# Patient Record
Sex: FEMALE | Race: WHITE | NOT HISPANIC OR LATINO | Employment: FULL TIME | URBAN - METROPOLITAN AREA
[De-identification: names, ages, dates, MRNs, and addresses within clinical notes are randomized per-mention and may not be internally consistent; named-entity substitution may affect disease eponyms.]

---
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
## 2018-01-19 ASSESSMENT — ASTHMA QUESTIONNAIRES: ACT_TOTALSCORE: 13

## 2018-02-07 ENCOUNTER — TELEPHONE (OUTPATIENT)
Dept: FAMILY MEDICINE | Facility: CLINIC | Age: 18
End: 2018-02-07

## 2018-02-07 NOTE — TELEPHONE ENCOUNTER
Called patient and made her an appt for 2/8. Asked her if she wanted to talk with triage and she declined. States that she is not having any suicidal thoughts or anything. Just feeling anxious. I did talk with ELLY Patel and she said if she gets worse before appt she should go to the ER. Patient agrees with plan.  ADRIANE/MA

## 2018-02-07 NOTE — TELEPHONE ENCOUNTER
Reason for Call:  Same Day Appointment, Requested Provider:  Oscar Medley M.D.    PCP: Oscar Medley    Reason for visit: Patient is scheduled for an appt with PCP for severe anxiety. She would really like to get worked in sooner if able. Please call her back to let her know if there is any chance she can be worked in sooner.     Duration of symptoms:     Have you been treated for this in the past? Yes    Additional comments:     Can we leave a detailed message on this number? YES    Phone number patient can be reached at: Home number on file [phone] (home)    Best Time: any    Call taken on 2/7/2018 at 3:21 PM by Jacklyn Felix

## 2018-02-08 ENCOUNTER — OFFICE VISIT (OUTPATIENT)
Dept: FAMILY MEDICINE | Facility: CLINIC | Age: 18
End: 2018-02-08
Payer: COMMERCIAL

## 2018-02-08 VITALS
TEMPERATURE: 97.9 F | WEIGHT: 283 LBS | BODY MASS INDEX: 41.92 KG/M2 | HEIGHT: 69 IN | OXYGEN SATURATION: 98 % | DIASTOLIC BLOOD PRESSURE: 72 MMHG | SYSTOLIC BLOOD PRESSURE: 122 MMHG | RESPIRATION RATE: 20 BRPM | HEART RATE: 100 BPM

## 2018-02-08 DIAGNOSIS — F32.1 MAJOR DEPRESSIVE DISORDER, SINGLE EPISODE, MODERATE (H): ICD-10-CM

## 2018-02-08 DIAGNOSIS — F41.1 GAD (GENERALIZED ANXIETY DISORDER): Primary | ICD-10-CM

## 2018-02-08 PROCEDURE — 99214 OFFICE O/P EST MOD 30 MIN: CPT | Performed by: FAMILY MEDICINE

## 2018-02-08 RX ORDER — VENLAFAXINE HYDROCHLORIDE 37.5 MG/1
37.5 CAPSULE, EXTENDED RELEASE ORAL DAILY
Qty: 60 CAPSULE | Refills: 3 | Status: ON HOLD | OUTPATIENT
Start: 2018-02-08 | End: 2018-02-22

## 2018-02-08 ASSESSMENT — PAIN SCALES - GENERAL: PAINLEVEL: NO PAIN (0)

## 2018-02-08 ASSESSMENT — ANXIETY QUESTIONNAIRES
IF YOU CHECKED OFF ANY PROBLEMS ON THIS QUESTIONNAIRE, HOW DIFFICULT HAVE THESE PROBLEMS MADE IT FOR YOU TO DO YOUR WORK, TAKE CARE OF THINGS AT HOME, OR GET ALONG WITH OTHER PEOPLE: SOMEWHAT DIFFICULT
6. BECOMING EASILY ANNOYED OR IRRITABLE: SEVERAL DAYS
2. NOT BEING ABLE TO STOP OR CONTROL WORRYING: MORE THAN HALF THE DAYS
3. WORRYING TOO MUCH ABOUT DIFFERENT THINGS: MORE THAN HALF THE DAYS
GAD7 TOTAL SCORE: 13
5. BEING SO RESTLESS THAT IT IS HARD TO SIT STILL: SEVERAL DAYS
1. FEELING NERVOUS, ANXIOUS, OR ON EDGE: MORE THAN HALF THE DAYS
7. FEELING AFRAID AS IF SOMETHING AWFUL MIGHT HAPPEN: NEARLY EVERY DAY

## 2018-02-08 ASSESSMENT — PATIENT HEALTH QUESTIONNAIRE - PHQ9: 5. POOR APPETITE OR OVEREATING: MORE THAN HALF THE DAYS

## 2018-02-08 NOTE — MR AVS SNAPSHOT
After Visit Summary   2/8/2018    Sasha Hand    MRN: [id number]           Patient Information     Date Of Birth          2000        Visit Information        Provider Department      2/8/2018 2:00 PM Oscar Medley MD [facility]        Today's Diagnoses     DELIA (generalized anxiety disorder)    -  1    Major depressive disorder, single episode, moderate (H)           Follow-ups after your visit        Your next 10 appointments already scheduled     Mar 27, 2018 10:10 AM CDT   Office Visit with Oscar Medley MD   [facility]    [address] [place] [id number] [phone]           Bring a current list of meds and any records pertaining to this visit. For Physicals, please bring immunization records and any forms needing to be filled out. Please arrive 10 minutes early to complete paperwork.              Who to contact     If you have questions or need follow up information about today's clinic visit or your schedule please contact [facility] directly at [phone].  Normal or non-critical lab and imaging results will be communicated to you by MyChart, letter or phone within 4 business days after the clinic has received the results. If you do not hear from us within 7 days, please contact the clinic through [email or web address] or phone. If you have a critical or abnormal lab result, we will notify you by phone as soon as possible.  Submit refill requests through [employer] or call your pharmacy and they will forward the refill request to us. Please allow 3 business days for your refill to be completed.          Additional Information About Your Visit        MyChart Information     [employer] gives you secure access to your electronic health record. If you see a primary care provider, you can also send messages to your care team and make appointments. If you have questions, please call your primary care clinic.   "If you do not have a primary care provider, please call [phone] and they will assist you.        Care EveryWhere ID     This is your Care EveryWhere ID. This could be used by other organizations to access your [facility] medical records  [id number]        Your Vitals Were     Pulse Temperature Respirations Height Pulse Oximetry BMI (Body Mass Index)    100 97.9  F (36.6  C) (Tympanic) 20 5' 9\" (1.753 m) 98% 41.79 kg/m2       Blood Pressure from Last 3 Encounters:   02/08/18 122/72   12/09/17 149/87   12/07/17 130/74    Weight from Last 3 Encounters:   02/08/18 283 lb (128.4 kg) (>99 %)*   11/14/17 276 lb (125.2 kg) (>99 %)*   10/22/17 265 lb (120.2 kg) (>99 %)*     * Growth percentiles are based on [facility] 2-20 Years data.              Today, you had the following     No orders found for display         Today's Medication Changes          These changes are accurate as of 2/8/18  5:24 PM.  If you have any questions, ask your nurse or doctor.               Start taking these medicines.        Dose/Directions    venlafaxine 37.5 MG 24 hr capsule   Commonly known as:  EFFEXOR-XR   Used for:  DELIA (generalized anxiety disorder), Major depressive disorder, single episode, moderate (H)   Started by:  Oscar Medley MD        Dose:  37.5 mg   Take 1 capsule (37.5 mg) by mouth daily (may increase to 75 mg after 2 weeks if needed)   Quantity:  60 capsule   Refills:  3            Where to get your medicines      These medications were sent to [facility] [address] [place] [address] [id number] [facility] [address]     Phone:  [phone]     venlafaxine 37.5 MG 24 hr capsule                Primary Care Provider Office Phone # Fax #    Oscar Medley [phone] [address] [place] [address]        Equal Access to Services     [facility]: Janeth Glasgow, sekou najera, qayblester staton" ah. So [facility] [phone].    ATENCIÓN: Si dean piper, tiene a de la rosa disposición servicios gratuitos de asistencia lingüística. Emely al [phone].    We comply with applicable federal civil rights laws and [place] laws. We do not discriminate on the basis of race, color, national origin, age, disability, sex, sexual orientation, or gender identity.            Thank you!     Thank you for choosing [facility]  for your care. Our goal is always to provide you with excellent care. Hearing back from our patients is one way we can continue to improve our services. Please take a few minutes to complete the written survey that you may receive in the mail after your visit with us. Thank you!             Your Updated Medication List - Protect others around you: Learn how to safely use, store and throw away your medicines at www.disposemymeds.org.          This list is accurate as of 2/8/18  5:24 PM.  Always use your most recent med list.                   Brand Name Dispense Instructions for use Diagnosis    etonogestrel 68 MG Impl    IMPLANON/NEXPLANON     1 each (68 mg) by Subdermal route continuous    Encounter for initial prescription of implantable subdermal contraceptive       montelukast 10 MG tablet    SINGULAIR    90 tablet    Take 1 tablet (10 mg) by mouth At Bedtime    Mild persistent asthma without complication       valACYclovir 500 MG tablet    VALTREX    90 tablet    Take 1 tablet (500 mg) by mouth daily    Genital herpes simplex, unspecified site       venlafaxine 37.5 MG 24 hr capsule    EFFEXOR-XR    60 capsule    Take 1 capsule (37.5 mg) by mouth daily (may increase to 75 mg after 2 weeks if needed)    DELIA (generalized anxiety disorder), Major depressive disorder, single episode, moderate (H)

## 2018-02-08 NOTE — PROGRESS NOTES
SUBJECTIVE:   Sasha Hand is a 17 year old female who presents to clinic today for the following health issues:      Anxiety Follow-Up    Status since last visit: Worsened     Other associated symptoms:None    Complicating factors:   Significant life event: No   Current substance abuse: None  Depression symptoms: No  No flowsheet data found.    DELIA-7    Amount of exercise or physical activity: None    Problems taking medications regularly: No    Medication side effects: none    Diet: regular (no restrictions)    DELIA-7 SCORE 2/8/2018   Total Score 13     PHQ-9 SCORE 1/20/2014 2/8/2018   Total Score 13 -   Total Score - 19         PROBLEMS TO ADD ON...  Patient has been under a lot of stress she and her boyfriend are living with her parents and they were just told that they needed to start paying rent which they did, but now have been told that they need to be out by March 19.  They also have a friend of hers and her 3 children living with her parents so she is helping care for those kids and keeping them under wraps so that there is not too much chaos.  Along with the stress, she has the stress of her father's health.  He has been depending on her a lot to help with things and he is quite demanding.  She denies any suicidal or homicidal ideation but her PHQ 9 is elevated as is her DELIA 7.  Her anxiety seems worse.  She feels with all the stress she has been eating a lot more and this is the heaviest she has ever been.  She has gained almost 35 pounds since May 2017.  Her BMI is 41.79.  She also notes that her cigarette smoking has increased to a pack and a half per day.  This bothers her because her father has significant lung disease i.e. severe asthma with COPD.  See above.    Problem list and histories reviewed & adjusted, as indicated.  Additional history: as documented    Patient Active Problem List   Diagnosis     Obesity     Tear of lateral cartilage or meniscus of knee, current     Menorrhagia      "Dysmenorrhea     Wheezing     Tobacco use disorder     Genital herpes simplex, unspecified site     Mild persistent asthma without complication     Nexplanon placed 11/14/17     Past Surgical History:   Procedure Laterality Date     ARTHROSCOPY KNEE WITH MENISCAL REPAIR Right 5/10/2017    Procedure: ARTHROSCOPY KNEE WITH MENISCAL REPAIR;  arthroscopy right knee with lateral meniscus repair;  Surgeon: Nathaniel Hicks MD;  Location: [facility] OR     NO HISTORY OF SURGERY         Social History   Substance Use Topics     Smoking status: Current Every Day Smoker     Packs/day: 0.50     Types: Cigarettes     Smokeless tobacco: Never Used     Alcohol use No     Family History   Problem Relation Age of Onset     Asthma Mother      Hypertension Mother      Asthma Father      DIABETES Father      CANCER Paternal Grandmother          Allergies   Allergen Reactions     No Known Drug Allergies      Seasonal Allergies      Recent Labs   Lab Test  08/21/17   1714   TSH  2.30      BP Readings from Last 3 Encounters:   02/08/18 122/72   12/09/17 149/87   12/07/17 130/74    Wt Readings from Last 3 Encounters:   02/08/18 283 lb (128.4 kg) (>99 %)*   11/14/17 276 lb (125.2 kg) (>99 %)*   10/22/17 265 lb (120.2 kg) (>99 %)*     * Growth percentiles are based on CDC 2-20 Years data.         Labs reviewed in EPIC    Reviewed and updated as needed this visit by clinical staff  Tobacco  Allergies  Meds  Problems       Reviewed and updated as needed this visit by Provider         ROS:  Constitutional, HEENT, cardiovascular, pulmonary, gi and gu systems are negative, except as otherwise noted.    OBJECTIVE:     /72  Pulse 100  Temp 97.9  F (36.6  C) (Tympanic)  Resp 20  Ht 5' 9\" (1.753 m)  Wt 283 lb (128.4 kg)  SpO2 98%  BMI 41.79 kg/m2  Body mass index is 41.79 kg/(m^2).  GENERAL: healthy, alert and no distress  NECK: no adenopathy, no asymmetry, masses, or scars and thyroid normal to palpation  RESP: lungs clear to " "auscultation - no rales, rhonchi or wheezes  CV: regular rate and rhythm, normal S1 S2, no S3 or S4, no murmur, click or rub, no peripheral edema and peripheral pulses strong    Diagnostic Test Results:  None     ASSESSMENT/PLAN:   (F41.1) DELIA (generalized anxiety disorder)  (primary encounter diagnosis)  (F32.1) Major depressive disorder, single episode, moderate (H)  Comment: A lot of her stress and anxiety I believe is situational due to the living situation and the stress that she feels from her dad and his dependency on her for help.  Plan: venlafaxine (EFFEXOR-XR) 37.5 MG 24 hr capsule        She is agreeable to start a medication as noted above.  She will take it in the morning and we discussed potential and reportable side effects of the medication.  We will start low at 37.5 mg and after 2 weeks if she is tolerating it well can go up to 75 mg if needed.  She has an appointment with me in March for follow-up and to repeat PHQ 9 and DELIA 7 scores.         Tobacco Cessation:   reports that she has been smoking Cigarettes.  She has been smoking about 0.50 packs per day. She has never used smokeless tobacco.  Tobacco Cessation Action Plan: Information offered: Patient not interested at this time    BMI:   Estimated body mass index is 41.79 kg/(m^2) as calculated from the following:    Height as of this encounter: 5' 9\" (1.753 m).    Weight as of this encounter: 283 lb (128.4 kg).   Weight management plan: She did not want to discuss this at this visit.    Electronically signed by:  Oscar Medley M.D.  2/8/2018    "

## 2018-02-08 NOTE — NURSING NOTE
"Chief Complaint   Patient presents with     Anxiety     having some anxiety attacks ongoing just after Deepali       Initial Temp 97.9  F (36.6  C) (Tympanic)  Ht 5' 9\" (1.753 m)  Wt 283 lb (128.4 kg)  SpO2 98%  BMI 41.79 kg/m2 Estimated body mass index is 41.79 kg/(m^2) as calculated from the following:    Height as of this encounter: 5' 9\" (1.753 m).    Weight as of this encounter: 283 lb (128.4 kg).  Medication Reconciliation: complete    "

## 2018-02-09 PROBLEM — F32.1 MAJOR DEPRESSIVE DISORDER, SINGLE EPISODE, MODERATE (H): Status: ACTIVE | Noted: 2018-02-09

## 2018-02-09 PROBLEM — F41.1 GAD (GENERALIZED ANXIETY DISORDER): Status: ACTIVE | Noted: 2018-02-09

## 2018-02-09 ASSESSMENT — ANXIETY QUESTIONNAIRES: GAD7 TOTAL SCORE: 13

## 2018-02-09 ASSESSMENT — PATIENT HEALTH QUESTIONNAIRE - PHQ9: SUM OF ALL RESPONSES TO PHQ QUESTIONS 1-9: 19

## 2018-02-19 ENCOUNTER — HOSPITAL ENCOUNTER (INPATIENT)
Facility: CLINIC | Age: 18
LOS: 2 days | Discharge: HOME | End: 2018-02-22
Attending: PHYSICIAN ASSISTANT | Admitting: FAMILY MEDICINE
Payer: COMMERCIAL

## 2018-02-19 ENCOUNTER — APPOINTMENT (OUTPATIENT)
Dept: ULTRASOUND IMAGING | Facility: CLINIC | Age: 18
End: 2018-02-19
Attending: PHYSICIAN ASSISTANT
Payer: COMMERCIAL

## 2018-02-19 DIAGNOSIS — F32.1 MAJOR DEPRESSIVE DISORDER, SINGLE EPISODE, MODERATE (H): ICD-10-CM

## 2018-02-19 DIAGNOSIS — F41.1 GAD (GENERALIZED ANXIETY DISORDER): ICD-10-CM

## 2018-02-19 DIAGNOSIS — N12 PYELONEPHRITIS: ICD-10-CM

## 2018-02-19 DIAGNOSIS — R11.0 NAUSEA: Primary | ICD-10-CM

## 2018-02-19 PROBLEM — N10 ACUTE PYELONEPHRITIS: Status: ACTIVE | Noted: 2018-02-19

## 2018-02-19 LAB
ALBUMIN SERPL-MCNC: 3.3 G/DL (ref 3.4–5)
ALBUMIN UR-MCNC: NEGATIVE MG/DL
ALP SERPL-CCNC: 86 U/L (ref 40–150)
ALT SERPL W P-5'-P-CCNC: 25 U/L (ref 0–50)
ANION GAP SERPL CALCULATED.3IONS-SCNC: 7 MMOL/L (ref 3–14)
APPEARANCE UR: ABNORMAL
AST SERPL W P-5'-P-CCNC: 8 U/L (ref 0–35)
BACTERIA #/AREA URNS HPF: ABNORMAL /HPF
BASOPHILS # BLD AUTO: 0 10E9/L (ref 0–0.2)
BASOPHILS NFR BLD AUTO: 0.1 %
BILIRUB SERPL-MCNC: 0.6 MG/DL (ref 0.2–1.3)
BILIRUB UR QL STRIP: NEGATIVE
BUN SERPL-MCNC: 5 MG/DL (ref 7–30)
CALCIUM SERPL-MCNC: 8.7 MG/DL (ref 8.5–10.1)
CHLORIDE SERPL-SCNC: 109 MMOL/L (ref 96–110)
CO2 SERPL-SCNC: 25 MMOL/L (ref 20–32)
COLOR UR AUTO: YELLOW
CREAT SERPL-MCNC: 0.61 MG/DL (ref 0.5–1)
CRP SERPL-MCNC: 162 MG/L (ref 0–8)
DIFFERENTIAL METHOD BLD: ABNORMAL
EOSINOPHIL # BLD AUTO: 0.2 10E9/L (ref 0–0.7)
EOSINOPHIL NFR BLD AUTO: 1.1 %
ERYTHROCYTE [DISTWIDTH] IN BLOOD BY AUTOMATED COUNT: 12.8 % (ref 10–15)
FLUAV+FLUBV AG SPEC QL: NEGATIVE
FLUAV+FLUBV AG SPEC QL: NEGATIVE
GFR SERPL CREATININE-BSD FRML MDRD: >90 ML/MIN/1.7M2
GLUCOSE SERPL-MCNC: 91 MG/DL (ref 70–99)
GLUCOSE UR STRIP-MCNC: NEGATIVE MG/DL
HCT VFR BLD AUTO: 41.7 % (ref 35–47)
HGB BLD-MCNC: 13.6 G/DL (ref 11.7–15.7)
HGB UR QL STRIP: ABNORMAL
IMM GRANULOCYTES # BLD: 0 10E9/L (ref 0–0.4)
IMM GRANULOCYTES NFR BLD: 0.2 %
KETONES UR STRIP-MCNC: NEGATIVE MG/DL
LEUKOCYTE ESTERASE UR QL STRIP: ABNORMAL
LIPASE SERPL-CCNC: 67 U/L (ref 73–393)
LYMPHOCYTES # BLD AUTO: 1.9 10E9/L (ref 0.8–5.3)
LYMPHOCYTES NFR BLD AUTO: 12.4 %
MCH RBC QN AUTO: 28.5 PG (ref 26.5–33)
MCHC RBC AUTO-ENTMCNC: 32.6 G/DL (ref 31.5–36.5)
MCV RBC AUTO: 87 FL (ref 78–100)
MONOCYTES # BLD AUTO: 1.3 10E9/L (ref 0–1.3)
MONOCYTES NFR BLD AUTO: 8.7 %
MUCOUS THREADS #/AREA URNS LPF: PRESENT /LPF
NEUTROPHILS # BLD AUTO: 11.7 10E9/L (ref 1.6–8.3)
NEUTROPHILS NFR BLD AUTO: 77.5 %
NITRATE UR QL: POSITIVE
PH UR STRIP: 8 PH (ref 5–7)
PLATELET # BLD AUTO: 262 10E9/L (ref 150–450)
POTASSIUM SERPL-SCNC: 3.3 MMOL/L (ref 3.4–5.3)
PROT SERPL-MCNC: 7.3 G/DL (ref 6.8–8.8)
RBC # BLD AUTO: 4.77 10E12/L (ref 3.8–5.2)
RBC #/AREA URNS AUTO: 1 /HPF (ref 0–2)
SODIUM SERPL-SCNC: 141 MMOL/L (ref 133–144)
SOURCE: ABNORMAL
SP GR UR STRIP: 1.01 (ref 1–1.03)
SPECIMEN SOURCE: NORMAL
SQUAMOUS #/AREA URNS AUTO: <1 /HPF (ref 0–1)
UROBILINOGEN UR STRIP-MCNC: 4 MG/DL (ref 0–2)
WBC # BLD AUTO: 15.1 10E9/L (ref 4–11)
WBC #/AREA URNS AUTO: 28 /HPF (ref 0–2)

## 2018-02-19 PROCEDURE — 86140 C-REACTIVE PROTEIN: CPT | Performed by: PHYSICIAN ASSISTANT

## 2018-02-19 PROCEDURE — 87186 SC STD MICRODIL/AGAR DIL: CPT | Performed by: PHYSICIAN ASSISTANT

## 2018-02-19 PROCEDURE — 81001 URINALYSIS AUTO W/SCOPE: CPT | Performed by: PHYSICIAN ASSISTANT

## 2018-02-19 PROCEDURE — 96375 TX/PRO/DX INJ NEW DRUG ADDON: CPT

## 2018-02-19 PROCEDURE — 96376 TX/PRO/DX INJ SAME DRUG ADON: CPT

## 2018-02-19 PROCEDURE — 80053 COMPREHEN METABOLIC PANEL: CPT | Performed by: PHYSICIAN ASSISTANT

## 2018-02-19 PROCEDURE — 76705 ECHO EXAM OF ABDOMEN: CPT

## 2018-02-19 PROCEDURE — 83690 ASSAY OF LIPASE: CPT | Performed by: PHYSICIAN ASSISTANT

## 2018-02-19 PROCEDURE — 96374 THER/PROPH/DIAG INJ IV PUSH: CPT

## 2018-02-19 PROCEDURE — 99207 ZZC CDG-MDM COMPONENT: MEETS MODERATE - UP CODED: CPT | Performed by: FAMILY MEDICINE

## 2018-02-19 PROCEDURE — 87086 URINE CULTURE/COLONY COUNT: CPT | Performed by: PHYSICIAN ASSISTANT

## 2018-02-19 PROCEDURE — 87088 URINE BACTERIA CULTURE: CPT | Performed by: PHYSICIAN ASSISTANT

## 2018-02-19 PROCEDURE — 99285 EMERGENCY DEPT VISIT HI MDM: CPT | Mod: 25

## 2018-02-19 PROCEDURE — 25000128 H RX IP 250 OP 636: Performed by: PHYSICIAN ASSISTANT

## 2018-02-19 PROCEDURE — 85025 COMPLETE CBC W/AUTO DIFF WBC: CPT | Performed by: PHYSICIAN ASSISTANT

## 2018-02-19 PROCEDURE — 99285 EMERGENCY DEPT VISIT HI MDM: CPT | Mod: 25 | Performed by: FAMILY MEDICINE

## 2018-02-19 PROCEDURE — 99220 ZZC INITIAL OBSERVATION CARE,LEVL III: CPT | Performed by: FAMILY MEDICINE

## 2018-02-19 PROCEDURE — 87804 INFLUENZA ASSAY W/OPTIC: CPT | Performed by: PHYSICIAN ASSISTANT

## 2018-02-19 RX ORDER — KETOROLAC TROMETHAMINE 30 MG/ML
30 INJECTION, SOLUTION INTRAMUSCULAR; INTRAVENOUS ONCE
Status: COMPLETED | OUTPATIENT
Start: 2018-02-19 | End: 2018-02-19

## 2018-02-19 RX ORDER — POTASSIUM CHLORIDE 7.45 MG/ML
10 INJECTION INTRAVENOUS
Status: DISCONTINUED | OUTPATIENT
Start: 2018-02-19 | End: 2018-02-22 | Stop reason: HOSPADM

## 2018-02-19 RX ORDER — POTASSIUM CL/LIDO/0.9 % NACL 10MEQ/0.1L
10 INTRAVENOUS SOLUTION, PIGGYBACK (ML) INTRAVENOUS
Status: DISCONTINUED | OUTPATIENT
Start: 2018-02-19 | End: 2018-02-22 | Stop reason: HOSPADM

## 2018-02-19 RX ORDER — VALACYCLOVIR HYDROCHLORIDE 500 MG/1
500 TABLET, FILM COATED ORAL AT BEDTIME
Status: DISCONTINUED | OUTPATIENT
Start: 2018-02-19 | End: 2018-02-22 | Stop reason: HOSPADM

## 2018-02-19 RX ORDER — POTASSIUM CHLORIDE 1500 MG/1
20-40 TABLET, EXTENDED RELEASE ORAL
Status: DISCONTINUED | OUTPATIENT
Start: 2018-02-19 | End: 2018-02-22 | Stop reason: HOSPADM

## 2018-02-19 RX ORDER — NALOXONE HYDROCHLORIDE 0.4 MG/ML
.1-.4 INJECTION, SOLUTION INTRAMUSCULAR; INTRAVENOUS; SUBCUTANEOUS
Status: DISCONTINUED | OUTPATIENT
Start: 2018-02-19 | End: 2018-02-22 | Stop reason: HOSPADM

## 2018-02-19 RX ORDER — ONDANSETRON 2 MG/ML
4 INJECTION INTRAMUSCULAR; INTRAVENOUS ONCE
Status: COMPLETED | OUTPATIENT
Start: 2018-02-19 | End: 2018-02-19

## 2018-02-19 RX ORDER — VENLAFAXINE HYDROCHLORIDE 75 MG/1
75 CAPSULE, EXTENDED RELEASE ORAL DAILY
Status: DISCONTINUED | OUTPATIENT
Start: 2018-02-20 | End: 2018-02-22 | Stop reason: HOSPADM

## 2018-02-19 RX ORDER — POTASSIUM CHLORIDE 1.5 G/1.58G
20-40 POWDER, FOR SOLUTION ORAL
Status: DISCONTINUED | OUTPATIENT
Start: 2018-02-19 | End: 2018-02-22 | Stop reason: HOSPADM

## 2018-02-19 RX ORDER — ONDANSETRON 4 MG/1
4 TABLET, ORALLY DISINTEGRATING ORAL EVERY 6 HOURS PRN
Status: DISCONTINUED | OUTPATIENT
Start: 2018-02-19 | End: 2018-02-22 | Stop reason: HOSPADM

## 2018-02-19 RX ORDER — SODIUM CHLORIDE 9 MG/ML
1000 INJECTION, SOLUTION INTRAVENOUS CONTINUOUS
Status: DISCONTINUED | OUTPATIENT
Start: 2018-02-19 | End: 2018-02-21

## 2018-02-19 RX ORDER — KETOROLAC TROMETHAMINE 30 MG/ML
30 INJECTION, SOLUTION INTRAMUSCULAR; INTRAVENOUS EVERY 6 HOURS
Status: DISCONTINUED | OUTPATIENT
Start: 2018-02-20 | End: 2018-02-21

## 2018-02-19 RX ORDER — HYDROMORPHONE HYDROCHLORIDE 1 MG/ML
0.5 INJECTION, SOLUTION INTRAMUSCULAR; INTRAVENOUS; SUBCUTANEOUS ONCE
Status: COMPLETED | OUTPATIENT
Start: 2018-02-19 | End: 2018-02-19

## 2018-02-19 RX ORDER — MONTELUKAST SODIUM 10 MG/1
10 TABLET ORAL AT BEDTIME
Status: DISCONTINUED | OUTPATIENT
Start: 2018-02-19 | End: 2018-02-22 | Stop reason: HOSPADM

## 2018-02-19 RX ORDER — PROCHLORPERAZINE 25 MG
25 SUPPOSITORY, RECTAL RECTAL EVERY 12 HOURS PRN
Status: DISCONTINUED | OUTPATIENT
Start: 2018-02-19 | End: 2018-02-22 | Stop reason: HOSPADM

## 2018-02-19 RX ORDER — POTASSIUM CHLORIDE 29.8 MG/ML
20 INJECTION INTRAVENOUS
Status: DISCONTINUED | OUTPATIENT
Start: 2018-02-19 | End: 2018-02-20 | Stop reason: CLARIF

## 2018-02-19 RX ORDER — LIDOCAINE 40 MG/G
CREAM TOPICAL
Status: DISCONTINUED | OUTPATIENT
Start: 2018-02-19 | End: 2018-02-22 | Stop reason: HOSPADM

## 2018-02-19 RX ORDER — HYDROMORPHONE HYDROCHLORIDE 1 MG/ML
0.3 INJECTION, SOLUTION INTRAMUSCULAR; INTRAVENOUS; SUBCUTANEOUS
Status: DISCONTINUED | OUTPATIENT
Start: 2018-02-19 | End: 2018-02-22 | Stop reason: HOSPADM

## 2018-02-19 RX ORDER — PROCHLORPERAZINE MALEATE 5 MG
10 TABLET ORAL EVERY 6 HOURS PRN
Status: DISCONTINUED | OUTPATIENT
Start: 2018-02-19 | End: 2018-02-22 | Stop reason: HOSPADM

## 2018-02-19 RX ORDER — ONDANSETRON 2 MG/ML
4 INJECTION INTRAMUSCULAR; INTRAVENOUS EVERY 6 HOURS PRN
Status: DISCONTINUED | OUTPATIENT
Start: 2018-02-19 | End: 2018-02-22 | Stop reason: HOSPADM

## 2018-02-19 RX ORDER — ACETAMINOPHEN 325 MG/1
650 TABLET ORAL EVERY 4 HOURS PRN
Status: DISCONTINUED | OUTPATIENT
Start: 2018-02-19 | End: 2018-02-21

## 2018-02-19 RX ORDER — HYDROMORPHONE HYDROCHLORIDE 1 MG/ML
0.2 INJECTION, SOLUTION INTRAMUSCULAR; INTRAVENOUS; SUBCUTANEOUS ONCE
Status: COMPLETED | OUTPATIENT
Start: 2018-02-19 | End: 2018-02-19

## 2018-02-19 RX ADMIN — HYDROMORPHONE HYDROCHLORIDE 0.5 MG: 1 INJECTION, SOLUTION INTRAMUSCULAR; INTRAVENOUS; SUBCUTANEOUS at 21:13

## 2018-02-19 RX ADMIN — KETOROLAC TROMETHAMINE 30 MG: 30 INJECTION, SOLUTION INTRAMUSCULAR at 20:43

## 2018-02-19 RX ADMIN — SODIUM CHLORIDE 1000 ML: 9 INJECTION, SOLUTION INTRAVENOUS at 23:05

## 2018-02-19 RX ADMIN — CEFTRIAXONE 1 G: 1 INJECTION, SOLUTION INTRAVENOUS at 23:49

## 2018-02-19 RX ADMIN — ONDANSETRON 4 MG: 2 INJECTION INTRAMUSCULAR; INTRAVENOUS at 23:05

## 2018-02-19 RX ADMIN — HYDROMORPHONE HYDROCHLORIDE 0.2 MG: 1 INJECTION, SOLUTION INTRAMUSCULAR; INTRAVENOUS; SUBCUTANEOUS at 23:04

## 2018-02-19 ASSESSMENT — ENCOUNTER SYMPTOMS
NAUSEA: 1
DYSURIA: 0
VOMITING: 0
ABDOMINAL PAIN: 1
SHORTNESS OF BREATH: 0
PHOTOPHOBIA: 1
HEADACHES: 1
RHINORRHEA: 0
COUGH: 0
SORE THROAT: 0
FEVER: 1
DIARRHEA: 0

## 2018-02-19 NOTE — IP AVS SNAPSHOT
[address] [facility]    [address] [place] [address]    Phone:  [phone]                                       After Visit Summary   2/19/2018    Sasha Hand    MRN: [id number]           After Visit Summary Signature Page     I have received my discharge instructions, and my questions have been answered. I have discussed any challenges I see with this plan with the nurse or doctor.    ..........................................................................................................................................  Patient/Patient Representative Signature      ..........................................................................................................................................  Patient Representative Print Name and Relationship to Patient    ..................................................               ................................................  Date                                            Time    ..........................................................................................................................................  Reviewed by Signature/Title    ...................................................              ..............................................  Date                                                            Time

## 2018-02-19 NOTE — IP AVS SNAPSHOT
MRN:[id number]                      After Visit Summary   2/19/2018    Sasha Hand    MRN: [id number]           Thank you!     Thank you for choosing [facility] for your care. Our goal is always to provide you with excellent care. Hearing back from our patients is one way we can continue to improve our services. Please take a few minutes to complete the written survey that you may receive in the mail after you visit with us. Thank you!        Patient Information     Date Of Birth          2000        Designated Caregiver       Most Recent Value    Caregiver    Will someone help with your care after discharge? no      About your hospital stay     You were admitted on:  February 19, 2018 You last received care in the:  [address]    You were discharged on:  February 22, 2018        Reason for your hospital stay       Acute pyelonephritis (kidney infection) which the culture has showed to be caused by the bacteria E coli, one of the most common bacterias that cause this type of infection.  You have been in antibiotics during this hospitalization and will continue on the Cipro antibiotic twice a day for another 7 days as you go home - please take all of the antibiotics until they are gone, even if you are feeling better before then.  Enjoy going home!                  Who to Call     For medical emergencies, please call 911.  For non-urgent questions about your medical care, please call your primary care provider or clinic, [phone]          Attending Provider     Provider Specialty    Leti Fox PA-C Physician Assistant    Chidi Chi MD [facility]    Blossom Beltran MD Family Practice       Primary Care Provider Office Phone # Fax #    Oscar Medley [phone]      After Care Instructions     Activity       Your activity upon discharge: activity as tolerated            Diet       Follow this diet upon  "discharge: Regular                  Follow-up Appointments     Follow-up and recommended labs and tests        Follow up with primary care provider, Oscar Medley MD, within 7 days for hospital follow- up.                  Your next 10 appointments already scheduled     Mar 01, 2018 11:30 AM CST   [facility]    [address] [place] [id number] [phone]           Bring a current list of meds and any records pertaining to this visit. For Physicals, please bring immunization records and any forms needing to be filled out. Please arrive 10 minutes early to complete paperwork.            Mar 27, 2018 10:10 AM CDT   Office Visit with Oscar Medley MD   [facility] ([address] [id number] [phone]           Bring a current list of meds and any records pertaining to this visit. For Physicals, please bring immunization records and any forms needing to be filled out. Please arrive 10 minutes early to complete paperwork.              Pending Results     No orders found from 2/17/2018 to 2/20/2018.            Statement of Approval     Ordered          02/22/18 0949  I have reviewed and agree with all the recommendations and orders detailed in this document.  EFFECTIVE NOW     Approved and electronically signed by:  Blossom Beltran MD             Admission Information     Date & Time Provider Department Dept. Phone    2/19/2018 Blossom Beltran MD [address] [facility] [phone]      Your Vitals Were     Blood Pressure Pulse Temperature Respirations Height Weight    118/60 69 96.5  F (35.8  C) (Oral) 16 1.702 m (5' 7\") 126.5 kg (278 lb 14.1 oz)    Pulse Oximetry BMI (Body Mass Index)                97% 43.68 kg/m2          MyChart Information     [employer] gives you secure access to your electronic " health record. If you see a primary care provider, you can also send messages to your care team and make appointments. If you have questions, please call your primary care clinic.  If you do not have a primary care provider, please call [phone] and they will assist you.        Care EveryWhere ID     This is your Care EveryWhere ID. This could be used by other organizations to access your [facility] medical records  [id number]        Equal Access to Services     MILAD HANSON : Hadbetty Glasgow, wajosephda dania, qaybta kaalmada vlad, lester gilmoreabdifatahkailyn castillo . So [facility] [phone].    ATENCIÓN: Si maria ala feliz, tiene a de la rosa disposición servicios gratuitos de asistencia lingüística. Llame al [phone].    We comply with applicable federal civil rights laws and [place] laws. We do not discriminate on the basis of race, color, national origin, age, disability, sex, sexual orientation, or gender identity.               Review of your medicines      START taking        Dose / Directions    acetaminophen 325 MG tablet   Commonly known as:  TYLENOL        Dose:  325 mg   Take 1 tablet (325 mg) by mouth every 4 hours as needed for mild pain or fever   Quantity:  100 tablet   Refills:  0       ciprofloxacin 500 MG tablet   Commonly known as:  CIPRO   Indication:  Pyelonephritis   Used for:  Pyelonephritis        Dose:  500 mg   Take 1 tablet (500 mg) by mouth every 12 hours for 7 days   Quantity:  14 tablet   Refills:  0       ibuprofen 600 MG tablet   Commonly known as:  ADVIL/MOTRIN   Used for:  Pyelonephritis        Dose:  600 mg   Take 1 tablet (600 mg) by mouth every 6 hours as needed for moderate pain   Quantity:  40 tablet   Refills:  0       ondansetron 4 MG ODT tab   Commonly known as:  ZOFRAN-ODT   Used for:  Nausea        Dose:  4 mg   Take 1 tablet (4 mg) by mouth every 6 hours as needed for nausea or vomiting   Quantity:  40 tablet   Refills:  0        oxyCODONE-acetaminophen 5-325 MG per tablet   Commonly known as:  PERCOCET   Used for:  Pyelonephritis        Dose:  1 tablet   Take 1 tablet by mouth every 4 hours as needed for moderate to severe pain   Quantity:  18 tablet   Refills:  0       senna-docusate 8.6-50 MG per tablet   Commonly known as:  SENOKOT-S;PERICOLACE   Used for:  Pyelonephritis        Dose:  1 tablet   Take 1 tablet by mouth 2 times daily as needed for constipation   Quantity:  40 tablet   Refills:  0         CONTINUE these medicines which may have CHANGED, or have new prescriptions. If we are uncertain of the size of tablets/capsules you have at home, strength may be listed as something that might have changed.        Dose / Directions    venlafaxine 37.5 MG 24 hr capsule   Commonly known as:  EFFEXOR-XR   This may have changed:    - how much to take  - additional instructions   Used for:  DELIA (generalized anxiety disorder), Major depressive disorder, single episode, moderate (H)        Dose:  75 mg   Take 2 capsules (75 mg) by mouth daily 75 mg daily with food.   Refills:  0         CONTINUE these medicines which have NOT CHANGED        Dose / Directions    etonogestrel 68 MG Impl   Commonly known as:  IMPLANON/NEXPLANON   Used for:  Encounter for initial prescription of implantable subdermal contraceptive        Dose:  1 each   1 each (68 mg) by Subdermal route continuous   Refills:  0       montelukast 10 MG tablet   Commonly known as:  SINGULAIR   Used for:  Mild persistent asthma without complication        Dose:  10 mg   Take 1 tablet (10 mg) by mouth At Bedtime   Quantity:  90 tablet   Refills:  3       valACYclovir 500 MG tablet   Commonly known as:  VALTREX   Used for:  Genital herpes simplex, unspecified site        Dose:  500 mg   Take 1 tablet (500 mg) by mouth daily   Quantity:  90 tablet   Refills:  3            Where to get your medicines      These medications were sent to [facility] [place] [address]  [address]  [address], [place] [address]     Phone:  [phone]     ciprofloxacin 500 MG tablet    ibuprofen 600 MG tablet    ondansetron 4 MG ODT tab    senna-docusate 8.6-50 MG per tablet         Some of these will need a paper prescription and others can be bought over the counter. Ask your nurse if you have questions.     Bring a paper prescription for each of these medications     oxyCODONE-acetaminophen 5-325 MG per tablet                Protect others around you: Learn how to safely use, store and throw away your medicines at www.disposemymeds.org.        ANTIBIOTIC INSTRUCTION     You've Been Prescribed an Antibiotic - Now What?  Your healthcare team thinks that you or your loved one might have an infection. Some infections can be treated with antibiotics, which are powerful, life-saving drugs. Like all medications, antibiotics have side effects and should only be used when necessary. There are some important things you should know about your antibiotic treatment.      Your healthcare team may run tests before you start taking an antibiotic.    Your team may take samples (e.g., from your blood, urine or other areas) to run tests to look for bacteria. These test can be important to determine if you need an antibiotic at all and, if you do, which antibiotic will work best.      Within a few days, your healthcare team might change or even stop your antibiotic.    Your team may start you on an antibiotic while they are working to find out what is making you sick.    Your team might change your antibiotic because test results show that a different antibiotic would be better to treat your infection.    In some cases, once your team has more information, they learn that you do not need an antibiotic at all. They may find out that you don't have an infection, or that the antibiotic you're taking won't work against your infection. For example, an infection caused by a virus can't be treated with  antibiotics. Staying on an antibiotic when you don't need it is more likely to be harmful than helpful.      You may experience side effects from your antibiotic.    Like all medications, antibiotics have side effects. Some of these can be serious.    Let you healthcare team know if you have any known allergies when you are admitted to the hospital.    One significant side effect of nearly all antibiotics is the risk of severe and sometimes deadly diarrhea caused by Clostridium difficile (C. Difficile). This occurs when a person takes antibiotics because some good germs are destroyed. Antibiotic use allows C. diificile to take over, putting patients at high risk for this serious infection.    As a patient or caregiver, it is important to understand your or your loved one's antibiotic treatment. It is especially important for caregivers to speak up when patients can't speak for themselves. Here are some important questions to ask your healthcare team.    What infection is this antibiotic treating and how do you know I have that infection?    What side effects might occur from this antibiotic?    How long will I need to take this antibiotic?    Is it safe to take this antibiotic with other medications or supplements (e.g., vitamins) that I am taking?     Are there any special directions I need to know about taking this antibiotic? For example, should I take it with food?    How will I be monitored to know whether my infection is responding to the antibiotic?    What tests may help to make sure the right antibiotic is prescribed for me?      Information provided by:  www.cdc.gov/getsmart  U.S. Department of Health and Human Services  Centers for disease Control and Prevention  National Center for Emerging and Zoonotic Infectious Diseases  Division of Healthcare Quality Promotion        Information about OPIOIDS     PRESCRIPTION OPIOIDS: WHAT YOU NEED TO KNOW    Prescription opioids can be used to help relieve moderate  to severe pain and are often prescribed following a surgery or injury, or for certain health conditions. These medications can be an important part of treatment but also come with serious risks. It is important to work with your health care provider to make sure you are getting the safest, most effective care.    WHAT ARE THE RISKS AND SIDE EFFECTS OF OPIOID USE?  Prescription opioids carry serious risks of addiction and overdose, especially with prolonged use. An opioid overdose, often marked by slowed breathing can cause sudden death. The use of prescription opioids can have a number of side effects as well, even when taken as directed:      Tolerance - meaning you might need to take more of a medication for the same pain relief    Physical dependence - meaning you have symptoms of withdrawal when a medication is stopped    Increased sensitivity to pain    Constipation    Nausea, vomiting, and dry mouth    Sleepiness and dizziness    Confusion    Depression    Low levels of testosterone that can result in lower sex drive, energy, and strength    Itching and sweating    RISKS ARE GREATER WITH:    History of drug misuse, substance use disorder, or overdose    Mental health conditions (such as depression or anxiety)    Sleep apnea    Older age (65 years or older)    Pregnancy    Avoid alcohol while taking prescription opioids.   Also, unless specifically advised by your health care provider, medications to avoid include:    Benzodiazepines (such as Xanax or Valium)    Muscle relaxants (such as Soma or Flexeril)    Hypnotics (such as Ambien or Lunesta)    Other prescription opioids    KNOW YOUR OPTIONS:  Talk to your health care provider about ways to manage your pain that do not involve prescription opioids. Some of these options may actually work better and have fewer risks and side effects:    Pain relievers such as acetaminophen, ibuprofen, and naproxen    Some medications that are also used for depression or  seizures    Physical therapy and exercise    Cognitive behavioral therapy, a psychological, goal-directed approach, in which patients learn how to modify physical, behavioral, and emotional triggers of pain and stress    IF YOU ARE PRESCRIBED OPIOIDS FOR PAIN:    Never take opioids in greater amounts or more often than prescribed    Follow up with your primary health care provider and work together to create a plan on how to manage your pain.    Talk about ways to help manage your pain that do not involve prescription opioids    Talk about all concerns and side effects    Help prevent misuse and abuse    Never sell or share prescription opioids    Never use another person's prescription opioids    Store prescription opioids in a secure place and out of reach of others (this may include visitors, children, friends, and family)    Visit www.cdc.gov/drugoverdose to learn about risks of opioid abuse and overdose    If you believe you may be struggling with addiction, tell your health care provider and ask for guidance or call [facility]'s National Helpline at 0-724-770-HELP    LEARN MORE / www.cdc.gov/drugoverdose/prescribing/guideline.html    Safely dispose of unused prescription opioids: Find your local drug take-back programs and more information about the importance of safe disposal at www.doseofreality.mn.gov             Medication List: This is a list of all your medications and when to take them. Check marks below indicate your daily home schedule. Keep this list as a reference.      Medications           Morning Afternoon Evening Bedtime As Needed    acetaminophen 325 MG tablet   Commonly known as:  TYLENOL   Take 1 tablet (325 mg) by mouth every 4 hours as needed for mild pain or fever   Last time this was given:  325 mg on 2/22/2018  6:27 AM                                   ciprofloxacin 500 MG tablet   Commonly known as:  CIPRO   Take 1 tablet (500 mg) by mouth every 12 hours for 7 days   Last time this was  given:  500 mg on 2/22/2018  8:27 AM                                      etonogestrel 68 MG Impl   Commonly known as:  IMPLANON/NEXPLANON   1 each (68 mg) by Subdermal route continuous                                ibuprofen 600 MG tablet   Commonly known as:  ADVIL/MOTRIN   Take 1 tablet (600 mg) by mouth every 6 hours as needed for moderate pain   Last time this was given:  600 mg on 2/22/2018  8:27 AM                                   montelukast 10 MG tablet   Commonly known as:  SINGULAIR   Take 1 tablet (10 mg) by mouth At Bedtime   Last time this was given:  10 mg on 2/21/2018  9:43 PM                                   ondansetron 4 MG ODT tab   Commonly known as:  ZOFRAN-ODT   Take 1 tablet (4 mg) by mouth every 6 hours as needed for nausea or vomiting                                   oxyCODONE-acetaminophen 5-325 MG per tablet   Commonly known as:  PERCOCET   Take 1 tablet by mouth every 4 hours as needed for moderate to severe pain   Last time this was given:  1 tablet on 2/22/2018  6:27 AM                                   senna-docusate 8.6-50 MG per tablet   Commonly known as:  SENOKOT-S;PERICOLACE   Take 1 tablet by mouth 2 times daily as needed for constipation   Last time this was given:  1 tablet on 2/22/2018  8:27 AM                                   valACYclovir 500 MG tablet   Commonly known as:  VALTREX   Take 1 tablet (500 mg) by mouth daily   Last time this was given:  500 mg on 2/21/2018  9:43 PM                                   venlafaxine 37.5 MG 24 hr capsule   Commonly known as:  EFFEXOR-XR   Take 2 capsules (75 mg) by mouth daily 75 mg daily with food.   Last time this was given:  75 mg on 2/22/2018  7:34 AM                                             More Information        Patient Education    Ibuprofen Chewable tablet    Ibuprofen Oral capsule    Ibuprofen Oral capsule, liquid filled    Ibuprofen Oral drops, suspension    Ibuprofen Oral suspension    Ibuprofen Oral  tablet    Ibuprofen Solution for injection  Ibuprofen Oral tablet  What is this medicine?  IBUPROFEN (eye BYOO proe fen) is a non-steroidal anti-inflammatory drug (NSAID). It is used for dental pain, fever, headaches or migraines, osteoarthritis, rheumatoid arthritis, or painful monthly periods. It can also relieve minor aches and pains caused by a cold, flu, or sore throat.  This medicine may be used for other purposes; ask your health care provider or pharmacist if you have questions.  What should I tell my health care provider before I take this medicine?  They need to know if you have any of these conditions:    asthma    cigarette smoker    drink more than 3 alcohol containing drinks a day    heart disease or circulation problems such as heart failure or leg edema (fluid retention)    high blood pressure    kidney disease    liver disease    stomach bleeding or ulcers    an unusual or allergic reaction to ibuprofen, aspirin, other NSAIDS, other medicines, foods, dyes, or preservatives    pregnant or trying to get pregnant    breast-feeding  How should I use this medicine?  Take this medicine by mouth with a glass of water. Follow the directions on the prescription label. Take this medicine with food if your stomach gets upset. Try to not lie down for at least 10 minutes after you take the medicine. Take your medicine at regular intervals. Do not take your medicine more often than directed.  A special MedGuide will be given to you by the pharmacist with each prescription and refill. Be sure to read this information carefully each time.  Talk to your pediatrician regarding the use of this medicine in children. Special care may be needed.  Overdosage: If you think you have taken too much of this medicine contact a poison control center or emergency room at once.  NOTE: This medicine is only for you. Do not share this medicine with others.  What if I miss a dose?  If you miss a dose, take it as soon as you can. If  it is almost time for your next dose, take only that dose. Do not take double or extra doses.  What may interact with this medicine?  Do not take this medicine with any of the following medications:    cidofovir    ketorolac    methotrexate    pemetrexed  This medicine may also interact with the following medications:    alcohol    aspirin    diuretics    lithium    other drugs for inflammation like prednisone    warfarin  This list may not describe all possible interactions. Give your health care provider a list of all the medicines, herbs, non-prescription drugs, or dietary supplements you use. Also tell them if you smoke, drink alcohol, or use illegal drugs. Some items may interact with your medicine.  What should I watch for while using this medicine?  Tell your doctor or healthcare professional if your symptoms do not start to get better or if they get worse.  This medicine does not prevent heart attack or stroke. In fact, this medicine may increase the chance of a heart attack or stroke. The chance may increase with longer use of this medicine and in people who have heart disease. If you take aspirin to prevent heart attack or stroke, talk with your doctor or health care professional.  Do not take other medicines that contain aspirin, ibuprofen, or naproxen with this medicine. Side effects such as stomach upset, nausea, or ulcers may be more likely to occur. Many medicines available without a prescription should not be taken with this medicine.  This medicine can cause ulcers and bleeding in the stomach and intestines at any time during treatment. Ulcers and bleeding can happen without warning symptoms and can cause death. To reduce your risk, do not smoke cigarettes or drink alcohol while you are taking this medicine.  You may get drowsy or dizzy. Do not drive, use machinery, or do anything that needs mental alertness until you know how this medicine affects you. Do not stand or sit up quickly, especially if  you are an older patient. This reduces the risk of dizzy or fainting spells.  This medicine can cause you to bleed more easily. Try to avoid damage to your teeth and gums when you brush or floss your teeth.  This medicine may be used to treat migraines. If you take migraine medicines for 10 or more days a month, your migraines may get worse. Keep a diary of headache days and medicine use. Contact your healthcare professional if your migraine attacks occur more frequently.  What side effects may I notice from receiving this medicine?  Side effects that you should report to your doctor or health care professional as soon as possible:    allergic reactions like skin rash, itching or hives, swelling of the face, lips, or tongue    severe stomach pain    signs and symptoms of bleeding such as bloody or black, tarry stools; red or dark-brown urine; spitting up blood or brown material that looks like coffee grounds; red spots on the skin; unusual bruising or bleeding from the eye, gums, or nose    signs and symptoms of a blood clot such as changes in vision; chest pain; severe, sudden headache; trouble speaking; sudden numbness or weakness of the face, arm, or leg    unexplained weight gain or swelling    unusually weak or tired    yellowing of eyes or skin  Side effects that usually do not require medical attention (report to your doctor or health care professional if they continue or are bothersome):    bruising    diarrhea    dizziness, drowsiness    headache    nausea, vomiting  This list may not describe all possible side effects. Call your doctor for medical advice about side effects. You may report side effects to FDA at 7-659-IKS-8172.  Where should I keep my medicine?  Keep out of the reach of children.   Store at room temperature between 15 and 30 degrees C (59 and 86 degrees F). Keep container tightly closed. Throw away any unused medicine after the expiration date.  NOTE:This sheet is a summary. It may not  cover all possible information. If you have questions about this medicine, talk to your doctor, pharmacist, or health care provider. Copyright  2016 [employer]                Ciprofloxacin tablets  Brand Name: Cipro  What is this medicine?  CIPROFLOXACIN (sip damaris FLOX a sin) is a quinolone antibiotic. It is used to treat certain kinds of bacterial infections. It will not work for colds, flu, or other viral infections.  How should I use this medicine?  Take this medicine by mouth with a glass of water. Follow the directions on the prescription label. Take your medicine at regular intervals. Do not take your medicine more often than directed. Take all of your medicine as directed even if you think your are better. Do not skip doses or stop your medicine early.  You can take this medicine with food or on an empty stomach. It can be taken with a meal that contains dairy or calcium, but do not take it alone with a dairy product, like milk or yogurt or calcium-fortified juice.  A special MedGuide will be given to you by the pharmacist with each prescription and refill. Be sure to read this information carefully each time.  Talk to your pediatrician regarding the use of this medicine in children. Special care may be needed.  What side effects may I notice from receiving this medicine?  Side effects that you should report to your doctor or health care professional as soon as possible:    allergic reactions like skin rash or hives, swelling of the face, lips, or tongue    anxious    confusion    depressed mood    diarrhea    fast, irregular heartbeat    hallucination, loss of contact with reality    joint, muscle, or tendon pain or swelling    pain, tingling, numbness in the hands or feet    suicidal thoughts or other mood changes    sunburn    unusually weak or tired  Side effects that usually do not require medical attention (report to your doctor or health care professional if they continue or are bothersome):    dry  mouth    headache    nausea    trouble sleeping  What may interact with this medicine?  Do not take this medicine with any of the following medications:    cisapride    droperidol    terfenadine    tizanidine  This medicine may also interact with the following medications:    antacids    birth control pills    caffeine    cyclosporin    didanosine (ddI) buffered tablets or powder    medicines for diabetes    medicines for inflammation like ibuprofen, naproxen    methotrexate    multivitamins    omeprazole    phenytoin    probenecid    sucralfate    theophylline    warfarin  What if I miss a dose?  If you miss a dose, take it as soon as you can. If it is almost time for your next dose, take only that dose. Do not take double or extra doses.  Where should I keep my medicine?  Keep out of the reach of children.  Store at room temperature below 30 degrees C (86 degrees F). Keep container tightly closed. Throw away any unused medicine after the expiration date.  What should I tell my health care provider before I take this medicine?  They need to know if you have any of these conditions:    bone problems    history of low levels of potassium in the blood    joint problems    irregular heartbeat    kidney disease    myasthenia gravis    seizures    tendon problems    tingling of the fingers or toes, or other nerve disorder    an unusual or allergic reaction to ciprofloxacin, other antibiotics or medicines, foods, dyes, or preservatives    pregnant or trying to get pregnant    breast-feeding  What should I watch for while using this medicine?  Tell your doctor or health care professional if your symptoms do not improve.  Do not treat diarrhea with over the counter products. Contact your doctor if you have diarrhea that lasts more than 2 days or if it is severe and watery.  You may get drowsy or dizzy. Do not drive, use machinery, or do anything that needs mental alertness until you know how this medicine affects you. Do  not stand or sit up quickly, especially if you are an older patient. This reduces the risk of dizzy or fainting spells.  This medicine can make you more sensitive to the sun. Keep out of the sun. If you cannot avoid being in the sun, wear protective clothing and use sunscreen. Do not use sun lamps or tanning beds/booths.  Avoid antacids, aluminum, calcium, iron, magnesium, and zinc products for 6 hours before and 2 hours after taking a dose of this medicine.  NOTE:This sheet is a summary. It may not cover all possible information. If you have questions about this medicine, talk to your doctor, pharmacist, or health care provider. Copyright  2017 [employer]                Patient Education    Senna Concentrate Oral solution    Senna Concentrate Oral syrup    Senna Concentrate Oral tablet    Sennosides Oral solution    Sennosides Oral tablet  Sennosides Oral tablet  What is this medicine?  SENNA (SEN uh) is a laxative. This medicine is used to relieve constipation. It may also be used to empty and prepare the bowel for surgery or examination.  This medicine may be used for other purposes; ask your health care provider or pharmacist if you have questions.  What should I tell my health care provider before I take this medicine?  They need to know if you have any of these conditions    appendicitis    stomach pain or blockage    vomiting    an unusual or allergic reaction to senna, other medicines, foods, dyes, or preservatives    pregnant or trying to get pregnant    breast-feeding  How should I use this medicine?  Take this medicine by mouth with a full glass of water. Follow the directions on the package. Always take with plenty of water. Take exactly as directed. Do not take your medicine more often than directed.  Talk to your pediatrician regarding the use of this medicine in children. While this medicine may be prescribed for children as young as 2 years for selected conditions, precautions do apply.  Overdosage: If  you think you have taken too much of this medicine contact a poison control center or emergency room at once.  NOTE: This medicine is only for you. Do not share this medicine with others.  What if I miss a dose?  If you miss a dose, take it as soon as you can. If it is almost time for your next dose, take only that dose. Do not take double or extra doses.  What may interact with this medicine?  Interactions are not expected. However, this medicine may affect the time other medicines stay in the stomach. It is best not to take this medicine within 1 to 2 hours of taking other medicines.  This list may not describe all possible interactions. Give your health care provider a list of all the medicines, herbs, non-prescription drugs, or dietary supplements you use. Also tell them if you smoke, drink alcohol, or use illegal drugs. Some items may interact with your medicine.  What should I watch for while using this medicine?  Do not use this medicine for longer than directed by your doctor or health care professional. This medicine can be habit-forming. Long-term use can make your body depend on the laxative for regular bowel movements, damage the bowel, cause malnutrition, and problems with the amounts of water and salts in your body. If your constipation keeps returning, check with your doctor or health care professional.  What side effects may I notice from receiving this medicine?  Side effects that you should report to your doctor or health care professional as soon as possible:    diarrhea    muscle weakness    nausea, vomiting    unusual weight loss  Side effects that usually do not require medical attention (report to your doctor or health care professional if they continue or are bothersome):    bloating    discolored urine    lower stomach discomfort or cramps  This list may not describe all possible side effects. Call your doctor for medical advice about side effects. You may report side effects to FDA at  1-800-FDA-1088.  Where should I keep my medicine?  Keep out of the reach of children.  Store at room temperature between 15 and 30 degrees C (59 and 86 degrees F). Keep container tightly closed. Throw away any unused medicine after the expiration date.  NOTE:This sheet is a summary. It may not cover all possible information. If you have questions about this medicine, talk to your doctor, pharmacist, or health care provider. Copyright  2016 [employer]                Ondansetron tablets  Brand Name: Zofran  What is this medicine?  ONDANSETRON (on DAN se bev) is used to treat nausea and vomiting caused by chemotherapy. It is also used to prevent or treat nausea and vomiting after surgery.  How should I use this medicine?  Take this medicine by mouth with a glass of water. Follow the directions on your prescription label. Take your doses at regular intervals. Do not take your medicine more often than directed.  Talk to your pediatrician regarding the use of this medicine in children. Special care may be needed.  What side effects may I notice from receiving this medicine?  Side effects that you should report to your doctor or health care professional as soon as possible:    allergic reactions like skin rash, itching or hives, swelling of the face, lips or tongue    breathing problems    confusion    dizziness    fast or irregular heartbeat    feeling faint or lightheaded, falls    fever and chills    loss of balance or coordination    seizures    sweating    swelling of the hands or feet    tightness in the chest    tremors    unusually weak or tired  Side effects that usually do not require medical attention (report to your doctor or health care professional if they continue or are bothersome):    constipation or diarrhea    headache  What may interact with this medicine?  Do not take this medicine with any of the following medications:    apomorphine    certain medicines for fungal infections like fluconazole,  itraconazole, ketoconazole, posaconazole, voriconazole    cisapride    dofetilide    dronedarone    pimozide    thioridazine    ziprasidone  This medicine may also interact with the following medications:    carbamazepine    certain medicines for depression, anxiety, or psychotic disturbances    fentanyl    linezolid    MAOIs like Carbex, Eldepryl, Marplan, Nardil, and Parnate    methylene blue (injected into a vein)    other medicines that prolong the QT interval (cause an abnormal heart rhythm)    phenytoin    rifampicin    tramadol  What if I miss a dose?  If you miss a dose, take it as soon as you can. If it is almost time for your next dose, take only that dose. Do not take double or extra doses.  Where should I keep my medicine?  Keep out of the reach of children.  Store between 2 and 30 degrees C (36 and 86 degrees F). Throw away any unused medicine after the expiration date.  What should I tell my health care provider before I take this medicine?  They need to know if you have any of these conditions:    heart disease    history of irregular heartbeat    liver disease    low levels of magnesium or potassium in the blood    an unusual or allergic reaction to ondansetron, granisetron, other medicines, foods, dyes, or preservatives    pregnant or trying to get pregnant    breast-feeding  What should I watch for while using this medicine?  Check with your doctor or health care professional right away if you have any sign of an allergic reaction.  NOTE:This sheet is a summary. It may not cover all possible information. If you have questions about this medicine, talk to your doctor, pharmacist, or health care provider. Copyright  2017 [employer]                Patient Education    Oxycodone Hydrochloride, Acetaminophen Oral capsule    Oxycodone Hydrochloride, Acetaminophen Oral solution    Oxycodone Hydrochloride, Acetaminophen Oral tablet    Oxycodone Hydrochloride, Acetaminophen Oral tablet, biphasic  release  Oxycodone Hydrochloride, Acetaminophen Oral tablet  What is this medicine?  ACETAMINOPHEN; OXYCODONE (a set a NORMA cole fen; ox i KOE done) is a pain reliever. It is used to treat moderate to severe pain.  This medicine may be used for other purposes; ask your health care provider or pharmacist if you have questions.  What should I tell my health care provider before I take this medicine?  They need to know if you have any of these conditions:    brain tumor    Crohn's disease, inflammatory bowel disease, or ulcerative colitis    drug abuse or addiction    head injury    heart or circulation problems    if you often drink alcohol    kidney disease or problems going to the bathroom    liver disease    lung disease, asthma, or breathing problems    an unusual or allergic reaction to acetaminophen, oxycodone, other opioid analgesics, other medicines, foods, dyes, or preservatives    pregnant or trying to get pregnant    breast-feeding  How should I use this medicine?  Take this medicine by mouth with a full glass of water. Follow the directions on the prescription label. You can take it with or without food. If it upsets your stomach, take it with food. Take your medicine at regular intervals. Do not take it more often than directed.  Talk to your pediatrician regarding the use of this medicine in children. Special care may be needed.  Patients over 65 years old may have a stronger reaction and need a smaller dose.  Overdosage: If you think you have taken too much of this medicine contact a poison control center or emergency room at once.  NOTE: This medicine is only for you. Do not share this medicine with others.  What if I miss a dose?  If you miss a dose, take it as soon as you can. If it is almost time for your next dose, take only that dose. Do not take double or extra doses.  What may interact with this medicine?    alcohol    antihistamines    barbiturates like amobarbital, butalbital, butabarbital,  methohexital, pentobarbital, phenobarbital, thiopental, and secobarbital    benztropine    drugs for bladder problems like solifenacin, trospium, oxybutynin, tolterodine, hyoscyamine, and methscopolamine    drugs for breathing problems like ipratropium and tiotropium    drugs for certain stomach or intestine problems like propantheline, homatropine methylbromide, glycopyrrolate, atropine, belladonna, and dicyclomine    general anesthetics like etomidate, ketamine, nitrous oxide, propofol, desflurane, enflurane, halothane, isoflurane, and sevoflurane    medicines for depression, anxiety, or psychotic disturbances    medicines for sleep    muscle relaxants    naltrexone    narcotic medicines (opiates) for pain    phenothiazines like perphenazine, thioridazine, chlorpromazine, mesoridazine, fluphenazine, prochlorperazine, promazine, and trifluoperazine    scopolamine    tramadol    trihexyphenidyl  This list may not describe all possible interactions. Give your health care provider a list of all the medicines, herbs, non-prescription drugs, or dietary supplements you use. Also tell them if you smoke, drink alcohol, or use illegal drugs. Some items may interact with your medicine.  What should I watch for while using this medicine?  Tell your doctor or health care professional if your pain does not go away, if it gets worse, or if you have new or a different type of pain. You may develop tolerance to the medicine. Tolerance means that you will need a higher dose of the medication for pain relief. Tolerance is normal and is expected if you take this medicine for a long time.  Do not suddenly stop taking your medicine because you may develop a severe reaction. Your body becomes used to the medicine. This does NOT mean you are addicted. Addiction is a behavior related to getting and using a drug for a non-medical reason. If you have pain, you have a medical reason to take pain medicine. Your doctor will tell you how much  medicine to take. If your doctor wants you to stop the medicine, the dose will be slowly lowered over time to avoid any side effects.  You may get drowsy or dizzy. Do not drive, use machinery, or do anything that needs mental alertness until you know how this medicine affects you. Do not stand or sit up quickly, especially if you are an older patient. This reduces the risk of dizzy or fainting spells. Alcohol may interfere with the effect of this medicine. Avoid alcoholic drinks.  There are different types of narcotic medicines (opiates) for pain. If you take more than one type at the same time, you may have more side effects. Give your health care provider a list of all medicines you use. Your doctor will tell you how much medicine to take. Do not take more medicine than directed. Call emergency for help if you have problems breathing.  The medicine will cause constipation. Try to have a bowel movement at least every 2 to 3 days. If you do not have a bowel movement for 3 days, call your doctor or health care professional.  Do not take Tylenol (acetaminophen) or medicines that have acetaminophen with this medicine. Too much acetaminophen can be very dangerous. Many nonprescription medicines contain acetaminophen. Always read the labels carefully to avoid taking more acetaminophen.  What side effects may I notice from receiving this medicine?  Side effects that you should report to your doctor or health care professional as soon as possible:    allergic reactions like skin rash, itching or hives, swelling of the face, lips, or tongue    breathing difficulties, wheezing    confusion    light headedness or fainting spells    severe stomach pain    unusually weak or tired    yellowing of the skin or the whites of the eyes  Side effects that usually do not require medical attention (report to your doctor or health care professional if they continue or are  bothersome):    dizziness    drowsiness    nausea    vomiting  This list may not describe all possible side effects. Call your doctor for medical advice about side effects. You may report side effects to FDA at 0-979-UIF-5240.  Where should I keep my medicine?  Keep out of the reach of children. This medicine can be abused. Keep your medicine in a safe place to protect it from theft. Do not share this medicine with anyone. Selling or giving away this medicine is dangerous and against the law.  Store at room temperature between 20 and 25 degrees C (68 and 77 degrees F). Keep container tightly closed. Protect from light.  This medicine may cause accidental overdose and death if it is taken by other adults, children, or pets. Flush any unused medicine down the toilet to reduce the chance of harm. Do not use the medicine after the expiration date.  NOTE: This sheet is a summary. It may not cover all possible information. If you have questions about this medicine, talk to your doctor, pharmacist, or health care provider.  NOTE:This sheet is a summary. It may not cover all possible information. If you have questions about this medicine, talk to your doctor, pharmacist, or health care provider. Copyright  2016 [employer]

## 2018-02-19 NOTE — LETTER
[address] [facility]  [address] [place] [address]  Phone: [phone]    February 22, 2018        Sasha Hand  [id number] [facility] [address]          To whom it may concern:    RE: Sasha Baez has been hospitalized from 2/19/18 through 2/22/18 and her absence from work should be excused.  She will be unable to work on 2/23/18 through 2/25/18 as she continues to recuperate but will be able to return to work on 2/26/18 for full days but must have a 20 lb weight restriction in place from 2/26/18 through 3/2/18.  She can return to work without restrictions on 3/3/18.    Please contact me for questions or concerns.      Sincerely,        Blossom Beltran MD

## 2018-02-20 PROBLEM — R63.8 DECREASED ORAL INTAKE: Status: ACTIVE | Noted: 2018-02-20

## 2018-02-20 LAB
ANION GAP SERPL CALCULATED.3IONS-SCNC: 8 MMOL/L (ref 3–14)
BUN SERPL-MCNC: 6 MG/DL (ref 7–30)
CALCIUM SERPL-MCNC: 8.1 MG/DL (ref 8.5–10.1)
CHLORIDE SERPL-SCNC: 112 MMOL/L (ref 96–110)
CO2 SERPL-SCNC: 21 MMOL/L (ref 20–32)
CREAT SERPL-MCNC: 0.64 MG/DL (ref 0.5–1)
ERYTHROCYTE [DISTWIDTH] IN BLOOD BY AUTOMATED COUNT: 12.8 % (ref 10–15)
GFR SERPL CREATININE-BSD FRML MDRD: >90 ML/MIN/1.7M2
GLUCOSE SERPL-MCNC: 84 MG/DL (ref 70–99)
HCT VFR BLD AUTO: 36.2 % (ref 35–47)
HGB BLD-MCNC: 11.8 G/DL (ref 11.7–15.7)
MCH RBC QN AUTO: 28.7 PG (ref 26.5–33)
MCHC RBC AUTO-ENTMCNC: 32.6 G/DL (ref 31.5–36.5)
MCV RBC AUTO: 88 FL (ref 78–100)
PLATELET # BLD AUTO: 231 10E9/L (ref 150–450)
POTASSIUM SERPL-SCNC: 3.9 MMOL/L (ref 3.4–5.3)
RBC # BLD AUTO: 4.11 10E12/L (ref 3.8–5.2)
SODIUM SERPL-SCNC: 141 MMOL/L (ref 133–144)
WBC # BLD AUTO: 10.2 10E9/L (ref 4–11)

## 2018-02-20 PROCEDURE — 25000128 H RX IP 250 OP 636: Performed by: PHYSICIAN ASSISTANT

## 2018-02-20 PROCEDURE — G0378 HOSPITAL OBSERVATION PER HR: HCPCS

## 2018-02-20 PROCEDURE — 12000000 ZZH R&B MED SURG/OB

## 2018-02-20 PROCEDURE — 85027 COMPLETE CBC AUTOMATED: CPT | Performed by: FAMILY MEDICINE

## 2018-02-20 PROCEDURE — 96376 TX/PRO/DX INJ SAME DRUG ADON: CPT

## 2018-02-20 PROCEDURE — 25000128 H RX IP 250 OP 636: Performed by: FAMILY MEDICINE

## 2018-02-20 PROCEDURE — 36415 COLL VENOUS BLD VENIPUNCTURE: CPT | Performed by: FAMILY MEDICINE

## 2018-02-20 PROCEDURE — 99232 SBSQ HOSP IP/OBS MODERATE 35: CPT | Performed by: FAMILY MEDICINE

## 2018-02-20 PROCEDURE — 80048 BASIC METABOLIC PNL TOTAL CA: CPT | Performed by: FAMILY MEDICINE

## 2018-02-20 PROCEDURE — 25000132 ZZH RX MED GY IP 250 OP 250 PS 637: Performed by: FAMILY MEDICINE

## 2018-02-20 RX ORDER — AMOXICILLIN 250 MG
1 CAPSULE ORAL 2 TIMES DAILY PRN
Status: DISCONTINUED | OUTPATIENT
Start: 2018-02-20 | End: 2018-02-22 | Stop reason: HOSPADM

## 2018-02-20 RX ORDER — OXYCODONE HYDROCHLORIDE 5 MG/1
5 TABLET ORAL
Status: DISCONTINUED | OUTPATIENT
Start: 2018-02-20 | End: 2018-02-21

## 2018-02-20 RX ADMIN — CEFTRIAXONE 1 G: 1 INJECTION, SOLUTION INTRAVENOUS at 22:56

## 2018-02-20 RX ADMIN — SODIUM CHLORIDE 1000 ML: 9 INJECTION, SOLUTION INTRAVENOUS at 14:41

## 2018-02-20 RX ADMIN — MONTELUKAST SODIUM 10 MG: 10 TABLET, FILM COATED ORAL at 22:56

## 2018-02-20 RX ADMIN — KETOROLAC TROMETHAMINE 30 MG: 30 INJECTION, SOLUTION INTRAMUSCULAR at 02:47

## 2018-02-20 RX ADMIN — ONDANSETRON 4 MG: 2 INJECTION INTRAMUSCULAR; INTRAVENOUS at 10:29

## 2018-02-20 RX ADMIN — MONTELUKAST SODIUM 10 MG: 10 TABLET, FILM COATED ORAL at 00:54

## 2018-02-20 RX ADMIN — VALACYCLOVIR HYDROCHLORIDE 500 MG: 500 TABLET, FILM COATED ORAL at 22:56

## 2018-02-20 RX ADMIN — KETOROLAC TROMETHAMINE 30 MG: 30 INJECTION, SOLUTION INTRAMUSCULAR at 20:31

## 2018-02-20 RX ADMIN — OXYCODONE HYDROCHLORIDE 5 MG: 5 TABLET ORAL at 20:31

## 2018-02-20 RX ADMIN — SODIUM CHLORIDE 1000 ML: 9 INJECTION, SOLUTION INTRAVENOUS at 00:55

## 2018-02-20 RX ADMIN — OXYCODONE HYDROCHLORIDE 2.5 MG: 5 TABLET ORAL at 14:40

## 2018-02-20 RX ADMIN — VALACYCLOVIR HYDROCHLORIDE 500 MG: 500 TABLET, FILM COATED ORAL at 00:54

## 2018-02-20 RX ADMIN — OXYCODONE HYDROCHLORIDE 5 MG: 5 TABLET ORAL at 17:33

## 2018-02-20 RX ADMIN — POTASSIUM CHLORIDE 40 MEQ: 1500 TABLET, EXTENDED RELEASE ORAL at 00:54

## 2018-02-20 RX ADMIN — OXYCODONE HYDROCHLORIDE 2.5 MG: 5 TABLET ORAL at 07:22

## 2018-02-20 RX ADMIN — KETOROLAC TROMETHAMINE 30 MG: 30 INJECTION, SOLUTION INTRAMUSCULAR at 07:31

## 2018-02-20 RX ADMIN — SODIUM CHLORIDE 1000 ML: 9 INJECTION, SOLUTION INTRAVENOUS at 04:13

## 2018-02-20 RX ADMIN — VENLAFAXINE HYDROCHLORIDE 75 MG: 75 CAPSULE, EXTENDED RELEASE ORAL at 07:31

## 2018-02-20 RX ADMIN — SODIUM CHLORIDE 1000 ML: 9 INJECTION, SOLUTION INTRAVENOUS at 22:58

## 2018-02-20 RX ADMIN — ONDANSETRON 4 MG: 2 INJECTION INTRAMUSCULAR; INTRAVENOUS at 19:16

## 2018-02-20 RX ADMIN — KETOROLAC TROMETHAMINE 30 MG: 30 INJECTION, SOLUTION INTRAMUSCULAR at 14:39

## 2018-02-20 RX ADMIN — POTASSIUM CHLORIDE 20 MEQ: 1500 TABLET, EXTENDED RELEASE ORAL at 02:47

## 2018-02-20 ASSESSMENT — ACTIVITIES OF DAILY LIVING (ADL)
CHANGE_IN_FUNCTIONAL_STATUS_SINCE_ONSET_OF_CURRENT_ILLNESS/INJURY: NO
AMBULATION: 0 - INDEPENDENT
FALL_HISTORY_WITHIN_LAST_SIX_MONTHS: NO
BATHING: 0 - INDEPENDENT
SWALLOWING: 0 - SWALLOWS FOODS/LIQUIDS WITHOUT DIFFICULTY
TOILETING: 0 - INDEPENDENT
TRANSFERRING: 0 - INDEPENDENT
RETIRED_EATING: 0-->INDEPENDENT
COGNITION: 0 - NO COGNITION ISSUES REPORTED
RETIRED_COMMUNICATION: 0-->UNDERSTANDS/COMMUNICATES WITHOUT DIFFICULTY
TOILETING: 0-->INDEPENDENT
BATHING: 0-->INDEPENDENT
SWALLOWING: 0-->SWALLOWS FOODS/LIQUIDS WITHOUT DIFFICULTY
AMBULATION: 0-->INDEPENDENT
DRESS: 0-->INDEPENDENT
DRESS: 0 - INDEPENDENT
EATING: 0 - INDEPENDENT
COMMUNICATION: 0 - UNDERSTANDS/COMMUNICATES WITHOUT DIFFICULTY
TRANSFERRING: 0-->INDEPENDENT

## 2018-02-20 NOTE — PLAN OF CARE
"Problem: Patient Care Overview  Goal: Plan of Care/Patient Progress Review  Outcome: Improving  Pt A&O x4. Potassium replacement given, recheck scheduled for 0700. VSS, /57  Pulse 98  Temp 99.8  F (37.7  C) (Oral)  Resp 20  Ht 1.702 m (5' 7\")  Wt 126.5 kg (278 lb 14.1 oz)  SpO2 100%  BMI 43.68 kg/m2. Pain controlled with toradol x1. Resting comfortably. Will continue to monitor per care plan.         "

## 2018-02-20 NOTE — PROGRESS NOTES
"S-(situation): Patient registered to Observation. Patient arrived to room 255 via cart from ED.    B-(background): pyelonephritis    A-(assessment): Pt A&Ox4, ambulated with SBA. VSS. /57  Pulse 98  Temp 99.8  F (37.7  C) (Oral)  Resp 20  Ht 1.702 m (5' 7\")  Wt 126.5 kg (278 lb 14.1 oz)  SpO2 100%  BMI 43.68 kg/m2.       R-(recommendations): Orders and observation goals reviewed with Pt.    Nursing Observation criteria listed below was met:    Skin issues/needs documented:No  Isolation needs addressed, if appropriate: NA  Fall Prevention: Education given and documented: Yes  Education Assessment documented:Yes  Education Documented (Pre-existing chronic infection such as, MRSA/VRE need education on admission): No  New medication patient education completed and documented (Possible Side Effects of Common Medications handout): Yes  Home medications if not able to send immediately home with family stored here: NA  Reminder note placed in discharge instructions: NA  Patient has discharge needs (If yes, please explain): No            "

## 2018-02-20 NOTE — PROGRESS NOTES
S-(situation): Patient changed to inpatient status    B-(background): Patient status change due to observation goals not being met.     A-(assessment): Pain and VS, and UO to be monitored, ABX continue.     R-(recommendations):  Will monitor patient per MD orders.       Inpatient nursing criteria listed below were met:    Adult Profile completedYes  Health care directives status obtained and documented: Yes  VTE prophylaxis orders: yes  (FYI: Asprin is not an approved anticoagulation for DVT prophylaxis)  SCD's Documented: Yes  Vaccine assessment done and vaccine ordered if needed. Yes  MRSA swab completed for patient 55 years and older (exclude ANNABELLE and TKA): NA  My Chart patient sign up addressed and documented: Yes  Care Plan initiated: Yes  Discharge planning review completed (admission navigator) Yes

## 2018-02-20 NOTE — PROGRESS NOTES
[facility] Progress Note          Assessment and Plan:   Assessment:   Principal Problem:    Acute pyelonephritis    Assessment:  WBC is improving today and HR improving but patient has been afebrile however continues to have significant pain and needs IV pain medications and also has not been able to take in much by mouth secondary to ongoing issues with nausea and pain.  At this time, although patient is starting to improve, it is not felt she would be able to control pain, keep down oral antibiotics, or maintain hydration if she were discharged.    Plan:  Patient will be transitioned to inpatient status, continue with IV Rocephin as WBC is improving and starting to see some signs of clinical improvement.  Continue with scheduled IV tordol and prn Dilaudid as needed, offer prn oxycodone as tolerated.   Will have patient try to void and see how much she can go and perform a post-void residual - may need to consider repeating a bolus if patient's output is truly low.      Active Problems:    Decreased oral intake    Assessment:  Ongoing and has not improved despite improvement of symptoms as above    Plan:  Continue with plan as above      Tobacco use disorder    Assessment: smoking 1 ppd prior to admission, declines nicotine replacement here    Plan: Continue to monitor, provide replacement if needed.      Mild persistent asthma without complication    Assessment: Chronic and stable, without concern for acute exacerbation    Plan: Continue with current treatment plan      Major depressive disorder, single episode, moderate (H)    Assessment: On Effexor, with symptoms well controlled    Plan: Continue with current treatment plan.       # Pain Assessment:   Current Pain Score 2/20/2018 2/20/2018 2/20/2018   Patient currently in pain? yes yes -   Pain score (0-10) 4 3 3   Pain location - - -   Pain descriptors - - -   - Sasha is experiencing pain due to acute pyelonephritis. Pain management was discussed and the  "plan was created in a collaborative fashion.  Sasha's response to the current recommendations: engaged  - Opioid regimen: oxycodone PO, Dilaudid IV if oxycodone is not working well enough   - Response to opioid medications: Reduction of symptoms - makes the symptoms tolerable but does not take the pain away  - Bowel regimen: senokot prn  - Pharmacologic adjuvants: NSAIDs, Tylenol  - Non-pharmacologic adjuvants: Heat and Ice    VTE:  SCDs  Code Status:  Full Code        Interval History:   Starting to improve but still having significant pain, feels chilled and sweats but no fever noted, unable to take in much by mouth secondary to pain and nausea.  Vital signs generally better.  Eating poorly and hasn't voided in over 9 hours despite IV fluids.  Tolerating medications without significant side effects.  No new concerns today.           Significant Problems:     Past Medical History:   Diagnosis Date     Moderate major depression (H) 1/20/2014     Nexplanon placed 11/14/17 11/14/2017     Obesity 9/14/2010            Physical Exam:   Blood pressure 119/47, pulse 75, temperature 97.2  F (36.2  C), temperature source Oral, resp. rate 20, height 1.702 m (5' 7\"), weight 126.5 kg (278 lb 14.1 oz), SpO2 97 %, not currently breastfeeding.  Constitutional:   awake, alert, cooperative, appears slightly uncomfortable at rest - states the oxycodone medication hasn't helped all the way from when it was given an hour ago, and appears stated age     Lungs:   No increased work of breathing, good air exchange, clear to auscultation bilaterally, no crackles or wheezing     Cardiovascular:   Normal apical impulse, regular rate and rhythm, normal S1 and S2, no S3 or S4, and no murmur noted     Abdomen:   Bowel sounds present, abdomen soft, mild tenderness on palpation of the right upper quadrant but more when going to the lateral side and still a lot on the right mid back region.     Musculoskeletal:   no lower extremity pitting edema " present     Neurologic:   Awake, alert, oriented to name, place and time.       Skin:   normal skin color, texture, turgor             Data:   All laboratory data reviewed    Attestation:  I have reviewed today's vital signs, notes, medications, labs and imaging.     Electronically Signed:  Blossom Beltran MD    Note: Chart documentation done in part with Dragon Voice Recognition software. Although reviewed after completion, some word and grammatical errors may remain.

## 2018-02-20 NOTE — ED PROVIDER NOTES
History     Chief Complaint   Patient presents with     Generalized Body Aches     The history is provided by the patient.     Sasha Hand is a 17 year old female who presents to the emergency department complaining of body aches.  The patient explains about 2 days ago she developed generalized body aches and fevers.  She had a temp up to 102F today, but she has been taking Tylenol around-the-clock since then.  2 days ago she developed generalized abdominal pain, and she still notes pain throughout abdomen and in back.  She has some nausea but no vomiting.  No diarrhea.  She notes headache with photophobia as well.  She has not had a cough, nasal congestion, sore throat.  She denies dysuria or hematuria.  Her mom has influenza-like symptoms currently.    Problem List:    Patient Active Problem List    Diagnosis Date Noted     Acute pyelonephritis 02/19/2018     Priority: Medium     DELIA (generalized anxiety disorder) 02/09/2018     Priority: Medium     Major depressive disorder, single episode, moderate (H) 02/09/2018     Priority: Medium     Nexplanon placed 11/14/17 11/14/2017     Priority: Medium     Genital herpes simplex, unspecified site 01/18/2017     Priority: Medium     Mild persistent asthma without complication 01/18/2017     Priority: Medium     Wheezing 05/04/2016     Priority: Medium     Tobacco use disorder 05/04/2016     Priority: Medium     Menorrhagia 01/20/2014     Priority: Medium     Dysmenorrhea 01/20/2014     Priority: Medium     Tear of lateral cartilage or meniscus of knee, current 09/09/2013     Priority: Medium     Obesity 09/14/2010     Priority: Medium        Past Medical History:    Past Medical History:   Diagnosis Date     Moderate major depression (H) 1/20/2014     Nexplanon placed 11/14/17 11/14/2017     Obesity 9/14/2010       Past Surgical History:    Past Surgical History:   Procedure Laterality Date     ARTHROSCOPY KNEE WITH MENISCAL REPAIR Right 5/10/2017    Procedure:  ARTHROSCOPY KNEE WITH MENISCAL REPAIR;  arthroscopy right knee with lateral meniscus repair;  Surgeon: Nathaniel Hicks MD;  Location: [facility] OR     NO HISTORY OF SURGERY         Family History:    Family History   Problem Relation Age of Onset     Asthma Mother      Hypertension Mother      Asthma Father      DIABETES Father      CANCER Paternal Grandmother        Social History:  Marital Status:  Single [1]  Social History   Substance Use Topics     Smoking status: Current Every Day Smoker     Packs/day: 0.50     Types: Cigarettes     Smokeless tobacco: Never Used     Alcohol use No        Medications:      valACYclovir (VALTREX) 500 MG tablet   montelukast (SINGULAIR) 10 MG tablet   venlafaxine (EFFEXOR-XR) 37.5 MG 24 hr capsule   etonogestrel (IMPLANON/NEXPLANON) 68 MG IMPL         Review of Systems   Constitutional: Positive for fever.   HENT: Negative for congestion, rhinorrhea and sore throat.    Eyes: Positive for photophobia.   Respiratory: Negative for cough and shortness of breath.    Cardiovascular: Negative for chest pain.   Gastrointestinal: Positive for abdominal pain and nausea. Negative for diarrhea and vomiting.   Genitourinary: Negative for dysuria.   Neurological: Positive for headaches.   All other systems reviewed and are negative.      Physical Exam   BP: 141/90  Pulse: 131  Temp: 98.8  F (37.1  C)  Resp: 20  Weight: 126.1 kg (278 lb)  SpO2: 100 %      Physical Exam   Constitutional: She appears well-developed and well-nourished.  Non-toxic appearance. She appears ill. No distress.   HENT:   Head: Normocephalic and atraumatic.   Mouth/Throat: Posterior oropharyngeal erythema present. No oropharyngeal exudate.   Eyes: Conjunctivae are normal. Pupils are equal, round, and reactive to light.   Neck: Normal range of motion. Neck supple. Muscular tenderness (Bilateral musculature) present. No rigidity. Normal range of motion present. No Brudzinski's sign noted.   Cardiovascular: Regular rhythm and  normal heart sounds.  Tachycardia present.    No murmur heard.  Pulmonary/Chest: Effort normal and breath sounds normal. She has no wheezes. She has no rales.   Abdominal: Soft. Bowel sounds are normal. There is generalized tenderness. There is no rebound and no guarding.   Musculoskeletal: Normal range of motion.   Neurological: She is alert. No cranial nerve deficit.   Skin: Skin is warm and dry. She is not diaphoretic.   Psychiatric: She has a normal mood and affect. Her behavior is normal.   Nursing note and vitals reviewed.      ED Course     ED Course     Procedures    Results for orders placed or performed during the hospital encounter of 02/19/18 (from the past 24 hour(s))   Influenza A/B antigen   Result Value Ref Range    Influenza A/B Agn Specimen Nasopharyngeal     Influenza A Negative NEG^Negative    Influenza B Negative NEG^Negative   CBC with platelets differential   Result Value Ref Range    WBC 15.1 (H) 4.0 - 11.0 10e9/L    RBC Count 4.77 3.8 - 5.2 10e12/L    Hemoglobin 13.6 11.7 - 15.7 g/dL    Hematocrit 41.7 35.0 - 47.0 %    MCV 87 78 - 100 fl    MCH 28.5 26.5 - 33.0 pg    MCHC 32.6 31.5 - 36.5 g/dL    RDW 12.8 10.0 - 15.0 %    Platelet Count 262 150 - 450 10e9/L    Diff Method Automated Method     % Neutrophils 77.5 %    % Lymphocytes 12.4 %    % Monocytes 8.7 %    % Eosinophils 1.1 %    % Basophils 0.1 %    % Immature Granulocytes 0.2 %    Absolute Neutrophil 11.7 (H) 1.6 - 8.3 10e9/L    Absolute Lymphocytes 1.9 0.8 - 5.3 10e9/L    Absolute Monocytes 1.3 0.0 - 1.3 10e9/L    Absolute Eosinophils 0.2 0.0 - 0.7 10e9/L    Absolute Basophils 0.0 0.0 - 0.2 10e9/L    Abs Immature Granulocytes 0.0 0 - 0.4 10e9/L   Comprehensive metabolic panel   Result Value Ref Range    Sodium 141 133 - 144 mmol/L    Potassium 3.3 (L) 3.4 - 5.3 mmol/L    Chloride 109 96 - 110 mmol/L    Carbon Dioxide 25 20 - 32 mmol/L    Anion Gap 7 3 - 14 mmol/L    Glucose 91 70 - 99 mg/dL    Urea Nitrogen 5 (L) 7 - 30 mg/dL     Creatinine 0.61 0.50 - 1.00 mg/dL    GFR Estimate >90 >60 mL/min/1.7m2    GFR Estimate If Black >90 >60 mL/min/1.7m2    Calcium 8.7 8.5 - 10.1 mg/dL    Bilirubin Total 0.6 0.2 - 1.3 mg/dL    Albumin 3.3 (L) 3.4 - 5.0 g/dL    Protein Total 7.3 6.8 - 8.8 g/dL    Alkaline Phosphatase 86 40 - 150 U/L    ALT 25 0 - 50 U/L    AST 8 0 - 35 U/L   Lipase   Result Value Ref Range    Lipase 67 (L) 73 - 393 U/L   CRP inflammation   Result Value Ref Range    CRP Inflammation 162.0 (H) 0.0 - 8.0 mg/L   UA with Microscopic reflex to Culture   Result Value Ref Range    Color Urine Yellow     Appearance Urine Slightly Cloudy     Glucose Urine Negative NEG^Negative mg/dL    Bilirubin Urine Negative NEG^Negative    Ketones Urine Negative NEG^Negative mg/dL    Specific Gravity Urine 1.009 1.003 - 1.035    Blood Urine Small (A) NEG^Negative    pH Urine 8.0 (H) 5.0 - 7.0 pH    Protein Albumin Urine Negative NEG^Negative mg/dL    Urobilinogen mg/dL 4.0 (H) 0.0 - 2.0 mg/dL    Nitrite Urine Positive (A) NEG^Negative    Leukocyte Esterase Urine Moderate (A) NEG^Negative    Source Midstream Urine     WBC Urine 28 (H) 0 - 2 /HPF    RBC Urine 1 0 - 2 /HPF    Bacteria Urine Moderate (A) NEG^Negative /HPF    Squamous Epithelial /HPF Urine <1 0 - 1 /HPF    Mucous Urine Present (A) NEG^Negative /LPF   US Abdomen Limited    Narrative    RIGHT UPPER QUADRANT ULTRASOUND  2/19/2018  10:29 PM    HISTORY: Right upper quadrant pain.    COMPARISON: None.    FINDINGS:   Gallbladder: Normal with no cholelithiasis, wall thickening or focal  tenderness.     Bile ducts: CHD is normal diameter. No intrahepatic biliary  dilatation.    Liver: Normal.     Pancreas: Not seen.    Right kidney: Normal.   Abdominal aorta and IVC partially seen and appear normal.    Appendix not visualized due to body habitus. No free fluid in the  right lower quadrant.      Impression    IMPRESSION:   1. Difficult exam due to body habitus.  2. No abnormalities seen.  3. Appendix not  identified. It is indeterminate.    NAWAF DOUGLAS MD       Medications   0.9% sodium chloride BOLUS (1,000 mLs Intravenous New Bag 2/19/18 2305)     Followed by   sodium chloride 0.9% infusion (not administered)   cefTRIAXone (ROCEPHIN) intermittent infusion 1 g (not administered)   ketorolac (TORADOL) injection 30 mg (30 mg Intravenous Given 2/19/18 2043)   HYDROmorphone (PF) (DILAUDID) injection 0.5 mg (0.5 mg Intravenous Given 2/19/18 2113)   HYDROmorphone (PF) (DILAUDID) injection 0.2 mg (0.2 mg Intravenous Given 2/19/18 2304)   ondansetron (ZOFRAN) injection 4 mg (4 mg Intravenous Given 2/19/18 2305)        Assessments & Plan (with Medical Decision Making)  Sasha Hand is a 17 year old female who presented to the ED complaining of body aches, headache, and fever for the last few days.  She has had generalized abdominal pain as well with nausea, but no vomiting or diarrhea.  No URI symptoms or urinary symptoms.  On arrival to the ED she was tachycardic but afebrile.  Exam notable for generalized tenderness throughout the abdomen.  Initially it was thought that symptoms seemed somewhat consistent with influenza with body aches and fever with headache, but influenza screen was negative today.  Patient was agreeable to further evaluation with additional laboratory studies.  IV access was obtained and labs drawn.  She was given Toradol initially for symptomatic relief.  Her white count was elevated at 15.1, and .  With these results I went to reassess the patient.  She reported abdominal pain had improved with Toradol.  She was no longer tender in her abdomen except for the right upper quadrant and right flank.  She did have a positive Blanco sign which led me to be concerned for possible gallbladder etiology.  Her LFTs and lipase were within normal limits however.  An ultrasound of the right upper quadrant was ordered and showed no acute gallbladder abnormalities.  Her urinalysis today however showed  positive nitrate, moderate leukocyte esterase, 28 WBCs, moderate bacteria, concerning for urinary tract infection.  Given her negative ultrasound, elevated white count and CRP, and right flank and right upper abdominal tenderness, I think this patient has pyelonephritis.  She was rather surprised to hear this given her lack of urinary symptoms.  I did offer her the options to either trial management at home with oral antibiotics or stay on observation for IV antibiotics and continued pain relief.  In addition to the Toradol, she required a couple doses of IV Dilaudid and Zofran here in the ED and still continued to complain of pain and nausea.  Because of this, I recommended that she be admitted to hospital, and she did prefer to stay the night for continued symptomatic relief.  IV ceftriaxone and fluids ordered.  I discussed this case Dr. Chi who accepted patient onto his service for observation stay.  Patient was staffed in the ED with Dr. Ortiz.     I have reviewed the nursing notes.    I have reviewed the findings, diagnosis, plan and need for follow up with the patient.       ED to Inpatient Handoff:    Discussed with Dr. Chi at 2300  Patient accepted for Observation Stay  Pending studies include urine culture  Code Status: Not Addressed           New Prescriptions    No medications on file       Final diagnoses:   Pyelonephritis     This document serves as a record of services personally performed by Leti Fox PA-C. It was created on their behalf by Rupali Kennedy, a trained medical scribe. The creation of this record is based on the provider's personal observations and the statements of the patient. This document has been checked and approved by the attending provider.    Note: Chart documentation done in part with Dragon Voice Recognition software. Although reviewed after completion, some word and grammatical errors may remain.     2/19/2018   [facility] EMERGENCY DEPARTMENT      Leti Fox PA-C  02/19/18 [id number]

## 2018-02-20 NOTE — PLAN OF CARE
Problem: Patient Care Overview  Goal: Plan of Care/Patient Progress Review  Pain controlled with Roxicodone and Toradol-- 0-7 T/O shift, up with SBA, Zofran given X1,poor intake, adequate UO, family has been present, post void bladder scan of 45cc.

## 2018-02-20 NOTE — H&P
[facility]    History and Physical  Hospitalist       Date of Admission:  2/19/2018    Assessment & Plan   Sasha Hand is a 17 year old female who presents with a 2 day history of increasing symptoms including body aches, headache, abdominal pain with slight nausea, decreased appetite and fevers as high as 102 .  She thought she might have influenza, but denies sore throat, cough.  In the emergency room she was uncomfortable with a pulse of 131.  Lab work has an elevated white blood cell count of 15,100 with an elevated CRP of 162.  Influenza testing was negative.  Urinalysis is positive for UTI.  This point she likely has pyelonephritis and is currently mildly dehydrated and has fair amount of pain which is not being well-controlled with which she has been given in the emergency department. She might very well have sepsis with fever, tachycardia, and leukocytosis.  Patient will be registered observation and brought in with IV fluid management, pain management and will start an IV Rocephin overnight with cultures of urine pending.  If improving clinically, able to take oral intake and pain management is improved, can likely go home tomorrow on oral medications.    Principal Problem:    Acute pyelonephritis    Assessment: Presenting with fever, abdominal pain, nausea with an abnormal urinalysis.  Has had UTIs in the past, but never pyelonephritis.  Currently nauseated, unable to keep fluids down.    Plan: IV  antibiotics tonight, will treat with Rocephin.  IV fluids, pain management, antinausea medications.  Reevaluate tomorrow to see if she can be managed as an outpatient.    Sepsis    Assessment: presenting with fever, leukocytosis and tachycardia, likely due to pyelonephritis    Plan: as above  Active Problems:    Mild persistent asthma without complication    Assessment: Stable, no current symptoms    Plan: Follow    Tobacco use disorder    Assessment: Smoking 1 pack cigarettes a  day    Plan: Encouraged to stop, declines nicotine replacement    Major depressive disorder, single episode, moderate (H)    Assessment: Taking Effexor    Plan: Continue  # Pain Assessment:   Current Pain Score 2/19/2018 2/19/2018 2/19/2018   Patient currently in pain? - - -   Pain score (0-10) 4 9 7   Pain location - - -   Pain descriptors - - -   - Sasha is experiencing pain due to pyelonephritis. Pain management was discussed and the plan was created in a collaborative fashion.  Sasha's response to the current recommendations: engaged  - Opioid regimen: IV narcotics, oxycodone  - Response to opioid medications: Reduction of symptoms   - Bowel regimen: not needed  - Pharmacologic adjuvants: NSAIDs and Acetaminophen        DVT Prophylaxis: Low Risk/Ambulatory with no VTE prophylaxis indicated  Code Status: Full Code    Disposition: Expected discharge in 1-2 days once feeling better.    Chidi Chi MD    Primary Care Physician   Oscar Medley MD    Chief Complaint   17-year-old female with fever, body aches and abdominal pain    History is obtained from the patient, electronic health record and emergency department provider    History of Present Illness   Sasha Hand is a 17 year old female who presents with increasing body aches, abdominal pain, nausea.  Has had a fever at home as high as 102.1.  Today the pain is localized more in the right side of the abdomen radiating to the back.  She denies dysuria, frequency or change in urine.  Has not had a history of pyelonephritis.  She thought she might have the flu and that her mother has been sick, patient denies sore throat, cough.  Patient denies rash.  She denies cough, sore throat, shortness of breath.    Past Medical History    I have reviewed this patient's medical history and updated it with pertinent information if needed.   Past Medical History:   Diagnosis Date     Moderate major depression (H) 1/20/2014     Nexplanon placed 11/14/17 11/14/2017      Obesity 2010       Past Surgical History   I have reviewed this patient's surgical history and updated it with pertinent information if needed.  Past Surgical History:   Procedure Laterality Date     ARTHROSCOPY KNEE WITH MENISCAL REPAIR Right 5/10/2017    Procedure: ARTHROSCOPY KNEE WITH MENISCAL REPAIR;  arthroscopy right knee with lateral meniscus repair;  Surgeon: Nathaniel Hicks MD;  Location: [facility] OR     NO HISTORY OF SURGERY         Prior to Admission Medications   Prior to Admission Medications   Prescriptions Last Dose Informant Patient Reported? Taking?   etonogestrel (IMPLANON/NEXPLANON) 68 MG IMPL More than a month at Unknown time  No No   Si each (68 mg) by Subdermal route continuous   montelukast (SINGULAIR) 10 MG tablet 2018 at 2200  No Yes   Sig: Take 1 tablet (10 mg) by mouth At Bedtime   valACYclovir (VALTREX) 500 MG tablet 2018 at 2200  No Yes   Sig: Take 1 tablet (500 mg) by mouth daily   venlafaxine (EFFEXOR-XR) 37.5 MG 24 hr capsule 2018 at 0800  No Yes   Sig: Take 1 capsule (37.5 mg) by mouth daily (may increase to 75 mg after 2 weeks if needed)   Patient taking differently: Take 75 mg by mouth daily 75 mg daily with food.      Facility-Administered Medications: None     Allergies   Allergies   Allergen Reactions     No Known Drug Allergies      Seasonal Allergies        Social History   I have reviewed this patient's social history and updated it with pertinent information if needed. Sasha Hand  reports that she has been smoking Cigarettes.  She has been smoking about 0.50 packs per day. She has never used smokeless tobacco. She reports that she does not drink alcohol or use illicit drugs.    Family History   I have reviewed this patient's family history and updated it with pertinent information if needed.   Family History   Problem Relation Age of Onset     Asthma Mother      Hypertension Mother      Asthma Father      DIABETES Father      CANCER Paternal  Grandmother        Review of Systems   The 10 point Review of Systems is negative other than noted in the HPI or here.     Physical Exam   Temp: 98.8  F (37.1  C) Temp src: Temporal BP: 141/90 Pulse: 131   Resp: 20 SpO2: 100 % O2 Device: None (Room air)    Vital Signs with Ranges  Temp:  [98.8  F (37.1  C)] 98.8  F (37.1  C)  Pulse:  [131] 131  Resp:  [20] 20  BP: (141)/(90) 141/90  SpO2:  [100 %] 100 %  278 lbs 0 oz    Constitutional: Alert female lying in bed in some mild distress.  Crying at times  Eyes: Pupils are equal reactive, EOM intact.  HEENT: Normal mouth and throat.  No signs or redness  Respiratory: Clear, no rales or wheezing or crackles heard  Cardiovascular: Regular rate and rhythm, no murmur heard, no peripheral edema  GI: Tender especially over the right upper quadrant with radiation into the right flank area.  Positive CVA tenderness.  No rebound, abdominal exam but mild guarding over the right upper quadrant.  Lymph/Hematologic: Normal cervical nodes  Genitourinary: Not examined  Skin: No signs of rashes  Musculoskeletal: No signs of deformity in her arms or legs.  Moving them equally.  Good strength  Neurologic: Cranial nerves normal.  No signs of any focal deficits, did not try to walk.  Psychiatric: She is crying.  Alert and oriented.    Data   Data reviewed today:  I personally reviewed the Ultrasound image(s) showing No obvious abdominal abnormalities per radiology.    Recent Labs  Lab 02/19/18  2035   WBC 15.1*   HGB 13.6   MCV 87         POTASSIUM 3.3*   CHLORIDE 109   CO2 25   BUN 5*   CR 0.61   ANIONGAP 7   DELIA 8.7   GLC 91   ALBUMIN 3.3*   PROTTOTAL 7.3   BILITOTAL 0.6   ALKPHOS 86   ALT 25   AST 8   LIPASE 67*       Recent Results (from the past 24 hour(s))   US Abdomen Limited    Narrative    RIGHT UPPER QUADRANT ULTRASOUND  2/19/2018  10:29 PM    HISTORY: Right upper quadrant pain.    COMPARISON: None.    FINDINGS:   Gallbladder: Normal with no cholelithiasis, wall  thickening or focal  tenderness.     Bile ducts: CHD is normal diameter. No intrahepatic biliary  dilatation.    Liver: Normal.     Pancreas: Not seen.    Right kidney: Normal.   Abdominal aorta and IVC partially seen and appear normal.    Appendix not visualized due to body habitus. No free fluid in the  right lower quadrant.      Impression    IMPRESSION:   1. Difficult exam due to body habitus.  2. No abnormalities seen.  3. Appendix not identified. It is indeterminate.    NAWAF DOUGLAS MD

## 2018-02-21 LAB
ANION GAP SERPL CALCULATED.3IONS-SCNC: 8 MMOL/L (ref 3–14)
BACTERIA SPEC CULT: ABNORMAL
BUN SERPL-MCNC: 7 MG/DL (ref 7–30)
CALCIUM SERPL-MCNC: 8.1 MG/DL (ref 8.5–10.1)
CHLORIDE SERPL-SCNC: 108 MMOL/L (ref 96–110)
CO2 SERPL-SCNC: 26 MMOL/L (ref 20–32)
CREAT SERPL-MCNC: 0.58 MG/DL (ref 0.5–1)
GFR SERPL CREATININE-BSD FRML MDRD: >90 ML/MIN/1.7M2
GLUCOSE SERPL-MCNC: 89 MG/DL (ref 70–99)
Lab: ABNORMAL
POTASSIUM SERPL-SCNC: 4 MMOL/L (ref 3.4–5.3)
SODIUM SERPL-SCNC: 142 MMOL/L (ref 133–144)
SPECIMEN SOURCE: ABNORMAL
WBC # BLD AUTO: 6.1 10E9/L (ref 4–11)

## 2018-02-21 PROCEDURE — 25000132 ZZH RX MED GY IP 250 OP 250 PS 637: Performed by: FAMILY MEDICINE

## 2018-02-21 PROCEDURE — 80048 BASIC METABOLIC PNL TOTAL CA: CPT | Performed by: FAMILY MEDICINE

## 2018-02-21 PROCEDURE — 99232 SBSQ HOSP IP/OBS MODERATE 35: CPT | Performed by: FAMILY MEDICINE

## 2018-02-21 PROCEDURE — 85048 AUTOMATED LEUKOCYTE COUNT: CPT | Performed by: FAMILY MEDICINE

## 2018-02-21 PROCEDURE — 36415 COLL VENOUS BLD VENIPUNCTURE: CPT | Performed by: FAMILY MEDICINE

## 2018-02-21 PROCEDURE — 25000128 H RX IP 250 OP 636: Performed by: FAMILY MEDICINE

## 2018-02-21 PROCEDURE — 12000000 ZZH R&B MED SURG/OB

## 2018-02-21 RX ORDER — OXYCODONE AND ACETAMINOPHEN 5; 325 MG/1; MG/1
1 TABLET ORAL EVERY 4 HOURS PRN
Status: DISCONTINUED | OUTPATIENT
Start: 2018-02-21 | End: 2018-02-22 | Stop reason: HOSPADM

## 2018-02-21 RX ORDER — ACETAMINOPHEN 325 MG/1
325 TABLET ORAL EVERY 4 HOURS PRN
Status: DISCONTINUED | OUTPATIENT
Start: 2018-02-21 | End: 2018-02-22 | Stop reason: HOSPADM

## 2018-02-21 RX ORDER — IBUPROFEN 600 MG/1
600 TABLET, FILM COATED ORAL EVERY 6 HOURS PRN
Status: DISCONTINUED | OUTPATIENT
Start: 2018-02-21 | End: 2018-02-22 | Stop reason: HOSPADM

## 2018-02-21 RX ORDER — SODIUM CHLORIDE 9 MG/ML
1000 INJECTION, SOLUTION INTRAVENOUS CONTINUOUS
Status: DISCONTINUED | OUTPATIENT
Start: 2018-02-21 | End: 2018-02-22

## 2018-02-21 RX ADMIN — KETOROLAC TROMETHAMINE 30 MG: 30 INJECTION, SOLUTION INTRAMUSCULAR at 08:23

## 2018-02-21 RX ADMIN — ONDANSETRON 4 MG: 2 INJECTION INTRAMUSCULAR; INTRAVENOUS at 14:00

## 2018-02-21 RX ADMIN — HYDROMORPHONE HYDROCHLORIDE 0.3 MG: 1 INJECTION, SOLUTION INTRAMUSCULAR; INTRAVENOUS; SUBCUTANEOUS at 00:47

## 2018-02-21 RX ADMIN — ACETAMINOPHEN 325 MG: 325 TABLET ORAL at 15:37

## 2018-02-21 RX ADMIN — MONTELUKAST SODIUM 10 MG: 10 TABLET, FILM COATED ORAL at 21:43

## 2018-02-21 RX ADMIN — CEFTRIAXONE 1 G: 1 INJECTION, SOLUTION INTRAVENOUS at 22:51

## 2018-02-21 RX ADMIN — OXYCODONE HYDROCHLORIDE AND ACETAMINOPHEN 1 TABLET: 5; 325 TABLET ORAL at 21:43

## 2018-02-21 RX ADMIN — OXYCODONE HYDROCHLORIDE AND ACETAMINOPHEN 1 TABLET: 5; 325 TABLET ORAL at 17:22

## 2018-02-21 RX ADMIN — VENLAFAXINE HYDROCHLORIDE 75 MG: 75 CAPSULE, EXTENDED RELEASE ORAL at 08:24

## 2018-02-21 RX ADMIN — KETOROLAC TROMETHAMINE 30 MG: 30 INJECTION, SOLUTION INTRAMUSCULAR at 02:01

## 2018-02-21 RX ADMIN — VALACYCLOVIR HYDROCHLORIDE 500 MG: 500 TABLET, FILM COATED ORAL at 21:43

## 2018-02-21 RX ADMIN — ACETAMINOPHEN 650 MG: 325 TABLET ORAL at 05:52

## 2018-02-21 RX ADMIN — OXYCODONE HYDROCHLORIDE AND ACETAMINOPHEN 1 TABLET: 5; 325 TABLET ORAL at 12:38

## 2018-02-21 RX ADMIN — OXYCODONE HYDROCHLORIDE 5 MG: 5 TABLET ORAL at 05:52

## 2018-02-21 NOTE — PROGRESS NOTES
[facility] Progress Note          Assessment and Plan:   Assessment:   Principal Problem:    Acute pyelonephritis    Assessment: Patient is improving and having lessening pain, improved appetite.  WBC is well within normal limits, patient remains afebrile with normal heart rate and sepsis symptoms resolved.  Urine culture is growing out E coli, sensitivities pending     Plan: will continue with IV rocephin until sensitivities are known, then transition to PO antibiotics tonight in hopes of tolerance and discharge home tomorrow.  Will decrease IV fluids from 125 to 50 cc/hr as patient's oral intake is starting to improve and consider saline locking this evening if oral intake continues to improve.      Active Problems:    Decreased oral intake    Assessment: improving with patient taking in more last evening and even more this morning without nausea or discomfort.      Plan: Will decrease IV fluid to 50 cc/hr this morning and consider saline locking later today if patient continues to improve in oral intake.       Tobacco use disorder    Assessment: ongoing, smoking 1 ppd    Plan: encouraged cessation, patient declines supplementation      Mild persistent asthma without complication    Assessment: chronic and stable without symptoms of acute exacerbation    Plan: continue home regimen without change      Major depressive disorder, single episode, moderate (H)    Assessment: on effexor    Plan: continue without change       # Pain Assessment:   Current Pain Score 2/21/2018 2/20/2018 2/20/2018   Patient currently in pain? yes yes yes   Pain score (0-10) - 7 3   Pain location Back - -   Pain descriptors - - -   - Sasha is experiencing pain due to acute pyelonephritis. Pain management was discussed and the plan was created in a collaborative fashion.  Sasha's response to the current recommendations: engaged  - Opioid regimen: will transition to percocet as patient feels the combination of oxycodone and acetaminophen  "given together improves the response - this will decrease the frequency of oxycodone but patient has not been needing more than every 4 hours so should work well  - Response to opioid medications: Reduction of symptoms   - Bowel regimen: Senokot prn  - Pharmacologic adjuvants: NSAIDs        VTE:  SCDs  Code Status:  Full Code        Interval History:   Starting to improve.  Vital signs generally better with patient continuing to be afebrile.  Eating has improved and patient is voiding well.  Tolerating medications without significant side effects - has noticed the oxycodone works better when given with tylenol.  No new concerns today.           Significant Problems:     Past Medical History:   Diagnosis Date     Moderate major depression (H) 1/20/2014     Nexplanon placed 11/14/17 11/14/2017     Obesity 9/14/2010            Physical Exam:   Blood pressure 132/71, pulse 70, temperature 96  F (35.6  C), temperature source Oral, resp. rate 16, height 1.702 m (5' 7\"), weight 126.5 kg (278 lb 14.1 oz), SpO2 98 %, not currently breastfeeding.  Constitutional:   awake, alert, cooperative, no apparent distress, and appears stated age     Lungs:   No increased work of breathing, good air exchange, clear to auscultation bilaterally, no crackles or wheezing     Cardiovascular:   Normal apical impulse, regular rate and rhythm, normal S1 and S2, no S3 or S4, and no murmur noted     Abdomen:   Bowel sounds present, abdomen soft, ongoing mild tenderness on palpation of the right upper abdomen but improved from previous and pain remains more lateral and right flank pain noted     Musculoskeletal:   no lower extremity pitting edema present     Neurologic:   Awake, alert, oriented to name, place and time.      Skin:   normal skin color, texture, turgor             Data:   All laboratory data reviewed    Attestation:  I have reviewed today's vital signs, notes, medications, labs and imaging.     Electronically Signed:  Blossom GARCIA" MD Devin    Note: Chart documentation done in part with Dragon Voice Recognition software. Although reviewed after completion, some word and grammatical errors may remain.

## 2018-02-21 NOTE — PLAN OF CARE
Problem: Pain, Acute (Adult)  Goal: Identify Related Risk Factors and Signs and Symptoms  Related risk factors and signs and symptoms are identified upon initiation of Human Response Clinical Practice Guideline (CPG).   Outcome: Improving  Pt reports pain to R flank. Requested IV Dilaudid and was given. Scheduled toradol given. Pt informed need to transition to oral meds for pain control and pt agreed to this plan with next pain intervention. Voiding well without difficulty. Vss, afebrile. Will monitor pain, labs.

## 2018-02-21 NOTE — PLAN OF CARE
Problem: Patient Care Overview  Goal: Plan of Care/Patient Progress Review  Outcome: Improving  Patient taking 5 mg oxycodone and scheduled Toradol with improvement in pain. Nauseated once after clear liquids, improved with Zofran. IV fluids running at 125 ml/hr, has had good urine output.

## 2018-02-22 ENCOUNTER — TELEPHONE (OUTPATIENT)
Dept: FAMILY MEDICINE | Facility: CLINIC | Age: 18
End: 2018-02-22

## 2018-02-22 VITALS
HEART RATE: 69 BPM | BODY MASS INDEX: 43.77 KG/M2 | DIASTOLIC BLOOD PRESSURE: 60 MMHG | HEIGHT: 67 IN | TEMPERATURE: 96.5 F | RESPIRATION RATE: 16 BRPM | SYSTOLIC BLOOD PRESSURE: 118 MMHG | OXYGEN SATURATION: 97 % | WEIGHT: 278.88 LBS

## 2018-02-22 PROCEDURE — 25000132 ZZH RX MED GY IP 250 OP 250 PS 637: Performed by: FAMILY MEDICINE

## 2018-02-22 PROCEDURE — 99239 HOSP IP/OBS DSCHRG MGMT >30: CPT | Performed by: FAMILY MEDICINE

## 2018-02-22 RX ORDER — ONDANSETRON 4 MG/1
4 TABLET, ORALLY DISINTEGRATING ORAL EVERY 6 HOURS PRN
Qty: 40 TABLET | Refills: 0 | Status: SHIPPED | OUTPATIENT
Start: 2018-02-22 | End: 2018-06-01

## 2018-02-22 RX ORDER — OXYCODONE AND ACETAMINOPHEN 5; 325 MG/1; MG/1
1 TABLET ORAL EVERY 4 HOURS PRN
Qty: 18 TABLET | Refills: 0 | Status: SHIPPED | OUTPATIENT
Start: 2018-02-22 | End: 2018-03-01

## 2018-02-22 RX ORDER — IBUPROFEN 600 MG/1
600 TABLET, FILM COATED ORAL EVERY 6 HOURS PRN
Qty: 40 TABLET | Refills: 0 | Status: SHIPPED | OUTPATIENT
Start: 2018-02-22 | End: 2018-04-03

## 2018-02-22 RX ORDER — AMOXICILLIN 250 MG
1 CAPSULE ORAL 2 TIMES DAILY PRN
Qty: 40 TABLET | Refills: 0 | Status: SHIPPED | OUTPATIENT
Start: 2018-02-22 | End: 2018-03-01

## 2018-02-22 RX ORDER — ACETAMINOPHEN 325 MG/1
325 TABLET ORAL EVERY 4 HOURS PRN
Qty: 100 TABLET | COMMUNITY
Start: 2018-02-22 | End: 2019-01-15

## 2018-02-22 RX ORDER — VENLAFAXINE HYDROCHLORIDE 37.5 MG/1
75 CAPSULE, EXTENDED RELEASE ORAL DAILY
Status: ON HOLD | COMMUNITY
Start: 2018-02-22 | End: 2018-03-12

## 2018-02-22 RX ORDER — CIPROFLOXACIN 500 MG/1
500 TABLET, FILM COATED ORAL EVERY 12 HOURS SCHEDULED
Status: DISCONTINUED | OUTPATIENT
Start: 2018-02-22 | End: 2018-02-22 | Stop reason: HOSPADM

## 2018-02-22 RX ORDER — CIPROFLOXACIN 500 MG/1
500 TABLET, FILM COATED ORAL EVERY 12 HOURS
Qty: 14 TABLET | Refills: 0 | Status: SHIPPED | OUTPATIENT
Start: 2018-02-22 | End: 2018-03-01

## 2018-02-22 RX ADMIN — IBUPROFEN 600 MG: 600 TABLET ORAL at 14:39

## 2018-02-22 RX ADMIN — OXYCODONE HYDROCHLORIDE AND ACETAMINOPHEN 1 TABLET: 5; 325 TABLET ORAL at 01:44

## 2018-02-22 RX ADMIN — ACETAMINOPHEN 325 MG: 325 TABLET ORAL at 00:23

## 2018-02-22 RX ADMIN — ACETAMINOPHEN 325 MG: 325 TABLET ORAL at 06:27

## 2018-02-22 RX ADMIN — VENLAFAXINE HYDROCHLORIDE 75 MG: 75 CAPSULE, EXTENDED RELEASE ORAL at 07:34

## 2018-02-22 RX ADMIN — OXYCODONE HYDROCHLORIDE AND ACETAMINOPHEN 1 TABLET: 5; 325 TABLET ORAL at 06:27

## 2018-02-22 RX ADMIN — CIPROFLOXACIN HYDROCHLORIDE 500 MG: 500 TABLET, FILM COATED ORAL at 08:27

## 2018-02-22 RX ADMIN — OXYCODONE HYDROCHLORIDE AND ACETAMINOPHEN 1 TABLET: 5; 325 TABLET ORAL at 11:17

## 2018-02-22 RX ADMIN — SENNOSIDES AND DOCUSATE SODIUM 1 TABLET: 8.6; 5 TABLET ORAL at 08:27

## 2018-02-22 RX ADMIN — IBUPROFEN 600 MG: 600 TABLET ORAL at 08:27

## 2018-02-22 NOTE — PROGRESS NOTES
S-(situation): Patient discharged to home via walking with her SO.    B-(background): pyelonephritis    A-(assessment): Goals were met for D/C.    R-(recommendations): Discharge instructions reviewed with pt previosly. Listed belongings gathered and returned to patient.          Discharge Nursing Criteria:     Care Plan and Patient education resolved: Yes    New Medications- pt has been educated about purpose and side effects: Yes    Vaccines  Influenza status verified at discharge:  Yes            MISC  Prescriptions if needed, hard copies sent with patient  Yes  Home and hospital aquired medications returned to patient: NA  Medication Bin checked and emptied on discharge Yes  Patient reports post-discharge pain management plan is effective: Yes

## 2018-02-22 NOTE — TELEPHONE ENCOUNTER
Patient called to schedule appointment for a hospital follow-up    Patient was admitted to [facility]   Discharged date: 2/22/18  Reason for hospital admission:  Pyelonephritis  Does patient have future appointment scheduled with provider? Yes   Date of future appointment:  3/01/18      This information will be used to help the care team plan for the patients upcoming visit.  The triage RN may determine that a follow up call is necessary and reach out to the patient via the phone number listed in the chart.     Please route this message on routine priority to the Triage RN pool.

## 2018-02-22 NOTE — PLAN OF CARE
Problem: Patient Care Overview  Goal: Interdisciplinary Rounds/Family Conf  Patient alert and oriented able to make needs known . Patient's pain is controlled with PO Percocet. Patient had one episode of nausea but given Zofran that provided relief.  Will continue to monitor patient .

## 2018-02-22 NOTE — PLAN OF CARE
Problem: Patient Care Overview  Goal: Plan of Care/Patient Progress Review  Outcome: Improving  Pt reports feeling improved and has appetite. Denies any difficulty with urination. Drinking fluids well and voiding well. Vss. IV fluids dc'd and IV saline locked. Pt has been independent in room. Percocet given for R flank pain and pt reports good relief, anticipating d/c to home today. Will monitor labs, pain.

## 2018-02-22 NOTE — DISCHARGE SUMMARY
[facility] Discharge Summary    Sasha Hand MRN# [id number]   Age: 17 year old YOB: 2000     Date of Admission:  2/19/2018  Date of Discharge::  2/22/2018  Admitting Physician:  Blossom Beltran MD  Discharge Physician:  Blossom Beltran MD    Home clinic: [facility]          Admission Diagnoses:   Pyelonephritis [N12]          Discharge Diagnosis:   Principal Problem:    Acute pyelonephritis    Assessment: Urine culture growing out E coli  with known sensitivities.  Patient was initially on IV Rocephin and transition to oral n oral Cipro and tolerating well with ongoing clinical resolution    Plan: Continue with Cipro at time of discharge for 10 day antibiotic course completion.   Patient does have follow-up scheduled for next week with her primary care provider    Active Problems:    Decreased oral intake    Assessment: Secondary to pyelonephritis, with symptoms resolving as patient clinically improved and is now tolerating a regular diet without difficulty    Plan: Discharge with plan as above      Tobacco use disorder    Assessment: Smoking 1 pack per day    Plan: Strongly encourage cessation, patient is not interested in supplementation help at this time      Mild persistent asthma without complication    Assessment: Chronic and stable without signs of acute exacerbation    Plan: Discharge without change to home regimen      Major depressive disorder, single episode, moderate (H)    Assessment: Chronic and stable on Effexor    Plan: Discharge without change to home regimen    # Discharge Pain Plan:   - During her hospitalization, Sasha experienced pain due to acute pyelonephritis.  The pain plan for discharge was discussed with Sasha and the plan was created in a collaborative fashion.    - Opioids prescribed on discharge: percocet 5/325 mg   - Duration of opioids after discharge: Per CDC opioid prescribing guidelines, a 3 day prescription of opioids was  provided.  - Bowel regimen: senokot as needed  - Pharmacologic adjuvants:  NSAIDs and Acetaminophen            Procedures:   No procedures performed during this admission          Medications Prior to Admission:     Prescriptions Prior to Admission   Medication Sig Dispense Refill Last Dose     valACYclovir (VALTREX) 500 MG tablet Take 1 tablet (500 mg) by mouth daily 90 tablet 3 2/18/2018 at 2200     montelukast (SINGULAIR) 10 MG tablet Take 1 tablet (10 mg) by mouth At Bedtime 90 tablet 3 2/18/2018 at 2200     etonogestrel (IMPLANON/NEXPLANON) 68 MG IMPL 1 each (68 mg) by Subdermal route continuous  0 More than a month at Unknown time             Discharge Medications:     Current Discharge Medication List      START taking these medications    Details   oxyCODONE-acetaminophen (PERCOCET) 5-325 MG per tablet Take 1 tablet by mouth every 4 hours as needed for moderate to severe pain  Qty: 18 tablet, Refills: 0    Associated Diagnoses: Pyelonephritis      acetaminophen (TYLENOL) 325 MG tablet Take 1 tablet (325 mg) by mouth every 4 hours as needed for mild pain or fever  Qty: 100 tablet      ibuprofen (ADVIL/MOTRIN) 600 MG tablet Take 1 tablet (600 mg) by mouth every 6 hours as needed for moderate pain  Qty: 40 tablet, Refills: 0    Associated Diagnoses: Pyelonephritis      ciprofloxacin (CIPRO) 500 MG tablet Take 1 tablet (500 mg) by mouth every 12 hours for 7 days  Qty: 14 tablet, Refills: 0    Associated Diagnoses: Pyelonephritis      senna-docusate (SENOKOT-S;PERICOLACE) 8.6-50 MG per tablet Take 1 tablet by mouth 2 times daily as needed for constipation  Qty: 40 tablet, Refills: 0    Associated Diagnoses: Pyelonephritis      ondansetron (ZOFRAN-ODT) 4 MG ODT tab Take 1 tablet (4 mg) by mouth every 6 hours as needed for nausea or vomiting  Qty: 40 tablet, Refills: 0    Associated Diagnoses: Nausea         CONTINUE these medications which have CHANGED    Details   venlafaxine (EFFEXOR-XR) 37.5 MG 24 hr capsule  Take 2 capsules (75 mg) by mouth daily 75 mg daily with food.    Associated Diagnoses: DELIA (generalized anxiety disorder); Major depressive disorder, single episode, moderate (H)         CONTINUE these medications which have NOT CHANGED    Details   valACYclovir (VALTREX) 500 MG tablet Take 1 tablet (500 mg) by mouth daily  Qty: 90 tablet, Refills: 3    Associated Diagnoses: Genital herpes simplex, unspecified site      montelukast (SINGULAIR) 10 MG tablet Take 1 tablet (10 mg) by mouth At Bedtime  Qty: 90 tablet, Refills: 3    Associated Diagnoses: Mild persistent asthma without complication      etonogestrel (IMPLANON/NEXPLANON) 68 MG IMPL 1 each (68 mg) by Subdermal route continuous  Refills: 0    Associated Diagnoses: Encounter for initial prescription of implantable subdermal contraceptive                   Consultations:   No consultations were requested during this admission          Brief History of Illness:   Patient is a 17-year-old female who presented with a 2 day history of body aches, headache, flank pain and right upper to lateral abdominal pain, decreased appetite, and fevers up to 102.  In the emergency room she was found to be tachycardic with leukocytosis and UA was positive for urinary tract infection.  Remainder of evaluation was unremarkable for other infection source and it was suspected patient had pyelonephritis.  Patient was placed under observation status for fluid resuscitation and initiation of antibiotics          Hospital Course:   During the patient's observation stay she was placed on IV Rocephin and did have improvement of her leukocytosis as well as some reduction in her pain, but she continued to have very poor oral intake and was unable to maintain hydration, therefore she was transitioned to inpatient status.  She did progressively improve and her UA grew out E. coli with known sensitivities.  She was transitioned to oral ciprofloxacin with ongoing clinical improvement, with  return of her appetite and ability to maintain hydration, and is being discharged home in stable condition.         Physical Exam:   Vitals were reviewed  Constitutional:   awake, alert, cooperative, no apparent distress, and appears stated age     Lungs:   No increased work of breathing, good air exchange, clear to auscultation bilaterally, no crackles or wheezing     Cardiovascular:   Normal apical impulse, regular rate and rhythm, normal S1 and S2, no S3 or S4, and no murmur noted     Abdomen:   Bowel sounds present, abdomen soft and non-tender, no significant tenderness on CVA testing     Musculoskeletal:   no lower extremity pitting edema present     Neurologic:   Awake, alert, oriented to name, place and time.       Skin:   normal skin color, texture, turgor              Discharge Instructions and Follow-Up:   Discharge diet: Regular   Discharge activity: Activity as tolerated   Discharge follow-up: Follow up with primary care provider within 7 days           Discharge Disposition:   Discharged to home      Attestation:  I have reviewed today's vital signs, notes, medications, labs and imaging.    More than 30 minutes was spent on this discharge.      Blossom Beltran MD    Note: Chart documentation done in part with Dragon Voice Recognition software. Although reviewed after completion, some word and grammatical errors may remain.

## 2018-02-23 ENCOUNTER — MYC MEDICAL ADVICE (OUTPATIENT)
Dept: FAMILY MEDICINE | Facility: CLINIC | Age: 18
End: 2018-02-23

## 2018-02-23 NOTE — TELEPHONE ENCOUNTER
Sasha Hand is a 17 year old female who calls with right sided abdominal and flank pain.  Patient was in the hospital from 02/19/2018 - 02/22/20108 with kidney infection.  Patient reports that pain has returned rating a 5/10 after taking ibuprofen and Percocet today.  Patient reports that she is getting plenty of fluids during the day, she reports that she has urinated today, but denies any burning or irritation.  She is not sure about odor or color.      NURSING ASSESSMENT:  Description:  Right flank pain.   Onset/duration:  02/19/2018.   Precip. factors:  Hospital stay for kidney infection.   Improves/worsens symptoms:  Percocet and Ibu semi-effective.   Pain scale (0-10)   5/10  Last exam/Treatment:  02/08/2018  Allergies:   Allergies   Allergen Reactions     No Known Drug Allergies      Seasonal Allergies      NURSING PLAN: Nursing advice to patient .    RECOMMENDED DISPOSITION:  Patient encouraged to continue to monitor symptoms, increase fluids, monitor fever, continue with medications.   Educated when to seek emergent care.   Will comply with recommendation: Yes  If further questions/concerns or if symptoms do not improve, worsen or new symptoms develop, call your PCP or [facility] as soon as possible.    Guideline used:  Telephone Triage Protocols for Nurses, Fifth Edition, Ana Paula Carlton RN

## 2018-02-23 NOTE — TELEPHONE ENCOUNTER
Follow up encounter documentation:    Reason for follow up: Sasha Hand appeared on our list for recent discharge from an Emergency Room, inpatient admission, or TCU/nursing home facility.    Visit date: 2/21/2018  Location: [facility]   Reason for visit: Pyelonephritis   Discharge instructions include: Follow up in clinic next week   Follow up phone call indicated? Yes. Details: Patient returns call. Continues to have some pain. She is taking her prescribed medications. Follow up appt scheduled. No further questions or concerns at this time.   Kelsey Blankenship, RN, BSN

## 2018-02-23 NOTE — TELEPHONE ENCOUNTER
ED / Discharge Outreach Protocol    Patient Contact    Attempt # 1    Was call answered?  No.  Left message on voicemail with information to call clinic back.     Prema Carlton RN

## 2018-03-01 ENCOUNTER — OFFICE VISIT (OUTPATIENT)
Dept: FAMILY MEDICINE | Facility: CLINIC | Age: 18
End: 2018-03-01
Payer: COMMERCIAL

## 2018-03-01 VITALS
HEIGHT: 67 IN | WEIGHT: 281 LBS | SYSTOLIC BLOOD PRESSURE: 114 MMHG | RESPIRATION RATE: 20 BRPM | OXYGEN SATURATION: 98 % | BODY MASS INDEX: 44.1 KG/M2 | DIASTOLIC BLOOD PRESSURE: 60 MMHG | TEMPERATURE: 98.2 F | HEART RATE: 80 BPM

## 2018-03-01 DIAGNOSIS — F41.1 GAD (GENERALIZED ANXIETY DISORDER): ICD-10-CM

## 2018-03-01 DIAGNOSIS — N10 ACUTE PYELONEPHRITIS: ICD-10-CM

## 2018-03-01 DIAGNOSIS — G47.09 OTHER INSOMNIA: ICD-10-CM

## 2018-03-01 DIAGNOSIS — J45.30 MILD PERSISTENT ASTHMA WITHOUT COMPLICATION: ICD-10-CM

## 2018-03-01 DIAGNOSIS — F32.1 MAJOR DEPRESSIVE DISORDER, SINGLE EPISODE, MODERATE (H): ICD-10-CM

## 2018-03-01 PROCEDURE — 99214 OFFICE O/P EST MOD 30 MIN: CPT | Performed by: FAMILY MEDICINE

## 2018-03-01 RX ORDER — HYDROXYZINE PAMOATE 25 MG/1
25-50 CAPSULE ORAL
Qty: 60 CAPSULE | Refills: 3 | Status: SHIPPED | OUTPATIENT
Start: 2018-03-01 | End: 2018-06-07

## 2018-03-01 ASSESSMENT — PATIENT HEALTH QUESTIONNAIRE - PHQ9: 5. POOR APPETITE OR OVEREATING: SEVERAL DAYS

## 2018-03-01 ASSESSMENT — ANXIETY QUESTIONNAIRES
7. FEELING AFRAID AS IF SOMETHING AWFUL MIGHT HAPPEN: NOT AT ALL
2. NOT BEING ABLE TO STOP OR CONTROL WORRYING: SEVERAL DAYS
5. BEING SO RESTLESS THAT IT IS HARD TO SIT STILL: NOT AT ALL
IF YOU CHECKED OFF ANY PROBLEMS ON THIS QUESTIONNAIRE, HOW DIFFICULT HAVE THESE PROBLEMS MADE IT FOR YOU TO DO YOUR WORK, TAKE CARE OF THINGS AT HOME, OR GET ALONG WITH OTHER PEOPLE: NOT DIFFICULT AT ALL
1. FEELING NERVOUS, ANXIOUS, OR ON EDGE: SEVERAL DAYS
GAD7 TOTAL SCORE: 4
3. WORRYING TOO MUCH ABOUT DIFFERENT THINGS: SEVERAL DAYS
6. BECOMING EASILY ANNOYED OR IRRITABLE: NOT AT ALL

## 2018-03-01 ASSESSMENT — PAIN SCALES - GENERAL: PAINLEVEL: NO PAIN (0)

## 2018-03-01 NOTE — MR AVS SNAPSHOT
After Visit Summary   3/1/2018    Sasha Hand    MRN: [id number]           Patient Information     Date Of Birth          2000        Visit Information        Provider Department      3/1/2018 11:30 AM Oscar Medley MD [facility]        Today's Diagnoses     Acute pyelonephritis (resolving)        DELIA (generalized anxiety disorder)        Major depressive disorder, single episode, moderate (H)        Other insomnia        Mild persistent asthma without complication           Follow-ups after your visit        Your next 10 appointments already scheduled     Mar 27, 2018 10:10 AM CDT   Office Visit with Oscar Medley MD   [facility] ([facility]    [address] [place] [id number] [phone]           Bring a current list of meds and any records pertaining to this visit. For Physicals, please bring immunization records and any forms needing to be filled out. Please arrive 10 minutes early to complete paperwork.              Future tests that were ordered for you today     Open Future Orders        Priority Expected Expires Ordered    Urine Culture Aerobic Bacterial Routine 3/15/2018 3/15/2018 3/1/2018            Who to contact     If you have questions or need follow up information about today's clinic visit or your schedule please contact [facility] directly at [phone].  Normal or non-critical lab and imaging results will be communicated to you by MyChart, letter or phone within 4 business days after the clinic has received the results. If you do not hear from us within 7 days, please contact the clinic through MyChart or phone. If you have a critical or abnormal lab result, we will notify you by phone as soon as possible.  Submit refill requests through [email or web address] or call your pharmacy and they will forward the refill request to us. Please allow 3 business days for your refill to be completed.           "Additional Information About Your Visit        MyChart Information     The [employer] [facility] gives you secure access to your electronic health record. If you see a primary care provider, you can also send messages to your care team and make appointments. If you have questions, please call your primary care clinic.  If you do not have a primary care provider, please call [phone] and they will assist you.        Care EveryWhere ID     This is your Care EveryWhere ID. This could be used by other organizations to access your [facility] medical records  [id number]        Your Vitals Were     Pulse Temperature Respirations Height Pulse Oximetry Breastfeeding?    80 98.2  F (36.8  C) (Temporal) 20 5' 7\" (1.702 m) 98% No    BMI (Body Mass Index)                   44.01 kg/m2            Blood Pressure from Last 3 Encounters:   03/01/18 114/60   02/22/18 118/60   02/08/18 122/72    Weight from Last 3 Encounters:   03/01/18 281 lb (127.5 kg) (>99 %)*   02/20/18 278 lb 14.1 oz (126.5 kg) (>99 %)*   02/08/18 283 lb (128.4 kg) (>99 %)*     * Growth percentiles are based on [facility] 2-20 Years data.              We Performed the Following     Asthma Action Plan (AAP)     DEPRESSION ACTION PLAN (DAP)          Today's Medication Changes          These changes are accurate as of 3/1/18  1:03 PM.  If you have any questions, ask your nurse or doctor.               Start taking these medicines.        Dose/Directions    hydrOXYzine 25 MG capsule   Commonly known as:  VISTARIL   Used for:  Other insomnia   Started by:  Oscar Medley MD        Dose:  25-50 mg   Take 1-2 capsules (25-50 mg) by mouth nightly as needed (insomnia)   Quantity:  60 capsule   Refills:  3         Stop taking these medicines if you haven't already. Please contact your care team if you have questions.     ciprofloxacin 500 MG tablet   Commonly known as:  CIPRO   Stopped by:  Oscar Medley MD           oxyCODONE-acetaminophen 5-325 MG per tablet   Commonly known as:  " PERCOCET   Stopped by:  Oscar Medley MD           senna-docusate 8.6-50 MG per tablet   Commonly known as:  SENOKOT-S;PERICOLACE   Stopped by:  Oscar Medley MD                Where to get your medicines      Some of these will need a paper prescription and others can be bought over the counter.  Ask your nurse if you have questions.     Bring a paper prescription for each of these medications     hydrOXYzine 25 MG capsule                Primary Care Provider Office Phone # Fax #    Oscar Medley [phone]       [address] [place] [address]        Equal Access to Services     [facility]: Hadii mark Glasgow, wajosephda dania, qaybta kaalmada vlad, lester castillo . So [facility] [phone].    ATENCIÓN: Si habla español, tiene a de la rosa disposición servicios gratuitos de asistencia lingüística. [facility] [phone].    We comply with applicable federal civil rights laws and [place] laws. We do not discriminate on the basis of race, color, national origin, age, disability, sex, sexual orientation, or gender identity.            Thank you!     Thank you for choosing [facility]  for your care. Our goal is always to provide you with excellent care. Hearing back from our patients is one way we can continue to improve our services. Please take a few minutes to complete the written survey that you may receive in the mail after your visit with us. Thank you!             Your Updated Medication List - Protect others around you: Learn how to safely use, store and throw away your medicines at www.disposemymeds.org.          This list is accurate as of 3/1/18  1:03 PM.  Always use your most recent med list.                   Brand Name Dispense Instructions for use Diagnosis    acetaminophen 325 MG tablet    TYLENOL    100 tablet    Take 1 tablet (325 mg) by mouth every 4 hours as needed for mild pain or fever         etonogestrel 68 MG Impl    IMPLANON/NEXPLANON     1 each (68 mg) by Subdermal route continuous    Encounter for initial prescription of implantable subdermal contraceptive       hydrOXYzine 25 MG capsule    VISTARIL    60 capsule    Take 1-2 capsules (25-50 mg) by mouth nightly as needed (insomnia)    Other insomnia       ibuprofen 600 MG tablet    ADVIL/MOTRIN    40 tablet    Take 1 tablet (600 mg) by mouth every 6 hours as needed for moderate pain    Pyelonephritis       montelukast 10 MG tablet    SINGULAIR    90 tablet    Take 1 tablet (10 mg) by mouth At Bedtime    Mild persistent asthma without complication       ondansetron 4 MG ODT tab    ZOFRAN-ODT    40 tablet    Take 1 tablet (4 mg) by mouth every 6 hours as needed for nausea or vomiting    Nausea       valACYclovir 500 MG tablet    VALTREX    90 tablet    Take 1 tablet (500 mg) by mouth daily    Genital herpes simplex, unspecified site       venlafaxine 37.5 MG 24 hr capsule    EFFEXOR-XR     Take 2 capsules (75 mg) by mouth daily 75 mg daily with food.    DELIA (generalized anxiety disorder), Major depressive disorder, single episode, moderate (H)

## 2018-03-01 NOTE — NURSING NOTE
"Chief Complaint   Patient presents with     Hospital F/U     2/19/2018       Initial /60  Pulse 80  Temp 98.2  F (36.8  C) (Temporal)  Resp 20  Ht 5' 7\" (1.702 m)  Wt 281 lb (127.5 kg)  SpO2 98%  Breastfeeding? No  BMI 44.01 kg/m2 Estimated body mass index is 44.01 kg/(m^2) as calculated from the following:    Height as of this encounter: 5' 7\" (1.702 m).    Weight as of this encounter: 281 lb (127.5 kg).  Medication Reconciliation: complete    "

## 2018-03-01 NOTE — PROGRESS NOTES
SUBJECTIVE:   Sasha Hand is a 17 year old female who presents to clinic today for the following health issues:          Hospital Follow-up Visit:    Hospital/Nursing Home/IP Rehab Facility: [facility]  Date of Admission: 2/19/2018  Date of Discharge: 2/22/2018  Reason(s) for Admission: pyelonephritis,DELIA,nausea             Problems taking medications regularly:  None       Medication changes since discharge: None       Problems adhering to non-medication therapy:  None    Summary of hospitalization:  [facility] discharge summary reviewed  Diagnostic Tests/Treatments reviewed.  Follow up needed: none  Other Healthcare Providers Involved in Patient s Care:         None  Update since discharge: improved.     Post Discharge Medication Reconciliation: discharge medications reconciled, continue medications without change.  Plan of care communicated with patient and family     Coding guidelines for this visit:  Type of Medical   Decision Making Face-to-Face Visit       within 7 Days of discharge Face-to-Face Visit        within 14 days of discharge   Moderate Complexity [id number]   High Complexity [id number]              PROBLEMS TO ADD ON...  She did her DELIA 7 again today to follow-up for her anxiety and that is markedly improved.  Her total score was 5 down from 13.  She feels remarkably better and less stressed.  DELIA-7 SCORE 2/8/2018 3/1/2018   Total Score 13 4     She also feels significantly better since her hospitalization.  She finished her last dose of antibiotics today and has no urinary tract symptoms whatsoever.    Problem list and histories reviewed & adjusted, as indicated.  Additional history: as documented    Patient Active Problem List   Diagnosis     Obesity     Tear of lateral cartilage or meniscus of knee, current     Menorrhagia     Dysmenorrhea     Wheezing     Tobacco use disorder     Genital herpes simplex, unspecified site     Mild persistent asthma without  complication     Nexplanon placed 11/14/17     DELIA (generalized anxiety disorder)     Major depressive disorder, single episode, moderate (H)     Acute pyelonephritis     Pyelonephritis     Decreased oral intake     Past Surgical History:   Procedure Laterality Date     ARTHROSCOPY KNEE WITH MENISCAL REPAIR Right 5/10/2017    Procedure: ARTHROSCOPY KNEE WITH MENISCAL REPAIR;  arthroscopy right knee with lateral meniscus repair;  Surgeon: Nathaniel Hicks MD;  Location: [facility]     NO HISTORY OF SURGERY         Social History   Substance Use Topics     Smoking status: Current Every Day Smoker     Packs/day: 0.50     Types: Cigarettes     Smokeless tobacco: Never Used     Alcohol use No     Family History   Problem Relation Age of Onset     Asthma Mother      Hypertension Mother      Asthma Father      DIABETES Father      CANCER Paternal Grandmother          Current Outpatient Prescriptions   Medication Sig Dispense Refill     hydrOXYzine (VISTARIL) 25 MG capsule Take 1-2 capsules (25-50 mg) by mouth nightly as needed (insomnia) 60 capsule 3     acetaminophen (TYLENOL) 325 MG tablet Take 1 tablet (325 mg) by mouth every 4 hours as needed for mild pain or fever 100 tablet      ibuprofen (ADVIL/MOTRIN) 600 MG tablet Take 1 tablet (600 mg) by mouth every 6 hours as needed for moderate pain 40 tablet 0     venlafaxine (EFFEXOR-XR) 37.5 MG 24 hr capsule Take 2 capsules (75 mg) by mouth daily 75 mg daily with food.       ondansetron (ZOFRAN-ODT) 4 MG ODT tab Take 1 tablet (4 mg) by mouth every 6 hours as needed for nausea or vomiting 40 tablet 0     etonogestrel (IMPLANON/NEXPLANON) 68 MG IMPL 1 each (68 mg) by Subdermal route continuous  0     valACYclovir (VALTREX) 500 MG tablet Take 1 tablet (500 mg) by mouth daily 90 tablet 3     montelukast (SINGULAIR) 10 MG tablet Take 1 tablet (10 mg) by mouth At Bedtime 90 tablet 3     Allergies   Allergen Reactions     No Known Drug Allergies      Seasonal Allergies      BP  "Readings from Last 3 Encounters:   03/01/18 114/60   02/22/18 118/60   02/08/18 122/72    Wt Readings from Last 3 Encounters:   03/01/18 281 lb (127.5 kg) (>99 %)*   02/20/18 278 lb 14.1 oz (126.5 kg) (>99 %)*   02/08/18 283 lb (128.4 kg) (>99 %)*     * Growth percentiles are based on CDC 2-20 Years data.            Labs were reviewed in EPIC     Reviewed and updated as needed this visit by clinical staff       Reviewed and updated as needed this visit by Provider         ROS:  Constitutional, HEENT, cardiovascular, pulmonary, gi and gu systems are negative, except as otherwise noted.    OBJECTIVE:     /60  Pulse 80  Temp 98.2  F (36.8  C) (Temporal)  Resp 20  Ht 5' 7\" (1.702 m)  Wt 281 lb (127.5 kg)  SpO2 98%  Breastfeeding? No  BMI 44.01 kg/m2  Body mass index is 44.01 kg/(m^2).  GENERAL: healthy, alert, no distress and she seems much more relaxed today.  RESP: lungs clear to auscultation - no rales, rhonchi or wheezes  CV: regular rate and rhythm, normal S1 S2, no S3 or S4, no murmur, click or rub, no peripheral edema and peripheral pulses strong  ABDOMEN: soft, nontender, no hepatosplenomegaly, no masses and bowel sounds normal  Back: She has no CVA tenderness.    Diagnostic Test Results:  none     ASSESSMENT/PLAN:   (N10) Acute pyelonephritis (resolving)  Comment: Showed a positive culture that grew out E. coli and is feeling significantly better after treatment with ciprofloxacin.  Plan: Urine Culture Aerobic Bacterial        She just finished her antibiotics today so we will repeat a urine culture next week to make sure that there is nothing still lingering.    (F41.1) DELIA (generalized anxiety disorder)  (F32.1) Major depressive disorder, single episode, moderate (H)  Comment: Her anxiety is significantly better today.  As is her depression.  The increase her dose to 75 mg the venlafaxine when she was in for her pyelonephritis.  Plan: DEPRESSION ACTION PLAN (DAP)        We will keep her on her " "current dose for a minimum of 6 months and have her follow-up to see if she is continuing to do well.  If so we can at that time decrease her down to 37.5 mg for 2-4 weeks and then try to get her off the medication or continue her on a lower dose to see how she does.    (G47.09) Other insomnia  Comment: 1 side effects of the medication that she is having is some middle insomnia.  Plan: hydrOXYzine (VISTARIL) 25 MG capsule        We will have her try hydroxyzine 25-50 mg right when she gets home from work which is usually about 11:30 at night.  This will hopefully get her resting better so that she does not continue to wake up in the middle of her night.    (J45.30) Mild persistent asthma without complication  Comment: Her asthma has been stable  Plan: Continue current medications and recommend smoking cessation, she is not ready to do so.    Tobacco Cessation:   reports that she has been smoking Cigarettes.  She has been smoking about 0.50 packs per day. She has never used smokeless tobacco.  Tobacco Cessation Action Plan: Information offered: Patient not interested at this time    BMI:   Estimated body mass index is 44.01 kg/(m^2) as calculated from the following:    Height as of this encounter: 5' 7\" (1.702 m).    Weight as of this encounter: 281 lb (127.5 kg).   Weight management plan: Discussed healthy diet and exercise guidelines and patient will follow up in 12 months in clinic to re-evaluate.    Electronically signed by:  Oscar Medley M.D.  3/1/2018    "

## 2018-03-01 NOTE — LETTER
My Asthma Action Plan  Name: Sasha Hand   YOB: 2000  Date: 3/1/2018   My doctor: Oscar Medley MD, MD   My clinic: [facility]        My Control Medicine: { :016277}  My Rescue Medicine: { :930641}  {AAP include Oral Steroid:456179} My Asthma Severity: { :391886}  Avoid your asthma triggers: { :370717}        {Is patient a child or adult?:661486}       GREEN ZONE   Good Control    I feel good    No cough or wheeze    Can work, sleep and play without asthma symptoms       Take your asthma control medicine every day.     1. If exercise triggers your asthma, take your rescue medication    15 minutes before exercise or sports, and    During exercise if you have asthma symptoms  2. Spacer to use with inhaler: If you have a spacer, make sure to use it with your inhaler             YELLOW ZONE Getting Worse  I have ANY of these:    I do not feel good    Cough or wheeze    Chest feels tight    Wake up at night   1. Keep taking your Green Zone medications  2. Start taking your rescue medicine:    every 20 minutes for up to 1 hour. Then every 4 hours for 24-48 hours.  3. If you stay in the Yellow Zone for more than 12-24 hours, contact your doctor.  4. If you do not return to the Green Zone in 12-24 hours or you get worse, start taking your oral steroid medicine if prescribed by your provider.           RED ZONE Medical Alert - Get Help  I have ANY of these:    I feel awful    Medicine is not helping    Breathing getting harder    Trouble walking or talking    Nose opens wide to breathe       1. Take your rescue medicine NOW  2. If your provider has prescribed an oral steroid medicine, start taking it NOW  3. Call your doctor NOW  4. If you are still in the Red Zone after 20 minutes and you have not reached your doctor:    Take your rescue medicine again and    Call 911 or go to the emergency room right away    See your regular doctor within 2 weeks of an Emergency Room or Urgent Care  visit for follow-up treatment.        Electronically signed by: Claudia Coyne, March 1, 2018    Annual Reminders:  Meet with Asthma Educator,  Flu Shot in the Fall, consider Pneumonia Vaccination for patients with asthma (aged 19 and older).    Pharmacy:    [facility]  [facility] [address] [place], [place] [id number] [facility]                    Asthma Triggers  How To Control Things That Make Your Asthma Worse    Triggers are things that make your asthma worse.  Look at the list below to help you find your triggers and what you can do about them.  You can help prevent asthma flare-ups by staying away from your triggers.      Trigger                                                          What you can do   Cigarette Smoke  Tobacco smoke can make asthma worse. Do not allow smoking in your home, car or around you.  Be sure no one smokes at a child s day care or school.  If you smoke, ask your health care provider for ways to help you quit.  Ask family members to quit too.  Ask your health care provider for a referral to Quit Plan to help you quit smoking, or call 5-866-343-PLAN.     Colds, Flu, Bronchitis  These are common triggers of asthma. Wash your hands often.  Don t touch your eyes, nose or mouth.  Get a flu shot every year.     Dust Mites  These are tiny bugs that live in cloth or carpet. They are too small to see. Wash sheets and blankets in hot water every week.   Encase pillows and mattress in dust mite proof covers.  Avoid having carpet if you can. If you have carpet, vacuum weekly.   Use a dust mask and HEPA vacuum.   Pollen and Outdoor Mold  Some people are allergic to trees, grass, or weed pollen, or molds. Try to keep your windows closed.  Limit time out doors when pollen count is high.   Ask you health care provider about taking medicine during allergy season.     Animal Dander  Some people are allergic to skin flakes, urine or  saliva from pets with fur or feathers. Keep pets with fur or feathers out of your home.    If you can t keep the pet outdoors, then keep the pet out of your bedroom.  Keep the bedroom door closed.  Keep pets off cloth furniture and away from stuffed toys.     Mice, Rats, and Cockroaches  Some people are allergic to the waste from these pests.   Cover food and garbage.  Clean up spills and food crumbs.  Store grease in the refrigerator.   Keep food out of the bedroom.   Indoor Mold  This can be a trigger if your home has high moisture. Fix leaking faucets, pipes, or other sources of water.   Clean moldy surfaces.  Dehumidify basement if it is damp and smelly.   Smoke, Strong Odors, and Sprays  These can reduce air quality. Stay away from strong odors and sprays, such as perfume, powder, hair spray, paints, smoke incense, paint, cleaning products, candles and new carpet.   Exercise or Sports  Some people with asthma have this trigger. Be active!  Ask your doctor about taking medicine before sports or exercise to prevent symptoms.    Warm up for 5-10 minutes before and after sports or exercise.     Other Triggers of Asthma  Cold air:  Cover your nose and mouth with a scarf.  Sometimes laughing or crying can be a trigger.  Some medicines and food can trigger asthma.

## 2018-03-02 ASSESSMENT — PATIENT HEALTH QUESTIONNAIRE - PHQ9: SUM OF ALL RESPONSES TO PHQ QUESTIONS 1-9: 6

## 2018-03-02 ASSESSMENT — ANXIETY QUESTIONNAIRES: GAD7 TOTAL SCORE: 4

## 2018-03-02 ASSESSMENT — ASTHMA QUESTIONNAIRES: ACT_TOTALSCORE: 25

## 2018-03-03 NOTE — PROGRESS NOTES
"Sasha Ferrer  Gender: female  : 2000  [address]  [facility] [id number] [phone] (home)     Medical Record: [id number]  Pharmacy:    [facility] - [facility]  [facility]  - [place] - [address] [facility]  Primary Care Provider: Oscar Medley    Parent's names are: Aishwarya Ferrernica (mother) and FELICITA FERRER (father).      [facility]  March 3, 2018     Discharge Phone Call:  Key Words/Key Times      Introduction - AIDET (Acknowledge, Introduce, Duration, Explanation)      Empathy-   We are calling to see how you are since your recent stay in the hospital?     Call back COMMENTS:        Clinical Questions -  (f/u appts, medication side effects/purpose, ability to care for self at home) \"For your safety, it is important to us that you understand the purpose and side effects of your medications, can you tell me what your new medications are?\"     Call back COMMENTS:         Staff Recognition -  We like to recognize staff and physicians who have done an excellent job.  Do you remember any people from your care team that you would like recognize?     Call back COMMENTS:        Very Good Care -  We want to provide very good care to all patients.  How was your care?     Call back COMMENTS:        Opportunities for Improvement -  Our goal is to be the best.  Do you have any suggestions for things that we could improve upon?     Call back COMMENTS:        Thank You        1st attempt FRANKLIN Haywood RN  2nd attempt left message with father.  FRANKLIN Haywood RN      "

## 2018-03-10 ENCOUNTER — TRANSFERRED RECORDS (OUTPATIENT)
Dept: HEALTH INFORMATION MANAGEMENT | Facility: CLINIC | Age: 18
End: 2018-03-10

## 2018-03-11 ENCOUNTER — HOSPITAL ENCOUNTER (INPATIENT)
Facility: CLINIC | Age: 18
LOS: 1 days | Discharge: HOME | End: 2018-03-12
Attending: PSYCHIATRY & NEUROLOGY | Admitting: PSYCHIATRY & NEUROLOGY
Payer: COMMERCIAL

## 2018-03-11 DIAGNOSIS — F41.1 GAD (GENERALIZED ANXIETY DISORDER): ICD-10-CM

## 2018-03-11 DIAGNOSIS — N39.0 URINARY TRACT INFECTION WITHOUT HEMATURIA, SITE UNSPECIFIED: Primary | ICD-10-CM

## 2018-03-11 DIAGNOSIS — F32.1 MAJOR DEPRESSIVE DISORDER, SINGLE EPISODE, MODERATE (H): ICD-10-CM

## 2018-03-11 PROBLEM — F32.A DEPRESSION: Status: ACTIVE | Noted: 2018-03-11

## 2018-03-11 PROCEDURE — 99223 1ST HOSP IP/OBS HIGH 75: CPT | Mod: AI | Performed by: PSYCHIATRY & NEUROLOGY

## 2018-03-11 PROCEDURE — 12400007 ZZH R&B MH INTERMEDIATE UMMC

## 2018-03-11 PROCEDURE — 25000132 ZZH RX MED GY IP 250 OP 250 PS 637: Performed by: PSYCHIATRY & NEUROLOGY

## 2018-03-11 RX ORDER — VALACYCLOVIR HYDROCHLORIDE 500 MG/1
500 TABLET, FILM COATED ORAL DAILY
Status: DISCONTINUED | OUTPATIENT
Start: 2018-03-11 | End: 2018-03-12 | Stop reason: HOSPADM

## 2018-03-11 RX ORDER — ALUMINA, MAGNESIA, AND SIMETHICONE 2400; 2400; 240 MG/30ML; MG/30ML; MG/30ML
30 SUSPENSION ORAL EVERY 4 HOURS PRN
Status: DISCONTINUED | OUTPATIENT
Start: 2018-03-11 | End: 2018-03-12 | Stop reason: HOSPADM

## 2018-03-11 RX ORDER — HYDROXYZINE HYDROCHLORIDE 25 MG/1
25 TABLET, FILM COATED ORAL EVERY 4 HOURS PRN
Status: DISCONTINUED | OUTPATIENT
Start: 2018-03-11 | End: 2018-03-12 | Stop reason: HOSPADM

## 2018-03-11 RX ORDER — OLANZAPINE 10 MG/1
10 TABLET ORAL
Status: DISCONTINUED | OUTPATIENT
Start: 2018-03-11 | End: 2018-03-12 | Stop reason: HOSPADM

## 2018-03-11 RX ORDER — VENLAFAXINE HYDROCHLORIDE 75 MG/1
75 TABLET, EXTENDED RELEASE ORAL DAILY
Status: DISCONTINUED | OUTPATIENT
Start: 2018-03-11 | End: 2018-03-11

## 2018-03-11 RX ORDER — MONTELUKAST SODIUM 10 MG/1
10 TABLET ORAL AT BEDTIME
Status: DISCONTINUED | OUTPATIENT
Start: 2018-03-11 | End: 2018-03-12 | Stop reason: HOSPADM

## 2018-03-11 RX ORDER — ACETAMINOPHEN 325 MG/1
650 TABLET ORAL EVERY 4 HOURS PRN
Status: DISCONTINUED | OUTPATIENT
Start: 2018-03-11 | End: 2018-03-12 | Stop reason: HOSPADM

## 2018-03-11 RX ORDER — OLANZAPINE 10 MG/2ML
10 INJECTION, POWDER, FOR SOLUTION INTRAMUSCULAR
Status: DISCONTINUED | OUTPATIENT
Start: 2018-03-11 | End: 2018-03-12 | Stop reason: HOSPADM

## 2018-03-11 RX ORDER — TRAZODONE HYDROCHLORIDE 50 MG/1
50 TABLET, FILM COATED ORAL
Status: DISCONTINUED | OUTPATIENT
Start: 2018-03-11 | End: 2018-03-12 | Stop reason: HOSPADM

## 2018-03-11 RX ORDER — VENLAFAXINE HYDROCHLORIDE 150 MG/1
150 TABLET, EXTENDED RELEASE ORAL DAILY
Status: DISCONTINUED | OUTPATIENT
Start: 2018-03-12 | End: 2018-03-12 | Stop reason: HOSPADM

## 2018-03-11 RX ADMIN — VENLAFAXINE HYDROCHLORIDE 75 MG: 75 TABLET, EXTENDED RELEASE ORAL at 10:27

## 2018-03-11 RX ADMIN — VALACYCLOVIR HYDROCHLORIDE 500 MG: 500 TABLET, FILM COATED ORAL at 10:27

## 2018-03-11 ASSESSMENT — ACTIVITIES OF DAILY LIVING (ADL)
GROOMING: INDEPENDENT;SHOWER
AMBULATION: 0-->INDEPENDENT
DRESS: STREET CLOTHES;INDEPENDENT
AMBULATION: 0 - INDEPENDENT
COMMUNICATION: 0 - UNDERSTANDS/COMMUNICATES WITHOUT DIFFICULTY
TOILETING: 0-->INDEPENDENT
BATHING: 0-->INDEPENDENT
LAUNDRY: WITH SUPERVISION
COGNITION: 0 - NO COGNITION ISSUES REPORTED
GROOMING: INDEPENDENT
RETIRED_COMMUNICATION: 0-->UNDERSTANDS/COMMUNICATES WITHOUT DIFFICULTY
DRESS: 0 - INDEPENDENT
SWALLOWING: 0 - SWALLOWS FOODS/LIQUIDS WITHOUT DIFFICULTY
TRANSFERRING: 0 - INDEPENDENT
DRESS: INDEPENDENT
ORAL_HYGIENE: INDEPENDENT
EATING: 0 - INDEPENDENT
ORAL_HYGIENE: INDEPENDENT
TOILETING: 0 - INDEPENDENT
DRESS: 0-->INDEPENDENT
RETIRED_EATING: 0-->INDEPENDENT
TRANSFERRING: 0-->INDEPENDENT
BATHING: 0 - INDEPENDENT
SWALLOWING: 0-->SWALLOWS FOODS/LIQUIDS WITHOUT DIFFICULTY
FALL_HISTORY_WITHIN_LAST_SIX_MONTHS: NO
LAUNDRY: UNABLE TO COMPLETE

## 2018-03-11 NOTE — IP AVS SNAPSHOT
MRN:[id number]                      After Visit Summary   3/11/2018    Sasha Hand    MRN: [id number]           Thank you!     Thank you for choosing [facility] for your care. Our goal is always to provide you with excellent care.        Patient Information     Date Of Birth          2000        About your hospital stay     You were admitted on:  March 11, 2018 You last received care in the:  [facility] 30[facility]    You were discharged on:  March 12, 2018       Who to Call     For medical emergencies, please call 911.  For non-urgent questions about your medical care, please call your primary care provider or clinic, [phone]          Attending Provider     Provider Jeremiah Berry MD Psychiatry       Primary Care Provider Office Phone # Fax #    Oscar Medley [phone]      Your next 10 appointments already scheduled     Mar 19, 2018  2:40 PM CDT   [facility] with Monica Friedman Mai, MD   [facility]    [address] [place] [address] [phone]            Mar 27, 2018 10:10 AM CDT   Office Visit with Oscar Medley MD   [facility]    [address] [place] [address] [phone]           Bring a current list of meds and any records pertaining to this visit. For Physicals, please bring immunization records and any forms needing to be filled out. Please arrive 10 minutes early to complete paperwork.              Further instructions from your care team        Behavioral Discharge Planning and Instructions      Summary:  You were admitted on 3/11/2018  due to suicidal ideation with intent.  You were treated by Dr. Jeremiah Barker MD and discharged on 03/12/2018 from [facility] to Home      Principal Diagnosis:   1.  Unspecified depressive disorder.   2.  Unspecified anxiety disorder.   3.  Rule out major depressive disorder, single episode, moderate  severity.    Health Care Follow-up Appointments:   1. Individual Therapy Appointment:  Date/Time: Friday, March 16, 2018 @ 1:00pm, with 12:30pm arrival time for paperwork.  Provider: Amarjit Ayala MS, [facility]  The location for your upcoming appointment:  [facility]  [address] [place] [address]  Phone: [phone]  The Health Unit Coordinator has faxed these discharge instructions to Fax:[phone].    2. Hospital Follow-up Appointment:  Date/Time: Monday, March 19, 2018 @ 2:40pm  Provider: Dr. Cross  * A note was left with Dr. Medley attempting to get you seen sooner, however his team did not get back to hospital  prior to your discharge.   The location for your upcoming appointment  [address] [place] [address]  Phone: [phone]  Attend all scheduled appointments with your outpatient providers. Call at least 24 hours in advance if you need to reschedule an appointment to ensure continued access to your outpatient providers.   Major Treatments, Procedures and Findings:  You were provided with: a psychiatric assessment, assessed for medical stability, medication evaluation and/or management, group therapy, individual therapy, milieu management and medical interventions    Symptoms to Report: feeling more aggressive, increased confusion, losing more sleep, mood getting worse or thoughts of suicide    Early warning signs can include: increased depression or anxiety sleep disturbances increased thoughts or behaviors of suicide or self-harm  increased unusual thinking, such as paranoia or hearing voices    Safety and Wellness:  Take all medicines as directed.  Make no changes unless your doctor suggests them.      Follow treatment recommendations.  Refrain from alcohol and non-prescribed drugs.  If there is a concern for safety, call 911.    Resources:   [facility]  Suicide/Mental Health  "Crisis Hotline..........................1-772.459.5088 or (978) 614-9979     Contact:   [facility]  ATTN:   [address] [place] [address]  Phone: [phone] or [phone]  Fax: [phone]  The treatment team has appreciated the opportunity to work with you.     Sasha,  please take care and make your recovery a daily recovery.   If you have any questions or concerns our unit number is [phone].  You may be receiving a follow-up phone call within the next three days from a representative from behavioral health.    You have identified the best phone number to reach you as [phone].          Pending Results     Date and Time Order Name Status Description    3/12/2018 1135 Urine Culture Aerobic Bacterial Preliminary             Admission Information     Date & Time Provider Department Dept. Phone    3/11/2018 Jeremiah Barker MD [facility] 30NR [phone]      Your Vitals Were     Blood Pressure Pulse Temperature Height Weight Pulse Oximetry    138/62 79 98.2  F (36.8  C) 1.702 m (5' 7\") 119.7 kg (264 lb) 98%    BMI (Body Mass Index)                   41.35 kg/m2           MyChart Information     [facility] gives you secure access to your electronic health record. If you see a primary care provider, you can also send messages to your care team and make appointments. If you have questions, please call your primary care clinic.  If you do not have a primary care provider, please call [phone] and they will assist you.        Care EveryWhere ID     This is your Care EveryWhere ID. This could be used by other organizations to access your [facility] medical records  [id number]        Equal Access to Services     MILAD HANSON : Janeth Glasgow, sekou najera, lester betancur. So [facility] [phone].    ATENCIÓN: Si habla español, tiene a de la rosa disposición servicios gratuitos de " asistencia lingüística. Emely al [phone].    We comply with applicable federal civil rights laws and [place] laws. We do not discriminate on the basis of race, color, national origin, age, disability, sex, sexual orientation, or gender identity.               Review of your medicines      START taking        Dose / Directions    nitroFURantoin (macrocrystal-monohydrate) 100 MG capsule   Commonly known as:  MACROBID   Used for:  Urinary tract infection without hematuria, site unspecified        Dose:  100 mg   Take 1 capsule (100 mg) by mouth 2 times daily   Quantity:  14 capsule   Refills:  0       venlafaxine 150 MG Tb24 24 hr tablet   Commonly known as:  EFFEXOR-ER   Used for:  DELIA (generalized anxiety disorder), Major depressive disorder, single episode, moderate (H)   Replaces:  venlafaxine 37.5 MG 24 hr capsule        Dose:  150 mg   Take 1 tablet (150 mg) by mouth daily   Quantity:  30 each   Refills:  0         CONTINUE these medicines which have NOT CHANGED        Dose / Directions    acetaminophen 325 MG tablet   Commonly known as:  TYLENOL        Dose:  325 mg   Take 1 tablet (325 mg) by mouth every 4 hours as needed for mild pain or fever   Quantity:  100 tablet   Refills:  0       etonogestrel 68 MG Impl   Commonly known as:  IMPLANON/NEXPLANON   Used for:  Encounter for initial prescription of implantable subdermal contraceptive        Dose:  1 each   1 each (68 mg) by Subdermal route continuous   Refills:  0       hydrOXYzine 25 MG capsule   Commonly known as:  VISTARIL   Used for:  Other insomnia        Dose:  25-50 mg   Take 1-2 capsules (25-50 mg) by mouth nightly as needed (insomnia)   Quantity:  60 capsule   Refills:  3       ibuprofen 600 MG tablet   Commonly known as:  ADVIL/MOTRIN   Used for:  Pyelonephritis        Dose:  600 mg   Take 1 tablet (600 mg) by mouth every 6 hours as needed for moderate pain   Quantity:  40 tablet   Refills:  0       montelukast 10 MG tablet   Commonly known  as:  SINGULAIR   Used for:  Mild persistent asthma without complication        Dose:  10 mg   Take 1 tablet (10 mg) by mouth At Bedtime   Quantity:  90 tablet   Refills:  3       ondansetron 4 MG ODT tab   Commonly known as:  ZOFRAN-ODT   Used for:  Nausea        Dose:  4 mg   Take 1 tablet (4 mg) by mouth every 6 hours as needed for nausea or vomiting   Quantity:  40 tablet   Refills:  0       valACYclovir 500 MG tablet   Commonly known as:  VALTREX   Used for:  Genital herpes simplex, unspecified site        Dose:  500 mg   Take 1 tablet (500 mg) by mouth daily   Quantity:  90 tablet   Refills:  3         STOP taking     venlafaxine 37.5 MG 24 hr capsule   Commonly known as:  EFFEXOR-XR   Replaced by:  venlafaxine 150 MG Tb24 24 hr tablet                Where to get your medicines      These medications were sent to [facility] [place], [place] - [address]  [address] [facility] [address]     Phone:  [phone]     nitroFURantoin (macrocrystal-monohydrate) 100 MG capsule    venlafaxine 150 MG Tb24 24 hr tablet                Protect others around you: Learn how to safely use, store and throw away your medicines at www.disposemymeds.org.             Medication List: This is a list of all your medications and when to take them. Check marks below indicate your daily home schedule. Keep this list as a reference.      Medications           Morning Afternoon Evening Bedtime As Needed    acetaminophen 325 MG tablet   Commonly known as:  TYLENOL   Take 1 tablet (325 mg) by mouth every 4 hours as needed for mild pain or fever                                etonogestrel 68 MG Impl   Commonly known as:  IMPLANON/NEXPLANON   1 each (68 mg) by Subdermal route continuous                                hydrOXYzine 25 MG capsule   Commonly known as:  VISTARIL   Take 1-2 capsules (25-50 mg) by mouth nightly as needed (insomnia)                                ibuprofen 600 MG tablet   Commonly  known as:  ADVIL/MOTRIN   Take 1 tablet (600 mg) by mouth every 6 hours as needed for moderate pain                                montelukast 10 MG tablet   Commonly known as:  SINGULAIR   Take 1 tablet (10 mg) by mouth At Bedtime                                nitroFURantoin (macrocrystal-monohydrate) 100 MG capsule   Commonly known as:  MACROBID   Take 1 capsule (100 mg) by mouth 2 times daily                                ondansetron 4 MG ODT tab   Commonly known as:  ZOFRAN-ODT   Take 1 tablet (4 mg) by mouth every 6 hours as needed for nausea or vomiting                                valACYclovir 500 MG tablet   Commonly known as:  VALTREX   Take 1 tablet (500 mg) by mouth daily   Last time this was given:  500 mg on 3/12/2018 12:48 PM                                venlafaxine 150 MG Tb24 24 hr tablet   Commonly known as:  EFFEXOR-ER   Take 1 tablet (150 mg) by mouth daily   Last time this was given:  150 mg on 3/12/2018 12:48 PM

## 2018-03-11 NOTE — PROGRESS NOTES
"Initial Psychosocial Assessment    I have reviewed the chart, met with the patient, and developed Care Plan. Information for assessment was obtained from: pt and chart review    Presenting Problem:  17 y.o bib EMS after family called police when pt locked herself in car. Pt made SI statements about \"shooting herself\" and \"crashing her car\". Pt was put on a 72 hour hold at 18 0350 to  18 035.  Pt has no previous MH admissions and no current MH providers. Pt signed AMANDA for her mother on unit and denied current SI.     History of Mental Health and Chemical Dependency:  1s t MH admission. Remote Hx of depression and anxiety .Denied CD issues.   Family Description ([place], Family Psychiatric History):  Pt grew up in [place] raised by both parents and is the youngest of 6 children. Pt has an uncle who suffers from depression and has SA in past.   Significant Life Events (Illness, Abuse, Trauma, Death):  Pt broke up with  boyfriend of 1.5 months because he cheated on her and started using drugs again. Pt lost her job and her father lost his job.  Living Situation:  Pt lives with family  Educational Background:  [employer]  Occupational History:  - recently lost her job  Financial Status:  None- parents  Legal Issues:   None  Ethnic/Cultural Issues:      Spiritual Orientation:  Pt denied   Service History:  None  Current Treatment Providers are:    PCP:D[facility] in [place].     Psychiatrist: None    Therapist: None- open to referral.   CADI Worker: None    Social Service Assessment/Plan:    Pt presented depressed (tearful) and irritable during assessment and was focused on discharging asap. Pt said she has a supportive family and \"did something stupid\" when making SI statements. Pt denied current SI and said \"I know I am worth more than having a boyfriend that cheats on me\". Pt is open to therapy referral.     Hospital staff will provide a safe " environment and a therapeutic milieu. Pt will have psychiatric assessment and medication management by the psychiatrist. CTC will do individual inpatient treatment planning and after care planning. Staff will continue to assess pt as needed. Patient will participate in unit groups and activities. Pt will receive individual and group support on the unit.     Patient admitted for safety/stabilization of mood disorder sx's.  Medication will be reviewed, adjusted per MD's as indicated.   Will contact outpatient providers for care coordination.  Will discuss options for increased community supports.  Will continue to assess, coordinate care, and ensure appropriate f/u care is in place.

## 2018-03-11 NOTE — IP AVS SNAPSHOT
30    [address]    [place] [address]    Phone:  [phone]                                       After Visit Summary   3/11/2018    Sasha Hand    MRN: [id number]           After Visit Summary Signature Page     I have received my discharge instructions, and my questions have been answered. I have discussed any challenges I see with this plan with the nurse or doctor.    ..........................................................................................................................................  Patient/Patient Representative Signature      ..........................................................................................................................................  Patient Representative Print Name and Relationship to Patient    ..................................................               ................................................  Date                                            Time    ..........................................................................................................................................  Reviewed by Signature/Title    ...................................................              ..............................................  Date                                                            Time

## 2018-03-11 NOTE — PLAN OF CARE
"Problem: Depressive Symptoms  Goal: Depressive Symptoms  Signs and symptoms of listed problems will be absent or manageable.     Pt's admission interview completed and profile is complete.  Allergy and PTA medication have been verified with pt.  Pt denies SI and contracts for safety.  Pt was calm and co-operative during the interview, displays a flat blunted affect,  was very tearful.  Pt denies AH/VH, denies anxiety and only endorsed depression.  Pt is on Effexor 75 mg daily for the depression which she reports taking consistently.  Pt reports feeling worthless and hopeless.  Main stressor is a break up with a boyfriend.  Pt was tearful and reports being sad because the boyfriend lied to her.  Pt thought her boyfriend was 22 yrs old and just found out he might be 47 years old.  This is pt's first psych admission.  Pt given a tour of the unit but still reported \"I am scared of this place\".  Will continue to monitor pt, build rapport and provide for safety as needed.        "

## 2018-03-11 NOTE — PLAN OF CARE
Problem: Depressive Symptoms  Goal: Depressive Symptoms  Signs and symptoms of listed problems will be absent or manageable.  Outcome: No Change  Admission:  16 y/o female admitted from [facility] ED on 72 hour hold with SI and plan to crash car or shoot self per ED report. Pt arrived on unit at 0645. She was ambulatory, alert and oriented. Pt was cooperative with safety search and VS. She declined offer for snack. Pt is quiet with flat affect, tearful and bed resting. Pt was given unit pkt and informed that day RN will be completing admission.  She voices no questions or requests.

## 2018-03-11 NOTE — PROGRESS NOTES
03/11/18 0807   Patient Belongings   Did you bring any home meds/supplements to the hospital?  No   Patient Belongings body jewelry;clothing   Disposition of Belongings Locker   Belongings Search Yes   Clothing Search Yes     Belongings in locker: Small cup of rings and earrings, one sweatshirt, one pair of sweatpants, one pair of boots, one pair of shoe insoles, and one tank top.     Other items: Two sweatshirts, three pairs of socks, two pairs of underwear, two shirts, makeup (eyeliner and mascara), two pairs of pants.    A               Admission:  I am responsible for any personal items that are not sent to the safe or pharmacy.  [facility] is not responsible for loss, theft or damage of any property in my possession.    Signature:  _________________________________ Date: _______  Time: _____                                              Staff Signature:  ____________________________ Date: ________  Time: _____      2nd Staff person, if patient is unable/unwilling to sign:    Signature: ________________________________ Date: ________  Time: _____     Discharge:  [facility] has returned all of my personal belongings:    Signature: _________________________________ Date: ________  Time: _____                                          Staff Signature:  ____________________________ Date: ________  Time: _____

## 2018-03-12 ENCOUNTER — TELEPHONE (OUTPATIENT)
Dept: FAMILY MEDICINE | Facility: CLINIC | Age: 18
End: 2018-03-12

## 2018-03-12 VITALS
SYSTOLIC BLOOD PRESSURE: 138 MMHG | BODY MASS INDEX: 41.44 KG/M2 | HEIGHT: 67 IN | OXYGEN SATURATION: 98 % | HEART RATE: 79 BPM | WEIGHT: 264 LBS | TEMPERATURE: 98.2 F | DIASTOLIC BLOOD PRESSURE: 62 MMHG

## 2018-03-12 LAB
ALBUMIN SERPL-MCNC: 3.5 G/DL (ref 3.4–5)
ALBUMIN UR-MCNC: 30 MG/DL
ALP SERPL-CCNC: 77 U/L (ref 40–150)
ALT SERPL W P-5'-P-CCNC: 36 U/L (ref 0–50)
AMORPH CRY #/AREA URNS HPF: ABNORMAL /HPF
ANION GAP SERPL CALCULATED.3IONS-SCNC: 9 MMOL/L (ref 3–14)
APPEARANCE UR: ABNORMAL
AST SERPL W P-5'-P-CCNC: 21 U/L (ref 0–35)
BASOPHILS # BLD AUTO: 0 10E9/L (ref 0–0.2)
BASOPHILS NFR BLD AUTO: 0.2 %
BILIRUB SERPL-MCNC: 0.6 MG/DL (ref 0.2–1.3)
BILIRUB UR QL STRIP: NEGATIVE
BUN SERPL-MCNC: 11 MG/DL (ref 7–30)
CALCIUM SERPL-MCNC: 8.5 MG/DL (ref 8.5–10.1)
CHLORIDE SERPL-SCNC: 110 MMOL/L (ref 96–110)
CHOLEST SERPL-MCNC: 92 MG/DL
CO2 SERPL-SCNC: 25 MMOL/L (ref 20–32)
COLOR UR AUTO: ABNORMAL
CREAT SERPL-MCNC: 0.62 MG/DL (ref 0.5–1)
DIFFERENTIAL METHOD BLD: NORMAL
EOSINOPHIL # BLD AUTO: 0.2 10E9/L (ref 0–0.7)
EOSINOPHIL NFR BLD AUTO: 4.3 %
ERYTHROCYTE [DISTWIDTH] IN BLOOD BY AUTOMATED COUNT: 12.6 % (ref 10–15)
GFR SERPL CREATININE-BSD FRML MDRD: >90 ML/MIN/1.7M2
GLUCOSE SERPL-MCNC: 90 MG/DL (ref 70–99)
GLUCOSE UR STRIP-MCNC: NEGATIVE MG/DL
HCT VFR BLD AUTO: 41.2 % (ref 35–47)
HDLC SERPL-MCNC: 37 MG/DL
HGB BLD-MCNC: 14 G/DL (ref 11.7–15.7)
HGB UR QL STRIP: NEGATIVE
IMM GRANULOCYTES # BLD: 0 10E9/L (ref 0–0.4)
IMM GRANULOCYTES NFR BLD: 0 %
KETONES UR STRIP-MCNC: NEGATIVE MG/DL
LDLC SERPL CALC-MCNC: 43 MG/DL
LEUKOCYTE ESTERASE UR QL STRIP: ABNORMAL
LYMPHOCYTES # BLD AUTO: 2.1 10E9/L (ref 0.8–5.3)
LYMPHOCYTES NFR BLD AUTO: 48.9 %
MCH RBC QN AUTO: 29.4 PG (ref 26.5–33)
MCHC RBC AUTO-ENTMCNC: 34 G/DL (ref 31.5–36.5)
MCV RBC AUTO: 87 FL (ref 78–100)
MONOCYTES # BLD AUTO: 0.4 10E9/L (ref 0–1.3)
MONOCYTES NFR BLD AUTO: 10 %
MUCOUS THREADS #/AREA URNS LPF: PRESENT /LPF
NEUTROPHILS # BLD AUTO: 1.6 10E9/L (ref 1.6–8.3)
NEUTROPHILS NFR BLD AUTO: 36.6 %
NITRATE UR QL: NEGATIVE
NONHDLC SERPL-MCNC: 55 MG/DL
NRBC # BLD AUTO: 0 10*3/UL
NRBC BLD AUTO-RTO: 0 /100
PH UR STRIP: 5.5 PH (ref 5–7)
PLATELET # BLD AUTO: 232 10E9/L (ref 150–450)
POTASSIUM SERPL-SCNC: 3.9 MMOL/L (ref 3.4–5.3)
PROT SERPL-MCNC: 7.3 G/DL (ref 6.8–8.8)
RBC # BLD AUTO: 4.76 10E12/L (ref 3.8–5.2)
RBC #/AREA URNS AUTO: 0 /HPF (ref 0–2)
SODIUM SERPL-SCNC: 144 MMOL/L (ref 133–144)
SOURCE: ABNORMAL
SP GR UR STRIP: 1.03 (ref 1–1.03)
TRIGL SERPL-MCNC: 58 MG/DL
TSH SERPL DL<=0.005 MIU/L-ACNC: 0.96 MU/L (ref 0.4–4)
UROBILINOGEN UR STRIP-MCNC: NORMAL MG/DL (ref 0–2)
WBC # BLD AUTO: 4.4 10E9/L (ref 4–11)
WBC #/AREA URNS AUTO: 2 /HPF (ref 0–5)

## 2018-03-12 PROCEDURE — 99239 HOSP IP/OBS DSCHRG MGMT >30: CPT | Performed by: PSYCHIATRY & NEUROLOGY

## 2018-03-12 PROCEDURE — 80053 COMPREHEN METABOLIC PANEL: CPT | Performed by: PSYCHIATRY & NEUROLOGY

## 2018-03-12 PROCEDURE — 25000132 ZZH RX MED GY IP 250 OP 250 PS 637: Performed by: PSYCHIATRY & NEUROLOGY

## 2018-03-12 PROCEDURE — 81001 URINALYSIS AUTO W/SCOPE: CPT | Performed by: PSYCHIATRY & NEUROLOGY

## 2018-03-12 PROCEDURE — 36415 COLL VENOUS BLD VENIPUNCTURE: CPT | Performed by: PSYCHIATRY & NEUROLOGY

## 2018-03-12 PROCEDURE — 85025 COMPLETE CBC W/AUTO DIFF WBC: CPT | Performed by: PSYCHIATRY & NEUROLOGY

## 2018-03-12 PROCEDURE — 80061 LIPID PANEL: CPT | Performed by: PSYCHIATRY & NEUROLOGY

## 2018-03-12 PROCEDURE — 90853 GROUP PSYCHOTHERAPY: CPT

## 2018-03-12 PROCEDURE — 87086 URINE CULTURE/COLONY COUNT: CPT | Performed by: PSYCHIATRY & NEUROLOGY

## 2018-03-12 PROCEDURE — 84443 ASSAY THYROID STIM HORMONE: CPT | Performed by: PSYCHIATRY & NEUROLOGY

## 2018-03-12 RX ORDER — VENLAFAXINE HYDROCHLORIDE 150 MG/1
150 TABLET, EXTENDED RELEASE ORAL DAILY
Qty: 30 EACH | Refills: 0 | Status: SHIPPED | OUTPATIENT
Start: 2018-03-12 | End: 2018-04-03

## 2018-03-12 RX ORDER — NITROFURANTOIN 25; 75 MG/1; MG/1
100 CAPSULE ORAL 2 TIMES DAILY
Qty: 14 CAPSULE | Refills: 0 | Status: SHIPPED | OUTPATIENT
Start: 2018-03-12 | End: 2018-04-03

## 2018-03-12 RX ADMIN — VENLAFAXINE HYDROCHLORIDE 150 MG: 150 TABLET, EXTENDED RELEASE ORAL at 12:48

## 2018-03-12 RX ADMIN — VALACYCLOVIR HYDROCHLORIDE 500 MG: 500 TABLET, FILM COATED ORAL at 12:48

## 2018-03-12 ASSESSMENT — ACTIVITIES OF DAILY LIVING (ADL)
LAUNDRY: WITH SUPERVISION
LAUNDRY: WITH SUPERVISION
GROOMING: INDEPENDENT
GROOMING: INDEPENDENT
ORAL_HYGIENE: INDEPENDENT
ORAL_HYGIENE: INDEPENDENT
DRESS: INDEPENDENT
DRESS: INDEPENDENT

## 2018-03-12 NOTE — DISCHARGE INSTRUCTIONS
Behavioral Discharge Planning and Instructions      Summary:  You were admitted on 3/11/2018  due to suicidal ideation with intent.  You were treated by Dr. Jeremiah Barker MD and discharged on 03/12/2018 from [facility] to Home      Principal Diagnosis:   1.  Unspecified depressive disorder.   2.  Unspecified anxiety disorder.   3.  Rule out major depressive disorder, single episode, moderate severity.    Health Care Follow-up Appointments:   1. Individual Therapy Appointment:  Date/Time: Friday, March 16, 2018 @ 1:00pm, with 12:30pm arrival time for paperwork.  Provider: Amarjit Ayala MS, [facility]  The location for your upcoming appointment:  [facility]  [address] [place] [address]  Phone: [phone]  The Health Unit Coordinator has faxed these discharge instructions to Fax:[phone].    2. Hospital Follow-up Appointment:  Date/Time: Monday, March 19, 2018 @ 2:40pm  Provider: Dr. Cross  * A note was left with Dr. Medley attempting to get you seen sooner, however his team did not get back to hospital  prior to your discharge.   The location for your upcoming appointment  [address] [place] [address]  Phone: [phone]  Attend all scheduled appointments with your outpatient providers. Call at least 24 hours in advance if you need to reschedule an appointment to ensure continued access to your outpatient providers.   Major Treatments, Procedures and Findings:  You were provided with: a psychiatric assessment, assessed for medical stability, medication evaluation and/or management, group therapy, individual therapy, milieu management and medical interventions    Symptoms to Report: feeling more aggressive, increased confusion, losing more sleep, mood getting worse or thoughts of suicide    Early warning signs can include: increased depression or anxiety sleep disturbances increased thoughts or behaviors  of suicide or self-harm  increased unusual thinking, such as paranoia or hearing voices    Safety and Wellness:  Take all medicines as directed.  Make no changes unless your doctor suggests them.      Follow treatment recommendations.  Refrain from alcohol and non-prescribed drugs.  If there is a concern for safety, call 001.    Resources:   [facility]  Suicide/Mental Health Crisis Hotline..........................1-382.651.8127 or (033) 653-2729     Contact:   [facility]  ATTN:   [address] [place] [address]  Phone: [phone] or [phone]  Fax: [phone]  The treatment team has appreciated the opportunity to work with you.     Sasha,  please take care and make your recovery a daily recovery.   If you have any questions or concerns our unit number is [phone].  You may be receiving a follow-up phone call within the next three days from a representative from behavioral health.    You have identified the best phone number to reach you as [phone].

## 2018-03-12 NOTE — PROGRESS NOTES
Patient discharging 3/12/2018 accompanied by parents and destination is the family home.     Discharge paperwork reviewed and medications reviewed with Sasha Hand who verbalizes understanding.     Patient has verbalized understanding of discharge instructions and medication administration.     Patient states that she is ready for discharge. Patient denies suicidal ideation and self injurious thoughts. Patient denies auditory and visual hallucinations. Patient denies any depression. Patient reports having a little bit of anxiety but it's mostly about leaving. Affect is bright and pleasant on approach. Awaiting the arrival of her parents.     Copy of AVS reviewed and signed for.     Medications from pharmacy received and signed for.    Locker items returned and signed for    Survey provided

## 2018-03-12 NOTE — PROGRESS NOTES
Patient expressed wanting to speak with her doctor tomorrow about discharge plans. Patient was calm this evening, quietly social with others, sleeping/napping later on in the evening, however attended and participated in groups. Patient denies all mental health issues other than feeling anxious from being in the hospital. Patient was very sad and emotional upon check-in and cried. Patient denies SI/SIB and hallucinations. ADL's are independent with no nutrition concerns. Patient took a shower this evening, wore headphones, and was given some essential oils to help relax. Patient reported the main source of her anxiety is not having her mother here. Both patients parents were visiting upon shift change.       03/11/18 1959   Behavioral Health   Hallucinations denies / not responding to hallucinations   Thinking poor concentration   Orientation time: oriented;date: oriented;place: oriented;person: oriented   Memory baseline memory   Insight poor   Judgement impaired   Eye Contact at examiner   Affect blunted, flat;sad   Mood mood is calm;anxious   Physical Appearance/Attire appears stated age;neat;attire appropriate to age and situation   Hygiene well groomed;other (see comment)  (Showered this evening)   Suicidality other (see comments)  (Patient denies)   Self Injury other (see comment)  (Patient denies)   Elopement (Patient does not appear to be a risk)   Activity isolative;other (see comment)  (Visible in milieu, mealtimes/snacks, groups)   Speech clear;coherent   Medication Sensitivity no observed side effects;no stated side effects   Psychomotor / Gait steady;balanced

## 2018-03-12 NOTE — PLAN OF CARE
"Problem: Depressive Symptoms  Goal: Social and Therapeutic (Depression)  Signs and symptoms of listed problems will be absent or manageable.   Initial Note:     Patient attended 2/3 OT groups designed to promote self-compassion and explore coping skills with positive social experiences. Pt initiated contribution to discussion on self-compassion, identifying concepts and applying concepts during group structure. Pt became tearful sharing that \"I am worth more to other people than [her ex-boyfriend]. That's why I'm here.\"  Pt engaged in exploration of coping skills, following a Abad Chi video. Pt demonstrated attention and adequate motor function to follow the video. Pt stated \"I'm so relaxed I could fall asleep.\" Pt will be given self-assessment form.       "

## 2018-03-12 NOTE — PLAN OF CARE
Brief Medicine Note  March 12, 2018    Was asked to review UA by primary team. UA c/w infection. Pts most recent urine culture with pansensitive E.Coli.     Would recommend macrobid 100mg BID x 7d.     Sharri Rodrigues PA-C

## 2018-03-12 NOTE — PROGRESS NOTES
03/12/18 1525   Behavioral Health   Hallucinations denies / not responding to hallucinations   Thinking intact   Orientation person: oriented;place: oriented;date: oriented;time: oriented   Memory baseline memory   Insight other (see comment)  (fair)   Judgement intact   Eye Contact at examiner   Affect full range affect   Mood mood is calm   Physical Appearance/Attire neat   Hygiene other (see comment)  (fair)   Suicidality other (see comments)  (denies)   1. Wish to be Dead No   2. Non-Specific Active Suicidal Thoughts  No   Self Injury other (see comment)  (denies)   Elopement (nothing)   Activity other (see comment)  (preparing for d/c, in grps, coloring, social )   Speech clear;coherent   Medication Sensitivity no stated side effects;no observed side effects   Psychomotor / Gait balanced;steady   Activities of Daily Living   Hygiene/Grooming independent   Oral Hygiene independent   Dress independent   Laundry with supervision   Room Organization independent   Behavioral Health Interventions   Depression maintain safety precautions;maintain safe secure environment;provide emotional support;establish therapeutic relationship;assist with developing and utilizing healthy coping strategies;build upon strengths   Social and Therapeutic Interventions (Depression) encourage socialization with peers;encourage effective boundaries with peers;encourage participation in therapeutic groups and milieu activities

## 2018-03-12 NOTE — DISCHARGE SUMMARY
[facility]  Department of Psychiatry    DATE OF ADMISSION:  3/11/2018    DATE OF DISCHARGE:  March 12, 2018    DISCHARGE DIAGNOSES:   1.  Major depressive disorder, single episode, moderate severity.     HOSPITAL COURSE: (Refer to H&P, progress notes, and consult notes for details)    The patient was admitted to our Behavioral Health Unit under a 72 hour hold for depressed mood and suicidal thoughts.  She was initially evaluated by Dr. Tipton who increased her Effexor to better address depressive symptoms.  The patient was denying suicidal ideation during that examination however her hospitalizations continue to monitor stability of her reported improvements.  The patient's care was transferred to me.  She continued to deny suicidal ideation.  She had not engaged in any self-injurious behaviors.  She complied with taking her medications, ate her meals, slept adequately, and displayed adequate energy and concentration.  She was not hostile or aggressive.  She was willing to continue taking her medications and follow up with outpatient providers.  Since the patient did not meet criteria to pursue involuntary commitment and she did not desire continuing hospitalization voluntarily, the patient was discharged home per her request.  Her care was transitioned to outpatient providers.    Other interventions received during his hospitalization included:   Psychosocial treatments were addressed with groups, social work consult, and supportive milieu provided by staff.    CONDITION AT DISCHARGE:  Improved.  The patients acute suicide risk is low due to the following factors:  improved mood/anxiety symptoms.  Denies suicidal ideations. Denies psychotic symptoms.  Not actively intoxicated and plans to abstain from illicit substances and alcohol.  Denies access to guns.  Denies feeling hopeless or helpless. At the time of discharge Sasha Hand was determined to not be an immediate  danger to herself or others. The patient's acute risk will be higher if noncompliant with treatment plan, medications, follow-up or using illicit substances or alcohol.  These findings along with the risks of noncompliance with medications and treatment plan, which could potentially cause decompensation and increase the risk for suicide, were discussed with the patient.  The patients chronic suicide risk is moderate given the following factors:white race; diagnosis of MDD, history of SIB; Denied a family history of suicide.  Preventative factors include: social supports, stable housing     MENTAL STATUS EXAMINATION AT TIME OF DISCHARGE:  The patient is 17 year old White female who appears their stated age and is appropriately dressed with good hygiene.  Calm and cooperative with the interview questions.  No psychomotor abnormalities are noted. Eye contact is appropriate. Speech has normal rate, tone, latency and volume and is not pushed or pressured. Mood is euthymic and affect is full and appropriate.  The patient does not seem overtly depressed, anxious, manic or irritable.  Thought process is linear, logical and future oriented.  Thought process is not tangential, circumstantial or disorganized.  Thought content is not significant for apperant paranoia, delusions, ideas of reference or grandiosity.  The patient denies suicidal and homicidal ideations as well as auditory and visual hallucinations.  Insight and judgment are fair.  Cognition appears intact to interviewing including orientation, recent and remote memory, fund of knowledge, use of language, attention span and concentration.  Muscle strength, tone and gait appear normal on visual inspection.      DISPOSITION:  The patient is discharged home     FOLLOWUP APPOINTMENTS:  ( per social workers notes and after visit summary)  1.  Individual psychotherapy of March 16 through [facility]  2.  Medication management on March 19 through  the [facility]    DISCHARGE MEDICATIONS:   Current Discharge Medication List      START taking these medications    Details   venlafaxine (EFFEXOR-ER) 150 MG TB24 24 hr tablet Take 1 tablet (150 mg) by mouth daily  Qty: 30 each, Refills: 0    Associated Diagnoses: DELIA (generalized anxiety disorder); Major depressive disorder, single episode, moderate (H)         CONTINUE these medications which have NOT CHANGED    Details   hydrOXYzine (VISTARIL) 25 MG capsule Take 1-2 capsules (25-50 mg) by mouth nightly as needed (insomnia)  Qty: 60 capsule, Refills: 3    Associated Diagnoses: Other insomnia      valACYclovir (VALTREX) 500 MG tablet Take 1 tablet (500 mg) by mouth daily  Qty: 90 tablet, Refills: 3    Associated Diagnoses: Genital herpes simplex, unspecified site      montelukast (SINGULAIR) 10 MG tablet Take 1 tablet (10 mg) by mouth At Bedtime  Qty: 90 tablet, Refills: 3    Associated Diagnoses: Mild persistent asthma without complication      acetaminophen (TYLENOL) 325 MG tablet Take 1 tablet (325 mg) by mouth every 4 hours as needed for mild pain or fever  Qty: 100 tablet      ibuprofen (ADVIL/MOTRIN) 600 MG tablet Take 1 tablet (600 mg) by mouth every 6 hours as needed for moderate pain  Qty: 40 tablet, Refills: 0    Associated Diagnoses: Pyelonephritis      ondansetron (ZOFRAN-ODT) 4 MG ODT tab Take 1 tablet (4 mg) by mouth every 6 hours as needed for nausea or vomiting  Qty: 40 tablet, Refills: 0    Associated Diagnoses: Nausea      etonogestrel (IMPLANON/NEXPLANON) 68 MG IMPL 1 each (68 mg) by Subdermal route continuous  Refills: 0    Associated Diagnoses: Encounter for initial prescription of implantable subdermal contraceptive         STOP taking these medications       venlafaxine (EFFEXOR-XR) 37.5 MG 24 hr capsule Comments:   Reason for Stopping:                LABORATORY RESULTS: (past 14 days)  Recent Results (from the past 336 hour(s))   CBC with platelets differential    Collection  Time: 03/12/18  7:40 AM   Result Value Ref Range    WBC 4.4 4.0 - 11.0 10e9/L    RBC Count 4.76 3.8 - 5.2 10e12/L    Hemoglobin 14.0 11.7 - 15.7 g/dL    Hematocrit 41.2 35.0 - 47.0 %    MCV 87 78 - 100 fl    MCH 29.4 26.5 - 33.0 pg    MCHC 34.0 31.5 - 36.5 g/dL    RDW 12.6 10.0 - 15.0 %    Platelet Count 232 150 - 450 10e9/L    Diff Method Automated Method     % Neutrophils 36.6 %    % Lymphocytes 48.9 %    % Monocytes 10.0 %    % Eosinophils 4.3 %    % Basophils 0.2 %    % Immature Granulocytes 0.0 %    Nucleated RBCs 0 0 /100    Absolute Neutrophil 1.6 1.6 - 8.3 10e9/L    Absolute Lymphocytes 2.1 0.8 - 5.3 10e9/L    Absolute Monocytes 0.4 0.0 - 1.3 10e9/L    Absolute Eosinophils 0.2 0.0 - 0.7 10e9/L    Absolute Basophils 0.0 0.0 - 0.2 10e9/L    Abs Immature Granulocytes 0.0 0 - 0.4 10e9/L    Absolute Nucleated RBC 0.0    Comprehensive metabolic panel    Collection Time: 03/12/18  7:40 AM   Result Value Ref Range    Sodium 144 133 - 144 mmol/L    Potassium 3.9 3.4 - 5.3 mmol/L    Chloride 110 96 - 110 mmol/L    Carbon Dioxide 25 20 - 32 mmol/L    Anion Gap 9 3 - 14 mmol/L    Glucose 90 70 - 99 mg/dL    Urea Nitrogen 11 7 - 30 mg/dL    Creatinine 0.62 0.50 - 1.00 mg/dL    GFR Estimate >90 >60 mL/min/1.7m2    GFR Estimate If Black >90 >60 mL/min/1.7m2    Calcium 8.5 8.5 - 10.1 mg/dL    Bilirubin Total 0.6 0.2 - 1.3 mg/dL    Albumin 3.5 3.4 - 5.0 g/dL    Protein Total 7.3 6.8 - 8.8 g/dL    Alkaline Phosphatase 77 40 - 150 U/L    ALT 36 0 - 50 U/L    AST 21 0 - 35 U/L   Lipid panel    Collection Time: 03/12/18  7:40 AM   Result Value Ref Range    Cholesterol 92 <170 mg/dL    Triglycerides 58 <90 mg/dL    HDL Cholesterol 37 (L) >45 mg/dL    LDL Cholesterol Calculated 43 <110 mg/dL    Non HDL Cholesterol 55 <120 mg/dL   TSH with free T4 reflex and/or T3 as indicated    Collection Time: 03/12/18  7:40 AM   Result Value Ref Range    TSH 0.96 0.40 - 4.00 mU/L       >30 minutes was spent on this discharge to allow for  reviewing the patient's response to treatment, reviewing plan of care, education on medications and diagnosis, and conducting a risk assessment.

## 2018-03-12 NOTE — PROGRESS NOTES
"This morning patient was c/o nausea and vomiting, unable to eat breakfast, right flank pain, a generalized feeling of not feeling well.  Heart rate is elevated, see flow sheets. \"This is exactly how I felt when I had the kidney infection 1 and 1/2 weeks ago. Dr. Barker notified and placed order for UA reflex to culture. UA was sent this morning.   "

## 2018-03-12 NOTE — TELEPHONE ENCOUNTER
Reason for Call:  Other     Detailed comments: Pt being discharged from Psych unit and will an appt within a week with Dr Medley.   Also, nurse can all for updates    Phone Number Patient can be reached at: [phone]    Best Time: any    Can we leave a detailed message on this number? YES    Call taken on 3/12/2018 at 1:52 PM by Jennifer Odonnell

## 2018-03-12 NOTE — PROGRESS NOTES
"CTC contacted pt's mom, \"Gavino\" per pt's request to inform her of pt's discharge and after care plans.  CTC did communicate with Gavino.  She stated that they can pick pt up around 4:30pm.     CTC scheduled a follow-up appt due to her present symptoms (vomitting, nausea, and right flank pain), generally not feeling.  Left a message for Dr. Medley's team to see if he could squeeze here in later this week, however did not get a return call.  So scheduled an appointment with Dr. Cross on Monday.  Informed pt of this appt and explained circumstances.  Pt informed me that Dr. Medley books pretty far out but that she does have an appt with him in about 2 weeks.    "

## 2018-03-13 LAB
BACTERIA SPEC CULT: NORMAL
Lab: NORMAL
SPECIMEN SOURCE: NORMAL

## 2018-03-13 NOTE — TELEPHONE ENCOUNTER
Called the  back and she made an appt with another provider but the patient wanted to see her PCP per . They wanted us to contact the patient and make her an appt.    Tried to call patient and the line was busy. Will call at later time.  ADRIANE/MA

## 2018-03-13 NOTE — TELEPHONE ENCOUNTER
We could see her next Tuesday or Wednesday.     Electronically signed by:  Oscar Medley M.D.  3/12/2018

## 2018-03-26 ENCOUNTER — TELEPHONE (OUTPATIENT)
Dept: FAMILY MEDICINE | Facility: CLINIC | Age: 18
End: 2018-03-26

## 2018-03-26 ENCOUNTER — ALLIED HEALTH/NURSE VISIT (OUTPATIENT)
Dept: FAMILY MEDICINE | Facility: CLINIC | Age: 18
End: 2018-03-26
Payer: COMMERCIAL

## 2018-03-26 DIAGNOSIS — N92.6 MISSED PERIOD: ICD-10-CM

## 2018-03-26 DIAGNOSIS — N92.6 MISSED PERIOD: Primary | ICD-10-CM

## 2018-03-26 DIAGNOSIS — N91.2 AMENORRHEA: Primary | ICD-10-CM

## 2018-03-26 LAB — B-HCG SERPL-ACNC: <1 IU/L (ref 0–5)

## 2018-03-26 PROCEDURE — 99207 ZZC NO CHARGE NURSE ONLY: CPT

## 2018-03-26 PROCEDURE — 36415 COLL VENOUS BLD VENIPUNCTURE: CPT | Performed by: FAMILY MEDICINE

## 2018-03-26 PROCEDURE — 84702 CHORIONIC GONADOTROPIN TEST: CPT | Performed by: FAMILY MEDICINE

## 2018-03-26 NOTE — TELEPHONE ENCOUNTER
Per opal Wayne for pt to have blood drawn for hcg level. Soraya Kellogg CMA ([facility])

## 2018-03-26 NOTE — TELEPHONE ENCOUNTER
He pregnancy test was negative. Please call to relay this information to her.     Electronically signed by:  Oscar Medley M.D.  3/26/2018

## 2018-03-26 NOTE — NURSING NOTE
RN  got a hold of her to inform her the pregnancy test was NEGATIVE.  She said she was glad. She had been on an antibiotic while on the OCP and was worried. Had 7 positive pregnancy tests at home so wanted the blood test to verify.... ...................ELLY Sparks      RN tried calling the patient 3 times from the waiting rooms and no one with her name. Checked over in lab and she was NOT in that waiting room either. Tried calling her cell phone [phone] - na. Unable to leave a message as she was NOT available to talk and her mail box was full so cannot accept messages. ELLY Sparks

## 2018-03-26 NOTE — MR AVS SNAPSHOT
After Visit Summary   3/26/2018    Sasha Hand    MRN: [id number]           Patient Information     Date Of Birth          2000        Visit Information        Provider Department      3/26/2018 3:30 PM PH NURSE [facility]        Today's Diagnoses     Amenorrhea    -  1    Missed period           Follow-ups after your visit        Your next 10 appointments already scheduled     Mar 27, 2018 10:10 AM CDT   Office Visit with Oscar Medley MD   [facility] ([facility]    [address] [place] [id number] [phone]           Bring a current list of meds and any records pertaining to this visit. For Physicals, please bring immunization records and any forms needing to be filled out. Please arrive 10 minutes early to complete paperwork.              Who to contact     If you have questions or need follow up information about today's clinic visit or your schedule please contact [facility] directly at [phone].  Normal or non-critical lab and imaging results will be communicated to you by [employer], letter or phone within 4 business days after the clinic has received the results. If you do not hear from us within 7 days, please contact the clinic through [employer] or phone. If you have a critical or abnormal lab result, we will notify you by phone as soon as possible.  Submit refill requests through [employer] or call your pharmacy and they will forward the refill request to us. Please allow 3 business days for your refill to be completed.          Additional Information About Your Visit        MyChart Information     [employer] gives you secure access to your electronic health record. If you see a primary care provider, you can also send messages to your care team and make appointments. If you have questions, please call your primary care clinic.  If you do not have a primary care provider, please call [phone] and they  will assist you.        Care EveryWhere ID     This is your Care EveryWhere ID. This could be used by other organizations to access your [facility] medical records  [id number]         Blood Pressure from Last 3 Encounters:   03/12/18 138/62   03/01/18 114/60   02/22/18 118/60    Weight from Last 3 Encounters:   03/11/18 264 lb (119.7 kg) (>99 %)*   03/01/18 281 lb (127.5 kg) (>99 %)*   02/20/18 278 lb 14.1 oz (126.5 kg) (>99 %)*     * Growth percentiles are based on [facility] 2-20 Years data.              We Performed the Following     HCG quantitative pregnancy        Primary Care Provider Office Phone # Fax #    Oscar Medley [phone]       [address] [place] [address]        Equal Access to Services     [facility]: Hadii aad ku hadasho Sojosé miguel, waaxda luqadaha, qaybta kaalmada adeegyada, lester castillo . So [facility] [phone].    ATENCIÓN: Si habla español, tiene a de la rosa disposición servicios gratuitos de asistencia lingüística. Llame al [phone].    We comply with applicable federal civil rights laws and [place] laws. We do not discriminate on the basis of race, color, national origin, age, disability, sex, sexual orientation, or gender identity.            Thank you!     Thank you for choosing [facility]  for your care. Our goal is always to provide you with excellent care. Hearing back from our patients is one way we can continue to improve our services. Please take a few minutes to complete the written survey that you may receive in the mail after your visit with us. Thank you!             Your Updated Medication List - Protect others around you: Learn how to safely use, store and throw away your medicines at www.disposemymeds.org.          This list is accurate as of 3/26/18  4:59 PM.  Always use your most recent med list.                   Brand Name Dispense Instructions for use Diagnosis    acetaminophen 325 MG tablet     TYLENOL    100 tablet    Take 1 tablet (325 mg) by mouth every 4 hours as needed for mild pain or fever        etonogestrel 68 MG Impl    IMPLANON/NEXPLANON     1 each (68 mg) by Subdermal route continuous    Encounter for initial prescription of implantable subdermal contraceptive       hydrOXYzine 25 MG capsule    VISTARIL    60 capsule    Take 1-2 capsules (25-50 mg) by mouth nightly as needed (insomnia)    Other insomnia       ibuprofen 600 MG tablet    ADVIL/MOTRIN    40 tablet    Take 1 tablet (600 mg) by mouth every 6 hours as needed for moderate pain    Pyelonephritis       montelukast 10 MG tablet    SINGULAIR    90 tablet    Take 1 tablet (10 mg) by mouth At Bedtime    Mild persistent asthma without complication       nitroFURantoin (macrocrystal-monohydrate) 100 MG capsule    MACROBID    14 capsule    Take 1 capsule (100 mg) by mouth 2 times daily    Urinary tract infection without hematuria, site unspecified       ondansetron 4 MG ODT tab    ZOFRAN-ODT    40 tablet    Take 1 tablet (4 mg) by mouth every 6 hours as needed for nausea or vomiting    Nausea       valACYclovir 500 MG tablet    VALTREX    90 tablet    Take 1 tablet (500 mg) by mouth daily    Genital herpes simplex, unspecified site       venlafaxine 150 MG Tb24 24 hr tablet    EFFEXOR-ER    30 each    Take 1 tablet (150 mg) by mouth daily    DELIA (generalized anxiety disorder), Major depressive disorder, single episode, moderate (H)

## 2018-03-26 NOTE — TELEPHONE ENCOUNTER
Patient was seen in lab today for a blood draw HCG.   She returns call to clinic.   States that she waited for over an hour and was never called.   Informed per previous RN note that they did attempt to find her, but unsure what happened.   She would like a phone call when blood test results are back.   Ok to leave a message if she doesn't answer.     Kelsey Blankenship, RN, BSN

## 2018-03-27 NOTE — TELEPHONE ENCOUNTER
Patient called back and info was given.  Thank you,  Geraldine Felix   for [facility]

## 2018-04-03 ENCOUNTER — APPOINTMENT (OUTPATIENT)
Dept: CT IMAGING | Facility: CLINIC | Age: 18
End: 2018-04-03
Attending: PHYSICIAN ASSISTANT
Payer: COMMERCIAL

## 2018-04-03 ENCOUNTER — TELEPHONE (OUTPATIENT)
Dept: FAMILY MEDICINE | Facility: CLINIC | Age: 18
End: 2018-04-03

## 2018-04-03 ENCOUNTER — HOSPITAL ENCOUNTER (EMERGENCY)
Facility: CLINIC | Age: 18
Discharge: HOME | End: 2018-04-03
Attending: PHYSICIAN ASSISTANT | Admitting: PHYSICIAN ASSISTANT
Payer: COMMERCIAL

## 2018-04-03 VITALS
TEMPERATURE: 97 F | HEIGHT: 67 IN | HEART RATE: 81 BPM | BODY MASS INDEX: 42.85 KG/M2 | WEIGHT: 273 LBS | SYSTOLIC BLOOD PRESSURE: 129 MMHG | OXYGEN SATURATION: 97 % | RESPIRATION RATE: 20 BRPM | DIASTOLIC BLOOD PRESSURE: 81 MMHG

## 2018-04-03 DIAGNOSIS — S06.0X0A CONCUSSION WITHOUT LOSS OF CONSCIOUSNESS, INITIAL ENCOUNTER: ICD-10-CM

## 2018-04-03 DIAGNOSIS — G44.209 TENSION HEADACHE: ICD-10-CM

## 2018-04-03 PROCEDURE — 99284 EMERGENCY DEPT VISIT MOD MDM: CPT | Mod: 25 | Performed by: PHYSICIAN ASSISTANT

## 2018-04-03 PROCEDURE — 70450 CT HEAD/BRAIN W/O DYE: CPT

## 2018-04-03 PROCEDURE — 99284 EMERGENCY DEPT VISIT MOD MDM: CPT | Mod: Z6 | Performed by: PHYSICIAN ASSISTANT

## 2018-04-03 NOTE — ED AVS SNAPSHOT
[facility] Emergency Department    [address] [place] [address]    Phone:  [phone]    Fax:  [phone]                                       Sasha Hand   MRN: [id number]    Department:  [facility] Emergency Department   Date of Visit:  4/3/2018           Patient Information     Date Of Birth          2000        Your diagnoses for this visit were:     Concussion without loss of consciousness, initial encounter     Tension headache        You were seen by Ankit Mendez PA-C.      Follow-up Information     Follow up with [facility] Emergency Department.    Specialty:  EMERGENCY MEDICINE    Why:  As needed, If symptoms worsen    Contact information:    [address] [place] [address] [phone]    Additional information:    From [address]: Exit at [address] on south side of [place]. Turn right on [address]. Turn left at stoplight on [address]. [facility] will be in view two blocks ahead        Discharge Instructions       It was a pleasure working with you today!  I hope your condition improves rapidly!     Given your concussion, please do not work, exercise, read, watch TV, play video games, or access any social media until cleared to do so by Dr. Roman at your recheck appointment. Adhering to these recommendations will help prevent having ongoing postconcussive symptoms in the future.    Make sure that you are drinking at least 8-10 glasses of water daily to maintain adequate hydration.  Please rest. Sleep is very important during this healing time period.    Please keep your appointment with Dr. Roman. It is crucial that you have appropriate follow-up for this concussion!!              Concussion Discharge Instructions  You were seen today for signs of a concussion. The symptoms will vary, depending on the nature of your injury and your health. You may have: headache, confusion, nausea (feel sick to your  "stomach), vomiting (throwing up) and problems with memory, concentrating or sleep. You may feel dizzy, irritable, and tired.   Children and teens may need help from their parents, teachers and coaches to watch for symptoms as they recover.  Follow-up  It is important for you to see a doctor for follow-up care to see how you are recovering. Please see your primary doctor within the next 5 to 7 days. You may have also been referred to the Concussion  service. They will contact you and arrange a follow-up visit if needed. If you need help sooner, you may call them at [phone].  Warning signs  Call your doctor or come back to Emergency if you suddenly have any of these symptoms:    Headaches that get worse    Feeling more and more drowsy    You keep repeating yourself    Strange behavior    Seizures    Repeat vomiting (throwing up)    Trouble walking    Growing confusion    Feeling more irritable    Neck pain that gets worse    Slurred speech    Weakness or numbness    Loss of consciousness    Fluid or blood coming from ears or nose  Self-care    Get lots of rest and get enough sleep at night. Take daytime naps or rest if you feel tired.    Limit physical activity and \"thinking\" activities. These can make symptoms worse.    Physical activity includes gym, sports, weight training, running, exercise and heavy lifting.    Thinking activities include homework, class work, job-related work and screen time (phone, computer, tablet, TV and video games).    Stick to a healthy diet and drink lots of fluids.    As symptoms improve, you may slowly return to your daily activities. If symptoms get worse   or return, reduce your activity.    Know that it is normal to feel sad and frustrated when you do not feel right and are less active.  Going back to work    Your care team will tell you when you are ready to return to work.    Limit the amount of work you do soon after your injury. This may speed healing. Take breaks " "if your symptoms get worse. You should also reduce your physical activity as well as activities that require a lot of thinking until you see your doctor.    You may need shorter work days and a lighter workload.    Avoid heavy lifting, working with machinery, driving and working at heights until your symptoms are gone or you are cleared by a doctor.  Returning to sports    Never return to play if you have any symptoms. A full recovery will reduce the chances of getting hurt again. Remember, it is better to miss one or two games than a whole season.    You should rest from all physical activity until you see your doctor. Generally, if all symptoms have completely cleared, your doctor can help guide you to slowly return to sports. If symptoms return or worsen, stop the activity and see your doctor.    Important: If you are in an organized sport and under age 18, you will need written consent from a healthcare provider before you return to sports. Typically, this will be your primary care or sports medicine doctor. Please make an appointment.  Going back to school    If you are still having symptoms, you may need extra help at school.    Tell your teachers and school nurse about your injury and symptoms. Ask them to watch for problems with learning, memory and concentrating. Symptoms may get worse when you do schoolwork, and you may become more irritable.    You may need shorter school days, a reduced workload, and to postpone testing.    Do not drive or take gym class (physical activity) until cleared by a doctor.  For informational purposes only. Not to replace the advice of your health care provider.   2009 [employer]. Used with permission. This form is adapted from the \"Heads Up: Brain Injury in Your Practice\" tool kit developed by the Centers for Disease Control and Prevention (CDC). All rights reserved. [employer]. [employer] 245212tu - Rev 03/17.      24 Hour " Appointment Hotline       To make an appointment at any [facility]3], call [phone] ([phone]). If you don't have a family doctor or clinic, we will help you find one. [place] clinics are conveniently located to serve the needs of you and your family.             Review of your medicines      Our records show that you are taking the medicines listed below. If these are incorrect, please call your family doctor or clinic.        Dose / Directions Last dose taken    acetaminophen 325 MG tablet   Commonly known as:  TYLENOL   Dose:  325 mg   Quantity:  100 tablet        Take 1 tablet (325 mg) by mouth every 4 hours as needed for mild pain or fever   Refills:  0        etonogestrel 68 MG Impl   Commonly known as:  IMPLANON/NEXPLANON   Dose:  1 each        1 each (68 mg) by Subdermal route continuous   Refills:  0        hydrOXYzine 25 MG capsule   Commonly known as:  VISTARIL   Dose:  25-50 mg   Quantity:  60 capsule        Take 1-2 capsules (25-50 mg) by mouth nightly as needed (insomnia)   Refills:  3        montelukast 10 MG tablet   Commonly known as:  SINGULAIR   Dose:  10 mg   Quantity:  90 tablet        Take 1 tablet (10 mg) by mouth At Bedtime   Refills:  3        ondansetron 4 MG ODT tab   Commonly known as:  ZOFRAN-ODT   Dose:  4 mg   Quantity:  40 tablet        Take 1 tablet (4 mg) by mouth every 6 hours as needed for nausea or vomiting   Refills:  0        valACYclovir 500 MG tablet   Commonly known as:  VALTREX   Dose:  500 mg   Quantity:  90 tablet        Take 1 tablet (500 mg) by mouth daily   Refills:  3                Procedures and tests performed during your visit     CT Head w/o Contrast      Orders Needing Specimen Collection     None      Pending Results     No orders found from 4/1/2018 to 4/4/2018.            Pending Culture Results     No orders found from 4/1/2018 to 4/4/2018.            Pending Results Instructions     If you had any lab results that were not finalized at the  time of your Discharge, you can call the ED Lab Result RN at [phone]. You will be contacted by this team for any positive Lab results or changes in treatment. The nurses are available 7 days a week from 10A to 6:30P.  You can leave a message 24 hours per day and they will return your call.        Thank you for choosing [facility]       Thank you for choosing [facility] for your care. Our goal is always to provide you with excellent care. Hearing back from our patients is one way we can continue to improve our services. Please take a few minutes to complete the written survey that you may receive in the mail after you visit with us. Thank you!        [employer] Information     [employer] gives you secure access to your electronic health record. If you see a primary care provider, you can also send messages to your care team and make appointments. If you have questions, please call your primary care clinic.  If you do not have a primary care provider, please call [phone] and they will assist you.        Care EveryWhere ID     This is your Care EveryWhere ID. This could be used by other organizations to access your [facility] medical records  [id number]        Equal Access to Services     MILAD HANSON : Janeth Glasgow, sekou najera, lester betancur. So [facility] [phone].    ATENCIÓN: Si habla español, tiene a de la rosa disposición servicios gratuitos de asistencia lingüística. Emely al [phone].    We comply with applicable federal civil rights laws and [place] laws. We do not discriminate on the basis of race, color, national origin, age, disability, sex, sexual orientation, or gender identity.            After Visit Summary       This is your record. Keep this with you and show to your community pharmacist(s) and doctor(s) at your next visit.

## 2018-04-03 NOTE — ED AVS SNAPSHOT
[facility] Emergency Department    [address] [place] [address]    Phone:  [phone]    Fax:  [phone]                                       Sasha Hand   MRN: [id number]    Department:  [facility] Emergency Department   Date of Visit:  4/3/2018           After Visit Summary Signature Page     I have received my discharge instructions, and my questions have been answered. I have discussed any challenges I see with this plan with the nurse or doctor.    ..........................................................................................................................................  Patient/Patient Representative Signature      ..........................................................................................................................................  Patient Representative Print Name and Relationship to Patient    ..................................................               ................................................  Date                                            Time    ..........................................................................................................................................  Reviewed by Signature/Title    ...................................................              ..............................................  Date                                                            Time

## 2018-04-03 NOTE — LETTER
April 3, 2018      To Whom It May Concern:      Sasha Hand was seen in our Emergency Department today, 04/03/18.  Due to her concussion she is not able to work until released by her primary care physician to do so.  She will need to be off of work for a minimum of 7 days likely.      Sincerely,            Ankit Mendez PA-C

## 2018-04-04 ENCOUNTER — PATIENT OUTREACH (OUTPATIENT)
Dept: CARE COORDINATION | Facility: CLINIC | Age: 18
End: 2018-04-04

## 2018-04-04 NOTE — ED PROVIDER NOTES
History     Chief Complaint   Patient presents with     Head Injury     HPI  Sasha Hand is a 17 year old female who presents for evaluation of a persistent and escalating headache ever since a head injury last evening. She was walking and slipped on the sidewalk. She struck the left posterior aspect of her head when she fell. She does not recall loss of consciousness, but cannot guarantee that she did not have an LOC. No witnesses. She got up and walked into the house after the incident. Head started hurting and she had nausea, but did not experience any emesis. She fell asleep okay but woke up with a headache. She went to work today and symptoms worsen. She developed more nausea but still did not have any emesis. Developed some blurry vision, epistaxis, and worsening left posterior headache. Denies any balance issues. No memory changes. Denies taking any anticoagulant medication on a regular basis. She did take Tylenol this morning for the headache, but she reports is not helping. 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 Denies ever having a concussion or head injury in the past.  Denies fever, chills, neck pain, extremity weakness, or extremity numbness/tingling.    Problem List:    Patient Active Problem List    Diagnosis Date Noted     Depression 03/11/2018     Priority: Medium     Decreased oral intake 02/20/2018     Priority: Medium     Acute pyelonephritis 02/19/2018     Priority: Medium     Pyelonephritis 02/19/2018     Priority: Medium     DELIA (generalized anxiety disorder) 02/09/2018     Priority: Medium     Major depressive disorder, single episode, moderate (H) 02/09/2018     Priority: Medium     Nexplanon placed 11/14/17 11/14/2017     Priority: Medium     Genital herpes simplex, unspecified site 01/18/2017     Priority: Medium     Mild persistent asthma without complication 01/18/2017     Priority: Medium     Wheezing 05/04/2016     Priority: Medium      "Tobacco use disorder 05/04/2016     Priority: Medium     Menorrhagia 01/20/2014     Priority: Medium     Dysmenorrhea 01/20/2014     Priority: Medium     Tear of lateral cartilage or meniscus of knee, current 09/09/2013     Priority: Medium     Obesity 09/14/2010     Priority: Medium        Past Medical History:    Past Medical History:   Diagnosis Date     Moderate major depression (H) 1/20/2014     Nexplanon placed 11/14/17 11/14/2017     Obesity 9/14/2010       Past Surgical History:    Past Surgical History:   Procedure Laterality Date     ARTHROSCOPY KNEE WITH MENISCAL REPAIR Right 5/10/2017    Procedure: ARTHROSCOPY KNEE WITH MENISCAL REPAIR;  arthroscopy right knee with lateral meniscus repair;  Surgeon: Nathaniel Hicks MD;  Location: [facility] OR     NO HISTORY OF SURGERY         Family History:    Family History   Problem Relation Age of Onset     Asthma Mother      Hypertension Mother      Asthma Father      DIABETES Father      CANCER Paternal Grandmother        Social History:  Marital Status:  Single [1]  Social History   Substance Use Topics     Smoking status: Current Every Day Smoker     Packs/day: 1.00     Types: Cigarettes     Smokeless tobacco: Never Used     Alcohol use No        Medications:      hydrOXYzine (VISTARIL) 25 MG capsule   acetaminophen (TYLENOL) 325 MG tablet   ondansetron (ZOFRAN-ODT) 4 MG ODT tab   etonogestrel (IMPLANON/NEXPLANON) 68 MG IMPL   valACYclovir (VALTREX) 500 MG tablet   montelukast (SINGULAIR) 10 MG tablet         Review of Systems   All other systems reviewed and are negative.      Physical Exam   BP: (!) 146/96  Pulse: 81  Heart Rate: 108  Temp: 97  F (36.1  C)  Resp: 20  Height: 170.2 cm (5' 7\")  Weight: 123.8 kg (273 lb)  SpO2: 100 %      Physical Exam   Nursing note and vitals reviewed.  Generally healthy appearing female in NAD who is active and non-toxic appearing.   Skin:  No rashes or lesions are noted on inspection of the torso, face, and upper extremities. "   Head: Normocephalic, tender to palpation over the left superior occiput. No skull defect. No significant swelling.  Eyes: PERRLA, conjunctiva and sclera clear  Ears: Bilateral TM's and canals are clear.  TM's translucent without erythema or effusion.  Nose: Nares normal and patent bilaterally.  Mucous membranes are non-erythematous and non-edematous.  No sinus tenderness.  Throat: Mucous membranes are clear.  No tonsilar hypertrophy, exudate, or erythema.  Neck: Supple.  FROM without pain.  No adenopathy.  No thyromegaly.  No nuchal rigidity. Negative Kernig's and Brudzinski's sign. No midline spinous process tenderness in the cervical spine.  Heart:  RRR with normal S1 and S2.  No S3 or S4.  No murmur, rub, gallop, or click.  PMI is nondisplaced.   Lungs:  CTA bilaterally without wheezes, rales, or rhonchi.  Good breath sounds heard throughout all lung fields.  Tympanitic to percussion with no areas of dullness.   Abdomen: Positive bowel sounds in all four quadrants.  No tenderness to palpation throughout.  No rebound and no guarding.  No hepatosplenomegaly.  No masses.   Extremities: extremities, peripheral pulses and reflexes normal, no edema, redness or tenderness in the calves or thighs.  No tenderness to palpation throughout the upper and lower extremities bilaterally.  Neuro:  Cranial nerves II-XII grossly intact.  Romberg is steady.  Gait WNL's.  Cerebellar testing is WNL's.  Sensation is intact to light touch throughout.  Bicep, brachioradialis, patellar, and achilles DTR's 2/4 without clonus.  No neurological abnormality identified.       ED Course     ED Course     Procedures               Critical Care time:  none               Results for orders placed or performed during the hospital encounter of 04/03/18 (from the past 24 hour(s))   CT Head w/o Contrast    Narrative    CT SCAN OF THE HEAD WITHOUT CONTRAST   4/3/2018 10:19 PM     HISTORY: Worsening headache. Left posterior head injury yesterday  with  possible loss of consciousness.     TECHNIQUE: Axial images of the head and coronal reformations without  IV contrast material. Radiation dose for this scan was reduced using  automated exposure control, adjustment of the mA and/or kV according  to patient size, or iterative reconstruction technique.    COMPARISON: None.    FINDINGS: There is no evidence of intracranial hemorrhage, mass, acute  infarct or anomaly. The ventricles are normal in size, shape and  configuration. The brain parenchyma and subarachnoid spaces are  normal.     The visualized portions of the sinuses and mastoids appear normal. The  bony calvarium and bones of the skull base appear intact.       Impression    IMPRESSION: No evidence of acute intracranial hemorrhage, mass, or  herniation.      MITCH BENZ MD       Medications - No data to display    Assessments & Plan (with Medical Decision Making)     Concussion without loss of consciousness, initial encounter  Tension headache     17 year old female presents for evaluation of escalating headache, blurry vision, nausea, and epistaxis since suffering a head injury last evening. She fell on the sidewalk. She is uncertain if she had LOC or not. On exam blood pressure 129/81, pulse 81, temperature 97.0, oxygen saturation 97% on room air. Tenderness to palpation in the left posterior occiput. Remainder of the exam normal including a nonfocal neurological exam. Given her escalating symptoms, I did feel obligated to perform a CT of the head. Thankfully this did not show any intracranial bleeding or skull fracture.  Patient was reassured. Symptoms and exam findings consistent with postconcussive syndrome. We discussed that she should not work, exercise, watch TV, playing video games, or read while she rests. Importance of sleep and hydration discussed. Note provided to not work until next week when she can potentially be cleared by her PCP to do so. We were unable to get an open appointment  with her PCP or with sports medicine at the end of this week, so we sent a work in message to her PCP. Indications to return to the ED prior to then discussed with her and her father in detail. Her father was present during for home.  She can use Tylenol 1000 mg every 6 hours as needed for headache and discomfort. The patient was in agreement with this plan and suitable for discharge.      I have reviewed the nursing notes.    I have reviewed the findings, diagnosis, plan and need for follow up with the patient.       Discharge Medication List as of 4/3/2018 11:00 PM          Final diagnoses:   Concussion without loss of consciousness, initial encounter   Tension headache       Disclaimer: This note consists of symbols derived from keyboarding, dictation and/or voice recognition software. As a result, there may be errors in the script that have gone undetected. Please consider this when interpreting information found in this chart.    4/3/2018   Ankit Mendez PA-C   [facility] EMERGENCY DEPARTMENT     Ankit Mendez PA-C  04/04/18 0016

## 2018-04-04 NOTE — PROGRESS NOTES
Clinic Care Coordination Contact    Situation: Patient chart reviewed by care coordinator.    Background: Pt triggers on Recently Discharged [facility] list.     Assessment: Pt had one ED visit in past 90 days. In ED for concussion after a fall on the sidewalk. Pt discharged to home.     Plan/Recommendations: Pt has a follow up appointment with PCP set up for tomorrow. Pt is aware. [facility] will do no outreaches at this time.     GARY [facility] Care Coordinator  [facility] BoltEwa [facility]  4/4/2018 2:34 PM  [phone]

## 2018-04-04 NOTE — ED NOTES
Fell yesterday on the ice hit back of head . Pain in back of head with nausea and today developed some visual changes

## 2018-04-04 NOTE — DISCHARGE INSTRUCTIONS
It was a pleasure working with you today!  I hope your condition improves rapidly!     Given your concussion, please do not work, exercise, read, watch TV, play video games, or access any social media until cleared to do so by Dr. Roman at your recheck appointment. Adhering to these recommendations will help prevent having ongoing postconcussive symptoms in the future.    Make sure that you are drinking at least 8-10 glasses of water daily to maintain adequate hydration.  Please rest. Sleep is very important during this healing time period.    Please keep your appointment with Dr. Roman. It is crucial that you have appropriate follow-up for this concussion!!              Concussion Discharge Instructions  You were seen today for signs of a concussion. The symptoms will vary, depending on the nature of your injury and your health. You may have: headache, confusion, nausea (feel sick to your stomach), vomiting (throwing up) and problems with memory, concentrating or sleep. You may feel dizzy, irritable, and tired.   Children and teens may need help from their parents, teachers and coaches to watch for symptoms as they recover.  Follow-up  It is important for you to see a doctor for follow-up care to see how you are recovering. Please see your primary doctor within the next 5 to 7 days. You may have also been referred to the Concussion  service. They will contact you and arrange a follow-up visit if needed. If you need help sooner, you may call them at [phone].  Warning signs  Call your doctor or come back to Emergency if you suddenly have any of these symptoms:    Headaches that get worse    Feeling more and more drowsy    You keep repeating yourself    Strange behavior    Seizures    Repeat vomiting (throwing up)    Trouble walking    Growing confusion    Feeling more irritable    Neck pain that gets worse    Slurred speech    Weakness or numbness    Loss of consciousness    Fluid or blood coming from  "ears or nose  Self-care    Get lots of rest and get enough sleep at night. Take daytime naps or rest if you feel tired.    Limit physical activity and \"thinking\" activities. These can make symptoms worse.    Physical activity includes gym, sports, weight training, running, exercise and heavy lifting.    Thinking activities include homework, class work, job-related work and screen time (phone, computer, tablet, TV and video games).    Stick to a healthy diet and drink lots of fluids.    As symptoms improve, you may slowly return to your daily activities. If symptoms get worse   or return, reduce your activity.    Know that it is normal to feel sad and frustrated when you do not feel right and are less active.  Going back to work    Your care team will tell you when you are ready to return to work.    Limit the amount of work you do soon after your injury. This may speed healing. Take breaks if your symptoms get worse. You should also reduce your physical activity as well as activities that require a lot of thinking until you see your doctor.    You may need shorter work days and a lighter workload.    Avoid heavy lifting, working with machinery, driving and working at heights until your symptoms are gone or you are cleared by a doctor.  Returning to sports    Never return to play if you have any symptoms. A full recovery will reduce the chances of getting hurt again. Remember, it is better to miss one or two games than a whole season.    You should rest from all physical activity until you see your doctor. Generally, if all symptoms have completely cleared, your doctor can help guide you to slowly return to sports. If symptoms return or worsen, stop the activity and see your doctor.    Important: If you are in an organized sport and under age 18, you will need written consent from a healthcare provider before you return to sports. Typically, this will be your primary care or sports medicine doctor. Please make an " "appointment.  Going back to school    If you are still having symptoms, you may need extra help at school.    Tell your teachers and school nurse about your injury and symptoms. Ask them to watch for problems with learning, memory and concentrating. Symptoms may get worse when you do schoolwork, and you may become more irritable.    You may need shorter school days, a reduced workload, and to postpone testing.    Do not drive or take gym class (physical activity) until cleared by a doctor.  For informational purposes only. Not to replace the advice of your health care provider.   2009 [employer]. Used with permission. This form is adapted from the \"Heads Up: Brain Injury in Your Practice\" tool kit developed by the Centers for Disease Control and Prevention (CDC). All rights reserved. [employer] 751978ih - Rev 03/17.    "

## 2018-04-05 ENCOUNTER — OFFICE VISIT (OUTPATIENT)
Dept: FAMILY MEDICINE | Facility: CLINIC | Age: 18
End: 2018-04-05
Payer: COMMERCIAL

## 2018-04-05 VITALS
BODY MASS INDEX: 43.47 KG/M2 | WEIGHT: 277 LBS | DIASTOLIC BLOOD PRESSURE: 66 MMHG | RESPIRATION RATE: 18 BRPM | HEART RATE: 120 BPM | HEIGHT: 67 IN | TEMPERATURE: 98.3 F | SYSTOLIC BLOOD PRESSURE: 118 MMHG | OXYGEN SATURATION: 98 %

## 2018-04-05 DIAGNOSIS — S09.90XD CLOSED HEAD INJURY, SUBSEQUENT ENCOUNTER: Primary | ICD-10-CM

## 2018-04-05 PROCEDURE — 99213 OFFICE O/P EST LOW 20 MIN: CPT | Performed by: FAMILY MEDICINE

## 2018-04-05 ASSESSMENT — PAIN SCALES - GENERAL: PAINLEVEL: MODERATE PAIN (5)

## 2018-04-05 NOTE — NURSING NOTE
"Chief Complaint   Patient presents with     ER F/U     fell on ice and hit head on 4-3 concussion        Initial /66  Pulse 120  Temp 98.3  F (36.8  C) (Temporal)  Resp 18  Ht 5' 7\" (1.702 m)  Wt 277 lb (125.6 kg)  SpO2 98%  Breastfeeding? No  BMI 43.38 kg/m2 Estimated body mass index is 43.38 kg/(m^2) as calculated from the following:    Height as of this encounter: 5' 7\" (1.702 m).    Weight as of this encounter: 277 lb (125.6 kg).  Medication Reconciliation: complete    "

## 2018-04-05 NOTE — PROGRESS NOTES
4/5/2018  SUBJECTIVE:  Sasha is a 17 year old female who presents for evaluation of a possible concussion.         Head injury occurred 4/2/2018 .  Mechanism of injury: fell on ice and hit head.   Immediate symptoms at time of injury: headache, light sensitivity, noise sensitivity, dizziness, confusion, blurred vision  Treatment to date:  Patient has been seen in ED on 4/3/2018. ED note reviewed. Patient have  improved since ED visit.       Prior concussion history:  Yes   Prior concussion date:  Back in HS 2016    Current Symptoms:   Headache or  pressure  in head 1 - Mild   Upset stomach or throwing up  0 - No symptoms   Problems with balance  0 - No symptoms   Feeling dizzy  0 - No symptoms   Sensitivity to light 1 - Mild   Sensitivity to noise  0 - No symptoms   Mood changes  0 - No symptoms   Feeling sluggish, hazy or foggy  0 - No symptoms   Trouble concentrating, lack of focus 3 - Moderate   Motion sickness  0 - No symptoms   Vision changes  3 - Moderate   Memory problems  1 - Mild   Feeling confused  0 - No symptoms   Neck pain 0 - No symptoms   Trouble sleeping 0 - No symptoms   Symptom Score at this visit:      Sleep: No Issues    Past pertinent history: Migraines: no     Depression: Yes:      Anxiety: Yes:      Learning disability: no     ADHD: no    Past Medical History:   Diagnosis Date     Moderate major depression (H) 1/20/2014     Nexplanon placed 11/14/17 11/14/2017     Obesity 9/14/2010       Patient Active Problem List   Diagnosis     Obesity     Tear of lateral cartilage or meniscus of knee, current     Menorrhagia     Dysmenorrhea     Wheezing     Tobacco use disorder     Genital herpes simplex, unspecified site     Mild persistent asthma without complication     Nexplanon placed 11/14/17     DELIA (generalized anxiety disorder)     Major depressive disorder, single episode, moderate (H)     Acute pyelonephritis     Pyelonephritis     Decreased oral intake     Depression        Social history-  "works at [employer] in [place].     REVIEW OF SYSTEMS:  CONSTITUTIONAL:NEGATIVE for fever, chills, change in weight  HEENT: negative  EYE- negative  MUSCULOSKELETAL:negative    OBJECTIVE:   /66  Pulse 120  Temp 98.3  F (36.8  C) (Temporal)  Resp 18  Ht 5' 7\" (1.702 m)  Wt 277 lb (125.6 kg)  SpO2 98%  Breastfeeding? No  BMI 43.38 kg/m2     EXAM:  General Appearance: healthy, alert and no distress              Head- no lacerations visualized. Skull is non-tender to palpation    Face- appears symmetric. All facial bones are non-tender to palpation  Eyes- normal on inspection with out any swelling. Eyelids and conjunctiva appear normal. PERRLA. Extraocular muscles move normally. No nystagmus is noted.   ENT- External auditory canal and tympanic membranes are normal. Nasal mucosa and oropharynx appears to be normal.   NECK -supple, non-tender to palpation, full range of motion without pain  Psych- mentation appears normal. and affect normal/bright  Strength: Normal strength in bilateral upper and lower extremities  Sensations- normal in all dermatomes  Cranial nerve testing was normal  Cerebellar tests- rapid alternating hand movements and finger to nose coordination tests were normal  Romberg test - normal  Pronator drift - negative    GENERAL APPEARANCE: healthy, alert and no distress    HEENT:    Tympanic Membranes:Pearly  External Ear Canal:Normal  Oropharynx:Atraumatic  Reflexes: Normal  NECK:  supple, non-tender, FULL ROM    Neurologic:  Cranial Nerves 2-12:  intact  DAVE:Yes  EOMI:Yes    Balance Testing: Romberg:normal    Painful Eye movements: No    Strength:  Shoulder shrug (C5):5/5  Deltoid (C5): 5/5  Bicep (C6):5/5  Wrist Extension (C6): 5/5  Tricep (C7):5/5  Wrist Flexion (C7): 5/5  Finger Flexion (C8/T1):5/5      Cognitive Assessment  Can remember months of year in reverse order:  Yes  Can count backwards from 100 by 3's: Did not do  No obvious cognitive impairment    Plan    Recommend rest " from cognitive and physical stimulus.    1.  Observe and monitor  2.  Return to work by Monday if symptoms have completely cleared.    3.  She will keep me posted if she has any recurrence of symptoms or worsening of symptoms.    Electronically signed by:  Oscar Medley M.D.  4/5/2018

## 2018-04-05 NOTE — MR AVS SNAPSHOT
"              After Visit Summary   4/5/2018    Sasha Hand    MRN: [id number]           Patient Information     Date Of Birth          2000        Visit Information        Provider Department      4/5/2018 2:00 PM Oscar Medley MD [facility]        Today's Diagnoses     Closed head injury, subsequent encounter    -  1       Follow-ups after your visit        Who to contact     If you have questions or need follow up information about today's clinic visit or your schedule please contact [facility] directly at [phone].  Normal or non-critical lab and imaging results will be communicated to you by Cytodynhart, letter or phone within 4 business days after the clinic has received the results. If you do not hear from us within 7 days, please contact the clinic through Brandlet or phone. If you have a critical or abnormal lab result, we will notify you by phone as soon as possible.  Submit refill requests through bMenu or call your pharmacy and they will forward the refill request to us. Please allow 3 business days for your refill to be completed.          Additional Information About Your Visit        MyChart Information     bMenu gives you secure access to your electronic health record. If you see a primary care provider, you can also send messages to your care team and make appointments. If you have questions, please call your primary care clinic.  If you do not have a primary care provider, please call [phone] and they will assist you.        Care EveryWhere ID     This is your Care EveryWhere ID. This could be used by other organizations to access your [place] medical records  [id number]        Your Vitals Were     Pulse Temperature Respirations Height Pulse Oximetry Breastfeeding?    120 98.3  F (36.8  C) (Temporal) 18 5' 7\" (1.702 m) 98% No    BMI (Body Mass Index)                   43.38 kg/m2            Blood Pressure from Last 3 Encounters:   04/05/18 " 118/66   04/03/18 129/81   03/12/18 138/62    Weight from Last 3 Encounters:   04/05/18 277 lb (125.6 kg) (>99 %)*   04/03/18 273 lb (123.8 kg) (>99 %)*   03/11/18 264 lb (119.7 kg) (>99 %)*     * Growth percentiles are based on [facility] 2-20 Years data.              Today, you had the following     No orders found for display       Primary Care Provider Office Phone # Fax #    Oscar Medley [phone] [address] [place] [address]        Equal Access to Services     [facility]: Hadii mark contreras Sojosé miguel, waaxda lumalka, qaybta kaalmada adejeniffer, lester castillo . So [facility] [phone].    ATENCIÓN: Si habla español, tiene a de la rosa disposición servicios gratuitos de asistencia lingüística. Llame al [phone].    We comply with applicable federal civil rights laws and [place] laws. We do not discriminate on the basis of race, color, national origin, age, disability, sex, sexual orientation, or gender identity.            Thank you!     Thank you for choosing [facility]  for your care. Our goal is always to provide you with excellent care. Hearing back from our patients is one way we can continue to improve our services. Please take a few minutes to complete the written survey that you may receive in the mail after your visit with us. Thank you!             Your Updated Medication List - Protect others around you: Learn how to safely use, store and throw away your medicines at www.disposemymeds.org.          This list is accurate as of 4/5/18  3:12 PM.  Always use your most recent med list.                   Brand Name Dispense Instructions for use Diagnosis    acetaminophen 325 MG tablet    TYLENOL    100 tablet    Take 1 tablet (325 mg) by mouth every 4 hours as needed for mild pain or fever        etonogestrel 68 MG Impl    IMPLANON/NEXPLANON     1 each (68 mg) by Subdermal route continuous    Encounter for initial prescription of  implantable subdermal contraceptive       hydrOXYzine 25 MG capsule    VISTARIL    60 capsule    Take 1-2 capsules (25-50 mg) by mouth nightly as needed (insomnia)    Other insomnia       montelukast 10 MG tablet    SINGULAIR    90 tablet    Take 1 tablet (10 mg) by mouth At Bedtime    Mild persistent asthma without complication       ondansetron 4 MG ODT tab    ZOFRAN-ODT    40 tablet    Take 1 tablet (4 mg) by mouth every 6 hours as needed for nausea or vomiting    Nausea       valACYclovir 500 MG tablet    VALTREX    90 tablet    Take 1 tablet (500 mg) by mouth daily    Genital herpes simplex, unspecified site

## 2018-05-07 ENCOUNTER — TELEPHONE (OUTPATIENT)
Dept: FAMILY MEDICINE | Facility: CLINIC | Age: 18
End: 2018-05-07

## 2018-05-07 DIAGNOSIS — R30.0 DYSURIA: Primary | ICD-10-CM

## 2018-05-07 NOTE — TELEPHONE ENCOUNTER
Reason for call:  Patient reporting a symptom    Symptom or request: Patient states since having the Nexplanon inserted in November she's had 5 UTI's and states she researched & that is a side effect, patient is wondering what else she could do?  Please advise.  Patient states she's self treated the UTI's with leftover medications from other things.  Patient thinks she has a UTI currently as well    Duration (how long have symptoms been present):     Have you been treated for this before? No    Additional comments:     Phone Number patient can be reached at:  Home number on file [phone] (home)    Best Time:      Can we leave a detailed message on this number:  YES    Call taken on 5/7/2018 at 2:53 PM by Ashley Lee

## 2018-05-08 DIAGNOSIS — R30.0 DYSURIA: ICD-10-CM

## 2018-05-08 LAB
ALBUMIN UR-MCNC: NEGATIVE MG/DL
AMORPH CRY #/AREA URNS HPF: ABNORMAL /HPF
APPEARANCE UR: ABNORMAL
BACTERIA #/AREA URNS HPF: ABNORMAL /HPF
BILIRUB UR QL STRIP: NEGATIVE
COLOR UR AUTO: YELLOW
GLUCOSE UR STRIP-MCNC: NEGATIVE MG/DL
HGB UR QL STRIP: NEGATIVE
KETONES UR STRIP-MCNC: NEGATIVE MG/DL
LEUKOCYTE ESTERASE UR QL STRIP: ABNORMAL
MUCOUS THREADS #/AREA URNS LPF: PRESENT /LPF
NITRATE UR QL: NEGATIVE
PH UR STRIP: 7 PH (ref 5–7)
RBC #/AREA URNS AUTO: 1 /HPF (ref 0–2)
SOURCE: ABNORMAL
SP GR UR STRIP: 1.02 (ref 1–1.03)
SQUAMOUS #/AREA URNS AUTO: 4 /HPF (ref 0–1)
UROBILINOGEN UR STRIP-MCNC: 0 MG/DL (ref 0–2)
WBC #/AREA URNS AUTO: 15 /HPF (ref 0–5)

## 2018-05-08 PROCEDURE — 81001 URINALYSIS AUTO W/SCOPE: CPT | Performed by: FAMILY MEDICINE

## 2018-05-08 PROCEDURE — 87086 URINE CULTURE/COLONY COUNT: CPT | Performed by: FAMILY MEDICINE

## 2018-05-08 NOTE — TELEPHONE ENCOUNTER
Patient was given the information below.  Patient states that she will schedule a lab only appointment to have her urine analyzed.  Papa Guzman CMA

## 2018-05-08 NOTE — TELEPHONE ENCOUNTER
We need to document whether or not she has a true UTI.  If she is having symptoms she needs to drop off a urine at the lab and we can have it analyzed.  This should not be something that we remove the Nexplanon for.  I have never heard that it is a cause of UTIs.  We need to make sure that she is completely emptying her bladder and not resisting the urge to go.  Holding urine is more of a cause of UTIs than anything else.  Also she needs to empty her bladder within 30-40 minutes of having intercourse.  That is probably the #1 reason why young woman her age and up with UTIs.  She should be drinking 48-64 ounces of water per day also.    Electronically signed by:  Oscar Medley M.D.  5/7/2018

## 2018-05-08 NOTE — TELEPHONE ENCOUNTER
Patient is calling back looking for results of the lab work. It is OK to leave a detailed message on her voicemail.  Thank you,  Geraldine Felix   for [facility]

## 2018-05-08 NOTE — TELEPHONE ENCOUNTER
Tried to reach patient, left message for patient to call the clinic back.  Papa Guzman, [facility]

## 2018-05-08 NOTE — TELEPHONE ENCOUNTER
She only had a few white blood cells on the urine sample so I am waiting for the culture to come back.  If the culture grows out anything that would suggest a UTI then we will treat her.  I should know more tomorrow afternoon or Thursday morning.    Electronically signed by:  Oscar Medley M.D.  5/8/2018

## 2018-05-09 ENCOUNTER — NURSE TRIAGE (OUTPATIENT)
Dept: NURSING | Facility: CLINIC | Age: 18
End: 2018-05-09

## 2018-05-09 ENCOUNTER — HOSPITAL ENCOUNTER (EMERGENCY)
Facility: CLINIC | Age: 18
Discharge: HOME | End: 2018-05-09
Attending: PHYSICIAN ASSISTANT | Admitting: PHYSICIAN ASSISTANT
Payer: COMMERCIAL

## 2018-05-09 VITALS
OXYGEN SATURATION: 100 % | DIASTOLIC BLOOD PRESSURE: 82 MMHG | SYSTOLIC BLOOD PRESSURE: 134 MMHG | TEMPERATURE: 98.2 F | RESPIRATION RATE: 20 BRPM

## 2018-05-09 DIAGNOSIS — A60.04 HERPES SIMPLEX VULVOVAGINITIS: ICD-10-CM

## 2018-05-09 DIAGNOSIS — A60.00 GENITAL HERPES SIMPLEX, UNSPECIFIED SITE: ICD-10-CM

## 2018-05-09 LAB
BACTERIA SPEC CULT: NO GROWTH
Lab: NORMAL
SPECIMEN SOURCE: NORMAL

## 2018-05-09 PROCEDURE — 99284 EMERGENCY DEPT VISIT MOD MDM: CPT | Mod: Z6 | Performed by: PHYSICIAN ASSISTANT

## 2018-05-09 PROCEDURE — 25000125 ZZHC RX 250: Performed by: PHYSICIAN ASSISTANT

## 2018-05-09 PROCEDURE — 99283 EMERGENCY DEPT VISIT LOW MDM: CPT | Performed by: PHYSICIAN ASSISTANT

## 2018-05-09 RX ORDER — VALACYCLOVIR HYDROCHLORIDE 500 MG/1
1000 TABLET, FILM COATED ORAL DAILY
Qty: 10 TABLET | Refills: 0 | Status: SHIPPED | OUTPATIENT
Start: 2018-05-09 | End: 2018-05-14

## 2018-05-09 RX ORDER — LIDOCAINE 50 MG/G
OINTMENT TOPICAL ONCE
Status: COMPLETED | OUTPATIENT
Start: 2018-05-09 | End: 2018-05-09

## 2018-05-09 RX ADMIN — LIDOCAINE: 50 OINTMENT TOPICAL at 22:17

## 2018-05-09 NOTE — ED AVS SNAPSHOT
[facility]    [address]     [place] [address]    Phone:  [phone]    Fax:  [phone]                                       Sasha Hand   MRN: [id number]    Department:  [facility]   Date of Visit:  5/9/2018           Patient Information     Date Of Birth          2000        Your diagnoses for this visit were:     Herpes simplex vulvovaginitis     Genital herpes simplex, unspecified site        You were seen by Leti Fox PA-C.      Follow-up Information     Follow up with Oscar Medley MD.    Specialty:  Family Practice    Why:  For ER follow up    Contact information:    [address] [facility]   [place] [address] [phone]          Follow up with [facility].    Specialty:  EMERGENCY MEDICINE    Why:  If symptoms worsen    Contact information:    [address]  [place] [address] [phone]    Additional information:    From [address]: Exit at [address] on [address] side of [place]. Turn right on [address]. Turn left at stoplight on [address]. [facility] will be in view two blocks ahead        Discharge Instructions         Genital Herpes    Genital herpes is a common sexually transmitted infection (STI). It is caused by the herpes simplex virus (HSV). One out of 5 teens and adults carry the herpes virus. During an outbreak, it causes small blisters that break open, leaving small, painful sores in the genital area. Eventually, scabs form and the sores heal. In women, these show up most often on the skin just outside the vaginal opening. They can occur on the buttocks, anus, or cervix. In men, the sores are usually on the tip, sides, or base of the penis. They also occur on the scrotum, buttocks, or thighs.  The first outbreak begins within 2 to 3 weeks after exposure to an infected sexual partner. It may last 1 to 3 weeks. It may cause  headache, muscle ache, and fevers. The first outbreak is usually the worst. Because the virus remains in the body even after the sores heal, most people will have more outbreaks. The frequency of outbreaks is different for each person. Some people will never have another outbreak. Others will have several episodes a year. Later outbreaks are usually shorter, milder, and less painful. For many, the number of outbreaks tends to decrease over time. Various factors may trigger an outbreak. These include:    Emotional stress    Menstruation    Presence of another illness (cold, flu, or fever from any cause)    Overexertion and fatigue    Weak immune system  Home care    It is very important that you do not have sexual relations until all the herpes sores have healed completely.    Wash the affected area gently with mild soap and water. Wash your hands after touching the affected area.    You may use over-the-counter pain medicine unless another pain medicine was prescribed. If you have chronic liver or kidney disease or have ever had a stomach ulcer or gastrointestinal bleeding, talk with your healthcare provider before using these medicines. Also talk to your provider if you are taking medicine to prevent blood clots. Aspirin should never be given to anyone younger than 18 years of age who is ill with a viral infection or fever. It may cause severe liver or brain damage.    Your healthcare provider may prescribe antiviral medicine during the first outbreak. This will help the sores heal faster. Antiviral medicine may also be prescribed so that you have it ready to take at the first sign of another outbreak. This will help the symptoms go away sooner. For people with frequent outbreaks, daily therapy may be prescribed. This will help reduce the frequency of attacks. Daily therapy may also reduce risk of spread of herpes to your sexual partner. Discuss the risks and benefits of daily therapy with your healthcare  provider.    If you are a woman who is pregnant now or may become pregnant in the future, let your healthcare provider know that you have had herpes. This may affect the way your baby is delivered.  Preventing spread to others  The virus is spread by sexual contact with someone who has the herpes virus. The risk of spread is highest when the sores are present. However, there is a chance of spreading the virus even when sores are not visible. Inform future sexual partners that you have herpes and that they may become infected. To reduce the risk of passing the virus to a partner who has never had herpes, avoid sexual relations at the first sign of an outbreak and until the sores are fully healed. Latex barriers, such as condoms, reduce the risk of spread between outbreaks if the infected site is covered, but they do not guarantee protection.  Follow-up care  Follow up with your healthcare provider, or as advised.  People who have just learned that they have herpes may feel upset. Getting the facts about herpes can help you feel more in control. Follow up with your healthcare provider or the public health department for complete STI screening, including HIV testing.  When to seek medical advice  Call your healthcare provider right away if any of these occur:    Inability to urinate due to pain    Swelling or increasing redness in the genital area    Unusual drowsiness, weakness, or confusion    Headache or stiff neck    Discharge from the vagina or penis    Increasing back or abdominal pain    Rash or joint pain        24 Hour Appointment Hotline       To make an appointment at any [facility] clinic, call [phone] ([phone]). If you don't have a family doctor or clinic, we will help you find one. [facility] clinics are conveniently located to serve the needs of you and your family.             Review of your medicines      CONTINUE these medicines which may have CHANGED, or have new prescriptions. If we are  uncertain of the size of tablets/capsules you have at home, strength may be listed as something that might have changed.        Dose / Directions Last dose taken    valACYclovir 500 MG tablet   Commonly known as:  VALTREX   Dose:  1000 mg   What changed:  how much to take   Quantity:  10 tablet        Take 2 tablets (1,000 mg) by mouth daily for 5 days   Refills:  0          Our records show that you are taking the medicines listed below. If these are incorrect, please call your family doctor or clinic.        Dose / Directions Last dose taken    acetaminophen 325 MG tablet   Commonly known as:  TYLENOL   Dose:  325 mg   Quantity:  100 tablet        Take 1 tablet (325 mg) by mouth every 4 hours as needed for mild pain or fever   Refills:  0        etonogestrel 68 MG Impl   Commonly known as:  IMPLANON/NEXPLANON   Dose:  1 each        1 each (68 mg) by Subdermal route continuous   Refills:  0        hydrOXYzine 25 MG capsule   Commonly known as:  VISTARIL   Dose:  25-50 mg   Quantity:  60 capsule        Take 1-2 capsules (25-50 mg) by mouth nightly as needed (insomnia)   Refills:  3        montelukast 10 MG tablet   Commonly known as:  SINGULAIR   Dose:  10 mg   Quantity:  90 tablet        Take 1 tablet (10 mg) by mouth At Bedtime   Refills:  3        ondansetron 4 MG ODT tab   Commonly known as:  ZOFRAN-ODT   Dose:  4 mg   Quantity:  40 tablet        Take 1 tablet (4 mg) by mouth every 6 hours as needed for nausea or vomiting   Refills:  0                Prescriptions were sent or printed at these locations (1 Prescription)                   [facility] [address] [place] [id number]    Telephone:  [phone]   Fax:  [phone]   Hours:                  E-Prescribed (1 of 1)         valACYclovir (VALTREX) 500 MG tablet                Orders Needing Specimen Collection     None      Pending Results     No orders found from 5/7/2018 to 5/10/2018.             Pending Culture Results     No orders found from 5/7/2018 to 5/10/2018.            Pending Results Instructions     If you had any lab results that were not finalized at the time of your Discharge, you can call the ED Lab Result RN at [phone]. You will be contacted by this team for any positive Lab results or changes in treatment. The nurses are available 7 days a week from 10A to 6:30P.  You can leave a message 24 hours per day and they will return your call.        Thank you for choosing [facility]       Thank you for choosing [facility] for your care. Our goal is always to provide you with excellent care. Hearing back from our patients is one way we can continue to improve our services. Please take a few minutes to complete the written survey that you may receive in the mail after you visit with us. Thank you!        [employer] Information     [employer] App gives you secure access to your electronic health record. If you see a primary care provider, you can also send messages to your care team and make appointments. If you have questions, please call your primary care clinic.  If you do not have a primary care provider, please call [phone] and they will assist you.        Care EveryWhere ID     This is your Care EveryWhere ID. This could be used by other organizations to access your [facility] medical records  [id number]        Equal Access to Services     MILAD HANSON : Janeth Glasgow, waaxda lubradyadaha, qaybta kaalmada vlad, lester metz. So [facility] [phone].    ATENCIÓN: Si habla español, tiene a de la rosa disposición servicios gratuitos de asistencia lingüística. Llame al [phone].    We comply with applicable federal civil rights laws and [place] laws. We do not discriminate on the basis of race, color, national origin, age, disability, sex, sexual orientation, or gender identity.            After Visit Summary       This is your record. Keep this with you and  show to your community pharmacist(s) and doctor(s) at your next visit.

## 2018-05-09 NOTE — ED AVS SNAPSHOT
[facility]    [address] [place] [address]    Phone:  [phone]    Fax:  [phone]                                       Sasha Hand   MRN: [id number]    Department:  [facility]   Date of Visit:  5/9/2018           After Visit Summary Signature Page     I have received my discharge instructions, and my questions have been answered. I have discussed any challenges I see with this plan with the nurse or doctor.    ..........................................................................................................................................  Patient/Patient Representative Signature      ..........................................................................................................................................  Patient Representative Print Name and Relationship to Patient    ..................................................               ................................................  Date                                            Time    ..........................................................................................................................................  Reviewed by Signature/Title    ...................................................              ..............................................  Date                                                            Time

## 2018-05-10 ASSESSMENT — ENCOUNTER SYMPTOMS
NAUSEA: 1
FEVER: 0
DYSURIA: 1
ABDOMINAL PAIN: 0

## 2018-05-10 NOTE — ED TRIAGE NOTES
Patient states he started a Herpes outbreak approx 2 days ago.  States she has severe pain, open sores, and drainage - vaginal area.  Tylenol, Valtrex have not helped.

## 2018-05-10 NOTE — ED PROVIDER NOTES
History     Chief Complaint   Patient presents with     Herpe Outbreak     HPI  Sasha Hand is a 18 year old female who presents to the emergency department for concerns of a herpes outbreak.  The patient reports about 2 days ago she developed sores in her genital region consistent with herpes.  She was diagnosed with this over a year ago and she states this is probably her third outbreak.  She had been taking Valtrex every day but stopped it for a month per her PCP recommendations.  However when this outbreak started again she started taking it once daily as prescribed.  She has tried Tylenol, ibuprofen, and antibiotic ointment to the sores but nothing seems to help the pain.  She is very uncomfortable.  She notes mild nausea related to pain but no vomiting.  No significant pelvic pain, vaginal bleeding or discharge.  No fevers.  She thought the pain may be related to a UTI but she sent a urine to the clinic yesterday and it was negative for infection.    Problem List:    Patient Active Problem List    Diagnosis Date Noted     Closed head injury, subsequent encounter 04/05/2018     Priority: Medium     Depression 03/11/2018     Priority: Medium     Decreased oral intake 02/20/2018     Priority: Medium     Acute pyelonephritis 02/19/2018     Priority: Medium     Pyelonephritis 02/19/2018     Priority: Medium     DELIA (generalized anxiety disorder) 02/09/2018     Priority: Medium     Major depressive disorder, single episode, moderate (H) 02/09/2018     Priority: Medium     Nexplanon placed 11/14/17 11/14/2017     Priority: Medium     Genital herpes simplex, unspecified site 01/18/2017     Priority: Medium     Mild persistent asthma without complication 01/18/2017     Priority: Medium     Wheezing 05/04/2016     Priority: Medium     Tobacco use disorder 05/04/2016     Priority: Medium     Menorrhagia 01/20/2014     Priority: Medium     Dysmenorrhea 01/20/2014     Priority: Medium     Tear of lateral cartilage or  meniscus of knee, current 09/09/2013     Priority: Medium     Obesity 09/14/2010     Priority: Medium        Past Medical History:    Past Medical History:   Diagnosis Date     Moderate major depression (H) 1/20/2014     Nexplanon placed 11/14/17 11/14/2017     Obesity 9/14/2010       Past Surgical History:    Past Surgical History:   Procedure Laterality Date     ARTHROSCOPY KNEE WITH MENISCAL REPAIR Right 5/10/2017    Procedure: ARTHROSCOPY KNEE WITH MENISCAL REPAIR;  arthroscopy right knee with lateral meniscus repair;  Surgeon: Nathaniel Hicks MD;  Location: [facility] OR     NO HISTORY OF SURGERY         Family History:    Family History   Problem Relation Age of Onset     Asthma Mother      Hypertension Mother      Asthma Father      DIABETES Father      CANCER Paternal Grandmother        Social History:  Marital Status:  Single [1]  Social History   Substance Use Topics     Smoking status: Current Every Day Smoker     Packs/day: 1.00     Types: Cigarettes     Smokeless tobacco: Never Used     Alcohol use No        Medications:      acetaminophen (TYLENOL) 325 MG tablet   etonogestrel (IMPLANON/NEXPLANON) 68 MG IMPL   montelukast (SINGULAIR) 10 MG tablet   ondansetron (ZOFRAN-ODT) 4 MG ODT tab   valACYclovir (VALTREX) 500 MG tablet   hydrOXYzine (VISTARIL) 25 MG capsule         Review of Systems   Constitutional: Negative for fever.   Gastrointestinal: Positive for nausea. Negative for abdominal pain.   Genitourinary: Positive for dysuria (from herpes) and vaginal pain. Negative for vaginal bleeding and vaginal discharge.   Skin: Positive for rash.   All other systems reviewed and are negative.      Physical Exam   BP: 136/77  Heart Rate: 94  Temp: 98.5  F (36.9  C)  Resp: 20  SpO2: 100 %      Physical Exam   Constitutional: She is oriented to person, place, and time. She appears well-developed and well-nourished. No distress.   HENT:   Head: Normocephalic and atraumatic.   Eyes: Conjunctivae are normal.   Neck:  Normal range of motion.   Cardiovascular: Normal rate, regular rhythm and normal heart sounds.    Pulmonary/Chest: Effort normal and breath sounds normal. No respiratory distress.   Abdominal: Soft. She exhibits no distension. There is no tenderness.   Genitourinary:   Genitourinary Comments: Inner vulvar folds with small vesicular lesions, very tender to touch.  No vaginal discharge or bleeding noted, otherwise vaginal tissues appeared normal.  Area recently shaved.   Neurological: She is alert and oriented to person, place, and time.   Skin: Skin is warm and dry. She is not diaphoretic.   Psychiatric: She has a normal mood and affect.   Nursing note and vitals reviewed.      ED Course     ED Course     Procedures    No results found for this or any previous visit (from the past 24 hour(s)).    Medications   lidocaine (XYLOCAINE) 5 % ointment ( Topical Given 5/9/18 2217)       Assessments & Plan (with Medical Decision Making)  Sasha Hand is a 18 year old female presents to the ED complaining of herpes.  She was diagnosed with this over a year ago and she says this is her third outbreak.  She just stopped taking daily prophylaxis when this popped up again.  She started taking the daily 500 mg tablets a couple days ago but this has not helped.  No fever, no discharge, otherwise at her baseline.  Exam today consistent with herpetic outbreak.  Patient was given lidocaine ointment here in the ED which completely resolved her symptoms for a short while.  I explained to her that she can use this cream every few hours or so but sparingly.  Encouraged her to continue use of Tylenol or ibuprofen as well and to wear loose fitting clothing, and practice proper hygiene of the area.  She is not on the proper dose of Valtrex to treat acute herpes outbreak.  I did send her a prescription of the 1000 mg daily dose for 5 days.  I encouraged her to follow-up with her PCP in the clinic for further management.  She may benefit from  being on prophylaxis long-term since shortly after discontinuing she had another outbreak.  She can return to the ED for any concerns.  Patient in agreement with plan and discharged home.     I have reviewed the nursing notes.    I have reviewed the findings, diagnosis, plan and need for follow up with the patient.    Discharge Medication List as of 5/9/2018 11:28 PM          Final diagnoses:   Herpes simplex vulvovaginitis     Note: Chart documentation done in part with Dragon Voice Recognition software. Although reviewed after completion, some word and grammatical errors may remain.     5/9/2018   [facility] EMERGENCY DEPARTMENT     Leti Fox PA-C  05/10/18 0014
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
## 2018-05-10 NOTE — DISCHARGE INSTRUCTIONS
Genital Herpes    Genital herpes is a common sexually transmitted infection (STI). It is caused by the herpes simplex virus (HSV). One out of 5 teens and adults carry the herpes virus. During an outbreak, it causes small blisters that break open, leaving small, painful sores in the genital area. Eventually, scabs form and the sores heal. In women, these show up most often on the skin just outside the vaginal opening. They can occur on the buttocks, anus, or cervix. In men, the sores are usually on the tip, sides, or base of the penis. They also occur on the scrotum, buttocks, or thighs.  The first outbreak begins within 2 to 3 weeks after exposure to an infected sexual partner. It may last 1 to 3 weeks. It may cause headache, muscle ache, and fevers. The first outbreak is usually the worst. Because the virus remains in the body even after the sores heal, most people will have more outbreaks. The frequency of outbreaks is different for each person. Some people will never have another outbreak. Others will have several episodes a year. Later outbreaks are usually shorter, milder, and less painful. For many, the number of outbreaks tends to decrease over time. Various factors may trigger an outbreak. These include:    Emotional stress    Menstruation    Presence of another illness (cold, flu, or fever from any cause)    Overexertion and fatigue    Weak immune system  Home care    It is very important that you do not have sexual relations until all the herpes sores have healed completely.    Wash the affected area gently with mild soap and water. Wash your hands after touching the affected area.    You may use over-the-counter pain medicine unless another pain medicine was prescribed. If you have chronic liver or kidney disease or have ever had a stomach ulcer or gastrointestinal bleeding, talk with your healthcare provider before using these medicines. Also talk to your provider if you are taking medicine to prevent  blood clots. Aspirin should never be given to anyone younger than 18 years of age who is ill with a viral infection or fever. It may cause severe liver or brain damage.    Your healthcare provider may prescribe antiviral medicine during the first outbreak. This will help the sores heal faster. Antiviral medicine may also be prescribed so that you have it ready to take at the first sign of another outbreak. This will help the symptoms go away sooner. For people with frequent outbreaks, daily therapy may be prescribed. This will help reduce the frequency of attacks. Daily therapy may also reduce risk of spread of herpes to your sexual partner. Discuss the risks and benefits of daily therapy with your healthcare provider.    If you are a woman who is pregnant now or may become pregnant in the future, let your healthcare provider know that you have had herpes. This may affect the way your baby is delivered.  Preventing spread to others  The virus is spread by sexual contact with someone who has the herpes virus. The risk of spread is highest when the sores are present. However, there is a chance of spreading the virus even when sores are not visible. Inform future sexual partners that you have herpes and that they may become infected. To reduce the risk of passing the virus to a partner who has never had herpes, avoid sexual relations at the first sign of an outbreak and until the sores are fully healed. Latex barriers, such as condoms, reduce the risk of spread between outbreaks if the infected site is covered, but they do not guarantee protection.  Follow-up care  Follow up with your healthcare provider, or as advised.  People who have just learned that they have herpes may feel upset. Getting the facts about herpes can help you feel more in control. Follow up with your healthcare provider or the public health department for complete STI screening, including HIV testing.  When to seek medical advice  Call your  healthcare provider right away if any of these occur:    Inability to urinate due to pain    Swelling or increasing redness in the genital area    Unusual drowsiness, weakness, or confusion    Headache or stiff neck    Discharge from the vagina or penis    Increasing back or abdominal pain    Rash or joint pain

## 2018-05-11 ENCOUNTER — TELEPHONE (OUTPATIENT)
Dept: FAMILY MEDICINE | Facility: CLINIC | Age: 18
End: 2018-05-11

## 2018-05-11 NOTE — TELEPHONE ENCOUNTER
: 2000  PHONE #'s: [phone] (home)     PRESENTING PROBLEM: I was seen in the ER last night to see if I was having agential  Herpes Outbreak?  I was Dx over a year ago, but haven;t had flare up.   My boyfriend just got tested for Chlamydia and was positive. Now I am concerned about having Chlamydia , too, but I have to work tonight until 8 PM. Can I get something or do I need to get tested?   NURSING ASSESSMENT  Description:   as above  Onset/duration:  Just found out today her boyfriend has CHlamydia.  Precip. factors:  She is wondering if she, Too, has it?  Assoc. Sx:   Has current outbreak of genital herpes.  Improves/worsens Sx:  same  Pain scale (1-10)   NA  Sx specific meds:  VALTREX  LMP/preg/breast feeding:  NA  Last exam/Tx:  Last night in ER. NO STD testing done per pt report.     RECOMMENDED DISPOSITION:  Home care advice - she needs to be tested for Chlamydia. RN will set up alicia with Dr. Medley for Monday, 18.  Will comply with recommendation: YES   If further questions/concerns or if Sx do not improve, worsen or new Sx develop, call your PCP or [facility] as soon as possible.    NOTES:  Disposition was determined by the first positive assessment question, therefore all previous assessment questions were negative.  Informed to check provider manual or call insurance company to assure coverage.    Guideline used: Sexually Transmitted Disease  Telephone Triage Protocols for Nurses, Fifth Edition, Ana Paula Purdy RN

## 2018-05-14 ENCOUNTER — OFFICE VISIT (OUTPATIENT)
Dept: FAMILY MEDICINE | Facility: CLINIC | Age: 18
End: 2018-05-14
Payer: COMMERCIAL

## 2018-05-14 VITALS
DIASTOLIC BLOOD PRESSURE: 82 MMHG | HEART RATE: 89 BPM | RESPIRATION RATE: 20 BRPM | BODY MASS INDEX: 43.7 KG/M2 | OXYGEN SATURATION: 99 % | WEIGHT: 279 LBS | TEMPERATURE: 97.9 F | SYSTOLIC BLOOD PRESSURE: 128 MMHG

## 2018-05-14 DIAGNOSIS — A60.00 GENITAL HERPES SIMPLEX, UNSPECIFIED SITE: ICD-10-CM

## 2018-05-14 DIAGNOSIS — Z11.3 SCREEN FOR STD (SEXUALLY TRANSMITTED DISEASE): ICD-10-CM

## 2018-05-14 DIAGNOSIS — Z20.2 EXPOSURE TO CHLAMYDIA: ICD-10-CM

## 2018-05-14 PROCEDURE — 99214 OFFICE O/P EST MOD 30 MIN: CPT | Performed by: FAMILY MEDICINE

## 2018-05-14 PROCEDURE — 36415 COLL VENOUS BLD VENIPUNCTURE: CPT | Performed by: FAMILY MEDICINE

## 2018-05-14 PROCEDURE — 87591 N.GONORRHOEAE DNA AMP PROB: CPT | Performed by: FAMILY MEDICINE

## 2018-05-14 PROCEDURE — 86696 HERPES SIMPLEX TYPE 2 TEST: CPT | Performed by: FAMILY MEDICINE

## 2018-05-14 PROCEDURE — 86780 TREPONEMA PALLIDUM: CPT | Performed by: FAMILY MEDICINE

## 2018-05-14 PROCEDURE — 87389 HIV-1 AG W/HIV-1&-2 AB AG IA: CPT | Performed by: FAMILY MEDICINE

## 2018-05-14 PROCEDURE — 87491 CHLMYD TRACH DNA AMP PROBE: CPT | Performed by: FAMILY MEDICINE

## 2018-05-14 PROCEDURE — 86695 HERPES SIMPLEX TYPE 1 TEST: CPT | Performed by: FAMILY MEDICINE

## 2018-05-14 RX ORDER — VALACYCLOVIR HYDROCHLORIDE 500 MG/1
500 TABLET, FILM COATED ORAL DAILY
Qty: 90 TABLET | Refills: 3 | Status: SHIPPED | OUTPATIENT
Start: 2018-05-14 | End: 2019-01-15

## 2018-05-14 RX ORDER — AZITHROMYCIN 500 MG/1
1000 TABLET, FILM COATED ORAL ONCE
Qty: 2 TABLET | Refills: 0 | Status: SHIPPED | OUTPATIENT
Start: 2018-05-14 | End: 2018-05-14

## 2018-05-14 ASSESSMENT — PAIN SCALES - GENERAL: PAINLEVEL: NO PAIN (0)

## 2018-05-14 NOTE — LETTER
[address]   [address]  Tel. [phone]/ Fax [phone]    August 29, 2018        Sasha I Peace  [address] [address] [place] [id number]          Dear Ms. Sasha Hand:    Your previous orders for lab work have been extended.  Please call to schedule a lab appointment and return to the clinic to have the labs drawn at your earliest convenience.    If you are required to be fasting for these tests,  remember to have nothing by mouth (except water) after midnight on the night before your test.    If you have any questions or concerns, please contact our office.    Sincerely,       Oscar Medley M.D.

## 2018-05-14 NOTE — MR AVS SNAPSHOT
After Visit Summary   5/14/2018    Sasha Hand    MRN: [id number]           Patient Information     Date Of Birth          2000        Visit Information        Provider Department      5/14/2018 4:00 PM Oscar Medley MD [facility]        Today's Diagnoses     Exposure to chlamydia    -  1    Genital herpes simplex, unspecified site           Follow-ups after your visit        Who to contact     If you have questions or need follow up information about today's clinic visit or your schedule please contact [facility] directly at [phone].  Normal or non-critical lab and imaging results will be communicated to you by [employer], letter or phone within 4 business days after the clinic has received the results. If you do not hear from us within 7 days, please contact the clinic through [employer] or phone. If you have a critical or abnormal lab result, we will notify you by phone as soon as possible.  Submit refill requests through Triage or call your pharmacy and they will forward the refill request to us. Please allow 3 business days for your refill to be completed.          Additional Information About Your Visit        MyChart Information     Triage gives you secure access to your electronic health record. If you see a primary care provider, you can also send messages to your care team and make appointments. If you have questions, please call your primary care clinic.  If you do not have a primary care provider, please call [phone] and they will assist you.        Care EveryWhere ID     This is your Care EveryWhere ID. This could be used by other organizations to access your [facility] medical records  [id number]        Your Vitals Were     Pulse Temperature Respirations Pulse Oximetry BMI (Body Mass Index)       89 97.9  F (36.6  C) (Temporal) 20 99% 43.7 kg/m2        Blood Pressure from Last 3 Encounters:   05/14/18 128/82   05/09/18 134/82   04/05/18  118/66    Weight from Last 3 Encounters:   05/14/18 279 lb (126.6 kg)   04/05/18 277 lb (125.6 kg) (>99 %)*   04/03/18 273 lb (123.8 kg) (>99 %)*     * Growth percentiles are based on [facility] 2-20 Years data.              We Performed the Following     CHLAMYDIA TRACHOMATIS PCR     NEISSERIA GONORRHOEA PCR          Today's Medication Changes          These changes are accurate as of 5/14/18  4:14 PM.  If you have any questions, ask your nurse or doctor.               These medicines have changed or have updated prescriptions.        Dose/Directions    valACYclovir 500 MG tablet   Commonly known as:  VALTREX   This may have changed:  how much to take   Used for:  Genital herpes simplex, unspecified site   Changed by:  Oscar Medley MD        Dose:  500 mg   Take 1 tablet (500 mg) by mouth daily   Quantity:  90 tablet   Refills:  3            Where to get your medicines      These medications were sent to [address] [address]     Phone:  [phone]     valACYclovir 500 MG tablet                Primary Care Provider Office Phone # Fax #    Oscar Medley [phone]       [address] [place] [address]        Equal Access to Services     MILAD HANSON AH: Janeth hassan hadasho Soomaali, waaxda luqadaha, qaybta kaalmada adeegyada, waxay bostonin hayjaydenn philipp metz. So [facility] [phone].    ATENCIÓN: Si habla español, tiene a de la rosa disposición servicios gratuitos de asistencia lingüística. Llame al [phone].    We comply with applicable federal civil rights laws and [place] laws. We do not discriminate on the basis of race, color, national origin, age, disability, sex, sexual orientation, or gender identity.            Thank you!     Thank you for choosing [facility]  for your care. Our goal is always to provide you with excellent care. Hearing back from our patients is one way we can  continue to improve our services. Please take a few minutes to complete the written survey that you may receive in the mail after your visit with us. Thank you!             Your Updated Medication List - Protect others around you: Learn how to safely use, store and throw away your medicines at www.disposemymeds.org.          This list is accurate as of 5/14/18  4:14 PM.  Always use your most recent med list.                   Brand Name Dispense Instructions for use Diagnosis    acetaminophen 325 MG tablet    TYLENOL    100 tablet    Take 1 tablet (325 mg) by mouth every 4 hours as needed for mild pain or fever        etonogestrel 68 MG Impl    IMPLANON/NEXPLANON     1 each (68 mg) by Subdermal route continuous    Encounter for initial prescription of implantable subdermal contraceptive       hydrOXYzine 25 MG capsule    VISTARIL    60 capsule    Take 1-2 capsules (25-50 mg) by mouth nightly as needed (insomnia)    Other insomnia       montelukast 10 MG tablet    SINGULAIR    90 tablet    Take 1 tablet (10 mg) by mouth At Bedtime    Mild persistent asthma without complication       ondansetron 4 MG ODT tab    ZOFRAN-ODT    40 tablet    Take 1 tablet (4 mg) by mouth every 6 hours as needed for nausea or vomiting    Nausea       valACYclovir 500 MG tablet    VALTREX    90 tablet    Take 1 tablet (500 mg) by mouth daily    Genital herpes simplex, unspecified site

## 2018-05-14 NOTE — PROGRESS NOTES
SUBJECTIVE:   Sasha Hand is a 18 year old female who presents to clinic today for the following health issues:      Possible Chlamydia partner was tested and was positive. States noticed open sores (could be from herpes states), pelvic pain, discharge.  She has been seeing this man for about 3 weeks and he contacted her stating that he had tested positive for chlamydia.  She had a recent herpes outbreak and was seen in the emergency department and notified him of that also.  They obviously are not wearing condoms which we discussed.  The patient is agreeable to test for other STDs today including GC, chlamydia, syphilis, and HIV.  When she was seen in the emergency department they did not do a Tzanck smear or culture for HSV.  She only has one lesion that still open and her pain is improved dramatically on the increased dose of valacyclovir.  She has had genital herpes since 2014 has not had an outbreak since 2015.  She was on suppressive else Cyclovir for 1 year but stopped taking the antiviral about 4-6 months ago.  This was her first outbreak since.    PROBLEMS TO ADD ON...  As noted above    Problem list and histories reviewed & adjusted, as indicated.  Additional history: as documented    Patient Active Problem List   Diagnosis     Obesity     Tear of lateral cartilage or meniscus of knee, current     Menorrhagia     Dysmenorrhea     Wheezing     Tobacco use disorder     Genital herpes simplex, unspecified site     Mild persistent asthma without complication     Nexplanon placed 11/14/17     DELIA (generalized anxiety disorder)     Major depressive disorder, single episode, moderate (H)     Acute pyelonephritis     Pyelonephritis     Decreased oral intake     Depression     Closed head injury, subsequent encounter     Past Surgical History:   Procedure Laterality Date     ARTHROSCOPY KNEE WITH MENISCAL REPAIR Right 5/10/2017    Procedure: ARTHROSCOPY KNEE WITH MENISCAL REPAIR;  arthroscopy right knee with  lateral meniscus repair;  Surgeon: Nathaniel Hicks MD;  Location: [facility] OR     NO HISTORY OF SURGERY         Social History   Substance Use Topics     Smoking status: Current Every Day Smoker     Packs/day: 1.00     Types: Cigarettes     Smokeless tobacco: Never Used     Alcohol use No     Family History   Problem Relation Age of Onset     Asthma Mother      Hypertension Mother      Asthma Father      DIABETES Father      CANCER Paternal Grandmother          Allergies   Allergen Reactions     No Known Drug Allergies      Seasonal Allergies      BP Readings from Last 3 Encounters:   05/14/18 128/82   05/09/18 134/82   04/05/18 118/66    Wt Readings from Last 3 Encounters:   05/14/18 279 lb (126.6 kg)   04/05/18 277 lb (125.6 kg) (>99 %)*   04/03/18 273 lb (123.8 kg) (>99 %)*     * Growth percentiles are based on [facility] 2-20 Years data.                    Reviewed and updated as needed this visit by clinical staff  Tobacco  Allergies  Meds  Med Hx  Surg Hx  Fam Hx  Soc Hx      Reviewed and updated as needed this visit by Provider         ROS:  Constitutional, HEENT, cardiovascular, pulmonary, gi and gu systems are negative, except as otherwise noted.    OBJECTIVE:     /82  Pulse 89  Temp 97.9  F (36.6  C) (Temporal)  Resp 20  Wt 279 lb (126.6 kg)  SpO2 99%  BMI 43.7 kg/m2  Body mass index is 43.7 kg/(m^2).  GENERAL: healthy, alert and no distress  We did not do any genital exams today.  She does have an recently in the hospital so I did not think it would give us more information since she is on antiviral right now.  She does agree to do blood draw for this screening noted above and to check for HSV titers.  We will do a urine GC chlamydia test today.    Diagnostic Test Results:  No results found for this or any previous visit (from the past 24 hour(s)).    ASSESSMENT/PLAN:   (A60.00) Genital herpes simplex, unspecified site  Comment: 46 months off suppressive therapy now with another outbreak of  "genital herpes.  Plan: valACYclovir (VALTREX) 500 MG tablet, Herpes         Simplex Virus 1 and 2 IgG        She is on treatment now for genital herpes and will go on suppressive therapy once she is completed that course.  She will go back to the 500 mg p.o. daily    (Z20.2) Exposure to chlamydia  Comment: We will screen with a urine PCR today  Plan: NEISSERIA GONORRHOEA PCR, CHLAMYDIA TRACHOMATIS        PCR        I am going to empirically treat her with Zithromax one-time dose.  We should rescreen her in 3 months.    (Z11.3) Screen for STD (sexually transmitted disease)  Comment: Patient is having unprotected sex and was exposed to chlamydia.  Plan: Treponema Abs w Reflex to RPR and Titer, Herpes        Simplex Virus 1 and 2 IgG, HIV Antigen Antibody        Combo        She is agreeable to testing for other infections today.       BP Screening:   Last 3 BP Readings:    BP Readings from Last 3 Encounters:   05/14/18 128/82   05/09/18 134/82   04/05/18 118/66       The following was recommended to the patient:  Re-screen BP within a year and recommended lifestyle modifications  Tobacco Cessation:   reports that she has been smoking Cigarettes.  She has been smoking about 1.00 pack per day. She has never used smokeless tobacco.  Tobacco Cessation Action Plan: Information offered: Patient not interested at this time    BMI:   Estimated body mass index is 43.7 kg/(m^2) as calculated from the following:    Height as of 4/5/18: 5' 7\" (1.702 m).    Weight as of this encounter: 279 lb (126.6 kg).   Weight management plan: Not addressed today.     Electronically signed by:  Oscar Medley M.D.  5/14/2018    "

## 2018-05-15 LAB
C TRACH DNA SPEC QL NAA+PROBE: NEGATIVE
HSV1 IGG SERPL QL IA: <0.2 AI (ref 0–0.8)
HSV2 IGG SERPL QL IA: <0.2 AI (ref 0–0.8)
N GONORRHOEA DNA SPEC QL NAA+PROBE: NEGATIVE
SPECIMEN SOURCE: NORMAL
SPECIMEN SOURCE: NORMAL
T PALLIDUM AB SER QL: NONREACTIVE

## 2018-05-16 LAB — HIV 1+2 AB+HIV1 P24 AG SERPL QL IA: NONREACTIVE

## 2018-05-31 ENCOUNTER — APPOINTMENT (OUTPATIENT)
Dept: CT IMAGING | Facility: CLINIC | Age: 18
End: 2018-05-31
Attending: NURSE PRACTITIONER
Payer: COMMERCIAL

## 2018-05-31 ENCOUNTER — TELEPHONE (OUTPATIENT)
Dept: FAMILY MEDICINE | Facility: CLINIC | Age: 18
End: 2018-05-31

## 2018-05-31 ENCOUNTER — HOSPITAL ENCOUNTER (EMERGENCY)
Facility: CLINIC | Age: 18
Discharge: HOME | End: 2018-06-01
Attending: NURSE PRACTITIONER | Admitting: NURSE PRACTITIONER
Payer: COMMERCIAL

## 2018-05-31 DIAGNOSIS — R11.0 NAUSEA: ICD-10-CM

## 2018-05-31 DIAGNOSIS — R10.9 ABDOMINAL PAIN, UNSPECIFIED ABDOMINAL LOCATION: ICD-10-CM

## 2018-05-31 LAB
ALBUMIN SERPL-MCNC: 3.8 G/DL (ref 3.4–5)
ALBUMIN UR-MCNC: NEGATIVE MG/DL
ALP SERPL-CCNC: 83 U/L (ref 40–150)
ALT SERPL W P-5'-P-CCNC: 49 U/L (ref 0–50)
ANION GAP SERPL CALCULATED.3IONS-SCNC: 9 MMOL/L (ref 3–14)
APPEARANCE UR: CLEAR
AST SERPL W P-5'-P-CCNC: 20 U/L (ref 0–45)
BACTERIA #/AREA URNS HPF: ABNORMAL /HPF
BASOPHILS # BLD AUTO: 0 10E9/L (ref 0–0.2)
BASOPHILS NFR BLD AUTO: 0.2 %
BILIRUB SERPL-MCNC: 0.3 MG/DL (ref 0.2–1.3)
BILIRUB UR QL STRIP: NEGATIVE
BUN SERPL-MCNC: 8 MG/DL (ref 7–30)
CALCIUM SERPL-MCNC: 8.8 MG/DL (ref 8.5–10.1)
CHLORIDE SERPL-SCNC: 111 MMOL/L (ref 94–109)
CO2 SERPL-SCNC: 23 MMOL/L (ref 20–32)
COLOR UR AUTO: YELLOW
CREAT SERPL-MCNC: 0.63 MG/DL (ref 0.52–1.04)
CRP SERPL-MCNC: <2.9 MG/L (ref 0–8)
DIFFERENTIAL METHOD BLD: NORMAL
EOSINOPHIL # BLD AUTO: 0.3 10E9/L (ref 0–0.7)
EOSINOPHIL NFR BLD AUTO: 3.2 %
ERYTHROCYTE [DISTWIDTH] IN BLOOD BY AUTOMATED COUNT: 13.1 % (ref 10–15)
GFR SERPL CREATININE-BSD FRML MDRD: >90 ML/MIN/1.7M2
GLUCOSE SERPL-MCNC: 85 MG/DL (ref 70–99)
GLUCOSE UR STRIP-MCNC: NEGATIVE MG/DL
HCG UR QL: NEGATIVE
HCT VFR BLD AUTO: 41.7 % (ref 35–47)
HGB BLD-MCNC: 14.4 G/DL (ref 11.7–15.7)
HGB UR QL STRIP: NEGATIVE
KETONES UR STRIP-MCNC: NEGATIVE MG/DL
LEUKOCYTE ESTERASE UR QL STRIP: NEGATIVE
LIPASE SERPL-CCNC: 86 U/L (ref 73–393)
LYMPHOCYTES # BLD AUTO: 2.6 10E9/L (ref 0.8–5.3)
LYMPHOCYTES NFR BLD AUTO: 31.1 %
MCH RBC QN AUTO: 30 PG (ref 26.5–33)
MCHC RBC AUTO-ENTMCNC: 34.5 G/DL (ref 31.5–36.5)
MCV RBC AUTO: 87 FL (ref 78–100)
MONOCYTES # BLD AUTO: 0.8 10E9/L (ref 0–1.3)
MONOCYTES NFR BLD AUTO: 9.1 %
MUCOUS THREADS #/AREA URNS LPF: PRESENT /LPF
NEUTROPHILS # BLD AUTO: 4.7 10E9/L (ref 1.6–8.3)
NEUTROPHILS NFR BLD AUTO: 56.4 %
NITRATE UR QL: NEGATIVE
PH UR STRIP: 6 PH (ref 5–7)
PLATELET # BLD AUTO: 268 10E9/L (ref 150–450)
POTASSIUM SERPL-SCNC: 3.7 MMOL/L (ref 3.4–5.3)
PROT SERPL-MCNC: 7.2 G/DL (ref 6.8–8.8)
RBC # BLD AUTO: 4.8 10E12/L (ref 3.8–5.2)
RBC #/AREA URNS AUTO: <1 /HPF (ref 0–2)
SODIUM SERPL-SCNC: 143 MMOL/L (ref 133–144)
SOURCE: ABNORMAL
SP GR UR STRIP: 1.01 (ref 1–1.03)
SQUAMOUS #/AREA URNS AUTO: <1 /HPF (ref 0–1)
UROBILINOGEN UR STRIP-MCNC: 0 MG/DL (ref 0–2)
WBC # BLD AUTO: 8.4 10E9/L (ref 4–11)
WBC #/AREA URNS AUTO: 1 /HPF (ref 0–5)

## 2018-05-31 PROCEDURE — 96361 HYDRATE IV INFUSION ADD-ON: CPT | Performed by: NURSE PRACTITIONER

## 2018-05-31 PROCEDURE — 86140 C-REACTIVE PROTEIN: CPT | Performed by: NURSE PRACTITIONER

## 2018-05-31 PROCEDURE — 85025 COMPLETE CBC W/AUTO DIFF WBC: CPT | Performed by: NURSE PRACTITIONER

## 2018-05-31 PROCEDURE — 74177 CT ABD & PELVIS W/CONTRAST: CPT

## 2018-05-31 PROCEDURE — 80053 COMPREHEN METABOLIC PANEL: CPT | Performed by: NURSE PRACTITIONER

## 2018-05-31 PROCEDURE — 96374 THER/PROPH/DIAG INJ IV PUSH: CPT | Performed by: NURSE PRACTITIONER

## 2018-05-31 PROCEDURE — 25000125 ZZHC RX 250: Performed by: NURSE PRACTITIONER

## 2018-05-31 PROCEDURE — 81001 URINALYSIS AUTO W/SCOPE: CPT | Performed by: NURSE PRACTITIONER

## 2018-05-31 PROCEDURE — 25000128 H RX IP 250 OP 636: Performed by: NURSE PRACTITIONER

## 2018-05-31 PROCEDURE — 99285 EMERGENCY DEPT VISIT HI MDM: CPT | Mod: 25 | Performed by: NURSE PRACTITIONER

## 2018-05-31 PROCEDURE — 81025 URINE PREGNANCY TEST: CPT | Performed by: NURSE PRACTITIONER

## 2018-05-31 PROCEDURE — 99285 EMERGENCY DEPT VISIT HI MDM: CPT | Mod: Z6 | Performed by: NURSE PRACTITIONER

## 2018-05-31 PROCEDURE — 96375 TX/PRO/DX INJ NEW DRUG ADDON: CPT | Performed by: NURSE PRACTITIONER

## 2018-05-31 PROCEDURE — 83690 ASSAY OF LIPASE: CPT | Performed by: NURSE PRACTITIONER

## 2018-05-31 RX ORDER — KETOROLAC TROMETHAMINE 30 MG/ML
30 INJECTION, SOLUTION INTRAMUSCULAR; INTRAVENOUS ONCE
Status: COMPLETED | OUTPATIENT
Start: 2018-05-31 | End: 2018-05-31

## 2018-05-31 RX ORDER — LORAZEPAM 2 MG/ML
1 INJECTION INTRAMUSCULAR ONCE
Status: COMPLETED | OUTPATIENT
Start: 2018-05-31 | End: 2018-05-31

## 2018-05-31 RX ORDER — IOPAMIDOL 755 MG/ML
100 INJECTION, SOLUTION INTRAVASCULAR ONCE
Status: COMPLETED | OUTPATIENT
Start: 2018-05-31 | End: 2018-05-31

## 2018-05-31 RX ORDER — ONDANSETRON 2 MG/ML
4 INJECTION INTRAMUSCULAR; INTRAVENOUS EVERY 30 MIN PRN
Status: DISCONTINUED | OUTPATIENT
Start: 2018-05-31 | End: 2018-06-01 | Stop reason: HOSPADM

## 2018-05-31 RX ADMIN — LORAZEPAM 1 MG: 2 INJECTION INTRAMUSCULAR; INTRAVENOUS at 23:10

## 2018-05-31 RX ADMIN — SODIUM CHLORIDE 1000 ML: 9 INJECTION, SOLUTION INTRAVENOUS at 22:45

## 2018-05-31 RX ADMIN — ONDANSETRON 4 MG: 2 INJECTION INTRAMUSCULAR; INTRAVENOUS at 22:45

## 2018-05-31 RX ADMIN — IOPAMIDOL 99 ML: 755 INJECTION, SOLUTION INTRAVENOUS at 23:42

## 2018-05-31 RX ADMIN — KETOROLAC TROMETHAMINE 30 MG: 30 INJECTION, SOLUTION INTRAMUSCULAR at 22:45

## 2018-05-31 RX ADMIN — SODIUM CHLORIDE 60 ML: 9 INJECTION, SOLUTION INTRAVENOUS at 23:41

## 2018-05-31 ASSESSMENT — ENCOUNTER SYMPTOMS
BACK PAIN: 1
NAUSEA: 1
ABDOMINAL PAIN: 1
ACTIVITY CHANGE: 1

## 2018-05-31 NOTE — ED AVS SNAPSHOT
[facility] Emergency Department    [address]     [place] [address]    Phone:  [phone]    Fax:  [phone]                                       Sasha Hand   MRN: [id number]    Department:  [facility] Emergency Department   Date of Visit:  5/31/2018           Patient Information     Date Of Birth          2000        Your diagnoses for this visit were:     Abdominal pain, unspecified abdominal location lower left and right quadrants    Nausea        You were seen by Brittny Walsh, SAVI VEGA.      Follow-up Information     Follow up with Oscar Medley MD In 1 week.    Specialty:  Family Practice    Why:  As needed    Contact information:    [address]   [place] [address] [phone]          Follow up with [facility] Emergency Department.    Specialty:  EMERGENCY MEDICINE    Why:  If symptoms worsen    Contact information:    [address]  [place] [address] [phone]    Additional information:    From [address]: Exit at [address] on south side of [place]. Turn right on [address]. Turn left at stoplight on [address]. [facility] will be in view two blocks ahead        Discharge Instructions         *Abdominal Pain, Unknown Cause (Female)    The exact cause of your abdominal (stomach) pain is not certain. This does not mean that this is something to worry about, or the right tests were not done. Everyone likes to know the exact cause of the problem, but sometimes with abdominal pain, there is no clear-cut cause, and this could be a good thing. The good news is that your symptoms can be treated, and you will feel better.   Your condition does not seem serious now; however, sometimes the signs of a serious problem may take more time to appear. For this reason, it is important for you to watch for any new symptoms, problems, or worsening of your condition.  Over the next few days, the abdominal pain  may come and go, or be continuous. Other common symptoms can include nausea and vomiting. Sometimes it can be difficult to tell if you feel nauseous, you may just feel bad and not associate that feeling with nausea. Constipation, diarrhea, and a fever may go along with the pain.  The pain may continue even if treated correctly over the following days. Depending on how things go, sometimes the cause can become clear and may require further or different treatment. Additional evaluations, medications, or tests may be needed.  Home care  Your health care provider may prescribe medications for pain, symptoms, or an infection.  Follow the health care provider's instructions for taking these medications.  General care    Rest until your next exam. No strenuous activities.    Try to find positions that ease discomfort. A small pillow placed on the abdomen may help relieve pain.    Something warm on your abdomen (such as a heating pad) may help, but be careful not to burn yourself.  Diet    Do not force yourself to eat, especially if having cramps, vomiting, or diarrhea.    Water is important so you do not get dehydrated. Soup may also be good. Sports drinks may also help, especially if they are not too acidic. Make sure you don't drink sugary drinks as this can make things worse. Take liquids in small amounts. Do not guzzle them.    Caffeine sometimes makes the pain and cramping worse.    Avoid dairy products if you have vomiting or diarrhea.    Don't eat large amounts at a time. Wait a few minutes between bites.    Eat a diet low in fiber (called a low-residue diet). Foods allowed include refined breads, white rice, fruit and vegetable juices without pulp, tender meats. These foods will pass more easily through the intestine.    Avoid fried or fatty foods, dairy, alcohol and spicy foods until your symptoms go away.  Follow-up care  Follow up with your health care provider as instructed, or if your pain does not begin to  improve in the next 24 hours.  When to seek medical care  Seek prompt medical care if any of the following occur:    Pain gets worse or moves to the right lower abdomen    New or worsening vomiting or diarrhea    Swelling of the abdomen    Unable to pass stool for more than three days    New fever over 101  F (38.3 C), or rising fever    Blood in vomit or bowel movements (dark red or black color)    Jaundice (yellow color of eyes and skin)    Weakness, dizziness    Chest, arm, back, neck or jaw pain    Unexpected vaginal bleeding or missed period  Call 911  Call emergency services if any of the following occur:    Trouble breathing    Confusion    Fainting or loss of consciousness    Rapid heart rate    Seizure    [id number] [employer], [address] [place] [address], [place] [address]. All rights reserved. This information is not intended as a substitute for professional medical care. Always follow your healthcare professional's instructions.          24 Hour Appointment Hotline       To make an appointment at any [facility], call [phone] ([phone]). If you don't have a family doctor or clinic, we will help you find one. [facility] clinics are conveniently located to serve the needs of you and your family.             Review of your medicines      CONTINUE these medicines which may have CHANGED, or have new prescriptions. If we are uncertain of the size of tablets/capsules you have at home, strength may be listed as something that might have changed.        Dose / Directions Last dose taken    ondansetron 4 MG ODT tab   Commonly known as:  ZOFRAN ODT   Dose:  4 mg   What changed:    - when to take this  - reasons to take this   Quantity:  10 tablet        Take 1 tablet (4 mg) by mouth every 8 hours as needed for nausea   Refills:  0          Our records show that you are taking the medicines listed below. If these are incorrect, please call your family doctor or clinic.        Dose / Directions Last  dose taken    acetaminophen 325 MG tablet   Commonly known as:  TYLENOL   Dose:  325 mg   Quantity:  100 tablet        Take 1 tablet (325 mg) by mouth every 4 hours as needed for mild pain or fever   Refills:  0        etonogestrel 68 MG Impl   Commonly known as:  IMPLANON/NEXPLANON   Dose:  1 each        1 each (68 mg) by Subdermal route continuous   Refills:  0        hydrOXYzine 25 MG capsule   Commonly known as:  VISTARIL   Dose:  25-50 mg   Quantity:  60 capsule        Take 1-2 capsules (25-50 mg) by mouth nightly as needed (insomnia)   Refills:  3        montelukast 10 MG tablet   Commonly known as:  SINGULAIR   Dose:  10 mg   Quantity:  90 tablet        Take 1 tablet (10 mg) by mouth At Bedtime   Refills:  3        valACYclovir 500 MG tablet   Commonly known as:  VALTREX   Dose:  500 mg   Quantity:  90 tablet        Take 1 tablet (500 mg) by mouth daily   Refills:  3                Prescriptions were sent or printed at these locations (1 Prescription)                   [facility] - [address] [address]    Telephone:  [phone]   Fax:  [phone]   Hours:                  E-Prescribed (1 of 1)         ondansetron (ZOFRAN ODT) 4 MG ODT tab                Procedures and tests performed during your visit     CBC with platelets differential    CRP inflammation    CT Abdomen Pelvis w Contrast    Comprehensive metabolic panel    HCG qualitative urine    Lipase    UA with Microscopic      Orders Needing Specimen Collection     None      Pending Results     Date and Time Order Name Status Description    5/31/2018 2307 CT Abdomen Pelvis w Contrast Preliminary             Pending Culture Results     No orders found for last 3 day(s).            Pending Results Instructions     If you had any lab results that were not finalized at the time of your Discharge, you can call the ED Lab Result RN at [phone]. You will be contacted by this  team for any positive Lab results or changes in treatment. The nurses are available 7 days a week from 10A to 6:30P.  You can leave a message 24 hours per day and they will return your call.        Thank you for choosing [facility]       Thank you for choosing [facility] for your care. Our goal is always to provide you with excellent care. Hearing back from our patients is one way we can continue to improve our services. Please take a few minutes to complete the written survey that you may receive in the mail after you visit with us. Thank you!        [employer] Information     [employer] gives you secure access to your electronic health record. If you see a primary care provider, you can also send messages to your care team and make appointments. If you have questions, please call your primary care clinic.  If you do not have a primary care provider, please call [phone] and they will assist you.        Care EveryWhere ID     This is your Care EveryWhere ID. This could be used by other organizations to access your [facility] medical records  [id number]        Equal Access to Services     MILAD HANSON : Hadbetty Glasgow, wajosephda dania, qaybta kaalmaharris chu, lester castillo . So [facility] [phone].    ATENCIÓN: Si habla español, tiene a de la roas disposición servicios gratuitos de asistencia lingüística. Llame al [phone].    We comply with applicable federal civil rights laws and [place] laws. We do not discriminate on the basis of race, color, national origin, age, disability, sex, sexual orientation, or gender identity.            After Visit Summary       This is your record. Keep this with you and show to your community pharmacist(s) and doctor(s) at your next visit.

## 2018-05-31 NOTE — TELEPHONE ENCOUNTER
": 2000  PHONE #'s: [phone] (home)     PRESENTING PROBLEM:  Is experiencing really bad low back pain Since yesterday Almost feels like belt wrapping around my belling in the back. See is calling from work tonight. She works in [place], which is 45 minutes away from [place], where she lives.  \" If I pass gas one last night, I noted green mucus came out of my rectum. Is that normal. ONly happened that once and then a little trickle of it today, when I wiped.  \"    NURSING ASSESSMENT  Description:   NO UTI Sx. NO urgency , frequency , burning.  Wondering if she has a fever. Has NOT checked.  Had sharp shooting pains last night left side of kidney area. Kept her awake. \" SEEMED Like a pulsating pain. \"   Onset/duration:  Last night. Intermittent, with levels where it gets  Worse and then it quiets down. \"   Precip. factors:   Hx of UTI. Hx of mild nephritis in 2018. She was hospitalized for 4 days for this.   Assoc. Sx:  Headache, \"pulsating\" 6/10/ started 2  Hours ago.  SHe works at a gas station until 9 PM.   Improves/worsens Sx:  Worse  Pain scale (1-10)   6/10  Sx specific meds:  NONE.   LMP/preg/breast feeding: LMP: 18 months ago   Last exam/Tx:  Has NOT been seen for this    RECOMMENDED DISPOSITION:  Home care advice - IF she has the type of pain that she cannot walk or talk through and she continues to have chills, fever > 100 4 days,  Blood in urine, difficulty moving legs, feet or toes, flank pain, nausea, body aches, then should be seen tonight after work. She works until 9 PM Other wise if better and goes away drink plenty of water to flush kidneys and drink cranberry juice,   Will comply with recommendation: YES   If further questions/concerns or if Sx do not improve, worsen or new Sx develop, call your PCP or [facility] as soon as possible.    NOTES:  Disposition was determined by the first positive assessment question, therefore all previous assessment questions were " negative.  Informed to check provider manual or call insurance company to assure coverage.    Guideline used:Back pain  Telephone Triage Protocols for Nurses, Fifth Edition, Ana Paula Purdy RN

## 2018-05-31 NOTE — ED AVS SNAPSHOT
[facility] Emergency Department    [address] [place] [address]    Phone:  [phone]    Fax:  [phone]                                       Sasha Hand   MRN: [id number]    Department:  [facility] Emergency Department   Date of Visit:  5/31/2018           After Visit Summary Signature Page     I have received my discharge instructions, and my questions have been answered. I have discussed any challenges I see with this plan with the nurse or doctor.    ..........................................................................................................................................  Patient/Patient Representative Signature      ..........................................................................................................................................  Patient Representative Print Name and Relationship to Patient    ..................................................               ................................................  Date                                            Time    ..........................................................................................................................................  Reviewed by Signature/Title    ...................................................              ..............................................  Date                                                            Time

## 2018-06-01 VITALS
DIASTOLIC BLOOD PRESSURE: 64 MMHG | OXYGEN SATURATION: 99 % | BODY MASS INDEX: 43.54 KG/M2 | TEMPERATURE: 98.7 F | HEART RATE: 70 BPM | SYSTOLIC BLOOD PRESSURE: 124 MMHG | RESPIRATION RATE: 16 BRPM | WEIGHT: 278 LBS

## 2018-06-01 RX ORDER — ONDANSETRON 4 MG/1
4 TABLET, ORALLY DISINTEGRATING ORAL EVERY 8 HOURS PRN
Qty: 10 TABLET | Refills: 0 | Status: SHIPPED | OUTPATIENT
Start: 2018-06-01 | End: 2018-06-04

## 2018-06-01 NOTE — ED PROVIDER NOTES
History     Chief Complaint   Patient presents with     Abdominal Pain     HPI  Sasha Hand is a 18 year old female who presents to the emergency department today with bilateral lower abdominal pain that radiates around to her back on both sides.  Patient reports the pain started this morning, has progressed throughout the day.  Patient has not taken anything for her symptoms.  Patient is not certain what makes her pain better or worse.  Patient reports a mucus rectal discharge today when she passed gas.  That started at noon.  Patient does endorse nausea but denies any vomiting.  Patient has been having normal stools.  Patient denies any dysuria or hematuria or frequency but reports that her symptoms are similar to when she had a kidney infection.    Problem List:    Patient Active Problem List    Diagnosis Date Noted     Closed head injury, subsequent encounter 04/05/2018     Priority: Medium     Depression 03/11/2018     Priority: Medium     Decreased oral intake 02/20/2018     Priority: Medium     Acute pyelonephritis 02/19/2018     Priority: Medium     Pyelonephritis 02/19/2018     Priority: Medium     DELIA (generalized anxiety disorder) 02/09/2018     Priority: Medium     Major depressive disorder, single episode, moderate (H) 02/09/2018     Priority: Medium     Nexplanon placed 11/14/17 11/14/2017     Priority: Medium     Genital herpes simplex, unspecified site 01/18/2017     Priority: Medium     Mild persistent asthma without complication 01/18/2017     Priority: Medium     Wheezing 05/04/2016     Priority: Medium     Tobacco use disorder 05/04/2016     Priority: Medium     Menorrhagia 01/20/2014     Priority: Medium     Dysmenorrhea 01/20/2014     Priority: Medium     Tear of lateral cartilage or meniscus of knee, current 09/09/2013     Priority: Medium     Obesity 09/14/2010     Priority: Medium        Past Medical History:    Past Medical History:   Diagnosis Date     Moderate major depression (H)  1/20/2014     Nexplanon placed 11/14/17 11/14/2017     Obesity 9/14/2010       Past Surgical History:    Past Surgical History:   Procedure Laterality Date     ARTHROSCOPY KNEE WITH MENISCAL REPAIR Right 5/10/2017    Procedure: ARTHROSCOPY KNEE WITH MENISCAL REPAIR;  arthroscopy right knee with lateral meniscus repair;  Surgeon: Nathaniel Hicks MD;  Location: [facility] OR     NO HISTORY OF SURGERY         Family History:    Family History   Problem Relation Age of Onset     Asthma Mother      Hypertension Mother      Asthma Father      DIABETES Father      CANCER Paternal Grandmother        Social History:  Marital Status:  Single [1]  Social History   Substance Use Topics     Smoking status: Current Every Day Smoker     Packs/day: 1.00     Types: Cigarettes     Smokeless tobacco: Never Used     Alcohol use No        Medications:      acetaminophen (TYLENOL) 325 MG tablet   etonogestrel (IMPLANON/NEXPLANON) 68 MG IMPL   hydrOXYzine (VISTARIL) 25 MG capsule   montelukast (SINGULAIR) 10 MG tablet   ondansetron (ZOFRAN-ODT) 4 MG ODT tab   valACYclovir (VALTREX) 500 MG tablet         Review of Systems   Constitutional: Positive for activity change.   Gastrointestinal: Positive for abdominal pain and nausea.   Musculoskeletal: Positive for back pain.   All other systems reviewed and are negative.      Physical Exam   BP: 139/89  Pulse: 94  Temp: 98.7  F (37.1  C)  Resp: 20  Weight: 126.1 kg (278 lb)  SpO2: 100 %      Physical Exam   Constitutional: She is oriented to person, place, and time. She appears well-developed and well-nourished. No distress.   HENT:   Head: Normocephalic.   Eyes: Conjunctivae are normal.   Neck: Normal range of motion.   Cardiovascular: Normal rate and intact distal pulses.    No murmur heard.  Pulmonary/Chest: Effort normal and breath sounds normal. No respiratory distress.   Abdominal: Soft. Bowel sounds are normal. She exhibits no distension and no mass. There is tenderness (Left lower  quadrant, mild right lower quadrant). There is no rebound and no guarding.   Musculoskeletal: Normal range of motion.   Neurological: She is alert and oriented to person, place, and time.   Skin: Skin is warm and dry. She is not diaphoretic.   Psychiatric: She has a normal mood and affect.       ED Course     ED Course     Procedures    Results for orders placed or performed during the hospital encounter of 05/31/18 (from the past 24 hour(s))   UA with Microscopic   Result Value Ref Range    Color Urine Yellow     Appearance Urine Clear     Glucose Urine Negative NEG^Negative mg/dL    Bilirubin Urine Negative NEG^Negative    Ketones Urine Negative NEG^Negative mg/dL    Specific Gravity Urine 1.013 1.003 - 1.035    Blood Urine Negative NEG^Negative    pH Urine 6.0 5.0 - 7.0 pH    Protein Albumin Urine Negative NEG^Negative mg/dL    Urobilinogen mg/dL 0.0 0.0 - 2.0 mg/dL    Nitrite Urine Negative NEG^Negative    Leukocyte Esterase Urine Negative NEG^Negative    Source Midstream Urine     WBC Urine 1 0 - 5 /HPF    RBC Urine <1 0 - 2 /HPF    Bacteria Urine Few (A) NEG^Negative /HPF    Squamous Epithelial /HPF Urine <1 0 - 1 /HPF    Mucous Urine Present (A) NEG^Negative /LPF   HCG qualitative urine   Result Value Ref Range    HCG Qual Urine Negative NEG^Negative   CBC with platelets differential   Result Value Ref Range    WBC 8.4 4.0 - 11.0 10e9/L    RBC Count 4.80 3.8 - 5.2 10e12/L    Hemoglobin 14.4 11.7 - 15.7 g/dL    Hematocrit 41.7 35.0 - 47.0 %    MCV 87 78 - 100 fl    MCH 30.0 26.5 - 33.0 pg    MCHC 34.5 31.5 - 36.5 g/dL    RDW 13.1 10.0 - 15.0 %    Platelet Count 268 150 - 450 10e9/L    Diff Method Automated Method     % Neutrophils 56.4 %    % Lymphocytes 31.1 %    % Monocytes 9.1 %    % Eosinophils 3.2 %    % Basophils 0.2 %    Absolute Neutrophil 4.7 1.6 - 8.3 10e9/L    Absolute Lymphocytes 2.6 0.8 - 5.3 10e9/L    Absolute Monocytes 0.8 0.0 - 1.3 10e9/L    Absolute Eosinophils 0.3 0.0 - 0.7 10e9/L    Absolute  Basophils 0.0 0.0 - 0.2 10e9/L   Comprehensive metabolic panel   Result Value Ref Range    Sodium 143 133 - 144 mmol/L    Potassium 3.7 3.4 - 5.3 mmol/L    Chloride 111 (H) 94 - 109 mmol/L    Carbon Dioxide 23 20 - 32 mmol/L    Anion Gap 9 3 - 14 mmol/L    Glucose 85 70 - 99 mg/dL    Urea Nitrogen 8 7 - 30 mg/dL    Creatinine 0.63 0.52 - 1.04 mg/dL    GFR Estimate >90 >60 mL/min/1.7m2    GFR Estimate If Black >90 >60 mL/min/1.7m2    Calcium 8.8 8.5 - 10.1 mg/dL    Bilirubin Total 0.3 0.2 - 1.3 mg/dL    Albumin 3.8 3.4 - 5.0 g/dL    Protein Total 7.2 6.8 - 8.8 g/dL    Alkaline Phosphatase 83 40 - 150 U/L    ALT 49 0 - 50 U/L    AST 20 0 - 45 U/L   Lipase   Result Value Ref Range    Lipase 86 73 - 393 U/L   CRP inflammation   Result Value Ref Range    CRP Inflammation <2.9 0.0 - 8.0 mg/L   CT Abdomen Pelvis w Contrast    Narrative    CT ABDOMEN AND PELVIS WITH CONTRAST  5/31/2018 11:56 PM     HISTORY: Lower abdominal pain.    COMPARISON: None.    TECHNIQUE: Following the uneventful administration of 99 mL Isovue-370  intravenous contrast, helical sections were acquired from the top of  the diaphragm through the pubic symphysis. Coronal reconstructions  were generated. Radiation dose for this scan was reduced using  automated exposure control, adjustment of the mA and/or kV according  to the patient's size, or iterative reconstruction technique.    FINDINGS:  Abdomen: The liver, spleen, pancreas, adrenal glands and right kidney  are unremarkable. 1 cm cyst in the upper pole of the left kidney. The  gallbladder is present. No enlarged lymph nodes or free fluid in the  upper abdomen.    Scan through the lower chest is unremarkable.    Pelvis: The small and large bowel are normal in caliber. The appendix  is unremarkable. No bowel wall thickening, pneumatosis or free  intraperitoneal gas. The uterus is present. No enlarged lymph nodes or  free fluid in the pelvis.      Impression    IMPRESSION: No cause of acute pain  identified in the abdomen or  pelvis. The appendix is unremarkable.       Medications   ondansetron (ZOFRAN) injection 4 mg (4 mg Intravenous Given 5/31/18 2245)   0.9% sodium chloride BOLUS (1,000 mLs Intravenous New Bag 5/31/18 2245)   ketorolac (TORADOL) injection 30 mg (30 mg Intravenous Given 5/31/18 2245)   LORazepam (ATIVAN) injection 1 mg (1 mg Intravenous Given 5/31/18 2310)   sodium chloride (PF) 0.9% PF flush 3 mL (3 mLs Intracatheter Given 5/31/18 2341)   iopamidol (ISOVUE-370) solution 100 mL (99 mLs Intravenous Given 5/31/18 2342)   sodium chloride 0.9 % bag 250mL for CT scan flush use (60 mLs Intravenous Given 5/31/18 2341)       Assessments & Plan (with Medical Decision Making)  Sasha is an obese 18-year-old female who presents to the emergency department today with complaints of lower abdominal pain.  Please refer to HPI and focused exam.  Patient on arrival here is hemodynamically stable, she is afebrile.  Patient's exam findings revealed tenderness more in the left lower quadrant than the right, given her nausea and report of mucus from her rectum this likely is of viral etiology, she has no lower pelvic tenderness to indicate an acute ovarian process.  Patient has very mild right lower quadrant tenderness, negative Blanco sign.  Peripheral IV was established, patient was given Toradol for pain and Zofran for nausea.  Patient appeared to be feeling better until her mom arrived when she became tearful, started gagging, and reported that her right lower quadrant pain was now significantly worse.  Prior to this, blood work returned with a unremarkable CBC, reassuring white count 8.4, CMP within normal limits, normal lipase at 86 and a negative CRP.  Urine is negative for infection, pregnancy test was negative as well.  My plan was to send patient home with ongoing close monitoring but given her sudden increase in symptoms I did elect to obtain a CT scan to rule out any acute intra-abdominal process,  CT scan is negative.  Patient was given a dose of Ativan for her pain and nausea with improvement in her symptoms.  I discussed findings of today's exam and test results with patient and her mom, I feel she is stable to be discharged home.  I will send patient home with Zofran as needed for nausea, hydration was encouraged.  Tylenol/ibuprofen as needed for abdominal pain.  Patient should follow-up with primary care next week for reevaluation, reasons to return to the emergency department were discussed in detail, patient and her mom are agreeable to plan of care and patient was discharged in stable condition.     I have reviewed the nursing notes.    I have reviewed the findings, diagnosis, plan and need for follow up with the patient.    New Prescriptions    ONDANSETRON (ZOFRAN ODT) 4 MG ODT TAB    Take 1 tablet (4 mg) by mouth every 8 hours as needed for nausea       Final diagnoses:   Abdominal pain, unspecified abdominal location - lower left and right quadrants   Nausea       5/31/2018   [facility] EMERGENCY DEPARTMENT     Brittny Walsh APRN CNP  06/01/18 0032

## 2018-06-01 NOTE — ED TRIAGE NOTES
"Patient c/o low abdominal pain that radiates around to her low back. She states she had green mucus rectal discharge today. \"No it wasn't stool\".  "

## 2018-06-01 NOTE — DISCHARGE INSTRUCTIONS
*Abdominal Pain, Unknown Cause (Female)    The exact cause of your abdominal (stomach) pain is not certain. This does not mean that this is something to worry about, or the right tests were not done. Everyone likes to know the exact cause of the problem, but sometimes with abdominal pain, there is no clear-cut cause, and this could be a good thing. The good news is that your symptoms can be treated, and you will feel better.   Your condition does not seem serious now; however, sometimes the signs of a serious problem may take more time to appear. For this reason, it is important for you to watch for any new symptoms, problems, or worsening of your condition.  Over the next few days, the abdominal pain may come and go, or be continuous. Other common symptoms can include nausea and vomiting. Sometimes it can be difficult to tell if you feel nauseous, you may just feel bad and not associate that feeling with nausea. Constipation, diarrhea, and a fever may go along with the pain.  The pain may continue even if treated correctly over the following days. Depending on how things go, sometimes the cause can become clear and may require further or different treatment. Additional evaluations, medications, or tests may be needed.  Home care  Your health care provider may prescribe medications for pain, symptoms, or an infection.  Follow the health care provider's instructions for taking these medications.  General care    Rest until your next exam. No strenuous activities.    Try to find positions that ease discomfort. A small pillow placed on the abdomen may help relieve pain.    Something warm on your abdomen (such as a heating pad) may help, but be careful not to burn yourself.  Diet    Do not force yourself to eat, especially if having cramps, vomiting, or diarrhea.    Water is important so you do not get dehydrated. Soup may also be good. Sports drinks may also help, especially if they are not too acidic. Make sure you  don't drink sugary drinks as this can make things worse. Take liquids in small amounts. Do not guzzle them.    Caffeine sometimes makes the pain and cramping worse.    Avoid dairy products if you have vomiting or diarrhea.    Don't eat large amounts at a time. Wait a few minutes between bites.    Eat a diet low in fiber (called a low-residue diet). Foods allowed include refined breads, white rice, fruit and vegetable juices without pulp, tender meats. These foods will pass more easily through the intestine.    Avoid fried or fatty foods, dairy, alcohol and spicy foods until your symptoms go away.  Follow-up care  Follow up with your health care provider as instructed, or if your pain does not begin to improve in the next 24 hours.  When to seek medical care  Seek prompt medical care if any of the following occur:    Pain gets worse or moves to the right lower abdomen    New or worsening vomiting or diarrhea    Swelling of the abdomen    Unable to pass stool for more than three days    New fever over 101  F (38.3 C), or rising fever    Blood in vomit or bowel movements (dark red or black color)    Jaundice (yellow color of eyes and skin)    Weakness, dizziness    Chest, arm, back, neck or jaw pain    Unexpected vaginal bleeding or missed period  Call 911  Call emergency services if any of the following occur:    Trouble breathing    Confusion    Fainting or loss of consciousness    Rapid heart rate    Seizure    [id number] [facility]on[facility], [address] [place] [address], [place] [address]. All rights reserved. This information is not intended as a substitute for professional medical care. Always follow your healthcare professional's instructions.

## 2018-06-04 ENCOUNTER — MYC MEDICAL ADVICE (OUTPATIENT)
Dept: FAMILY MEDICINE | Facility: CLINIC | Age: 18
End: 2018-06-04

## 2018-06-05 NOTE — TELEPHONE ENCOUNTER
Patient called back and I gave her the message from Dr. Medley. Sasha stated that she is having normal stools and wants to be seen.   She would like a call back from [place] or Dr. Medley.       Thank you,  Tari Acevedo   for [facility]

## 2018-06-05 NOTE — TELEPHONE ENCOUNTER
I have nothing available this week.  We will have to check to see when something is available.      Electronically signed by:  Oscar Medley M.D.  6/5/2018

## 2018-06-05 NOTE — TELEPHONE ENCOUNTER
If she is not having regular bowel movements, I would use MiraLAX 1 capful in 4-6 ounces of water or Gatorade 2-3 times a day until she has normal stools.  If she wants a physical, she is in need to get that on the schedule and that is 4-6 weeks out.    Electronically signed by:  Oscar Medley M.D.  6/5/2018

## 2018-06-07 ENCOUNTER — OFFICE VISIT (OUTPATIENT)
Dept: OBGYN | Facility: OTHER | Age: 18
End: 2018-06-07
Payer: COMMERCIAL

## 2018-06-07 VITALS
SYSTOLIC BLOOD PRESSURE: 122 MMHG | DIASTOLIC BLOOD PRESSURE: 80 MMHG | WEIGHT: 273.75 LBS | BODY MASS INDEX: 42.88 KG/M2 | HEART RATE: 88 BPM

## 2018-06-07 DIAGNOSIS — Z11.3 ROUTINE SCREENING FOR STI (SEXUALLY TRANSMITTED INFECTION): ICD-10-CM

## 2018-06-07 DIAGNOSIS — R10.2 PELVIC PAIN IN FEMALE: Primary | ICD-10-CM

## 2018-06-07 PROCEDURE — 99203 OFFICE O/P NEW LOW 30 MIN: CPT | Performed by: OBSTETRICS & GYNECOLOGY

## 2018-06-07 NOTE — PROGRESS NOTES
"OB/GYN New Consult  2018      NAME:  Sasha Hand  PCP:  Oscar Medley  MRN:  [id number]      Impression / Plan     18 year old  with:      ICD-10-CM    1. Pelvic pain in female R10.2 US Pelvic Complete with Transvaginal   2. Routine screening for STI (sexually transmitted infection) Z11.3 Hepatitis B surface antigen     Hepatitis C antibody     Body mass index is 42.88 kg/(m^2).   Nexplanon in place.      Discussed exam findings. Plan US to see in the cyst returned or if there are any gyn abnormalities.  She will do labs as previously ordered by her PCP and add hepatitis screening per her request.    I will contact her with the results and follow up recommendations.      Chief Complaint     Chief Complaint   Patient presents with     Abdominal Pain     Exposure to STD       HPI     Sasha Hand is a 18 year old female who is seen for multiple concerns.      Patient was seen in the emergency room 2018 for bilateral lower abdominal pain. Pain had started that morning.  CT scan was negative at that time. Lab work was unremarkable.  Patient was seen by her primary care provider on 2018 for exposure to Chlamydia.  Testing was negative.  She was treated empirically with Zithromax 1 time dose. Plan was to rescreen her in 3 months. Additional STD testing was done at that time.  See below.      Patient's history is significant for genital herpes since . She had a positive PCR of a lesion in . She had an outbreak in May. HSV IgG was negative recently.  Plan was to start suppressive therapy once she completed a course of Valtrex.      No LMP recorded. Patient has had an implant.  Has not had a period about a year and a half and had some bleeding for about 4 hours yesterday.  Light.  Clots.  Pain was worse during that time.      Pain is tolerable at this time.  Pain centered between her bladder and her belly button.  Pain kind of comes and goes.  \"pinching\" pain, like someone is grabbing and " twisting her organs.  Bowels have been regular.  No improvement in symptoms since her bowel function.  No bladder concerns.    She always has heavy discharge. No change.  No itching or burning.   No herpes lesions.     No exposure to STI since she was last seen.        Problem List     Patient Active Problem List    Diagnosis Date Noted     Closed head injury, subsequent encounter 04/05/2018     Priority: Medium     Depression 03/11/2018     Priority: Medium     Decreased oral intake 02/20/2018     Priority: Medium     Acute pyelonephritis 02/19/2018     Priority: Medium     Pyelonephritis 02/19/2018     Priority: Medium     DELIA (generalized anxiety disorder) 02/09/2018     Priority: Medium     Major depressive disorder, single episode, moderate (H) 02/09/2018     Priority: Medium     Nexplanon placed 11/14/17 11/14/2017     Priority: Medium     Genital herpes simplex, unspecified site 01/18/2017     Priority: Medium     Mild persistent asthma without complication 01/18/2017     Priority: Medium     Wheezing 05/04/2016     Priority: Medium     Tobacco use disorder 05/04/2016     Priority: Medium     Menorrhagia 01/20/2014     Priority: Medium     Dysmenorrhea 01/20/2014     Priority: Medium     Tear of lateral cartilage or meniscus of knee, current 09/09/2013     Priority: Medium     Obesity 09/14/2010     Priority: Medium       Medications     Current Outpatient Prescriptions   Medication     etonogestrel (IMPLANON/NEXPLANON) 68 MG IMPL     valACYclovir (VALTREX) 500 MG tablet     acetaminophen (TYLENOL) 325 MG tablet     No current facility-administered medications for this visit.         Allergies     Allergies   Allergen Reactions     No Known Drug Allergies      Seasonal Allergies        Past Medical/Surgical History     Past Medical History:   Diagnosis Date     Moderate major depression (H) 1/20/2014     Nexplanon placed 11/14/17 11/14/2017     Obesity 9/14/2010       Past Surgical History:   Procedure  Laterality Date     ARTHROSCOPY KNEE WITH MENISCAL REPAIR Right 5/10/2017    Procedure: ARTHROSCOPY KNEE WITH MENISCAL REPAIR;  arthroscopy right knee with lateral meniscus repair;  Surgeon: Nathaniel Hicks MD;  Location:  OR        Social History     Social History     Social History     Marital status: Single     Spouse name: N/A     Number of children: N/A     Years of education: N/A     Occupational History     student      [employer]     Social History Main Topics     Smoking status: Current Every Day Smoker     Packs/day: 1.00     Types: Cigarettes     Smokeless tobacco: Never Used     Alcohol use No     Drug use: No     Sexual activity: Yes     Partners: Male     Birth control/ protection: Condom     Other Topics Concern     Not on file     Social History Narrative       Family History      Family History   Problem Relation Age of Onset     Asthma Mother      Hypertension Mother      Asthma Father      DIABETES Father      CANCER Paternal Grandmother        ROS     A 6 organ review of systems was asked and the pertinent positives and negatives are listed in the HPI. All other organ systems can be considered negative.     Physical Exam   Vitals: /80  Pulse 88  Wt 273 lb 12 oz (124.2 kg)  BMI 42.88 kg/m2    General: Comfortable, no obvious distress  Psych: Alert and orientated x 3. Appropriate affect, good insight.   : Normal female external genitalia.  No lesions.  Urethral meatus normal.  Speculum exam reveals a normal vaginal vault, normal cervix.  No abnormal discharge.  Bimanual exam reveals a normal, mobile uterus.  No cervical motion tenderness.  Adnexa nontender with no palpable masses.   Tenderness noted at or above the uterine fundus.  Exam limited by body habitus.         Labs/Imaging       Labs were reviewed in Epic     Component      Latest Ref Rng & Units 5/14/2018   Specimen Descrip       Urine   N Gonorrhea PCR      NEG:Negative Negative   Specimen  Description       Urine   Chlamydia Trachomatis PCR      NEG:Negative Negative   Herpes Simplex Virus Type 1 IgG      0.0 - 0.8 AI <0.2   Herpes Simplex Virus Type 2 IgG      0.0 - 0.8 AI <0.2   Treponema Antibodies      NR:Nonreactive Nonreactive   HIV Antigen Antibody Combo      NR:Nonreactive     Nonreactive       Imaging was reviewed in Epic.   Last ultrasound done 10 2017 for evaluation of right ovarian cyst. Uterus was 4.4 x 2.8 x 3.4 cm. Endometrium was 1 mm thick and normal. Ovaries appeared normal with follicles. Large cyst no longer present. No free fluid.      30 minutes was spent with patient, more than 50% counseling and coordinating care    Eula Mcfarland MD

## 2018-06-07 NOTE — MR AVS SNAPSHOT
After Visit Summary   6/7/2018    Sasha Hand    MRN: [id number]           Patient Information     Date Of Birth          2000        Visit Information        Provider Department      6/7/2018 10:15 AM Eula Mcfarland MD [facility]        Today's Diagnoses     Pelvic pain in female    -  1    Routine screening for STI (sexually transmitted infection)           Follow-ups after your visit        Follow-up notes from your care team     Return if symptoms worsen or fail to improve.      Future tests that were ordered for you today     Open Future Orders        Priority Expected Expires Ordered    Hepatitis B surface antigen Routine  6/7/2019 6/7/2018    Hepatitis C antibody Routine  6/7/2019 6/7/2018    US Pelvic Complete with Transvaginal Routine  9/5/2018 6/7/2018            Who to contact     If you have questions or need follow up information about today's clinic visit or your schedule please contact [facility] directly at [phone].  Normal or non-critical lab and imaging results will be communicated to you by [employer], letter or phone within 4 business days after the clinic has received the results. If you do not hear from us within 7 days, please contact the clinic through [employer] or phone. If you have a critical or abnormal lab result, we will notify you by phone as soon as possible.  Submit refill requests through Sutus or call your pharmacy and they will forward the refill request to us. Please allow 3 business days for your refill to be completed.          Additional Information About Your Visit        MyChart Information     [employer] gives you secure access to your electronic health record. If you see a primary care provider, you can also send messages to your care team and make appointments. If you have questions, please call your primary care clinic.  If you do not have a primary care provider, please call [phone] and they will assist you.         Care EveryWhere ID     This is your Care EveryWhere ID. This could be used by other organizations to access your [place] medical records  [id number]        Your Vitals Were     Pulse BMI (Body Mass Index)                88 42.88 kg/m2           Blood Pressure from Last 3 Encounters:   06/07/18 122/80   06/01/18 124/64   05/14/18 128/82    Weight from Last 3 Encounters:   06/07/18 273 lb 12 oz (124.2 kg)   05/31/18 278 lb (126.1 kg)   05/14/18 279 lb (126.6 kg)                 Today's Medication Changes          These changes are accurate as of 6/7/18 11:05 AM.  If you have any questions, ask your nurse or doctor.               Stop taking these medicines if you haven't already. Please contact your care team if you have questions.     hydrOXYzine 25 MG capsule   Commonly known as:  VISTARIL   Stopped by:  Eula Mcfarland MD           montelukast 10 MG tablet   Commonly known as:  SINGULAIR   Stopped by:  Eula Mcfarland MD                    Primary Care Provider Office Phone # Fax #    Oscar DEREK Medley [phone]       [address] [place] [address]        Equal Access to Services     JOBY HANSON AH: Hadii mark hassan hadyamileto Sogauravali, waaxda luqadaha, qaybta kaalmada adeegyada, lester metz. So [facility] [phone].    ATENCIÓN: Si habla español, tiene a de la rosa disposición servicios gratuitos de asistencia lingüística. Llame al [phone].    We comply with applicable federal civil rights laws and [place] laws. We do not discriminate on the basis of race, color, national origin, age, disability, sex, sexual orientation, or gender identity.            Thank you!     Thank you for choosing [facility]  for your care. Our goal is always to provide you with excellent care. Hearing back from our patients is one way we can continue to improve our services. Please take a few minutes to complete the written survey that you may receive in the  mail after your visit with us. Thank you!             Your Updated Medication List - Protect others around you: Learn how to safely use, store and throw away your medicines at www.disposemymeds.org.          This list is accurate as of 6/7/18 11:05 AM.  Always use your most recent med list.                   Brand Name Dispense Instructions for use Diagnosis    acetaminophen 325 MG tablet    TYLENOL    100 tablet    Take 1 tablet (325 mg) by mouth every 4 hours as needed for mild pain or fever        etonogestrel 68 MG Impl    IMPLANON/NEXPLANON     1 each (68 mg) by Subdermal route continuous    Encounter for initial prescription of implantable subdermal contraceptive       valACYclovir 500 MG tablet    VALTREX    90 tablet    Take 1 tablet (500 mg) by mouth daily    Genital herpes simplex, unspecified site

## 2018-06-07 NOTE — NURSING NOTE
"Chief Complaint   Patient presents with     Abdominal Pain     Exposure to STD       Initial /80  Pulse 88  Wt 273 lb 12 oz (124.2 kg)  BMI 42.88 kg/m2 Estimated body mass index is 42.88 kg/(m^2) as calculated from the following:    Height as of 4/5/18: 5' 7\" (1.702 m).    Weight as of this encounter: 273 lb 12 oz (124.2 kg).  BP completed using cuff size: regular    No obstetric history on file.    The following HM Due: NONE          "

## 2018-06-15 ENCOUNTER — HOSPITAL ENCOUNTER (OUTPATIENT)
Dept: ULTRASOUND IMAGING | Facility: CLINIC | Age: 18
Discharge: HOME | End: 2018-06-15
Attending: OBSTETRICS & GYNECOLOGY | Admitting: OBSTETRICS & GYNECOLOGY
Payer: COMMERCIAL

## 2018-06-15 DIAGNOSIS — R10.2 PELVIC PAIN IN FEMALE: ICD-10-CM

## 2018-06-15 PROCEDURE — 76856 US EXAM PELVIC COMPLETE: CPT

## 2018-06-20 ENCOUNTER — TELEPHONE (OUTPATIENT)
Dept: OBGYN | Facility: OTHER | Age: 18
End: 2018-06-20

## 2018-06-20 NOTE — TELEPHONE ENCOUNTER
Left message for patient to return call to clinic. Please let her know-  Your ultrasound was normal.  Please let me know if you have any questions or concerns.     Thanks!   Dr. Bartolo Mccartney CMA

## 2018-06-21 NOTE — TELEPHONE ENCOUNTER
Discussed US results.  No etiology for pts symptoms seen on US.  Recommend she follow up with her PCP.

## 2018-07-06 ENCOUNTER — OFFICE VISIT (OUTPATIENT)
Dept: FAMILY MEDICINE | Facility: CLINIC | Age: 18
End: 2018-07-06
Payer: COMMERCIAL

## 2018-07-06 VITALS
SYSTOLIC BLOOD PRESSURE: 122 MMHG | WEIGHT: 275 LBS | BODY MASS INDEX: 43.07 KG/M2 | TEMPERATURE: 97.1 F | OXYGEN SATURATION: 100 % | DIASTOLIC BLOOD PRESSURE: 60 MMHG | HEART RATE: 68 BPM | RESPIRATION RATE: 22 BRPM

## 2018-07-06 DIAGNOSIS — R30.0 DYSURIA: Primary | ICD-10-CM

## 2018-07-06 LAB
ALBUMIN UR-MCNC: NEGATIVE MG/DL
APPEARANCE UR: CLEAR
BILIRUB UR QL STRIP: NEGATIVE
COLOR UR AUTO: YELLOW
GLUCOSE UR STRIP-MCNC: NEGATIVE MG/DL
HGB UR QL STRIP: NEGATIVE
KETONES UR STRIP-MCNC: NEGATIVE MG/DL
LEUKOCYTE ESTERASE UR QL STRIP: NEGATIVE
NITRATE UR QL: NEGATIVE
PH UR STRIP: 8 PH (ref 5–7)
SOURCE: ABNORMAL
SP GR UR STRIP: 1.01 (ref 1–1.03)
SPECIMEN SOURCE: NORMAL
UROBILINOGEN UR STRIP-MCNC: 0 MG/DL (ref 0–2)
WET PREP SPEC: NORMAL

## 2018-07-06 PROCEDURE — 87210 SMEAR WET MOUNT SALINE/INK: CPT | Performed by: NURSE PRACTITIONER

## 2018-07-06 PROCEDURE — 99213 OFFICE O/P EST LOW 20 MIN: CPT | Performed by: NURSE PRACTITIONER

## 2018-07-06 PROCEDURE — 81003 URINALYSIS AUTO W/O SCOPE: CPT | Performed by: NURSE PRACTITIONER

## 2018-07-06 ASSESSMENT — PAIN SCALES - GENERAL: PAINLEVEL: NO PAIN (0)

## 2018-07-06 NOTE — PROGRESS NOTES
SUBJECTIVE:   Sasha Hand is a 18 year old female who presents to clinic today for the following health issues:      URINARY TRACT SYMPTOMS  Onset: 3-6 months, off and on.    Description:   Painful urination (Dysuria): YES  Blood in urine (Hematuria): no   Delay in urine (Hesitency): YES    Intensity: moderate    Progression of Symptoms:  same    Accompanying Signs & Symptoms:  Fever/chills: no   Flank pain YES  Nausea and vomiting: YES  Any vaginal symptoms: none  Abdominal/Pelvic Pain: YES- little    History:   History of frequent UTI's: no   History of kidney stones: no   Sexually Active: YES  Possibility of pregnancy: No    Precipitating factors:   none    Therapies Tried and outcome: Increase fluid intake    Patient is seen in clinic today with the above reported symptoms that have recurred off and on for several months.  She has been seen in clinic, has always had negative urinalysis.  Also negative STD screen.  She was recently seen by gynecology for pelvic pain, had normal pelvic ultrasound.    Problem list and histories reviewed & adjusted, as indicated.  Additional history: as documented    BP Readings from Last 3 Encounters:   07/06/18 122/60   06/07/18 122/80   06/01/18 124/64    Wt Readings from Last 3 Encounters:   07/06/18 275 lb (124.7 kg)   06/07/18 273 lb 12 oz (124.2 kg)   05/31/18 278 lb (126.1 kg)                    Reviewed and updated as needed this visit by clinical staff       Reviewed and updated as needed this visit by Provider         ROS:  Constitutional, HEENT, cardiovascular, pulmonary, gi and gu systems are negative, except as otherwise noted.    OBJECTIVE:     /60  Pulse 68  Temp 97.1  F (36.2  C) (Temporal)  Resp 22  Wt 275 lb (124.7 kg)  SpO2 100%  BMI 43.07 kg/m2  Body mass index is 43.07 kg/(m^2).   GENERAL: healthy, alert and no distress  ABDOMEN: soft, nontender, no hepatosplenomegaly, no masses and bowel sounds normal    Diagnostic Test Results:  Results for  orders placed or performed in visit on 07/06/18   UA reflex to Microscopic and Culture   Result Value Ref Range    Color Urine Yellow     Appearance Urine Clear     Glucose Urine Negative NEG^Negative mg/dL    Bilirubin Urine Negative NEG^Negative    Ketones Urine Negative NEG^Negative mg/dL    Specific Gravity Urine 1.008 1.003 - 1.035    Blood Urine Negative NEG^Negative    pH Urine 8.0 (H) 5.0 - 7.0 pH    Protein Albumin Urine Negative NEG^Negative mg/dL    Urobilinogen mg/dL 0.0 0.0 - 2.0 mg/dL    Nitrite Urine Negative NEG^Negative    Leukocyte Esterase Urine Negative NEG^Negative    Source Midstream Urine    Wet prep   Result Value Ref Range    Specimen Description Vagina     Wet Prep Few  PMNs seen       Wet Prep No Trichomonas seen     Wet Prep No clue cells seen     Wet Prep No yeast seen        ASSESSMENT/PLAN:     Problem List Items Addressed This Visit     None      Visit Diagnoses     Dysuria    -  Primary    Relevant Orders    UA reflex to Microscopic and Culture (Completed)    Wet prep (Completed)           The patient is reassured there is no evidence for urinary tract infection or vaginitis.  Recommend she follow-up with her primary care provider if symptoms are persistent    SAVI Torres [facility]

## 2018-07-06 NOTE — MR AVS SNAPSHOT
After Visit Summary   7/6/2018    Sasha Hand    MRN: [id number]           Patient Information     Date Of Birth          2000        Visit Information        Provider Department      7/6/2018 2:45 PM Lashonda Morse APRN CNP [facility]        Today's Diagnoses     Dysuria    -  1       Follow-ups after your visit        Who to contact     If you have questions or need follow up information about today's clinic visit or your schedule please contact [facility] directly at [phone].  Normal or non-critical lab and imaging results will be communicated to you by [employer], letter or phone within 4 business days after the clinic has received the results. If you do not hear from us within 7 days, please contact the clinic through [employer] or phone. If you have a critical or abnormal lab result, we will notify you by phone as soon as possible.  Submit refill requests through [employer] or call your pharmacy and they will forward the refill request to us. Please allow 3 business days for your refill to be completed.          Additional Information About Your Visit        MyChart Information     [employer] gives you secure access to your electronic health record. If you see a primary care provider, you can also send messages to your care team and make appointments. If you have questions, please call your primary care clinic.  If you do not have a primary care provider, please call [phone] and they will assist you.        Care EveryWhere ID     This is your Care EveryWhere ID. This could be used by other organizations to access your [place] medical records  [id number]        Your Vitals Were     Pulse Temperature Respirations Pulse Oximetry BMI (Body Mass Index)       68 97.1  F (36.2  C) (Temporal) 22 100% 43.07 kg/m2        Blood Pressure from Last 3 Encounters:   07/06/18 122/60   06/07/18 122/80   06/01/18 124/64    Weight from Last 3 Encounters:   07/06/18 275  lb (124.7 kg)   06/07/18 273 lb 12 oz (124.2 kg)   05/31/18 278 lb (126.1 kg)              We Performed the Following     UA reflex to Microscopic and Culture     Wet prep        Primary Care Provider Office Phone # Fax #    Oscar Medley [phone] [address] [place] [address]        Equal Access to Services     [facility] : Hadii aad ku hadasho Soomaali, waaxda luqadaha, qaybta kaalmada adeegyada, waxay idiin hayaan adeeg khabdifatahsh laclarisan ah. So [facility] [phone].    ATENCIÓN: Si dean piper, tiene a de la rosa disposición servicios gratuitos de asistencia lingüística. Llame al [phone].    We comply with applicable federal civil rights laws and [place] laws. We do not discriminate on the basis of race, color, national origin, age, disability, sex, sexual orientation, or gender identity.            Thank you!     Thank you for choosing [facility]  for your care. Our goal is always to provide you with excellent care. Hearing back from our patients is one way we can continue to improve our services. Please take a few minutes to complete the written survey that you may receive in the mail after your visit with us. Thank you!             Your Updated Medication List - Protect others around you: Learn how to safely use, store and throw away your medicines at www.disposemymeds.org.          This list is accurate as of 7/6/18 11:59 PM.  Always use your most recent med list.                   Brand Name Dispense Instructions for use Diagnosis    acetaminophen 325 MG tablet    TYLENOL    100 tablet    Take 1 tablet (325 mg) by mouth every 4 hours as needed for mild pain or fever        etonogestrel 68 MG Impl    IMPLANON/NEXPLANON     1 each (68 mg) by Subdermal route continuous    Encounter for initial prescription of implantable subdermal contraceptive       valACYclovir 500 MG tablet    VALTREX    90 tablet    Take 1 tablet (500 mg) by mouth daily    Genital  herpes simplex, unspecified site

## 2018-07-12 ENCOUNTER — TELEPHONE (OUTPATIENT)
Dept: FAMILY MEDICINE | Facility: CLINIC | Age: 18
End: 2018-07-12

## 2018-07-12 NOTE — TELEPHONE ENCOUNTER
Patient is due for a PHQ-9.  Index start date:6/08/2018  Index end date:10/08/2018    Please call patient. Pt is active on [employer]. I have sent PHQ-9 via [employer] and will postpone encounter. Soraya Kellogg CMA (AAMA)

## 2018-08-01 DIAGNOSIS — R06.2 WHEEZING: ICD-10-CM

## 2018-08-01 RX ORDER — ALBUTEROL SULFATE 90 UG/1
1-2 AEROSOL, METERED RESPIRATORY (INHALATION) EVERY 4 HOURS PRN
Qty: 1 INHALER | Refills: 11 | Status: SHIPPED | OUTPATIENT
Start: 2018-08-01 | End: 2020-09-15

## 2018-08-01 NOTE — TELEPHONE ENCOUNTER
"Requested Prescriptions   Pending Prescriptions Disp Refills     albuterol (PROAIR HFA/PROVENTIL HFA/VENTOLIN HFA) 108 (90 Base) MCG/ACT Inhaler 1 Inhaler 1    Last Written Prescription Date:  05/18/2016  Last Fill Quantity: 1,  # refills: 1   Last office visit: 7/6/2018 with prescribing provider:  07/06/2018   Future Office Visit:     Sig: Inhale 1-2 puffs into the lungs every 4 hours as needed for shortness of breath / dyspnea or wheezing    Asthma Maintenance Inhalers - Anticholinergics Passed    8/1/2018 11:04 AM       Passed - Patient is age 12 years or older       Passed - Asthma control assessment score within normal limits in last 6 months    Please review ACT score.          Passed - Recent (6 mo) or future (30 days) visit within the authorizing provider's specialty    Patient had office visit in the last 6 months or has a visit in the next 30 days with authorizing provider or within the authorizing provider's specialty.  See \"Patient Info\" tab in inbasket, or \"Choose Columns\" in Meds & Orders section of the refill encounter.            HISTORICAL MED   "

## 2018-08-24 ENCOUNTER — HOSPITAL ENCOUNTER (EMERGENCY)
Facility: CLINIC | Age: 18
Discharge: HOME | End: 2018-08-24
Attending: EMERGENCY MEDICINE | Admitting: EMERGENCY MEDICINE
Payer: COMMERCIAL

## 2018-08-24 VITALS
BODY MASS INDEX: 44.73 KG/M2 | HEIGHT: 67 IN | WEIGHT: 285 LBS | TEMPERATURE: 98.3 F | SYSTOLIC BLOOD PRESSURE: 162 MMHG | HEART RATE: 72 BPM | OXYGEN SATURATION: 99 % | RESPIRATION RATE: 16 BRPM | DIASTOLIC BLOOD PRESSURE: 78 MMHG

## 2018-08-24 DIAGNOSIS — J45.901 MODERATE ASTHMA WITH EXACERBATION, UNSPECIFIED WHETHER PERSISTENT: ICD-10-CM

## 2018-08-24 DIAGNOSIS — J20.9 ACUTE BRONCHITIS, UNSPECIFIED ORGANISM: ICD-10-CM

## 2018-08-24 PROCEDURE — 99284 EMERGENCY DEPT VISIT MOD MDM: CPT | Mod: Z6 | Performed by: EMERGENCY MEDICINE

## 2018-08-24 PROCEDURE — 99283 EMERGENCY DEPT VISIT LOW MDM: CPT | Performed by: EMERGENCY MEDICINE

## 2018-08-24 RX ORDER — PREDNISONE 20 MG/1
TABLET ORAL
Qty: 10 TABLET | Refills: 0 | Status: SHIPPED | OUTPATIENT
Start: 2018-08-24 | End: 2018-08-30

## 2018-08-24 ASSESSMENT — ENCOUNTER SYMPTOMS
WHEEZING: 1
SINUS PRESSURE: 1
FEVER: 1
RHINORRHEA: 1
COUGH: 1
ABDOMINAL PAIN: 0
DYSURIA: 0
SORE THROAT: 1

## 2018-08-24 NOTE — ED PROVIDER NOTES
History     Chief Complaint   Patient presents with     Fever     The history is provided by the patient.     Sasha Hand is a 18 year old female who presents to the ED complaining of upper respiratory symptoms. Patient reports she has an asthma exacerbation with a mild sinus infection that developed 2 days ago. Patient states she developed wheezing this morning. She has an inhaler but has not used it yet. Patient took Tylenol at 0900 this morning after recording a fever of 100.4 F. She coughs whenever taking a deep breath. Patient is also experiencing sinus pressure, congestion, and runny nose. She has tried been taking Nyquil and benadryl with minimal relief. She has had similar symptoms many times in the past and has seen great improvements when treated with prednisone and albuterol. Patient denies abdominal pain, ear pain, and dysuria. She had a sore throat a few days ago but has since resolved.    Problem List:    Patient Active Problem List    Diagnosis Date Noted     Closed head injury, subsequent encounter 04/05/2018     Priority: Medium     Depression 03/11/2018     Priority: Medium     Decreased oral intake 02/20/2018     Priority: Medium     Acute pyelonephritis 02/19/2018     Priority: Medium     Pyelonephritis 02/19/2018     Priority: Medium     DELIA (generalized anxiety disorder) 02/09/2018     Priority: Medium     Major depressive disorder, single episode, moderate (H) 02/09/2018     Priority: Medium     Nexplanon placed 11/14/17 11/14/2017     Priority: Medium     Genital herpes simplex, unspecified site 01/18/2017     Priority: Medium     Mild persistent asthma without complication 01/18/2017     Priority: Medium     Wheezing 05/04/2016     Priority: Medium     Tobacco use disorder 05/04/2016     Priority: Medium     Menorrhagia 01/20/2014     Priority: Medium     Dysmenorrhea 01/20/2014     Priority: Medium     Tear of lateral cartilage or meniscus of knee, current 09/09/2013     Priority:  "Medium     Obesity 09/14/2010     Priority: Medium        Past Medical History:    Past Medical History:   Diagnosis Date     Moderate major depression (H) 1/20/2014     Nexplanon placed 11/14/17 11/14/2017     Obesity 9/14/2010       Past Surgical History:    Past Surgical History:   Procedure Laterality Date     ARTHROSCOPY KNEE WITH MENISCAL REPAIR Right 5/10/2017    Procedure: ARTHROSCOPY KNEE WITH MENISCAL REPAIR;  arthroscopy right knee with lateral meniscus repair;  Surgeon: Nathaniel Hicks MD;  Location:  OR       Family History:    Family History   Problem Relation Age of Onset     Asthma Mother      Hypertension Mother      Asthma Father      Diabetes Father      Cancer Paternal Grandmother        Social History:  Marital Status:  Single [1]  Social History   Substance Use Topics     Smoking status: Current Every Day Smoker     Packs/day: 1.00     Types: Cigarettes     Smokeless tobacco: Never Used     Alcohol use No        Medications:      predniSONE (DELTASONE) 20 MG tablet   acetaminophen (TYLENOL) 325 MG tablet   albuterol (PROAIR HFA/PROVENTIL HFA/VENTOLIN HFA) 108 (90 Base) MCG/ACT Inhaler   etonogestrel (IMPLANON/NEXPLANON) 68 MG IMPL   valACYclovir (VALTREX) 500 MG tablet         Review of Systems   Constitutional: Positive for fever.   HENT: Positive for congestion, rhinorrhea, sinus pressure and sore throat. Negative for ear pain.    Respiratory: Positive for cough (when taking deep breath) and wheezing.    Gastrointestinal: Negative for abdominal pain.   Genitourinary: Negative for dysuria.   All other systems reviewed and are negative.      Physical Exam   BP: (!) 152/93  Pulse: 72  Heart Rate: 107  Temp: 98.3  F (36.8  C)  Resp: 16  Height: 170.2 cm (5' 7\")  Weight: 129.3 kg (285 lb)  SpO2: 99 %      Physical Exam   Constitutional: She appears well-developed and well-nourished. No distress.   HENT:   Head: Normocephalic and atraumatic.   Mouth/Throat: Oropharynx is clear and moist. No " oropharyngeal exudate.   Eyes: EOM are normal. Pupils are equal, round, and reactive to light. Right eye exhibits no discharge. Left eye exhibits no discharge. No scleral icterus.   Neck: Normal range of motion.   Cardiovascular: Normal rate, normal heart sounds and intact distal pulses.    Pulmonary/Chest: Effort normal. No respiratory distress. She has wheezes.   Few scattered wheezes without crackles   Abdominal: Soft. Bowel sounds are normal. There is no tenderness.   Musculoskeletal: Normal range of motion. She exhibits no edema, tenderness or deformity.   Neurological: She is alert. No cranial nerve deficit.   Skin: Skin is warm. No rash noted. She is not diaphoretic.   Psychiatric: She has a normal mood and affect.   Nursing note and vitals reviewed.      ED Course     ED Course     Procedures    No results found for this or any previous visit (from the past 24 hour(s)).    Medications - No data to display    Assessments & Plan (with Medical Decision Making)  The patient has viral bronchitis type symptoms with mild sinusitis and no fever here.  She has diffuse scattered wheezes.  Probably she has URI induced bronchospasm.  She states in the past this gets better with albuterol and prednisone.  She is given 5 days of oral prednisone.  She states that usually antibiotics do not help this and I agreed that they were not necessarily indicated today.  She will return if worsen.  She was offered a chest x-ray and declined.     I have reviewed the nursing notes.    I have reviewed the findings, diagnosis, plan and need for follow up with the patient.      Discharge Medication List as of 8/24/2018  5:12 PM      START taking these medications    Details   predniSONE (DELTASONE) 20 MG tablet Take two tablets (= 40mg) each day for 5 (five) days, Disp-10 tablet, R-0, E-Prescribe             Final diagnoses:   Moderate asthma with exacerbation, unspecified whether persistent   Acute bronchitis, unspecified organism      This document serves as a record of services personally performed by Joseph Avalos MD. It was created on their behalf by Horacio Beltran, a trained medical scribe. The creation of this record is based on the provider's personal observations and the statements of the patient. This document has been checked and approved by the attending provider.    Note: Chart documentation done in part with Dragon Voice Recognition software. Although reviewed after completion, some word and grammatical errors may remain.    8/24/2018   [facility] EMERGENCY DEPARTMENT     Joseph Avalos MD  08/25/18 0838

## 2018-08-24 NOTE — ED AVS SNAPSHOT
[facility] Emergency Department    [address] [place] [address]    Phone:  [phone]    Fax:  [phone]                                       Sasha Hand   MRN: [id number]    Department:  [facility] Emergency Department   Date of Visit:  8/24/2018           Patient Information     Date Of Birth          2000        Your diagnoses for this visit were:     Moderate asthma with exacerbation, unspecified whether persistent     Acute bronchitis, unspecified organism        You were seen by Joseph Avalos MD.      Follow-up Information     Follow up with Oscar Medley MD In 3 days.    Specialty:  Family Practice    Why:  If symptoms worsen, ER follow up    Contact information:    [address] [place] [address] [phone]          Discharge Instructions       Return to ER if new or worsening symptoms.  Would suggest getting a chest x-ray if fever persists.    24 Hour Appointment Hotline       To make an appointment at any [facility] clinic, call [phone] ([phone]). If you don't have a family doctor or clinic, we will help you find one. [facility] clinics are conveniently located to serve the needs of you and your family.          ED Discharge Orders     MED THERAPY MANAGE REFERRAL       Your provider has referred you to: [facility] Scheduling (numerous locations) [phone]   [email or web address]    Reason for Referral: Tobacco Cessation    The [facility] department will contact you to schedule an appointment.  You may also schedule the appointment by calling [phone].  For [facility] [place] patients, please call [phone] to confirm/schedule your appointment on the next business day.    This service is designed to help you get the most from your medications.  A specially trained Pharmacist will work closely with you and your  providers to solve any questions, concerns, issues or problems related to your medications.    Please bring all of your prescription and non-prescription medications (such as vitamins, over-the-counter medications, and herbals) or a detailed medication list to your appointment.    If you have a glucose meter or other home monitoring information, please also bring this to your appointment (i.e. blood glucose log, blood pressure log, pain log, etc.).                     Review of your medicines      START taking        Dose / Directions Last dose taken    predniSONE 20 MG tablet   Commonly known as:  DELTASONE   Quantity:  10 tablet        Take two tablets (= 40mg) each day for 5 (five) days   Refills:  0          Our records show that you are taking the medicines listed below. If these are incorrect, please call your family doctor or clinic.        Dose / Directions Last dose taken    acetaminophen 325 MG tablet   Commonly known as:  TYLENOL   Dose:  325 mg   Quantity:  100 tablet        Take 1 tablet (325 mg) by mouth every 4 hours as needed for mild pain or fever   Refills:  0        albuterol 108 (90 Base) MCG/ACT inhaler   Commonly known as:  PROAIR HFA/PROVENTIL HFA/VENTOLIN HFA   Dose:  1-2 puff   Quantity:  1 Inhaler        Inhale 1-2 puffs into the lungs every 4 hours as needed for shortness of breath / dyspnea or wheezing   Refills:  11        etonogestrel 68 MG Impl   Commonly known as:  IMPLANON/NEXPLANON   Dose:  1 each        1 each (68 mg) by Subdermal route continuous   Refills:  0        valACYclovir 500 MG tablet   Commonly known as:  VALTREX   Dose:  500 mg   Quantity:  90 tablet        Take 1 tablet (500 mg) by mouth daily   Refills:  3                Prescriptions were sent or printed at these locations (1 Prescription)                   [facility] [address] [place] [address] - [address]   [address] [facility] [address]    Telephone:  [phone]   Fax:  [phone]   Hours:                   E-Prescribed (1 of 1)         predniSONE (DELTASONE) 20 MG tablet                Orders Needing Specimen Collection     None      Pending Results     No orders found from 8/22/2018 to 8/25/2018.            Pending Culture Results     No orders found from 8/22/2018 to 8/25/2018.            Pending Results Instructions     If you had any lab results that were not finalized at the time of your Discharge, you can call the ED Lab Result RN at [phone]. You will be contacted by this team for any positive Lab results or changes in treatment. The nurses are available 7 days a week from 10A to 6:30P.  You can leave a message 24 hours per day and they will return your call.        Thank you for choosing [facility]       Thank you for choosing [facility] for your care. Our goal is always to provide you with excellent care. Hearing back from our patients is one way we can continue to improve our services. Please take a few minutes to complete the written survey that you may receive in the mail after you visit with us. Thank you!        [employer] Information     [employer] gives you secure access to your electronic health record. If you see a primary care provider, you can also send messages to your care team and make appointments. If you have questions, please call your primary care clinic.  If you do not have a primary care provider, please call [phone] and they will assist you.        Care EveryWhere ID     This is your Care EveryWhere ID. This could be used by other organizations to access your [facility] medical records  [id number]        Equal Access to Services     MILAD HANSON : Hadii mark [place], waaxda lubradyadaha, qaybta kaalmada lester chu. So [facility] [phone].    ATENCIÓN: Si habla español, tiene a de la rosa disposición servicios gratuitos de asistencia lingüística. Llame al [phone].    We comply with applicable federal civil rights laws and [place] laws. We do  not discriminate on the basis of race, color, national origin, age, disability, sex, sexual orientation, or gender identity.            After Visit Summary       This is your record. Keep this with you and show to your community pharmacist(s) and doctor(s) at your next visit.

## 2018-08-24 NOTE — ED AVS SNAPSHOT
[facility] Emergency Department    [address] [place] [address]    Phone:  [phone]    Fax:  [phone]                                       Sasha Hand   MRN: [id number]    Department:  [facility] Emergency Department   Date of Visit:  8/24/2018           After Visit Summary Signature Page     I have received my discharge instructions, and my questions have been answered. I have discussed any challenges I see with this plan with the nurse or doctor.    ..........................................................................................................................................  Patient/Patient Representative Signature      ..........................................................................................................................................  Patient Representative Print Name and Relationship to Patient    ..................................................               ................................................  Date                                            Time    ..........................................................................................................................................  Reviewed by Signature/Title    ...................................................              ..............................................  Date                                                            Time          22EPIC Rev 08/18

## 2018-08-24 NOTE — TELEPHONE ENCOUNTER
I have attempted to call the pt to update a PHQ-9. I left message for pt to call back. I will call back another time. Soraya Kellogg CMA ([facility])

## 2018-08-24 NOTE — DISCHARGE INSTRUCTIONS
Return to ER if new or worsening symptoms.  Would suggest getting a chest x-ray if fever persists.

## 2018-08-25 ASSESSMENT — PATIENT HEALTH QUESTIONNAIRE - PHQ9: SUM OF ALL RESPONSES TO PHQ QUESTIONS 1-9: 0

## 2018-08-28 ENCOUNTER — TELEPHONE (OUTPATIENT)
Dept: PHARMACY | Facility: OTHER | Age: 18
End: 2018-08-28

## 2018-08-28 NOTE — TELEPHONE ENCOUNTER
MTM referral from: Transitions of Care (recent hospital discharge or ED visit)    MTM referral outreach attempt #2 on August 28, 2018 at 3:00 PM      Outcome: Patient not reachable after several attempts, will route to MTM Pharmacist/Provider as an FYI. Thank you for the referral.    Rylee Grossman, MTM Coordinator

## 2018-08-30 ENCOUNTER — TELEPHONE (OUTPATIENT)
Dept: FAMILY MEDICINE | Facility: CLINIC | Age: 18
End: 2018-08-30

## 2018-08-30 ENCOUNTER — HOSPITAL ENCOUNTER (EMERGENCY)
Facility: CLINIC | Age: 18
Discharge: HOME | End: 2018-08-30
Attending: FAMILY MEDICINE | Admitting: FAMILY MEDICINE
Payer: COMMERCIAL

## 2018-08-30 VITALS
RESPIRATION RATE: 20 BRPM | BODY MASS INDEX: 44.57 KG/M2 | HEART RATE: 117 BPM | OXYGEN SATURATION: 96 % | TEMPERATURE: 97.4 F | HEIGHT: 67 IN | SYSTOLIC BLOOD PRESSURE: 145 MMHG | DIASTOLIC BLOOD PRESSURE: 73 MMHG | WEIGHT: 284 LBS

## 2018-08-30 DIAGNOSIS — J20.9 ACUTE BRONCHITIS, UNSPECIFIED ORGANISM: ICD-10-CM

## 2018-08-30 PROCEDURE — 99284 EMERGENCY DEPT VISIT MOD MDM: CPT | Mod: Z6 | Performed by: FAMILY MEDICINE

## 2018-08-30 PROCEDURE — 99283 EMERGENCY DEPT VISIT LOW MDM: CPT | Mod: 25 | Performed by: FAMILY MEDICINE

## 2018-08-30 PROCEDURE — 94640 AIRWAY INHALATION TREATMENT: CPT | Performed by: FAMILY MEDICINE

## 2018-08-30 PROCEDURE — 25000125 ZZHC RX 250: Performed by: FAMILY MEDICINE

## 2018-08-30 RX ORDER — PREDNISONE 20 MG/1
TABLET ORAL
Qty: 26 TABLET | Refills: 0 | Status: SHIPPED | OUTPATIENT
Start: 2018-08-30 | End: 2018-09-09

## 2018-08-30 RX ORDER — AZITHROMYCIN 250 MG/1
TABLET, FILM COATED ORAL
Qty: 6 TABLET | Refills: 0 | Status: SHIPPED | OUTPATIENT
Start: 2018-08-30 | End: 2018-09-04

## 2018-08-30 RX ORDER — IPRATROPIUM BROMIDE AND ALBUTEROL SULFATE 2.5; .5 MG/3ML; MG/3ML
3 SOLUTION RESPIRATORY (INHALATION) ONCE
Status: COMPLETED | OUTPATIENT
Start: 2018-08-30 | End: 2018-08-30

## 2018-08-30 RX ORDER — IPRATROPIUM BROMIDE AND ALBUTEROL SULFATE 2.5; .5 MG/3ML; MG/3ML
1 SOLUTION RESPIRATORY (INHALATION) EVERY 6 HOURS PRN
Qty: 30 VIAL | Refills: 1 | Status: SHIPPED | OUTPATIENT
Start: 2018-08-30

## 2018-08-30 RX ADMIN — IPRATROPIUM BROMIDE AND ALBUTEROL SULFATE 3 ML: .5; 3 SOLUTION RESPIRATORY (INHALATION) at 19:21

## 2018-08-30 NOTE — ED AVS SNAPSHOT
[facility] Emergency Department    [address] [place] [address]    Phone:  [phone]    Fax:  [phone]                                       Sasha Hand   MRN: [id number]    Department:  [facility] Emergency Department   Date of Visit:  8/30/2018           After Visit Summary Signature Page     I have received my discharge instructions, and my questions have been answered. I have discussed any challenges I see with this plan with the nurse or doctor.    ..........................................................................................................................................  Patient/Patient Representative Signature      ..........................................................................................................................................  Patient Representative Print Name and Relationship to Patient    ..................................................               ................................................  Date                                            Time    ..........................................................................................................................................  Reviewed by Signature/Title    ...................................................              ..............................................  Date                                                            Time          22EPIC Rev 08/18

## 2018-08-30 NOTE — ED TRIAGE NOTES
Her asthma started flaring up last Friday and she has been on steroids, nebs and albuterol inhaler, mucinex and sudafed.  It is worsening after the steroids were completed.

## 2018-08-30 NOTE — ED AVS SNAPSHOT
[facility] Emergency Department    [address] [place] [address]    Phone:  [phone]    Fax:  [phone]                                       Sasha Hand   MRN: [id number]    Department:  [facility] Emergency Department   Date of Visit:  8/30/2018           Patient Information     Date Of Birth          2000        Your diagnoses for this visit were:     Acute bronchitis, unspecified organism        You were seen by Raúl Jain MD.      Follow-up Information     Follow up with Oscar Medley MD. Schedule an appointment as soon as possible for a visit in 5 days.    Specialty:  Family Practice    Why:  If not improving.    Contact information:    [address] [place] [address] [phone]          Discharge Instructions         Viral or Bacterial Bronchitis with Wheezing (Adult)    Bronchitis is an infection of the air passages. It often occurs during a cold and is usually caused by a virus. Symptoms include cough with mucus (phlegm) and low-grade fever. This illness is contagious during the first few days and is spread through the air by coughing and sneezing, or by direct contact (touching the sick person and then touching your own eyes, nose, or mouth).  If there is a lot of inflammation, air flow is restricted. The air passages may also go into spasm, especially if you have asthma. This causes wheezing and difficulty breathing even in people who do not have asthma.  Bronchitis usually lasts 7 to 14 days. The wheezing should improve with treatment during the first week. An inhaler is often prescribed to relax the air passages and stop wheezing. Antibiotics will be prescribed if your doctor thinks there is also a secondary bacterial infection.  Home care    If symptoms are severe, rest at home for the first 2 to 3 days. When you go back to your usual activities, don't let yourself get too tired.    Do not smoke. Also avoid being exposed to  secondhand smoke.    You may use over-the-counter medicine to control fever or pain, unless another medicine was prescribed. Note: If you have chronic liver or kidney disease or have ever had a stomach ulcer or gastrointestinal bleeding, talk with your healthcare provider before using these medicines. Also talk to your provider if you are taking medicine to prevent blood clots.) Aspirin should never be given to anyone younger than 18 years of age who is ill with a viral infection or fever. It may cause severe liver or brain damage.    Your appetite may be poor, so a light diet is fine. Avoid dehydration by drinking 6 to 8 glasses of fluids per day (such as water, soft drinks, sports drinks, juices, tea, or soup). Extra fluids will help loosen secretions in the nose and lungs.    Over-the-counter cough, cold, and sore-throat medicines will not shorten the length of the illness, but they may be helpful to reduce symptoms. (Note: Do not use decongestants if you have high blood pressure.)    If you were given an inhaler, use it exactly as directed. If you need to use it more often than prescribed, your condition may be worsening. If this happens, contact your healthcare provider.    If prescribed, finish all antibiotic medicine, even if you are feeling better after only a few days.  Follow-up care  Follow up with your healthcare provider, or as advised. If you had an X-ray or ECG (electrocardiogram), a specialist will review it. You will be notified of any new findings that may affect your care.  If you are age 65 or older, or if you have a chronic lung disease or condition that affects your immune system, or you smoke, ask your healthcare provider about getting a pneumococcal vaccine and a yearly flu shot (influenza vaccine).  When to seek medical advice  Call your healthcare provider right away if any of these occur:    Fever of 100.4 F (38 C) or higher, or as directed by your healthcare provider    Coughing up  increasing amounts of colored sputum    Weakness, drowsiness, headache, facial pain, ear pain, or a stiff neck  Call 911  Call 911 if any of these occur.    Coughing up blood    Worsening weakness, drowsiness, headache, or stiff neck    Increased wheezing not helped with medication, shortness of breath, or pain with breathing  Date Last Reviewed: 9/13/2015 2000-2017 [employer]. [address] [place] [address]. All rights reserved. This information is not intended as a substitute for professional medical care. Always follow your healthcare professional's instructions.          24 Hour Appointment Hotline       To make an appointment at any [facility], call [phone] ([phone]). If you don't have a family doctor or clinic, we will help you find one. [facility] are conveniently located to serve the needs of you and your family.             Review of your medicines      START taking        Dose / Directions Last dose taken    azithromycin 250 MG tablet   Commonly known as:  ZITHROMAX Z-MARLEN   Quantity:  6 tablet        Two tablets on the first day, then one tablet daily for the next 4 days   Refills:  0        ipratropium - albuterol 0.5 mg/2.5 mg/3 mL 0.5-2.5 (3) MG/3ML neb solution   Commonly known as:  DUONEB   Dose:  1 vial   Quantity:  30 vial        Take 1 vial (3 mLs) by nebulization every 6 hours as needed for shortness of breath / dyspnea or wheezing   Refills:  1          CONTINUE these medicines which may have CHANGED, or have new prescriptions. If we are uncertain of the size of tablets/capsules you have at home, strength may be listed as something that might have changed.        Dose / Directions Last dose taken    predniSONE 20 MG tablet   Commonly known as:  DELTASONE   What changed:  additional instructions   Quantity:  26 tablet        Take 3 tablets daily for 5 days,  take 2 tablets daily for 3 days, take 1 tablet daily for 3 days, take 1/2 tablet daily  for 4 days.   Refills:  0          Our records show that you are taking the medicines listed below. If these are incorrect, please call your family doctor or clinic.        Dose / Directions Last dose taken    acetaminophen 325 MG tablet   Commonly known as:  TYLENOL   Dose:  325 mg   Quantity:  100 tablet        Take 1 tablet (325 mg) by mouth every 4 hours as needed for mild pain or fever   Refills:  0        albuterol 108 (90 Base) MCG/ACT inhaler   Commonly known as:  PROAIR HFA/PROVENTIL HFA/VENTOLIN HFA   Dose:  1-2 puff   Quantity:  1 Inhaler        Inhale 1-2 puffs into the lungs every 4 hours as needed for shortness of breath / dyspnea or wheezing   Refills:  11        dextromethorphan-guaiFENesin  MG per 12 hr tablet   Commonly known as:  MUCINEX DM   Dose:  1 tablet        Take 1 tablet by mouth every 12 hours   Refills:  0        etonogestrel 68 MG Impl   Commonly known as:  IMPLANON/NEXPLANON   Dose:  1 each        1 each (68 mg) by Subdermal route continuous   Refills:  0        SUDAFED PO   Dose:  30 mg        Take 30 mg by mouth every 6 hours as needed for congestion   Refills:  0        valACYclovir 500 MG tablet   Commonly known as:  VALTREX   Dose:  500 mg   Quantity:  90 tablet        Take 1 tablet (500 mg) by mouth daily   Refills:  3                Prescriptions were sent or printed at these locations (3 Prescriptions)                   [facility] [place], [place] - [address] [place] [address]    [address] [facility] [address]    Telephone:  [phone]   Fax:  [phone]   Hours:                  E-Prescribed (3 of 3)         azithromycin (ZITHROMAX Z-MARLEN) 250 MG tablet               ipratropium - albuterol 0.5 mg/2.5 mg/3 mL (DUONEB) 0.5-2.5 (3) MG/3ML neb solution               predniSONE (DELTASONE) 20 MG tablet                Orders Needing Specimen Collection     None      Pending Results     No orders found from 8/28/2018 to 8/31/2018.            Pending Culture  Results     No orders found from 8/28/2018 to 8/31/2018.            Pending Results Instructions     If you had any lab results that were not finalized at the time of your Discharge, you can call the ED Lab Result RN at [phone]. You will be contacted by this team for any positive Lab results or changes in treatment. The nurses are available 7 days a week from 10A to 6:30P.  You can leave a message 24 hours per day and they will return your call.        Thank you for choosing [facility]       Thank you for choosing [facility] for your care. Our goal is always to provide you with excellent care. Hearing back from our patients is one way we can continue to improve our services. Please take a few minutes to complete the written survey that you may receive in the mail after you visit with us. Thank you!        [facility] Information     Fingerprint gives you secure access to your electronic health record. If you see a primary care provider, you can also send messages to your care team and make appointments. If you have questions, please call your primary care clinic.  If you do not have a primary care provider, please call [phone] and they will assist you.        Care EveryWhere ID     This is your Care EveryWhere ID. This could be used by other organizations to access your [facility] medical records  [id number]        Equal Access to Services     MILAD HANSON : Janeth Glasgow, wareyna najera, qaperezta kaalmaharris chu, lester metz. So [facility] [phone].    ATENCIÓN: Si habla español, tiene a de la rosa disposición servicios gratuitos de asistencia lingüística. Llame al [phone].    We comply with applicable federal civil rights laws and [place] laws. We do not discriminate on the basis of race, color, national origin, age, disability, sex, sexual orientation, or gender identity.            After Visit Summary       This is your record. Keep this with you and show to your  community pharmacist(s) and doctor(s) at your next visit.

## 2018-08-30 NOTE — TELEPHONE ENCOUNTER
Reason for Call:  Same Day Appointment, Requested Provider:  Oscar Medley M.D.    PCP: Oscar Medley    Reason for visit: Patient was seen in the ER on 8/24/18 for a cough and cold and its not getting better. Patient known's Dr. Medley is not in but she wanted to see any of the other covering providers.     Duration of symptoms: at least the 24th of this month    Have you been treated for this in the past? Yes    Additional comments: not at this time    Can we leave a detailed message on this number? YES    Phone number patient can be reached at: Cell number on file:    Telephone Information:   Mobile [phone]       Best Time: any    Call taken on 8/30/2018 at 6:04 PM by Tari Acevedo

## 2018-08-31 NOTE — DISCHARGE INSTRUCTIONS
Viral or Bacterial Bronchitis with Wheezing (Adult)    Bronchitis is an infection of the air passages. It often occurs during a cold and is usually caused by a virus. Symptoms include cough with mucus (phlegm) and low-grade fever. This illness is contagious during the first few days and is spread through the air by coughing and sneezing, or by direct contact (touching the sick person and then touching your own eyes, nose, or mouth).  If there is a lot of inflammation, air flow is restricted. The air passages may also go into spasm, especially if you have asthma. This causes wheezing and difficulty breathing even in people who do not have asthma.  Bronchitis usually lasts 7 to 14 days. The wheezing should improve with treatment during the first week. An inhaler is often prescribed to relax the air passages and stop wheezing. Antibiotics will be prescribed if your doctor thinks there is also a secondary bacterial infection.  Home care    If symptoms are severe, rest at home for the first 2 to 3 days. When you go back to your usual activities, don't let yourself get too tired.    Do not smoke. Also avoid being exposed to secondhand smoke.    You may use over-the-counter medicine to control fever or pain, unless another medicine was prescribed. Note: If you have chronic liver or kidney disease or have ever had a stomach ulcer or gastrointestinal bleeding, talk with your healthcare provider before using these medicines. Also talk to your provider if you are taking medicine to prevent blood clots.) Aspirin should never be given to anyone younger than 18 years of age who is ill with a viral infection or fever. It may cause severe liver or brain damage.    Your appetite may be poor, so a light diet is fine. Avoid dehydration by drinking 6 to 8 glasses of fluids per day (such as water, soft drinks, sports drinks, juices, tea, or soup). Extra fluids will help loosen secretions in the nose and lungs.    Over-the-counter  cough, cold, and sore-throat medicines will not shorten the length of the illness, but they may be helpful to reduce symptoms. (Note: Do not use decongestants if you have high blood pressure.)    If you were given an inhaler, use it exactly as directed. If you need to use it more often than prescribed, your condition may be worsening. If this happens, contact your healthcare provider.    If prescribed, finish all antibiotic medicine, even if you are feeling better after only a few days.  Follow-up care  Follow up with your healthcare provider, or as advised. If you had an X-ray or ECG (electrocardiogram), a specialist will review it. You will be notified of any new findings that may affect your care.  If you are age 65 or older, or if you have a chronic lung disease or condition that affects your immune system, or you smoke, ask your healthcare provider about getting a pneumococcal vaccine and a yearly flu shot (influenza vaccine).  When to seek medical advice  Call your healthcare provider right away if any of these occur:    Fever of 100.4 F (38 C) or higher, or as directed by your healthcare provider    Coughing up increasing amounts of colored sputum    Weakness, drowsiness, headache, facial pain, ear pain, or a stiff neck  Call 911  Call 911 if any of these occur.    Coughing up blood    Worsening weakness, drowsiness, headache, or stiff neck    Increased wheezing not helped with medication, shortness of breath, or pain with breathing  Date Last Reviewed: 9/13/2015 2000-2017 [employer] [address] [place] [id number]. All rights reserved. This information is not intended as a substitute for professional medical care. Always follow your healthcare professional's instructions.

## 2018-08-31 NOTE — TELEPHONE ENCOUNTER
Tried to call patient with an appointment for today with Lashonda Morse for a follow up on her cough. I left a message for patient to call and confirm or cancel the appointment if she is not able to make it.     India MOSES

## 2018-08-31 NOTE — ED PROVIDER NOTES
History     Chief Complaint   Patient presents with     Asthma     HPI  Sasha Hand is a 18 year old female who presents with concerns of her asthma still being bad.  Patient was seen here last Friday for it and put on a 5 day course of prednisone.  She states that her fever and URI-like symptoms have somewhat improved since then, she no longer does have a fever but she is feeling like she is still wheezing and having chest tightness.  She has been using her nebulizer inhalers at home but this is not helped much.  Patient states that she has finished her steroids but felt like they were not strong enough and did not do much.  She took her father's 60 mg prednisone one day and that seemed to help for day.  Patient denies any sick contacts.  Patient denies a sore throat or runny nose.  Patient has never been hospitalized for her asthma before.    Problem List:    Patient Active Problem List    Diagnosis Date Noted     Closed head injury, subsequent encounter 04/05/2018     Priority: Medium     Depression 03/11/2018     Priority: Medium     Decreased oral intake 02/20/2018     Priority: Medium     Acute pyelonephritis 02/19/2018     Priority: Medium     Pyelonephritis 02/19/2018     Priority: Medium     DELIA (generalized anxiety disorder) 02/09/2018     Priority: Medium     Major depressive disorder, single episode, moderate (H) 02/09/2018     Priority: Medium     Nexplanon placed 11/14/17 11/14/2017     Priority: Medium     Genital herpes simplex, unspecified site 01/18/2017     Priority: Medium     Mild persistent asthma without complication 01/18/2017     Priority: Medium     Wheezing 05/04/2016     Priority: Medium     Tobacco use disorder 05/04/2016     Priority: Medium     Menorrhagia 01/20/2014     Priority: Medium     Dysmenorrhea 01/20/2014     Priority: Medium     Tear of lateral cartilage or meniscus of knee, current 09/09/2013     Priority: Medium     Obesity 09/14/2010     Priority: Medium        Past  "Medical History:    Past Medical History:   Diagnosis Date     Moderate major depression (H) 1/20/2014     Nexplanon placed 11/14/17 11/14/2017     Obesity 9/14/2010     Uncomplicated asthma        Past Surgical History:    Past Surgical History:   Procedure Laterality Date     ARTHROSCOPY KNEE WITH MENISCAL REPAIR Right 5/10/2017    Procedure: ARTHROSCOPY KNEE WITH MENISCAL REPAIR;  arthroscopy right knee with lateral meniscus repair;  Surgeon: Nathaniel Hicks MD;  Location: [facility] OR       Family History:    Family History   Problem Relation Age of Onset     Asthma Mother      Hypertension Mother      Asthma Father      Diabetes Father      Cancer Paternal Grandmother        Social History:  Marital Status:  Single [1]  Social History   Substance Use Topics     Smoking status: Current Every Day Smoker     Packs/day: 1.00     Types: Cigarettes     Smokeless tobacco: Never Used     Alcohol use No        Medications:      albuterol (PROAIR HFA/PROVENTIL HFA/VENTOLIN HFA) 108 (90 Base) MCG/ACT Inhaler   dextromethorphan-guaiFENesin (MUCINEX DM)  MG per 12 hr tablet   etonogestrel (IMPLANON/NEXPLANON) 68 MG IMPL   Pseudoephedrine HCl (SUDAFED PO)   acetaminophen (TYLENOL) 325 MG tablet   predniSONE (DELTASONE) 20 MG tablet   valACYclovir (VALTREX) 500 MG tablet         Review of Systems   All other systems reviewed and are negative.      Physical Exam   BP: (!) 139/95  Pulse: 104  Temp: 97.4  F (36.3  C)  Resp: 18  Height: 170.2 cm (5' 7\")  Weight: 128.8 kg (284 lb)  SpO2: 96 %      Physical Exam   Constitutional: She appears well-developed and well-nourished. No distress.   HENT:   Head: Normocephalic and atraumatic.   Mouth/Throat: Oropharynx is clear and moist. No oropharyngeal exudate.   Eyes: Conjunctivae are normal.   Neck: Normal range of motion. Neck supple.   Cardiovascular: Normal rate, regular rhythm and normal heart sounds.    No murmur heard.  Pulmonary/Chest: Effort normal. No respiratory distress. " She has wheezes. She has no rales. She exhibits no tenderness.   Abdominal: Soft. She exhibits no distension. There is no tenderness. There is no rebound.   Lymphadenopathy:     She has no cervical adenopathy.   Skin: She is not diaphoretic.   Nursing note and vitals reviewed.      ED Course     ED Course     Procedures          Medications   ipratropium - albuterol 0.5 mg/2.5 mg/3 mL (DUONEB) neb solution 3 mL (3 mLs Nebulization Given 8/30/18 1921)     Patient feels better after the neb treatment that was given as noted above.  I think patient needs to go back on a course of prednisone but we need to do a longer 14 day taper.  I meant also put the patient on a course of Zithromax to cover for atypical organisms and treat this like bronchitis.  All questions were answered and I think patient is safe to be discharged home at this point.    Assessments & Plan (with Medical Decision Making)  Acute bronchitis     I have reviewed the nursing notes.    I have reviewed the findings, diagnosis, plan and need for follow up with the patient.              8/30/2018   [facility] EMERGENCY DEPARTMENT     Raúl Jain MD  08/30/18 1959

## 2018-10-08 ENCOUNTER — OFFICE VISIT (OUTPATIENT)
Dept: URGENT CARE | Facility: RETAIL CLINIC | Age: 18
End: 2018-10-08
Payer: COMMERCIAL

## 2018-10-08 VITALS
SYSTOLIC BLOOD PRESSURE: 141 MMHG | DIASTOLIC BLOOD PRESSURE: 85 MMHG | TEMPERATURE: 98.5 F | OXYGEN SATURATION: 100 % | HEART RATE: 70 BPM

## 2018-10-08 DIAGNOSIS — F17.200 TOBACCO USE DISORDER: ICD-10-CM

## 2018-10-08 DIAGNOSIS — R05.9 COUGH: ICD-10-CM

## 2018-10-08 DIAGNOSIS — R09.81 NASAL SINUS CONGESTION: ICD-10-CM

## 2018-10-08 DIAGNOSIS — J06.9 UPPER RESPIRATORY TRACT INFECTION, UNSPECIFIED TYPE: Primary | ICD-10-CM

## 2018-10-08 PROCEDURE — 99213 OFFICE O/P EST LOW 20 MIN: CPT | Performed by: PHYSICIAN ASSISTANT

## 2018-10-08 RX ORDER — FLUTICASONE PROPIONATE 50 MCG
1-2 SPRAY, SUSPENSION (ML) NASAL DAILY
Qty: 1 BOTTLE | Refills: 1 | Status: SHIPPED | OUTPATIENT
Start: 2018-10-08 | End: 2019-01-15

## 2018-10-08 NOTE — PROGRESS NOTES
Chief Complaint   Patient presents with     Pharyngitis     x 3 days     Cough     Sinus Problem     Nasal Congestion         SUBJECTIVE:   Pt. presenting to [facility] -  with a chief complaint of some dry cough, nasal congestion x 3 days.   See CC.  Cough nonproducitve.No SOB or chest pain. 'more to clear my throat'  Hx of asthma 'yes but this is a different cough'.  Onset of symptoms few days  Course of illness is same.    Severity mild  Current and Associated symptoms: runny nose, stuffy nose, cough - non-productive, headache and ears plugged at times  Treatment measures tried include Tylenol/Ibuprofen, Decongestants and OTC Cough med.  Predisposing factors include None.  Last antibiotic 8/30/2018 Zithromax     Pregnancy no  Smoker yes    ROS:  Afebrile   Energy level is normal   ENT - see above  CP - see above  GI- - appetite ok. No nausea, vomiting or diarrhea.   No bowel or bladder changes   MSK - no joint pain or swelling   Skin: No rashes    Past Medical History:   Diagnosis Date     Moderate major depression (H) 1/20/2014     Nexplanon placed 11/14/17 11/14/2017     Obesity 9/14/2010     Uncomplicated asthma      Past Surgical History:   Procedure Laterality Date     ARTHROSCOPY KNEE WITH MENISCAL REPAIR Right 5/10/2017    Procedure: ARTHROSCOPY KNEE WITH MENISCAL REPAIR;  arthroscopy right knee with lateral meniscus repair;  Surgeon: Nathaniel Hicks MD;  Location: [facility] OR     Patient Active Problem List   Diagnosis     Obesity     Tear of lateral cartilage or meniscus of knee, current     Menorrhagia     Dysmenorrhea     Wheezing     Tobacco use disorder     Genital herpes simplex, unspecified site     Mild persistent asthma without complication     Nexplanon placed 11/14/17     DELIA (generalized anxiety disorder)     Major depressive disorder, single episode, moderate (H)     Acute pyelonephritis     Pyelonephritis     Decreased oral intake     Depression     Closed head injury,  subsequent encounter     Current Outpatient Prescriptions   Medication     etonogestrel (IMPLANON/NEXPLANON) 68 MG IMPL     acetaminophen (TYLENOL) 325 MG tablet     albuterol (PROAIR HFA/PROVENTIL HFA/VENTOLIN HFA) 108 (90 Base) MCG/ACT Inhaler     dextromethorphan-guaiFENesin (MUCINEX DM)  MG per 12 hr tablet     ipratropium - albuterol 0.5 mg/2.5 mg/3 mL (DUONEB) 0.5-2.5 (3) MG/3ML neb solution     Pseudoephedrine HCl (SUDAFED PO)     valACYclovir (VALTREX) 500 MG tablet     No current facility-administered medications for this visit.        OBJECTIVE:  /85  Pulse 70  Temp 98.5  F (36.9  C) (Oral)  SpO2 100%    GENERAL APPEARANCE: cooperative, alert and no distress. Appears well hydrated.  EYES: conjunctiva clear  HENT: Rt ear canal  clear and TM normal   Lt ear canal clear and TM normal   Nose some congestion. clear discharge  Mouth without ulcers or lesions. no erythema. no exudate.   NECK: supple, few small shoddy NT ant nodes. No  posterior nodes.  RESP: lungs clear to auscultation - no rales, rhonchi or wheezes. Breathing easily.  CV: regular rates and rhythm  ABDOMEN:  soft, nontender, no HSM or masses and bowel sounds normal   SKIN: no suspicious lesions or rashes  no tenderness to palpate over  sinus areas.      ASSESSMENT:     Upper respiratory tract infection, unspecified type  Cough  Tobacco use disorder  Nasal sinus congestion      PLAN:  Symptomatic measures   Prescriptions as below. Discussed indications, dosing, side affects and adverse reactions of medications with  Patient -Flonase  OTC cough suppressant/expectorant discussed.  saline nasal spray for  nasal congestion .  Cool mist vaporizer.  Smoking discouraged - Discussed > CV,CP and cancer risks with tobacco use.   Stay in clean air environment.  > rest.  > fluids.  Contagiousness and hygiene discussed.  Fever and pain  control measures discussed.   If unable to swallow or any breathing difficulty to go to ED AVS given and  discussed:  Patient Instructions   Try to stop smoking!    Increase fluid intake.  Increase rest.  Stay in clean air environment.  Salt water gargles. Throat lozenges if soothing. (honey-lemon combinations are usually soothing)  Try Mucinex in am if thick nasal or chest congestion.   Delsym may help decrease your cough at night.  Saline nose spray  may help with nasal and sinus congestion.  Try acetominophen or Ibuprofen (with food) for fever and pain.    If you are unable to swallow or are having difficulty breathing seek prompt medical attention - go to the emergency department.      Please FOLLOW UP at primary care clinic if not improving, new symptoms, worse or this does not resolve.  [facility]  [phone]    See letter for work  .PT is comfortable with this plan.  Electronically signed,  JULIANO Segura, PAC

## 2018-10-08 NOTE — LETTER
..    [address] [address]        10/8/2018    Sasha Baez was seen 10/8/2018 at the [facility]. Please excuse Sasha from work as needed today  due to illness. Sasha may return to work when no fever x 1 day and feeling better.      Cordially,        Delmy Segura, PAC

## 2018-10-08 NOTE — MR AVS SNAPSHOT
After Visit Summary   10/8/2018    Sasha Hand    MRN: [id number]           Patient Information     Date Of Birth          2000        Visit Information        Provider Department      10/8/2018 6:00 PM Delmy Segura PA-C [facility]        Today's Diagnoses     Upper respiratory tract infection, unspecified type    -  1    Cough        Tobacco use disorder        Nasal sinus congestion          Care Instructions    Try to stop smoking!    Increase fluid intake.  Increase rest.  Stay in clean air environment.  Salt water gargles. Throat lozenges if soothing. (honey-lemon combinations are usually soothing)  Try Mucinex in am if thick nasal or chest congestion.   Delsym may help decrease your cough at night.  Saline nose spray  may help with nasal and sinus congestion.  Try acetominophen or Ibuprofen (with food) for fever and pain.    If you are unable to swallow or are having difficulty breathing seek prompt medical attention - go to the emergency department.      Please FOLLOW UP at primary care clinic if not improving, new symptoms, worse or this does not resolve.  [facility]  [phone]            Follow-ups after your visit        Who to contact     You can reach your care team any time of the day by calling [phone].  Notification of test results:  If you have an abnormal lab result, we will notify you by phone as soon as possible.         Additional Information About Your Visit        MyChart Information     [employer] gives you secure access to your electronic health record. If you see a primary care provider, you can also send messages to your care team and make appointments. If you have questions, please call your primary care clinic.  If you do not have a primary care provider, please call [phone] and they will assist you.        Care EveryWhere ID     This is your Care EveryWhere ID. This could be used by other organizations to access  your [place] medical records  [id number]        Your Vitals Were     Pulse Temperature Pulse Oximetry             70 98.5  F (36.9  C) (Oral) 100%          Blood Pressure from Last 3 Encounters:   10/08/18 141/85   08/30/18 145/73   08/24/18 162/78    Weight from Last 3 Encounters:   08/30/18 284 lb (128.8 kg)   08/24/18 285 lb (129.3 kg)   07/06/18 275 lb (124.7 kg)              Today, you had the following     No orders found for display         Today's Medication Changes          These changes are accurate as of 10/8/18  7:05 PM.  If you have any questions, ask your nurse or doctor.               Start taking these medicines.        Dose/Directions    fluticasone 50 MCG/ACT spray   Commonly known as:  FLONASE   Used for:  Nasal sinus congestion   Started by:  Delmy Segura PA-C        Dose:  1-2 spray   Spray 1-2 sprays into both nostrils daily   Quantity:  1 Bottle   Refills:  1            Where to get your medicines      These medications were [address]  [address] [facility] [address]     Phone:  [phone]     fluticasone 50 MCG/ACT spray                Primary Care Provider Office Phone # Fax #    Oscar Medley [phone]       [address] [facility]   [facility] [place] [address]        Equal Access to Services     [facility] AH: Hadii mark hassan hadasho Sogauravali, waaxda luqadaha, qaybta kaalmada vlad, lester castillo . So [facility] [phone].    ATENCIÓN: Si habla español, tiene a de la rosa disposición servicios gratuitos de asistencia lingüística. Llame al [phone].    We comply with applicable federal civil rights laws and [place] laws. We do not discriminate on the basis of race, color, national origin, age, disability, sex, sexual orientation, or gender identity.            Thank you!     Thank you for choosing [facility]  for your care. Our goal is always to provide you with excellent care.  Hearing back from our patients is one way we can continue to improve our services. Please take a few minutes to complete the written survey that you may receive in the mail after your visit with us. Thank you!             Your Updated Medication List - Protect others around you: Learn how to safely use, store and throw away your medicines at www.disposemymeds.org.          This list is accurate as of 10/8/18  7:05 PM.  Always use your most recent med list.                   Brand Name Dispense Instructions for use Diagnosis    acetaminophen 325 MG tablet    TYLENOL    100 tablet    Take 1 tablet (325 mg) by mouth every 4 hours as needed for mild pain or fever        albuterol 108 (90 Base) MCG/ACT inhaler    PROAIR HFA/PROVENTIL HFA/VENTOLIN HFA    1 Inhaler    Inhale 1-2 puffs into the lungs every 4 hours as needed for shortness of breath / dyspnea or wheezing    Wheezing       dextromethorphan-guaiFENesin  MG per 12 hr tablet    MUCINEX DM     Take 1 tablet by mouth every 12 hours        etonogestrel 68 MG Impl    IMPLANON/NEXPLANON     1 each (68 mg) by Subdermal route continuous    Encounter for initial prescription of implantable subdermal contraceptive       fluticasone 50 MCG/ACT spray    FLONASE    1 Bottle    Spray 1-2 sprays into both nostrils daily    Nasal sinus congestion       ipratropium - albuterol 0.5 mg/2.5 mg/3 mL 0.5-2.5 (3) MG/3ML neb solution    DUONEB    30 vial    Take 1 vial (3 mLs) by nebulization every 6 hours as needed for shortness of breath / dyspnea or wheezing        SUDAFED PO      Take 30 mg by mouth every 6 hours as needed for congestion        valACYclovir 500 MG tablet    VALTREX    90 tablet    Take 1 tablet (500 mg) by mouth daily    Genital herpes simplex, unspecified site

## 2018-10-08 NOTE — PATIENT INSTRUCTIONS
Try to stop smoking!    Increase fluid intake.  Increase rest.  Stay in clean air environment.  Salt water gargles. Throat lozenges if soothing. (honey-lemon combinations are usually soothing)  Try Mucinex in am if thick nasal or chest congestion.   Delsym may help decrease your cough at night.  Saline nose spray  may help with nasal and sinus congestion.  Try acetominophen or Ibuprofen (with food) for fever and pain.    If you are unable to swallow or are having difficulty breathing seek prompt medical attention - go to the emergency department.      Please FOLLOW UP at primary care clinic if not improving, new symptoms, worse or this does not resolve.  [facility]  [phone]

## 2018-12-06 ENCOUNTER — MYC MEDICAL ADVICE (OUTPATIENT)
Dept: FAMILY MEDICINE | Facility: CLINIC | Age: 18
End: 2018-12-06

## 2019-01-15 ENCOUNTER — OFFICE VISIT (OUTPATIENT)
Dept: FAMILY MEDICINE | Facility: CLINIC | Age: 19
End: 2019-01-15
Payer: COMMERCIAL

## 2019-01-15 VITALS
HEART RATE: 74 BPM | SYSTOLIC BLOOD PRESSURE: 124 MMHG | BODY MASS INDEX: 43.4 KG/M2 | WEIGHT: 293 LBS | TEMPERATURE: 97.4 F | OXYGEN SATURATION: 99 % | DIASTOLIC BLOOD PRESSURE: 86 MMHG | HEIGHT: 69 IN | RESPIRATION RATE: 18 BRPM

## 2019-01-15 DIAGNOSIS — E66.01 MORBID OBESITY (H): ICD-10-CM

## 2019-01-15 DIAGNOSIS — Z72.0 TOBACCO ABUSE DISORDER: Primary | ICD-10-CM

## 2019-01-15 PROCEDURE — 99214 OFFICE O/P EST MOD 30 MIN: CPT | Performed by: FAMILY MEDICINE

## 2019-01-15 RX ORDER — BUPROPION HYDROCHLORIDE 150 MG/1
150 TABLET, FILM COATED, EXTENDED RELEASE ORAL 2 TIMES DAILY
Qty: 180 TABLET | Refills: 3 | Status: SHIPPED | OUTPATIENT
Start: 2019-01-15 | End: 2019-11-21

## 2019-01-15 ASSESSMENT — PATIENT HEALTH QUESTIONNAIRE - PHQ9
5. POOR APPETITE OR OVEREATING: NOT AT ALL
SUM OF ALL RESPONSES TO PHQ QUESTIONS 1-9: 3

## 2019-01-15 ASSESSMENT — ANXIETY QUESTIONNAIRES
6. BECOMING EASILY ANNOYED OR IRRITABLE: SEVERAL DAYS
1. FEELING NERVOUS, ANXIOUS, OR ON EDGE: NOT AT ALL
IF YOU CHECKED OFF ANY PROBLEMS ON THIS QUESTIONNAIRE, HOW DIFFICULT HAVE THESE PROBLEMS MADE IT FOR YOU TO DO YOUR WORK, TAKE CARE OF THINGS AT HOME, OR GET ALONG WITH OTHER PEOPLE: NOT DIFFICULT AT ALL
3. WORRYING TOO MUCH ABOUT DIFFERENT THINGS: NOT AT ALL
GAD7 TOTAL SCORE: 1
5. BEING SO RESTLESS THAT IT IS HARD TO SIT STILL: NOT AT ALL
7. FEELING AFRAID AS IF SOMETHING AWFUL MIGHT HAPPEN: NOT AT ALL
2. NOT BEING ABLE TO STOP OR CONTROL WORRYING: NOT AT ALL

## 2019-01-15 ASSESSMENT — MIFFLIN-ST. JEOR: SCORE: 2193.58

## 2019-01-15 ASSESSMENT — PAIN SCALES - GENERAL: PAINLEVEL: NO PAIN (0)

## 2019-01-15 NOTE — LETTER
[address] [address] [phone]        Sasha CASE [place]  [address] [facility] [address]      January 17, 2019      Dear Sasha,    I care about your health and have reviewed your health plan, including your medical conditions, medication list, and lab results and am making recommendations based on this review, to better manage your health.    You are in particular need of attention regarding:  -Asthma    I am recommending that you:  -Complete and return the attached ASTHMA CONTROL TEST.  If your total score is 19 or less or you have been to the ER or urgent care for your asthma, then please schedule an asthma followup appointment.    If you've had the preventative screening completed at another facility or feel you're not due for this screening, please call our clinic at the number listed above or send us a My Chart message so we can update our records. We would like to thank you in advance for taking the time to take care of your health.  If you have any questions, please don t hesitate to contact our clinic.    Sincerely,       Your [facility] Team

## 2019-01-15 NOTE — LETTER
[address] [address]  [phone]        January 16, 2019    Sasha Hand  [address] [facility] [address]

## 2019-01-15 NOTE — PROGRESS NOTES
SUBJECTIVE:   Sasha Hand is a 18 year old female who presents to clinic today for the following health issues:    Chief Complaint   Patient presents with     Smoking Cessation     She was smoking upwards of a pack per day and is now down to half pack per day.  She really wants to quit but cannot do it on her own.  Her mother was successful at quitting smoking with Wellbutrin and her father continues to smoke.  She really is motivated to quit smoking now.  We talked about various options she does not want to try Chantix because she has had some severe depression in the past with suicidal ideation and she does not want to risk that.  Her mother has used Wellbutrin and she was thinking that she would like to try that.  We also discussed the patch and other nicotine replacement products.  We also discussed the quit plan which is an online smoking cessation program that is used to assist patients in their efforts to quit smoking.  This will increase her odds of being successful by about 20%.    She is also concerned about her weight she put on 25 pounds in the last 3 months this is the heaviest she is ever been.  Her BMI is 44.  She would be interested in doing a consultation for a gastric procedure particularly the gastric sleeve.  Her neighbor did this and is down 105 pounds and is been life changing for her.    Problem list and histories reviewed & adjusted, as indicated.  Additional history: as documented    Patient Active Problem List   Diagnosis     Obesity     Tear of lateral cartilage or meniscus of knee, current     Menorrhagia     Dysmenorrhea     Wheezing     Tobacco use disorder     Genital herpes simplex, unspecified site     Mild persistent asthma without complication     Nexplanon placed 11/14/17     DELIA (generalized anxiety disorder)     Major depressive disorder, single episode, moderate (H)     Acute pyelonephritis     Pyelonephritis     Decreased oral intake     Depression     Closed head injury,  "subsequent encounter     Past Surgical History:   Procedure Laterality Date     ARTHROSCOPY KNEE WITH MENISCAL REPAIR Right 5/10/2017    Procedure: ARTHROSCOPY KNEE WITH MENISCAL REPAIR;  arthroscopy right knee with lateral meniscus repair;  Surgeon: Nathaniel Hicks MD;  Location:  OR       Social History     Tobacco Use     Smoking status: Current Every Day Smoker     Packs/day: 1.00     Types: Cigarettes     Smokeless tobacco: Never Used   Substance Use Topics     Alcohol use: No     Alcohol/week: 0.0 oz     Family History   Problem Relation Age of Onset     Asthma Mother      Hypertension Mother      Asthma Father      Diabetes Father      Cancer Paternal Grandmother          Allergies   Allergen Reactions     No Known Drug Allergies      Seasonal Allergies      Recent Labs   Lab Test 05/31/18  2235 03/12/18  0740  02/19/18  2035 08/21/17  1714   LDL  --  43  --   --   --    HDL  --  37*  --   --   --    TRIG  --  58  --   --   --    ALT 49 36  --  25  --    CR 0.63 0.62   < > 0.61  --    GFRESTIMATED >90 >90   < > >90  --    GFRESTBLACK >90 >90   < > >90  --    POTASSIUM 3.7 3.9   < > 3.3*  --    TSH  --  0.96  --   --  2.30    < > = values in this interval not displayed.      BP Readings from Last 3 Encounters:   01/15/19 124/86   10/08/18 141/85   08/30/18 145/73    Wt Readings from Last 3 Encounters:   01/15/19 136.1 kg (300 lb)   08/30/18 128.8 kg (284 lb)   08/24/18 129.3 kg (285 lb)                    Reviewed and updated as needed this visit by clinical staff  Tobacco  Allergies  Meds       Reviewed and updated as needed this visit by Provider         ROS:  Constitutional, HEENT, cardiovascular, pulmonary, gi and gu systems are negative, except as otherwise noted.    OBJECTIVE:     /86   Pulse 74   Temp 97.4  F (36.3  C) (Temporal)   Resp 18   Ht 1.75 m (5' 8.9\")   Wt 136.1 kg (300 lb)   SpO2 99%   BMI 44.43 kg/m    Body mass index is 44.43 kg/m .  GENERAL: healthy, alert and no " "distress  NECK: no adenopathy, no asymmetry, masses, or scars and thyroid normal to palpation  RESP: lungs clear to auscultation - no rales, rhonchi or wheezes  CV: regular rate and rhythm, normal S1 S2, no S3 or S4, no murmur, click or rub, no peripheral edema and peripheral pulses strong    Diagnostic Test Results:  none     ASSESSMENT/PLAN:   (Z72.0) Tobacco abuse disorder  (primary encounter diagnosis)  Comment: She is agreeable to try Wellbutrin will start this medication twice daily.    Plan: buPROPion (ZYBAN) 150 MG 12 hr tablet        She is going to set a quit date for 2-3 weeks after starting medication.  Once she stopped smoking she will stay on the medication for minimum of 30-60 days.    (E66.01) Morbid obesity (H)  Comment: We talked at length about her weight and her comorbid factors including her borderline hypertension and her family history.  There is extensive family history of diabetes and heart disease puts her at risk.  Plan: BARIATRIC ADULT REFERRAL        She is going to call and get into the [facility] bariatric program for the informational meeting and then after that meeting decide if she wants to go further with the program.       BP Screening:   Last 3 BP Readings:    BP Readings from Last 3 Encounters:   01/15/19 124/86   10/08/18 141/85   08/30/18 145/73       The following was recommended to the patient:  Re-screen BP within a year and recommended lifestyle modifications  Tobacco Cessation:   reports that she has been smoking cigarettes.  She has been smoking about 1.00 pack per day. she has never used smokeless tobacco.  Tobacco Cessation Action Plan: Pharmacotherapies : Zyban/Wellbutrin    BMI:   Estimated body mass index is 44.43 kg/m  as calculated from the following:    Height as of this encounter: 1.75 m (5' 8.9\").    Weight as of this encounter: 136.1 kg (300 lb).   Weight management plan: Specific weight management program called Bariatric program down at [facility] " discussed    Electronically signed by:  Oscar Medley M.D.  1/15/2019

## 2019-01-16 ASSESSMENT — ANXIETY QUESTIONNAIRES: GAD7 TOTAL SCORE: 1

## 2019-04-24 NOTE — LETTER
[facility]  Emergency Room  [address] [place]   [address]  Tel: [phone]   Fax: [phone]  2017    Sasha Hand  [address] [facility] [address]  [phone] (home)     : 2000          To Whom it May Concern:    Sasha Hand was seen in our ER today, 2017. I expect her condition to improve over the next 5-7 days.  She may return to work, but is limited due to her cam walker boot. Do not walk excessively for the next 7 days..      Please contact me for questions or concerns.    Sincerely,      Nabil Garcia MD         
98.2

## 2019-05-05 ENCOUNTER — NURSE TRIAGE (OUTPATIENT)
Dept: NURSING | Facility: CLINIC | Age: 19
End: 2019-05-05

## 2019-05-05 NOTE — TELEPHONE ENCOUNTER
"\"Yesterday I started getting really bad burning in my esophagus and reflux, I don't get heartburn. This am about 9, I started having upper right abdominal pain. (still has gallbladder). It's an 11(1-10) when it comes, but it comes and goes. Always hurting but the sharp pain is bad.\" Denies other sx. Triaged and advised ER.  Kelsey Paz RN [facility]      Reason for Disposition    [1] MILD-MODERATE pain AND [2] constant AND [3] present > 2 hours    Additional Information    Negative: Severe difficulty breathing (e.g., struggling for each breath, speaks in single words)    Negative: Shock suspected (e.g., cold/pale/clammy skin, too weak to stand, low BP, rapid pulse)    Negative: Difficult to awaken or acting confused (e.g., disoriented, slurred speech)    Negative: Passed out (i.e., lost consciousness, collapsed and was not responding)    Negative: Visible sweat on face or sweat dripping down face    Negative: Sounds like a life-threatening emergency to the triager    Negative: Followed an abdomen (stomach) injury    Negative: Chest pain    Negative: [1] SEVERE pain (e.g., excruciating) AND [2] present > 1 hour    Negative: [1] Pain lasts > 10 minutes AND [2] age > 50    Negative: [1] Pain lasts > 10 minutes AND [2] age > 40 AND [3] associated chest, arm, neck, upper back or jaw pain    Negative: [1] Pain lasts > 10 minutes AND [2] age > 35 AND [3] at least one cardiac risk factor (i.e., hypertension, diabetes, obesity, smoker or strong family history of heart disease)    Negative: [1] Pain lasts > 10 minutes AND [2] history of heart disease (i.e., heart attack, bypass surgery, angina, angioplasty, CHF; not just a heart murmur)    Negative: [1] Pain lasts > 10 minutes AND [2] difficulty breathing    Negative: [1] Vomiting AND [2] contains red blood  (Exception: few streaks and only occurred once)    Negative: [1] Vomiting AND [2] contains black (\"coffee ground\") material    Negative: Blood in bowel " movements  (Exception: Blood on surface of BM with constipation)    Negative: Black or tarry bowel movements  (Exception: chronic-unchanged  black-grey bowel movements AND is taking iron pills or Pepto-bismol)    Negative: [1] Pregnant > 24 weeks AND [2] hand or face swelling    Negative: Patient sounds very sick or weak to the triager    Protocols used: ABDOMINAL PAIN - UPPER-A-AH

## 2019-06-23 NOTE — ED NOTES
"Pt reports pain is starting to increase some, states she has to go to the bathroom and is \"fearful because it is going to hurt\". Provider notified and went in to talk to pt.   "
Yes

## 2019-07-03 ENCOUNTER — MYC MEDICAL ADVICE (OUTPATIENT)
Dept: FAMILY MEDICINE | Facility: CLINIC | Age: 19
End: 2019-07-03

## 2019-07-03 DIAGNOSIS — E66.01 MORBID OBESITY (H): Primary | ICD-10-CM

## 2019-07-03 NOTE — TELEPHONE ENCOUNTER
[place] can you put in a referral for Sasha for the bariatric program down at [facility]?  You can then just my chart her the number to call to set up the initial evaluation.  Thank you,    Electronically signed by:  Oscar Medley M.D.  7/3/2019

## 2019-07-31 ENCOUNTER — TELEPHONE (OUTPATIENT)
Dept: FAMILY MEDICINE | Facility: CLINIC | Age: 19
End: 2019-07-31

## 2019-07-31 NOTE — TELEPHONE ENCOUNTER
Panel Management Review      Patient has the following on her problem list:     Depression / Dysthymia review    Measure:  Needs PHQ-9 score of 4 or less during index window.  Administer PHQ-9 and if score is 5 or more, send encounter to provider for next steps.    5 - 7 month window range: 6    PHQ-9 SCORE 3/1/2018 8/24/2018 1/15/2019   PHQ-9 Total Score - - -   PHQ-9 Total Score 6 0 3       If PHQ-9 recheck is 5 or more, route to provider for next steps.    Patient is due for:  PHQ9    Asthma review     ACT Total Scores 3/1/2018   ACT TOTAL SCORE (Goal Greater than or Equal to 20) 25   In the past 12 months, how many times did you visit the emergency room for your asthma without being admitted to the hospital? 0   In the past 12 months, how many times were you hospitalized overnight because of your asthma? 0      1. Is Asthma diagnosis on the Problem List? Yes    2. Is Asthma listed on Health Maintenance? Yes    3. Patient is due for:  ACT and AAP      Composite cancer screening  Chart review shows that this patient is due/due soon for the following Pap Smear  Summary:    Patient is due/failing the following:   AAP, ACT, PAP, PHQ9 and PHYSICAL    Action needed:   Patient needs office visit for physcial., Patient needs to do ACT. and Patient needs to do PHQ9.    Type of outreach:    Sent letter.    Questions for provider review:    None                                                                                                                                    Betzy Islas MA 7/31/2019       Chart routed to Care Team .

## 2019-07-31 NOTE — LETTER
[address] [address]  [phone]        July 31, 2019    Sasha Hand  [address]  [facility] [address]          Dear Sasha,    Complete and return the attached ASTHMA CONTROL TEST.  If your total score is 19 or less or you have been to the ER or urgent care for your asthma, then please schedule an asthma followup appointment.     Complete and return the PHQ9    You are also due for a Physical and PAP please call to schedule those.     Sincerely,        Oscar Medley M.D.

## 2019-08-15 NOTE — TELEPHONE ENCOUNTER
Phone, spoke to patient.  patient states she has been out of town she will be home sunday and will get them filled out and mailed in. Betzy Islas MA 8/15/2019

## 2019-09-28 ENCOUNTER — HEALTH MAINTENANCE LETTER (OUTPATIENT)
Age: 19
End: 2019-09-28

## 2019-11-19 ASSESSMENT — ENCOUNTER SYMPTOMS
EYE PAIN: 0
JOINT SWELLING: 0
CONSTIPATION: 0
HEARTBURN: 0
SORE THROAT: 0
HEADACHES: 0
FREQUENCY: 0
NAUSEA: 1
DYSURIA: 0
SHORTNESS OF BREATH: 0
BREAST MASS: 0
WEAKNESS: 0
HEMATURIA: 0
CHILLS: 0
NERVOUS/ANXIOUS: 0
FEVER: 0
DIARRHEA: 0
HEMATOCHEZIA: 0
PALPITATIONS: 0
ARTHRALGIAS: 0
ABDOMINAL PAIN: 0
COUGH: 1
DIZZINESS: 0
MYALGIAS: 0
PARESTHESIAS: 0

## 2019-11-21 ENCOUNTER — TELEPHONE (OUTPATIENT)
Dept: FAMILY MEDICINE | Facility: CLINIC | Age: 19
End: 2019-11-21

## 2019-11-21 ENCOUNTER — OFFICE VISIT (OUTPATIENT)
Dept: FAMILY MEDICINE | Facility: CLINIC | Age: 19
End: 2019-11-21
Payer: COMMERCIAL

## 2019-11-21 ENCOUNTER — DOCUMENTATION ONLY (OUTPATIENT)
Dept: FAMILY MEDICINE | Facility: CLINIC | Age: 19
End: 2019-11-21

## 2019-11-21 VITALS
RESPIRATION RATE: 18 BRPM | WEIGHT: 293 LBS | TEMPERATURE: 97.4 F | SYSTOLIC BLOOD PRESSURE: 128 MMHG | DIASTOLIC BLOOD PRESSURE: 84 MMHG | BODY MASS INDEX: 47.39 KG/M2 | HEART RATE: 74 BPM | OXYGEN SATURATION: 98 %

## 2019-11-21 DIAGNOSIS — Z00.00 ROUTINE GENERAL MEDICAL EXAMINATION AT A HEALTH CARE FACILITY: Primary | ICD-10-CM

## 2019-11-21 DIAGNOSIS — E66.01 MORBID OBESITY (H): ICD-10-CM

## 2019-11-21 DIAGNOSIS — Z13.220 LIPID SCREENING: ICD-10-CM

## 2019-11-21 DIAGNOSIS — L91.8 SKIN TAG: ICD-10-CM

## 2019-11-21 DIAGNOSIS — F17.200 TOBACCO USE DISORDER: ICD-10-CM

## 2019-11-21 DIAGNOSIS — F32.1 MAJOR DEPRESSIVE DISORDER, SINGLE EPISODE, MODERATE (H): ICD-10-CM

## 2019-11-21 DIAGNOSIS — Z13.1 SCREENING FOR DIABETES MELLITUS: ICD-10-CM

## 2019-11-21 DIAGNOSIS — Z11.3 SCREEN FOR STD (SEXUALLY TRANSMITTED DISEASE): ICD-10-CM

## 2019-11-21 DIAGNOSIS — D48.5 NEOPLASM OF UNCERTAIN BEHAVIOR OF SKIN: ICD-10-CM

## 2019-11-21 DIAGNOSIS — Z23 NEED FOR VACCINATION: ICD-10-CM

## 2019-11-21 LAB
CHOLEST SERPL-MCNC: 142 MG/DL
GLUCOSE SERPL-MCNC: 96 MG/DL (ref 70–99)
HDLC SERPL-MCNC: 42 MG/DL
LDLC SERPL CALC-MCNC: 76 MG/DL
NONHDLC SERPL-MCNC: 100 MG/DL
TRIGL SERPL-MCNC: 120 MG/DL
TSH SERPL DL<=0.005 MIU/L-ACNC: 3.07 MU/L (ref 0.4–4)

## 2019-11-21 PROCEDURE — 82947 ASSAY GLUCOSE BLOOD QUANT: CPT | Performed by: FAMILY MEDICINE

## 2019-11-21 PROCEDURE — 80061 LIPID PANEL: CPT | Performed by: FAMILY MEDICINE

## 2019-11-21 PROCEDURE — G0145 SCR C/V CYTO,THINLAYER,RESCR: HCPCS | Performed by: FAMILY MEDICINE

## 2019-11-21 PROCEDURE — 84443 ASSAY THYROID STIM HORMONE: CPT | Performed by: FAMILY MEDICINE

## 2019-11-21 PROCEDURE — 36415 COLL VENOUS BLD VENIPUNCTURE: CPT | Performed by: FAMILY MEDICINE

## 2019-11-21 PROCEDURE — 99395 PREV VISIT EST AGE 18-39: CPT | Mod: 25 | Performed by: FAMILY MEDICINE

## 2019-11-21 PROCEDURE — 90732 PPSV23 VACC 2 YRS+ SUBQ/IM: CPT | Performed by: FAMILY MEDICINE

## 2019-11-21 PROCEDURE — 99213 OFFICE O/P EST LOW 20 MIN: CPT | Mod: 25 | Performed by: FAMILY MEDICINE

## 2019-11-21 PROCEDURE — 90686 IIV4 VACC NO PRSV 0.5 ML IM: CPT | Performed by: FAMILY MEDICINE

## 2019-11-21 PROCEDURE — 90471 IMMUNIZATION ADMIN: CPT | Performed by: FAMILY MEDICINE

## 2019-11-21 PROCEDURE — 90472 IMMUNIZATION ADMIN EACH ADD: CPT | Performed by: FAMILY MEDICINE

## 2019-11-21 RX ORDER — VARENICLINE TARTRATE 1 MG/1
1 TABLET, FILM COATED ORAL 2 TIMES DAILY
Qty: 60 TABLET | Refills: 0 | Status: SHIPPED | OUTPATIENT
Start: 2019-12-21 | End: 2020-01-07

## 2019-11-21 ASSESSMENT — ENCOUNTER SYMPTOMS
HEMATURIA: 0
FEVER: 0
CHILLS: 0
HEARTBURN: 0
FREQUENCY: 0
DIZZINESS: 0
ABDOMINAL PAIN: 0
MYALGIAS: 0
BREAST MASS: 0
ARTHRALGIAS: 0
PALPITATIONS: 0
HEADACHES: 0
WEAKNESS: 0
SHORTNESS OF BREATH: 0
EYE PAIN: 0
SORE THROAT: 0
PARESTHESIAS: 0
JOINT SWELLING: 0
NERVOUS/ANXIOUS: 0
COUGH: 1
DYSURIA: 0
CONSTIPATION: 0
DIARRHEA: 0
HEMATOCHEZIA: 0

## 2019-11-21 ASSESSMENT — ANXIETY QUESTIONNAIRES
1. FEELING NERVOUS, ANXIOUS, OR ON EDGE: SEVERAL DAYS
7. FEELING AFRAID AS IF SOMETHING AWFUL MIGHT HAPPEN: SEVERAL DAYS
IF YOU CHECKED OFF ANY PROBLEMS ON THIS QUESTIONNAIRE, HOW DIFFICULT HAVE THESE PROBLEMS MADE IT FOR YOU TO DO YOUR WORK, TAKE CARE OF THINGS AT HOME, OR GET ALONG WITH OTHER PEOPLE: SOMEWHAT DIFFICULT
5. BEING SO RESTLESS THAT IT IS HARD TO SIT STILL: SEVERAL DAYS
6. BECOMING EASILY ANNOYED OR IRRITABLE: SEVERAL DAYS
3. WORRYING TOO MUCH ABOUT DIFFERENT THINGS: SEVERAL DAYS
2. NOT BEING ABLE TO STOP OR CONTROL WORRYING: SEVERAL DAYS
GAD7 TOTAL SCORE: 8

## 2019-11-21 ASSESSMENT — PAIN SCALES - GENERAL: PAINLEVEL: NO PAIN (0)

## 2019-11-21 ASSESSMENT — PATIENT HEALTH QUESTIONNAIRE - PHQ9
SUM OF ALL RESPONSES TO PHQ QUESTIONS 1-9: 10
5. POOR APPETITE OR OVEREATING: MORE THAN HALF THE DAYS

## 2019-11-21 NOTE — PROGRESS NOTES
Urine STD labs were ordered after patient had already left. Orders were canceled and reordered as future for 1 week.  Please contact patient to come back in.  Thanks, Shelley Oreilly

## 2019-11-21 NOTE — PROGRESS NOTES
SUBJECTIVE:   CC: Sasha Hand is an 19 year old woman who presents for preventive health visit.     Healthy Habits:     Getting at least 3 servings of Calcium per day:  Yes    Bi-annual eye exam:  NO    Dental care twice a year:  Yes    Sleep apnea or symptoms of sleep apnea:  None    Diet:  Regular (no restrictions)    Frequency of exercise:  None    Taking medications regularly:  Yes    Medication side effects:  None    PHQ-2 Total Score: 1    Additional concerns today:  Yes    Birth control concerns   She had a Nexplanon placed in 2017. Since having it in, has gained over 50 pounds. Has had not any periods on it and has no other complaints. Before this, she was on the Depo shot. She would like to have it taken out as she is in a committed relationship and wants to be on a birth control that she can control whether or not she's on it. She is thinking patch or pill.     Smoking   She is currently a smoker but is interested in quitting. She has tried wellbutrin and didn't like the way it made her feel.     Lump on leg  She's had a lump on her left calf for about 2-3 weeks. It has grown in size. It is painful to touch, but does not cause her pain while walking. No associated trauma to that area.     Today's PHQ-2 Score:   PHQ-2 ( 1999 [employer]) 11/19/2019   Q1: Little interest or pleasure in doing things 0   Q2: Feeling down, depressed or hopeless 1   PHQ-2 Score 1   Q1: Little interest or pleasure in doing things Not at all   Q2: Feeling down, depressed or hopeless Several days   PHQ-2 Score 1     Abuse: Current or Past(Physical, Sexual or Emotional)- No  Do you feel safe in your environment? Yes    Social History     Tobacco Use     Smoking status: Current Every Day Smoker     Packs/day: 1.00     Types: Cigarettes     Smokeless tobacco: Never Used   Substance Use Topics     Alcohol use: No     Alcohol/week: 0.0 standard drinks     If you drink alcohol do you typically have >3 drinks per day or >7 drinks per  week? No    Alcohol Use 11/19/2019   Prescreen: >3 drinks/day or >7 drinks/week? No   No flowsheet data found.    Reviewed orders with patient.  Reviewed health maintenance and updated orders accordingly - Yes  Lab work is in process  BP Readings from Last 3 Encounters:   11/21/19 128/84   01/15/19 124/86   10/08/18 141/85    Wt Readings from Last 3 Encounters:   11/21/19 145.2 kg (320 lb)   01/15/19 136.1 kg (300 lb)   08/30/18 128.8 kg (284 lb)           Patient Active Problem List   Diagnosis     Morbid obesity (H)     Tear of lateral cartilage or meniscus of knee, current     Menorrhagia     Dysmenorrhea     Wheezing     Tobacco use disorder     Genital herpes simplex, unspecified site     Mild persistent asthma without complication     Nexplanon placed 11/14/17     DELIA (generalized anxiety disorder)     Major depressive disorder, single episode, moderate (H)     Acute pyelonephritis     Pyelonephritis     Decreased oral intake     Depression     Closed head injury, subsequent encounter     Neoplasm of uncertain behavior      Past Surgical History:   Procedure Laterality Date     ARTHROSCOPY KNEE WITH MENISCAL REPAIR Right 5/10/2017    Procedure: ARTHROSCOPY KNEE WITH MENISCAL REPAIR;  arthroscopy right knee with lateral meniscus repair;  Surgeon: Nathaniel Hicks MD;  Location:  OR       Social History     Tobacco Use     Smoking status: Current Every Day Smoker     Packs/day: 1.00     Types: Cigarettes     Smokeless tobacco: Never Used   Substance Use Topics     Alcohol use: No     Alcohol/week: 0.0 standard drinks     Family History   Problem Relation Age of Onset     Asthma Mother      Hypertension Mother      Asthma Father      Diabetes Father      Cancer Father 60        metastatic to brain     Cervical Cancer Sister 19     Cancer Paternal Grandmother          Current Outpatient Medications   Medication Sig Dispense Refill     albuterol (PROAIR HFA/PROVENTIL HFA/VENTOLIN HFA) 108 (90 Base) MCG/ACT  Inhaler Inhale 1-2 puffs into the lungs every 4 hours as needed for shortness of breath / dyspnea or wheezing 1 Inhaler 11     etonogestrel (IMPLANON/NEXPLANON) 68 MG IMPL 1 each (68 mg) by Subdermal route continuous  0     ipratropium - albuterol 0.5 mg/2.5 mg/3 mL (DUONEB) 0.5-2.5 (3) MG/3ML neb solution Take 1 vial (3 mLs) by nebulization every 6 hours as needed for shortness of breath / dyspnea or wheezing 30 vial 1     varenicline (CHANTIX MARLEN) 0.5 MG X 11 & 1 MG X 42 tablet Take 0.5 mg tab daily for 3 days, THEN 0.5 mg tab twice daily for 4 days, THEN 1 mg twice daily. 53 tablet 0     [START ON 12/21/2019] varenicline (CHANTIX) 1 MG tablet Take 1 tablet (1 mg) by mouth 2 times daily 60 tablet 0     Recent Labs   Lab Test 05/31/18  2235 03/12/18  0740  02/19/18  2035 08/21/17  1714   LDL  --  43  --   --   --    HDL  --  37*  --   --   --    TRIG  --  58  --   --   --    ALT 49 36  --  25  --    CR 0.63 0.62   < > 0.61  --    GFRESTIMATED >90 >90   < > >90  --    GFRESTBLACK >90 >90   < > >90  --    POTASSIUM 3.7 3.9   < > 3.3*  --    TSH  --  0.96  --   --  2.30    < > = values in this interval not displayed.      Mammogram not appropriate for this patient based on age.    Pertinent mammograms are reviewed under the imaging tab.  History of abnormal Pap smear: no      Reviewed and updated as needed this visit by clinical staff  Tobacco  Allergies  Meds       Reviewed and updated as needed this visit by Provider        Past Medical History:   Diagnosis Date     History of PCR DNA positive for HSV1      Moderate major depression (H) 1/20/2014     Nexplanon placed 11/14/17 11/14/2017     Obesity 9/14/2010     Uncomplicated asthma         Review of Systems   Constitutional: Negative for chills and fever.   HENT: Positive for congestion. Negative for ear pain, hearing loss and sore throat.    Eyes: Negative for pain and visual disturbance.   Respiratory: Positive for cough. Negative for shortness of breath.     Cardiovascular: Negative for chest pain, palpitations and peripheral edema.   Gastrointestinal: Negative for abdominal pain, constipation, diarrhea, heartburn and hematochezia.   Breasts:  Negative for tenderness, breast mass and discharge.   Genitourinary: Negative for dysuria, frequency, genital sores, hematuria, pelvic pain, urgency, vaginal bleeding and vaginal discharge.   Musculoskeletal: Negative for arthralgias, joint swelling and myalgias.   Skin: Negative for rash.   Neurological: Negative for dizziness, weakness, headaches and paresthesias.   Psychiatric/Behavioral: Negative for mood changes. The patient is not nervous/anxious.      OBJECTIVE:   Temp 97.4  F (36.3  C) (Temporal)   Wt 145.2 kg (320 lb)   SpO2 98%   BMI 47.39 kg/m    Physical Exam  GENERAL: healthy, alert and no distress  EYES: Eyes grossly normal to inspection, PERRL and conjunctivae and sclerae normal  RESP: lungs clear to auscultation - no rales, rhonchi or wheezes  CV: regular rate and rhythm, normal S1 S2, no S3 or S4, no murmur, click or rub, no peripheral edema and peripheral pulses strong   (female): normal female external genitalia, normal urethral meatus, vaginal mucosa, normal cervix/adnexa/uterus without masses; mild clear discharge from cervix  MS: 1.5-2 cm firm tender non-mobile mass on left medial gastrocnemius that palpates to be at a dermis level or below, no associated erythema or drainage  SKIN: blanchable spider veins on left ankle; 5-7 mm brown growth in left vulvar area, likely skin tag; 7 mm along the right collar line, no other suspicious lesions or rashes  PSYCH: mentation appears normal, affect normal/bright    Diagnostic Test Results:  No results found for this or any previous visit (from the past 24 hour(s)).    ASSESSMENT/PLAN:     (Z00.00) Routine general medical examination at a health care facility  (primary encounter diagnosis)  Comment: See below for additional concerns that were addressed. Is also  due for a pap today.   Plan: Pap imaged thin layer screen only - recommended        age 19 - 24 years  GC Chlamydia screen UA     (F32.1) Major depressive disorder, single episode, moderate (H)  Comment: She feels that her depression and anxiety symptoms are moderately well controlled today. She has recently received news about her dad having cancer and thinks she has managed that well. She is in a good relationship and has a strong mile, both of which she thinks have helped her during this time. She does not wish to start medication at this time.  Plan: Continue to monitor, particularly with initiation of chantix.     (F17.200) Tobacco use disorder  (Z23) Need for vaccination  Comment: Patient is currently a smoker but has the motivation to quit.   Plan: Pneumococcal vaccine 23 valent PPSV23          (Pneumovax) [31417], varenicline (CHANTIX MARLEN)         0.5 MG X 11 & 1 MG X 42 tablet, varenicline         (CHANTIX) 1 MG tablet    (Z13.220) Lipid screening  Comment: For baseline. Patient is also morbidly obese.   Plan: Lipid panel reflex to direct LDL Non-fasting    (Z13.1) Screening for diabetes mellitus  Comment: For baseline and morbid obesity. Will get non-fasting glucose.   Plan: **Glucose FUTURE 1yr    (E66.01) Morbid obesity (H)  Comment: For baseline, will check thyroid function.   Plan: TSH with free T4 reflex    (D48.5) Neoplasm of uncertain behavior   Comment: 2-3 weeks of growing 2 cm growth with associated pain. Will get ultrasound imaging.   Plan: US Extremity Non Vascular Bilateral    COUNSELING:  Reviewed preventive health counseling, as reflected in patient instructions       Regular exercise       Healthy diet/nutrition       Immunizations    Vaccinated for: Influenza and Pneumococcal         Contraception       Family planning    At some point she would like her Nexplanon removed and just start an OCP.  She is in a monogamous relationship and within the next couple years would probably like to  "start a family with this person.  She also would like the skin tag on her right collar line and on the left labia removed at the same time as she has that Nexplanon.  She will set this up at her convenience at some time after the first of the year.    Estimated body mass index is 47.39 kg/m  as calculated from the following:    Height as of 1/15/19: 1.75 m (5' 8.9\").    Weight as of this encounter: 145.2 kg (320 lb).    Weight management plan: Discussed healthy diet and exercise guidelines     reports that she has been smoking cigarettes. She has been smoking about 1.00 pack per day. She has never used smokeless tobacco.  Tobacco Cessation Action Plan: Pharmacotherapies : Chantix  and given information on the Quit line    Counseling Resources:  ATP IV Guidelines  Pooled Cohorts Equation Calculator  Breast Cancer Risk Calculator  FRAX Risk Assessment  ICSI Preventive Guidelines  Dietary Guidelines for Americans, 2010  USDA's MyPlate  ASA Prophylaxis  Lung CA Screening    This document serves as a record of the services and decisions personally performed and made by Oscar Flowers MD.  It was created on his behalf by Anjana Benites, a trained medical student and scribe.  The creation of this record is based on the provider's personal observations and the statements of the patient.     Anjana Benites, MPH, MS3  November 21, 2019    I agree with the PFSH and ROS as completed by the MS.  The remainder of the encounter was performed by me and scribed by the MS.  The scribed note accurately reflects my personal services and the decisions made by me.     Electronically signed by:  Oscar Medley M.D.  11/21/2019     "

## 2019-11-21 NOTE — TELEPHONE ENCOUNTER
Please see [employer] message below.    Thanks     Appointment Request From: Sasha Hand      With Provider: Oscar Medley MD, MD [[facility]]      Preferred Date Range: Any      Preferred Times: Any time      Reason for visit: Request an Appointment      Comments:   Taking out birth control and removing skin tags

## 2019-11-21 NOTE — TELEPHONE ENCOUNTER
I told Sasha that we would probably have to wait until after the first of the year because of our schedule prior to that so full.  She was okay with waiting.  Please get her scheduled during 1 of our Thursday afternoon procedure times I will need 30 minutes minimum.    Electronically signed by:  Oscar Medley M.D.  11/21/2019

## 2019-11-22 ASSESSMENT — ANXIETY QUESTIONNAIRES: GAD7 TOTAL SCORE: 8

## 2019-11-22 ASSESSMENT — ASTHMA QUESTIONNAIRES: ACT_TOTALSCORE: 11

## 2019-11-25 ENCOUNTER — HOSPITAL ENCOUNTER (OUTPATIENT)
Dept: ULTRASOUND IMAGING | Facility: CLINIC | Age: 19
Discharge: HOME | End: 2019-11-25
Attending: FAMILY MEDICINE | Admitting: FAMILY MEDICINE
Payer: COMMERCIAL

## 2019-11-25 DIAGNOSIS — Z11.3 SCREEN FOR STD (SEXUALLY TRANSMITTED DISEASE): ICD-10-CM

## 2019-11-25 DIAGNOSIS — D48.5 NEOPLASM OF UNCERTAIN BEHAVIOR OF SKIN: ICD-10-CM

## 2019-11-25 PROCEDURE — 76882 US LMTD JT/FCL EVL NVASC XTR: CPT | Mod: LT

## 2019-11-25 PROCEDURE — 87491 CHLMYD TRACH DNA AMP PROBE: CPT | Performed by: FAMILY MEDICINE

## 2019-11-25 PROCEDURE — 87591 N.GONORRHOEAE DNA AMP PROB: CPT | Performed by: FAMILY MEDICINE

## 2019-11-26 LAB
C TRACH DNA SPEC QL NAA+PROBE: NEGATIVE
COPATH REPORT: NORMAL
N GONORRHOEA DNA SPEC QL NAA+PROBE: NEGATIVE
PAP: NORMAL
SPECIMEN SOURCE: NORMAL
SPECIMEN SOURCE: NORMAL

## 2019-12-29 ENCOUNTER — HOSPITAL ENCOUNTER (EMERGENCY)
Facility: CLINIC | Age: 19
Discharge: HOME | End: 2019-12-29
Attending: NURSE PRACTITIONER | Admitting: NURSE PRACTITIONER
Payer: COMMERCIAL

## 2019-12-29 ENCOUNTER — NURSE TRIAGE (OUTPATIENT)
Dept: NURSING | Facility: CLINIC | Age: 19
End: 2019-12-29

## 2019-12-29 ENCOUNTER — APPOINTMENT (OUTPATIENT)
Dept: GENERAL RADIOLOGY | Facility: CLINIC | Age: 19
End: 2019-12-29
Attending: NURSE PRACTITIONER
Payer: COMMERCIAL

## 2019-12-29 VITALS
HEART RATE: 98 BPM | WEIGHT: 293 LBS | TEMPERATURE: 97.5 F | DIASTOLIC BLOOD PRESSURE: 98 MMHG | OXYGEN SATURATION: 100 % | BODY MASS INDEX: 45.99 KG/M2 | RESPIRATION RATE: 19 BRPM | SYSTOLIC BLOOD PRESSURE: 176 MMHG | HEIGHT: 67 IN

## 2019-12-29 DIAGNOSIS — T18.9XXA SWALLOWED FOREIGN BODY, INITIAL ENCOUNTER: ICD-10-CM

## 2019-12-29 PROCEDURE — 99283 EMERGENCY DEPT VISIT LOW MDM: CPT | Performed by: NURSE PRACTITIONER

## 2019-12-29 PROCEDURE — 99282 EMERGENCY DEPT VISIT SF MDM: CPT | Mod: Z6 | Performed by: NURSE PRACTITIONER

## 2019-12-29 PROCEDURE — 74019 RADEX ABDOMEN 2 VIEWS: CPT | Mod: TC

## 2019-12-29 ASSESSMENT — MIFFLIN-ST. JEOR: SCORE: 2262.75

## 2019-12-29 NOTE — ED AVS SNAPSHOT
[facility] Emergency Department  [address] [place] [address]  Phone:  [phone]  Fax:  [phone]                                    Sasha Hand   MRN: [id number]    Department:  [facility] Emergency Department   Date of Visit:  12/29/2019           After Visit Summary Signature Page    I have received my discharge instructions, and my questions have been answered. I have discussed any challenges I see with this plan with the nurse or doctor.    ..........................................................................................................................................  Patient/Patient Representative Signature      ..........................................................................................................................................  Patient Representative Print Name and Relationship to Patient    ..................................................               ................................................  Date                                   Time    ..........................................................................................................................................  Reviewed by Signature/Title    ...................................................              ..............................................  Date                                               Time          22EPIC Rev 08/18

## 2019-12-29 NOTE — ED TRIAGE NOTES
Was eating gold fish crackers and noticed she swallowed her entire tongue ring.  No airway issues.  Happened 45 mins prior to arrival, complains of pain intermittently to upper abdomen and feels like something is poking her.

## 2019-12-29 NOTE — TELEPHONE ENCOUNTER
Sasha with concerns over her swallowing her tongue ring.  Object approx 2 inches, no sharp edges and swallowed without being aware of it.  Triaged to be seen in clinic tomorrow, however given her level of concern plans to go to ED today.  History of familial diverticulitis, scared if she has it too, may get caught in diverticula if she does in fact have any.        Reason for Disposition    [1] Smooth object > 1 inch (2.5 cm) across (e.g., quarter is 2.5 cm) AND [2] no symptoms    Protocols used: SWALLOWED FOREIGN BODY-A-AH

## 2019-12-30 NOTE — ED PROVIDER NOTES
"  History     Chief Complaint   Patient presents with     Swallowed Foreign Body     HPI  Sasha Hand is a 19 year old female who presents to the emergency department today with concerns of swallowing her tongue ring.  Patient was eating goldfish crackers when she realized her tongue ring was no longer in her tongue.  Patient reports that she feels sharp pains every now and again in her stomach like it is \"trying to get out\".  Patient denies any hematemesis or other complaints.  Patient has not taken any medications for her symptoms.  Patient has not had anything to eat or drink since this happened.  Patient denies any other complaints.    Allergies:  Allergies   Allergen Reactions     No Known Drug Allergies      Seasonal Allergies        Problem List:    Patient Active Problem List    Diagnosis Date Noted     Neoplasm of uncertain behavior  11/21/2019     Priority: Medium     On left calf       Skin tag (right collar line and left labial region 11/21/2019     Priority: Medium     Closed head injury, subsequent encounter 04/05/2018     Priority: Medium     Depression 03/11/2018     Priority: Medium     Decreased oral intake 02/20/2018     Priority: Medium     Acute pyelonephritis 02/19/2018     Priority: Medium     Pyelonephritis 02/19/2018     Priority: Medium     DELIA (generalized anxiety disorder) 02/09/2018     Priority: Medium     Major depressive disorder, single episode, moderate (H) 02/09/2018     Priority: Medium     Nexplanon placed 11/14/17 11/14/2017     Priority: Medium     Genital herpes simplex, unspecified site 01/18/2017     Priority: Medium     Mild persistent asthma without complication 01/18/2017     Priority: Medium     Wheezing 05/04/2016     Priority: Medium     Tobacco use disorder 05/04/2016     Priority: Medium     Menorrhagia 01/20/2014     Priority: Medium     Dysmenorrhea 01/20/2014     Priority: Medium     Tear of lateral cartilage or meniscus of knee, current 09/09/2013     Priority: " "Medium     Morbid obesity (H) 09/14/2010     Priority: Medium        Past Medical History:    Past Medical History:   Diagnosis Date     History of PCR DNA positive for HSV1      Moderate major depression (H) 1/20/2014     Nexplanon placed 11/14/17 11/14/2017     Obesity 9/14/2010     Uncomplicated asthma        Past Surgical History:    Past Surgical History:   Procedure Laterality Date     ARTHROSCOPY KNEE WITH MENISCAL REPAIR Right 5/10/2017    Procedure: ARTHROSCOPY KNEE WITH MENISCAL REPAIR;  arthroscopy right knee with lateral meniscus repair;  Surgeon: Nathaniel Hicks MD;  Location:  OR       Family History:    Family History   Problem Relation Age of Onset     Asthma Mother      Hypertension Mother      Asthma Father      Diabetes Father      Cancer Father 60        metastatic to brain     Cervical Cancer Sister 19     Cancer Paternal Grandmother        Social History:  Marital Status:  Single [1]  Social History     Tobacco Use     Smoking status: Current Every Day Smoker     Packs/day: 1.00     Types: Cigarettes     Smokeless tobacco: Never Used   Substance Use Topics     Alcohol use: No     Alcohol/week: 0.0 standard drinks     Drug use: No        Medications:    albuterol (PROAIR HFA/PROVENTIL HFA/VENTOLIN HFA) 108 (90 Base) MCG/ACT Inhaler  etonogestrel (IMPLANON/NEXPLANON) 68 MG IMPL  ipratropium - albuterol 0.5 mg/2.5 mg/3 mL (DUONEB) 0.5-2.5 (3) MG/3ML neb solution  varenicline (CHANTIX MARLEN) 0.5 MG X 11 & 1 MG X 42 tablet  varenicline (CHANTIX) 1 MG tablet          Review of Systems   All other systems reviewed and are negative.      Physical Exam   BP: (!) 176/98  Pulse: 98  Temp: 97.5  F (36.4  C)  Resp: 19  Height: 170.2 cm (5' 7\")  Weight: 146.5 kg (323 lb)  SpO2: 100 %      Physical Exam  Vitals signs reviewed.   Constitutional:       Appearance: Normal appearance. She is obese.   HENT:      Head: Normocephalic.      Nose: Nose normal.      Mouth/Throat:      Mouth: Mucous membranes are " moist.   Eyes:      Extraocular Movements: Extraocular movements intact.   Neck:      Musculoskeletal: Normal range of motion and neck supple.   Cardiovascular:      Rate and Rhythm: Normal rate.      Pulses: Normal pulses.      Heart sounds: Normal heart sounds.   Abdominal:      General: Bowel sounds are normal. There is no distension.      Palpations: Abdomen is soft.      Tenderness: There is no abdominal tenderness.   Musculoskeletal: Normal range of motion.   Skin:     Capillary Refill: Capillary refill takes less than 2 seconds.   Neurological:      General: No focal deficit present.      Mental Status: She is alert.   Psychiatric:         Mood and Affect: Mood normal.         ED Course        Procedures    Results for orders placed or performed during the hospital encounter of 12/29/19 (from the past 24 hour(s))   XR Abdomen 2 Views    Narrative    ABDOMEN TWO VIEW  DATE/TIME: 12/29/2019 4:42 PM     INDICATION: Foreign body ingestion.   COMPARISON: None.      Impression    IMPRESSION: There is a linear metallic body projecting over the upper  abdomen (likely within the distal stomach), consistent with ingestion  of a metallic foreign body. The examination is otherwise unremarkable.  No free air. Normal bowel gas pattern. Stool volume in the ascending  colon is at the upper limits of normal. The lung bases are clear.    JOSÉ BUSBY MD       Medications - No data to display    Assessments & Plan (with Medical Decision Making)  Ingested foreign body  Patient is in no acute distress here and exam is unremarkable, 2 view of the abdomen x-ray shows a linear metallic body projecting over the upper abdomen likely within the distal stomach consistent with ingested metallic foreign body.  There is no other concerning findings on x-ray.  Patient was reassured this will likely pass on its own she was encouraged to monitor her stools for the next few days to ensure it has passed.  Patient can resume a normal diet.  If  patient develops any worsening pain, nausea, vomiting hematemesis or hematochezia she needs to return to the emergency room which she was agreeable to.  Patient was discharged in stable condition.     I have reviewed the nursing notes.    I have reviewed the findings, diagnosis, plan and need for follow up with the patient.    Discharge Medication List as of 12/29/2019  5:55 PM          Final diagnoses:   Swallowed foreign body, initial encounter       12/29/2019   [facility] EMERGENCY DEPARTMENT     Brittny Walsh, SAVI CNP  12/29/19 185

## 2020-01-03 ENCOUNTER — MYC MEDICAL ADVICE (OUTPATIENT)
Dept: FAMILY MEDICINE | Facility: CLINIC | Age: 20
End: 2020-01-03

## 2020-01-03 NOTE — DISCHARGE INSTRUCTIONS
Bronchitis, Antibiotic Treatment (Adult)    Bronchitis is an infection of the air passages (bronchial tubes) in your lungs. It often occurs when you have a cold. This illness is contagious during the first few days and is spread through the air by coughing and sneezing, or by direct contact (touching the sick person and then touching your own eyes, nose, or mouth).  Symptoms of bronchitis include cough with mucus (phlegm) and low-grade fever. Bronchitis usually lasts 7 to 14 days. Mild cases can be treated with simple home remedies. More severe infection is treated with an antibiotic.  Home care  Follow these guidelines when caring for yourself at home:    If your symptoms are severe, rest at home for the first 2 to 3 days. When you go back to your usual activities, don't let yourself get too tired.    Do not smoke. Also avoid being exposed to secondhand smoke.    You may use over-the-counter medicines to control fever or pain, unless another medicine was prescribed. (Note: If you have chronic liver or kidney disease or have ever had a stomach ulcer or gastrointestinal bleeding, talk with your healthcare provider before using these medicines. Also talk to your provider if you are taking medicine to prevent blood clots.) Aspirin should never be given to anyone younger than 18 years of age who is ill with a viral infection or fever. It may cause severe liver or brain damage.    Your appetite may be poor, so a light diet is fine. Avoid dehydration by drinking 6 to 8 glasses of fluids per day (such as water, soft drinks, sports drinks, juices, tea, or soup). Extra fluids will help loosen secretions in the nose and lungs.    Over-the-counter cough, cold, and sore-throat medicines will not shorten the length of the illness, but they may be helpful to reduce symptoms. (Note: Do not use decongestants if you have high blood pressure.)    Finish all antibiotic medicine. Do this even if you are feeling better after only a  All finished Dr. Goode! few days.  Follow-up care  Follow up with your healthcare provider, or as advised. If you had an X-ray or ECG (electrocardiogram), a specialist will review it. You will be notified of any new findings that may affect your care.  Note: If you are age 65 or older, or if you have a chronic lung disease or condition that affects your immune system, or you smoke, talk to your healthcare provider about having pneumococcal vaccinations and a yearly influenza vaccination (flu shot).  When to seek medical advice  Call your healthcare provider right away if any of these occur:    Fever of 100.4 F (38 C) or higher    Coughing up increased amounts of colored sputum    Weakness, drowsiness, headache, facial pain, ear pain, or a stiff neck  Call 911, or get immediate medical care  Contact emergency services right away if any of these occur.    Coughing up blood    Worsening weakness, drowsiness, headache, or stiff neck    Trouble breathing, wheezing, or pain with breathing  Date Last Reviewed: 9/13/2015 2000-2017 [employer]. [address] [place] [address], [place] [address]. All rights reserved. This information is not intended as a substitute for professional medical care. Always follow your healthcare professional's instructions.

## 2020-01-05 NOTE — TELEPHONE ENCOUNTER
Is there anytime this week or next to see Sasha to re-evaluate her leg bumps and knee?    Electronically signed by:  Oscar Medley M.D.  1/5/2020

## 2020-01-06 NOTE — TELEPHONE ENCOUNTER
Patient called back, relayed message above. Appt scheduled        Altagracia Gerber ~ Patient Representative  [address] [address]  [email or web address]  [email or web address]  Office:  [phone]  Fax:  [phone]

## 2020-01-07 ENCOUNTER — OFFICE VISIT (OUTPATIENT)
Dept: FAMILY MEDICINE | Facility: CLINIC | Age: 20
End: 2020-01-07
Payer: COMMERCIAL

## 2020-01-07 VITALS
OXYGEN SATURATION: 100 % | DIASTOLIC BLOOD PRESSURE: 80 MMHG | TEMPERATURE: 95.9 F | HEART RATE: 84 BPM | WEIGHT: 293 LBS | HEIGHT: 68 IN | SYSTOLIC BLOOD PRESSURE: 110 MMHG | RESPIRATION RATE: 18 BRPM | BODY MASS INDEX: 44.41 KG/M2

## 2020-01-07 DIAGNOSIS — M79.661 RIGHT CALF PAIN: Primary | ICD-10-CM

## 2020-01-07 PROCEDURE — 99213 OFFICE O/P EST LOW 20 MIN: CPT | Performed by: FAMILY MEDICINE

## 2020-01-07 ASSESSMENT — PAIN SCALES - GENERAL: PAINLEVEL: MODERATE PAIN (4)

## 2020-01-07 ASSESSMENT — MIFFLIN-ST. JEOR: SCORE: 2285.88

## 2020-01-07 NOTE — PROGRESS NOTES
Subjective     Sasha Hand is a 19 year old female who presents to clinic today for the following health issues:    HPI   Chief Complaint   Patient presents with     Mass     left calf 3 weeks now on and off       PROBLEMS TO ADD ON...  We have seen her for this issue a while ago and did an ultrasound which showed probable varicosities in this region without evidence of any clots or thrombophlebitis.  She now has 2 or 3 more that only bother her when she pokes at them.  She is quite active and does not notice any other increased in edema redness or warmth to those areas.    Patient Active Problem List   Diagnosis     Morbid obesity (H)     Tear of lateral cartilage or meniscus of knee, current     Menorrhagia     Dysmenorrhea     Wheezing     Tobacco use disorder     Genital herpes simplex, unspecified site     Mild persistent asthma without complication     Nexplanon placed 11/14/17     DELIA (generalized anxiety disorder)     Major depressive disorder, single episode, moderate (H)     Acute pyelonephritis     Pyelonephritis     Decreased oral intake     Depression     Closed head injury, subsequent encounter     Neoplasm of uncertain behavior      Skin tag (right collar line and left labial region     Past Surgical History:   Procedure Laterality Date     ARTHROSCOPY KNEE WITH MENISCAL REPAIR Right 5/10/2017    Procedure: ARTHROSCOPY KNEE WITH MENISCAL REPAIR;  arthroscopy right knee with lateral meniscus repair;  Surgeon: Nathaniel Hicks MD;  Location: [facility] OR       Social History     Tobacco Use     Smoking status: Current Every Day Smoker     Packs/day: 1.00     Types: Cigarettes     Smokeless tobacco: Never Used   Substance Use Topics     Alcohol use: No     Alcohol/week: 0.0 standard drinks     Family History   Problem Relation Age of Onset     Asthma Mother      Hypertension Mother      Asthma Father      Diabetes Father      Cancer Father 60        metastatic to brain     Cervical Cancer Sister 19      "Cancer Paternal Grandmother          Current Outpatient Medications   Medication Sig Dispense Refill     albuterol (PROAIR HFA/PROVENTIL HFA/VENTOLIN HFA) 108 (90 Base) MCG/ACT Inhaler Inhale 1-2 puffs into the lungs every 4 hours as needed for shortness of breath / dyspnea or wheezing 1 Inhaler 11     etonogestrel (IMPLANON/NEXPLANON) 68 MG IMPL 1 each (68 mg) by Subdermal route continuous  0     ipratropium - albuterol 0.5 mg/2.5 mg/3 mL (DUONEB) 0.5-2.5 (3) MG/3ML neb solution Take 1 vial (3 mLs) by nebulization every 6 hours as needed for shortness of breath / dyspnea or wheezing 30 vial 1     Allergies   Allergen Reactions     No Known Drug Allergies      Seasonal Allergies      Recent Labs   Lab Test 11/21/19  0948 05/31/18  2235 03/12/18  0740  02/19/18  2035   LDL 76  --  43  --   --    HDL 42*  --  37*  --   --    TRIG 120  --  58  --   --    ALT  --  49 36  --  25   CR  --  0.63 0.62   < > 0.61   GFRESTIMATED  --  >90 >90   < > >90   GFRESTBLACK  --  >90 >90   < > >90   POTASSIUM  --  3.7 3.9   < > 3.3*   TSH 3.07  --  0.96  --   --     < > = values in this interval not displayed.      BP Readings from Last 3 Encounters:   01/07/20 110/80   12/29/19 (!) 176/98   11/21/19 128/84    Wt Readings from Last 3 Encounters:   01/07/20 147.9 kg (326 lb)   12/29/19 146.5 kg (323 lb)   11/21/19 145.2 kg (320 lb)                 Reviewed and updated as needed this visit by Provider         Review of Systems   ROS COMP: Constitutional, HEENT, cardiovascular, pulmonary, gi and gu systems are negative, except as otherwise noted.      Objective    Temp 95.9  F (35.5  C) (Temporal)   Ht 1.717 m (5' 7.6\")   Wt 147.9 kg (326 lb)   SpO2 100%   BMI 50.16 kg/m    Body mass index is 50.16 kg/m .  Physical Exam   GENERAL: healthy, alert and no distress  MS: She is got very large legs but no significant edema.  On the posterior aspect of her right calf there is some 1 to 2 cm regions that are firm and nonraised.  We really " have to feel the area it is to know that there and this could be again some small varicosities underneath the the skin they are not rockhard but definitely there are some definition there.  There is no significant tenderness to the calf in general no evidence of redness or warmth so I doubt that this is anything to do with any deeper structures.  Her exam is essentially unchanged from when I saw her previously.    Diagnostic Test Results:  Labs reviewed in Epic    None    Assessment & Plan     (M79.661) Right calf pain  (primary encounter diagnosis)  Comment: Most likely she is got some small varicosities or even some small lipomas under the skin that do not appear too concerning.  There is no physical exam that is consistent with DVT or anything more serious going on.  She is got good color and sensation otherwise.  Plan: She is, watch and wait and if this worsens we may need to repeat her Doppler duplex.  She is going to try to stop smoking which would decrease her risk for any DVTs.      No follow-ups on file.    Electronically signed by:  Oscar Medley M.D.  1/9/2020

## 2020-01-08 ASSESSMENT — ASTHMA QUESTIONNAIRES: ACT_TOTALSCORE: 25

## 2020-01-16 ENCOUNTER — OFFICE VISIT (OUTPATIENT)
Dept: FAMILY MEDICINE | Facility: CLINIC | Age: 20
End: 2020-01-16
Payer: COMMERCIAL

## 2020-01-16 VITALS
SYSTOLIC BLOOD PRESSURE: 136 MMHG | DIASTOLIC BLOOD PRESSURE: 78 MMHG | TEMPERATURE: 97.5 F | OXYGEN SATURATION: 100 % | HEART RATE: 90 BPM

## 2020-01-16 DIAGNOSIS — L91.8 SKIN TAG: ICD-10-CM

## 2020-01-16 DIAGNOSIS — E66.01 MORBID OBESITY (H): ICD-10-CM

## 2020-01-16 DIAGNOSIS — Z30.011 ENCOUNTER FOR INITIAL PRESCRIPTION OF CONTRACEPTIVE PILLS: ICD-10-CM

## 2020-01-16 DIAGNOSIS — F32.1 MAJOR DEPRESSIVE DISORDER, SINGLE EPISODE, MODERATE (H): ICD-10-CM

## 2020-01-16 DIAGNOSIS — Z30.46 NEXPLANON REMOVAL: ICD-10-CM

## 2020-01-16 PROBLEM — Z30.017 ENCOUNTER FOR INITIAL PRESCRIPTION OF IMPLANTABLE SUBDERMAL CONTRACEPTIVE: Status: RESOLVED | Noted: 2017-11-14 | Resolved: 2020-01-16

## 2020-01-16 PROBLEM — N12 PYELONEPHRITIS: Status: RESOLVED | Noted: 2018-02-19 | Resolved: 2020-01-16

## 2020-01-16 PROBLEM — Z30.017 ENCOUNTER FOR INITIAL PRESCRIPTION OF IMPLANTABLE SUBDERMAL CONTRACEPTIVE: Status: ACTIVE | Noted: 2017-11-14

## 2020-01-16 PROBLEM — D48.5 NEOPLASM OF UNCERTAIN BEHAVIOR OF SKIN: Status: RESOLVED | Noted: 2019-11-21 | Resolved: 2020-01-16

## 2020-01-16 PROBLEM — S09.90XD CLOSED HEAD INJURY, SUBSEQUENT ENCOUNTER: Status: RESOLVED | Noted: 2018-04-05 | Resolved: 2020-01-16

## 2020-01-16 PROBLEM — N10 ACUTE PYELONEPHRITIS: Status: RESOLVED | Noted: 2018-02-19 | Resolved: 2020-01-16

## 2020-01-16 PROBLEM — R63.8 DECREASED ORAL INTAKE: Status: RESOLVED | Noted: 2018-02-20 | Resolved: 2020-01-16

## 2020-01-16 PROCEDURE — 99214 OFFICE O/P EST MOD 30 MIN: CPT | Mod: 25 | Performed by: FAMILY MEDICINE

## 2020-01-16 PROCEDURE — 11200 RMVL SKIN TAGS UP TO&INC 15: CPT | Performed by: FAMILY MEDICINE

## 2020-01-16 PROCEDURE — 11982 REMOVE DRUG IMPLANT DEVICE: CPT | Performed by: FAMILY MEDICINE

## 2020-01-16 RX ORDER — DESOGESTREL AND ETHINYL ESTRADIOL 0.15-0.03
1 KIT ORAL DAILY
Qty: 112 TABLET | Refills: 4 | Status: SHIPPED | OUTPATIENT
Start: 2020-01-16 | End: 2021-03-29

## 2020-01-16 ASSESSMENT — PAIN SCALES - GENERAL: PAINLEVEL: NO PAIN (0)

## 2020-01-16 NOTE — NURSING NOTE
Chief Complaint   Patient presents with     Contraception     Nexplanon removal      Skin Tags     MP/MA

## 2020-01-16 NOTE — PROGRESS NOTES
Subjective:   Patient is in today to have her Nexplanon removed.  She has been on progesterone only products for a number of years.  She has had this Nexplanon in for 2 years and was on Depakote for a couple years prior to that.  She states that she has gained a lot of weight on these methods and would like to go just back to a combination OCP which she was on when she was in her early teens to control her menometrorrhagia.  She is in a stable long-term relationship, has children yet.  She also has a few skin tags that she would like removed.  She is got one on her right lateral neck region that has gotten significantly larger and gets caught on clothing and jewelry.  She also has 2 underneath the right axilla that makes it difficult for her to shave because they keep on getting in the way or injured.    Objective:  /78   Pulse 90   Temp 97.5  F (36.4  C) (Temporal)   SpO2 100%   Exam: I can easily palpate the Nexplanon in her left upper arm.    She also has a 5 to 6 mm skin tag that is hyperpigmented along the collar line on the right neck region.  In the right axilla.  Anteriorly along the axillary line she has a large 5 to 6 mm skin tag again slightly pigmented on a wider base and then just a little bit more anterior into the axilla base much smaller 2 to 3 mm skin tag that is not hyperpigmented.    Procedure: This area was cleaned with Hibiclens and anesthetized with 4% lidocaine with epinephrine.  Under adequate anesthesia a stab incision was made at the distal end of the tube.  With minimal manipulation I was able to extrude the tube from that incision and remove it completely.  There was minimal bleeding so I opted to place 3-week eighth inch Steri-Strips to reapproximate the skin edges after applying benzoin to the skin.  I then placed a small Band-Aid over this.  She can remove these after 3 to 4 days.  The bandage can come off later tonight or tomorrow.    I then anesthetized each of these skin  tags as noted above with 1% lidocaine with epinephrine after cleaning the areas with alcohol.  I then elevated the skin tags with thumb forceps and snipped them at the base with a curved iris scissor.  He was silver nitrate sticks to cauterize the base of each of these.  She tolerated it quite well.    Assessment/plan:  (Z30.011) Encounter for initial prescription of contraceptive pills  Comment: Patient's Nexplanon was removed today so she wants to start OCPs to prevent pregnancy.  Plan: desogestrel-ethinyl estradiol (DESOGEN) 0.15-30        MG-MCG tablet        She will take her herself once every 4 pill packs so she is getting up menses about once every 3 months.    (L91.8) Skin tag  Comment: Skin tag removal today  Plan: See note above.  She will watch for any signs of infection.    (Z30.46) Nexplanon removal  Comment: No issues with removing her Nexplanon.  Plan: She remove the Band-Aid later tonight or tomorrow and then let the Steri-Strips fall off.    (F32.1) Major depressive disorder, single episode, moderate (H)  Comment: Depression is been stable and well-controlled on no medications.  Plan: I will resolve this from her problem list since she is not having any active depressive symptoms.    (E66.01) Morbid obesity (H)  Comment: She has gained a significant amount of weight over the past 3 to 5 years.  Plan: She is currently doing a diet program with her mother who has lost 50 pounds on this program and she is hoping to lose central amount of weight.    Electronically signed by:  Oscar Medley M.D.  1/16/2020

## 2020-05-28 ENCOUNTER — MYC MEDICAL ADVICE (OUTPATIENT)
Dept: FAMILY MEDICINE | Facility: CLINIC | Age: 20
End: 2020-05-28

## 2020-05-28 NOTE — TELEPHONE ENCOUNTER
Per Dr. Elizondo Pt should be seen in clinic again. Okay to put on schedule tomorrow. Sent "[employer]" message informing Pt of this.     Yesenia Tamayo CMA

## 2020-05-28 NOTE — TELEPHONE ENCOUNTER
Patient seen Dr. Medley in Jan for right calf pain and bumps, she is updating us and sent a photo.   Would like to know what her next step would or should be.   India MOSES

## 2020-05-29 ENCOUNTER — OFFICE VISIT (OUTPATIENT)
Dept: FAMILY MEDICINE | Facility: CLINIC | Age: 20
End: 2020-05-29
Payer: COMMERCIAL

## 2020-05-29 VITALS
TEMPERATURE: 98.6 F | HEART RATE: 99 BPM | SYSTOLIC BLOOD PRESSURE: 126 MMHG | BODY MASS INDEX: 49.27 KG/M2 | WEIGHT: 293 LBS | OXYGEN SATURATION: 99 % | DIASTOLIC BLOOD PRESSURE: 72 MMHG

## 2020-05-29 DIAGNOSIS — L98.9 SKIN LESION: Primary | ICD-10-CM

## 2020-05-29 PROCEDURE — 11104 PUNCH BX SKIN SINGLE LESION: CPT | Performed by: FAMILY MEDICINE

## 2020-05-29 PROCEDURE — 88305 TISSUE EXAM BY PATHOLOGIST: CPT | Performed by: FAMILY MEDICINE

## 2020-05-29 ASSESSMENT — PATIENT HEALTH QUESTIONNAIRE - PHQ9: SUM OF ALL RESPONSES TO PHQ QUESTIONS 1-9: 5

## 2020-05-29 NOTE — PROGRESS NOTES
Subjective     Sasha Hand is a 20 year old female who presents to clinic today for the following health issues:    HPI   Bruise on Left calf getting worse      Duration: 7 months    Description (location/character/radiation): Left calf, larger bruise, brown siva in color, 2 different spots, knee pain     Intensity:  moderate    Accompanying signs and symptoms: lumps underneath the brusing     History (similar episodes/previous evaluation): Yes had an ultrasound done    Precipitating or alleviating factors: None    Therapies tried and outcome: None          Patient said some spots on her leg over the past many months.  They have been slightly enlarging and now she feels like the dermis underneath these areas are changing becoming nodular.  Her mother has history of scleroderma and has lesions exactly like this on her body she is concerned that she may be developing it.  Other complaints at this time.      Review of Systems   Constitutional, HEENT, cardiovascular, pulmonary, gi and gu systems are negative, except as otherwise noted.      Objective    /72   Pulse 99   Temp 98.6  F (37  C) (Temporal)   Wt 145.3 kg (320 lb 4 oz)   LMP 05/25/2020   SpO2 99%   Breastfeeding No   BMI 49.27 kg/m    Body mass index is 49.27 kg/m .  Physical Exam   GENERAL: healthy, alert and no distress  SKIN: She has some irregularly shaped tan to reddish discolored lesions on the back of her left calf.  The lesions are measuring about 3 x 6 cm with irregular borders and there appears to be some irregular stroma underneath these lesions.    Procedure:  1 area in the superior lesion was prepped with alcohol anesthetized with 1% lidocaine with epinephrine.  A 3 mm punch biopsy was obtained.  The base was hyfrecated and a sterile dressing was applied.  Patient tolerated the procedure well.  The sample was sent off for pathology in formalin.    Diagnostic Test Results:  Labs reviewed in Epic  Kin lesion sent to path in  formalinAssessment & Plan   Assessment      Plan  1. Skin lesion  Wait for the biopsy to come back if indeed it is scleroderma she will need referral down to [facility] to the scleroderma clinic.  For further evaluation and work-up.  - Surgical pathology exam    Marvel Elizondo MD, MD  [facility]            Assessment & Plan   Assessment      Plan  1. Skin lesion  Contact the patient with the biopsy report.  If the lesion is indeed scleroderma she will need referral done to the scleroderma clinic at the [facility] for further work-up.  - Surgical pathology exam  - HC PUNCH BIOPSY OF SKIN, FIRST LESION      Marvel Elizondo MD, MD  [facility]

## 2020-06-03 LAB — COPATH REPORT: NORMAL

## 2020-08-24 ENCOUNTER — MYC MEDICAL ADVICE (OUTPATIENT)
Dept: FAMILY MEDICINE | Facility: CLINIC | Age: 20
End: 2020-08-24

## 2020-09-10 ENCOUNTER — E-VISIT (OUTPATIENT)
Dept: FAMILY MEDICINE | Facility: CLINIC | Age: 20
End: 2020-09-10
Payer: COMMERCIAL

## 2020-09-10 DIAGNOSIS — L03.031 SUBUNGUAL INFECTION OF TOE OF BOTH FEET DUE TO INGROWN TOENAIL: Primary | ICD-10-CM

## 2020-09-10 DIAGNOSIS — L03.032 SUBUNGUAL INFECTION OF TOE OF BOTH FEET DUE TO INGROWN TOENAIL: Primary | ICD-10-CM

## 2020-09-10 DIAGNOSIS — L60.0 SUBUNGUAL INFECTION OF TOE OF BOTH FEET DUE TO INGROWN TOENAIL: Primary | ICD-10-CM

## 2020-09-10 PROCEDURE — 99421 OL DIG E/M SVC 5-10 MIN: CPT | Performed by: FAMILY MEDICINE

## 2020-09-10 RX ORDER — CEPHALEXIN 500 MG/1
500 CAPSULE ORAL 2 TIMES DAILY
Qty: 20 CAPSULE | Refills: 0 | Status: SHIPPED | OUTPATIENT
Start: 2020-09-10 | End: 2020-09-20

## 2020-09-15 DIAGNOSIS — R06.2 WHEEZING: ICD-10-CM

## 2020-09-16 RX ORDER — ALBUTEROL SULFATE 90 UG/1
AEROSOL, METERED RESPIRATORY (INHALATION)
Qty: 18 G | Refills: 0 | Status: ON HOLD | OUTPATIENT
Start: 2020-09-16 | End: 2022-07-26

## 2020-09-16 NOTE — TELEPHONE ENCOUNTER
Routing refill request to provider for review/approval because:  ACT not current  Completed on 01/07/2020, score of 25    Prema Carlton RN

## 2021-01-05 ENCOUNTER — MYC MEDICAL ADVICE (OUTPATIENT)
Dept: FAMILY MEDICINE | Facility: CLINIC | Age: 21
End: 2021-01-05

## 2021-01-05 DIAGNOSIS — F17.200 TOBACCO USE DISORDER: ICD-10-CM

## 2021-01-05 RX ORDER — VARENICLINE TARTRATE 1 MG/1
1 TABLET, FILM COATED ORAL 2 TIMES DAILY
Qty: 60 TABLET | Refills: 1 | Status: SHIPPED | OUTPATIENT
Start: 2021-01-05 | End: 2021-02-25

## 2021-01-09 ENCOUNTER — HEALTH MAINTENANCE LETTER (OUTPATIENT)
Age: 21
End: 2021-01-09

## 2021-02-25 ENCOUNTER — OFFICE VISIT (OUTPATIENT)
Dept: FAMILY MEDICINE | Facility: CLINIC | Age: 21
End: 2021-02-25
Payer: COMMERCIAL

## 2021-02-25 VITALS
RESPIRATION RATE: 18 BRPM | DIASTOLIC BLOOD PRESSURE: 94 MMHG | HEIGHT: 69 IN | WEIGHT: 293 LBS | HEART RATE: 112 BPM | BODY MASS INDEX: 43.4 KG/M2 | OXYGEN SATURATION: 99 % | TEMPERATURE: 97.6 F | SYSTOLIC BLOOD PRESSURE: 138 MMHG

## 2021-02-25 DIAGNOSIS — L91.8 SKIN TAG: ICD-10-CM

## 2021-02-25 DIAGNOSIS — L98.9 SKIN LESION: ICD-10-CM

## 2021-02-25 PROCEDURE — 88305 TISSUE EXAM BY PATHOLOGIST: CPT | Performed by: DERMATOLOGY

## 2021-02-25 PROCEDURE — 11300 SHAVE SKIN LESION 0.5 CM/<: CPT | Mod: 59 | Performed by: FAMILY MEDICINE

## 2021-02-25 PROCEDURE — 11200 RMVL SKIN TAGS UP TO&INC 15: CPT | Performed by: FAMILY MEDICINE

## 2021-02-25 ASSESSMENT — PAIN SCALES - GENERAL: PAINLEVEL: NO PAIN (0)

## 2021-02-25 ASSESSMENT — MIFFLIN-ST. JEOR: SCORE: 2295.17

## 2021-02-25 ASSESSMENT — PATIENT HEALTH QUESTIONNAIRE - PHQ9: SUM OF ALL RESPONSES TO PHQ QUESTIONS 1-9: 8

## 2021-02-25 NOTE — PROGRESS NOTES
"    Assessment & Plan   (L98.9) Skin lesion (left ear and left labia)  Comment: Skin lesions of the left ear and left labia that have now been removed and sent to pathology.  Plan: Dermatological path order and indications        I will contact the patient once pathology results are back.  We will make plans for follow-up if necessary at that time.    (L91.8) Skin tag  Comment: Small skin tag of the neck that was removed without incident.  Plan: No further treatment for that.                Tobacco Cessation:   reports that she has been smoking cigarettes. She has been smoking about 1.00 pack per day. She has never used smokeless tobacco.      BMI:   Estimated body mass index is 48.85 kg/m  as calculated from the following:    Height as of this encounter: 1.74 m (5' 8.5\").    Weight as of this encounter: 147.9 kg (326 lb).           No follow-ups on file.    Oscar Medley MD, MD  [facility]    Marco Antonio Baez is a 20 year old who presents for the following health issues     HPI   Chief Complaint   Patient presents with     Skin Tags     left ear, vaginal area and left side of neck    Lesion on the left ear which is between the helix and the raquel of the helix has been there for years but now has gotten larger and has hairs growing out of it.  The patient would like it removed to make sure that there is no abnormalities within the lesion itself.  She also has a left labial lesion that has increased in size and causes irritation during intercourse that she would like removed.  She has one of the skin tag on the left neck around the collar line that is protruding more and would like that removed.  She has a number of other moles throughout the integument which she has not noticed any significant changes with.    Patient did quit smoking about 3 weeks ago which she is happy about.      Review of Systems   Constitutional, HEENT, cardiovascular, pulmonary, gi and gu systems are negative, " "except as otherwise noted.      Objective    BP (!) 138/94   Pulse 112   Temp 97.6  F (36.4  C) (Temporal)   Resp 18   Ht 1.74 m (5' 8.5\")   Wt 147.9 kg (326 lb)   SpO2 99%   BMI 48.85 kg/m    Body mass index is 48.85 kg/m .  Physical Exam   GENERAL: healthy, alert and no distress  SKIN: Patient has a 6 to 7 mm raised lesion between the helix on the raquel of the helix that is pigmented.  It is quite dark in pigmentation and there is a small hair growing out of it.  It is raised about 6-7 mm.  She also has a lesion on the left labia which looks like a skin tag that has really grown it is quite wide-based about a centimeter at the base by 6 to 7 mm wide.  It is quite raised although probably 8 to 10 mm and very fleshy in appearance.  She has a number of other nevi throughout the integument of varying sizes and shapes most are less than 6 mm in size.  She is got a small skin tag on the left caudal line that is about 2- mm in diameter.    Procedure: All areas were cleaned with alcohol and then anesthetized with 1% lidocaine with epinephrine.  I then cleaned each area separately with Hibiclens and did shave biopsies of the 2 larger lesions and snipped the skin tag off at the base.  Started at the left neck where we grabbed with the forceps and snipped this small skin tag it at the base with an iris scissors.  The larger lesion on the ear we did a shave biopsy by elevating it and removed the entire lesion and all the pigmented portion.  This did leave a small divot in the tissue which I had warned her about an had some bleeding but we cauterized this with silver nitrate and it stopped.  The labial lesion was again elevated with a thumb forceps and shaved off at the base completely.  There was minimal to no bleeding here but I did cauterize it with silver nitrate and placed a small Band-Aid over the top of it.  I also placed a small Band-Aid over the neck sites which was bleeding minimally and nothing on the ear.  " The ear and labial lesions were then placed in specimen jars and labeled appropriately for pathology.  I did not send the neck skin tag.

## 2021-02-25 NOTE — NURSING NOTE
Chief Complaint   Patient presents with     Skin Tags     left ear, vaginal area and left side of neck     MP/MA

## 2021-02-26 ASSESSMENT — ASTHMA QUESTIONNAIRES: ACT_TOTALSCORE: 24

## 2021-03-01 LAB — COPATH REPORT: NORMAL

## 2021-03-28 DIAGNOSIS — Z30.011 ENCOUNTER FOR INITIAL PRESCRIPTION OF CONTRACEPTIVE PILLS: ICD-10-CM

## 2021-03-29 RX ORDER — DESOGESTREL AND ETHINYL ESTRADIOL 0.15-0.03
KIT ORAL
Qty: 112 TABLET | Refills: 1 | Status: SHIPPED | OUTPATIENT
Start: 2021-03-29 | End: 2021-10-22

## 2021-03-31 ENCOUNTER — ALLIED HEALTH/NURSE VISIT (OUTPATIENT)
Dept: FAMILY MEDICINE | Facility: CLINIC | Age: 21
End: 2021-03-31
Payer: COMMERCIAL

## 2021-03-31 VITALS — DIASTOLIC BLOOD PRESSURE: 86 MMHG | SYSTOLIC BLOOD PRESSURE: 156 MMHG

## 2021-03-31 DIAGNOSIS — Z01.30 BLOOD PRESSURE CHECK: Primary | ICD-10-CM

## 2021-03-31 DIAGNOSIS — I10 ESSENTIAL HYPERTENSION, BENIGN: ICD-10-CM

## 2021-03-31 PROCEDURE — 99207 PR NO CHARGE NURSE ONLY: CPT

## 2021-03-31 RX ORDER — LABETALOL 100 MG/1
100 TABLET, FILM COATED ORAL 2 TIMES DAILY
Qty: 60 TABLET | Refills: 1 | Status: SHIPPED | OUTPATIENT
Start: 2021-03-31 | End: 2021-04-25

## 2021-03-31 NOTE — LETTER
[address] [address]  [phone]        March 31, 2021    Sasha Huffel  [address] [facility] [address]          Dear Sasha,    Appointment Information   Name: Sasha Hand MRN: [id number]     Date: 3/31/2021 Status: Arrived     Time: 9:00 AM Length: 20     Visit Type: NURSE ONLY [145] Copay: $0.00     Provider: SANG LEIVA MA Department:  FAMILY PRACTICE     Encounter #: [id number] Notes: B/P- Daniel Chandler will do    Printed on:         Arrival Time: 8:47 AM             Sincerely,        Your formerly [facility] Care Team

## 2021-03-31 NOTE — PROGRESS NOTES
Patient in for BP check today with elevated results, patient also reports a positive pregnancy test this morning at home.      Spoke with PCP, patient to start Labetalol 100mg BID and schedule appointment for OB intake.  Patient expressed verbal understanding.     Appointment made and medication pended for provider to sign.     Prema Carlton RN

## 2021-04-22 ENCOUNTER — ALLIED HEALTH/NURSE VISIT (OUTPATIENT)
Dept: FAMILY MEDICINE | Facility: CLINIC | Age: 21
End: 2021-04-22
Payer: COMMERCIAL

## 2021-04-22 VITALS — SYSTOLIC BLOOD PRESSURE: 144 MMHG | DIASTOLIC BLOOD PRESSURE: 82 MMHG

## 2021-04-22 DIAGNOSIS — O20.0 THREATENED MISCARRIAGE: ICD-10-CM

## 2021-04-22 DIAGNOSIS — I10 ESSENTIAL HYPERTENSION, BENIGN: Primary | ICD-10-CM

## 2021-04-22 LAB — HCG SERPL QL: NEGATIVE

## 2021-04-22 PROCEDURE — 99207 PR NO CHARGE NURSE ONLY: CPT

## 2021-04-22 PROCEDURE — 84703 CHORIONIC GONADOTROPIN ASSAY: CPT | Performed by: FAMILY MEDICINE

## 2021-04-22 NOTE — PROGRESS NOTES
blood pressure was checked today twice:     Manually   Right arm  Large cuff    1st reading at 150/80 at 2:32    2nd reading at 144/82 at 2:37    Rivka Bledsoe CMA (AAMA)

## 2021-04-25 RX ORDER — LABETALOL 200 MG/1
200 TABLET, FILM COATED ORAL 2 TIMES DAILY
Qty: 60 TABLET | Refills: 3 | Status: SHIPPED | OUTPATIENT
Start: 2021-04-25 | End: 2021-08-30

## 2021-04-25 NOTE — PROGRESS NOTES
I changed her dose to 200 mg (sent 200 mg tablets) twice daily.  Will need a repeat BP in 2 months with me in the clinic.     Electronically signed by:  Oscar Medley M.D.  4/25/2021

## 2021-06-24 ENCOUNTER — OFFICE VISIT (OUTPATIENT)
Dept: FAMILY MEDICINE | Facility: CLINIC | Age: 21
End: 2021-06-24
Payer: COMMERCIAL

## 2021-06-24 VITALS
SYSTOLIC BLOOD PRESSURE: 122 MMHG | TEMPERATURE: 96.3 F | DIASTOLIC BLOOD PRESSURE: 82 MMHG | WEIGHT: 293 LBS | HEART RATE: 84 BPM | OXYGEN SATURATION: 98 % | RESPIRATION RATE: 18 BRPM | BODY MASS INDEX: 50.35 KG/M2

## 2021-06-24 DIAGNOSIS — Z13.220 LIPID SCREENING: ICD-10-CM

## 2021-06-24 DIAGNOSIS — R63.5 WEIGHT GAIN: ICD-10-CM

## 2021-06-24 DIAGNOSIS — E66.01 OBESITY, MORBID, BMI 50 OR HIGHER (H): ICD-10-CM

## 2021-06-24 DIAGNOSIS — I10 ESSENTIAL HYPERTENSION, BENIGN: ICD-10-CM

## 2021-06-24 LAB
ANION GAP SERPL CALCULATED.3IONS-SCNC: 6 MMOL/L (ref 3–14)
BUN SERPL-MCNC: 8 MG/DL (ref 7–30)
CALCIUM SERPL-MCNC: 8.9 MG/DL (ref 8.5–10.1)
CHLORIDE SERPL-SCNC: 111 MMOL/L (ref 94–109)
CHOLEST SERPL-MCNC: 173 MG/DL
CO2 SERPL-SCNC: 23 MMOL/L (ref 20–32)
CREAT SERPL-MCNC: 0.56 MG/DL (ref 0.52–1.04)
CREAT UR-MCNC: 170 MG/DL
GFR SERPL CREATININE-BSD FRML MDRD: >90 ML/MIN/{1.73_M2}
GLUCOSE SERPL-MCNC: 93 MG/DL (ref 70–99)
HDLC SERPL-MCNC: 58 MG/DL
LDLC SERPL CALC-MCNC: 89 MG/DL
MICROALBUMIN UR-MCNC: 9 MG/L
MICROALBUMIN/CREAT UR: 5.08 MG/G CR (ref 0–25)
NONHDLC SERPL-MCNC: 115 MG/DL
POTASSIUM SERPL-SCNC: 4.1 MMOL/L (ref 3.4–5.3)
SODIUM SERPL-SCNC: 140 MMOL/L (ref 133–144)
TRIGL SERPL-MCNC: 128 MG/DL
TSH SERPL DL<=0.005 MIU/L-ACNC: 3.1 MU/L (ref 0.4–4)

## 2021-06-24 PROCEDURE — 99214 OFFICE O/P EST MOD 30 MIN: CPT | Performed by: FAMILY MEDICINE

## 2021-06-24 PROCEDURE — 84443 ASSAY THYROID STIM HORMONE: CPT | Performed by: FAMILY MEDICINE

## 2021-06-24 PROCEDURE — 82043 UR ALBUMIN QUANTITATIVE: CPT | Performed by: FAMILY MEDICINE

## 2021-06-24 PROCEDURE — 80048 BASIC METABOLIC PNL TOTAL CA: CPT | Performed by: FAMILY MEDICINE

## 2021-06-24 PROCEDURE — 80061 LIPID PANEL: CPT | Performed by: FAMILY MEDICINE

## 2021-06-24 PROCEDURE — 36415 COLL VENOUS BLD VENIPUNCTURE: CPT | Performed by: FAMILY MEDICINE

## 2021-06-24 ASSESSMENT — PAIN SCALES - GENERAL: PAINLEVEL: NO PAIN (0)

## 2021-06-24 ASSESSMENT — PATIENT HEALTH QUESTIONNAIRE - PHQ9: SUM OF ALL RESPONSES TO PHQ QUESTIONS 1-9: 8

## 2021-06-24 NOTE — PROGRESS NOTES
"    Assessment & Plan     (I10) Essential hypertension, benign  Comment: Blood pressure is well controlled on her current dose of medication.  Plan: **Basic metabolic panel FUTURE anytime, Albumin        Random Urine Quantitative with Creat Ratio        We have put her on labetalol since she had early pregnancy but had a miscarriage.  I am going to continue her on this because it is working well and she does plan on having a baby within the next 2 to 3 years so we will just keep her on what is working for him.    (Z13.220) Lipid screening  Comment: due for lipids  Plan: Lipid panel reflex to direct LDL Fasting        Drawn today    (E66.01) Obesity, morbid, BMI 50 or higher (H)  (R63.5) Weight gain  Comment: She has gained another 20+ pounds since her last visit.  BMI is now greater than 50.  Plan: COMPREHENSIVE WEIGHT MANAGEMENT        We had a long discussion about bariatric programs and weight loss surgery.  She has some anxiety and concerns about going through something like this but she realizes how important it is for her to lose weight.  I especially would like her to lose weight prior to her considering getting pregnant.      30 minutes spent on the date of the encounter doing chart review, history and exam, documentation and further activities per the note       Tobacco Cessation:   reports that she has been smoking cigarettes. She has been smoking about 1.00 pack per day. She has never used smokeless tobacco.      BMI:   Estimated body mass index is 50.35 kg/m  as calculated from the following:    Height as of 2/25/21: 1.74 m (5' 8.5\").    Weight as of this encounter: 152.4 kg (336 lb).   Weight management plan: See above      No follow-ups on file.    Electronically signed by:  Oscar Medley M.D.  6/24/2021      Marco Antonio Baez is a 21 year old who presents for the following health issues     HPI     Hypertension Follow-up      Do you check your blood pressure regularly outside of the clinic? Yes "     Are you following a low salt diet? Yes    Are your blood pressures ever more than 140 on the top number (systolic) OR more   than 90 on the bottom number (diastolic), for example 140/90? No      How many servings of fruits and vegetables do you eat daily?  2-3    On average, how many sweetened beverages do you drink each day (Examples: soda, juice, sweet tea, etc.  Do NOT count diet or artificially sweetened beverages)?   1    How many days per week do you exercise enough to make your heart beat faster? 3 or less    How many minutes a day do you exercise enough to make your heart beat faster? 60 or more    How many days per week do you miss taking your medication? 0    Tolerating her blood pressure medications well.  She is concerned that she continues to gain weight and we talked a lot about her BMI and the risks that she is putting her health at with the continued weight gain.  She has had a friend who did the gastric sleeve procedure and has done well so that is something that she would consider.  We talked about putting on a consultation so we can at least do the information gathering for her.  She is willing to do that.  She is due for some labs today and will do that since she is fasting.  She was a little emotional today.  She found out that her mom is doing methamphetamines has probably been doing that for the last year or so since her dad .  She has cut off ties with her mom for now and told her that she still loves her and wants to be a part of her life but only when she is clean.    Review of Systems   Constitutional, HEENT, cardiovascular, pulmonary, gi and gu systems are negative, except as otherwise noted.      Objective    /82   Pulse 84   Temp 96.3  F (35.7  C) (Temporal)   Resp 18   Wt (!) 152.4 kg (336 lb)   SpO2 98%   BMI 50.35 kg/m    Body mass index is 50.35 kg/m .  Physical Exam   GENERAL: healthy, alert and no distress  NECK: no adenopathy, no asymmetry, masses, or scars  and thyroid normal to palpation  RESP: lungs clear to auscultation - no rales, rhonchi or wheezes  CV: regular rate and rhythm, normal S1 S2, no S3 or S4, no murmur, click or rub, no peripheral edema and peripheral pulses strong  MS: no gross musculoskeletal defects noted, no edema    Results for orders placed or performed in visit on 06/24/21 (from the past 24 hour(s))   **Basic metabolic panel FUTURE anytime   Result Value Ref Range    Sodium 140 133 - 144 mmol/L    Potassium 4.1 3.4 - 5.3 mmol/L    Chloride 111 (H) 94 - 109 mmol/L    Carbon Dioxide 23 20 - 32 mmol/L    Anion Gap 6 3 - 14 mmol/L    Glucose 93 70 - 99 mg/dL    Urea Nitrogen 8 7 - 30 mg/dL    Creatinine 0.56 0.52 - 1.04 mg/dL    GFR Estimate >90 >60 mL/min/[1.73_m2]    GFR Estimate If Black >90 >60 mL/min/[1.73_m2]    Calcium 8.9 8.5 - 10.1 mg/dL   Lipid panel reflex to direct LDL Fasting   Result Value Ref Range    Cholesterol 173 <200 mg/dL    Triglycerides 128 <150 mg/dL    HDL Cholesterol 58 >49 mg/dL    LDL Cholesterol Calculated 89 <100 mg/dL    Non HDL Cholesterol 115 <130 mg/dL   TSH with free T4 reflex   Result Value Ref Range    TSH 3.10 0.40 - 4.00 mU/L   Albumin Random Urine Quantitative with Creat Ratio   Result Value Ref Range    Creatinine Urine 170 mg/dL    Albumin Urine mg/L 9 mg/L    Albumin Urine mg/g Cr 5.08 0 - 25 mg/g Cr

## 2021-06-25 ASSESSMENT — ASTHMA QUESTIONNAIRES: ACT_TOTALSCORE: 21

## 2021-06-28 ENCOUNTER — MYC MEDICAL ADVICE (OUTPATIENT)
Dept: FAMILY MEDICINE | Facility: CLINIC | Age: 21
End: 2021-06-28

## 2021-07-16 NOTE — TELEPHONE ENCOUNTER
Can someone call her insurance to see if there is any coverage for Bariatric services under her plan?  I have never had an insurance plan deny this especially since her BMI is >50.    Electronically signed by:  Oscar Medley M.D.  7/16/2021

## 2021-07-16 NOTE — TELEPHONE ENCOUNTER
Called provider line (automated system) and they will fax us a copy of the benefits and eligibility. I gave the fax number [phone] ([facility] fax #) Tay Law on 7/16/2021 at 11:57 AM

## 2021-07-29 ENCOUNTER — VIRTUAL VISIT (OUTPATIENT)
Dept: FAMILY MEDICINE | Facility: OTHER | Age: 21
End: 2021-07-29
Payer: COMMERCIAL

## 2021-07-29 DIAGNOSIS — B35.3 ATHLETE'S FOOT ON LEFT: Primary | ICD-10-CM

## 2021-07-29 PROCEDURE — 99213 OFFICE O/P EST LOW 20 MIN: CPT | Mod: GT | Performed by: FAMILY MEDICINE

## 2021-07-29 RX ORDER — CLOTRIMAZOLE 1 %
CREAM (GRAM) TOPICAL 2 TIMES DAILY
Qty: 15 G | Refills: 1 | Status: ON HOLD | OUTPATIENT
Start: 2021-07-29 | End: 2022-07-26

## 2021-07-29 ASSESSMENT — PAIN SCALES - GENERAL: PAINLEVEL: MODERATE PAIN (4)

## 2021-07-29 NOTE — PROGRESS NOTES
Sasha is a 21 year old who is being evaluated via a billable video visit.      How would you like to obtain your AVS? MyChart  If the video visit is dropped, the invitation should be resent by: Text to cell phone: [phone]  Will anyone else be joining your video visit? No      Video Start Time: 9:56 AM    Assessment & Plan       ICD-10-CM    1. Athlete's foot on left  B35.3 clotrimazole (LOTRIMIN) 1 % external cream      Reviewed image over the video and it is consistent with athlete's foot.  She has been taking several antiitch creams that are not treating the underlying fungus.  Prescribed Lotrimin to assist in discussed reducing risk factors.  She will attempt to keep her feet clean and dry with moisture wicking socks when she is wearing socks and shoes.  Discussed weight is another risk factor and she is applying for direct gastric sleeve which we should assist in this regards as well.  She is declining STD screening Covid vaccination advance care planning at this time.    23 minutes spent on the date of the encounter doing chart review, history and exam, documentation and further activities per the note        Return in about 2 weeks (around 8/12/2021) for if not improved.    Amanda Cole MD, MD  [facility]   Sasha is a 21 year old who presents for the following health issues     History of Present Illness       She eats 0-1 servings of fruits and vegetables daily.She consumes 1 sweetened beverage(s) daily.She exercises with enough effort to increase her heart rate 20 to 29 minutes per day.  She exercises with enough effort to increase her heart rate 3 or less days per week.   She is taking medications regularly.       Rash  Onset/Duration: 2.5 weeks ago   Description  Location: feet   Character: round, raised, draining  Itching: mild  Intensity:  moderate  Progression of Symptoms:  worsening  Accompanying signs and symptoms:   Fever: no  Body aches or joint pain: no  Sore  throat symptoms: no  Recent cold symptoms: YES  History:           Previous episodes of similar rash: None  New exposures:  None  Recent travel: no  Exposure to similar rash: no  Precipitating or alleviating factors:   Therapies tried and outcome: Benadryl/diphenhydramine -  not effective    Lidocaine cream - no help.      Review of Systems   Constitutional, HEENT, cardiovascular, pulmonary, GI, , musculoskeletal, neuro, skin, endocrine and psych systems are negative, except as otherwise noted.      Objective    Vitals - Patient Reported  Pain Score: Moderate Pain (4)  Pain Loc: Foot      Vitals:  No vitals were obtained today due to virtual visit.    Physical Exam   GENERAL: Healthy, alert and no distress  EYES: Eyes grossly normal to inspection.  No discharge or erythema, or obvious scleral/conjunctival abnormalities.  RESP: No audible wheeze, cough, or visible cyanosis.  No visible retractions or increased work of breathing.    SKIN: Visible skin clear. No significant rash, abnormal pigmentation or lesions.  NEURO: Cranial nerves grossly intact.  Mentation and speech appropriate for age.  PSYCH: Mentation appears normal, affect normal/bright, judgement and insight intact, normal speech and appearance well-groomed.                Video-Visit Details    Type of service:  Video Visit    Video End Time:10:05 AM    Originating Location (pt. Location): Home    Distant Location (provider location):  [facility]     Platform used for Video Visit: [employer]

## 2021-08-09 ENCOUNTER — MYC MEDICAL ADVICE (OUTPATIENT)
Dept: FAMILY MEDICINE | Facility: CLINIC | Age: 21
End: 2021-08-09

## 2021-08-11 NOTE — TELEPHONE ENCOUNTER
please offer her a 40 minute appt in [place] with me tomorrow- make it for 40 minutes please-at 11 am   thanks- if that doesn't work I am booked for the rest of the week-    rm

## 2021-08-11 NOTE — TELEPHONE ENCOUNTER
Patient unable to come in today at that time.  She is wondering if there is anything that she can do at home.    Prema Carlton RN

## 2021-08-30 ENCOUNTER — MYC MEDICAL ADVICE (OUTPATIENT)
Dept: FAMILY MEDICINE | Facility: CLINIC | Age: 21
End: 2021-08-30

## 2021-08-30 DIAGNOSIS — I10 ESSENTIAL HYPERTENSION, BENIGN: ICD-10-CM

## 2021-08-30 RX ORDER — LABETALOL 200 MG/1
TABLET, FILM COATED ORAL
Qty: 60 TABLET | Refills: 1 | Status: SHIPPED | OUTPATIENT
Start: 2021-08-30 | End: 2021-09-03 | Stop reason: SINTOL

## 2021-08-30 NOTE — TELEPHONE ENCOUNTER
Prescription approved per [facility] Refill Protocol.    Prema Carlton RN

## 2021-09-13 NOTE — TELEPHONE ENCOUNTER
Did we ever receive any of this information?  I still have not seen anything.      Electronically signed by:  Oscar Medley M.D.  9/13/2021

## 2021-09-13 NOTE — TELEPHONE ENCOUNTER
Attempted again. [facility] fax number given.   Debbie Abrams, CMA on 9/13/2021 at 1:26 PM

## 2021-10-14 ENCOUNTER — MYC MEDICAL ADVICE (OUTPATIENT)
Dept: FAMILY MEDICINE | Facility: CLINIC | Age: 21
End: 2021-10-14

## 2021-10-14 DIAGNOSIS — Z11.59 ENCOUNTER FOR HEPATITIS C SCREENING TEST FOR LOW RISK PATIENT: ICD-10-CM

## 2021-10-14 DIAGNOSIS — Z11.3 SCREEN FOR STD (SEXUALLY TRANSMITTED DISEASE): ICD-10-CM

## 2021-10-14 DIAGNOSIS — I10 BENIGN ESSENTIAL HYPERTENSION: Primary | ICD-10-CM

## 2021-10-15 RX ORDER — METOPROLOL SUCCINATE 25 MG/1
25 TABLET, EXTENDED RELEASE ORAL DAILY
Qty: 90 TABLET | Refills: 3 | Status: SHIPPED | OUTPATIENT
Start: 2021-10-15 | End: 2021-12-14

## 2021-10-15 NOTE — TELEPHONE ENCOUNTER
Please schedule the patient for a blood pressure check with me in 2 months.  You can double book her with a physical if necessary.  I told her you would send her a "[employer] message with the date and time.  I placed orders for labs that she will need done also.  She is due for her chlamydia and gonorrhea screens so I ordered that urine test also.  She will be due for a physical after the first of the year so we might want to get that scheduled for her also because she will need her Pap smear at that time.    Electronically signed by:  Oscar Medley M.D.  10/15/2021

## 2021-10-21 DIAGNOSIS — Z30.011 ENCOUNTER FOR INITIAL PRESCRIPTION OF CONTRACEPTIVE PILLS: ICD-10-CM

## 2021-10-22 ENCOUNTER — TELEPHONE (OUTPATIENT)
Dept: FAMILY MEDICINE | Facility: CLINIC | Age: 21
End: 2021-10-22

## 2021-10-22 RX ORDER — DESOGESTREL AND ETHINYL ESTRADIOL 0.15-0.03
KIT ORAL
Qty: 112 TABLET | Refills: 2 | Status: SHIPPED | OUTPATIENT
Start: 2021-10-22 | End: 2021-12-29

## 2021-10-22 NOTE — TELEPHONE ENCOUNTER
Reason for Call:  Other call back    Detailed comments: [facility] called needing clarification on APRI they are wanting to know is the patient supposed to be taking it continuously or as normal.    Phone Number Patient can be reached at: Other phone number:  [phone]    Best Time: Any    Can we leave a detailed message on this number? YES    Call taken on 10/22/2021 at 9:11 AM by Anjana Sands

## 2021-10-23 ENCOUNTER — HEALTH MAINTENANCE LETTER (OUTPATIENT)
Age: 21
End: 2021-10-23

## 2021-12-14 ENCOUNTER — LAB (OUTPATIENT)
Dept: LAB | Facility: CLINIC | Age: 21
End: 2021-12-14
Payer: COMMERCIAL

## 2021-12-14 ENCOUNTER — OFFICE VISIT (OUTPATIENT)
Dept: FAMILY MEDICINE | Facility: CLINIC | Age: 21
End: 2021-12-14
Payer: COMMERCIAL

## 2021-12-14 ENCOUNTER — MYC MEDICAL ADVICE (OUTPATIENT)
Dept: FAMILY MEDICINE | Facility: CLINIC | Age: 21
End: 2021-12-14

## 2021-12-14 VITALS
HEART RATE: 102 BPM | WEIGHT: 293 LBS | RESPIRATION RATE: 20 BRPM | SYSTOLIC BLOOD PRESSURE: 168 MMHG | DIASTOLIC BLOOD PRESSURE: 78 MMHG | OXYGEN SATURATION: 99 % | TEMPERATURE: 97.8 F | BODY MASS INDEX: 54.09 KG/M2

## 2021-12-14 DIAGNOSIS — I10 BENIGN ESSENTIAL HYPERTENSION: ICD-10-CM

## 2021-12-14 DIAGNOSIS — Z11.59 ENCOUNTER FOR HEPATITIS C SCREENING TEST FOR LOW RISK PATIENT: ICD-10-CM

## 2021-12-14 DIAGNOSIS — Z30.017 INSERTION OF IMPLANTABLE SUBDERMAL CONTRACEPTIVE: ICD-10-CM

## 2021-12-14 DIAGNOSIS — E66.01 MORBID OBESITY (H): ICD-10-CM

## 2021-12-14 DIAGNOSIS — Z11.3 SCREEN FOR STD (SEXUALLY TRANSMITTED DISEASE): ICD-10-CM

## 2021-12-14 LAB
ANION GAP SERPL CALCULATED.3IONS-SCNC: 6 MMOL/L (ref 3–14)
BUN SERPL-MCNC: 10 MG/DL (ref 7–30)
CALCIUM SERPL-MCNC: 9.1 MG/DL (ref 8.5–10.1)
CHLORIDE BLD-SCNC: 111 MMOL/L (ref 94–109)
CO2 SERPL-SCNC: 24 MMOL/L (ref 20–32)
CREAT SERPL-MCNC: 0.4 MG/DL (ref 0.52–1.04)
CREAT UR-MCNC: 149 MG/DL
GFR SERPL CREATININE-BSD FRML MDRD: >90 ML/MIN/1.73M2
GLUCOSE BLD-MCNC: 103 MG/DL (ref 70–99)
HCG UR QL: NEGATIVE
HCV AB SERPL QL IA: NONREACTIVE
MICROALBUMIN UR-MCNC: 11 MG/L
MICROALBUMIN/CREAT UR: 7.38 MG/G CR (ref 0–25)
POTASSIUM BLD-SCNC: 3.7 MMOL/L (ref 3.4–5.3)
SODIUM SERPL-SCNC: 141 MMOL/L (ref 133–144)

## 2021-12-14 PROCEDURE — 36415 COLL VENOUS BLD VENIPUNCTURE: CPT

## 2021-12-14 PROCEDURE — 99214 OFFICE O/P EST MOD 30 MIN: CPT | Mod: 25 | Performed by: FAMILY MEDICINE

## 2021-12-14 PROCEDURE — 87491 CHLMYD TRACH DNA AMP PROBE: CPT

## 2021-12-14 PROCEDURE — 87591 N.GONORRHOEAE DNA AMP PROB: CPT

## 2021-12-14 PROCEDURE — 82043 UR ALBUMIN QUANTITATIVE: CPT

## 2021-12-14 PROCEDURE — 11981 INSERTION DRUG DLVR IMPLANT: CPT | Performed by: FAMILY MEDICINE

## 2021-12-14 PROCEDURE — 80048 BASIC METABOLIC PNL TOTAL CA: CPT

## 2021-12-14 PROCEDURE — 81025 URINE PREGNANCY TEST: CPT | Performed by: FAMILY MEDICINE

## 2021-12-14 PROCEDURE — 86803 HEPATITIS C AB TEST: CPT

## 2021-12-14 RX ORDER — METOPROLOL SUCCINATE 50 MG/1
50 TABLET, EXTENDED RELEASE ORAL DAILY
Qty: 90 TABLET | Refills: 3 | Status: SHIPPED | OUTPATIENT
Start: 2021-12-14 | End: 2023-02-01

## 2021-12-14 ASSESSMENT — ANXIETY QUESTIONNAIRES
2. NOT BEING ABLE TO STOP OR CONTROL WORRYING: SEVERAL DAYS
7. FEELING AFRAID AS IF SOMETHING AWFUL MIGHT HAPPEN: NOT AT ALL
GAD7 TOTAL SCORE: 8
6. BECOMING EASILY ANNOYED OR IRRITABLE: MORE THAN HALF THE DAYS
3. WORRYING TOO MUCH ABOUT DIFFERENT THINGS: SEVERAL DAYS
7. FEELING AFRAID AS IF SOMETHING AWFUL MIGHT HAPPEN: NOT AT ALL
1. FEELING NERVOUS, ANXIOUS, OR ON EDGE: MORE THAN HALF THE DAYS
1. FEELING NERVOUS, ANXIOUS, OR ON EDGE: MORE THAN HALF THE DAYS
6. BECOMING EASILY ANNOYED OR IRRITABLE: MORE THAN HALF THE DAYS
3. WORRYING TOO MUCH ABOUT DIFFERENT THINGS: SEVERAL DAYS
5. BEING SO RESTLESS THAT IT IS HARD TO SIT STILL: SEVERAL DAYS
IF YOU CHECKED OFF ANY PROBLEMS ON THIS QUESTIONNAIRE, HOW DIFFICULT HAVE THESE PROBLEMS MADE IT FOR YOU TO DO YOUR WORK, TAKE CARE OF THINGS AT HOME, OR GET ALONG WITH OTHER PEOPLE: SOMEWHAT DIFFICULT
2. NOT BEING ABLE TO STOP OR CONTROL WORRYING: SEVERAL DAYS

## 2021-12-14 ASSESSMENT — PAIN SCALES - GENERAL: PAINLEVEL: NO PAIN (0)

## 2021-12-14 ASSESSMENT — PATIENT HEALTH QUESTIONNAIRE - PHQ9
5. POOR APPETITE OR OVEREATING: SEVERAL DAYS
5. POOR APPETITE OR OVEREATING: SEVERAL DAYS
SUM OF ALL RESPONSES TO PHQ QUESTIONS 1-9: 10

## 2021-12-14 NOTE — PROGRESS NOTES
"  Assessment & Plan     (I10) Benign essential hypertension  Comment: Blood pressure is not well controlled.  Plan: metoprolol succinate ER (TOPROL-XL) 50 MG 24 hr        tablet        We are increasing her metoprolol to 50 mg daily and will have her back in a few months for recheck.  She is coming back in February for her yearly physical so that will be good timing for recheck.    (E66.01) Morbid obesity (H)  Comment: She has gained another 28 pounds and her BMI now is 54.  She has new insurance for work for I will submit another referral for the comprehensive weight management program at [facility] in [place].  Plan: Comprehensive Weight Management        The patient is going to check with her insurance to make sure that this is covered.  Her last insurance would not cover in the bariatric referral.    (Z30.017) Encounter for initial prescription of implantable subdermal contraceptive  Comment: Because the patient's high blood pressure we have to get her off her combination birth control pills so she is agreeable to go back on the Nexplanon.  Plan: HCG Qual, Urine ([id number])        Pregnancy test was negative today and we did implant the Nexplanon.        30 minutes spent on the date of the encounter doing chart review, history and exam, documentation and further activities per the note       BMI:   Estimated body mass index is 54.09 kg/m  as calculated from the following:    Height as of 2/25/21: 1.74 m (5' 8.5\").    Weight as of this encounter: 163.7 kg (361 lb).   Weight management plan: Specific weight management program called The comprehensive weight management program in [place] discussed      No follow-ups on file.    Oscar Medley MD, MD  [facility]    Marco Antonio Baez is a 21 year old who presents for the following health issues     HPI     Hypertension Follow-up      Do you check your blood pressure regularly outside of the clinic? Yes     Are you following a low salt diet? " Yes    Are your blood pressures ever more than 140 on the top number (systolic) OR more   than 90 on the bottom number (diastolic), for example 140/90? No      How many servings of fruits and vegetables do you eat daily?  0-1    On average, how many sweetened beverages do you drink each day (Examples: soda, juice, sweet tea, etc.  Do NOT count diet or artificially sweetened beverages)?   0    How many days per week do you exercise enough to make your heart beat faster? 3 or less    How many minutes a day do you exercise enough to make your heart beat faster? 30 - 60    How many days per week do you miss taking your medication? 0    She has been checking her blood pressures at home and they have been still little bit elevated.  She likes this medication better than her last is not having any side effects.  We did review the fact that she is on combination birth control pills and with elevated blood pressure she needs to get off of those.  She has had Nexplanon in the past and tolerated that quite well.  She is willing to have that placed today so we can stop her OCPs.    She has a different insurance program now to her [employer] so would like to try to be referred back to the comprehensive weight loss program at [facility].  Her last insurance would not cover this program.  She is in a long-term relationship and does not want have any children and now so she figures at least 3 years before she considers having a child.    Review of Systems   Constitutional, HEENT, cardiovascular, pulmonary, gi and gu systems are negative, except as otherwise noted.      Objective    Pulse 102   Temp 97.8  F (36.6  C) (Temporal)   Resp 20   Wt (!) 163.7 kg (361 lb)   SpO2 99%   BMI 54.09 kg/m    Body mass index is 54.09 kg/m .  Physical Exam   GENERAL: healthy, alert and no distress  NECK: no adenopathy, no asymmetry, masses, or scars and thyroid normal to palpation  RESP: lungs clear to auscultation - no rales, rhonchi or  wheezes  CV: regular rate and rhythm, normal S1 S2, no S3 or S4, no murmur, click or rub, no peripheral edema and peripheral pulses strong  MS: no gross musculoskeletal defects noted, no edema    Results for orders placed or performed in visit on 12/14/21 (from the past 24 hour(s))   HCG Qual, Urine ([id number])   Result Value Ref Range    hCG Urine Qualitative Negative Negative       SUBJECTIVE:  Patient was in today for her blood pressure and is still elevated.  She is on a combination OCPs so we will have to stop that and get her on the Nexplanon which she is used in the past and has been happy with it.  Pregnancy test done and negative.    PROCEDURE:  The patient was placed on exam table in supine position with her left arm flexed at the elbow.  A point ~8-10 cm from the epicondyle was marked and another point 4 cm proximal to this xander was made on the inner arm.  This area was cleansed with chlorhexidine prep and then injected with 2mL of 1% lidocaine with epi along the track of insertion.  The Nexplanon device was opened and it was confirmed that the mariangel is in the device.  Under standard sterile fashion, a small stab incision was made with the device at the just distal to the xander closer to the elbow.  The Nexplanon device was then inserted along the direction of the two skin marks just under the skin with care not to go too deep.  Once the insertion needle was fully inserted beneath the skin, the retractor on the device was then slowly pulled back to allow the introducer needle to retract into the device, leaving the mariangel behind in the arm.  The mariangel was palpable in the appropriate place under the skin.  The incision was noted to be hemostatic and a pressure bandage was placed over insertion site with 4x4 gauze, Kerlix and tape.  She was instructed to ice as needed.    ASSESSMENT:  Nexplanon Insertion     PLAN:  Patient instructed to use barrier contraception for 1 week after which she should have full  contraception effect from Nexplanon device.  She was instructed to periodically palpate device to insure placement and was invited to follow-up with me if she has any issues or questions regarding the device.  She will need to follow-up in 3 years for removal, at which time she may have replacement device inserted for continued use of this form of contraception.    Follow up with me in February for her yearly exam.     Electronically signed by:  Oscar Medley M.D.  12/14/2021

## 2021-12-15 LAB
C TRACH DNA SPEC QL NAA+PROBE: NEGATIVE
N GONORRHOEA DNA SPEC QL NAA+PROBE: NEGATIVE

## 2021-12-15 ASSESSMENT — ANXIETY QUESTIONNAIRES: GAD7 TOTAL SCORE: 8

## 2021-12-15 ASSESSMENT — ASTHMA QUESTIONNAIRES: ACT_TOTALSCORE: 20

## 2021-12-15 NOTE — TELEPHONE ENCOUNTER
REFERRAL INFORMATION:    Referring Provider:  Oscar Medley MD    Referring Clinic:  [facility]     Reason for Visit/Diagnosis:  BMI 50        FUTURE VISIT INFORMATION:    Appointment Date: 1/13/22    Appointment Time: 8:45am     NOTES RECORD STATUS  DETAILS   OFFICE NOTE from Referring Provider Internal OV/referral 12/14/21- [facility]   OFFICE NOTE from Other Specialists N/A    HOSPITAL DISCHARGE SUMMARY/ ED VISITS  N/A    OPERATIVE REPORT N/A    ENDOSCOPY (EGD)  N/A    PERTINENT LABS Internal Labs drawn 12/14/21, 10/19/21, 6/24/21   PATHOLOGY REPORTS (RELATED) N/A    IMAGING (CT, MRI, US, XR)  Internal XR ABD 12/29/19, US Pelvic 6/15/18, US abd 2/19/18

## 2021-12-17 NOTE — TELEPHONE ENCOUNTER
Patient is being referred in network so no insurance referral needed.  Financial securing would handle any in system prior auths needed.

## 2021-12-22 ENCOUNTER — MYC REFILL (OUTPATIENT)
Dept: FAMILY MEDICINE | Facility: CLINIC | Age: 21
End: 2021-12-22
Payer: COMMERCIAL

## 2021-12-22 DIAGNOSIS — R06.2 WHEEZING: ICD-10-CM

## 2021-12-23 RX ORDER — ALBUTEROL SULFATE 90 UG/1
1-2 AEROSOL, METERED RESPIRATORY (INHALATION) EVERY 4 HOURS PRN
Qty: 8.5 G | Refills: 5 | Status: SHIPPED | OUTPATIENT
Start: 2021-12-23

## 2021-12-23 RX ORDER — ALBUTEROL SULFATE 90 UG/1
AEROSOL, METERED RESPIRATORY (INHALATION)
Qty: 18 G | Refills: 0 | OUTPATIENT
Start: 2021-12-23

## 2021-12-28 ENCOUNTER — NURSE TRIAGE (OUTPATIENT)
Dept: NURSING | Facility: CLINIC | Age: 21
End: 2021-12-28
Payer: COMMERCIAL

## 2021-12-29 ENCOUNTER — APPOINTMENT (OUTPATIENT)
Dept: ULTRASOUND IMAGING | Facility: CLINIC | Age: 21
End: 2021-12-29
Attending: EMERGENCY MEDICINE
Payer: COMMERCIAL

## 2021-12-29 ENCOUNTER — HOSPITAL ENCOUNTER (EMERGENCY)
Facility: CLINIC | Age: 21
Discharge: HOME | End: 2021-12-29
Attending: EMERGENCY MEDICINE | Admitting: EMERGENCY MEDICINE
Payer: COMMERCIAL

## 2021-12-29 VITALS
WEIGHT: 293 LBS | HEIGHT: 68 IN | OXYGEN SATURATION: 99 % | SYSTOLIC BLOOD PRESSURE: 165 MMHG | RESPIRATION RATE: 20 BRPM | TEMPERATURE: 98.3 F | DIASTOLIC BLOOD PRESSURE: 91 MMHG | HEART RATE: 92 BPM | BODY MASS INDEX: 44.41 KG/M2

## 2021-12-29 DIAGNOSIS — I80.02 THROMBOPHLEBITIS OF SUPERFICIAL VEINS OF LEFT LOWER EXTREMITY: ICD-10-CM

## 2021-12-29 PROCEDURE — 99284 EMERGENCY DEPT VISIT MOD MDM: CPT | Mod: 25 | Performed by: EMERGENCY MEDICINE

## 2021-12-29 PROCEDURE — 93971 EXTREMITY STUDY: CPT | Mod: LT

## 2021-12-29 PROCEDURE — 99282 EMERGENCY DEPT VISIT SF MDM: CPT | Performed by: EMERGENCY MEDICINE

## 2021-12-29 ASSESSMENT — MIFFLIN-ST. JEOR: SCORE: 2422.84

## 2021-12-29 NOTE — ED PROVIDER NOTES
History     Chief Complaint   Patient presents with     Leg Swelling     HPI  Sasha Hand is a 21 year old female who presents with atraumatic left leg swelling since 12/15/2021.  Associated with Nexplanon placement the day before.  Did have a local lump with a next nondisplaced but no localized swelling of the leg.  No injury or fall.  No back pain or radicular leg pain.  No paresthesias.  Does have a history of asthma but currently has no respiratory complaints.  Denies any congestion or sore throat.  No change in taste or smell.  No shortness of breath.  No family history or personal history of DVT/PE.  Former smoker.  No treatment for her leg issues prior to arrival.  Patient was seen on 12/27/2021 recommended going to the ER for ultrasound but she did not present until today.  Patient called the nurse triage line yesterday stating that she noticed a dime sized lump on her left mid shin and had increased.  There is no redness.    Allergies:  Allergies   Allergen Reactions     No Known Drug Allergies      Seasonal Allergies        Problem List:    Patient Active Problem List    Diagnosis Date Noted     Nexplanon placed 12/14/2021 12/14/2021     Priority: Medium     Depression 03/11/2018     Priority: Medium     DELIA (generalized anxiety disorder) 02/09/2018     Priority: Medium     Major depressive disorder, single episode, moderate (H) 02/09/2018     Priority: Medium     Genital herpes simplex, unspecified site 01/18/2017     Priority: Medium     Mild persistent asthma without complication 01/18/2017     Priority: Medium     Wheezing 05/04/2016     Priority: Medium     Menorrhagia 01/20/2014     Priority: Medium     Dysmenorrhea 01/20/2014     Priority: Medium     Tear of lateral cartilage or meniscus of knee, current 09/09/2013     Priority: Medium     Morbid obesity (H) 09/14/2010     Priority: Medium        Past Medical History:    Past Medical History:   Diagnosis Date     Closed head injury, subsequent  encounter 2018     History of PCR DNA positive for HSV1      Moderate major depression (H) 2014     Nexplanon placed 15     Nexplanon placed 17 (removed 2020) 2017     Nexplanon placed 2021     Obesity 2010     Pyelonephritis 2018     Skin tag (right collar line and left labial region 2019     Tobacco use disorder 2016     Uncomplicated asthma        Past Surgical History:    Past Surgical History:   Procedure Laterality Date     ARTHROSCOPY KNEE WITH MENISCAL REPAIR Right 5/10/2017    Procedure: ARTHROSCOPY KNEE WITH MENISCAL REPAIR;  arthroscopy right knee with lateral meniscus repair;  Surgeon: Nathaniel Hicks MD;  Location:  OR       Family History:    Family History   Problem Relation Age of Onset     Asthma Mother      Hypertension Mother      Asthma Father      Diabetes Father      Cancer Father 60        metastatic to brain     Cervical Cancer Sister 19     Cancer Paternal Grandmother        Social History:  Marital Status:  Single [1]  Social History     Tobacco Use     Smoking status: Former Smoker     Packs/day: 1.00     Types: Cigarettes     Quit date: 2021     Years since quittin.9     Smokeless tobacco: Never Used   Vaping Use     Vaping Use: Never used   Substance Use Topics     Alcohol use: No     Alcohol/week: 0.0 standard drinks     Drug use: No        Medications:    albuterol (PROAIR HFA/PROVENTIL HFA/VENTOLIN HFA) 108 (90 Base) MCG/ACT inhaler  etonogestrel (NEXPLANON) 68 MG IMPL  metoprolol succinate ER (TOPROL-XL) 50 MG 24 hr tablet  clotrimazole (LOTRIMIN) 1 % external cream  ipratropium - albuterol 0.5 mg/2.5 mg/3 mL (DUONEB) 0.5-2.5 (3) MG/3ML neb solution  VENTOLIN  (90 Base) MCG/ACT inhaler          Review of Systems all other systems are reviewed and are negative.    Physical Exam   BP: (!) 165/91 (R) lower forearm)  Pulse: 92  Temp: 98.3  F (36.8  C)  Resp: 20  Height: 172.7 cm (5'  "8\")  Weight: (!) 161.9 kg (357 lb)  SpO2: 99 %      Physical Exam alert cooperative female does not look toxic or ill.  HEENT shows no facial swelling.  Speech is clear.  Neck is supple.  Lungs are clear to adventitious sounds.  No low back tenderness.  Does not have significant pitting edema in the left leg.  She does have subtle redness and tenderness of the medial ankle and lower calf.  Negative Homans.    ED Course                 Procedures              Critical Care time:  none               No results found for this or any previous visit (from the past 24 hour(s)).    Medications - No data to display  Dopplers ordered and was positive for superficial thrombophlebitis.  Assessments & Plan (with Medical Decision Making)   Sasha Hand is a 21 year old female who presents with atraumatic left leg swelling since 12/15/2021.  Associated with Nexplanon placement the day before.  Did have a local lump with a next nondisplaced but no localized swelling of the leg.  No injury or fall.  No back pain or radicular leg pain.  No paresthesias.  Does have a history of asthma but currently has no respiratory complaints.  Denies any congestion or sore throat.  No change in taste or smell.  No shortness of breath.  No family history or personal history of DVT/PE.  Former smoker.  No treatment for her leg issues prior to arrival.  Patient was seen on 12/27/2021 recommended going to the ER for ultrasound but she did not present until today.  Patient called the nurse triage line yesterday stating that she noticed a dime sized lump on her left mid shin and had increased.  There is no redness.  On exam patient did have some tenderness in the medial ankle and into the calf.  No redness or warmth.  Ultrasound showed superficial thrombophlebitis.  Information provided for this.  No DVT was noted.  Symptomatic treatment.  Follow-up as needed.  I have reviewed the nursing notes.    I have reviewed the findings, diagnosis, plan and need " for follow up with the patient.       Discharge Medication List as of 12/29/2021 10:34 AM          Final diagnoses:   Thrombophlebitis of superficial veins of left lower extremity       12/29/2021   [facility] EMERGENCY DEPT     Fan Samano MD  12/31/21 0910

## 2021-12-29 NOTE — DISCHARGE INSTRUCTIONS
Ultrasound was reassuring that you do not have a deep vein clot that would be worrisome.  You do have some superficial thrombophlebitis.  This can be treated with aspirin, heat, and compression.  Follow-up if you have persistent symptoms or new concerning symptoms

## 2021-12-29 NOTE — ED TRIAGE NOTES
Presents with concerns for L) lower leg swelling and pain since 12/15/21. She reports having implant for BC on 12/14/2021. Ana Deluna RN

## 2021-12-29 NOTE — TELEPHONE ENCOUNTER
"Had Nexplanon placed on 12/14 . The next day she notice a \"dime sized\"  lump in her left mid shin.  This lump has increased in size now ist is \"the width of three fingers\".  Area is warm to touch, not red. Swelling mid shin to ankle.  Painful to touch.     Per protocol to see provider with in 24 hours.  Transferred to scheduling to make an appointment.    Laila Rowley RN, MA  [facility] Triage Nurse Advisor      Reason for Disposition    [1] MODERATE leg swelling (e.g., swelling extends up to knees) AND [2] new onset or worsening    Additional Information    Negative: Severe difficulty breathing (e.g., struggling for each breath, speaks in single words)    Negative: Looks like a broken bone or dislocated joint (e.g., crooked or deformed)    Negative: Sounds like a life-threatening emergency to the triager    Negative: Difficulty breathing at rest    Negative: Entire foot is cool or blue in comparison to other side    Negative: [1] Can't walk or can barely walk AND [2] new onset    Negative: [1] Difficulty breathing with exertion (e.g., walking) AND [2] new onset or worsening    Negative: [1] Red area or streak AND [2] fever    Negative: [1] Swelling is painful to touch AND [2] fever    Negative: [1] Cast on leg or ankle AND [2] now increased pain    Negative: Patient sounds very sick or weak to the triager    Negative: SEVERE leg swelling (e.g., swelling extends above knee, entire leg is swollen, weeping fluid)    Negative: [1] Red area or streak [2] large (> 2 in. or 5 cm)    Negative: [1] Thigh or calf pain AND [2] only 1 side AND [3] present > 1 hour    Negative: [1] Thigh, calf, or ankle swelling AND [2] only 1 side    Negative: [1] Thigh, calf, or ankle swelling AND [2] bilateral AND [3] 1 side is more swollen    Protocols used: LEG SWELLING AND EDEMA-A-AH      "

## 2021-12-30 ENCOUNTER — PATIENT OUTREACH (OUTPATIENT)
Dept: CARE COORDINATION | Facility: CLINIC | Age: 21
End: 2021-12-30
Payer: COMMERCIAL

## 2021-12-30 DIAGNOSIS — Z71.89 OTHER SPECIFIED COUNSELING: ICD-10-CM

## 2021-12-30 NOTE — PROGRESS NOTES
Clinic Care Coordination Contact  [facility]/Voicemail       Clinical Data: Care Coordinator Outreach  Reason for referral: TCM outreach  Outreach attempted x 1.  Left message on patient's voicemail with call back information and requested return call.  Plan:  Care Coordinator will try to reach patient again in 1-2 business days.       Tari Gomez  Community Health Worker  [facility] [place]  Ph: [phone]

## 2021-12-31 NOTE — PROGRESS NOTES
Clinic Care Coordination Contact  [facility]/Voicemail       Clinical Data: Care Coordinator Outreach  Outreach attempted x 2.  Left message on patient's voicemail with call back information and requested return call.  Plan: Care Coordinator will do no further outreaches at this time.          KATIE Harman  [phone]  [facility]

## 2022-01-12 VITALS — BODY MASS INDEX: 44.41 KG/M2 | HEIGHT: 68 IN | WEIGHT: 293 LBS

## 2022-01-12 ASSESSMENT — MIFFLIN-ST. JEOR: SCORE: 2459.13

## 2022-01-12 NOTE — PROGRESS NOTES
During this virtual visit the patient is located in [place], patient verifies this as the location during the entirety of this visit.     Sasha is a 21 year old who is being evaluated via a billable video visit.      How would you like to obtain your AVS? MyChart  If the video visit is dropped, the invitation should be resent by: Text to cell phone:  [phone]  Will anyone else be joining your video visit? No      Video Start Time: 0845  Video-Visit Details    Type of service:  Video Visit    Video End Time:0915    Originating Location (pt. Location): Home    Distant Location (provider location):  [facility] [place]     Platform used for Video Visit: Mitch Oshea NREMT

## 2022-01-12 NOTE — NURSING NOTE
Chief Complaint   Patient presents with     Consult     New consultation for bariatric surgery.       Vitals:    01/12/22 1657   Weight: (!) 365 lb       Body mass index is 55.5 kg/m .      Yobany Oshea, EMT  Surgery Clinic

## 2022-01-13 ENCOUNTER — VIRTUAL VISIT (OUTPATIENT)
Dept: ENDOCRINOLOGY | Facility: CLINIC | Age: 22
End: 2022-01-13
Payer: COMMERCIAL

## 2022-01-13 ENCOUNTER — CARE COORDINATION (OUTPATIENT)
Dept: ENDOCRINOLOGY | Facility: CLINIC | Age: 22
End: 2022-01-13

## 2022-01-13 ENCOUNTER — TELEPHONE (OUTPATIENT)
Dept: ENDOCRINOLOGY | Facility: CLINIC | Age: 22
End: 2022-01-13

## 2022-01-13 ENCOUNTER — PRE VISIT (OUTPATIENT)
Dept: ENDOCRINOLOGY | Facility: CLINIC | Age: 22
End: 2022-01-13

## 2022-01-13 DIAGNOSIS — E66.01 MORBID OBESITY (H): Primary | ICD-10-CM

## 2022-01-13 DIAGNOSIS — E66.9 OBESITY: ICD-10-CM

## 2022-01-13 DIAGNOSIS — Z71.3 NUTRITIONAL COUNSELING: Primary | ICD-10-CM

## 2022-01-13 DIAGNOSIS — K21.00 GASTROESOPHAGEAL REFLUX DISEASE WITH ESOPHAGITIS, UNSPECIFIED WHETHER HEMORRHAGE: ICD-10-CM

## 2022-01-13 PROCEDURE — 99204 OFFICE O/P NEW MOD 45 MIN: CPT | Mod: 95 | Performed by: NURSE PRACTITIONER

## 2022-01-13 PROCEDURE — 97802 MEDICAL NUTRITION INDIV IN: CPT | Mod: 95 | Performed by: DIETITIAN, REGISTERED

## 2022-01-13 RX ORDER — PEN NEEDLE, DIABETIC 31 GX5/16"
NEEDLE, DISPOSABLE MISCELLANEOUS
Qty: 100 EACH | Refills: 1 | Status: SHIPPED | OUTPATIENT
Start: 2022-01-13 | End: 2022-06-23

## 2022-01-13 NOTE — TELEPHONE ENCOUNTER
"Prior Authorization Retail Medication Request    Medication/Dose: Saxenda  ICD code (if different than what is on RX):  Morbid obesity (H) [E66.01]  - Primary     Previously Tried and Failed:  History of diet and exericse  Rationale:  I had the pleasure of seeing your patient, Sasha Hand, to evaluate her obesity and consider her for possible weight loss surgery. As you know, Sasha Hand is 21 year old.  She has a height of 5' 8\", a weight of 365 lbs 0 oz, and calculated Body mass index is 55.5 kg/m . Overweight all of life. More significant struggles after puberty. Never able to successfully lose greater than 10lb despite numerous weight loss efforts. 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 Co morbidities include hypertension and prediabetes. Has considered surgery in the past but lost insurance that would cover surgery, now would like to consider again. Describes never feeling full. When trying to reduce portions, will feel hungry within 2 hours. Never tried AOM. No hx pancreatitis. No fam hx medullary thyroid cancer. GERD chronically, well controlled with omeprazole. Will do EGD prior to sleeve. Would like to try Saxenda if covered by insurance.     Insurance Name:    Insurance ID:        Pharmacy Information (if different than what is on RX)  Name:  [facility] [address] [address] [facility]  Phone:  [phone]  "

## 2022-01-13 NOTE — ASSESSMENT & PLAN NOTE
Overweight all of life. More significant struggles after puberty. Never able to successfully lose greater than 10lb despite numerous weight loss efforts. 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 Co morbidities include hypertension and prediabetes. Has considered surgery in the past but lost insurance that would cover surgery, now would like to consider again.     Describes never feeling full. When trying to reduce portions, will feel hungry within 2 hours. Never tried AOM. No hx pancreatitis. No fam hx medullary thyroid cancer. GERD chronically, well controlled with omeprazole. Will do EGD prior to sleeve. Would like to try Saxenda if covered by insurance.     Plan:  Schedule psych eval  Start working with dietitian   Start working on weight loss  Start saxenda   Follow up with pharmacist in 3 weeks   Schedule with Dr. Pugh in1 month   Follow up with me in 2 months   Schedule EGD   Letter of support from primary care

## 2022-01-13 NOTE — PATIENT INSTRUCTIONS
"Thank you for allowing us the privilege of caring for you. We hope we provided you with the excellent service you deserve.   Please let us know if there is anything else we can do for you so that we can be sure you are completely satisfied with your care experience.    To ensure the quality of our services you may be receiving a patient satisfaction survey from an independent patient satisfaction monitoring company.    The greatest compliment you can give is a \"Likely to Recommend\"    Your visit was with Komal Miller NP today.    Instructions per today's visit:     Harjit Hand, it was great to visit with you today.  Here is a review of our visit.  If our clinic scheduler is not able to reach you please call [phone] to schedule your next appointments.      Plan:  Schedule psych eval  Start working with dietitian   Start working on weight loss  Start saxenda   Follow up with pharmacist in 3 weeks   Schedule with Dr. Pugh in1 month   Follow up with me in 2 months   Schedule EGD   Letter of support from primary care         Information about Video Visits with [facility]: video visit information  _________________________________________________________________________________________________________________________________________________________  Important contact and scheduling information:  Please call our contact center at [phone] to schedule your next appointments.  To find a lab location near you, please call [phone].  For any nursing questions or concerns call Keke Feliciano LPN at [phone] or Amanda Rivas RN at [phone]  Please call during clinic hours Monday through Friday 8:00a - 4:00p if you have questions or you can contact us via [employer] at anytime and we will reply during clinic hours.    Lab results will be communicated through My Chart or letter (if My Chart not used). Please call the clinic if you have not received communication after 1 week or if you have any " questions.?  Clinic Fax: [phone]  _________________________________________________________________________________________________________________________________________________________  Meal Replacement Products:    Here is the link to our new e-store where you can purchase our meal replacement products    [facility] E-Store  [employer]Time Warden/store    The one week starter kit is a great way to sample a variety of products and see what works for you.    If you want more information about the product go to: Fresh Steps Meals  [employer].Ratify    If you are an employee or [facility] or [facility] please contact your care team for a 10% estore discount    Free Shipping for orders over $75     Benefits of meal replacements products:    Portion and calorie control  Improved nutrition  Structured eating  Simplified food choices  Avoid contact with trigger foods  _________________________________________________________________________________________________________________________________________________________  Interested in working with a health ?  Health coaches work with you to improve your overall health and wellbeing.  They look at the whole person, and may involve discussion of different areas of life, including, but not limited to the four pillars of health (sleep, exercise, nutrition, and stress management). Discuss with your care team if you would like to start working a health .  Health Coaching-3 Pack: Schedule by calling [phone]    $99 for three health coaching visits    Visits may be done in person or via phone    Coaching is a partnership between the  and the client; Coaches do not prescribe or diagnose    Coaching helps inspire the client to reach his/her personal goals   _________________________________________________________________________________________________________________________________________________________  24 Week Healthy  Lifestyle Plan:    Our mission in the 24-week Healthy Lifestyle Plan is to provide you with individualized care by giving you the tools, education and support you need to lose weight and maintain a healthy lifestyle. In your 24-week journey, you ll be supported by a dedicated weight loss team that includes registered dietitians, medical weight management providers, health coaches, and nurses -- all with special expertise in weight loss -- to help you every step of the way.     Monthly meetings with your registered dietician or medical weight management provider help to review your progress, update your care plan, and make any adjustments needed to ensure success. Between these visits, weekly and bi-weekly health  visits will help you focus on the four pillars of weight loss -- stress, sleep, nutrition, and exercise -- and how you can best adapt each to achieve sustainable weight loss results.    In addition, you will be given exclusive access to online wellbeing classes through [employer].  Your initial visit will be with a medical weight management provider who will help to understand your weight loss goals and ensure this program is the right fit for you. Please let our team know if you are interested in the 24 week plan by sending a message to your care team or calling [phone] to schedule.  _________________________________________________________________________________________________________________________________________________________    COMPREHENSIVE WEIGHT MANAGEMENT PROGRAM  VIRTUAL SUPPORT GROUPS    For Support Group Information:      We offer support groups for patients who are working on weight loss and considering, preparing for or have had weight loss surgery.   There is no cost for this opportunity.  You are invited to attend the?Virtual Support Groups?provided by any of the following locations:    1. [employer] via Microsoft Teams with Sangita Eastman RN  2.   [employer] via [employer]  "with Bryce Raymond, PhD, LP  3.   [place] via [facility] Teams with Tari Blue RN  4.   [facility] via [facility] Teams with Tari cShultz [facility]    The following Support Group information can also be found on our website:  [email or web address]    [facility] Weight Loss Surgery Support Group    [facility] Weight Loss Surgery Support Group  The support group is a patient-lead forum that meets monthly to share experiences, encouragement and education. It is open to those who have had weight loss surgery, are scheduled for surgery, and those who are considering surgery.   WHEN: This group meets on the 3rd Wednesday of each month from 5:00PM - 6:00PM virtually using Microsoft Teams.   FACILITATOR: Led by Sangita Eastman RD, LD, RN, the program's Clinical Coordinator.   TO REGISTER: Please contact the clinic via [employer] or call the nurse line directly at [phone] to inform our staff that you would like an invite sent to you and to let us know the email you would like the invite sent to. Prior to the meeting, a link with directions on how to join the meeting will be sent to you.    2022 Meetings  January 19: \"Let's Talk\" a time for the group to share.  February 16: \"Let's Talk\" a time for the group to share.  March 16: Guest Speakers: Psychologists, Katherine Pearson, PhD,LP and Evelia Marte PsMelanie,LP  April 20: Guest Speaker: Health Nanette, [facility],CHES, CPT  May 18: Guest Speaker: DietitianMarvel, NIKITA, LP  Bhumika 15: \"Let's Talk\" a time for the group to share.  July 20: \"Let's Talk\" a time for the group to share.  August 17: TBA  September 21: TBA  October 19: Guest Speaker: Dr Ang Turner MD Pulmonologist and Sleep Medicine Physician, \"Getting a Good Night's Sleep\".  November 16: TBA  December 21: TBA    [facility] Support Groups    Connections: Bariatric Care Support " "Group?  This is open to all [facility] (and those external to this program) pre- and post- operative bariatric surgery patients as well as their support system.   WHEN: This group meets the 2nd Tuesday of each month from 6:30 PM - 8:00 PM virtually using Microsoft Teams.   FACILITATOR: Led by Bryce Raymond, Ph.D who is a Licensed Psychologist with the [facility] Comprehensive Weight Management Program.   TO REGISTER: Please send an email to Bryce Raymond, Ph.D., LP at?[email or web address]?if you would like an invitation to the group and to learn about using Microsoft Teams.    2022 Meetings  January 11: Kelsey Menard, PharmD, Pharmacy Resident at [facility], \"Medications and Bariatric Surgery\".  February 8: Open Forum  March 8  April 12  May 10  Bhumika 14    Connections: Post-Operative Bariatric Surgery Support Group  This is a support group for [facility] bariatric patients (and those external to [facility]) who have had bariatric surgery and are at least 3 months post-surgery.  WHEN: This support group meets the 4th Wednesday of the month from 11:00 AM - 12:00 PM virtually using Microsoft Teams.   FACILITATOR: Led by Certified Bariatric Nurse, Tari Blue RN.   TO REGISTER: Please send an email to Tari at [email or web address] if you would like an invitation to the group and to learn about using Microsoft Teams.    2022 Meetings  January 26  February 23  March 23  April 27  May 25  Bhumika 22    [facility] Healthy Lifestyle Virtual Support Group    Healthy Lifestyle Virtual Support Group?  This is 60 minutes of small group guided discussion, support and resources. All are welcome who want a healthy lifestyle.  WHEN: This group meets monthly on a Friday from 12:30 PM - 1:30 PM virtually using Microsoft Teams.   FACILITATOR: Led by National Board Certified Health and , JANE [facility].   TO REGISTER: Please send an " "email to Tari [email or web address] to receive monthly invites to the group or if you have any questions about having a health .  Prior to the meeting, a link with directions on how to join the meeting will be sent to you.    2022 Meetings  January 21: Lauren Lara MS, RN, CIC, CBN, \"Healthy Habits\"  February 25: Open Forum  March 18: \"Setting Limits and Boundaries\"  April 29: Hilda Murguia RD, \"Meal Planning Made Easy\"  May 20: Open Forum  June: To be determined  ____________________________________________________________________________________________________________________________________________________________________________  [place] [facility] Get Moving Program  Our team of physical therapists is trained to help you understand and take control of your condition. They will perform a thorough evaluation to determine your ability for activity and develop a customized plan to fit your goals and physical ability.  Scheduling: Unsure if the Get Moving program is right for you? Discuss the program with your medical provider or diabetes educator. You can also call us at [phone] to ask questions or schedule an appointment.   ANG[facility] Get Moving Program  ____________________________________________________________________________________________________________________________________________________________________________  [facility] Diabetes Prevention Program (DPP)  If you have prediabetes and Medicare please contact us via [employer] to learn more about the Diabetes Prevention Program (DPP)  Program Details:   [facility] [place] offers the year-long Diabetes Prevention Program (DPP). The program helps you to make lifestyle changes that prevent or delay type 2 diabetes by supporting healthy eating, increased physical activity, stress reduction and use of coping skills.   On average, previous [facility] DPP cohorts have lost and maintained at least 5% of their starting weight " throughout the program and averaged more than 150 minutes of physical activity per week.  Participants meet weekly for one-hour group sessions over sixteen weeks, every other week for the next 8 weeks, and monthly for the last six months.   A year-long maintenance program is also available for participants who complete the first year.   Location & Cost:   During the COVID-19 Public Health Emergency, the program is offered virtually. When in-person classes can resume, they will be held at [facility].  For people with Medicare, the program is covered in full. A self-pay option will also be available for those with non-Medicare insurance plans.   _________________________________________________________________________________________________________________________________________________________  Bluetooth Scale:    We hope to provide you with high quality virtual healthcare visits while social distancing for COVID-19 is necessary, as well as in the future when virtual visits may be more convenient for you.     Our technology team made it possible for Bluetooth scales to send weight measurements to our electronic medical record. This allows weights from you weighing at home to securely flow into the medical record, which will improve telephone and virtual visits.   Additionally, studies have shown that adults actually lose more weight when their weights are automatically sent to someone else, and also that this process is not stressful for those adults.    Below is a link for purchasing the scale, with a discount code for our patients. You may call your insurance company to see if they will reimburse you for the cost of the scale, as a piece of durable medical equipment. The scales only go up to a weight of 400 pounds. This is an issue and we are working with the developer on increasing this. We found no scales that go over 400lb that have blue-tooth for connecting to  [employer].    Scale to purchase: the MoPix  Body  Scale: [email or web address]/[email or web address]=aw.ds    Discount Code: We have a discount code for our patients to bring the cost down to $50, Discount code is: UMinsushilota_Scale_20%off      Thank you,   [facility] Comprehensive Weight Management Team                             MEDICATION STARTED AT THIS APPOINTMENT    We are starting a GLP-1 (Glucagon-like Peptide-1) medication called Saxenda. One of the ways it works is by slowing down the rate that food leaves your stomach. You feel alcantara and will eat less. It also helps regulate hormones that can help improve your blood sugars.    If you are a patient that checks blood sugars, continue to check as instructed by your doctor. Low blood sugars are rare but can happen if patients are on insulin or other oral agents. If you notice consistent low sugars or high sugars, your medication may need to be adjusted after your appointment. If this is the case, please call RN and provide her your blood sugar record from the last 3-4 days. The RN will get in touch with the doctor and call you back/Yesware message with recommendations. We tolerate high sugars for a bit, so if sugars are running 180-200, this is ok. As weight starts dropping the blood sugars should too. If readings are consistently over 200 for 1-2 weeks, then you should call the doctor/nurse.    Dosing for this medication:   Week 1- Inject 0.6 mg daily  Week 2- Inject 1.2 mg daily  Week 3- Inject 1.8 mg daily  Week 4- Inject 2.4 mg daily  Week 5 and thereafter- Inject 3.0 mg daily    Side effects of GLP- Medications include: The most common side effects are all GI related and consist of: nausea, constipation, diarrhea, burping, or gassiness. Patients are advised to eat slowly and less, and nausea typically passes if people can stick it out.     The risk of pancreatitis (inflammation of  the pancreas) has been associated with this type of medication, but is very rare.  If you have had pancreatitis in the past, this medication may not be for you. Please let us know about any past history of pancreas problems.    Symptoms of pancreatitis include: Pain in your upper stomach area which may travel to your back and be worse after eating. Your stomach area may be tender to the touch.  You may have vomiting or nausea and/or have a fever. If you should develop any of these symptoms, stop the medication and contact your primary care doctor. They will do a blood test to check for pancreatitis.         There is a small chance you may have some low blood sugar after taking the medication.   The signs of low blood sugar are:  o Weakness  o Shaky   o Hungry  o Sweating  o Confusion      See below for ways to treat low blood sugar without adding in lots of extra calories.      Treating Low Blood Sugar    If you have symptoms of low blood sugar (sweating, shaking, dizzy, confused) eat 15 grams of carbs and wait 15 minutes:      Glucose Tabs are best for sugars under 70 -  Dex4 or BD Glucose tablets are good, you will need to take 3-4 of these to equal 15 grams.       One small box of raisins    4 oz fruit juice box or   cup fruit juice    1 small apple    1 small banana      cup canned fruit in water      English muffin or a slice of bread with jelly     1 low fat frozen waffle with sugar-free syrup      cup cottage cheese with   cup frozen or fresh blueberries    1 cup skim or low-fat milk      cup whole grain cereal    4-6 crackers such as Triscuits      This medication is usually not covered by insurance and can be quite expensive. Sometimes a prior authorization is required, which may take up to 1-2 weeks for an insurance company to make a decision if they will cover the medication. Please be patient, you will be notified after a decision has been made.    Contact the nurse via [employer] or call [phone] if  you have any questions or concerns. (Do not stop taking it if you don't think it's working. For some people it works without them knowing it.)     In order to get refills of this or any medication we prescribe you must be seen in the medical weight mgmt clinic every 2-4 months.    Bariatric Task List    Fax:  Please fax all paperwork to:   -     Status:  Is patient a candidate for bariatric surgery?:    -     Cleared to schedule surgeon consult?:    -     Status:    -     Surgeon: WIse -     Tentative surgery month/year:   -        Insurance: Insurance:  [employer] -      Contact insurance to discuss coverage:   -       Cigna: PCP Recommendation and Medical Clearance:    -     HP Referral:    -      Advanced beneficiary notification (ABN) for Medicare patients for RD visits   and surgery:   -      Weight history:   -     Other:    -        Patient Info: Initial Weight:  365 -     Date of Initial Weight/Height:  1/13/2022 -     Goal Weight (lbs):  329 -     Required Weight Loss:  36 -     Surgery Type:  sleeve gastrectomy -     Multidisciplinary Meeting:    -        Dietician Visits: Structured weight loss required by insurance?:    -     Dietician Visit 1:  Needed -     Dietician Visit 2:  Needed -     Dietician Visit 3:  Needed -     Dietician Visit 4:    -     Dietician Visit 5:    -     Dietician Visit 6:    -     Dietician Visit additional:    -     Clearance from dietician to see surgeon?:    -     Dietician Notes:    -        Psychological Evaluation: Psych eval:  Needed -     Therapist letter of support:    -     Psychiatrist letter of support:    -     Establish care with therapist:    -     Complete eating disorder evaluation:    -     Letter of clearance from therapist/eating disorder program:    -     Other:    -        Lab Work: Complete Blood Count:  Needed -     Comprehensive Metabolic Panel:  Needed -     Vitamin D:  Needed -     PTH:  Needed -     Hgb A1c:  Needed -      Lipids: Completed -       "TSH ([facility], [facility], [place] MA): Completed -       Ferritin:   -       Folate:   -       Testosterone, Total and Free:   -     Thiamine:   -     Vitamin A:   -     Vitamin B12:   -     Zinc:   -     C-peptide:   -     H. pylori:    -     MRSA (2 swabs, minimum 48 hours apart):   -     Nicotine Testing:    -     Recheck Vitamin D:   -     Other:    -        Consults/ Clearance Sleep Medicine:    -     Cardiac:    -     Pain:   -     Dental:    -     Endocrine:    -     Gastroenterology:    -     Vascular Medicine:    -     Hematology:    -     Medical Weight Management:   -     Physical Therapy/Exercise:    -     Nephrology:    -     Neurology:    -     Pulmonology:    -     Rheumatology:    -     Other:    -     Other:    -     Other:    -        Testing: UGI:    -     EGD:  Needed -     Sleep Study:   -     Other:   -     Other:    -        PCP: Establish care with PCP:  Completed -     Follow up with PCP:    -     PCP letter of support:  Needed -        Stopping Smoking/ Alcohol Use: Quit tobacco use (3 months smoke free)?:    -     Quit date:    -     Quit alcohol use:   -     Quit date:   -     Other:   -     Quit date:   -        Patient Education:  Information Session:    -     Given \"Making your decision\" handout?:    -     Given \"A Roadmap to you Weight Loss Surgery\" handout?:   -     Given \"Get Well Loop\" information?:   -     Given support group information?:    -     Attended support group?:    -     Support plan in place?:    -     Research consents signed?:    -     Avoid NSAIDS/ Alternate Plan for Pain:   -        Additional Surgery Requirements: Review Coag plan:    -     HgA1c <8:    -     Inpatient pain consult:    -     Final nicotine screen:    -     Dental work complete:    -     Birth control plan:    -     Gallstone prevention plan (Actigall for 6 months postop):   -     Other:   -     Other:   -        Final Tasks:  Before surgery online class:  Needed -     Before surgery online class website link:  " [email or web address]   After surgery online class:  Needed -     After surgery online class website link:  [email or web address]   Nurse visit per clinic:  Needed -     History and Physical per clinic:   -     Final labs per clinic: Needed -     Chest xray per clinic:   -     Electrocardiogram (ECG) per clinic:   -     Other:   -        Notes:   -

## 2022-01-13 NOTE — LETTER
"2022       RE: Sasha Hand  [address]  [facility] [address]     Dear Colleague,    Thank you for referring your patient, Sasha Hand, to the [facility] [place] [facility]. Please see a copy of my visit note below.    During this virtual visit the patient is located in [place], patient verifies this as the location during the entirety of this visit.     Sasha is a 21 year old who is being evaluated via a billable video visit.      How would you like to obtain your AVS? MyChart  If the video visit is dropped, the invitation should be resent by: Text to cell phone:  [phone]  Will anyone else be joining your video visit? No      Video Start Time: 845  Video-Visit Details    Type of service:  Video Visit    Video End Time:915    Originating Location (pt. Location): Home    Distant Location (provider location):  [facility] WE[facility]NAGEMENT [facility]     Platform used for Video Visit: Mitch Oshea NREMT        New Bariatric Surgery Consultation Note    2022    RE: Sasha Hand  MR#: [id number]  : 2000      Referring provider:       2022   Who referred you? Dr.Americo Medley       Chief Complaint/Reason for visit: evaluation for possible weight loss surgery    Dear Oscar Medley MD (General),    I had the pleasure of seeing your patient, Sasha Hand, to evaluate her obesity and consider her for possible weight loss surgery. As you know, Sasha Hand is 21 year old.  She has a height of 5' 8\", a weight of 365 lbs 0 oz, and calculated Body mass index is 55.5 kg/m .    Assessment & Plan   Problem List Items Addressed This Visit        Digestive    Morbid obesity (H) - Primary     Overweight all of life. More significant struggles after puberty. Never able to successfully lose greater than 10lb despite numerous weight loss efforts. 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 Co morbidities include hypertension and prediabetes. Has considered surgery in the past but lost insurance that would cover surgery, now would like to consider again.     Describes never feeling full. When trying to reduce portions, will feel hungry within 2 hours. Never tried AOM. No hx pancreatitis. No fam hx medullary thyroid cancer. GERD chronically, well controlled with omeprazole. Will do EGD prior to sleeve. Would like to try Saxenda if covered by insurance.     Plan:  Schedule psych eval  Start working with dietitian   Start working on weight loss  Start saxenda   Follow up with pharmacist in 3 weeks   Schedule with Dr. Pugh in1 month   Follow up with me in 2 months   Schedule EGD   Letter of support from primary care            Relevant Medications    liraglutide - Weight Management (SAXENDA) 18 MG/3ML pen    insulin pen needle (B-D U/F) 31G X 8 MM miscellaneous    Other Relevant Orders    Adult Gastro Ref - Procedure Only    CBC with platelets    Comprehensive metabolic panel    Hemoglobin A1c    Vitamin D Deficiency    Parathyroid Hormone Intact    Med Therapy Management Referral      Other Visit Diagnoses     Gastroesophageal reflux disease with esophagitis, unspecified whether hemorrhage        Relevant Orders    Adult Gastro Ref - Procedure Only    CBC with platelets    Comprehensive metabolic panel    Hemoglobin A1c    Vitamin D Deficiency    Parathyroid Hormone Intact         54 minutes spent on the date of the encounter doing chart review, history and exam, documentation and further activities per the note    Sasha Hand is a 21 year old female who presents to clinic today for the following health issues  accompanied by her mother.      HISTORY OF PRESENT ILLNESS:  Weight Loss History Reviewed with Patient 1/12/2022   How long have you been overweight? Since early childhood   What is the most that you have ever weighed? 365   What is the most weight you have lost? 10   I have  tried the following methods to lose weight Watching portions or calories, Exercise, Weight Watchers   I have tried the following weight loss medications? (Check all that apply) None   Have you ever had weight loss surgery? No     Always heavier  Never able to lose more than 10lb. 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       Birth Control: Nexplanon  Chronic opioid use: none   CO-MORBIDITIES OF OBESITY INCLUDE:     1/12/2022   I have the following health issues associated with obesity: Pre-Diabetes, High Blood Pressure, GERD (Reflux), Asthma     HTN   Lower extremity edema L>R   Reflux- omeprazole daily, symptoms if misses a dose x 1.5 years   Sleep- light snoring, sleeps through the night, no excessive daytime sleepiness     PAST MEDICAL HISTORY:  Past Medical History:   Diagnosis Date     Closed head injury, subsequent encounter 4/5/2018     History of PCR DNA positive for HSV1      Moderate major depression (H) 1/20/2014     Nexplanon placed 11/14/17 11/14/2017     Nexplanon placed 11/14/17 (removed 1/16/2020) 11/14/2017     Nexplanon placed 12/14/2021 12/14/2021     Obesity 9/14/2010     Pyelonephritis 2/19/2018     Skin tag (right collar line and left labial region 11/21/2019     Tobacco use disorder 5/4/2016     Uncomplicated asthma        PAST SURGICAL HISTORY:  Past Surgical History:   Procedure Laterality Date     ARTHROSCOPY KNEE WITH MENISCAL REPAIR Right 5/10/2017    Procedure: ARTHROSCOPY KNEE WITH MENISCAL REPAIR;  arthroscopy right knee with lateral meniscus repair;  Surgeon: Nathaniel Hicks MD;  Location: [facility] OR       FAMILY HISTORY:   Family History   Problem Relation Age of Onset     Asthma Mother      Hypertension Mother      Asthma Father      Diabetes Father      Cancer Father 60        metastatic to brain     Cervical Cancer Sister 19     Cancer Paternal Grandmother        SOCIAL HISTORY:   Social History Questions Reviewed With Patient 1/12/2022   Which best describes your employment status  (select all that apply) I work full-time   If you work, 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 No   Who do you have in your support network that can be available to help you for the first 2 weeks after surgery? Pranay  (boyfriend) Genet (sister in law) angelo (mother)   Who can you count on for support throughout your weight loss surgery journey? Mother , boyfriend   Can you afford 3 meals a day?  Yes   Can you afford 50-60 dollars a month for vitamins? Yes      at a [facility]   Limited lifting     HABITS:     1/12/2022   How often do you drink alcohol? Monthly or less   If you do drink alcohol, how many drinks might you have in a day? (one drink = 5 oz. wine, 1 can/bottle of beer, 1 shot liquor) 1 or 2   Have you ever used any of the following nicotine products? Cigarettes   If you previously used any of these products, what year did you quit? 2020   Have you or are you currently using street drugs or prescription strength medication for which you do not have a prescription for? No   Do you have a history of chemical dependency (alcohol or drug abuse)? No     Quit smoking 1 year ago     PSYCHOLOGICAL HISTORY:   Psychological History Reviewed With Patient 1/12/2022   Have you ever attempted suicide? In the last 5 to 10 years.   Have you had thoughts of suicide in the past year? No   Have you ever been hospitalized for mental illness or a suicide attempt? In the last 5 to 10 years.   Do you have a history of chronic pain? No   Have you ever been diagnosed with fibromyalgia? No   Are you currently seeing a therapist or counselor?  No   Are you currently seeing a psychiatrist? No     Last hospitalization in 2018     ROS:     1/12/2022   Skin:  Skin fold rashes (groin or other folds), Leg swelling   HEENT: Missing teeth   If you answered yes to missing teeth, please indicate how many: 4   Musculoskeletal: Joint Pain,  "Swelling of legs   Cardiovascular: Shortness of breath with activity   Pulmonary: Shortness of breath with activity   Gastrointestinal: Heartburn, Reflux   Genitourinary: Stress incontinence (losing urine when coughing, sneezing, etc.)   Hematological: None of the above   Neurological: None of the above   Female only: Birth control       EATING BEHAVIORS:     1/12/2022   Have you or anyone else thought that you had an eating disorder? Yes   If you answered yes to the previous eating disorder question, select the types that apply from this list: Other   If you answered \"Other\" to the type of eating disorder question above, please describe what it is: Over eating, never feeling full.   Do you currently binge eat (eat a large amount of food in a short time)? Yes   Are you an emotional eater? Yes   Do you get up to eat after falling asleep? No     Never full  Large portions without consequences   Will decrease potions and feel hungry 2 hours later   With larger portions doesn't snack as much   Working on decreasing emotional     EXERCISE:     1/12/2022   How often do you exercise? 1 to 2 times per week   What is the duration of your exercise (in minutes)? 20 Minutes   What types of exercise do you do? other   What keeps you from being more active?  Pain, Too tired       MEDICATIONS:  Current Outpatient Medications   Medication Sig Dispense Refill     insulin pen needle (B-D U/F) 31G X 8 MM miscellaneous Use 1 pen needles daily or as directed. 100 each 1     liraglutide - Weight Management (SAXENDA) 18 MG/3ML pen Week 1: 0.6 mg subcutaneous daily, Week 2: 1.2 mg daily, Week 3: 1.8 mg daily, Week 4: 2.4 mg daily, Week 5 & on: 3 mg daily 15 mL 1     omeprazole (PRILOSEC) 20 MG DR capsule Take 20 mg by mouth daily       albuterol (PROAIR HFA/PROVENTIL HFA/VENTOLIN HFA) 108 (90 Base) MCG/ACT inhaler Inhale 1-2 puffs into the lungs every 4 hours as needed for shortness of breath / dyspnea or wheezing 8.5 g 5     clotrimazole " "(LOTRIMIN) 1 % external cream Apply topically 2 times daily 15 g 1     etonogestrel (NEXPLANON) 68 MG IMPL 1 each (68 mg) by Subdermal route once       ipratropium - albuterol 0.5 mg/2.5 mg/3 mL (DUONEB) 0.5-2.5 (3) MG/3ML neb solution Take 1 vial (3 mLs) by nebulization every 6 hours as needed for shortness of breath / dyspnea or wheezing 30 vial 1     metoprolol succinate ER (TOPROL-XL) 50 MG 24 hr tablet Take 1 tablet (50 mg) by mouth daily 90 tablet 3     VENTOLIN  (90 Base) MCG/ACT inhaler Inhale 1-2 puffs into the lungs every 4 hours as needed for shortness of breath / dyspnea or wheezing 18 g 0       ALLERGIES:  Allergies   Allergen Reactions     No Known Drug Allergies      Seasonal Allergies          PHYSICAL EXAM:  Objective    Ht 1.727 m (5' 8\")   Wt (!) 165.6 kg (365 lb)   BMI 55.50 kg/m           Vitals:  No vitals were obtained today due to virtual visit.    Physical Exam   GENERAL: Healthy, alert and no distress  EYES: Eyes grossly normal to inspection.  No discharge or erythema, or obvious scleral/conjunctival abnormalities.  RESP: No audible wheeze, cough, or visible cyanosis.  No visible retractions or increased work of breathing.    SKIN: Visible skin clear. No significant rash, abnormal pigmentation or lesions.  NEURO: Cranial nerves grossly intact.  Mentation and speech appropriate for age.  PSYCH: Mentation appears normal, affect normal/bright, judgement and insight intact, normal speech and appearance well-groomed.      In summary, Sasha Hand has Class III obesity with a body mass index of Body mass index is 55.5 kg/m . kg/m2 and the comorbidities stated above. She completed an informational seminar and is a possible candidate for the laparoscopic gastric sleeve.  She will have to complete the following pre-requisites:    Received weight loss goal of 36 lb prior to surgery.  Dietitian x 3   Have preoperative laboratory tests drawn.  Psychological Evaluation with MMPI and clearance " for weight loss surgery.  Letter of clearance from the following primary care provider   EGD     Today in the office we discussed gastric sleeve surgery. Preoperative, perioperative, and postoperative processes, management, and follow up were addressed.  Risks and benefits were outlined including the risk of death, staple line leak (1-2%), PE, DVT, ulcer, worsening GERD, N/V, stricture, hernia, wound infection, weight regain, and vitamin deficiencies. I emphasized exercise and activity along with appropriate food choice as the main foundation for weight loss with surgery providing surgical reinforcement of this.  All questions were answered.  A goal sheet and support group handout were given to the patient.          Once the patient has completed the requirements in their task list and there are no further recommendations, the pt will be allowed to see the surgeon of her choice for consultation on the laparoscopic gastric sleeve surgery. Patient verbalizes understanding of the process to surgery and expectations for the postoperative period including the need for lifelong lifestyle changes, vitamin supplementation, and laboratory monitoring.    Sincerely,     Komal Miller NP

## 2022-01-13 NOTE — PATIENT INSTRUCTIONS
"Hi Sasha!    Follow-up with RD in 1 month    Thank you,    Kaleigh Curran, NIKITA, LD  If you would like to schedule or reschedule an appointment with the RD, please call [phone]    Nutrition Goals  Relating To Eatin) Eat slowly (20-30 minutes per meal), chewing foods well (25 chews per bite/applesauce consistency)  2) 9\" Plate method (1/2 plate non-starchy vegetables/fruit, 1/4 plate lean protein, 1/4 plate whole grain starch - no more than 1/2 cup carb/meal)  3) See recipes below  4) Utilize food journal or MyFitness Pal to track meals and snacks as well as emotions (2000 calories per day for weight loss)   -https://www.Noxilizer/  5) Review handouts below    Healthy Recipe Resources:  \"The Volumetrics Eating Plan\" by Lauren Powell, Ph.D.  https://www.Cuponomia.JustFab/  www.[employer]  www.AtBizz.org  Dash Diet Recipes [facility]  www.extension.[facility] - the recipe box  \"Cooking that Counts\" by editors of [employer]  https://www.[facility]/communityculinary/[facility]_Cookbook_KT_Final_11-6_NoCrops-compressed.pdf  Https://www.choosemyplate.gov/myplatekitchen  https://snaped.fns.usda.gov/recipes-menus - SNAP recipes  https://www.diabetesfoodhub.org/all-recipes.html  https://www.mealime.com/    Relating to dietary supplements:  1) Start a multivitamin containing iron daily     Relating to activity:  1) Increase activity as able    Relating to cravings:  1) Anytime you have a craving, find an activity (such as a hobby, take a walk, call a friend) 15 minutes to see if craving goes away.     The Plate Method  http://www.BitAccess/398838tb.pdf    Protein Sources for Weight Loss  [email or web address]://[email or web address]     Carbohydrates  [email or web address]://[email or web address].[email or web address]     Mindful Eating  http://[email or web address]     Summary of Volumetrics Eating Plan  [email or web address]://[email or web address]     Diet Guidelines after Weight Loss Surgery  [email or web address]://[email or web address]     Seated Exercises for Arms and Legs (can be " done before or after surgery)  [email or web address]://www.Topmission/[email or web address]    Surgery Related Nutrition Handouts:    Diet Guidelines after Weight Loss Surgery  [email or web address]://[email or web address]     Lifestyle Changes Before and After Weight Loss Surgery: Gibson for Success  [email or web address]://[email or web address]     Modified Liquid Diet (2 liquid meals, 1 meal, 2 snacks)  [email or web address]://[email or web address]     Your Stage 1 Diet: Clear Liquids  [email or web address]://[email or web address]     Your Stage 2 Diet: Low-fat Full Liquids  [email or web address]://[email or web address]/155986.[email or web address]     Your Stage 3 Diet: Pureed Foods  [email or web address]://[email or web address]     Pureed Pleasures  [email or web address]://Topmission/806994.pdf    Your Stage 4 Diet: Soft Foods  [email or web address]://[email or web address]    Your Stage 5 Diet: Regular Foods  [email or web address]://[email or web address].[email or web address]    Supplements after Weight Loss Surgery  http://Topmission/067526.[email or web address]     Keeping Track of Fluids  [email or web address]://www.[email or web address].[email or web address]    Why Take Supplements for Life after Weight Loss Surgery  [email or web address]://[email or web address]     Keeping Up with Your Diet after Weight Loss Surgery  [email or web address]://[email or web address]    Preventing Low Blood Sugar after Weight Loss Surgery  [email or web address]://[email or web address]     Preventing Dumping Syndrome after Weight Loss Surgery  [email or web address]://[email or web address]        Interested in working with a health ? Health coaches work with you to improve your overall health and wellbeing. They look at the whole person, and may involve discussion of different areas of life, including, but not limited to the four pillars of health (sleep, exercise, nutrition, and stress management). Discuss with your care team if you would like to start working a health .    Health Coaching-3 Pack:    $99 for three health coaching visits    Visits may be done in person or via phone    Coaching is a partnership between the  and the client; Coaches do not prescribe or diagnose    Coaching helps inspire the client to reach his/her personal  "[facility] WEIGHT MANAGEMENT PROGRAM  VIRTUAL SUPPORT GROUPS    For Support Group Information:    We offer support groups for patients who are working on weight loss and considering, preparing for or have had weight loss surgery. There is no cost for this opportunity.  You are invited to attend the?Virtual Support Groups?provided by any of the following locations:    [facility] via Microsoft Teams with Sagnita Eastman RN  2. [facility] via [employer] with Bryce Raymond, PhD, LP  3. [facility] via [employer] with Tari Blue RN  4. [facility] via [employer] Teams with Tari Schultz, [facility]    The following Support Group information can also be found on our website:  https://www.[facility].org/treatments/weight-loss-surgery-support-groups    [facility] Weight Loss Surgery Support Group    [facility] Weight Loss Surgery Support Group  The support group is a patient-lead forum that meets monthly to share experiences, encouragement and education. It is open to those who have had weight loss surgery, are scheduled for surgery, and those who are considering surgery.  WHEN: This group meets on the 3rd Wednesday of each month from 5:00PM - 6:00PM virtually using Microsoft Teams.  FACILITATOR: Led by Sangita Eastman, the program's Clinical Coordinator.  TO REGISTER: Please contact the clinic via Enkia or call the nurse line directly at [phone] to inform our staff that you would like an invite sent to you and to let us know the email you would like the invite sent to. Prior to the meeting, a link with directions on how to join the meeting will be sent to you.    2021 Meetings  August 18: \"Let's Talk\" a time for the group to share.  September 15: \"Let's Talk\" a time for the group to share.  October 20: \"Let's Talk\" a time for the group to share.  November 17: Renee Duke RD, VEENA \"Protein, Metabolism and Meal Planning\"  December 15: Marvel Cannon RD, LD will speak, " "\"Recipe Modification\"    [facility] [place] Support Groups    Connections: Bariatric Care Support Group?  This is open to all [facility] (and those external to this program) pre- and post- operative bariatric surgery patients as well as their support system.  WHEN: This group meets the 2nd Tuesday of each month from 6:30 PM - 8:00 PM virtually using Microsoft Teams.  FACILITATOR: Led by Bryce Raymond, Ph.D who is a Licensed Psychologist with the [facility] Comprehensive Weight Management Program.  TO REGISTER: Please send an email to Bryce Raymond, Ph.D., LP at?[email or web address]?if you would like an invitation to the group and to learn about using Microsoft Teams.    2021 Meetings  August 10: Open Forum  September 14: Guest Speaker: Tari Blue RN,CBN, CIC, [facility], \"Your Hospital Stay and Post-Operative Compliance\"  October 12: Open Forum  November 9: Guest Speaker: Svetlana Orantes RD,LD, [facility],\"Holiday Eating\".  December 14: Guest Speaker Jenae Duran MD, MPH, [facility], [facility], \"Body Contouring Surgery for Bariatric Surgery Patients\"    Connections: Post-Operative Bariatric Surgery Support Group  This is a support group for [facility] bariatric patients (and those external to [facility]) who have had bariatric surgery and are at least 3 months post-surgery.  WHEN: This support group meets the 4th Wednesday of the month from 11:00 AM - 12:00 PM virtually using Microsoft Teams.  FACILITATOR: Led by Certified Bariatric Nurse, Tari Blue RN.  TO REGISTER: Please send an email to Tari at [email or web address] if you would like an invitation to the group and to learn about using Taggled.    [facility] Healthy Lifestyle Virtual Support Group    Healthy Lifestyle Virtual " Support Group?  This is 60 minutes of small group guided discussion, support and resources. All are welcome who want a healthy lifestyle.  WHEN: This group meets monthly on a Friday from 12:30 PM - to 1:30 PM virtually using Microsoft Teams.  FACILITATOR: Led by National Board Certified Health , Tari Schultz, [facility].  TO REGISTER: Please send an email to Tari at?[email or web address] to receive monthly invites to the group or if you have any questions about having a health . Prior to the meeting, a link with directions on how to join the meeting will be sent to you.    2021 Meetings  August 27: Open Forum  September 24: Sleep and the 7 Types of Rest  October 29: Open Forum  November 19: Gratitude  December 10: Open Forum

## 2022-01-13 NOTE — LETTER
"1/13/2022       RE: Sasha Hand  [address]  [place] [facility] [place] [address]     Dear Colleague,    Thank you for referring your patient, Sasha Hand, to the [facility]NA[facility] [place] [facility]. Please see a copy of my visit note below.    Sasha Hand is a 21 year old female who is being evaluated via a billable video visit.      The patient has been notified of following:     \"This video visit will be conducted via a call between you and your physician/provider. We have found that certain health care needs can be provided without the need for an in-person physical exam.  This service lets us provide the care you need with a video conversation.  If a prescription is necessary we can send it directly to your pharmacy.  If lab work is needed we can place an order for that and you can then stop by our lab to have the test done at a later time.    Video visits are billed at different rates depending on your insurance coverage.  Please reach out to your insurance provider with any questions.    If during the course of the call the physician/provider feels a video visit is not appropriate, you will not be charged for this service.\"    Patient has given verbal consent for Video visit? Yes  How would you like to obtain your AVS? MyChart  If you are dropped from the video visit, the video invite should be resent to: Send to e-mail at: [email or web address]  Will anyone else be joining your video visit? No  {If patient encounters technical issues they should call [phone]      Video-Visit Details    Type of service:  Video Visit    Video Start Time: 9:42 AM  Video End Time: 10:19 AM    Originating Location (pt. Location): Home    Distant Location (provider location):  [facility] WEIGHT MANAGEMENT [facility]     Platform used for Video Visit: GiPStech    During this virtual visit the patient is located in [place], patient verifies this as " "the location during the entirety of this visit.         New Bariatric Nutrition Consultation Note    Reason For Visit: Nutrition Assessment    Sasha Hand is a 21 year old presenting today for new bariatric nutrition consult.   Pt is interested in laparoscopic sleeve gastrectomy with Dr. Pugh expected surgery in [place].  Patient is accompanied by mother and sister.  This is pt's first of 3 required nutrition visits prior to surgery.     Pt referred by Komal Miller NP on January 13, 2022.  Patient with Co-morbidities of obesity including:  Type II DM no - Prediabetes   Renal Failure no  Sleep apnea no  Hypertension yes   Dyslipidemia no  Joint pain no  Back pain no  GERD yes     Support System Reviewed With Patient 1/12/2022   Who do you have in your support network that can be available to help you for the first 2 weeks after surgery? Pranay  (boyfriend) Genet (sister in law) angelo (mother)   Who can you count on for support throughout your weight loss surgery journey? Mother , boyfriend       ANTHROPOMETRICS:  Estimated body mass index is 55.5 kg/m  as calculated from the following:    Height as of 1/12/22: 1.727 m (5' 8\").    Weight as of 1/12/22: 165.6 kg (365 lb).     Current weight (1/13/22): 365 lbs    Required weight loss goal pre-op: -36 lbs from initial consult weight (goal weight 329 lbs or less before surgery)       1/12/2022   I have tried the following methods to lose weight Watching portions or calories, Exercise, Weight Watchers       Weight Loss Questions Reviewed With Patient 1/12/2022   How long have you been overweight? Since early childhood       SUPPLEMENT INFORMATION:  Biotin     NUTRITION HISTORY:  Pt has gradually gained weight since puberty. Tried weight watchers in the past. 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 If she has smaller portions she feels hungry within two hours. Snacks at work but not at home.     Recall Diet Questions Reviewed With " Patient 1/12/2022   Describe what you typically consume for breakfast (typical or most recent): Eggs, cheese, sasuage, taylor. Yogart.   Describe what you typically consume for lunch (typical or most recent): Soup, left over dinner. Used to be fast food. Stopped that. Somthibg sweet. Sonetimes repeat of breakfast.   Describe what you typically consume for supper (typical or most recent): Protine. Veggies, starch.   Describe what you typically consume as snacks (typical or most recent): Cheeze oliver. Jerky. Cookies . (Only at work)   How many ounces of water, or other low calorie drinks, do you drink daily (8 oz=1 glass)? 48 oz   How many ounces of caffeine (coffee, tea, pop) do you drink daily (8 oz=1 glass)? 24 oz   How many ounces of carbonated (pop, beer, sparkling water) drinks do you drinky daily (8 oz=1 glass)? 24 oz   How many ounces of juice, pop, sweet tea, sports drinks, protein drinks, other sweetened drinks, do you drink daily (8 oz=1 glass)? 8 oz   How many ounces of milk do you drink daily (8 oz=1 glass) 8 oz   Please indicate the type of milk: whole   How often do you drink alcohol? Monthly or less   If you do drink alcohol, how many drinks might you have in a day? (one drink = 5 oz. wine, 1 can/bottle of beer, 1 shot liquor) 1 or 2       Eating Habits 1/12/2022   Do you have any dietary restrictions? No   Do you currently binge eat (eat a large amount of food in a short time)? Yes   Are you an emotional eater? Yes   Do you get up to eat after falling asleep? No   What foods do you crave? Salt and sweet.       ADDITIONAL INFORMATION:  Physical activity: mostly sedentary job (). Has stationary bike at home-tries to do 20 mins per day    Dining Out History Reviewed With Patient 1/12/2022   How often do you dine out? Rarely.   Where do you dine out? (select all that apply) sit-down restaurants   What types of food do you order when you dine out? Joann or maria c       Physical Activity Reviewed  "With Patient 2022   How often do you exercise? 1 to 2 times per week   What is the duration of your exercise (in minutes)? 20 Minutes   What types of exercise do you do? other   What keeps you from being more active?  Pain, Too tired       NUTRITION DIAGNOSIS:  Obesity r/t long history of positive energy balance aeb BMI >30 kg/m2.    INTERVENTION:  Intervention Provided/Education Provided on post-op diet guidelines, vitamins/minerals essential post-operatively, GI anatomy of bariatric surgeries, ways to help prepare for post-op diet guidelines pre-operatively, portion/calorie-control, mindful eating and sources of protein.  Patient demonstrates understanding. Provided pt with list of goals RD contact information.      Questions Reviewed With Patient 2022   How ready are you to make changes regarding your weight? Number 1 = Not ready at all to make changes up to 10 = very ready. 10   How confident are you that you can change? 1 = Not confident that you will be successful making changes up to 10 = very confident. 7       Expected Engagement: good    GOALS:  Relating To Eatin) Eat slowly (20-30 minutes per meal), chewing foods well (25 chews per bite/applesauce consistency)  2) 9\" Plate method (1/2 plate non-starchy vegetables/fruit, 1/4 plate lean protein, 1/4 plate whole grain starch - no more than 1/2 cup carb/meal)  3) See recipes below  4) Utilize food journal or pr2go.com Pal to track meals and snacks as well as emotions (2000 calories per day for weight loss)   -https://www.Radario.Lipocalyx/  5) Review handouts below    Healthy Recipe Resources:  \"The Volumetrics Eating Plan\" by Lauren Powell, Ph.D.  https://www.HatchtechicSocialExpresstive.com/  www.cookinglight.com  www.oldwayspt.org  Dash Diet Recipes [facility]  www.extension.[facility].edu - the recipe box  \"Cooking that Counts\" by editors of " CookingLight  https://www.[facility] Hospital[facility]ris/[facility]_Cookbook_KT_Final_11-6_NoCrops-compressed.pdf  Https://www.choosemyplate.gov/myplatekitchen  https://snaped.fns.usda.gov/recipes-menus - SNAP recipes  https://www.diabetesfoodhub.org/all-recipes.html  https://www.edupristine.dentalDoctors/    Relating to dietary supplements:  1) Start a multivitamin containing iron daily     Relating to activity:  1) Increase activity as able    Relating to cravings:  1) Anytime you have a craving, find an activity (such as a hobby, take a walk, call a friend) 15 minutes to see if craving goes away.     The Plate Method  http://www.[employer]/528378iq.pdf    Protein Sources for Weight Loss  http://[email or web address]/[email or web address]     Carbohydrates  http://[email or web address]/536062.[email or web address]     Mindful Eating  http://[employer]/509319.pdf     Summary of Volumetrics Eating Plan  http://[email or web address]/305852.[email or web address]     Diet Guidelines after Weight Loss Surgery  http://[email or web address]/807863.pdf     Seated Exercises for Arms and Legs (can be done before or after surgery)  [email or web address]://www.[email or web address]    Surgery Related Nutrition Handouts:    Diet Guidelines after Weight Loss Surgery  http://[email or web address]/374930.pdf     Lifestyle Changes Before and After Weight Loss Surgery: Proctor for Success  https://[email or web address]/096958.pdf     Modified Liquid Diet (2 liquid meals, 1 meal, 2 snacks)  https://[email or web address]/[email or web address]     Your Stage 1 Diet: Clear Liquids  http://[email or web address]/256924.pdf     Your Stage 2 Diet: Low-fat Full Liquids  http://fvfiles.com/023190.pdf     Your Stage 3 Diet: Pureed Foods  http://[email or web address]/421467.pdf     Pureed Pleasures  http://[employer]/053974.pdf    Your Stage 4 Diet: Soft Foods  http://fvfiles.com/781031.pdf    Your Stage 5 Diet: Regular Foods  http://fvfiles.com/462814.pdf    Supplements after Weight Loss Surgery  http://[employer]/751825.pdf     Keeping Track of Fluids  http://www.fvfiles.com/646466.pdf    Why Take Supplements for Life after  Weight Loss Surgery  https://[employer]/014123.pdf     Keeping Up with Your Diet after Weight Loss Surgery  https://[employer]/534123.pdf    Preventing Low Blood Sugar after Weight Loss Surgery  https://[employer]/315603.pdf     Preventing Dumping Syndrome after Weight Loss Surgery  https://[employer]/733164.pdf      Follow up: 1 month, prn     Time spent with patient: 37 minutes.  MARCIE GLOVER RD, LD

## 2022-01-13 NOTE — Clinical Note
LYDIA. Hitesh. 36lb     Young. Motivated. Tasklist done. Good support   Amanda, can  you please send packet and letters? Thanks!   Scott, she'll need EGD with Dr. Proctor- ordered thanks!

## 2022-01-13 NOTE — PROGRESS NOTES
"Sasha Hand is a 21 year old female who is being evaluated via a billable video visit.      The patient has been notified of following:     \"This video visit will be conducted via a call between you and your physician/provider. We have found that certain health care needs can be provided without the need for an in-person physical exam.  This service lets us provide the care you need with a video conversation.  If a prescription is necessary we can send it directly to your pharmacy.  If lab work is needed we can place an order for that and you can then stop by our lab to have the test done at a later time.    Video visits are billed at different rates depending on your insurance coverage.  Please reach out to your insurance provider with any questions.    If during the course of the call the physician/provider feels a video visit is not appropriate, you will not be charged for this service.\"    Patient has given verbal consent for Video visit? Yes  How would you like to obtain your AVS? MyChart  If you are dropped from the video visit, the video invite should be resent to: Send to e-mail at: [email or web address]  Will anyone else be joining your video visit? No  {If patient encounters technical issues they should call [phone]      Video-Visit Details    Type of service:  Video Visit    Video Start Time: 9:42 AM  Video End Time: 10:19 AM    Originating Location (pt. Location): Home    Distant Location (provider location):  [facility]NAGEMENT CLINIC [place]     Platform used for Video Visit: SYLLETA    During this virtual visit the patient is located in [place], patient verifies this as the location during the entirety of this visit.         New Bariatric Nutrition Consultation Note    Reason For Visit: Nutrition Assessment    Sasha Hand is a 21 year old presenting today for new bariatric nutrition consult.   Pt is interested in laparoscopic sleeve gastrectomy with Dr. Pugh expected surgery in " "TBD.  Patient is accompanied by mother and sister.  This is pt's first of 3 required nutrition visits prior to surgery.     Pt referred by Komal Miller NP on January 13, 2022.  Patient with Co-morbidities of obesity including:  Type II DM no - Prediabetes   Renal Failure no  Sleep apnea no  Hypertension yes   Dyslipidemia no  Joint pain no  Back pain no  GERD yes     Support System Reviewed With Patient 1/12/2022   Who do you have in your support network that can be available to help you for the first 2 weeks after surgery? Pranay  (boyfriend) Genet (sister in law) angelo (mother)   Who can you count on for support throughout your weight loss surgery journey? Mother , boyfriend       ANTHROPOMETRICS:  Estimated body mass index is 55.5 kg/m  as calculated from the following:    Height as of 1/12/22: 1.727 m (5' 8\").    Weight as of 1/12/22: 165.6 kg (365 lb).     Current weight (1/13/22): 365 lbs    Required weight loss goal pre-op: -36 lbs from initial consult weight (goal weight 329 lbs or less before surgery)       1/12/2022   I have tried the following methods to lose weight Watching portions or calories, Exercise, Weight Watchers       Weight Loss Questions Reviewed With Patient 1/12/2022   How long have you been overweight? Since early childhood       SUPPLEMENT INFORMATION:  Biotin     NUTRITION HISTORY:  Pt has gradually gained weight since puberty. Tried weight watchers in the past. 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 If she has smaller portions she feels hungry within two hours. Snacks at work but not at home.     Recall Diet Questions Reviewed With Patient 1/12/2022   Describe what you typically consume for breakfast (typical or most recent): Eggs, cheese, sasuage, taylor. Yogart.   Describe what you typically consume for lunch (typical or most recent): Soup, left over dinner. Used to be fast food. Stopped that. Somthibg sweet. Sonetimes repeat of breakfast. "   Describe what you typically consume for supper (typical or most recent): Protine. Veggies, starch.   Describe what you typically consume as snacks (typical or most recent): Cheeze oliver. Jerky. Cookies . (Only at work)   How many ounces of water, or other low calorie drinks, do you drink daily (8 oz=1 glass)? 48 oz   How many ounces of caffeine (coffee, tea, pop) do you drink daily (8 oz=1 glass)? 24 oz   How many ounces of carbonated (pop, beer, sparkling water) drinks do you drinky daily (8 oz=1 glass)? 24 oz   How many ounces of juice, pop, sweet tea, sports drinks, protein drinks, other sweetened drinks, do you drink daily (8 oz=1 glass)? 8 oz   How many ounces of milk do you drink daily (8 oz=1 glass) 8 oz   Please indicate the type of milk: whole   How often do you drink alcohol? Monthly or less   If you do drink alcohol, how many drinks might you have in a day? (one drink = 5 oz. wine, 1 can/bottle of beer, 1 shot liquor) 1 or 2       Eating Habits 1/12/2022   Do you have any dietary restrictions? No   Do you currently binge eat (eat a large amount of food in a short time)? Yes   Are you an emotional eater? Yes   Do you get up to eat after falling asleep? No   What foods do you crave? Salt and sweet.       ADDITIONAL INFORMATION:  Physical activity: mostly sedentary job (). Has stationary bike at home-tries to do 20 mins per day    Dining Out History Reviewed With Patient 1/12/2022   How often do you dine out? Rarely.   Where do you dine out? (select all that apply) sit-down restaurants   What types of food do you order when you dine out? Burger or pasta       Physical Activity Reviewed With Patient 1/12/2022   How often do you exercise? 1 to 2 times per week   What is the duration of your exercise (in minutes)? 20 Minutes   What types of exercise do you do? other   What keeps you from being more active?  Pain, Too tired       NUTRITION DIAGNOSIS:  Obesity r/t long history of positive energy  "balance aeb BMI >30 kg/m2.    INTERVENTION:  Intervention Provided/Education Provided on post-op diet guidelines, vitamins/minerals essential post-operatively, GI anatomy of bariatric surgeries, ways to help prepare for post-op diet guidelines pre-operatively, portion/calorie-control, mindful eating and sources of protein.  Patient demonstrates understanding. Provided pt with list of goals RD contact information.      Questions Reviewed With Patient 2022   How ready are you to make changes regarding your weight? Number 1 = Not ready at all to make changes up to 10 = very ready. 10   How confident are you that you can change? 1 = Not confident that you will be successful making changes up to 10 = very confident. 7       Expected Engagement: good    GOALS:  Relating To Eatin) Eat slowly (20-30 minutes per meal), chewing foods well (25 chews per bite/applesauce consistency)  2) 9\" Plate method (1/2 plate non-starchy vegetables/fruit, 1/4 plate lean protein, 1/4 plate whole grain starch - no more than 1/2 cup carb/meal)  3) See recipes below  4) Utilize food journal or USDS Pal to track meals and snacks as well as emotions (2000 calories per day for weight loss)   -https://www.[employer]/  5) Review handouts below    Healthy Recipe Resources:  \"The Volumetrics Eating Plan\" by Lauren Powell, Ph.D.  https://www.Sai Medisoft.[employer]/  www.Ryonet.[employer]  www.[employer].org  Dash Diet Recipes [facility]  www.extension.[facility] - the recipe box  \"Cooking that Counts\" by editors of [employer]  https://www.[facility]/communityculinary/[facility]_Cookbook_KT_Final_11-6_NoCrops-compressed.pdf  Https://www.choosemyplate.gov/myplatekitchen  https://snaped.fns.usda.gov/recipes-menus - SNAP recipes  https://www.diabetesfoodhub.org/all-recipes.html  https://www.[employer]/    Relating to dietary supplements:  1) Start a multivitamin containing iron daily     Relating to activity:  1) Increase activity as " able    Relating to cravings:  1) Anytime you have a craving, find an activity (such as a hobby, take a walk, call a friend) 15 minutes to see if craving goes away.     The Plate Method  http://www.[employer]/157428ts.pdf    Protein Sources for Weight Loss  http://[email or web address]/367076.[email or web address]     Carbohydrates  http://[email or web address]/185319.pdf     Mindful Eating  http://[employer]/382817.pdf     Summary of Volumetrics Eating Plan  http://[email or web address]/781224.pdf     Diet Guidelines after Weight Loss Surgery  http://fvfiles.com/039935.pdf     Seated Exercises for Arms and Legs (can be done before or after surgery)  http://www.[email or web address]/016174.pdf    Surgery Related Nutrition Handouts:    Diet Guidelines after Weight Loss Surgery  http://[email or web address]/647351.pdf     Lifestyle Changes Before and After Weight Loss Surgery: [place] for Success  https://[email or web address]/399412.pdf     Modified Liquid Diet (2 liquid meals, 1 meal, 2 snacks)  https://[email or web address]/644940.pdf     Your Stage 1 Diet: Clear Liquids  http://[email or web address]/723991.pdf     Your Stage 2 Diet: Low-fat Full Liquids  http://[email or web address]/208824.pdf     Your Stage 3 Diet: Pureed Foods  http://fvfiles.com/646601.pdf     Pureed Pleasures  http://[employer]/271500.pdf    Your Stage 4 Diet: Soft Foods  http://[email or web address]/032554.pdf    Your Stage 5 Diet: Regular Foods  http://fvfiles.com/569309.pdf    Supplements after Weight Loss Surgery  http://[employer]/199668.pdf     Keeping Track of Fluids  http://www.[email or web address]/260900.pdf    Why Take Supplements for Life after Weight Loss Surgery  https://[employer]/488299.pdf     Keeping Up with Your Diet after Weight Loss Surgery  https://[employer]/531459.pdf    Preventing Low Blood Sugar after Weight Loss Surgery  https://[employer]/274963.pdf     Preventing Dumping Syndrome after Weight Loss Surgery  https://[employer]/276180.pdf      Follow up: 1 month, prn     Time spent with patient: 37 minutes.  MARCIE GLOVER RD, LD

## 2022-01-13 NOTE — PROGRESS NOTES
"New Bariatric Surgery Consultation Note    2022    RE: Sasha Hand  MR#: [id number]  : 2000      Referring provider:       2022   Who referred you? Dr.Americo Medley       Chief Complaint/Reason for visit: evaluation for possible weight loss surgery    Dear Oscar Medley MD (General),    I had the pleasure of seeing your patient, Sasha Hand, to evaluate her obesity and consider her for possible weight loss surgery. As you know, Sasha Hand is 21 year old.  She has a height of 5' 8\", a weight of 365 lbs 0 oz, and calculated Body mass index is 55.5 kg/m .    Assessment & Plan   Problem List Items Addressed This Visit        Digestive    Morbid obesity (H) - Primary     Overweight all of life. More significant struggles after puberty. Never able to successfully lose greater than 10lb despite numerous weight loss efforts. 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 Co morbidities include hypertension and prediabetes. Has considered surgery in the past but lost insurance that would cover surgery, now would like to consider again.     Describes never feeling full. When trying to reduce portions, will feel hungry within 2 hours. Never tried AOM. No hx pancreatitis. No fam hx medullary thyroid cancer. GERD chronically, well controlled with omeprazole. Will do EGD prior to sleeve. Would like to try Saxenda if covered by insurance.     Plan:  Schedule psych eval  Start working with dietitian   Start working on weight loss  Start saxenda   Follow up with pharmacist in 3 weeks   Schedule with Dr. Pugh in1 month   Follow up with me in 2 months   Schedule EGD   Letter of support from primary care            Relevant Medications    liraglutide - Weight Management (SAXENDA) 18 MG/3ML pen    insulin pen needle (B-D U/F) 31G X 8 MM miscellaneous    Other Relevant Orders    Adult Gastro Ref - Procedure Only    CBC with platelets    Comprehensive metabolic panel    Hemoglobin A1c    Vitamin D " Deficiency    Parathyroid Hormone Intact    Med Therapy Management Referral      Other Visit Diagnoses     Gastroesophageal reflux disease with esophagitis, unspecified whether hemorrhage        Relevant Orders    Adult Gastro Ref - Procedure Only    CBC with platelets    Comprehensive metabolic panel    Hemoglobin A1c    Vitamin D Deficiency    Parathyroid Hormone Intact         54 minutes spent on the date of the encounter doing chart review, history and exam, documentation and further activities per the note    Sasha Hand is a 21 year old female who presents to clinic today for the following health issues  accompanied by her mother.      HISTORY OF PRESENT ILLNESS:  Weight Loss History Reviewed with Patient 1/12/2022   How long have you been overweight? Since early childhood   What is the most that you have ever weighed? 365   What is the most weight you have lost? 10   I have tried the following methods to lose weight Watching portions or calories, Exercise, Weight Watchers   I have tried the following weight loss medications? (Check all that apply) None   Have you ever had weight loss surgery? No     Always heavier  Never able to lose more than 10lb. 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       Birth Control: Nexplanon  Chronic opioid use: none   CO-MORBIDITIES OF OBESITY INCLUDE:     1/12/2022   I have the following health issues associated with obesity: Pre-Diabetes, High Blood Pressure, GERD (Reflux), Asthma     HTN   Lower extremity edema L>R   Reflux- omeprazole daily, symptoms if misses a dose x 1.5 years   Sleep- light snoring, sleeps through the night, no excessive daytime sleepiness     PAST MEDICAL HISTORY:  Past Medical History:   Diagnosis Date     Closed head injury, subsequent encounter 4/5/2018     History of PCR DNA positive for HSV1      Moderate major depression (H) 1/20/2014     Nexplanon placed 11/14/17 11/14/2017     Nexplanon placed 11/14/17 (removed 1/16/2020) 11/14/2017     Nexplanon  placed 12/14/2021 12/14/2021     Obesity 9/14/2010     Pyelonephritis 2/19/2018     Skin tag (right collar line and left labial region 11/21/2019     Tobacco use disorder 5/4/2016     Uncomplicated asthma        PAST SURGICAL HISTORY:  Past Surgical History:   Procedure Laterality Date     ARTHROSCOPY KNEE WITH MENISCAL REPAIR Right 5/10/2017    Procedure: ARTHROSCOPY KNEE WITH MENISCAL REPAIR;  arthroscopy right knee with lateral meniscus repair;  Surgeon: Nathaniel Hicks MD;  Location:  OR       FAMILY HISTORY:   Family History   Problem Relation Age of Onset     Asthma Mother      Hypertension Mother      Asthma Father      Diabetes Father      Cancer Father 60        metastatic to brain     Cervical Cancer Sister 19     Cancer Paternal Grandmother        SOCIAL HISTORY:   Social History Questions Reviewed With Patient 1/12/2022   Which best describes your employment status (select all that apply) I work full-time   If you work, 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 No   Who do you have in your support network that can be available to help you for the first 2 weeks after surgery? Pranay  (boyfriend) Genet (sister in law) angelo (mother)   Who can you count on for support throughout your weight loss surgery journey? Mother , boyfriend   Can you afford 3 meals a day?  Yes   Can you afford 50-60 dollars a month for vitamins? Yes      at a vet clinic   Limited lifting     HABITS:     1/12/2022   How often do you drink alcohol? Monthly or less   If you do drink alcohol, how many drinks might you have in a day? (one drink = 5 oz. wine, 1 can/bottle of beer, 1 shot liquor) 1 or 2   Have you ever used any of the following nicotine products? Cigarettes   If you previously used any of these products, what year did you quit? 2020   Have you or are you currently using street drugs or prescription strength medication for  "which you do not have a prescription for? No   Do you have a history of chemical dependency (alcohol or drug abuse)? No     Quit smoking 1 year ago     PSYCHOLOGICAL HISTORY:   Psychological History Reviewed With Patient 1/12/2022   Have you ever attempted suicide? In the last 5 to 10 years.   Have you had thoughts of suicide in the past year? No   Have you ever been hospitalized for mental illness or a suicide attempt? In the last 5 to 10 years.   Do you have a history of chronic pain? No   Have you ever been diagnosed with fibromyalgia? No   Are you currently seeing a therapist or counselor?  No   Are you currently seeing a psychiatrist? No     Last hospitalization in 2018     ROS:     1/12/2022   Skin:  Skin fold rashes (groin or other folds), Leg swelling   HEENT: Missing teeth   If you answered yes to missing teeth, please indicate how many: 4   Musculoskeletal: Joint Pain, Swelling of legs   Cardiovascular: Shortness of breath with activity   Pulmonary: Shortness of breath with activity   Gastrointestinal: Heartburn, Reflux   Genitourinary: Stress incontinence (losing urine when coughing, sneezing, etc.)   Hematological: None of the above   Neurological: None of the above   Female only: Birth control       EATING BEHAVIORS:     1/12/2022   Have you or anyone else thought that you had an eating disorder? Yes   If you answered yes to the previous eating disorder question, select the types that apply from this list: Other   If you answered \"Other\" to the type of eating disorder question above, please describe what it is: Over eating, never feeling full.   Do you currently binge eat (eat a large amount of food in a short time)? Yes   Are you an emotional eater? Yes   Do you get up to eat after falling asleep? No     Never full  Large portions without consequences   Will decrease potions and feel hungry 2 hours later   With larger portions doesn't snack as much   Working on decreasing emotional     EXERCISE:     " "1/12/2022   How often do you exercise? 1 to 2 times per week   What is the duration of your exercise (in minutes)? 20 Minutes   What types of exercise do you do? other   What keeps you from being more active?  Pain, Too tired       MEDICATIONS:  Current Outpatient Medications   Medication Sig Dispense Refill     insulin pen needle (B-D U/F) 31G X 8 MM miscellaneous Use 1 pen needles daily or as directed. 100 each 1     liraglutide - Weight Management (SAXENDA) 18 MG/3ML pen Week 1: 0.6 mg subcutaneous daily, Week 2: 1.2 mg daily, Week 3: 1.8 mg daily, Week 4: 2.4 mg daily, Week 5 & on: 3 mg daily 15 mL 1     omeprazole (PRILOSEC) 20 MG DR capsule Take 20 mg by mouth daily       albuterol (PROAIR HFA/PROVENTIL HFA/VENTOLIN HFA) 108 (90 Base) MCG/ACT inhaler Inhale 1-2 puffs into the lungs every 4 hours as needed for shortness of breath / dyspnea or wheezing 8.5 g 5     clotrimazole (LOTRIMIN) 1 % external cream Apply topically 2 times daily 15 g 1     etonogestrel (NEXPLANON) 68 MG IMPL 1 each (68 mg) by Subdermal route once       ipratropium - albuterol 0.5 mg/2.5 mg/3 mL (DUONEB) 0.5-2.5 (3) MG/3ML neb solution Take 1 vial (3 mLs) by nebulization every 6 hours as needed for shortness of breath / dyspnea or wheezing 30 vial 1     metoprolol succinate ER (TOPROL-XL) 50 MG 24 hr tablet Take 1 tablet (50 mg) by mouth daily 90 tablet 3     VENTOLIN  (90 Base) MCG/ACT inhaler Inhale 1-2 puffs into the lungs every 4 hours as needed for shortness of breath / dyspnea or wheezing 18 g 0       ALLERGIES:  Allergies   Allergen Reactions     No Known Drug Allergies      Seasonal Allergies          PHYSICAL EXAM:  Objective    Ht 1.727 m (5' 8\")   Wt (!) 165.6 kg (365 lb)   BMI 55.50 kg/m           Vitals:  No vitals were obtained today due to virtual visit.    Physical Exam   GENERAL: Healthy, alert and no distress  EYES: Eyes grossly normal to inspection.  No discharge or erythema, or obvious scleral/conjunctival " abnormalities.  RESP: No audible wheeze, cough, or visible cyanosis.  No visible retractions or increased work of breathing.    SKIN: Visible skin clear. No significant rash, abnormal pigmentation or lesions.  NEURO: Cranial nerves grossly intact.  Mentation and speech appropriate for age.  PSYCH: Mentation appears normal, affect normal/bright, judgement and insight intact, normal speech and appearance well-groomed.      In summary, Sasha Hand has Class III obesity with a body mass index of Body mass index is 55.5 kg/m . kg/m2 and the comorbidities stated above. She completed an informational seminar and is a possible candidate for the laparoscopic gastric sleeve.  She will have to complete the following pre-requisites:    Received weight loss goal of 36 lb prior to surgery.  Dietitian x 3   Have preoperative laboratory tests drawn.  Psychological Evaluation with MMPI and clearance for weight loss surgery.  Letter of clearance from the following primary care provider   EGD     Today in the office we discussed gastric sleeve surgery. Preoperative, perioperative, and postoperative processes, management, and follow up were addressed.  Risks and benefits were outlined including the risk of death, staple line leak (1-2%), PE, DVT, ulcer, worsening GERD, N/V, stricture, hernia, wound infection, weight regain, and vitamin deficiencies. I emphasized exercise and activity along with appropriate food choice as the main foundation for weight loss with surgery providing surgical reinforcement of this.  All questions were answered.  A goal sheet and support group handout were given to the patient.          Once the patient has completed the requirements in their task list and there are no further recommendations, the pt will be allowed to see the surgeon of her choice for consultation on the laparoscopic gastric sleeve surgery. Patient verbalizes understanding of the process to surgery and expectations for the postoperative  period including the need for lifelong lifestyle changes, vitamin supplementation, and laboratory monitoring.    Sincerely,     Komal Miller NP

## 2022-01-13 NOTE — PROGRESS NOTES
Bariatric Task List updated.  Bariatric information/clearance letters sent to patient via [employer].    Bariatric Task List    Fax:  Please fax all paperwork to: [phone] -     Status:  Is patient a candidate for bariatric surgery?:    -     Cleared to schedule surgeon consult?:    -     Status:    -     Surgeon: WIse -     Tentative surgery month/year:   -        Insurance: Insurance:  [employer] -      Contact insurance to discuss coverage:   -       Cigna: PCP Recommendation and Medical Clearance:    -     HP Referral:    -      Advanced beneficiary notification (ABN) for Medicare patients for RD visits   and surgery:   -      Weight history:   -     Other:    -        Patient Info: Initial Weight:  365 -     Date of Initial Weight/Height:  1/13/2022 -     Goal Weight (lbs):  329 -     Required Weight Loss:  36 -     Surgery Type:  sleeve gastrectomy -     Multidisciplinary Meeting:    -        Dietician Visits: Structured weight loss required by insurance?:    -     Dietician Visit 1:  Completed - 1/12/22  ES - AS   Dietician Visit 2:  Needed -     Dietician Visit 3:  Needed -     Dietician Visit 4:    -     Dietician Visit 5:    -     Dietician Visit 6:    -     Dietician Visit additional:  Needed - Monthly until surgery   Clearance from dietician to see surgeon?:    -     Dietician Notes:    -        Psychological Evaluation: Psych eval:  Needed - List and letter sent to pt 1/13/22 - AS   Therapist letter of support:    -     Psychiatrist letter of support:    -     Establish care with therapist:    -     Complete eating disorder evaluation:    -     Letter of clearance from therapist/eating disorder program:    -     Other:    -        Lab Work: Complete Blood Count:  Needed - Ordered 1/12/22 - AS   Comprehensive Metabolic Panel:  Needed - Ordered 1/12/22 - AS   Vitamin D:  Needed - Ordered 1/12/22 - AS   PTH:  Needed - Ordered 1/12/22 - AS   Hgb A1c:  Needed - Ordered 1/12/22 - AS    Lipids: Completed -  "     TSH (UCARE, SCA, [place] MA): Completed -       Ferritin:   -       Folate:   -       Testosterone, Total and Free:   -     Thiamine:   -     Vitamin A:   -     Vitamin B12:   -     Zinc:   -     C-peptide:   -     H. pylori:    -     MRSA (2 swabs, minimum 48 hours apart):   -     Nicotine Testing:    -     Recheck Vitamin D:   -     Other:    -        Consults/ Clearance Sleep Medicine:    -     Cardiac:    -     Pain:   -     Dental:    -     Endocrine:    -     Gastroenterology:    -     Vascular Medicine:    -     Hematology:    -     Medical Weight Management:   -     Physical Therapy/Exercise:    -     Nephrology:    -     Neurology:    -     Pulmonology:    -     Rheumatology:    -     Other:    -     Other:    -     Other:    -        Testing: UGI:    -     EGD:  Needed - Ordered 1/12/22 - AS   Sleep Study:   -     Other:   -     Other:    -        PCP: Establish care with PCP:  Completed -     Follow up with PCP:    -     PCP letter of support:  Needed - Letter sent to pt 1/13/22 - AS      Stopping Smoking/ Alcohol Use: Quit tobacco use (3 months smoke free)?:    -     Quit date:    -     Quit alcohol use:   -     Quit date:   -     Other:   -     Quit date:   -        Patient Education:  Information Session:    -     Given \"Making your decision\" handout?:  Yes - 1/13/22 - AS   Given \"A Roadmap to you Weight Loss Surgery\" handout?: Yes - 1/13/22 - AS   Given \"Get Well Loop\" information?: Yes - 1/13/22 - AS   Given support group information?:    -     Attended support group?:    -     Support plan in place?:    -     Research consents signed?:    -     Avoid NSAIDS/ Alternate Plan for Pain:   -        Additional Surgery Requirements: Review Coag plan:    -     HgA1c <8:    -     Inpatient pain consult:    -     Final nicotine screen:    -     Dental work complete:    -     Birth control plan:    -     Gallstone prevention plan (Actigall for 6 months postop):   -     Other:   -     Other:   -        Final " Tasks:  Before surgery online class:  Needed -     Before surgery online class website link:  [email or web address]   After surgery online class:  Needed -     After surgery online class website link:  [email or web address]   Nurse visit per clinic:  Needed -     History and Physical per clinic:   -     Final labs per clinic: Needed -     Chest xray per clinic:   -     Electrocardiogram (ECG) per clinic:   -     Other:   -        Notes:   -

## 2022-01-14 ENCOUNTER — TELEPHONE (OUTPATIENT)
Dept: ENDOCRINOLOGY | Facility: CLINIC | Age: 22
End: 2022-01-14
Payer: COMMERCIAL

## 2022-01-14 ENCOUNTER — TELEPHONE (OUTPATIENT)
Dept: GASTROENTEROLOGY | Facility: CLINIC | Age: 22
End: 2022-01-14
Payer: COMMERCIAL

## 2022-01-14 ENCOUNTER — HOSPITAL ENCOUNTER (OUTPATIENT)
Facility: AMBULATORY SURGERY CENTER | Age: 22
End: 2022-01-14
Attending: INTERNAL MEDICINE
Payer: COMMERCIAL

## 2022-01-14 NOTE — TELEPHONE ENCOUNTER
Screening Questions  1. Are you active on mychart? y    2. What insurance is in the chart? [employer],HP    2.  Ordering/Referring Provider: Pending sale to [facility]    3. BMI 55.5, If greater than 40 review exclusion criteria also will need EXTENDED PREP    4.  Respiratory Screening (If yes to any of the following HOSPITAL setting only):     Do you use daily home oxygen? n  Do you have mod to severe Obstructive Sleep Apnea? n   Do you have Pulmonary Hypertension? n   Do you have UNCONTROLLED asthma? n    5. Have you had a heart or lung transplant? n  (If yes, please review exclusion criteria)    6. Are you currently on dialysis?n  (If yes, schedule in HOSPITAL setting only)(If yes, please send Golytely prep)    7. Do you have chronic kidney disease? n (If yes, please send Golytely prep)    7. Have you had a stroke or Transient ischemic attack (TIA) within 6 months? n (If yes, do not schedule at [facility])    8. In the past 6 months, have you had any heart related issues including cardiomyopathy or heart attack? n (If yes, please review exclusion criteria)           If yes, did it require cardiac stenting or other implantable device?n  (If yes, please review exclusion criteria)      9. Do you have any implantable devices in your body (pacemaker, defib, LVAD)? n (If yes, schedule at [facility])    10. Do you take nitroglycerin? If yes, how often? n (if yes, schedule at HOSPITAL setting)    11. Are you currently taking any blood thinners?n (If yes- inform patient to follow up with PCP or provider for follow up instructions)     12. Are you a diabetic? Pre diabetic (If yes, please send Golytely prep)    13. (Females) Are you currently pregnant? n  If yes, how many weeks?      15. Are you taking any prescription pain medications on a routine schedule? n If yes, MAC sedation and patient will need EXTENDED PREP.    16. Do you have any chemical dependencies such as alcohol, street drugs, or methadone? n If yes, MAC sedation and patient will need  EXTENDED PREP    17. Do you have any history of post-traumatic stress syndrome, severe anxiety or history of psychosis? n  If yes, MAC sedation.     18. Do you transfer independently? y    19.  Do you have any issues with constipation? n   If yes, pt will need EXTENDED PREP     20. Preferred Pharmacy for Pre Prescription [facility] [address] [facility] [id number] [facility]    Scheduling Details    Which Colonoscopy Prep was Sent?:   Type of Procedure Scheduled: EGD  Surgeon: Keaton  Date of Procedure: 2/15/22  [place] [facility]  [place] (Please ask for phone number if not scheduled by patient): Sasha Hand        Sedation Type: CS  Conscious Sedation- Needs  for 6 hours after the procedure  MAC/General-Needs  for 24 hours after procedure    Pre-op Required at [facility], [place], [place] and OR for MAC sedation: n  (if yes advise patient they will need a pre-op prior to procedure)      Informed patient they will need an adult  y  Cannot take any type of public or medical transportation alone    Pre-Procedure Covid test to be completed at [facility] or Externally: y    Confirmed Nurse will call to complete assessment y    Additional comments:  ([facility] PATIENTS NEED EXTENDED PREP)

## 2022-01-14 NOTE — TELEPHONE ENCOUNTER
Patient called back about her insurance. She did know she had an exclusion with [employer] but called [employer] and she does have bariatric surgery coverage.   I tried to get through to [employer] but was on hold for greater than an hour and disconnected. Discussed the HP Call to Change program which must be completed prior to surgery. Patient states the best time to receive calls is noon over her lunch hour or on Thursdays. HP referral submitted today.

## 2022-01-14 NOTE — TELEPHONE ENCOUNTER
Called [employer] to check if policy has coverage for bariatric surgery, it does not. It will not cover medications for weight loss or any weight loss program.

## 2022-01-14 NOTE — TELEPHONE ENCOUNTER
Left VM message letting patient know I called [employer] to check if her policy covers bariatric surgery, it does not. I inquired about working with our weight loss program, it will not cover that nor will it cover weight loss medications. My direct call back number left.

## 2022-01-17 ENCOUNTER — TELEPHONE (OUTPATIENT)
Dept: ENDOCRINOLOGY | Facility: CLINIC | Age: 22
End: 2022-01-17
Payer: COMMERCIAL

## 2022-01-17 NOTE — TELEPHONE ENCOUNTER
MTM referral from: [facility] visit (referral by provider)    MTM referral outreach attempt #2 on January 17, 2022 at 5:14 PM      Outcome: Patient not reachable after several attempts, will route to MTM Pharmacist/Provider as an FYI.  MT scheduling number is [phone].  Thank you for the referral.    Jhonatan Hopkins, MTM coordinator

## 2022-01-18 ENCOUNTER — TELEPHONE (OUTPATIENT)
Dept: ENDOCRINOLOGY | Facility: CLINIC | Age: 22
End: 2022-01-18
Payer: COMMERCIAL

## 2022-01-18 NOTE — TELEPHONE ENCOUNTER
Central Prior Authorization Team   Phone: [phone]      PA Initiation    Medication: Saxenda-PA initiated  Insurance Company: [employer] - Phone [phone] Fax [phone]  Pharmacy Filling the Rx: [id number] [place] [address]  Filling Pharmacy Phone: [phone]  Filling Pharmacy Fax:    Start Date: 1/18/2022

## 2022-01-18 NOTE — TELEPHONE ENCOUNTER
MTM referral from: [facility] visit (referral by provider)    MTM referral outreach attempt #2 on January 18, 2022 at 2:35 PM      Outcome: Patient not reachable after several attempts, will route to MTM Pharmacist/Provider as an FYI.  MT scheduling number is [phone].  Thank you for the referral.    Jhonatan Hopkins, MTM coordinator

## 2022-01-20 ENCOUNTER — LAB (OUTPATIENT)
Dept: LAB | Facility: CLINIC | Age: 22
End: 2022-01-20
Payer: COMMERCIAL

## 2022-01-20 DIAGNOSIS — E11.9 TYPE 2 DIABETES MELLITUS WITHOUT COMPLICATION, WITHOUT LONG-TERM CURRENT USE OF INSULIN (H): Primary | ICD-10-CM

## 2022-01-20 DIAGNOSIS — E66.01 MORBID OBESITY (H): ICD-10-CM

## 2022-01-20 DIAGNOSIS — K21.00 GASTROESOPHAGEAL REFLUX DISEASE WITH ESOPHAGITIS, UNSPECIFIED WHETHER HEMORRHAGE: ICD-10-CM

## 2022-01-20 LAB
ALBUMIN SERPL-MCNC: 3.7 G/DL (ref 3.4–5)
ALP SERPL-CCNC: 59 U/L (ref 40–150)
ALT SERPL W P-5'-P-CCNC: 96 U/L (ref 0–50)
ANION GAP SERPL CALCULATED.3IONS-SCNC: 5 MMOL/L (ref 3–14)
AST SERPL W P-5'-P-CCNC: 36 U/L (ref 0–45)
BILIRUB SERPL-MCNC: 0.4 MG/DL (ref 0.2–1.3)
BUN SERPL-MCNC: 12 MG/DL (ref 7–30)
CALCIUM SERPL-MCNC: 9.2 MG/DL (ref 8.5–10.1)
CHLORIDE BLD-SCNC: 111 MMOL/L (ref 94–109)
CO2 SERPL-SCNC: 24 MMOL/L (ref 20–32)
CREAT SERPL-MCNC: 0.54 MG/DL (ref 0.52–1.04)
ERYTHROCYTE [DISTWIDTH] IN BLOOD BY AUTOMATED COUNT: 12.1 % (ref 10–15)
GFR SERPL CREATININE-BSD FRML MDRD: >90 ML/MIN/1.73M2
GLUCOSE BLD-MCNC: 109 MG/DL (ref 70–99)
HBA1C MFR BLD: 9.1 % (ref 0–5.6)
HCT VFR BLD AUTO: 43.1 % (ref 35–47)
HGB BLD-MCNC: 14.6 G/DL (ref 11.7–15.7)
MCH RBC QN AUTO: 28.6 PG (ref 26.5–33)
MCHC RBC AUTO-ENTMCNC: 33.9 G/DL (ref 31.5–36.5)
MCV RBC AUTO: 84 FL (ref 78–100)
PLATELET # BLD AUTO: 311 10E3/UL (ref 150–450)
POTASSIUM BLD-SCNC: 3.8 MMOL/L (ref 3.4–5.3)
PROT SERPL-MCNC: 7.5 G/DL (ref 6.8–8.8)
PTH-INTACT SERPL-MCNC: 46 PG/ML (ref 18–80)
RBC # BLD AUTO: 5.11 10E6/UL (ref 3.8–5.2)
SODIUM SERPL-SCNC: 140 MMOL/L (ref 133–144)
WBC # BLD AUTO: 8.6 10E3/UL (ref 4–11)

## 2022-01-20 PROCEDURE — 36415 COLL VENOUS BLD VENIPUNCTURE: CPT

## 2022-01-20 PROCEDURE — 83036 HEMOGLOBIN GLYCOSYLATED A1C: CPT

## 2022-01-20 PROCEDURE — 82306 VITAMIN D 25 HYDROXY: CPT

## 2022-01-20 PROCEDURE — 83970 ASSAY OF PARATHORMONE: CPT

## 2022-01-20 PROCEDURE — 80053 COMPREHEN METABOLIC PANEL: CPT

## 2022-01-20 PROCEDURE — 85027 COMPLETE CBC AUTOMATED: CPT

## 2022-01-20 RX ORDER — METFORMIN HCL 500 MG
TABLET, EXTENDED RELEASE 24 HR ORAL
Qty: 60 TABLET | Refills: 3 | Status: SHIPPED | OUTPATIENT
Start: 2022-01-20 | End: 2022-03-17

## 2022-01-20 RX ORDER — LIRAGLUTIDE 6 MG/ML
INJECTION SUBCUTANEOUS
Qty: 9 ML | Refills: 3 | Status: ON HOLD | OUTPATIENT
Start: 2022-01-20 | End: 2022-03-28

## 2022-01-20 NOTE — TELEPHONE ENCOUNTER
Prior Authorization Approval    Authorization Effective Date: 12/19/2021  Authorization Expiration Date: 5/18/2022  Medication: Saxenda-PA approved  Approved Dose/Quantity:   Reference #:   [id number]  Insurance Company: [employer] - Phone [phone] Fax [phone]  Expected CoPay:       CoPay Card Available:      Foundation Assistance Needed:    Which Pharmacy is filling the prescription (Not needed for infusion/clinic administered): [facility] [address] [place] [id number] [facility]  Pharmacy Notified: Yes  Patient Notified: No

## 2022-01-21 ENCOUNTER — MYC MEDICAL ADVICE (OUTPATIENT)
Dept: FAMILY MEDICINE | Facility: CLINIC | Age: 22
End: 2022-01-21
Payer: COMMERCIAL

## 2022-01-21 DIAGNOSIS — E11.9 TYPE 2 DIABETES MELLITUS WITHOUT COMPLICATION, WITHOUT LONG-TERM CURRENT USE OF INSULIN (H): Primary | ICD-10-CM

## 2022-01-21 DIAGNOSIS — E11.65 TYPE 2 DIABETES MELLITUS WITH HYPERGLYCEMIA, WITHOUT LONG-TERM CURRENT USE OF INSULIN (H): Primary | ICD-10-CM

## 2022-01-21 DIAGNOSIS — E66.01 MORBID OBESITY (H): ICD-10-CM

## 2022-01-21 LAB — DEPRECATED CALCIDIOL+CALCIFEROL SERPL-MC: 19 UG/L (ref 20–75)

## 2022-01-25 ENCOUNTER — TELEPHONE (OUTPATIENT)
Dept: ENDOCRINOLOGY | Facility: CLINIC | Age: 22
End: 2022-01-25
Payer: COMMERCIAL

## 2022-01-25 DIAGNOSIS — E66.01 MORBID OBESITY (H): ICD-10-CM

## 2022-01-25 DIAGNOSIS — E11.9 TYPE 2 DIABETES MELLITUS WITHOUT COMPLICATION, WITHOUT LONG-TERM CURRENT USE OF INSULIN (H): Primary | ICD-10-CM

## 2022-01-25 RX ORDER — LANCETS
1 EACH MISCELLANEOUS 3 TIMES DAILY
Qty: 100 EACH | Refills: 3 | Status: SHIPPED | OUTPATIENT
Start: 2022-01-25 | End: 2023-02-01

## 2022-01-27 ENCOUNTER — LAB (OUTPATIENT)
Dept: LAB | Facility: CLINIC | Age: 22
End: 2022-01-27
Payer: COMMERCIAL

## 2022-01-27 DIAGNOSIS — E66.01 MORBID OBESITY (H): ICD-10-CM

## 2022-01-27 DIAGNOSIS — E11.9 TYPE 2 DIABETES MELLITUS WITHOUT COMPLICATION, WITHOUT LONG-TERM CURRENT USE OF INSULIN (H): ICD-10-CM

## 2022-01-27 LAB
ANION GAP SERPL CALCULATED.3IONS-SCNC: 6 MMOL/L (ref 3–14)
BUN SERPL-MCNC: 8 MG/DL (ref 7–30)
CALCIUM SERPL-MCNC: 9.7 MG/DL (ref 8.5–10.1)
CHLORIDE BLD-SCNC: 112 MMOL/L (ref 94–109)
CHOLEST SERPL-MCNC: 126 MG/DL
CO2 SERPL-SCNC: 23 MMOL/L (ref 20–32)
CREAT SERPL-MCNC: 0.5 MG/DL (ref 0.52–1.04)
FASTING STATUS PATIENT QL REPORTED: YES
GFR SERPL CREATININE-BSD FRML MDRD: >90 ML/MIN/1.73M2
GLUCOSE BLD-MCNC: 92 MG/DL (ref 70–99)
HBA1C MFR BLD: 5.4 % (ref 0–5.6)
HDLC SERPL-MCNC: 38 MG/DL
LDLC SERPL CALC-MCNC: 74 MG/DL
NONHDLC SERPL-MCNC: 88 MG/DL
POTASSIUM BLD-SCNC: 3.9 MMOL/L (ref 3.4–5.3)
SODIUM SERPL-SCNC: 141 MMOL/L (ref 133–144)
TRIGL SERPL-MCNC: 72 MG/DL

## 2022-01-27 PROCEDURE — 80061 LIPID PANEL: CPT

## 2022-01-27 PROCEDURE — 80048 BASIC METABOLIC PNL TOTAL CA: CPT

## 2022-01-27 PROCEDURE — 83036 HEMOGLOBIN GLYCOSYLATED A1C: CPT

## 2022-01-27 PROCEDURE — 36415 COLL VENOUS BLD VENIPUNCTURE: CPT

## 2022-01-28 ENCOUNTER — TELEPHONE (OUTPATIENT)
Dept: ENDOCRINOLOGY | Facility: CLINIC | Age: 22
End: 2022-01-28
Payer: COMMERCIAL

## 2022-01-28 NOTE — TELEPHONE ENCOUNTER
Called  to check if [facility] has coverage for bariatric surgery, it does.  phone call referral had been sent previously.  I did tell her I would need to move her surgeon from Dr. Martinez Pugh to Dr Mata because Dr. Pugh is not set up with  yet to do bariatric surgeries. Patient is fine with that. Video visit meeting and greet scheduled 3/10 at 9:30 am.

## 2022-01-31 DIAGNOSIS — Z01.812 PRE-PROCEDURE LAB EXAM: Primary | ICD-10-CM

## 2022-02-01 ENCOUNTER — DOCUMENTATION ONLY (OUTPATIENT)
Dept: ENDOCRINOLOGY | Facility: CLINIC | Age: 22
End: 2022-02-01
Payer: COMMERCIAL

## 2022-02-01 NOTE — PROGRESS NOTES
Surgeon Updated  Bariatric Task List    Fax:  Please fax all paperwork to: [phone] -     Status:  Is patient a candidate for bariatric surgery?:    -     Cleared to schedule surgeon consult?:    -     Status:    -     Surgeon: Esperanza -     Olvinative surgery month/year:   -        Insurance: Insurance:  Preferred One -      Contact insurance to discuss coverage:   -       Cigna: PCP Recommendation and Medical Clearance:    -     HP Referral:    -      Advanced beneficiary notification (ABN) for Medicare patients for RD visits   and surgery:   -      Weight history:   -     Other:    -        Patient Info: Initial Weight:  365 -     Date of Initial Weight/Height:  1/13/2022 -     Goal Weight (lbs):  329 -     Required Weight Loss:  36 -     Surgery Type:  sleeve gastrectomy -     Multidisciplinary Meeting:    -        Dietician Visits: Structured weight loss required by insurance?:    -     Dietician Visit 1:  Completed - 1/12/22  ES - AS   Dietician Visit 2:  Needed -     Dietician Visit 3:  Needed -     Dietician Visit 4:    -     Dietician Visit 5:    -     Dietician Visit 6:    -     Dietician Visit additional:  Needed - Monthly until surgery   Clearance from dietician to see surgeon?:    -     Dietician Notes:    -        Psychological Evaluation: Psych eval:  Needed - List and letter sent to pt 1/13/22 - AS   Therapist letter of support:    -     Psychiatrist letter of support:    -     Establish care with therapist:    -     Complete eating disorder evaluation:    -     Letter of clearance from therapist/eating disorder program:    -     Other:    -        Lab Work: Complete Blood Count:  Needed - Ordered 1/12/22 - AS   Comprehensive Metabolic Panel:  Needed - Ordered 1/12/22 - AS   Vitamin D:  Needed - Ordered 1/12/22 - AS   PTH:  Needed - Ordered 1/12/22 - AS   Hgb A1c:  Needed - Ordered 1/12/22 - AS    Lipids: Completed -      TSH ([facility], [facility], [place]): Completed -       Ferritin:   -       Folate:   -    "    Testosterone, Total and Free:   -     Thiamine:   -     Vitamin A:   -     Vitamin B12:   -     Zinc:   -     C-peptide:   -     H. pylori:    -     MRSA (2 swabs, minimum 48 hours apart):   -     Nicotine Testing:    -     Recheck Vitamin D:   -     Other:    -        Consults/ Clearance Sleep Medicine:    -     Cardiac:    -     Pain:   -     Dental:    -     Endocrine:    -     Gastroenterology:    -     Vascular Medicine:    -     Hematology:    -     Medical Weight Management:   -     Physical Therapy/Exercise:    -     Nephrology:    -     Neurology:    -     Pulmonology:    -     Rheumatology:    -     Other:    -     Other:    -     Other:    -        Testing: UGI:    -     EGD:  Needed - Ordered 1/12/22 - AS   Sleep Study:   -     Other:   -     Other:    -        PCP: Establish care with PCP:  Completed -     Follow up with PCP:    -     PCP letter of support:  Needed - Letter sent to pt 1/13/22 - AS      Stopping Smoking/ Alcohol Use: Quit tobacco use (3 months smoke free)?:    -     Quit date:    -     Quit alcohol use:   -     Quit date:   -     Other:   -     Quit date:   -        Patient Education:  Information Session:    -     Given \"Making your decision\" handout?:  Yes - 1/13/22 - AS   Given \"A Roadmap to you Weight Loss Surgery\" handout?: Yes - 1/13/22 - AS   Given \"Get Well Loop\" information?: Yes - 1/13/22 - AS   Given support group information?:    -     Attended support group?:    -     Support plan in place?:    -     Research consents signed?:    -     Avoid NSAIDS/ Alternate Plan for Pain:   -        Additional Surgery Requirements: Review Coag plan:    -     HgA1c <8:    -     Inpatient pain consult:    -     Final nicotine screen:    -     Dental work complete:    -     Birth control plan:    -     Gallstone prevention plan (Actigall for 6 months postop):   -     Other:   -     Other:   -        Final Tasks:  Before surgery online class:  Needed -     Before surgery online class " website link:  [email or web address]   After surgery online class:  Needed -     After surgery online class website link:  [email or web address]   Nurse visit per clinic:  Needed -     History and Physical per clinic:   -     Final labs per clinic: Needed -     Chest xray per clinic:   -     Electrocardiogram (ECG) per clinic:   -     Other:   -        Notes:   -

## 2022-02-03 ENCOUNTER — TELEPHONE (OUTPATIENT)
Dept: GASTROENTEROLOGY | Facility: CLINIC | Age: 22
End: 2022-02-03
Payer: COMMERCIAL

## 2022-02-03 ENCOUNTER — HOSPITAL ENCOUNTER (OUTPATIENT)
Facility: AMBULATORY SURGERY CENTER | Age: 22
End: 2022-02-03
Attending: INTERNAL MEDICINE
Payer: COMMERCIAL

## 2022-02-03 NOTE — TELEPHONE ENCOUNTER
Caller: Writer called patient to reschedule procedure and Covid appointment due to Provider block change.    Procedure: EGD - Wise only    Date, Location, and Surgeon of Procedure Cancelled: 02/15 [facility]    Ordering Provider:Komal Miller NP    Reason for cancel (please be detailed, any staff messages or encounters to note?): provider block change        Rescheduled: No, Left message to return call. Procedure case and Covid appointment Cancelled.      If rescheduled:    Date:    Location:    Note any change or update to original order/sedation:

## 2022-02-03 NOTE — TELEPHONE ENCOUNTER
Caller: Sasha    Procedure: EGD    Date, Location, and Surgeon of Procedure Cancelled: 2/15/22 [facility]-previously canceled    Ordering Provider:Paul    Reason for cancel (please be detailed, any staff messages or encounters to note?): Provider not available          Rescheduled: Yes     If rescheduled:    Date: 3/11/22   [place] [facility] [place]   Note any change or update to original order/sedation: None

## 2022-02-11 DIAGNOSIS — Z11.59 ENCOUNTER FOR SCREENING FOR OTHER VIRAL DISEASES: Primary | ICD-10-CM

## 2022-02-12 ENCOUNTER — HEALTH MAINTENANCE LETTER (OUTPATIENT)
Age: 22
End: 2022-02-12

## 2022-02-23 NOTE — PROGRESS NOTES
"Sasha Hand is a 21 year old female who is being evaluated via a billable video visit.      The patient has been notified of following:     \"This video visit will be conducted via a call between you and your physician/provider. We have found that certain health care needs can be provided without the need for an in-person physical exam.  This service lets us provide the care you need with a video conversation.  If a prescription is necessary we can send it directly to your pharmacy.  If lab work is needed we can place an order for that and you can then stop by our lab to have the test done at a later time.    Video visits are billed at different rates depending on your insurance coverage.  Please reach out to your insurance provider with any questions.    If during the course of the call the physician/provider feels a video visit is not appropriate, you will not be charged for this service.\"    Patient has given verbal consent for Video visit? Yes  How would you like to obtain your AVS? MyChart  If you are dropped from the video visit, the video invite should be resent to: Send to e-mail at: [email or web address]  Will anyone else be joining your video visit? No  {If patient encounters technical issues they should call [phone]      Video-Visit Details    Type of service:  Video Visit    Video Start Time: 10:08 AM  Video End Time: 10:34 AM    Originating Location (pt. Location): Home    Distant Location (provider location):  [facility] [place]     Platform used for Video Visit: [place]    During this virtual visit the patient is located in [place], patient verifies this as the location during the entirety of this visit.         Return Bariatric Nutrition Consultation Note    Reason For Visit: Nutrition Assessment    Sasha Hand is a 21 year old presenting today for a return bariatric nutrition consult.   Pt is interested in laparoscopic sleeve gastrectomy with Dr. Pugh expected " "surgery in TBD.  Patient is accompanied by mother and sister.  This is pt's second of 3 required nutrition visits prior to surgery.     Pt referred by Komal Miller NP on January 13, 2022.  Patient with Co-morbidities of obesity including:  Type II DM no - Prediabetes   Renal Failure no  Sleep apnea no  Hypertension yes   Dyslipidemia no  Joint pain no  Back pain no  GERD yes     Support System Reviewed With Patient 1/12/2022   Who do you have in your support network that can be available to help you for the first 2 weeks after surgery? Pranay  (boyfriend) Genet (sister in law) angelo (mother)   Who can you count on for support throughout your weight loss surgery journey? Mother , boyfriend       ANTHROPOMETRICS:  Estimated body mass index is 55.5 kg/m  as calculated from the following:    Height as of 1/12/22: 1.727 m (5' 8\").    Weight as of 1/12/22: 165.6 kg (365 lb).     Current weight (2/24/22): 346 lbs per pt     Required weight loss goal pre-op: -36 lbs from initial consult weight (goal weight 329 lbs or less before surgery)       1/12/2022   I have tried the following methods to lose weight Watching portions or calories, Exercise, Weight Watchers       Weight Loss Questions Reviewed With Patient 1/12/2022   How long have you been overweight? Since early childhood       SUPPLEMENT INFORMATION:  Biotin     Saxenda- reached 2.0 mg dose yesterday    NUTRITION HISTORY:  Pt has gradually gained weight since puberty. Tried weight watchers in the past. 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 If she has smaller portions she feels hungry within two hours. Snacks at work but not at home.     2/24/22: Pt has lost weight since last visit. Pt's boyfriend has been a huge support in cooking healthy meals with her.  She has been pre planning meals ahead of time to determine calories for the day. Still wanting to increase physical activity but overall feels her main goal now is to be " "consistent.     Recall Diet Questions Reviewed With Patient 2022   Describe what you typically consume for breakfast (typical or most recent): Eggs, cheese, sasuage, taylor. Yogart.   Describe what you typically consume for lunch (typical or most recent): Soup, left over dinner. Used to be fast food. Stopped that. Somthibg sweet. Sonetimes repeat of breakfast.   Describe what you typically consume for supper (typical or most recent): Protine. Veggies, starch.   Describe what you typically consume as snacks (typical or most recent): Cheeze oliver. Jerky. Cookies . (Only at work)   How many ounces of water, or other low calorie drinks, do you drink daily (8 oz=1 glass)? 48 oz   How many ounces of caffeine (coffee, tea, pop) do you drink daily (8 oz=1 glass)? 24 oz   How many ounces of carbonated (pop, beer, sparkling water) drinks do you drinky daily (8 oz=1 glass)? 24 oz   How many ounces of juice, pop, sweet tea, sports drinks, protein drinks, other sweetened drinks, do you drink daily (8 oz=1 glass)? 8 oz   How many ounces of milk do you drink daily (8 oz=1 glass) 8 oz   Please indicate the type of milk: whole   How often do you drink alcohol? Monthly or less   If you do drink alcohol, how many drinks might you have in a day? (one drink = 5 oz. wine, 1 can/bottle of beer, 1 shot liquor) 1 or 2       Eating Habits 2022   Do you have any dietary restrictions? No   Do you currently binge eat (eat a large amount of food in a short time)? Yes   Are you an emotional eater? Yes   Do you get up to eat after falling asleep? No   What foods do you crave? Salt and sweet.     Progress on Previous Goals  Relating To Eatin) Eat slowly (20-30 minutes per meal), chewing foods well (25 chews per bite/applesauce consistency) improving, continues  2) 9\" Plate method (1/2 plate non-starchy vegetables/fruit, 1/4 plate lean protein, 1/4 plate whole grain starch - no more than 1/2 cup carb/meal) improving - pt feels she needs " "to incorporate more vegetables  3) See recipes below met  4) Utilize food journal or Beezag Pal to track meals and snacks as well as emotions (2000 calories per day for weight loss) met, continues- loosely pre tracks calories    -https://www.eatGlassmap.com/  5) Review handouts below  Met, continues     Healthy Recipe Resources:  \"The Volumetrics Eating Plan\" by Lauren Powell, Ph.D.  https://www.ProspectNow.[employer]/  www.Drop Messages  www.THEVA  Dash Diet Recipes [facility]  www.extension.[facility] - the recipe box  \"Cooking that Counts\" by editors of In[employer] Edge  https://www.[facility]/communityculinary/[facility]_Cookbook_KT_Final_11-6_NoCrops-compressed.pdf  Https://www.Hi-G-Tekplate.gov/myplatekitchen  https://snaped.POKKTs.usda.gov/recipes-menus - SNAP recipes  https://www.diabetesfoodhub.org/all-recipes.html  https://www.Sellsy.[employer]/    Relating to dietary supplements:  1) Start a multivitamin containing iron daily  Met, continues     Relating to activity:  1) Increase activity as able improving, continues- wants to increase    Relating to cravings:  1) Anytime you have a craving, find an activity (such as a hobby, take a walk, call a friend) 15 minutes to see if craving goes away. Improving, continues    ADDITIONAL INFORMATION:  Physical activity: mostly sedentary job ().     Doing 40 mins 2x per week    Dining Out History Reviewed With Patient 1/12/2022   How often do you dine out? Rarely.   Where do you dine out? (select all that apply) sit-down restaurants   What types of food do you order when you dine out? Burger or pasta       Physical Activity Reviewed With Patient 1/12/2022   How often do you exercise? 1 to 2 times per week   What is the duration of your exercise (in minutes)? 20 Minutes   What types of exercise do you do? other   What keeps you from being more active?  Pain, Too tired       NUTRITION DIAGNOSIS:  Obesity r/t long history of positive energy balance aeb BMI >30 " kg/m2.    INTERVENTION:  Intervention Provided/Education Provided on post-op diet guidelines, vitamins/minerals essential post-operatively, GI anatomy of bariatric surgeries, ways to help prepare for post-op diet guidelines pre-operatively, portion/calorie-control, mindful eating and sources of protein.  Patient demonstrates understanding. Provided pt with list of goals RD contact information.      Questions Reviewed With Patient 1/12/2022   How ready are you to make changes regarding your weight? Number 1 = Not ready at all to make changes up to 10 = very ready. 10   How confident are you that you can change? 1 = Not confident that you will be successful making changes up to 10 = very confident. 7       Expected Engagement: good    GOALS:  1) Increase physical activity as able   2) Continue focusing on lean proteins and non-starchy vegetables  3) Continue tracking calories as needed  4) Avoid snacking if able. If snack is needed use lean protein and/or fruit/vegetable. Examples:   - 2 cup popcorn   - 1 cup mixed berries   - 15 almonds, walnuts, cashews   - carrot/celery sticks and 2 tbsp low-fat ranch   - 1 hard boiled egg   - Part-skim mozzarella cheese stick   - Low-fat, low-sugar greek yogurt with 1/2 cup berries   - Med apple or pear   - sliced bell peppers with 1/2 cup salsa   - 1/2 cup roasted chickpeas   - sliced cucumbers with vinegar    Snack ideas:  - Banana and creamy PB dip (https://www.diabetesfoodhub.org/recipes/sweet-peanut-buttery-dip.html?home-category_id=23)  - Roasted chickpeas (https://www.diabetesfoodhub.org/recipes/roasted-and-spiced-chickpeas.html?home-category_id=23)  - Lemon Raspberry quynh seed pudding (https://www.diabetesfoodhub.org/recipes/lemon-raspberry- quynh-seed-pudding.html?home-category_id=23)  - Black bean hummus with carrot and celery sticks (https://www.diabetesfoodhub.org/recipes/black-bean-hummus.html?home-category_id=23)  - Greek yogurt chocolate mouse  (https://www.diabetesfood[employer]if[employer].org/recipes/greek-yogurt-chocolate-mousse.html?home-category_id=23)   - Broccoli Cheese Bites  (https://www.diabetesfoShave Club.org/recipes/broccoli-cheese-bites.html?home-category_id=20)  - Chicken Satay with peanut sauce  (https://www.diabetesfoShave Club.org/recipes/blueberry-almond-chicken-salad-lettuce-wraps.html?home-category_id=20)  - Deviled Eggs  (https://www.Quincee.org/recipes/devilled-eggs.html?home-category_id=20)      The Plate Method  http://www.ACE*COMM/871527qx.pdf    Protein Sources for Weight Loss  [email or web address]://[email or web address]     Carbohydrates  [email or web address]://[email or web address]     Mindful Eating  http://ACE*[email or web address]/[email or web address]     Summary of Volumetrics Eating Plan  [email or web address]     Diet Guidelines after Weight Loss Surgery  [email or web address]://[email or web address]     Seated Exercises for Arms and Legs (can be done before or after surgery)  [email or web address]://[email or web address]    Surgery Related Nutrition Handouts:    Diet Guidelines after Weight Loss Surgery  [email or web address]://[email or web address]     Lifestyle Changes Before and After Weight Loss Surgery: Renningers for Success  [email or web address]://[email or web address]     Modified Liquid Diet (2 liquid meals, 1 meal, 2 snacks)  [email or web address]     Your Stage 1 Diet: Clear Liquids  [email or web address]://[email or web address]     Your Stage 2 Diet: Low-fat Full Liquids  [email or web address]://[email or web address]     Your Stage 3 Diet: Pureed Foods  [email or web address]://[email or web address]     Pureed Pleasures  [email or web address]://[email or web address]/[email or web address]    Your Stage 4 Diet: Soft Foods  [email or web address]    Your Stage 5 Diet: Regular Foods  [email or web address]    Supplements after Weight Loss Surgery  [email or web address]://[email or web address]/[email or web address]     Keeping Track of Fluids  [email or web address]://[email or web address]    Why Take Supplements for Life after Weight Loss Surgery  [email or web address]://[email or web address]/673958.pdf     Keeping Up with Your Diet after Weight Loss  Surgery  [email or web address]    Preventing Low Blood Sugar after Weight Loss Surgery  [email or web address]://[email or web address]     Preventing Dumping Syndrome after Weight Loss Surgery  [email or web address]://[email or web address]      Follow up: 1 month, prn     Time spent with patient: 26 minutes.  MARCIE GLOVER RD, LD

## 2022-02-24 ENCOUNTER — VIRTUAL VISIT (OUTPATIENT)
Dept: ENDOCRINOLOGY | Facility: CLINIC | Age: 22
End: 2022-02-24
Payer: COMMERCIAL

## 2022-02-24 DIAGNOSIS — E66.9 OBESITY: ICD-10-CM

## 2022-02-24 DIAGNOSIS — Z71.3 NUTRITIONAL COUNSELING: Primary | ICD-10-CM

## 2022-02-24 DIAGNOSIS — E11.9 TYPE 2 DIABETES MELLITUS WITHOUT COMPLICATION, WITHOUT LONG-TERM CURRENT USE OF INSULIN (H): ICD-10-CM

## 2022-02-24 PROCEDURE — 97803 MED NUTRITION INDIV SUBSEQ: CPT | Mod: GT | Performed by: DIETITIAN, REGISTERED

## 2022-02-24 NOTE — LETTER
"2/24/2022       RE: Sasha Hand  [address]  [facility] [address]     Dear Colleague,    Thank you for referring your patient, Sasha Hand, to the [facility] [place] at [facility]. Please see a copy of my visit note below.    Sasha Hand is a 21 year old female who is being evaluated via a billable video visit.      The patient has been notified of following:     \"This video visit will be conducted via a call between you and your physician/provider. We have found that certain health care needs can be provided without the need for an in-person physical exam.  This service lets us provide the care you need with a video conversation.  If a prescription is necessary we can send it directly to your pharmacy.  If lab work is needed we can place an order for that and you can then stop by our lab to have the test done at a later time.    Video visits are billed at different rates depending on your insurance coverage.  Please reach out to your insurance provider with any questions.    If during the course of the call the physician/provider feels a video visit is not appropriate, you will not be charged for this service.\"    Patient has given verbal consent for Video visit? Yes  How would you like to obtain your AVS? MyChart  If you are dropped from the video visit, the video invite should be resent to: Send to e-mail at: [email or web address]  Will anyone else be joining your video visit? No  {If patient encounters technical issues they should call [phone]      Video-Visit Details    Type of service:  Video Visit    Video Start Time: 10:08 AM  Video End Time: 10:34 AM    Originating Location (pt. Location): Home    Distant Location (provider location):  [facility]     Platform used for Video Visit: ActuatedMedical    During this virtual visit the patient is located in [place], patient verifies this as " "the location during the entirety of this visit.         Return Bariatric Nutrition Consultation Note    Reason For Visit: Nutrition Assessment    Sasha Hand is a 21 year old presenting today for a return bariatric nutrition consult.   Pt is interested in laparoscopic sleeve gastrectomy with Dr. Pugh expected surgery in [place].  Patient is accompanied by mother and sister.  This is pt's second of 3 required nutrition visits prior to surgery.     Pt referred by Komal Miller NP on January 13, 2022.  Patient with Co-morbidities of obesity including:  Type II DM no - Prediabetes   Renal Failure no  Sleep apnea no  Hypertension yes   Dyslipidemia no  Joint pain no  Back pain no  GERD yes     Support System Reviewed With Patient 1/12/2022   Who do you have in your support network that can be available to help you for the first 2 weeks after surgery? Pranay  (boyfriend) Genet (sister in law) angelo (mother)   Who can you count on for support throughout your weight loss surgery journey? Mother , boyfriend       ANTHROPOMETRICS:  Estimated body mass index is 55.5 kg/m  as calculated from the following:    Height as of 1/12/22: 1.727 m (5' 8\").    Weight as of 1/12/22: 165.6 kg (365 lb).     Current weight (2/24/22): 346 lbs per pt     Required weight loss goal pre-op: -36 lbs from initial consult weight (goal weight 329 lbs or less before surgery)       1/12/2022   I have tried the following methods to lose weight Watching portions or calories, Exercise, Weight Watchers       Weight Loss Questions Reviewed With Patient 1/12/2022   How long have you been overweight? Since early childhood       SUPPLEMENT INFORMATION:  Biotin     Saxenda- reached 2.0 mg dose yesterday    NUTRITION HISTORY:  Pt has gradually gained weight since puberty. Tried weight watchers in the past. 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 If she has smaller portions she feels hungry within two hours. Snacks at work " but not at home.     2/24/22: Pt has lost weight since last visit. Pt's boyfriend has been a huge support in cooking healthy meals with her.  She has been pre planning meals ahead of time to determine calories for the day. Still wanting to increase physical activity but overall feels her main goal now is to be consistent.     Recall Diet Questions Reviewed With Patient 1/12/2022   Describe what you typically consume for breakfast (typical or most recent): Eggs, cheese, sasuage, taylor. Yogart.   Describe what you typically consume for lunch (typical or most recent): Soup, left over dinner. Used to be fast food. Stopped that. Somthibg sweet. Sonetimes repeat of breakfast.   Describe what you typically consume for supper (typical or most recent): Protine. Veggies, starch.   Describe what you typically consume as snacks (typical or most recent): Cheeze oliver. Jerky. Cookies . (Only at work)   How many ounces of water, or other low calorie drinks, do you drink daily (8 oz=1 glass)? 48 oz   How many ounces of caffeine (coffee, tea, pop) do you drink daily (8 oz=1 glass)? 24 oz   How many ounces of carbonated (pop, beer, sparkling water) drinks do you drinky daily (8 oz=1 glass)? 24 oz   How many ounces of juice, pop, sweet tea, sports drinks, protein drinks, other sweetened drinks, do you drink daily (8 oz=1 glass)? 8 oz   How many ounces of milk do you drink daily (8 oz=1 glass) 8 oz   Please indicate the type of milk: whole   How often do you drink alcohol? Monthly or less   If you do drink alcohol, how many drinks might you have in a day? (one drink = 5 oz. wine, 1 can/bottle of beer, 1 shot liquor) 1 or 2       Eating Habits 1/12/2022   Do you have any dietary restrictions? No   Do you currently binge eat (eat a large amount of food in a short time)? Yes   Are you an emotional eater? Yes   Do you get up to eat after falling asleep? No   What foods do you crave? Salt and sweet.     Progress on Previous Goals  Relating To  "Eatin) Eat slowly (20-30 minutes per meal), chewing foods well (25 chews per bite/applesauce consistency) improving, continues  2) 9\" Plate method (1/2 plate non-starchy vegetables/fruit, 1/4 plate lean protein, 1/4 plate whole grain starch - no more than 1/2 cup carb/meal) improving - pt feels she needs to incorporate more vegetables  3) See recipes below met  4) Utilize food journal or Celframe Pal to track meals and snacks as well as emotions (2000 calories per day for weight loss) met, continues- loosely pre tracks calories    -https://www.isocket.Nubity/  5) Review handouts below  Met, continues     Healthy Recipe Resources:  \"The Volumetrics Eating Plan\" by Lauren Powell, Ph.D.  https://www.ServerPilot.Nubity/  www.Kiwigrid.Nubity  www.Narvii.org  Dash Diet Recipes [facility]  www.extension.[facility] - the recipe box  \"Cooking that Counts\" by editors of Magzter  https://www.[facility]/communityculinary/[facility]_Cookbook_KT_Final_11-6_NoCrops-compressed.pdf  Https://www.choosemyplate.gov/myplatekitchen  https://snaped.fns.usda.gov/recipes-menus - SNAP recipes  https://www.diabetesfoodhub.org/all-recipes.html  https://www.mealDesRueda.com.Nubity/    Relating to dietary supplements:  1) Start a multivitamin containing iron daily  Met, continues     Relating to activity:  1) Increase activity as able improving, continues- wants to increase    Relating to cravings:  1) Anytime you have a craving, find an activity (such as a hobby, take a walk, call a friend) 15 minutes to see if craving goes away. Improving, continues    ADDITIONAL INFORMATION:  Physical activity: mostly sedentary job ().     Doing 40 mins 2x per week    Dining Out History Reviewed With Patient 2022   How often do you dine out? Rarely.   Where do you dine out? (select all that apply) sit-down restaurants   What types of food do you order when you dine out? Burger or pasta       Physical Activity Reviewed With Patient 2022 "   How often do you exercise? 1 to 2 times per week   What is the duration of your exercise (in minutes)? 20 Minutes   What types of exercise do you do? other   What keeps you from being more active?  Pain, Too tired       NUTRITION DIAGNOSIS:  Obesity r/t long history of positive energy balance aeb BMI >30 kg/m2.    INTERVENTION:  Intervention Provided/Education Provided on post-op diet guidelines, vitamins/minerals essential post-operatively, GI anatomy of bariatric surgeries, ways to help prepare for post-op diet guidelines pre-operatively, portion/calorie-control, mindful eating and sources of protein.  Patient demonstrates understanding. Provided pt with list of goals RD contact information.      Questions Reviewed With Patient 1/12/2022   How ready are you to make changes regarding your weight? Number 1 = Not ready at all to make changes up to 10 = very ready. 10   How confident are you that you can change? 1 = Not confident that you will be successful making changes up to 10 = very confident. 7       Expected Engagement: good    GOALS:  1) Increase physical activity as able   2) Continue focusing on lean proteins and non-starchy vegetables  3) Continue tracking calories as needed  4) Avoid snacking if able. If snack is needed use lean protein and/or fruit/vegetable. Examples:   - 2 cup popcorn   - 1 cup mixed berries   - 15 almonds, walnuts, cashews   - carrot/celery sticks and 2 tbsp low-fat ranch   - 1 hard boiled egg   - Part-skim mozzarella cheese stick   - Low-fat, low-sugar greek yogurt with 1/2 cup berries   - Med apple or pear   - sliced bell peppers with 1/2 cup salsa   - 1/2 cup roasted chickpeas   - sliced cucumbers with vinegar    Snack ideas:  - Banana and creamy PB dip (https://www.diabetesfoodhub.org/recipes/sweet-peanut-buttery-dip.html?home-category_id=23)  - Roasted chickpeas (https://www.diabetesfoodhub.org/recipes/roasted-and-spiced-chickpeas.html?home-category_id=23)  - Lemon Raspberry  quynh seed pudding (https://www.diabetesfoPacket Digital.org/recipes/lemon-raspberry- quynh-seed-pudding.html?home-category_id=23)  - Black bean hummus with carrot and celery sticks (https://www.Pombai.org/recipes/black-bean-hummus.html?home-category_id=23)  - Greek yogurt chocolate mouse (https://www.PersonSpotfoPacket Digital.org/recipes/greek-yogurt-chocolate-mousse.html?home-category_id=23)   - Broccoli Cheese Bites  (https://www.Pombai.org/recipes/broccoli-cheese-bites.html?home-category_id=20)  - Chicken Satay with peanut sauce  (https://www.Pombai.org/recipes/blueberry-almond-chicken-salad-lettuce-wraps.html?home-category_id=20)  - Deviled Eggs  (https://www.Pombai.org/recipes/devilled-eggs.html?home-category_id=20)      The Plate Method  http://www.Care Technology Systems/569348xe.pdf    Protein Sources for Weight Loss  http://fvfiles.com/724274.pdf     Carbohydrates  http://fvfiles.com/839527.pdf     Mindful Eating  http://Care Technology Systems/144252.pdf     Summary of Volumetrics Eating Plan  http://fvfiles.com/446817.pdf     Diet Guidelines after Weight Loss Surgery  http://fvfiles.com/491589.pdf     Seated Exercises for Arms and Legs (can be done before or after surgery)  http://www.Care Technology Systems/010113.pdf    Surgery Related Nutrition Handouts:    Diet Guidelines after Weight Loss Surgery  http://fvfiles.com/003862.pdf     Lifestyle Changes Before and After Weight Loss Surgery: [place] for Success  https://fvfiles.com/720603.pdf     Modified Liquid Diet (2 liquid meals, 1 meal, 2 snacks)  https://fvfiles.com/499965.pdf     Your Stage 1 Diet: Clear Liquids  http://fvfiles.com/779121.pdf     Your Stage 2 Diet: Low-fat Full Liquids  http://fvfiles.com/710883.pdf     Your Stage 3 Diet: Pureed Foods  http://fvfiles.com/921154.pdf     Pureed Pleasures  http://Care Technology Systems/029340.pdf    Your Stage 4 Diet: Soft Foods  http://fvfiles.com/087477.pdf    Your Stage 5 Diet: Regular  Foods  http://fvfiles.com/229703.pdf    Supplements after Weight Loss Surgery  http://Visus Technology/125705.pdf     Keeping Track of Fluids  http://www.fvfiles.com/408541.pdf    Why Take Supplements for Life after Weight Loss Surgery  https://Visus Technology/245847.pdf     Keeping Up with Your Diet after Weight Loss Surgery  https://Visus Technology/783108.pdf    Preventing Low Blood Sugar after Weight Loss Surgery  https://Visus Technology/645725.pdf     Preventing Dumping Syndrome after Weight Loss Surgery  https://Visus Technology/687398.pdf      Follow up: 1 month, prn     Time spent with patient: 26 minutes.  MARCIE GLOVER RD, LD

## 2022-02-24 NOTE — PATIENT INSTRUCTIONS
Hajrit Baez!    Follow-up with RD in 1 month    Thank you,    Kaleigh Curran, RD, LD  If you would like to schedule or reschedule an appointment with the RD, please call [phone]    Nutrition Goals  1) Increase physical activity as able   2) Continue focusing on lean proteins and non-starchy vegetables  3) Continue tracking calories as needed  4) Avoid snacking if able. If snack is needed use lean protein and/or fruit/vegetable. Examples:   - 2 cup popcorn   - 1 cup mixed berries   - 15 almonds, walnuts, cashews   - carrot/celery sticks and 2 tbsp low-fat ranch   - 1 hard boiled egg   - Part-skim mozzarella cheese stick   - Low-fat, low-sugar greek yogurt with 1/2 cup berries   - Med apple or pear   - sliced bell peppers with 1/2 cup salsa   - 1/2 cup roasted chickpeas   - sliced cucumbers with vinegar    Snack ideas:  - Banana and creamy PB dip (https://www.diabetesfoodhub.org/recipes/sweet-peanut-buttery-dip.html?home-category_id=23)  - Roasted chickpeas (https://www.diabetesfoodhub.org/recipes/roasted-and-spiced-chickpeas.html?home-category_id=23)  - Lemon Raspberry quynh seed pudding (https://www.diabetesfoodhub.org/recipes/lemon-raspberry- quynh-seed-pudding.html?home-category_id=23)  - Black bean hummus with carrot and celery sticks (https://www.diabetesfoodhub.org/recipes/black-bean-hummus.html?home-category_id=23)  - Greek yogurt chocolate mouse (https://www.diabetesfoodhub.org/recipes/greek-yogurt-chocolate-mousse.html?home-category_id=23)   - Broccoli Cheese Bites  (https://www.diabetesfoodhub.org/recipes/broccoli-cheese-bites.html?home-category_id=20)  - Chicken Satay with peanut sauce  (https://www.diabetesfoodhub.org/recipes/blueberry-almond-chicken-salad-lettuce-wraps.html?home-category_id=20)  - Deviled Eggs  (https://www.diabetesfoodhub.org/recipes/devilled-eggs.html?home-category_id=20)      The Plate Method  http://www.Instacoach/545925fd.pdf    Protein Sources for Weight  Loss  http://National Recovery Services/545212.pdf     Carbohydrates  http://fvfiles.com/269550.pdf     Mindful Eating  http://National Recovery Services/738831.pdf     Summary of Volumetrics Eating Plan  http://fvfiles.com/597042.pdf     Diet Guidelines after Weight Loss Surgery  http://fvfiles.com/353930.pdf     Seated Exercises for Arms and Legs (can be done before or after surgery)  http://www.National Recovery Services/232418.pdf    Surgery Related Nutrition Handouts:    Diet Guidelines after Weight Loss Surgery  http://fvfiles.com/585329.pdf     Lifestyle Changes Before and After Weight Loss Surgery: [place] for Success  https://fvfiles.com/595413.pdf     Modified Liquid Diet (2 liquid meals, 1 meal, 2 snacks)  https://fvfiles.com/149301.pdf     Your Stage 1 Diet: Clear Liquids  http://fvfiles.com/682028.pdf     Your Stage 2 Diet: Low-fat Full Liquids  http://fvfiles.com/731285.pdf     Your Stage 3 Diet: Pureed Foods  http://fvfiles.com/587232.pdf     Pureed Pleasures  http://National Recovery Services/193899.pdf    Your Stage 4 Diet: Soft Foods  http://fvfiles.com/936893.pdf    Your Stage 5 Diet: Regular Foods  http://fvfiles.com/427226.pdf    Supplements after Weight Loss Surgery  http://National Recovery Services/557529.pdf     Keeping Track of Fluids  http://www.fvfiles.com/634439.pdf    Why Take Supplements for Life after Weight Loss Surgery  https://National Recovery Services/688457.pdf     Keeping Up with Your Diet after Weight Loss Surgery  https://National Recovery Services/393131.pdf    Preventing Low Blood Sugar after Weight Loss Surgery  https://National Recovery Services/755130.pdf     Preventing Dumping Syndrome after Weight Loss Surgery  https://National Recovery Services/659485.pdf      Interested in working with a health ? Health coaches work with you to improve your overall health and wellbeing. They look at the whole person, and may involve discussion of different areas of life, including, but not limited to the four pillars of health (sleep, exercise, nutrition, and stress management). Discuss with your care team if you  would like to start working a health .    Health Coaching-3 Pack:    $99 for three health coaching visits    Visits may be done in person or via phone    Coaching is a partnership between the  and the client; Coaches do not prescribe or diagnose    Coaching helps inspire the client to reach his/her personal goals    COMPREHENSIVE WEIGHT MANAGEMENT PROGRAM  VIRTUAL SUPPORT GROUPS    For Support Group Information:      We offer support groups for patients who are working on weight loss and considering, preparing for or have had weight loss surgery.   There is no cost for this opportunity.  You are invited to attend the?Virtual Support Groups?provided by any of the following locations:    1. [facility] via Microsoft Teams with Sangita Eastman RN  2.   [place] via Data Stream CBOT with Bryce Raymond, PhD, LP  3.   [place] via Data Stream CBOT with Tari Blue RN  4.   [facility] via Microsoft Teams with Tari Schultz [facility]    The following Support Group information can also be found on our website:  https://www.[facility].org/treatments/weight-loss-surgery-support-groups      [facility] Weight Loss Surgery Support Group    [facility] Weight Loss Surgery Support Group  The support group is a patient-lead forum that meets monthly to share experiences, encouragement and education. It is open to those who have had weight loss surgery, are scheduled for surgery, and those who are considering surgery.   WHEN: This group meets on the 3rd Wednesday of each month from 5:00PM - 6:00PM virtually using Microsoft Teams.   FACILITATOR: Led by Sangita Eastman RD, LD, RN, the program's Clinical Coordinator.   TO REGISTER: Please contact the clinic via [employer] [place] or call the nurse line directly at [phone] to inform our staff that you would like an invite sent to you and to let us know the email you would like the invite sent to. Prior to the meeting, a link with directions on how to  "join the meeting will be sent to you.    2022 Meetings  January 19: \"Let's Talk\" a time for the group to share.  February 16: \"Let's Talk\" a time for the group to share.  March 16: Guest Speakers: Psychologists, Katherine Pearson, PhD, and Evelia Marte PsyD,  April 20: Guest Speaker: Health , Nanette Alfaro, FMCHC,CHES, CPT  May 18: Guest Speaker: DietitianMarvel, NIKITA, LP  Bhumika 15: \"Let's Talk\" a time for the group to share.  July 20: \"Let's Talk\" a time for the group to share.  August 17: TBA  September 21: TBA  October 19: Guest Speaker: Dr Ang Turner MD Pulmonologist and Sleep Medicine Physician, \"Getting a Good Night's Sleep\".  November 16: TBA  December 21: TBA    [facility] Support Groups    Connections: Bariatric Care Support Group?  This is open to all [facility] (and those external to this program) pre- and post- operative bariatric surgery patients as well as their support system.   WHEN: This group meets the 2nd Tuesday of each month from 6:30 PM - 8:00 PM virtually using Microsoft Teams.   FACILITATOR: Led by Bryce Raymond, Ph.D who is a Licensed Psychologist with the [facility] Comprehensive Weight Management Program.   TO REGISTER: Please send an email to Bryce Raymond, Ph.D.,  at?[email or web address]?if you would like an invitation to the group and to learn about using Microsoft Teams.    2022 Meetings  January 11: Kelsey Menard, PharmD, Pharmacy Resident at [facility], \"Medications and Bariatric Surgery\".  February 8: Open Forum  March 8  April 12  May 10  Bhumika 14    Connections: Post-Operative Bariatric Surgery Support Group  This is a support group for [facility] bariatric patients (and those external to [facility]) who have had bariatric surgery and are at least 3 months post-surgery.  WHEN: This support group meets the 4th Wednesday of the month from 11:00 AM - 12:00 PM virtually using Microsoft Teams. " "  FACILITATOR: Led by Certified Bariatric Nurse, Tari Blue RN.   TO REGISTER: Please send an email to Tari at [email or web address] if you would like an invitation to the group and to learn about using Microsoft Teams.    2022 Meetings  January 26  February 23  March 23  April 27  May 25  Bhumika 22    [facility] Healthy Lifestyle Virtual Support Group    Healthy Lifestyle Virtual Support Group?  This is 60 minutes of small group guided discussion, support and resources. All are welcome who want a healthy lifestyle.  WHEN: This group meets monthly on a Friday from 12:30 PM - 1:30 PM virtually using Microsoft Teams.   FACILITATOR: Led by National Board Certified Health and , Tari Schultz [facility].   TO REGISTER: Please send an email to Tari [email or web address] to receive monthly invites to the group or if you have any questions about having a health .  Prior to the meeting, a link with directions on how to join the meeting will be sent to you.    2022 Meetings  January 21: Lauren Lara MS, RN, CIC, CBN, \"Healthy Habits\"  February 25: Open Forum  March 18: \"Setting Limits and Boundaries\"  April 29: Hilda Murguia RD, \"Meal Planning Made Easy\"  May 20: Open Forum  June: To be determined                  "

## 2022-03-03 ENCOUNTER — TRANSFERRED RECORDS (OUTPATIENT)
Dept: HEALTH INFORMATION MANAGEMENT | Facility: CLINIC | Age: 22
End: 2022-03-03
Payer: COMMERCIAL

## 2022-03-07 ENCOUNTER — LAB (OUTPATIENT)
Dept: LAB | Facility: OTHER | Age: 22
End: 2022-03-07
Payer: COMMERCIAL

## 2022-03-07 ENCOUNTER — TELEPHONE (OUTPATIENT)
Dept: GASTROENTEROLOGY | Facility: CLINIC | Age: 22
End: 2022-03-07

## 2022-03-07 DIAGNOSIS — Z11.59 ENCOUNTER FOR SCREENING FOR OTHER VIRAL DISEASES: ICD-10-CM

## 2022-03-07 PROCEDURE — U0003 INFECTIOUS AGENT DETECTION BY NUCLEIC ACID (DNA OR RNA); SEVERE ACUTE RESPIRATORY SYNDROME CORONAVIRUS 2 (SARS-COV-2) (CORONAVIRUS DISEASE [COVID-19]), AMPLIFIED PROBE TECHNIQUE, MAKING USE OF HIGH THROUGHPUT TECHNOLOGIES AS DESCRIBED BY CMS-2020-01-R: HCPCS

## 2022-03-07 PROCEDURE — U0005 INFEC AGEN DETEC AMPLI PROBE: HCPCS

## 2022-03-07 NOTE — TELEPHONE ENCOUNTER
"Staff message from RN coordinator:  \"Yes she should have egd with  in the OR. I will call her tomorrow. She does have a clinic appointment to see Dr. Mata in clinic on Thursday for a meet and greet visit.\"     Will send reply message back and include endoscopy scheduling lead in case there endoscopy team aids in the rescheduled of provider in OR.     Magdalene Guevara RN       "

## 2022-03-07 NOTE — TELEPHONE ENCOUNTER
Patient scheduled for EGD on 3/11/22.     Covid test scheduled: 3/7/22    Facility location: [facility] - Upon review BMI of 55.5. Would need to be r/s to [facility].     Per telephone encounter 1/28/22 it was mentioned that Dr. Martinez Pugh would not be able to preform bariatric surgery and that it would now be Dr. Mata.     Staff message sent to RN coordinator to see if EGD is to be schedule with Dr. Mata.     Indication for procedure: chronic GERD, clearance for sleeve    Anticoagulations? no     Pre visit planning completed.    Magdalene Guevara, ELLY

## 2022-03-08 LAB — SARS-COV-2 RNA RESP QL NAA+PROBE: NEGATIVE

## 2022-03-10 ENCOUNTER — VIRTUAL VISIT (OUTPATIENT)
Dept: ENDOCRINOLOGY | Facility: CLINIC | Age: 22
End: 2022-03-10
Payer: COMMERCIAL

## 2022-03-10 VITALS — BODY MASS INDEX: 44.41 KG/M2 | HEIGHT: 68 IN | WEIGHT: 293 LBS

## 2022-03-10 DIAGNOSIS — E66.01 MORBID OBESITY (H): Primary | ICD-10-CM

## 2022-03-10 PROCEDURE — 99214 OFFICE O/P EST MOD 30 MIN: CPT | Mod: GT | Performed by: SURGERY

## 2022-03-10 ASSESSMENT — PAIN SCALES - GENERAL: PAINLEVEL: NO PAIN (0)

## 2022-03-10 NOTE — NURSING NOTE
"Chief Complaint   Patient presents with     Consult     Consultation Bariatric Surgery here to Meet and Greet Surgeon       Vitals:    03/10/22 0903   Weight: (!) 157.4 kg (347 lb)   Height: 1.727 m (5' 8\")       Body mass index is 52.76 kg/m .                       "

## 2022-03-10 NOTE — PROGRESS NOTES
"Sasha is a 21 year old who is being evaluated via a billable video visit.      How would you like to obtain your AVS? MyChart  If the video visit is dropped, the invitation should be resent by: Text to cell phone: [phone]  Will anyone else be joining your video visit? No      Video Start Time: 0928  Video-Visit Details    Type of service:  Video Visit    Video End Time:0954    Originating Location (pt. Location): Home    Distant Location (provider location):  [facility] [place]     Platform used for Video Visit: [facility]           Dear Dr. Medley,     Referring provider:       1/12/2022   Who referred you? Dr.Americo Medley     I was asked to see the patient regarding obesity by the referring provider above.    I had the pleasure of meeting with your patient Sasha Hand in our virtual weight loss surgery office.  This patient is a 21 year old female who has been undergoing our thorough preoperative screening process in anticipation of potential bariatric surgery.    At initial evaluation we recorded Sasha Hand's Height: 172.7 cm (5' 8\"), Initial Weight (lbs): 365 lbs and Initial BMI: 55.5.  The patient has been unsuccessful with other methods of permanent weight loss and suffers from multiple weight related medical conditions.  Due to lack of success in achieving weight loss through other methods, she is interested in undergoing bariatric surgery.      PREVIOUS WEIGHT LOSS ATTEMPTS:     1/12/2022   I have tried the following methods to lose weight Watching portions or calories, Exercise, Weight Watchers       CO-MORBIDITIES OF OBESITY INCLUDE:     1/12/2022   I have the following health issues associated with obesity: Pre-Diabetes, High Blood Pressure, GERD (Reflux), Asthma       VITALS:  Ht 1.727 m (5' 8\")   Wt (!) 157.4 kg (347 lb)   BMI 52.76 kg/m      PE:  GENERAL: Alert and oriented x3. NAD  RESPIRATORY: Breathing unlabored  Rest of exam deferred due to virtual " visit      In summary, she has undergone an appropriate medical evaluation, dietitian evaluation, as well as psychologic screening. The patient appears to be an appropriate candidate for bariatric surgery.    In the office today, I discussed the laparoscopic gastric sleeve surgery.  Risks, benefits and anticipated outcomes were outlined including the risk of death, staple line leak (1-2%), PE, DVT, ulcer, worsening GERD, N/V, stricture, hernia, wound infection, weight regain, and vitamin deficiencies. This patient has a good chance of sustaining permanent weight loss due to this procedure.  This should also allow improvement if not resolution of his/her weight related medical conditions.    At present we are going to present your patient's file for prior authorization to insurance.  Pending prior authorization, I anticipate a surgical date in the near future.  We will keep you updated on any progress.  If you have questions regarding care please feel free to contact me.  Total time spent in the clinic on chart review, charting, and face to face was 30 minutes with greater than 50% in face-to-face consultation.        Sincerely,    Joseph Mata MD    Please route or send letter to:  Primary Care Provider (PCP) and Referring Provider

## 2022-03-10 NOTE — LETTER
"3/10/2022       RE: Sasha Hand  [address]  [facility] [address]     Dear Colleague,    Thank you for referring your patient, Sasha Hand, to the [facility] [place] at [facility]. Please see a copy of my visit note below.    Sasha is a 21 year old who is being evaluated via a billable video visit.      How would you like to obtain your AVS? MyChart  If the video visit is dropped, the invitation should be resent by: Text to cell phone: [phone]  Will anyone else be joining your video visit? No      Video Start Time: 0928  Video-Visit Details    Type of service:  Video Visit    Video End Time:0954    Originating Location (pt. Location): Home    Distant Location (provider location):  [facility]     Platform used for Video Visit: Mitch           Dear Dr. Medley,     Referring provider:       1/12/2022   Who referred you? Dr.Americo Medley     I was asked to see the patient regarding obesity by the referring provider above.    I had the pleasure of meeting with your patient Sasha Hand in our virtual weight loss surgery office.  This patient is a 21 year old female who has been undergoing our thorough preoperative screening process in anticipation of potential bariatric surgery.    At initial evaluation we recorded Sasha Hand's Height: 172.7 cm (5' 8\"), Initial Weight (lbs): 365 lbs and Initial BMI: 55.5.  The patient has been unsuccessful with other methods of permanent weight loss and suffers from multiple weight related medical conditions.  Due to lack of success in achieving weight loss through other methods, she is interested in undergoing bariatric surgery.      PREVIOUS WEIGHT LOSS ATTEMPTS:     1/12/2022   I have tried the following methods to lose weight Watching portions or calories, Exercise, Weight Watchers       CO-MORBIDITIES OF OBESITY INCLUDE:     1/12/2022   I " "have the following health issues associated with obesity: Pre-Diabetes, High Blood Pressure, GERD (Reflux), Asthma       VITALS:  Ht 1.727 m (5' 8\")   Wt (!) 157.4 kg (347 lb)   BMI 52.76 kg/m      PE:  GENERAL: Alert and oriented x3. NAD  RESPIRATORY: Breathing unlabored  Rest of exam deferred due to virtual visit      In summary, she has undergone an appropriate medical evaluation, dietitian evaluation, as well as psychologic screening. The patient appears to be an appropriate candidate for bariatric surgery.    In the office today, I discussed the laparoscopic gastric sleeve surgery.  Risks, benefits and anticipated outcomes were outlined including the risk of death, staple line leak (1-2%), PE, DVT, ulcer, worsening GERD, N/V, stricture, hernia, wound infection, weight regain, and vitamin deficiencies. This patient has a good chance of sustaining permanent weight loss due to this procedure.  This should also allow improvement if not resolution of his/her weight related medical conditions.    At present we are going to present your patient's file for prior authorization to insurance.  Pending prior authorization, I anticipate a surgical date in the near future.  We will keep you updated on any progress.  If you have questions regarding care please feel free to contact me.  Total time spent in the clinic on chart review, charting, and face to face was 30 minutes with greater than 50% in face-to-face consultation.        Sincerely,    Joseph Mata MD    Please route or send letter to:  Primary Care Provider (PCP) and Referring Provider    "

## 2022-03-11 ENCOUNTER — TELEPHONE (OUTPATIENT)
Dept: ENDOCRINOLOGY | Facility: CLINIC | Age: 22
End: 2022-03-11
Payer: COMMERCIAL

## 2022-03-11 ENCOUNTER — TELEPHONE (OUTPATIENT)
Dept: GASTROENTEROLOGY | Facility: CLINIC | Age: 22
End: 2022-03-11
Payer: COMMERCIAL

## 2022-03-11 RX ORDER — ONDANSETRON 2 MG/ML
4 INJECTION INTRAMUSCULAR; INTRAVENOUS
Status: CANCELLED | OUTPATIENT
Start: 2022-03-11

## 2022-03-11 RX ORDER — LIDOCAINE 40 MG/G
CREAM TOPICAL
Status: CANCELLED | OUTPATIENT
Start: 2022-03-11

## 2022-03-11 RX ORDER — SODIUM CHLORIDE, SODIUM LACTATE, POTASSIUM CHLORIDE, CALCIUM CHLORIDE 600; 310; 30; 20 MG/100ML; MG/100ML; MG/100ML; MG/100ML
INJECTION, SOLUTION INTRAVENOUS CONTINUOUS
Status: CANCELLED | OUTPATIENT
Start: 2022-03-11

## 2022-03-11 NOTE — TELEPHONE ENCOUNTER
Called patient to let her know endoscopy was incorrectly scheduled in the Endoscopy Suite. Due to her BMI of 55, she will need this procedure done in the OR. Told patient she would need a pre-op H&P prior, will call her back when I can find OR time for Dr. Pugh to do this procedure.

## 2022-03-12 NOTE — TELEPHONE ENCOUNTER
Caller: Sasha Hand      Procedure: EGD     Date, Location, and Surgeon of Procedure Cancelled: 03/11/2022, [facility], [facility]     Ordering Provider:RAAD CHOUDHURY     Reason for cancel (please be detailed, any staff messages or encounters to note?):     Liya Roberts, RN  P Endoscopy Scheduling Pool    This patient was cancelled to day due to her BMI is too high, needs rescheduled elsewhere       PT BMI EXCEED [facility] RESTRICTIONS 50+        Rescheduled: NO, PT stated she was told to be scheduled in the OR only. -- pt has been added to OR waiting list, pt verbalized understanding.     NH

## 2022-03-16 ENCOUNTER — CARE COORDINATION (OUTPATIENT)
Dept: ENDOCRINOLOGY | Facility: CLINIC | Age: 22
End: 2022-03-16
Payer: COMMERCIAL

## 2022-03-16 NOTE — PROGRESS NOTES
Tasklist updated and sent to patient via [employer].    Bariatric Task List    Fax:  Please fax all paperwork to: [phone] -     Status:  Is patient a candidate for bariatric surgery?:    - Need EGD   Cleared to schedule surgeon consult?:  cleared to schedule surgeon consult - 3/10/22 appt. bks   Status:    -     Surgeon: Esperanza -     Tentative surgery month/year: To be determined when within 5 lbs of pre-surgery goal weight, call Scott at  [phone]. bks -        Insurance: Insurance:  [employer] -        Patient Info: Initial Weight:  365 -     Date of Initial Weight/Height:  1/13/2022 -     Goal Weight (lbs):  329 -     Required Weight Loss:  36 -     Surgery Type:  sleeve gastrectomy -        Dietician Visits: Structured weight loss required by insurance?:    -     Dietician Visit 1:  Completed - 1/12/22  ES - AS   Dietician Visit 2:  Completed - 2/24/22- ES   Dietician Visit 3:  Needed -     Dietician Visit 4:    -     Dietician Visit 5:    -     Dietician Visit 6:    -     Dietician Visit additional:  Needed - Monthly until surgery for weight loss and postop diet teaching. bks      Psychological Evaluation: Psych eval:  Completed - List and letter sent to pt 1/13/22 - AS; 3/3/22 Cleared by Dr Hahn. bks      Lab Work: Complete Blood Count:  Completed - Ordered 1/12/22 - AS   Comprehensive Metabolic Panel:  Completed - Ordered 1/12/22 - AS   Vitamin D:  Completed - Ordered 1/12/22 - AS   PTH:  Completed - Ordered 1/12/22 - AS   Hgb A1c:  Completed - Ordered 1/12/22 - AS    Lipids: Completed -      TSH ([facility], [facility], [place]): Completed -        Testing: UGI:    -     EGD:  Needed - Ordered 1/12/22 - Check with Scott at [phone] to schedule in the OR.      PCP: Establish care with PCP:  Completed -     Follow up with PCP:    -     PCP letter of support:  Needed - Letter sent to pt 1/13/22 - AS; Letter sent to Dr Medley on 3/16/22. bks      Patient Education:  Information Session:  Needed -     Given  "\"Making your decision\" handout?:  Yes - 1/13/22 - AS   Given \"A Roadmap to you Weight Loss Surgery\" handout?: Yes - 1/13/22 - AS   Given \"Get Well Loop\" information?: Yes - 1/13/22 - AS   Given support group information?:    -  Yes - 1/13/22 - AS   Attended support group?:    -     Support plan in place?:  Completed -        Additional Surgery Requirements: Other: Covid test 4 days prior to surgery. bks -     Other: Contact Center to schedule the 1 week and 31+ days postop appts. bks -        Final Tasks:  Before surgery online class:  Needed -     Before surgery online class website link:  [email or web address]   After surgery online class:  Needed -     After surgery online class website link:  [email or web address]   Nurse visit per clinic:  Needed -     History and Physical per clinic:   - To be done at the PreAssessment Clinic. bks   Final labs per clinic: Needed -        Notes: Please register for the Sleeve Gastrectomy Get Well Loop when you get an email invitation after you get a surgery date.   The Get Well Loop will give you information via email or text messages that can help you be more successful before and after elba,irina for up to one year after surgery.  It can also help answer any questions you may have.   Get Well Loop Handout - [email or web address]  A patient can only be connected to one Loop at a time.  You can be disconnected from the Sleeve ,Gastrectomy Loop and added to another Loop because you were in the hospital/emergency department, had another surgery or had Covid. Please send Yolanda Lara RN a [employer] message at \"P Surgery Clinic W[facility] Nurses - \" to restart the Sleeve Gastrectomy Loop after the added Loop is completed.    -            "

## 2022-03-16 NOTE — PROGRESS NOTES
During this virtual visit the patient is located in [place], patient verifies this as the location during the entirety of this visit.     Sasha is a 21 year old who is being evaluated via a billable video visit.      How would you like to obtain your AVS? MyChart  If the video visit is dropped, the invitation should be resent by: Text to cell phone:  [phone]  Will anyone else be joining your video visit? No      Video Start Time: 1532  Video-Visit Details    Type of service:  Video Visit    Video End Time:1554    Originating Location (pt. Location): Home    Distant Location (provider location):  [facility] [place]     Platform used for Video Visit: Mitch Oshea NREMT

## 2022-03-17 ENCOUNTER — TELEPHONE (OUTPATIENT)
Dept: ENDOCRINOLOGY | Facility: CLINIC | Age: 22
End: 2022-03-17

## 2022-03-17 ENCOUNTER — VIRTUAL VISIT (OUTPATIENT)
Dept: ENDOCRINOLOGY | Facility: CLINIC | Age: 22
End: 2022-03-17
Payer: COMMERCIAL

## 2022-03-17 VITALS — HEIGHT: 68 IN | WEIGHT: 293 LBS | BODY MASS INDEX: 44.41 KG/M2

## 2022-03-17 DIAGNOSIS — E66.01 MORBID OBESITY (H): ICD-10-CM

## 2022-03-17 DIAGNOSIS — K21.00 GASTROESOPHAGEAL REFLUX DISEASE WITH ESOPHAGITIS, UNSPECIFIED WHETHER HEMORRHAGE: Primary | ICD-10-CM

## 2022-03-17 PROCEDURE — 99213 OFFICE O/P EST LOW 20 MIN: CPT | Mod: GT | Performed by: NURSE PRACTITIONER

## 2022-03-17 RX ORDER — CEFAZOLIN SODIUM IN 0.9 % NACL 3 G/100 ML
3 INTRAVENOUS SOLUTION, PIGGYBACK (ML) INTRAVENOUS SEE ADMIN INSTRUCTIONS
Status: CANCELLED | OUTPATIENT
Start: 2022-03-17

## 2022-03-17 RX ORDER — CEFAZOLIN SODIUM IN 0.9 % NACL 3 G/100 ML
3 INTRAVENOUS SOLUTION, PIGGYBACK (ML) INTRAVENOUS
Status: CANCELLED | OUTPATIENT
Start: 2022-03-17

## 2022-03-17 NOTE — ASSESSMENT & PLAN NOTE
Down 18lb since starting saxenda 2 months ago. Had never really felt satisfied after meals and now she does, and finds that she doesn't go back for seconds. She denies adverse side effects to the medication.     Preplanning meals. Working on mindfulness around stress eating. Hydration has improved. Nearly complete with tasklist.     Plan:  Continue saxenda   Discussed meds postop   Continue with EGD   Follow up if needed before surgery

## 2022-03-17 NOTE — PROGRESS NOTES
Pre-Bariatric Surgery Note    Oscar Medley    Date: 3/17/2022     RE: Sasha Hand    MR#: [id number]   : 2000   Date of Visit: Mar 17, 2022    REASON FOR VISIT: Preoperative evaluation for possible weight loss surgery    Dear Oscar Wayne,    I had the pleasure of seeing your patient, Sasha Hand, in my preoperative bariatric clinic.    As you know, she is morbidly obese and considering weight loss surgery to treat obesity in association with her medical conditions of obesity.  Her consult weight was 365.  She has lost 18 pounds since her consult weight. She has not met her required pre-surgery weight. Please refer to initial consult note from date 2022 for patient's weight history and co-morbidities.    saxenda- earlier satiety- never really felt full prior. Still has some stress eating that she is identifying   Psych- completed   EGD 3/28/2022   Dr. Mata 3/10/2022  Hydration - 90oz water daily       Assessment & Plan   Problem List Items Addressed This Visit        Digestive    Morbid obesity (H)     Down 18lb since starting saxenda 2 months ago. Had never really felt satisfied after meals and now she does, and finds that she doesn't go back for seconds. She denies adverse side effects to the medication.     Preplanning meals. Working on mindfulness around stress eating. Hydration has improved. Nearly complete with tasklist.     Plan:  Continue saxenda   Discussed meds postop   Continue with EGD   Follow up if needed before surgery                 Review of external notes as documented above       27 minutes spent on the date of the encounter doing chart review, history and exam, documentation and further activities per the note  0956}    Reviewed tasklist with patient     Most recent weights:  Wt Readings from Last 4 Encounters:   22 (!) 157.4 kg (347 lb)   03/10/22 (!) 157.4 kg (347 lb)   22 (!) 165.6 kg (365 lb)   21 (!) 161.9 kg (357 lb)         ROS    Past  Medical History:   Diagnosis Date     Closed head injury, subsequent encounter 4/5/2018     History of PCR DNA positive for HSV1      Moderate major depression (H) 1/20/2014     Nexplanon placed 11/14/17 11/14/2017     Nexplanon placed 11/14/17 (removed 1/16/2020) 11/14/2017     Nexplanon placed 12/14/2021 12/14/2021     Obesity 9/14/2010     Pyelonephritis 2/19/2018     Skin tag (right collar line and left labial region 11/21/2019     Tobacco use disorder 5/4/2016     Uncomplicated asthma        Past Surgical History:   Procedure Laterality Date     ARTHROSCOPY KNEE WITH MENISCAL REPAIR Right 5/10/2017    Procedure: ARTHROSCOPY KNEE WITH MENISCAL REPAIR;  arthroscopy right knee with lateral meniscus repair;  Surgeon: Nathaniel Hicks MD;  Location:  OR       Current Outpatient Medications   Medication     albuterol (PROAIR HFA/PROVENTIL HFA/VENTOLIN HFA) 108 (90 Base) MCG/ACT inhaler     blood glucose (NO BRAND SPECIFIED) test strip     blood glucose monitoring (NO BRAND SPECIFIED) meter device kit     clotrimazole (LOTRIMIN) 1 % external cream     etonogestrel (NEXPLANON) 68 MG IMPL     insulin pen needle (31G X 5 MM) 31G X 5 MM miscellaneous     insulin pen needle (B-D U/F) 31G X 8 MM miscellaneous     ipratropium - albuterol 0.5 mg/2.5 mg/3 mL (DUONEB) 0.5-2.5 (3) MG/3ML neb solution     liraglutide (VICTOZA) 18 MG/3ML solution     liraglutide - Weight Management (SAXENDA) 18 MG/3ML pen     metoprolol succinate ER (TOPROL-XL) 50 MG 24 hr tablet     Microlet Lancets MISC     omeprazole (PRILOSEC) 20 MG DR capsule     VENTOLIN  (90 Base) MCG/ACT inhaler     No current facility-administered medications for this visit.       Allergies   Allergen Reactions     No Known Drug Allergies      Seasonal Allergies        Lab on 03/07/2022   Component Date Value Ref Range Status     SARS CoV2 PCR 03/07/2022 Negative  Negative, Testing sent to reference lab. Results will be returned via unsolicited result Final  "   NEGATIVE: SARS-CoV-2 (COVID-19) RNA not detected, presumed negative.       PHYSICAL EXAM:  Objective    Ht 1.727 m (5' 8\")   Wt (!) 157.4 kg (347 lb)   BMI 52.76 kg/m           Vitals:  No vitals were obtained today due to virtual visit.    Physical Exam   GENERAL: Healthy, alert and no distress  EYES: Eyes grossly normal to inspection.  No discharge or erythema, or obvious scleral/conjunctival abnormalities.  RESP: No audible wheeze, cough, or visible cyanosis.  No visible retractions or increased work of breathing.    SKIN: Visible skin clear. No significant rash, abnormal pigmentation or lesions.  NEURO: Cranial nerves grossly intact.  Mentation and speech appropriate for age.  PSYCH: Mentation appears normal, affect normal/bright, judgement and insight intact, normal speech and appearance well-groomed.    Sleeve Gastrectomy: Risks and Side Effects    The complications or risks of surgery include but are not limited to: death, heart attack, infection in the surgical site (wound infection), abdomen (abscess), bladder (urinary tract infection), lungs (pneumonia), clots in legs (deep vein thrombosis) or lungs (pulmonary emboli),  injury to the bowels or other organs, bowel obstruction, hernia at the incision and gastrointestional bleeding.    More specific risks related to vertical sleeve gastrectomy were detailed at the bariatric informational seminar and include the following: leak at the vertical sleeve staple line, leak at the anastomoses,  nausea, vomiting, and dehydration for several months,  adhesions causing bowel obstruction, rapid weight loss causing a higher rate of gallstone formation during the first 6 months after surgery, decreased absorption of vitamins and protein because of the reduced stomach size, weight regain if inappropriate food intake occurs, stricture, injury to other organs, hernia,  and ulcers.       Side effects of bariatric surgery include but are not limited to: abdominal pain, " cramping, bloating, constipation, nausea, vomiting, diarrhea, difficulty swallowing,  dehydration, hair loss, excess skin, protein, iron and vitamin deficiencies, heartburn, transfer of addictions, increased anxiety and worsening depression.       I emphasized exercise and activity behavior along with appropriate food choice as the main foundation for weight loss with surgery providing surgical reinforcement of the appropriate behavior set.        Review of general surgery weight loss process    1. Complete preoperative requirements, including weight loss.  Final weight check to confirm MANDATORY weight loss requirement must be documented on a clinic scale.    2. Discuss prior authorization with .    3. History and physical evaluation by PCP of PAC clinic within 30 days of surgery date, preoperative class, and weight check (weigh-in visit) to be scheduled by patient.  Pre-anesthesia clinic for risk evaluation to be scheduled by anesthesia clinic.    4. We cannot guarantee that patient will qualify for surgery unless all preoperative requirements are met, prior authorization from primary insurance company is granted, and insurance changes do not occur.    5. It is possible for patients to regain all weight after weight loss surgery unless they follow guidelines prescribed by our bariatric center.    6. All patients with gastrointestinal complaints after weight loss surgery must have complaints conveyed to the bariatric team for appropriate treatment.    7. Vitamin deficiencies may develop post-bariatric surgery and annual laboratory testing should be performed.    8. Persistent nausea/vomiting after bariatric surgery entails risk of thiamine deficiency and should be treated early.  Vitamin B12 deficiency may develop, especially after gastric bypass surgery and must be recognized.        If you have any questions about our plans please don't hesitate to contact me.    Sincerely,    Komal Miller,  NP

## 2022-03-17 NOTE — NURSING NOTE
Chief Complaint   Patient presents with     Follow Up     Pre-surgery with medication.       Vitals:    03/17/22 1507   Weight: (!) 347 lb       Body mass index is 52.76 kg/m .      Yobany Oshea, EMT  Surgery Clinic

## 2022-03-17 NOTE — PATIENT INSTRUCTIONS
"Thank you for allowing us the privilege of caring for you. We hope we provided you with the excellent service you deserve.   Please let us know if there is anything else we can do for you so that we can be sure you are completely satisfied with your care experience.    To ensure the quality of our services you may be receiving a patient satisfaction survey from an independent patient satisfaction monitoring company.    The greatest compliment you can give is a \"Likely to Recommend\"    Your visit was with Komal Miller NP today.    Instructions per today's visit:     Harjit Hand, it was great to visit with you today.  Here is a review of our visit.  If our clinic scheduler is not able to reach you please call [phone] to schedule your next appointments.    Plan:  Continue saxenda   Discussed meds postop   Continue with EGD   Follow up if needed before surgery       Information about Video Visits with [facility]: video visit information  _________________________________________________________________________________________________________________________________________________________  If you are asked by your clinic team to have your blood pressure checked:  [facility] do offer several locations for blood pressure checks. Please follow the below link to schedule an appointment. Scheduling an appointment at the pharmacy for a blood pressure check is now preferred.    Appointment Plus ([email or web address])  _________________________________________________________________________________________________________________________________________________________  Important contact and scheduling information:  Please call our contact center at [phone] to schedule your next appointments.  To find a lab location near you, please call [phone].  For any nursing questions or concerns call Keke Feliciano LPN at [phone] or Amanda Rivas RN at [phone]  Please call during clinic hours " Monday through Friday 8:00a - 4:00p if you have questions or you can contact us via [employer] at anytime and we will reply during clinic hours.    Lab results will be communicated through My Chart or letter (if My Chart not used). Please call the clinic if you have not received communication after 1 week or if you have any questions.?  Clinic Fax: [phone]    _________________________________________________________________________________________________________________________________________________________  Meal Replacement Products:    Here is the link to our new e-store where you can purchase our meal replacement products    [employer] E-Store  [email or web address]    The one week starter kit is a great way to sample a variety of products and see what works for you.    If you want more information about the product go to: Fresh [employer] Meals  [email or web address]    If you are an employee or [facility] or  [employer] please contact your care team for a 10% estore discount    Free Shipping for orders over $75     Benefits of meal replacements products:    Portion and calorie control  Improved nutrition  Structured eating  Simplified food choices  Avoid contact with trigger foods  _________________________________________________________________________________________________________________________________________________________  Interested in working with a health ?  Health coaches work with you to improve your overall health and wellbeing.  They look at the whole person, and may involve discussion of different areas of life, including, but not limited to the four pillars of health (sleep, exercise, nutrition, and stress management). Discuss with your care team if you would like to start working a health .  Health Coaching-3 Pack: Schedule by calling [phone]    $99 for three health coaching visits    Visits may be done in person or via phone    Coaching is a  partnership between the  and the client; Coaches do not prescribe or diagnose    Coaching helps inspire the client to reach his/her personal goals   _________________________________________________________________________________________________________________________________________________________  24 Week Healthy Lifestyle Plan:    Our mission in the 24-week Healthy Lifestyle Plan is to provide you with individualized care by giving you the tools, education and support you need to lose weight and maintain a healthy lifestyle. In your 24-week journey, you ll be supported by a dedicated weight loss team that includes registered dietitians, medical weight management providers, health coaches, and nurses -- all with special expertise in weight loss -- to help you every step of the way.     Monthly meetings with your registered dietician or medical weight management provider help to review your progress, update your care plan, and make any adjustments needed to ensure success. Between these visits, weekly and bi-weekly health  visits will help you focus on the four pillars of weight loss -- stress, sleep, nutrition, and exercise -- and how you can best adapt each to achieve sustainable weight loss results.    In addition, you will be given exclusive access to online wellbeing classes through [employer].  Your initial visit will be with a medical weight management provider who will help to understand your weight loss goals and ensure this program is the right fit for you. Please let our team know if you are interested in the 24 week plan by sending a message to your care team or calling [phone] to schedule.  _________________________________________________________________________________________________________________________________________________________    COMPREHENSIVE WEIGHT MANAGEMENT PROGRAM  VIRTUAL SUPPORT GROUPS    For Support Group Information:      We offer support groups for patients who  "are working on weight loss and considering, preparing for or have had weight loss surgery.   There is no cost for this opportunity.  You are invited to attend the?Virtual Support Groups?provided by any of the following locations:    1. [facility] via Microsoft Teams with Sangita Eastman RN  2.   [place] via [employer] with Bryce Raymond, PhD, LP  3.   [place] via [employer] with Tari Blue RN  4.   [facility] via Microsoft Teams with Tari Schultz [facility]    The following Support Group information can also be found on our website:  [email or web address]    [facility] Weight Loss Surgery Support Group    [facility] Weight Loss Surgery Support Group  The support group is a patient-lead forum that meets monthly to share experiences, encouragement and education. It is open to those who have had weight loss surgery, are scheduled for surgery, and those who are considering surgery.   WHEN: This group meets on the 3rd Wednesday of each month from 5:00PM - 6:00PM virtually using Microsoft Teams.   FACILITATOR: Led by Sangita Eastman RD, LD, RN, the program's Clinical Coordinator.   TO REGISTER: Please contact the clinic via [employer] or call the nurse line directly at [phone] to inform our staff that you would like an invite sent to you and to let us know the email you would like the invite sent to. Prior to the meeting, a link with directions on how to join the meeting will be sent to you.    2022 Meetings  January 19: \"Let's Talk\" a time for the group to share.  February 16: \"Let's Talk\" a time for the group to share.  March 16: Guest Speakers: Psychologists, Katherine Pearson, PhD,LP and Evelia Marte PsMelanie,LP  April 20: Guest Speaker: Health , Nanette Alfaro, [facility], CPT  May 18: Guest Speaker: Dietitian, Marvel Cannon, NIKITA, LP  Bhumika 15: \"Let's Talk\" a time for the group to share.  July 20: \"Let's Talk\" a time for the " "group to share.  August 17: TBA  September 21: TBA  October 19: Guest Speaker: Dr Ang Turner MD Pulmonologist and Sleep Medicine Physician, \"Getting a Good Night's Sleep\".  November 16: TBA  December 21: TBA    [facility] Clin[facility] and [facility] Support Groups    Connections: Bariatric Care Support Group?  This is open to all [facility] (and those external to this program) pre- and post- operative bariatric surgery patients as well as their support system.   WHEN: This group meets the 2nd Tuesday of each month from 6:30 PM - 8:00 PM virtually using Microsoft Teams.   FACILITATOR: Led by Bryce Raymond, Ph.D who is a Licensed Psychologist with the [facility] Comprehensive Weight Management Program.   TO REGISTER: Please send an email to Bryce Raymond, Ph.D., LP at?[email or web address]?if you would like an invitation to the group and to learn about using Microsoft Teams.    2022 Meetings  January 11: Kelsey Menard, PharmD, Pharmacy Resident at [facility], \"Medications and Bariatric Surgery\".  February 8: Open Forum  March 8: Cristobal eParson MD, \"Exercise and Bariatric Surgery\".  April 12  May 10  Bhumika 14    Connections: Post-Operative Bariatric Surgery Support Group  This is a support group for [facility] bariatric patients (and those external to [facility]) who have had bariatric surgery and are at least 3 months post-surgery.  WHEN: This support group meets the 4th Wednesday of the month from 11:00 AM - 12:00 PM virtually using Microsoft Teams.   FACILITATOR: Led by Certified Bariatric Nurse, Tari Blue RN.   TO REGISTER: Please send an email to Tari at [email or web address] if you would like an invitation to the group and to learn about using Microsoft Teams.    2022 Meetings  January 26  February 23  March 23  April 27  May 25  Bhumika 22    [facility] Healthy Lifestyle Virtual Support Group    Healthy Lifestyle " "Virtual Support Group?  This is 60 minutes of small group guided discussion, support and resources. All are welcome who want a healthy lifestyle.  WHEN: This group meets monthly on a Friday from 12:30 PM - 1:30 PM virtually using Microsoft Teams.   FACILITATOR: Led by National Board Certified Health and , Tari Schultz [facility].   TO REGISTER: Please send an email to Tari [email or web address] to receive monthly invites to the group or if you have any questions about having a health .  Prior to the meeting, a link with directions on how to join the meeting will be sent to you.    2022 Meetings  January 21: Lauren Lara MS, RN, CIC, CBN, \"Healthy Habits\"  February 25: Open Forum  March 18: \"Setting Limits and Boundaries\"  April 29: Hilda Murguia RD, \"Meal Planning Made Easy\"  May 20: Open Forum  Bhumika: To be determined  ____________________________________________________________________________________________________________________________________________________________________________  [place] [facility] Get Moving Program  Our team of physical therapists is trained to help you understand and take control of your condition. They will perform a thorough evaluation to determine your ability for activity and develop a customized plan to fit your goals and physical ability.  Scheduling: Unsure if the Get Moving program is right for you? Discuss the program with your medical provider or diabetes educator. You can also call us at [phone] to ask questions or schedule an appointment.   [facility] Get Moving Program  ____________________________________________________________________________________________________________________________________________________________________________  [facility] Diabetes Prevention Program (DPP)  If you have prediabetes and Medicare please contact us via MyChart to learn more about the Diabetes Prevention Program (DPP)  Program Details:  [facility] offers the year-long Diabetes Prevention Program (DPP). The program helps you to make lifestyle changes that prevent or delay type 2 diabetes by supporting healthy eating, increased physical activity, stress reduction and use of coping skills.   On average, previous [facility] DPP cohorts have lost and maintained at least 5% of their starting weight throughout the program and averaged more than 150 minutes of physical activity per week.  Participants meet weekly for one-hour group sessions over sixteen weeks, every other week for the next 8 weeks, and monthly for the last six months.   A year-long maintenance program is also available for participants who complete the first year.   Location & Cost:   During the COVID-19 Public Health Emergency, the program is offered virtually. When in-person classes can resume, they will be held at [facility].  For people with Medicare, the program is covered in full. A self-pay option will also be available for those with non-Medicare insurance plans.   _________________________________________________________________________________________________________________________________________________________  Bluetooth Scale:    We hope to provide you with high quality virtual healthcare visits while social distancing for COVID-19 is necessary, as well as in the future when virtual visits may be more convenient for you.     Our technology team made it possible for Bluetooth scales to send weight measurements to our electronic medical record. This allows weights from you weighing at home to securely flow into the medical record, which will improve telephone and virtual visits.   Additionally, studies have shown that adults actually lose more weight when their weights are automatically sent to someone else, and also that this process is not stressful for those adults.    Below is a link for purchasing the scale, with a discount  code for our patients. You may call your insurance company to see if they will reimburse you for the cost of the scale, as a piece of durable medical equipment. The scales only go up to a weight of 400 pounds. This is an issue and we are working with the developer on increasing this. We found no scales that go over 400lb that have blue-tooth for connecting to [employer]    Scale to purchase: the [employer]  Body  Scale: [email or web address]/body/[email or web address].ds    Discount Code: We have a discount code for our patients to bring the cost down to $50, Discount code is: [place]_Scale_20%off  _______________________________________________________________________________________________________________________________________________________________________________    To work with a Behavioral Health Psychologist:    Call to schedule:    Aron Nice - [phone]  Hannah Casas - [phone]  Samira Prescott - [phone]  Michelle Breaux - [phone]   Stephanie Leal PhD (cannot accept Medicare) [phone]        Thank you,   [facility] Comprehensive Weight Management Team

## 2022-03-17 NOTE — LETTER
3/17/2022       RE: Sasha Hand  [address]  [facility] [address]     Dear Colleague,    Thank you for referring your patient, Sasha Hand, to the [facility] [place] at [facility]. Please see a copy of my visit note below.    During this virtual visit the patient is located in [place], patient verifies this as the location during the entirety of this visit.     Sasha is a 21 year old who is being evaluated via a billable video visit.      How would you like to obtain your AVS? MyChart  If the video visit is dropped, the invitation should be resent by: Text to cell phone:  [phone]  Will anyone else be joining your video visit? No      Video Start Time:   Video-Visit Details    Type of service:  Video Visit    Video End Time:    Originating Location (pt. Location): Home    Distant Location (provider location):  [facility]     Platform used for Video Visit: Mitch Oshea NREMT          Pre-Bariatric Surgery Note    Oscar Medley    Date: 3/17/2022     RE: Sasha Hand    MR#: [id number]   : 2000   Date of Visit: Mar 17, 2022    REASON FOR VISIT: Preoperative evaluation for possible weight loss surgery    Dear Oscar Wayne,    I had the pleasure of seeing your patient, Sasha Hand, in my preoperative bariatric clinic.    As you know, she is morbidly obese and considering weight loss surgery to treat obesity in association with her medical conditions of obesity.  Her consult weight was 365.  She has lost 18 pounds since her consult weight. She has not met her required pre-surgery weight. Please refer to initial consult note from date 2022 for patient's weight history and co-morbidities.    saxenda- earlier satiety- never really felt full prior. Still has some stress eating that she is identifying   Psych- completed   EGD 3/28/2022     Esperanza 3/10/2022  Hydration - 90oz water daily       Assessment & Plan   Problem List Items Addressed This Visit        Digestive    Morbid obesity (H)     Down 18lb since starting saxenda 2 months ago. Had never really felt satisfied after meals and now she does, and finds that she doesn't go back for seconds. She denies adverse side effects to the medication.     Preplanning meals. Working on mindfulness around stress eating. Hydration has improved. Nearly complete with tasklist.     Plan:  Continue saxenda   Discussed meds postop   Continue with EGD   Follow up if needed before surgery                 Review of external notes as documented above       27 minutes spent on the date of the encounter doing chart review, history and exam, documentation and further activities per the note  0956}    Reviewed tasklist with patient     Most recent weights:  Wt Readings from Last 4 Encounters:   03/17/22 (!) 157.4 kg (347 lb)   03/10/22 (!) 157.4 kg (347 lb)   01/12/22 (!) 165.6 kg (365 lb)   12/29/21 (!) 161.9 kg (357 lb)         ROS    Past Medical History:   Diagnosis Date     Closed head injury, subsequent encounter 4/5/2018     History of PCR DNA positive for HSV1      Moderate major depression (H) 1/20/2014     Nexplanon placed 11/14/17 11/14/2017     Nexplanon placed 11/14/17 (removed 1/16/2020) 11/14/2017     Nexplanon placed 12/14/2021 12/14/2021     Obesity 9/14/2010     Pyelonephritis 2/19/2018     Skin tag (right collar line and left labial region 11/21/2019     Tobacco use disorder 5/4/2016     Uncomplicated asthma        Past Surgical History:   Procedure Laterality Date     ARTHROSCOPY KNEE WITH MENISCAL REPAIR Right 5/10/2017    Procedure: ARTHROSCOPY KNEE WITH MENISCAL REPAIR;  arthroscopy right knee with lateral meniscus repair;  Surgeon: Nathaniel Hicks MD;  Location: [facility] OR       Current Outpatient Medications   Medication     albuterol (PROAIR HFA/PROVENTIL HFA/VENTOLIN HFA) 108 (90 Base) MCG/ACT  "inhaler     blood glucose (NO BRAND SPECIFIED) test strip     blood glucose monitoring (NO BRAND SPECIFIED) meter device kit     clotrimazole (LOTRIMIN) 1 % external cream     etonogestrel (NEXPLANON) 68 MG IMPL     insulin pen needle (31G X 5 MM) 31G X 5 MM miscellaneous     insulin pen needle (B-D U/F) 31G X 8 MM miscellaneous     ipratropium - albuterol 0.5 mg/2.5 mg/3 mL (DUONEB) 0.5-2.5 (3) MG/3ML neb solution     liraglutide (VICTOZA) 18 MG/3ML solution     liraglutide - Weight Management (SAXENDA) 18 MG/3ML pen     metoprolol succinate ER (TOPROL-XL) 50 MG 24 hr tablet     Microlet Lancets MISC     omeprazole (PRILOSEC) 20 MG DR capsule     VENTOLIN  (90 Base) MCG/ACT inhaler     No current facility-administered medications for this visit.       Allergies   Allergen Reactions     No Known Drug Allergies      Seasonal Allergies        Lab on 03/07/2022   Component Date Value Ref Range Status     SARS CoV2 PCR 03/07/2022 Negative  Negative, Testing sent to reference lab. Results will be returned via unsolicited result Final    NEGATIVE: SARS-CoV-2 (COVID-19) RNA not detected, presumed negative.       PHYSICAL EXAM:  Objective    Ht 1.727 m (5' 8\")   Wt (!) 157.4 kg (347 lb)   BMI 52.76 kg/m           Vitals:  No vitals were obtained today due to virtual visit.    Physical Exam   GENERAL: Healthy, alert and no distress  EYES: Eyes grossly normal to inspection.  No discharge or erythema, or obvious scleral/conjunctival abnormalities.  RESP: No audible wheeze, cough, or visible cyanosis.  No visible retractions or increased work of breathing.    SKIN: Visible skin clear. No significant rash, abnormal pigmentation or lesions.  NEURO: Cranial nerves grossly intact.  Mentation and speech appropriate for age.  PSYCH: Mentation appears normal, affect normal/bright, judgement and insight intact, normal speech and appearance well-groomed.    Sleeve Gastrectomy: Risks and Side Effects    The complications or " risks of surgery include but are not limited to: death, heart attack, infection in the surgical site (wound infection), abdomen (abscess), bladder (urinary tract infection), lungs (pneumonia), clots in legs (deep vein thrombosis) or lungs (pulmonary emboli),  injury to the bowels or other organs, bowel obstruction, hernia at the incision and gastrointestional bleeding.    More specific risks related to vertical sleeve gastrectomy were detailed at the bariatric informational seminar and include the following: leak at the vertical sleeve staple line, leak at the anastomoses,  nausea, vomiting, and dehydration for several months,  adhesions causing bowel obstruction, rapid weight loss causing a higher rate of gallstone formation during the first 6 months after surgery, decreased absorption of vitamins and protein because of the reduced stomach size, weight regain if inappropriate food intake occurs, stricture, injury to other organs, hernia,  and ulcers.       Side effects of bariatric surgery include but are not limited to: abdominal pain, cramping, bloating, constipation, nausea, vomiting, diarrhea, difficulty swallowing,  dehydration, hair loss, excess skin, protein, iron and vitamin deficiencies, heartburn, transfer of addictions, increased anxiety and worsening depression.       I emphasized exercise and activity behavior along with appropriate food choice as the main foundation for weight loss with surgery providing surgical reinforcement of the appropriate behavior set.        Review of general surgery weight loss process    1. Complete preoperative requirements, including weight loss.  Final weight check to confirm MANDATORY weight loss requirement must be documented on a clinic scale.    2. Discuss prior authorization with .    3. History and physical evaluation by PCP of PAC clinic within 30 days of surgery date, preoperative class, and weight check (weigh-in visit) to be scheduled by  patient.  Pre-anesthesia clinic for risk evaluation to be scheduled by anesthesia clinic.    4. We cannot guarantee that patient will qualify for surgery unless all preoperative requirements are met, prior authorization from primary insurance company is granted, and insurance changes do not occur.    5. It is possible for patients to regain all weight after weight loss surgery unless they follow guidelines prescribed by our bariatric center.    6. All patients with gastrointestinal complaints after weight loss surgery must have complaints conveyed to the bariatric team for appropriate treatment.    7. Vitamin deficiencies may develop post-bariatric surgery and annual laboratory testing should be performed.    8. Persistent nausea/vomiting after bariatric surgery entails risk of thiamine deficiency and should be treated early.  Vitamin B12 deficiency may develop, especially after gastric bypass surgery and must be recognized.        If you have any questions about our plans please don't hesitate to contact me.    Sincerely,    Komal Miller NP

## 2022-03-18 DIAGNOSIS — Z11.59 ENCOUNTER FOR SCREENING FOR OTHER VIRAL DISEASES: Primary | ICD-10-CM

## 2022-03-21 ENCOUNTER — CARE COORDINATION (OUTPATIENT)
Dept: ENDOCRINOLOGY | Facility: CLINIC | Age: 22
End: 2022-03-21
Payer: COMMERCIAL

## 2022-03-21 NOTE — PROGRESS NOTES
Tasklist updated and sent to patient via [employer].    Bariatric Task List  Fax:  Please fax all paperwork to: [phone] -     Status:  Is patient a candidate for bariatric surgery?:  patient is a candidate for bariatric surgery - Need EGD   Cleared to schedule surgeon consult?:  cleared to schedule surgeon consult - 3/10/22 appt. bks   Status:  surgery evaluation in process -     Surgeon: Esperanza -     Olvinative surgery month/year: To be determined when within 5 lbs of pre-surgery goal weight, call Scott at  [phone]. bks -        Insurance: Insurance:  [employer] -     HP Referral:  Needed - HP Coaching calls registered 1/114/22, 2nd call 2/24/22. May be done by 4/7/22 (5 calls every 2 weeks). [facility]      Patient Info: Initial Weight:  365 -     Date of Initial Weight/Height:  1/13/2022 -     Goal Weight (lbs):  329 -     Required Weight Loss:  36 -     Surgery Type:  sleeve gastrectomy -        Dietician Visits: Structured weight loss required by insurance?:    -     Dietician Visit 1:  Completed - 1/12/22  ES - AS   Dietician Visit 2:  Completed - 2/24/22- ES   Dietician Visit 3:  Needed - 3/24/22 appt. s   Dietician Visit 4:    -     Dietician Visit 5:    -     Dietician Visit 6:    -     Dietician Visit additional:  Needed - Monthly until surgery for weight loss and postop diet teaching. [facility]      Psychological Evaluation: Psych eval:  Completed - List and letter sent to pt 1/13/22 - AS; 3/3/22 Cleared by Dr Hahn. [facility]      Lab Work: Complete Blood Count:  Completed - Ordered 1/12/22 - AS   Comprehensive Metabolic Panel:  Completed - Ordered 1/12/22 - AS   Vitamin D:  Completed - Ordered 1/12/22 - AS   PTH:  Completed - Ordered 1/12/22 - AS   Hgb A1c:  Completed - Ordered 1/12/22 - AS    Lipids: Completed -      TSH ([facility], [facility], [place]): Completed -     Recheck Vitamin D:   -        Testing:  -     EGD:  Needed - Ordered 1/12/22 - AS; Scheduled in OR on 3/28/22. [facility]      PCP: Establish care with PCP:   "Completed -     Follow up with PCP:    -     PCP letter of support:  Completed - Letter sent to pt 1/13/22 - AS; Letter from Dr Medley on 3/18/22 in Epic. bks      Patient Education:  Information Session:  Needed -     Given \"Making your decision\" handout?:  Yes - 1/13/22 - AS   Given \"A Roadmap to you Weight Loss Surgery\" handout?: Yes - 1/13/22 - AS   Given \"Get Well Loop\" information?: Yes - 1/13/22 - AS   Given support group information?:    -   Attended support group?:    -     Support plan in place?:  Completed -        Additional Surgery Requirements: Other: Covid test 4 days prior to surgery. bks -     Other: Contact Center to schedule the 1 week and 31+ days postop appts. bks -        Final Tasks:  Before surgery online class:  Needed -     Before surgery online class website link:  [email or web address]   After surgery online class:  Needed -     After surgery online class website link:  [email or web address]   Nurse visit per clinic:  Needed -     History and Physical per clinic:   -  Pre Assessment Clinic   Final labs per clinic: Needed -        Notes: Please register for the Sleeve Gastrectomy Get Well Loop when you get an email invitation after you get a surgery date.   The Get Well Loop will give you information via email or text messages that can help you be more successful before and after surgery for up to one year after surgery.  It can also help answer any questions you may have.   Get Well Loop Handout - [email or web address]  A patient can only be connected to one Loop at a time.  You can be disconnected from the Sleeve Gastrectomy Loop and added to another Loop because you were in the hospital/emergency department, had another surgery or had Covid. Please send Yolanda Lara RN a [employer] message at \"[facility] Nurses - \" to restart the Sleeve Gastrectomy Loop after the added Loop is completed.    -            "

## 2022-03-24 ENCOUNTER — LAB (OUTPATIENT)
Dept: LAB | Facility: OTHER | Age: 22
End: 2022-03-24
Payer: COMMERCIAL

## 2022-03-24 ENCOUNTER — VIRTUAL VISIT (OUTPATIENT)
Dept: ENDOCRINOLOGY | Facility: CLINIC | Age: 22
End: 2022-03-24
Payer: COMMERCIAL

## 2022-03-24 DIAGNOSIS — E11.9 TYPE 2 DIABETES MELLITUS WITHOUT COMPLICATION, WITHOUT LONG-TERM CURRENT USE OF INSULIN (H): ICD-10-CM

## 2022-03-24 DIAGNOSIS — Z71.3 NUTRITIONAL COUNSELING: Primary | ICD-10-CM

## 2022-03-24 DIAGNOSIS — E66.9 OBESITY: ICD-10-CM

## 2022-03-24 DIAGNOSIS — Z11.59 ENCOUNTER FOR SCREENING FOR OTHER VIRAL DISEASES: ICD-10-CM

## 2022-03-24 PROCEDURE — U0003 INFECTIOUS AGENT DETECTION BY NUCLEIC ACID (DNA OR RNA); SEVERE ACUTE RESPIRATORY SYNDROME CORONAVIRUS 2 (SARS-COV-2) (CORONAVIRUS DISEASE [COVID-19]), AMPLIFIED PROBE TECHNIQUE, MAKING USE OF HIGH THROUGHPUT TECHNOLOGIES AS DESCRIBED BY CMS-2020-01-R: HCPCS

## 2022-03-24 PROCEDURE — 97803 MED NUTRITION INDIV SUBSEQ: CPT | Mod: GT | Performed by: DIETITIAN, REGISTERED

## 2022-03-24 PROCEDURE — U0005 INFEC AGEN DETEC AMPLI PROBE: HCPCS

## 2022-03-24 RX ORDER — ERGOCALCIFEROL (VITAMIN D2) 50 MCG
1 CAPSULE ORAL EVERY MORNING
Status: ON HOLD | COMMUNITY
End: 2022-07-27

## 2022-03-24 NOTE — PROGRESS NOTES
"Sasha Hand is a 21 year old female who is being evaluated via a billable video visit.      The patient has been notified of following:     \"This video visit will be conducted via a call between you and your physician/provider. We have found that certain health care needs can be provided without the need for an in-person physical exam.  This service lets us provide the care you need with a video conversation.  If a prescription is necessary we can send it directly to your pharmacy.  If lab work is needed we can place an order for that and you can then stop by our lab to have the test done at a later time.    Video visits are billed at different rates depending on your insurance coverage.  Please reach out to your insurance provider with any questions.    If during the course of the call the physician/provider feels a video visit is not appropriate, you will not be charged for this service.\"    Patient has given verbal consent for Video visit? Yes  How would you like to obtain your AVS? MyChart  If you are dropped from the video visit, the video invite should be resent to: Send to e-mail at: [email or web address]  Will anyone else be joining your video visit? No  {If patient encounters technical issues they should call [phone]      Video-Visit Details    Type of service:  Video Visit    Video Start Time: 12:45 PM  Video End Time: 1:06 PM    Originating Location (pt. Location): Home    Distant Location (provider location):  [facility] [place]     Platform used for Video Visit: The Mad Video    During this virtual visit the patient is located in [place], patient verifies this as the location during the entirety of this visit.         Return Bariatric Nutrition Consultation Note    Reason For Visit: Nutrition Assessment    Sasha Hand is a 21 year old presenting today for a return bariatric nutrition consult.   Pt is interested in laparoscopic sleeve gastrectomy with Dr. Mata expected " "surgery in TBD.  Patient is accompanied by mother and sister.  This is pt's 3rd of 3 required nutrition visits prior to surgery.     Pt referred by Komal Miller NP on January 13, 2022.  Patient with Co-morbidities of obesity including:  Type II DM no - Prediabetes   Renal Failure no  Sleep apnea no  Hypertension yes   Dyslipidemia no  Joint pain no  Back pain no  GERD yes     Support System Reviewed With Patient 1/12/2022   Who do you have in your support network that can be available to help you for the first 2 weeks after surgery? Pranay  (boyfriend) Genet (sister in law) angelo (mother)   Who can you count on for support throughout your weight loss surgery journey? Mother , boyfriend       ANTHROPOMETRICS:  Estimated body mass index is 52.76 kg/m  as calculated from the following:    Height as of 3/17/22: 1.727 m (5' 8\").    Weight as of 3/17/22: 157.4 kg (347 lb).     Current weight (3/24/22): 346 lbs per pt     Required weight loss goal pre-op: -36 lbs from initial consult weight (goal weight 329 lbs or less before surgery)       1/12/2022   I have tried the following methods to lose weight Watching portions or calories, Exercise, Weight Watchers       Weight Loss Questions Reviewed With Patient 1/12/2022   How long have you been overweight? Since early childhood       SUPPLEMENT INFORMATION:  Biotin     Saxenda- reached 2.0 mg dose yesterday    NUTRITION HISTORY:  Pt has gradually gained weight since puberty. Tried weight watchers in the past. 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 If she has smaller portions she feels hungry within two hours. Snacks at work but not at home.     2/24/22: Pt has lost weight since last visit. Pt's boyfriend has been a huge support in cooking healthy meals with her.  She has been pre planning meals ahead of time to determine calories for the day. Still wanting to increase physical activity but overall feels her main goal now is to be " consistent.     3/24/22: Pt maintained weight since last visit but has not lost weight. Has not been doing any exercise. Has been struggling with life events that are causing stress. Wanting to get back into exercising regularly. Has been able to continue focusing on portion sizes.     Recall Diet Questions Reviewed With Patient 1/12/2022   Describe what you typically consume for breakfast (typical or most recent): Eggs, cheese, sasuage, taylor. Yogart.   Describe what you typically consume for lunch (typical or most recent): Soup, left over dinner. Used to be fast food. Stopped that. Somthibg sweet. Sonetimes repeat of breakfast.   Describe what you typically consume for supper (typical or most recent): Protine. Veggies, starch.   Describe what you typically consume as snacks (typical or most recent): Cheeze oliver. Jerky. Cookies . (Only at work)   How many ounces of water, or other low calorie drinks, do you drink daily (8 oz=1 glass)? 48 oz   How many ounces of caffeine (coffee, tea, pop) do you drink daily (8 oz=1 glass)? 24 oz   How many ounces of carbonated (pop, beer, sparkling water) drinks do you drinky daily (8 oz=1 glass)? 24 oz   How many ounces of juice, pop, sweet tea, sports drinks, protein drinks, other sweetened drinks, do you drink daily (8 oz=1 glass)? 8 oz   How many ounces of milk do you drink daily (8 oz=1 glass) 8 oz   Please indicate the type of milk: whole   How often do you drink alcohol? Monthly or less   If you do drink alcohol, how many drinks might you have in a day? (one drink = 5 oz. wine, 1 can/bottle of beer, 1 shot liquor) 1 or 2       Eating Habits 1/12/2022   Do you have any dietary restrictions? No   Do you currently binge eat (eat a large amount of food in a short time)? Yes   Are you an emotional eater? Yes   Do you get up to eat after falling asleep? No   What foods do you crave? Salt and sweet.     Progress on Previous Goals  1) Increase physical activity as able not met  2)  Continue focusing on lean proteins and non-starchy vegetables met, continues  3) Continue tracking calories as needed not met  4) Avoid snacking if able. If snack is needed use lean protein and/or fruit/vegetable met, continues    ADDITIONAL INFORMATION:  Physical activity: mostly sedentary job ().     Doing 40 mins 2x per week    Dining Out History Reviewed With Patient 1/12/2022   How often do you dine out? Rarely.   Where do you dine out? (select all that apply) sit-down restaurants   What types of food do you order when you dine out? Burger or pasta       Physical Activity Reviewed With Patient 1/12/2022   How often do you exercise? 1 to 2 times per week   What is the duration of your exercise (in minutes)? 20 Minutes   What types of exercise do you do? other   What keeps you from being more active?  Pain, Too tired       NUTRITION DIAGNOSIS:  Obesity r/t long history of positive energy balance aeb BMI >30 kg/m2.    INTERVENTION:  Intervention Provided/Education Provided on post-op diet guidelines, vitamins/minerals essential post-operatively, GI anatomy of bariatric surgeries, ways to help prepare for post-op diet guidelines pre-operatively, portion/calorie-control, mindful eating and sources of protein.  Patient demonstrates understanding. Provided pt with list of goals RD contact information.      Questions Reviewed With Patient 1/12/2022   How ready are you to make changes regarding your weight? Number 1 = Not ready at all to make changes up to 10 = very ready. 10   How confident are you that you can change? 1 = Not confident that you will be successful making changes up to 10 = very confident. 7       Expected Engagement: good    GOALS:  1) Increase physical activity as able   -at least 2x per week  2) Review handouts below    Meal Replacement Shake Options:   *Protein Shake Criteria: no more than 210 Calories, at least 20 grams of protein, and less than 10 grams of sugar   [facility]  (160 Calories, 20 g protein)   Premier Protein (160 Calories, 30 g protein)  Slim Fast Advanced Nutrition (180 Calories, 20 g protein)  Muscle Milk, lactose-free, 17 oz bottle (210 Calories, 30 g protein)  Integrated Supplements, no artificial sugars (110 Calories, 20 g protein)  Genepro, unflavored protein powder (60 Calories, 30 g protein)  Boost/Ensure Max (160 calories, 30 gm protein)   [facility] Core Power (170 calories, 26 gm protein)    Meal Replacement Bar Options:  [facility] Protein Shake (160 Calories, 15 g protein)  Quest Protein Bars (190 Calories, 20 g protein)  Built Bar (170 Calories, 15-20 g protein)  One Protein Bar (210 calories, 20 g protein)  Dubois Signature Protein Bar ([employer]) (190 Calories, 21 g protein)  Pure Protein Bars (180 Calories, 21 g protein)    Low Calorie Frozen Meal:  Healthy Choice Power Bowls  Lean Cuisine  Smart Ones  Leonel Sotelo    Protein Sources for Weight Loss  [email or web address]     Carbohydrates  [email or web address]     Mindful Eating  [email or web address]     Summary of Volumetrics Eating Plan  [email or web address]     Diet Guidelines after Weight Loss Surgery  [email or web address]     Seated Exercises for Arms and Legs (can be done before or after surgery)  [email or web address]    Surgery Related Nutrition Handouts:    Diet Guidelines after Weight Loss Surgery  [email or web address]     Lifestyle Changes Before and After Weight Loss Surgery: [place] for Success  [email or web address]     Modified Liquid Diet (2 liquid meals, 1 meal, 2 snacks)  [email or web address]     Your Stage 1 Diet: Clear Liquids  [email or web address]     Your Stage 2 Diet: Low-fat Full Liquids  [email or web address]     Your Stage 3 Diet: Pureed Foods  [email or web address]     Your Stage 4 Diet: Soft Foods  [email or web address]    Your Stage 5 Diet: Regular  Foods  [email or web address]    Supplements after Weight Loss Surgery  [email or web address]     Keeping Track of Fluids  [email or web address]    Why Take Supplements for Life after Weight Loss Surgery  [email or web address]     Keeping Up with Your Diet after Weight Loss Surgery  [email or web address]    Preventing Low Blood Sugar after Weight Loss Surgery  [email or web address]     Preventing Dumping Syndrome after Weight Loss Surgery  [email or web address]      Follow up: 1 month, prn     Time spent with patient: 21 minutes.  MARCIE GLOVER RD, LD

## 2022-03-24 NOTE — PATIENT INSTRUCTIONS
Harjit Baez!    Follow-up with RD in 1 month    Thank you,    Kaleigh Curran, NIKITA, LD  If you would like to schedule or reschedule an appointment with the RD, please call [phone]    Nutrition Goals  1) Increase physical activity as able   -at least 2x per week  2) Review handouts below    Meal Replacement Shake Options:   *Protein Shake Criteria: no more than 210 Calories, at least 20 grams of protein, and less than 10 grams of sugar   [facility] smoothie (160 Calories, 20 g protein)   Premier Protein (160 Calories, 30 g protein)  Slim Fast Advanced Nutrition (180 Calories, 20 g protein)  Muscle Milk, lactose-free, 17 oz bottle (210 Calories, 30 g protein)  Integrated Supplements, no artificial sugars (110 Calories, 20 g protein)  Genepro, unflavored protein powder (60 Calories, 30 g protein)  Boost/Ensure Max (160 calories, 30 gm protein)   [employer] Core Power (170 calories, 26 gm protein)    Meal Replacement Bar Options:  [facility] Protein Shake (160 Calories, 15 g protein)  Quest Protein Bars (190 Calories, 20 g protein)  Built Bar (170 Calories, 15-20 g protein)  One Protein Bar (210 calories, 20 g protein)  [place] Signature Protein Bar ([employer]) (190 Calories, 21 g protein)  Pure Protein Bars (180 Calories, 21 g protein)    Low Calorie Frozen Meal:  Healthy Choice Power Bowls  Lean Cuisine  Smart Ones  Leonel Sotelo    Protein Sources for Weight Loss  [email or web address]     Carbohydrates  [email or web address]     Mindful Eating  [email or web address]     Summary of Volumetrics Eating Plan  [email or web address]     Diet Guidelines after Weight Loss Surgery  [email or web address]     Seated Exercises for Arms and Legs (can be done before or after surgery)  [email or web address]    Surgery Related Nutrition Handouts:    Diet Guidelines after Weight Loss Surgery  [email or web address]     Lifestyle Changes Before and After Weight Loss  Surgery: [place] for Success  [email or web address]     Modified Liquid Diet (2 liquid meals, 1 meal, 2 snacks)  [email or web address]     Your Stage 1 Diet: Clear Liquids  [email or web address]     Your Stage 2 Diet: Low-fat Full Liquids  [email or web address]     Your Stage 3 Diet: Pureed Foods  [email or web address]     Your Stage 4 Diet: Soft Foods  [email or web address]    Your Stage 5 Diet: Regular Foods  [email or web address]    Supplements after Weight Loss Surgery  [email or web address]     Keeping Track of Fluids  [email or web address]    Why Take Supplements for Life after Weight Loss Surgery  [email or web address]     Keeping Up with Your Diet after Weight Loss Surgery  [email or web address]    Preventing Low Blood Sugar after Weight Loss Surgery  [email or web address]     Preventing Dumping Syndrome after Weight Loss Surgery  [email or web address]        Interested in working with a health ? Health coaches work with you to improve your overall health and wellbeing. They look at the whole person, and may involve discussion of different areas of life, including, but not limited to the four pillars of health (sleep, exercise, nutrition, and stress management). Discuss with your care team if you would like to start working a health .    Health Coaching-3 Pack:    $99 for three health coaching visits    Visits may be done in person or via phone    Coaching is a partnership between the  and the client; Coaches do not prescribe or diagnose    Coaching helps inspire the client to reach his/her personal goals    COMPREHENSIVE WEIGHT MANAGEMENT PROGRAM  VIRTUAL SUPPORT GROUPS    For Support Group Information:      We offer support groups for patients who are working on weight loss and considering, preparing for or have had weight loss surgery.   There is no cost for this opportunity.  You  "are invited to attend the?Virtual Support Groups?provided by any of the following locations:    1. [facility] via Microsoft Teams with Sangita Eastman RN  2.   [facility] via Findery with Bryce Raymond, PhD, LP  3.   [place] via Findery with Tari Blue RN  4.   [facility] via Ordr.in Teams with Tari Schultz [facility]    The following Support Group information can also be found on our website:  [email or web address]      [facility] Weight Loss Surgery Support Group    [facility] Weight Loss Surgery Support Group  The support group is a patient-lead forum that meets monthly to share experiences, encouragement and education. It is open to those who have had weight loss surgery, are scheduled for surgery, and those who are considering surgery.   WHEN: This group meets on the 3rd Wednesday of each month from 5:00PM - 6:00PM virtually using Microsoft Teams.   FACILITATOR: Led by Sangita Eastman RD, VEENA, RN, the program's Clinical Coordinator.   TO REGISTER: Please contact the clinic via [employer] or call the nurse line directly at [phone] to inform our staff that you would like an invite sent to you and to let us know the email you would like the invite sent to. Prior to the meeting, a link with directions on how to join the meeting will be sent to you.    2022 Meetings  January 19: \"Let's Talk\" a time for the group to share.  February 16: \"Let's Talk\" a time for the group to share.  March 16: Guest Speakers: Psychologists, Katherine Pearson, PhD,LP and Evelia Marte, PsyD,LP  April 20: Guest Speaker: Health , Nanette Alfaro, CH,CHES, CPT  May 18: Guest Speaker: DietitianMarvel, NIKITA, LP  Bhumika 15: \"Let's Talk\" a time for the group to share.  July 20: \"Let's Talk\" a time for the group to share.  August 17: TBA  September 21: TBA  October 19: Guest Speaker: Dr Ang Turner MD Pulmonologist and Sleep Medicine " "Physician, \"Getting a Good Night's Sleep\".  November 16: TBA  December 21: TBA    [facility] Support Groups    Connections: Bariatric Care Support Group?  This is open to all [facility] (and those external to this program) pre- and post- operative bariatric surgery patients as well as their support system.   WHEN: This group meets the 2nd Tuesday of each month from 6:30 PM - 8:00 PM virtually using Microsoft Teams.   FACILITATOR: Led by Bryce Raymond, Ph.D who is a Licensed Psychologist with the [facility] Comprehensive Weight Management Program.   TO REGISTER: Please send an email to Bryce Raymond, Ph.D., LP at?[email or web address]?if you would like an invitation to the group and to learn about using Microsoft Teams.    2022 Meetings January 11: Kelsey Menard, PharmD, Pharmacy Resident at [facility], \"Medications and Bariatric Surgery\".  February 8: Open Forum  March 8  April 12  May 10  Bhumika 14    Connections: Post-Operative Bariatric Surgery Support Group  This is a support group for [facility] bariatric patients (and those external to [facility]) who have had bariatric surgery and are at least 3 months post-surgery.  WHEN: This support group meets the 4th Wednesday of the month from 11:00 AM - 12:00 PM virtually using Microsoft Teams.   FACILITATOR: Led by Certified Bariatric Nurse, Tari Blue RN.   TO REGISTER: Please send an email to Tari at [email or web address] if you would like an invitation to the group and to learn about using Microsoft Teams.    2022 Meetings  January 26  February 23  March 23  April 27  May 25  Bhumika 22    [facility] Healthy Lifestyle Virtual Support Group    Healthy Lifestyle Virtual Support Group?  This is 60 minutes of small group guided discussion, support and resources. All are welcome who want a healthy lifestyle.  WHEN: This group meets monthly on " "a Friday from 12:30 PM - 1:30 PM virtually using Microsoft Teams.   FACILITATOR: Led by National Board Certified Health and , Tari Schultz, [facility].   TO REGISTER: Please send an email to Tari [email or web address] to receive monthly invites to the group or if you have any questions about having a health .  Prior to the meeting, a link with directions on how to join the meeting will be sent to you.    2022 Meetings  January 21: Lauren Lara MS, RN, CIC, CBN, \"Healthy Habits\"  February 25: Open Forum  March 18: \"Setting Limits and Boundaries\"  April 29: Hilda Murguia RD, \"Meal Planning Made Easy\"  May 20: Open Forum  June: To be determined                  "

## 2022-03-24 NOTE — PROGRESS NOTES
[address]  [facility] [address]  Phone: [phone]  Primary Provider: Oscar Medley        PREOPERATIVE EVALUATION:  Today's date: 3/25/2022    Sasha Hand is a 21 year old female who presents for a preoperative evaluation.    Surgical Information:  Surgery/Procedure: ESOPHAGOGASTRODUODENOSCOPY (EGD)  Surgery Location: [facility]  Surgeon: Dr. Joseph Mata   Surgery Date: 3/28/22  Time of Surgery: 7:30  Where patient plans to recover: At home with family  Fax number for surgical facility: Note does not need to be faxed, will be available electronically in Epic.    Type of Anesthesia Anticipated: to be determined    Assessment & Plan     The proposed surgical procedure is considered LOW risk.    Preop general physical exam\  Gastroesophageal reflux disease without esophagitis  Preop for planned EGD for recurrent GERD slightly responsive to PPI use.  Lab work noted below.  Details of lab work also below.  - Hemoglobin A1c  - CBC with platelets  - UA Macro with Reflex to Micro and Culture - lab collect    Elevated A1c?  Very interesting that the patient had elevated A1c of 9.1 and repeat of this 1 week later was 5.4.  This was done back in January.  Patient was set up as a type II diabetic including education, medications, etc.  Would like to repeat this to close the loop to be sure on these results.  No concern for acute blood loss of any kind.  Currently has Nexplanon in place, no menses.  Currently is on Victoza for weight loss, details below.    Morbid obesity (H)  BMI 50.0-59.9, adult (H)  Adding to overall complexity.  Continue with Victoza.    Essential hypertension  Slightly elevated today, on metoprolol, she plans on having discussions with her PCP about change in medication or add of medication             Risks and Recommendations:  The patient has the following additional risks and recommendations for perioperative complications:   - Morbid  obesity (BMI >40)  Medication Instructions:  Discussed with the patient that she should hold all of her daily medications until after the procedure.  Avoid any NSAID type medications 7 days prior to.    RECOMMENDATION:  APPROVAL GIVEN to proceed with proposed procedure pending review of diagnostic evaluation.    Subjective     HPI related to upcoming procedure: Morbid obesity, uncontrolled GERD, BMI 54.  No adverse effects to anesthesia but her father did.      Preop Questions 3/25/2022   1. Have you ever had a heart attack or stroke? No   2. Have you ever had surgery on your heart or blood vessels, such as a stent placement, a coronary artery bypass, or surgery on an artery in your head, neck, heart, or legs? No   3. Do you have chest pain with activity? No   4. Do you have a history of  heart failure? No   5. Do you currently have a cold, bronchitis or symptoms of other infection? No   6. Do you have a cough, shortness of breath, or wheezing? No   7. Do you or anyone in your family have previous history of blood clots? No   8. Do you or does anyone in your family have a serious bleeding problem such as prolonged bleeding following surgeries or cuts? No   9. Have you ever had problems with anemia or been told to take iron pills? No   10. Have you had any abnormal blood loss such as black, tarry or bloody stools, or abnormal vaginal bleeding? No   11. Have you ever had a blood transfusion? No   12. Are you willing to have a blood transfusion if it is medically needed before, during, or after your surgery? Yes   13. Have you or any of your relatives ever had problems with anesthesia? YES - Dad had reaction, required nebs, an diffuicult to awake   14. Do you have sleep apnea, excessive snoring or daytime drowsiness? No   15. Do you have any artifical heart valves or other implanted medical devices like a pacemaker, defibrillator, or continuous glucose monitor? No   16. Do you have artificial joints? No   17. Are you  allergic to latex? No   18. Is there any chance that you may be pregnant? No     Health Care Directive:  Patient does not have a Health Care Directive or Living Will: Discussed advance care planning with patient; however, patient declined at this time.    Preoperative Review of :   reviewed - no record of controlled substances prescribed.        Review of Systems  CONSTITUTIONAL: NEGATIVE for fever, chills, change in weight  ENT/MOUTH: NEGATIVE for ear, mouth and throat problems  RESP: NEGATIVE for significant cough or SOB  CV: NEGATIVE for chest pain, palpitations or peripheral edema    Patient Active Problem List    Diagnosis Date Noted     BMI 50.0-59.9, adult (H) 03/25/2022     Priority: Medium     Essential hypertension 03/25/2022     Priority: Medium     Nexplanon placed 12/14/2021 12/14/2021     Priority: Medium     Depression 03/11/2018     Priority: Medium     DELIA (generalized anxiety disorder) 02/09/2018     Priority: Medium     Major depressive disorder, single episode, moderate (H) 02/09/2018     Priority: Medium     Genital herpes simplex, unspecified site 01/18/2017     Priority: Medium     Mild persistent asthma without complication 01/18/2017     Priority: Medium     Wheezing 05/04/2016     Priority: Medium     Menorrhagia 01/20/2014     Priority: Medium     Dysmenorrhea 01/20/2014     Priority: Medium     Tear of lateral cartilage or meniscus of knee, current 09/09/2013     Priority: Medium     Morbid obesity (H) 09/14/2010     Priority: Medium     Initial weight 365lb (BMI 55) - bariatric surgery consult        Past Medical History:   Diagnosis Date     Closed head injury, subsequent encounter 4/5/2018     History of PCR DNA positive for HSV1      Moderate major depression (H) 1/20/2014     Nexplanon placed 11/14/17 11/14/2017     Nexplanon placed 11/14/17 (removed 1/16/2020) 11/14/2017     Nexplanon placed 12/14/2021 12/14/2021     Obesity 9/14/2010     Pyelonephritis 2/19/2018     Skin tag  (right collar line and left labial region 11/21/2019     Tobacco use disorder 5/4/2016     Uncomplicated asthma      Past Surgical History:   Procedure Laterality Date     ARTHROSCOPY KNEE WITH MENISCAL REPAIR Right 5/10/2017    Procedure: ARTHROSCOPY KNEE WITH MENISCAL REPAIR;  arthroscopy right knee with lateral meniscus repair;  Surgeon: Nathaniel Hicks MD;  Location: [facility] OR     Current Outpatient Medications   Medication Sig Dispense Refill     albuterol (PROAIR HFA/PROVENTIL HFA/VENTOLIN HFA) 108 (90 Base) MCG/ACT inhaler Inhale 1-2 puffs into the lungs every 4 hours as needed for shortness of breath / dyspnea or wheezing 8.5 g 5     blood glucose (NO BRAND SPECIFIED) test strip Use to test blood sugar 3 times daily or as directed. 100 strip 1     blood glucose monitoring (NO BRAND SPECIFIED) meter device kit Use to test blood sugar 3 times daily or as directed. 1 kit 1     clotrimazole (LOTRIMIN) 1 % external cream Apply topically 2 times daily 15 g 1     etonogestrel (NEXPLANON) 68 MG IMPL 1 each (68 mg) by Subdermal route once       Fexofenadine HCl (ALLEGRA PO) Take by mouth daily as needed for allergies       insulin pen needle (31G X 5 MM) 31G X 5 MM miscellaneous Use 1 pen needles daily or as directed. 100 each 1     insulin pen needle (B-D U/F) 31G X 8 MM miscellaneous Use 1 pen needles daily or as directed. 100 each 1     ipratropium - albuterol 0.5 mg/2.5 mg/3 mL (DUONEB) 0.5-2.5 (3) MG/3ML neb solution Take 1 vial (3 mLs) by nebulization every 6 hours as needed for shortness of breath / dyspnea or wheezing 30 vial 1     liraglutide (VICTOZA) 18 MG/3ML solution Inject 0.6 mg subcutaneously daily for 1 week, then 1.2 mg daily for 1 week, then 1.8 mg daily. If no coverage for saxenda. New Dx of DMII 9 mL 3     liraglutide - Weight Management (SAXENDA) 18 MG/3ML pen Week 1: 0.6 mg subcutaneous daily, Week 2: 1.2 mg daily, Week 3: 1.8 mg daily, Week 4: 2.4 mg daily, Week 5 & on: 3 mg daily 15 mL 1      "metoprolol succinate ER (TOPROL-XL) 50 MG 24 hr tablet Take 1 tablet (50 mg) by mouth daily 90 tablet 3     Microlet Lancets MISC 1 each 3 times daily 100 each 3     Multiple Vitamins-Minerals (MULTIVITAMIN WOMEN PO) Take 1 tablet by mouth daily as needed With iron       omeprazole (PRILOSEC) 20 MG DR capsule Take 20 mg by mouth daily       VENTOLIN  (90 Base) MCG/ACT inhaler Inhale 1-2 puffs into the lungs every 4 hours as needed for shortness of breath / dyspnea or wheezing 18 g 0     Vitamin D2, Ergocalciferol, 50 MCG (2000 UT) CAPS Take 1 tablet by mouth         Allergies   Allergen Reactions     No Known Drug Allergies      Seasonal Allergies      Steri Strips      Got boils on incision after knee surgery from steri strips        Social History     Tobacco Use     Smoking status: Former Smoker     Packs/day: 1.00     Types: Cigarettes     Quit date: 2021     Years since quittin.1     Smokeless tobacco: Former User   Substance Use Topics     Alcohol use: No     Alcohol/week: 0.0 standard drinks       History   Drug Use No         Objective     BP (!) 142/90   Pulse 87   Temp 97  F (36.1  C) (Temporal)   Resp 18   Ht 1.727 m (5' 7.99\")   Wt (!) 161 kg (355 lb)   SpO2 97%   BMI 53.99 kg/m      Physical Exam      Recent Labs   Lab Test 22  1047 22  1140   HGB  --  14.6   PLT  --  311    140   POTASSIUM 3.9 3.8   CR 0.50* 0.54   A1C 5.4 9.1*        Diagnostics:  Labs pending at this time.  Results will be reviewed when available.     Revised Cardiac Risk Index (RCRI):  The patient has the following serious cardiovascular risks for perioperative complications:   - No serious cardiac risks = 0 points     RCRI Interpretation: 0 points: Class I (very low risk - 0.4% complication rate)           Signed Electronically by: THERESA GIBSON MD  Copy of this evaluation report is provided to requesting physician.      "

## 2022-03-24 NOTE — LETTER
"3/24/2022       RE: Sasha Hand  [address]  [facility] [address]     Dear Colleague,    Thank you for referring your patient, Sasha Hand, to the [facility] [place] [facility]. Please see a copy of my visit note below.    Sasha Hand is a 21 year old female who is being evaluated via a billable video visit.      The patient has been notified of following:     \"This video visit will be conducted via a call between you and your physician/provider. We have found that certain health care needs can be provided without the need for an in-person physical exam.  This service lets us provide the care you need with a video conversation.  If a prescription is necessary we can send it directly to your pharmacy.  If lab work is needed we can place an order for that and you can then stop by our lab to have the test done at a later time.    Video visits are billed at different rates depending on your insurance coverage.  Please reach out to your insurance provider with any questions.    If during the course of the call the physician/provider feels a video visit is not appropriate, you will not be charged for this service.\"    Patient has given verbal consent for Video visit? Yes  How would you like to obtain your AVS? MyChart  If you are dropped from the video visit, the video invite should be resent to: Send to e-mail at: [email or web address]  Will anyone else be joining your video visit? No  {If patient encounters technical issues they should call [phone]      Video-Visit Details    Type of service:  Video Visit    Video Start Time: 12:45 PM  Video End Time: 1:06 PM    Originating Location (pt. Location): Home    Distant Location (provider location):  [facility]     Platform used for Video Visit: iCopyright    During this virtual visit the patient is located in [place], patient verifies this as " "the location during the entirety of this visit.         Return Bariatric Nutrition Consultation Note    Reason For Visit: Nutrition Assessment    Sasha Hand is a 21 year old presenting today for a return bariatric nutrition consult.   Pt is interested in laparoscopic sleeve gastrectomy with Dr. Mata expected surgery in [facility].  Patient is accompanied by mother and sister.  This is pt's 3rd of 3 required nutrition visits prior to surgery.     Pt referred by Komal Miller NP on January 13, 2022.  Patient with Co-morbidities of obesity including:  Type II DM no - Prediabetes   Renal Failure no  Sleep apnea no  Hypertension yes   Dyslipidemia no  Joint pain no  Back pain no  GERD yes     Support System Reviewed With Patient 1/12/2022   Who do you have in your support network that can be available to help you for the first 2 weeks after surgery? Pranay  (boyfriend) Genet (sister in law) angelo (mother)   Who can you count on for support throughout your weight loss surgery journey? Mother , boyfriend       ANTHROPOMETRICS:  Estimated body mass index is 52.76 kg/m  as calculated from the following:    Height as of 3/17/22: 1.727 m (5' 8\").    Weight as of 3/17/22: 157.4 kg (347 lb).     Current weight (3/24/22): 346 lbs per pt     Required weight loss goal pre-op: -36 lbs from initial consult weight (goal weight 329 lbs or less before surgery)       1/12/2022   I have tried the following methods to lose weight Watching portions or calories, Exercise, Weight Watchers       Weight Loss Questions Reviewed With Patient 1/12/2022   How long have you been overweight? Since early childhood       SUPPLEMENT INFORMATION:  Biotin     Saxenda- reached 2.0 mg dose yesterday    NUTRITION HISTORY:  Pt has gradually gained weight since puberty. Tried weight watchers in the past. 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 If she has smaller portions she feels hungry within two hours. Snacks at work " but not at home.     2/24/22: Pt has lost weight since last visit. Pt's boyfriend has been a huge support in cooking healthy meals with her.  She has been pre planning meals ahead of time to determine calories for the day. Still wanting to increase physical activity but overall feels her main goal now is to be consistent.     3/24/22: Pt maintained weight since last visit but has not lost weight. Has not been doing any exercise. Has been struggling with life events that are causing stress. Wanting to get back into exercising regularly. Has been able to continue focusing on portion sizes.     Recall Diet Questions Reviewed With Patient 1/12/2022   Describe what you typically consume for breakfast (typical or most recent): Eggs, cheese, sasuage, taylor. Yogart.   Describe what you typically consume for lunch (typical or most recent): Soup, left over dinner. Used to be fast food. Stopped that. Somthibg sweet. Sonetimes repeat of breakfast.   Describe what you typically consume for supper (typical or most recent): Protine. Veggies, starch.   Describe what you typically consume as snacks (typical or most recent): Cheeze oliver. Jerky. Cookies . (Only at work)   How many ounces of water, or other low calorie drinks, do you drink daily (8 oz=1 glass)? 48 oz   How many ounces of caffeine (coffee, tea, pop) do you drink daily (8 oz=1 glass)? 24 oz   How many ounces of carbonated (pop, beer, sparkling water) drinks do you drinky daily (8 oz=1 glass)? 24 oz   How many ounces of juice, pop, sweet tea, sports drinks, protein drinks, other sweetened drinks, do you drink daily (8 oz=1 glass)? 8 oz   How many ounces of milk do you drink daily (8 oz=1 glass) 8 oz   Please indicate the type of milk: whole   How often do you drink alcohol? Monthly or less   If you do drink alcohol, how many drinks might you have in a day? (one drink = 5 oz. wine, 1 can/bottle of beer, 1 shot liquor) 1 or 2       Eating Habits 1/12/2022   Do you have any  dietary restrictions? No   Do you currently binge eat (eat a large amount of food in a short time)? Yes   Are you an emotional eater? Yes   Do you get up to eat after falling asleep? No   What foods do you crave? Salt and sweet.     Progress on Previous Goals  1) Increase physical activity as able not met  2) Continue focusing on lean proteins and non-starchy vegetables met, continues  3) Continue tracking calories as needed not met  4) Avoid snacking if able. If snack is needed use lean protein and/or fruit/vegetable met, continues    ADDITIONAL INFORMATION:  Physical activity: mostly sedentary job ().     Doing 40 mins 2x per week    Dining Out History Reviewed With Patient 1/12/2022   How often do you dine out? Rarely.   Where do you dine out? (select all that apply) sit-down restaurants   What types of food do you order when you dine out? Burger or pasta       Physical Activity Reviewed With Patient 1/12/2022   How often do you exercise? 1 to 2 times per week   What is the duration of your exercise (in minutes)? 20 Minutes   What types of exercise do you do? other   What keeps you from being more active?  Pain, Too tired       NUTRITION DIAGNOSIS:  Obesity r/t long history of positive energy balance aeb BMI >30 kg/m2.    INTERVENTION:  Intervention Provided/Education Provided on post-op diet guidelines, vitamins/minerals essential post-operatively, GI anatomy of bariatric surgeries, ways to help prepare for post-op diet guidelines pre-operatively, portion/calorie-control, mindful eating and sources of protein.  Patient demonstrates understanding. Provided pt with list of goals RD contact information.      Questions Reviewed With Patient 1/12/2022   How ready are you to make changes regarding your weight? Number 1 = Not ready at all to make changes up to 10 = very ready. 10   How confident are you that you can change? 1 = Not confident that you will be successful making changes up to 10 = very  confident. 7       Expected Engagement: good    GOALS:  1) Increase physical activity as able   -at least 2x per week  2) Review handouts below    Meal Replacement Shake Options:   *Protein Shake Criteria: no more than 210 Calories, at least 20 grams of protein, and less than 10 grams of sugar   [facility] smoothie (160 Calories, 20 g protein)   Premier Protein (160 Calories, 30 g protein)  Slim Fast Advanced Nutrition (180 Calories, 20 g protein)  Muscle Milk, lactose-free, 17 oz bottle (210 Calories, 30 g protein)  Integrated Supplements, no artificial sugars (110 Calories, 20 g protein)  Genepro, unflavored protein powder (60 Calories, 30 g protein)  Boost/Ensure Max (160 calories, 30 gm protein)   Fairlife Core Power (170 calories, 26 gm protein)    Meal Replacement Bar Options:  [facility] Protein Shake (160 Calories, 15 g protein)  Quest Protein Bars (190 Calories, 20 g protein)  Built Bar (170 Calories, 15-20 g protein)  One Protein Bar (210 calories, 20 g protein)  [place] Signature Protein Bar ([employer]) (190 Calories, 21 g protein)  Pure Protein Bars (180 Calories, 21 g protein)    Low Calorie Frozen Meal:  Healthy Choice Power Bowls  Lean Cuisine  Smart Ones  Leonel Sotelo    Protein Sources for Weight Loss  [email or web address]     Carbohydrates  [email or web address]     Mindful Eating  [email or web address]     Summary of Volumetrics Eating Plan  [email or web address]     Diet Guidelines after Weight Loss Surgery  [email or web address]     Seated Exercises for Arms and Legs (can be done before or after surgery)  [email or web address]    Surgery Related Nutrition Handouts:    Diet Guidelines after Weight Loss Surgery  [email or web address]     Lifestyle Changes Before and After Weight Loss Surgery: [place] for Success  [email or web address]     Modified Liquid Diet (2 liquid meals, 1 meal, 2  snacks)  [email or web address]     Your Stage 1 Diet: Clear Liquids  [email or web address]     Your Stage 2 Diet: Low-fat Full Liquids  [email or web address]     Your Stage 3 Diet: Pureed Foods  [email or web address]     Your Stage 4 Diet: Soft Foods  [email or web address]    Your Stage 5 Diet: Regular Foods  [email or web address]    Supplements after Weight Loss Surgery  [email or web address]     Keeping Track of Fluids  [email or web address]    Why Take Supplements for Life after Weight Loss Surgery  [email or web address]     Keeping Up with Your Diet after Weight Loss Surgery  [email or web address]    Preventing Low Blood Sugar after Weight Loss Surgery  [email or web address]     Preventing Dumping Syndrome after Weight Loss Surgery  [email or web address]      Follow up: 1 month, prn     Time spent with patient: 21 minutes.  MARCIE GLOVER RD, LD

## 2022-03-24 NOTE — PATIENT INSTRUCTIONS
Avoidance of NSAID type medications (including aspirin, ibuprofen, Motrin, Plavix, Naproxen etc...) and fish oil for at least 7 days prior to procedure    You are able to take tylenol the day prior to the procedure but not the day of    For medications day of procedure, you can take them after the procedure is complete, otherwise hold them before.              Preparing for Your Surgery  Getting started  A nurse will call you to review your health history and instructions. They will give you an arrival time based on your scheduled surgery time. Please be ready to share:    Your doctor's clinic name and phone number    Your medical, surgical and anesthesia history    A list of allergies and sensitivities    A list of medicines, including herbal treatments and over-the-counter drugs    Whether the patient has a legal guardian (ask how to send us the papers in advance)  Please tell us if you're pregnant--or if there's any chance you might be pregnant. Some surgeries may injure a fetus (unborn baby), so they require a pregnancy test. Surgeries that are safe for a fetus don't always need a test, and you can choose whether to have one.   If you have a child who's having surgery, please ask for a copy of Preparing for Your Child's Surgery.    Preparing for surgery  1. Within 30 days of surgery: Have a pre-op exam (sometimes called an H&P, or History and Physical). This can be done at a clinic or pre-operative center.  ? If you're having a , you may not need this exam. Talk to your care team.  2. At your pre-op exam, talk to your care team about all medicines you take. If you need to stop any medicines before surgery, ask when to start taking them again.  ? We do this for your safety. Many medicines can make you bleed too much during surgery. Some change how well surgery (anesthesia) drugs work.  3. Call your insurance company to let them know you're having surgery. (If you don't have insurance, call  [phone].)  4. Call your clinic if there's any change in your health. This includes signs of a cold or flu (sore throat, runny nose, cough, rash, fever). It also includes a scrape or scratch near the surgery site.  5. If you have questions on the day of surgery, call your hospital or surgery center.  COVID testing  You may need to be tested for COVID-19 before having surgery. If so, your surgical team will give you instructions for scheduling this test, separate from your preoperative history and physical.  Eating and drinking guidelines  For your safety: Unless your surgeon tells you otherwise, follow the guidelines below.    Eat and drink as usual until 8 hours before surgery. After that, no food or milk.    Drink clear liquids until 2 hours before surgery. These are liquids you can see through, like water, Gatorade and Propel Water. You may also have black coffee and tea (no cream or milk).    Nothing by mouth within 2 hours of surgery. This includes gum, candy and breath mints.    If you drink alcohol: Stop drinking it the night before surgery.    If your care team tells you to take medicine on the morning of surgery, it's okay to take it with a sip of water.  Preventing infection  1. Shower or bathe the night before and morning of your surgery. Follow the instructions your clinic gave you. (If no instructions, use regular soap.)  2. Don't shave or clip hair near your surgery site. We'll remove the hair if needed.  3. Don't smoke or vape the morning of surgery. You may chew nicotine gum up to 2 hours before surgery. A nicotine patch is okay.  ? Note: Some surgeries require you to completely quit smoking and nicotine. Check with your surgeon.  4. Your care team will make every effort to keep you safe from infection. We will:  ? Clean our hands often with soap and water (or an alcohol-based hand rub).  ? Clean the skin at your surgery site with a special soap that kills germs.  ? Give you a special gown to  keep you warm. (Cold raises the risk of infection.)  ? Wear special hair covers, masks, gowns and gloves during surgery.  ? Give antibiotic medicine, if prescribed. Not all surgeries need antibiotics.  What to bring on the day of surgery  1. Photo ID and insurance card  2. Copy of your health care directive, if you have one  3. Glasses and hearing aides (bring cases)  ? You can't wear contacts during surgery  4. Inhaler and eye drops, if you use them (tell us about these when you arrive)  5. CPAP machine or breathing device, if you use them  6. A few personal items, if spending the night  7. If you have . . .  ? A pacemaker, ICD (cardiac defibrillator) or other implant: Bring the ID card.  ? An implanted stimulator: Bring the remote control.  ? A legal guardian: Bring a copy of the certified (court-stamped) guardianship papers.  Please remove any jewelry, including body piercings. Leave jewelry and other valuables at home.  If you're going home the day of surgery    You must have a responsible adult drive you home. They should stay with you overnight as well.    If you don't have someone to stay with you, and you aren't safe to go home alone, we may keep you overnight. Insurance often won't pay for this.  After surgery  If it's hard to control your pain or you need more pain medicine, please call your surgeon's office.  Questions?   If you have any questions for your care team, list them here: _________________________________________________________________________________________________________________________________________________________________________ ____________________________________ ____________________________________ ____________________________________  For informational purposes only. Not to replace the advice of your health care provider. Copyright   2003, 2019 [employer]. All rights reserved. Clinically reviewed by Ashlee Pretty MD. [employer] [id number] - REV 07/21.    Preparing for Your  Surgery  Getting started  A nurse will call you to review your health history and instructions. They will give you an arrival time based on your scheduled surgery time. Please be ready to share:    Your doctor's clinic name and phone number    Your medical, surgical and anesthesia history    A list of allergies and sensitivities    A list of medicines, including herbal treatments and over-the-counter drugs    Whether the patient has a legal guardian (ask how to send us the papers in advance)  Please tell us if you're pregnant--or if there's any chance you might be pregnant. Some surgeries may injure a fetus (unborn baby), so they require a pregnancy test. Surgeries that are safe for a fetus don't always need a test, and you can choose whether to have one.   If you have a child who's having surgery, please ask for a copy of Preparing for Your Child's Surgery.    Preparing for surgery  6. Within 30 days of surgery: Have a pre-op exam (sometimes called an H&P, or History and Physical). This can be done at a clinic or pre-operative center.  ? If you're having a , you may not need this exam. Talk to your care team.  7. At your pre-op exam, talk to your care team about all medicines you take. If you need to stop any medicines before surgery, ask when to start taking them again.  ? We do this for your safety. Many medicines can make you bleed too much during surgery. Some change how well surgery (anesthesia) drugs work.  8. Call your insurance company to let them know you're having surgery. (If you don't have insurance, call [phone].)  9. Call your clinic if there's any change in your health. This includes signs of a cold or flu (sore throat, runny nose, cough, rash, fever). It also includes a scrape or scratch near the surgery site.  10. If you have questions on the day of surgery, call your hospital or surgery center.  COVID testing  You may need to be tested for COVID-19 before having surgery. If so, your  surgical team will give you instructions for scheduling this test, separate from your preoperative history and physical.  Eating and drinking guidelines  For your safety: Unless your surgeon tells you otherwise, follow the guidelines below.    Eat and drink as usual until 8 hours before surgery. After that, no food or milk.    Drink clear liquids until 2 hours before surgery. These are liquids you can see through, like water, Gatorade and Propel Water. You may also have black coffee and tea (no cream or milk).    Nothing by mouth within 2 hours of surgery. This includes gum, candy and breath mints.    If you drink alcohol: Stop drinking it the night before surgery.    If your care team tells you to take medicine on the morning of surgery, it's okay to take it with a sip of water.  Preventing infection  5. Shower or bathe the night before and morning of your surgery. Follow the instructions your clinic gave you. (If no instructions, use regular soap.)  6. Don't shave or clip hair near your surgery site. We'll remove the hair if needed.  7. Don't smoke or vape the morning of surgery. You may chew nicotine gum up to 2 hours before surgery. A nicotine patch is okay.  ? Note: Some surgeries require you to completely quit smoking and nicotine. Check with your surgeon.  8. Your care team will make every effort to keep you safe from infection. We will:  ? Clean our hands often with soap and water (or an alcohol-based hand rub).  ? Clean the skin at your surgery site with a special soap that kills germs.  ? Give you a special gown to keep you warm. (Cold raises the risk of infection.)  ? Wear special hair covers, masks, gowns and gloves during surgery.  ? Give antibiotic medicine, if prescribed. Not all surgeries need antibiotics.  What to bring on the day of surgery  8. Photo ID and insurance card  9. Copy of your health care directive, if you have one  10. Glasses and hearing aides (bring cases)  ? You can't wear contacts  during surgery  11. Inhaler and eye drops, if you use them (tell us about these when you arrive)  12. CPAP machine or breathing device, if you use them  13. A few personal items, if spending the night  14. If you have . . .  ? A pacemaker, ICD (cardiac defibrillator) or other implant: Bring the ID card.  ? An implanted stimulator: Bring the remote control.  ? A legal guardian: Bring a copy of the certified (court-stamped) guardianship papers.  Please remove any jewelry, including body piercings. Leave jewelry and other valuables at home.  If you're going home the day of surgery    You must have a responsible adult drive you home. They should stay with you overnight as well.    If you don't have someone to stay with you, and you aren't safe to go home alone, we may keep you overnight. Insurance often won't pay for this.  After surgery  If it's hard to control your pain or you need more pain medicine, please call your surgeon's office.  Questions?   If you have any questions for your care team, list them here: _________________________________________________________________________________________________________________________________________________________________________ ____________________________________ ____________________________________ ____________________________________  For informational purposes only. Not to replace the advice of your health care provider. Copyright   2003, 2019 [facility]. All rights reserved. Clinically reviewed by Ashlee Pretty MD. [employer] [id number] - REV 07/21.

## 2022-03-25 ENCOUNTER — OFFICE VISIT (OUTPATIENT)
Dept: FAMILY MEDICINE | Facility: OTHER | Age: 22
End: 2022-03-25
Payer: COMMERCIAL

## 2022-03-25 ENCOUNTER — TELEPHONE (OUTPATIENT)
Dept: ENDOCRINOLOGY | Facility: CLINIC | Age: 22
End: 2022-03-25

## 2022-03-25 ENCOUNTER — TELEPHONE (OUTPATIENT)
Dept: SURGERY | Facility: CLINIC | Age: 22
End: 2022-03-25

## 2022-03-25 VITALS
OXYGEN SATURATION: 97 % | TEMPERATURE: 97 F | DIASTOLIC BLOOD PRESSURE: 90 MMHG | HEIGHT: 68 IN | SYSTOLIC BLOOD PRESSURE: 142 MMHG | HEART RATE: 87 BPM | WEIGHT: 293 LBS | RESPIRATION RATE: 18 BRPM | BODY MASS INDEX: 44.41 KG/M2

## 2022-03-25 DIAGNOSIS — Z01.818 PREOP GENERAL PHYSICAL EXAM: Primary | ICD-10-CM

## 2022-03-25 DIAGNOSIS — I10 ESSENTIAL HYPERTENSION: ICD-10-CM

## 2022-03-25 DIAGNOSIS — K21.9 GASTROESOPHAGEAL REFLUX DISEASE WITHOUT ESOPHAGITIS: ICD-10-CM

## 2022-03-25 DIAGNOSIS — E66.01 MORBID OBESITY (H): ICD-10-CM

## 2022-03-25 LAB
ALBUMIN UR-MCNC: NEGATIVE MG/DL
APPEARANCE UR: CLEAR
BILIRUB UR QL STRIP: NEGATIVE
COLOR UR AUTO: YELLOW
ERYTHROCYTE [DISTWIDTH] IN BLOOD BY AUTOMATED COUNT: 14.1 % (ref 10–15)
GLUCOSE UR STRIP-MCNC: NEGATIVE MG/DL
HBA1C MFR BLD: 5.1 % (ref 0–5.6)
HCT VFR BLD AUTO: 42.4 % (ref 35–47)
HGB BLD-MCNC: 14.2 G/DL (ref 11.7–15.7)
HGB UR QL STRIP: NEGATIVE
KETONES UR STRIP-MCNC: NEGATIVE MG/DL
LEUKOCYTE ESTERASE UR QL STRIP: NEGATIVE
MCH RBC QN AUTO: 28.3 PG (ref 26.5–33)
MCHC RBC AUTO-ENTMCNC: 33.5 G/DL (ref 31.5–36.5)
MCV RBC AUTO: 85 FL (ref 78–100)
NITRATE UR QL: NEGATIVE
PH UR STRIP: 6.5 [PH] (ref 5–7)
PLATELET # BLD AUTO: 292 10E3/UL (ref 150–450)
RBC # BLD AUTO: 5.01 10E6/UL (ref 3.8–5.2)
SARS-COV-2 RNA RESP QL NAA+PROBE: NEGATIVE
SP GR UR STRIP: 1.02 (ref 1–1.03)
UROBILINOGEN UR STRIP-ACNC: 0.2 E.U./DL
WBC # BLD AUTO: 9.3 10E3/UL (ref 4–11)

## 2022-03-25 PROCEDURE — 83036 HEMOGLOBIN GLYCOSYLATED A1C: CPT | Performed by: STUDENT IN AN ORGANIZED HEALTH CARE EDUCATION/TRAINING PROGRAM

## 2022-03-25 PROCEDURE — 81003 URINALYSIS AUTO W/O SCOPE: CPT | Performed by: STUDENT IN AN ORGANIZED HEALTH CARE EDUCATION/TRAINING PROGRAM

## 2022-03-25 PROCEDURE — 36415 COLL VENOUS BLD VENIPUNCTURE: CPT | Performed by: STUDENT IN AN ORGANIZED HEALTH CARE EDUCATION/TRAINING PROGRAM

## 2022-03-25 PROCEDURE — 99214 OFFICE O/P EST MOD 30 MIN: CPT | Performed by: STUDENT IN AN ORGANIZED HEALTH CARE EDUCATION/TRAINING PROGRAM

## 2022-03-25 PROCEDURE — 85027 COMPLETE CBC AUTOMATED: CPT | Performed by: STUDENT IN AN ORGANIZED HEALTH CARE EDUCATION/TRAINING PROGRAM

## 2022-03-25 ASSESSMENT — PAIN SCALES - GENERAL: PAINLEVEL: MODERATE PAIN (4)

## 2022-03-25 NOTE — TELEPHONE ENCOUNTER
Left  message stating patient's endoscopy start time has been moved up on 3/28 from 11:30 to 9:30 a.m., to arrive at 7:30 a.m. Asked patient to call me back to let me know she got this message.

## 2022-03-25 NOTE — TELEPHONE ENCOUNTER
Called patient to let her know her endoscopy on 3/28 has been changed to begin at 12:40 p.m., to arrive at 10:40 a.m.m

## 2022-03-28 ENCOUNTER — HOSPITAL ENCOUNTER (OUTPATIENT)
Facility: CLINIC | Age: 22
Discharge: HOME | End: 2022-03-28
Attending: SURGERY | Admitting: SURGERY
Payer: COMMERCIAL

## 2022-03-28 ENCOUNTER — ANESTHESIA (OUTPATIENT)
Dept: SURGERY | Facility: CLINIC | Age: 22
End: 2022-03-28
Payer: COMMERCIAL

## 2022-03-28 ENCOUNTER — ANESTHESIA EVENT (OUTPATIENT)
Dept: SURGERY | Facility: CLINIC | Age: 22
End: 2022-03-28
Payer: COMMERCIAL

## 2022-03-28 VITALS
HEART RATE: 88 BPM | BODY MASS INDEX: 45.99 KG/M2 | DIASTOLIC BLOOD PRESSURE: 71 MMHG | HEIGHT: 67 IN | RESPIRATION RATE: 14 BRPM | WEIGHT: 293 LBS | SYSTOLIC BLOOD PRESSURE: 137 MMHG | OXYGEN SATURATION: 97 % | TEMPERATURE: 98.2 F

## 2022-03-28 DIAGNOSIS — K21.00 GASTROESOPHAGEAL REFLUX DISEASE WITH ESOPHAGITIS, UNSPECIFIED WHETHER HEMORRHAGE: ICD-10-CM

## 2022-03-28 LAB
GLUCOSE BLDC GLUCOMTR-MCNC: 88 MG/DL (ref 70–99)
HCG UR QL: NEGATIVE

## 2022-03-28 PROCEDURE — 272N000001 HC OR GENERAL SUPPLY STERILE: Performed by: SURGERY

## 2022-03-28 PROCEDURE — 360N000082 HC SURGERY LEVEL 2 W/ FLUORO, PER MIN: Performed by: SURGERY

## 2022-03-28 PROCEDURE — 710N000012 HC RECOVERY PHASE 2, PER MINUTE: Performed by: SURGERY

## 2022-03-28 PROCEDURE — 370N000017 HC ANESTHESIA TECHNICAL FEE, PER MIN: Performed by: SURGERY

## 2022-03-28 PROCEDURE — 258N000003 HC RX IP 258 OP 636: Performed by: NURSE ANESTHETIST, CERTIFIED REGISTERED

## 2022-03-28 PROCEDURE — 81025 URINE PREGNANCY TEST: CPT | Performed by: ANESTHESIOLOGY

## 2022-03-28 PROCEDURE — 43235 EGD DIAGNOSTIC BRUSH WASH: CPT | Performed by: SURGERY

## 2022-03-28 PROCEDURE — 250N000009 HC RX 250: Performed by: NURSE ANESTHETIST, CERTIFIED REGISTERED

## 2022-03-28 PROCEDURE — 999N000141 HC STATISTIC PRE-PROCEDURE NURSING ASSESSMENT: Performed by: SURGERY

## 2022-03-28 PROCEDURE — 250N000011 HC RX IP 250 OP 636: Performed by: NURSE ANESTHETIST, CERTIFIED REGISTERED

## 2022-03-28 PROCEDURE — 82962 GLUCOSE BLOOD TEST: CPT

## 2022-03-28 RX ORDER — FENTANYL CITRATE 50 UG/ML
25 INJECTION, SOLUTION INTRAMUSCULAR; INTRAVENOUS
Status: DISCONTINUED | OUTPATIENT
Start: 2022-03-28 | End: 2022-03-28 | Stop reason: HOSPADM

## 2022-03-28 RX ORDER — MEPERIDINE HYDROCHLORIDE 25 MG/ML
12.5 INJECTION INTRAMUSCULAR; INTRAVENOUS; SUBCUTANEOUS
Status: DISCONTINUED | OUTPATIENT
Start: 2022-03-28 | End: 2022-03-28 | Stop reason: HOSPADM

## 2022-03-28 RX ORDER — LIDOCAINE HYDROCHLORIDE 20 MG/ML
INJECTION, SOLUTION INFILTRATION; PERINEURAL PRN
Status: DISCONTINUED | OUTPATIENT
Start: 2022-03-28 | End: 2022-03-28

## 2022-03-28 RX ORDER — PROPOFOL 10 MG/ML
INJECTION, EMULSION INTRAVENOUS CONTINUOUS PRN
Status: DISCONTINUED | OUTPATIENT
Start: 2022-03-28 | End: 2022-03-28

## 2022-03-28 RX ORDER — PROPOFOL 10 MG/ML
INJECTION, EMULSION INTRAVENOUS PRN
Status: DISCONTINUED | OUTPATIENT
Start: 2022-03-28 | End: 2022-03-28

## 2022-03-28 RX ORDER — SODIUM CHLORIDE, SODIUM LACTATE, POTASSIUM CHLORIDE, CALCIUM CHLORIDE 600; 310; 30; 20 MG/100ML; MG/100ML; MG/100ML; MG/100ML
INJECTION, SOLUTION INTRAVENOUS CONTINUOUS PRN
Status: DISCONTINUED | OUTPATIENT
Start: 2022-03-28 | End: 2022-03-28

## 2022-03-28 RX ORDER — HYDROMORPHONE HCL IN WATER/PF 6 MG/30 ML
0.2 PATIENT CONTROLLED ANALGESIA SYRINGE INTRAVENOUS EVERY 5 MIN PRN
Status: DISCONTINUED | OUTPATIENT
Start: 2022-03-28 | End: 2022-03-28 | Stop reason: HOSPADM

## 2022-03-28 RX ORDER — OXYCODONE HYDROCHLORIDE 5 MG/1
5 TABLET ORAL
Status: DISCONTINUED | OUTPATIENT
Start: 2022-03-28 | End: 2022-03-28 | Stop reason: HOSPADM

## 2022-03-28 RX ORDER — CEFAZOLIN SODIUM IN 0.9 % NACL 3 G/100 ML
3 INTRAVENOUS SOLUTION, PIGGYBACK (ML) INTRAVENOUS
Status: DISCONTINUED | OUTPATIENT
Start: 2022-03-28 | End: 2022-03-28 | Stop reason: HOSPADM

## 2022-03-28 RX ORDER — ONDANSETRON 4 MG/1
4 TABLET, ORALLY DISINTEGRATING ORAL
Status: DISCONTINUED | OUTPATIENT
Start: 2022-03-28 | End: 2022-03-28 | Stop reason: HOSPADM

## 2022-03-28 RX ORDER — FENTANYL CITRATE 50 UG/ML
25 INJECTION, SOLUTION INTRAMUSCULAR; INTRAVENOUS EVERY 5 MIN PRN
Status: DISCONTINUED | OUTPATIENT
Start: 2022-03-28 | End: 2022-03-28 | Stop reason: HOSPADM

## 2022-03-28 RX ORDER — SODIUM CHLORIDE, SODIUM LACTATE, POTASSIUM CHLORIDE, CALCIUM CHLORIDE 600; 310; 30; 20 MG/100ML; MG/100ML; MG/100ML; MG/100ML
INJECTION, SOLUTION INTRAVENOUS CONTINUOUS
Status: DISCONTINUED | OUTPATIENT
Start: 2022-03-28 | End: 2022-03-28 | Stop reason: HOSPADM

## 2022-03-28 RX ORDER — ONDANSETRON 4 MG/1
4 TABLET, ORALLY DISINTEGRATING ORAL EVERY 30 MIN PRN
Status: DISCONTINUED | OUTPATIENT
Start: 2022-03-28 | End: 2022-03-28 | Stop reason: HOSPADM

## 2022-03-28 RX ORDER — ACETAMINOPHEN 325 MG/1
650 TABLET ORAL
Status: DISCONTINUED | OUTPATIENT
Start: 2022-03-28 | End: 2022-03-28 | Stop reason: HOSPADM

## 2022-03-28 RX ORDER — ONDANSETRON 2 MG/ML
4 INJECTION INTRAMUSCULAR; INTRAVENOUS EVERY 30 MIN PRN
Status: DISCONTINUED | OUTPATIENT
Start: 2022-03-28 | End: 2022-03-28 | Stop reason: HOSPADM

## 2022-03-28 RX ORDER — CEFAZOLIN SODIUM IN 0.9 % NACL 3 G/100 ML
3 INTRAVENOUS SOLUTION, PIGGYBACK (ML) INTRAVENOUS SEE ADMIN INSTRUCTIONS
Status: DISCONTINUED | OUTPATIENT
Start: 2022-03-28 | End: 2022-03-28 | Stop reason: HOSPADM

## 2022-03-28 RX ORDER — OXYCODONE HYDROCHLORIDE 5 MG/1
5 TABLET ORAL EVERY 4 HOURS PRN
Status: DISCONTINUED | OUTPATIENT
Start: 2022-03-28 | End: 2022-03-28 | Stop reason: HOSPADM

## 2022-03-28 RX ADMIN — PROPOFOL 30 MG: 10 INJECTION, EMULSION INTRAVENOUS at 13:13

## 2022-03-28 RX ADMIN — PROPOFOL 150 MCG/KG/MIN: 10 INJECTION, EMULSION INTRAVENOUS at 13:04

## 2022-03-28 RX ADMIN — SODIUM CHLORIDE, POTASSIUM CHLORIDE, SODIUM LACTATE AND CALCIUM CHLORIDE: 600; 310; 30; 20 INJECTION, SOLUTION INTRAVENOUS at 12:50

## 2022-03-28 RX ADMIN — TOPICAL ANESTHETIC 1 SPRAY: 200 SPRAY DENTAL; PERIODONTAL at 13:06

## 2022-03-28 RX ADMIN — LIDOCAINE HYDROCHLORIDE 100 MG: 20 INJECTION, SOLUTION INFILTRATION; PERINEURAL at 13:07

## 2022-03-28 RX ADMIN — PROPOFOL 30 MG: 10 INJECTION, EMULSION INTRAVENOUS at 13:08

## 2022-03-28 NOTE — ANESTHESIA PREPROCEDURE EVALUATION
Anesthesia Pre-Procedure Evaluation    Patient: Sasha Hand   MRN: [id number] : 2000        Procedure : Procedure(s):  ESOPHAGOGASTRODUODENOSCOPY (EGD)          Past Medical History:   Diagnosis Date     Closed head injury, subsequent encounter 2018     History of PCR DNA positive for HSV1      Moderate major depression (H) 2014     Nexplanon placed 17     Nexplanon placed 17 (removed 2020) 2017     Nexplanon placed 2021     Obesity 2010     Pyelonephritis 2018     Skin tag (right collar line and left labial region 2019     Tobacco use disorder 2016     Uncomplicated asthma       Past Surgical History:   Procedure Laterality Date     ARTHROSCOPY KNEE WITH MENISCAL REPAIR Right 5/10/2017    Procedure: ARTHROSCOPY KNEE WITH MENISCAL REPAIR;  arthroscopy right knee with lateral meniscus repair;  Surgeon: Nathaniel Hicks MD;  Location: [facility]      Allergies   Allergen Reactions     No Known Drug Allergies      Seasonal Allergies      Steri Strips      Got boils on incision after knee surgery from steri strips      Social History     Tobacco Use     Smoking status: Former Smoker     Packs/day: 1.00     Types: Cigarettes     Quit date: 2021     Years since quittin.1     Smokeless tobacco: Former User   Substance Use Topics     Alcohol use: No     Alcohol/week: 0.0 standard drinks      Wt Readings from Last 1 Encounters:   22 (!) 160.9 kg (354 lb 11.5 oz)        Anesthesia Evaluation   Pt has had prior anesthetic.         ROS/MED HX  ENT/Pulmonary:     (+) Intermittent, asthma Last exacerbation: 4 months ago,  Treatment: Inhaler prn,      Neurologic:       Cardiovascular:     (+) hypertension-----    METS/Exercise Tolerance:     Hematologic:       Musculoskeletal:       GI/Hepatic:     (+) GERD, Asymptomatic on medication,     Renal/Genitourinary:       Endo:     (+) Obesity,     Psychiatric/Substance Use:     (+)  psychiatric history depression     Infectious Disease:       Malignancy:       Other:            Physical Exam    Airway        Mallampati: I   TM distance: > 3 FB   Neck ROM: full   Mouth opening: > 3 cm    Respiratory Devices and Support         Dental  no notable dental history         Cardiovascular   cardiovascular exam normal          Pulmonary   pulmonary exam normal                OUTSIDE LABS:  CBC:   Lab Results   Component Value Date    WBC 9.3 03/25/2022    WBC 8.6 01/20/2022    HGB 14.2 03/25/2022    HGB 14.6 01/20/2022    HCT 42.4 03/25/2022    HCT 43.1 01/20/2022     03/25/2022     01/20/2022     BMP:   Lab Results   Component Value Date     01/27/2022     01/20/2022    POTASSIUM 3.9 01/27/2022    POTASSIUM 3.8 01/20/2022    CHLORIDE 112 (H) 01/27/2022    CHLORIDE 111 (H) 01/20/2022    CO2 23 01/27/2022    CO2 24 01/20/2022    BUN 8 01/27/2022    BUN 12 01/20/2022    CR 0.50 (L) 01/27/2022    CR 0.54 01/20/2022    GLC 88 03/28/2022    GLC 92 01/27/2022     COAGS: No results found for: PTT, INR, FIBR  POC:   Lab Results   Component Value Date    HCG Negative 12/14/2021    HCGS Negative 04/22/2021     HEPATIC:   Lab Results   Component Value Date    ALBUMIN 3.7 01/20/2022    PROTTOTAL 7.5 01/20/2022    ALT 96 (H) 01/20/2022    AST 36 01/20/2022    ALKPHOS 59 01/20/2022    BILITOTAL 0.4 01/20/2022     OTHER:   Lab Results   Component Value Date    A1C 5.1 03/25/2022    DELIA 9.7 01/27/2022    LIPASE 86 05/31/2018    TSH 3.10 06/24/2021    CRP <2.9 05/31/2018       Anesthesia Plan    ASA Status:  3   NPO Status:  NPO Appropriate    Anesthesia Type: MAC.     - Reason for MAC: straight local not clinically adequate, immobility needed   Induction: Intravenous.   Maintenance: TIVA.        Consents    Anesthesia Plan(s) and associated risks, benefits, and realistic alternatives discussed. Questions answered and patient/representative(s) expressed understanding.    - Discussed:     -  Discussed with:  Patient         Postoperative Care    Pain management: IV analgesics.   PONV prophylaxis: Dexamethasone or Solumedrol, Ondansetron (or other 5HT-3), Background Propofol Infusion     Comments:                ELLIE GRAVES MD

## 2022-03-28 NOTE — ANESTHESIA CARE TRANSFER NOTE
Patient: Sasha Hand    Procedure: Procedure(s):  ESOPHAGOGASTRODUODENOSCOPY (EGD)       Diagnosis: Gastroesophageal reflux disease with esophagitis, unspecified whether hemorrhage [K21.00]  Diagnosis Additional Information: No value filed.    Anesthesia Type:   General     Note:    Oropharynx: oropharynx clear of all foreign objects  Level of Consciousness: awake  Oxygen Supplementation: nasal cannula    Independent Airway: airway patency satisfactory and stable  Dentition: dentition unchanged  Vital Signs Stable: post-procedure vital signs reviewed and stable  Report to RN Given: handoff report given  Patient transferred to: PACU    Handoff Report: Identifed the Patient, Identified the Reponsible Provider, Reviewed the pertinent medical history, Discussed the surgical course, Reviewed Intra-OP anesthesia mangement and issues during anesthesia, Set expectations for post-procedure period and Allowed opportunity for questions and acknowledgement of understanding      Vitals:  Vitals Value Taken Time   BP     Temp     Pulse     Resp     SpO2         Electronically Signed By: SAVI Sotelo CRNA  March 28, 2022  1:24 PM

## 2022-03-28 NOTE — OP NOTE
"[facility]    Operative Note    Pre-operative diagnosis: Gastroesophageal reflux disease with esophagitis, unspecified whether hemorrhage [K21.00]   Post-operative diagnosis Same   Procedure: Procedure(s):  ESOPHAGOGASTRODUODENOSCOPY (EGD)   Surgeon: Surgeon(s) and Role:     * Joseph Mata MD - Primary   Anesthesia: General    Estimated blood loss: None   Drains: None   Specimens: * No specimens in log *   Findings: Small hiatal hernia   Complications: None.   Implants: None.     COMORBIDITIES:   Past Medical History:   Diagnosis Date     Closed head injury, subsequent encounter 4/5/2018     History of PCR DNA positive for HSV1      Moderate major depression (H) 1/20/2014     Nexplanon placed 11/14/17 11/14/2017     Nexplanon placed 11/14/17 (removed 1/16/2020) 11/14/2017     Nexplanon placed 12/14/2021 12/14/2021     Obesity 9/14/2010     Pyelonephritis 2/19/2018     Skin tag (right collar line and left labial region 11/21/2019     Tobacco use disorder 5/4/2016     Uncomplicated asthma        OPERATIVE INDICATIONS: Sasha Hand is a 21 year old female who is morbidly obese with h/o GERD undergoing evaluation for lap sleeve gastrectomy.  she has attempted to lose weight numerous times without success.    Height: 170.2 cm (5' 7\") Weight: (!) 160.9 kg (354 lb 11.5 oz) Body mass index is 55.56 kg/m ..  After understanding the risks and benefits of proceeding with EGD, she agreed to the procedure.    OPERATIVE PROCEDURE:    A timeout procedure was performed.  The patient was positioned in supine position and the nurse monitored vital signs, the pharynx was sprayed with hurricane spray, and conscious sedation was administered with propofol.  Oral bite block placed and endoscope (28Fr) introduced orally and advanced through esophagus, stomach, and to the jejunum. Normal appearing esophagus, stomach and duodenum. Small hiatal hernia was seen. No biopsies.    I was " present for all critical components of the operation and all needle and sponge counts were correct x2 at the end of the procedure.    Joseph Mata MD  Surgery  [phone] (hospital )        Kayleigh Rodriguez MD PGY-4

## 2022-03-28 NOTE — DISCHARGE INSTRUCTIONS
[facility]  Same-Day Surgery   Adult Discharge Orders & Instructions     For 24 hours after surgery    1. Get plenty of rest.  A responsible adult must stay with you for at least 24 hours after you leave the hospital.   2. Do not drive or use heavy equipment.  If you have weakness or tingling, don't drive or use heavy equipment until this feeling goes away.  3. Do not drink alcohol.  4. Avoid strenuous or risky activities.  Ask for help when climbing stairs.   5. You may feel lightheaded.  IF so, sit for a few minutes before standing.  Have someone help you get up.   6. If you have nausea (feel sick to your stomach): Drink only clear liquids such as apple juice, ginger ale, broth or 7-Up.  Rest may also help.  Be sure to drink enough fluids.  Move to a regular diet as you feel able.  7. You may have a slight fever. Call the doctor if your fever is over 100 F (37.7 C) (taken under the tongue) or lasts longer than 24 hours.  8. You may have a dry mouth, a sore throat, muscle aches or trouble sleeping.  These should go away after 24 hours.  9. Do not make important or legal decisions.   Call your doctor for any of the followin.  Signs of infection (fever, growing tenderness at the surgery site, a large amount of drainage or bleeding, severe pain, foul-smelling drainage, redness, swelling).    2. It has been over 8 to 10 hours since surgery and you are still not able to urinate (pass water).    3.  Headache for over 24 hours.      To contact a doctor, call Dr Mata's clinic at [phone] or:        [phone] and ask for the resident on call for BARIATRIC SURGERY (answered 24 hours a day)      Emergency Department:    [facility]: [phone]       (TTY for hearing impaired: [phone])    [facility]: [phone]       (TTY for hearing impaired: [phone])

## 2022-03-28 NOTE — ANESTHESIA POSTPROCEDURE EVALUATION
Patient: Sasha Hand    Procedure: Procedure(s):  ESOPHAGOGASTRODUODENOSCOPY (EGD)       Anesthesia Type:  General    Note:  Disposition: Outpatient   Postop Pain Control:    PONV:    Neuro/Psych:    Airway/Respiratory:    CV/Hemodynamics:    Other NRE:    DID A NON-ROUTINE EVENT OCCUR?            Last vitals:  Vitals Value Taken Time   BP     Temp     Pulse     Resp     SpO2         Electronically Signed By: ELLIE GRAVES MD  March 28, 2022  1:30 PM

## 2022-03-28 NOTE — OR NURSING
3C 25. Peace. Pt would like to discuss risks and benefits of today's procedure before signing consent. Dr. Schneider' 1240 EGD. Thanks. Winsome SANABRIA [phone]

## 2022-03-30 ENCOUNTER — PATIENT OUTREACH (OUTPATIENT)
Dept: SURGERY | Facility: CLINIC | Age: 22
End: 2022-03-30
Payer: COMMERCIAL

## 2022-03-30 NOTE — PROGRESS NOTES
Sasha Hand is a patient of Dr. Madhu Mata that underwent EGD for bariatric surgery planning approximately 2 days ago (2/28).  Attempted to contact patient via telephone for a status update and review post-op  teaching.  LM on VM to call office.  Await return call.      Of note:  Follow-up:  Per previous plan  Restrictions:  - No activity restrictions  New medications:  None  Equipment/Supplies:  None  Diet:  Tolerating regular diet?    Small Hiatal Hernia identified

## 2022-03-31 NOTE — PROGRESS NOTES
Attempted to contact patient x 2 for post procedure telephone call/status update.  LM on VM to call office-contact information provided.

## 2022-04-13 NOTE — PROGRESS NOTES
"Sasha Hand is a 21 year old female who is being evaluated via a billable video visit.      The patient has been notified of following:     \"This video visit will be conducted via a call between you and your physician/provider. We have found that certain health care needs can be provided without the need for an in-person physical exam.  This service lets us provide the care you need with a video conversation.  If a prescription is necessary we can send it directly to your pharmacy.  If lab work is needed we can place an order for that and you can then stop by our lab to have the test done at a later time.    Video visits are billed at different rates depending on your insurance coverage.  Please reach out to your insurance provider with any questions.    If during the course of the call the physician/provider feels a video visit is not appropriate, you will not be charged for this service.\"    Patient has given verbal consent for Video visit? Yes  How would you like to obtain your AVS? MyChart  If you are dropped from the video visit, the video invite should be resent to: Send to e-mail at: [email or web address]  Will anyone else be joining your video visit? No  {If patient encounters technical issues they should call [phone]      Video-Visit Details    Type of service:  Video Visit    Video Start Time: 1:30 PM  Video End Time: 1:53 PM    Originating Location (pt. Location): Home    Distant Location (provider location):  [facility] [place]     Platform used for Video Visit: Zadspace    During this virtual visit the patient is located in [place], patient verifies this as the location during the entirety of this visit.         Return Bariatric Nutrition Consultation Note    Reason For Visit: Nutrition Assessment    Sasha Hand is a 21 year old presenting today for a return bariatric nutrition consult.   Pt is interested in laparoscopic sleeve gastrectomy with Dr. Mata expected " "surgery in TBD.  Patient is accompanied by mother and sister.  This is pt's 4th of 3 required nutrition visits prior to surgery.     Pt referred by Komal Miller NP on January 13, 2022.  Patient with Co-morbidities of obesity including:  Type II DM no - Prediabetes   Renal Failure no  Sleep apnea no  Hypertension yes   Dyslipidemia no  Joint pain no  Back pain no  GERD yes     Support System Reviewed With Patient 1/12/2022   Who do you have in your support network that can be available to help you for the first 2 weeks after surgery? Pranay  (boyfriend) Genet (sister in law) angelo (mother)   Who can you count on for support throughout your weight loss surgery journey? Mother , boyfriend       ANTHROPOMETRICS:  Estimated body mass index is 55.56 kg/m  as calculated from the following:    Height as of 3/28/22: 1.702 m (5' 7\").    Weight as of 3/28/22: 160.9 kg (354 lb 11.5 oz).     Current weight (4/14/22): 349 lbs per pt     Required weight loss goal pre-op: -36 lbs from initial consult weight (goal weight 329 lbs or less before surgery)       1/12/2022   I have tried the following methods to lose weight Watching portions or calories, Exercise, Weight Watchers       Weight Loss Questions Reviewed With Patient 1/12/2022   How long have you been overweight? Since early childhood       SUPPLEMENT INFORMATION:  Biotin     Saxenda- reached 2.0 mg dose yesterday    NUTRITION HISTORY:  Pt has gradually gained weight since puberty. Tried weight watchers in the past. 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 If she has smaller portions she feels hungry within two hours. Snacks at work but not at home.     2/24/22: Pt has lost weight since last visit. Pt's boyfriend has been a huge support in cooking healthy meals with her.  She has been pre planning meals ahead of time to determine calories for the day. Still wanting to increase physical activity but overall feels her main goal now is to " be consistent.     3/24/22: Pt maintained weight since last visit but has not lost weight. Has not been doing any exercise. Has been struggling with life events that are causing stress. Wanting to get back into exercising regularly. Has been able to continue focusing on portion sizes.     4/14/22: Pt has been struggling with emotional eating since last visit. Mom has breast cancer and she is having a difficult time not over-snacking. Is planning to start eating using a smaller plate with sectioning to help with portion sizes.     Recall Diet Questions Reviewed With Patient 1/12/2022   Describe what you typically consume for breakfast (typical or most recent): Eggs, cheese, sasuage, taylor. Yogart.   Describe what you typically consume for lunch (typical or most recent): Soup, left over dinner. Used to be fast food. Stopped that. Somthibg sweet. Sonetimes repeat of breakfast.   Describe what you typically consume for supper (typical or most recent): Protine. Veggies, starch.   Describe what you typically consume as snacks (typical or most recent): Cheeze oliver. Jerky. Cookies . (Only at work)   How many ounces of water, or other low calorie drinks, do you drink daily (8 oz=1 glass)? 48 oz   How many ounces of caffeine (coffee, tea, pop) do you drink daily (8 oz=1 glass)? 24 oz   How many ounces of carbonated (pop, beer, sparkling water) drinks do you drinky daily (8 oz=1 glass)? 24 oz   How many ounces of juice, pop, sweet tea, sports drinks, protein drinks, other sweetened drinks, do you drink daily (8 oz=1 glass)? 8 oz   How many ounces of milk do you drink daily (8 oz=1 glass) 8 oz   Please indicate the type of milk: whole   How often do you drink alcohol? Monthly or less   If you do drink alcohol, how many drinks might you have in a day? (one drink = 5 oz. wine, 1 can/bottle of beer, 1 shot liquor) 1 or 2       Eating Habits 1/12/2022   Do you have any dietary restrictions? No   Do you currently binge eat (eat a  large amount of food in a short time)? Yes   Are you an emotional eater? Yes   Do you get up to eat after falling asleep? No   What foods do you crave? Salt and sweet.     Progress on Previous Goals  1) Increase physical activity as able   -at least 2x per week  2) Review handouts below    ADDITIONAL INFORMATION:  Physical activity: mostly sedentary job ().     Doing 40 mins 2x per week    Dining Out History Reviewed With Patient 1/12/2022   How often do you dine out? Rarely.   Where do you dine out? (select all that apply) sit-down restaurants   What types of food do you order when you dine out? Burger or pasta       Physical Activity Reviewed With Patient 1/12/2022   How often do you exercise? 1 to 2 times per week   What is the duration of your exercise (in minutes)? 20 Minutes   What types of exercise do you do? other   What keeps you from being more active?  Pain, Too tired       NUTRITION DIAGNOSIS:  Obesity r/t long history of positive energy balance aeb BMI >30 kg/m2.    INTERVENTION:  Intervention Provided/Education Provided on post-op diet guidelines, vitamins/minerals essential post-operatively, GI anatomy of bariatric surgeries, ways to help prepare for post-op diet guidelines pre-operatively, portion/calorie-control, mindful eating and sources of protein.  Patient demonstrates understanding. Provided pt with list of goals RD contact information.      Questions Reviewed With Patient 1/12/2022   How ready are you to make changes regarding your weight? Number 1 = Not ready at all to make changes up to 10 = very ready. 10   How confident are you that you can change? 1 = Not confident that you will be successful making changes up to 10 = very confident. 7       Expected Engagement: good    GOALS:  1) Avoid snacking if able. If snack is needed use lean protein and/or fruit/vegetable. Examples:   - 2 cup popcorn   - 1 cup mixed berries   - 15 almonds, walnuts, cashews   - carrot/celery sticks and 2  tbsp low-fat ranch   - 1 hard boiled egg   - Part-skim mozzarella cheese stick   - Low-fat, low-sugar greek yogurt with 1/2 cup berries   - Med apple or pear   - sliced bell peppers with 1/2 cup salsa   - 1/2 cup roasted chickpeas   - sliced cucumbers with vinegar  2) Holiday eating tips: https://www.cdc.gov/diabetes/library/features/holidays-healthy-eating.html    Meal Replacement Shake Options:   *Protein Shake Criteria: no more than 210 Calories, at least 20 grams of protein, and less than 10 grams of sugar   [facility] smoothie (160 Calories, 20 g protein)   Premier Protein (160 Calories, 30 g protein)  Slim Fast Advanced Nutrition (180 Calories, 20 g protein)  Muscle Milk, lactose-free, 17 oz bottle (210 Calories, 30 g protein)  Integrated Supplements, no artificial sugars (110 Calories, 20 g protein)  Genepro, unflavored protein powder (60 Calories, 30 g protein)  Boost/Ensure Max (160 calories, 30 gm protein)   [email or web address] Core Power (170 calories, 26 gm protein)    Meal Replacement Bar Options:  [facility] Protein Shake (160 Calories, 15 g protein)  Quest Protein Bars (190 Calories, 20 g protein)  Built Bar (170 Calories, 15-20 g protein)  One Protein Bar (210 calories, 20 g protein)  [place] Signature Protein Bar ([employer]) (190 Calories, 21 g protein)  Pure Protein Bars (180 Calories, 21 g protein)    Low Calorie Frozen Meal:  Healthy Choice Power Bowls  Lean Cuisine  Smart Ones  Leonel Sotelo    Protein Sources for Weight Loss  [email or web address]     Carbohydrates  [email or web address]     Mindful Eating  [email or web address]://[email or web address]     Summary of Volumetrics Eating Plan  [email or web address]     Diet Guidelines after Weight Loss Surgery  [email or web address]     Seated Exercises for Arms and Legs (can be done before or after surgery)  [email or web address]    Surgery Related Nutrition Handouts:    Diet Guidelines after Weight Loss  Surgery  [email or web address]     Lifestyle Changes Before and After Weight Loss Surgery: [employer] for Success  [email or web address]     Modified Liquid Diet (2 liquid meals, 1 meal, 2 snacks)  [email or web address]     Your Stage 1 Diet: Clear Liquids  [email or web address]     Your Stage 2 Diet: Low-fat Full Liquids  [email or web address]     Your Stage 3 Diet: Pureed Foods  [email or web address]     Your Stage 4 Diet: Soft Foods  [email or web address]    Your Stage 5 Diet: Regular Foods  [email or web address]    Supplements after Weight Loss Surgery  http://[email or web address]     Keeping Track of Fluids  [email or web address]    Why Take Supplements for Life after Weight Loss Surgery  https://[email or web address]     Keeping Up with Your Diet after Weight Loss Surgery  https://[email or web address]    Preventing Low Blood Sugar after Weight Loss Surgery  https://[email or web address]     Preventing Dumping Syndrome after Weight Loss Surgery  https://[email or web address]      Follow up: 1 month, prn     Time spent with patient: 23 minutes.  MARCIE GLOVRE RD, LD

## 2022-04-14 ENCOUNTER — VIRTUAL VISIT (OUTPATIENT)
Dept: ENDOCRINOLOGY | Facility: CLINIC | Age: 22
End: 2022-04-14
Payer: COMMERCIAL

## 2022-04-14 DIAGNOSIS — E11.9 TYPE 2 DIABETES MELLITUS WITHOUT COMPLICATION, WITHOUT LONG-TERM CURRENT USE OF INSULIN (H): ICD-10-CM

## 2022-04-14 DIAGNOSIS — E66.9 OBESITY: ICD-10-CM

## 2022-04-14 DIAGNOSIS — Z71.3 NUTRITIONAL COUNSELING: Primary | ICD-10-CM

## 2022-04-14 PROCEDURE — 97803 MED NUTRITION INDIV SUBSEQ: CPT | Mod: GT | Performed by: DIETITIAN, REGISTERED

## 2022-04-14 NOTE — LETTER
"4/14/2022       RE: Sasha Hand  [address]  [facility] [address]     Dear Colleague,    Thank you for referring your patient, Sasha Hand, to the [facility] [place] at [facility]. Please see a copy of my visit note below.    Sasha Hand is a 21 year old female who is being evaluated via a billable video visit.      The patient has been notified of following:     \"This video visit will be conducted via a call between you and your physician/provider. We have found that certain health care needs can be provided without the need for an in-person physical exam.  This service lets us provide the care you need with a video conversation.  If a prescription is necessary we can send it directly to your pharmacy.  If lab work is needed we can place an order for that and you can then stop by our lab to have the test done at a later time.    Video visits are billed at different rates depending on your insurance coverage.  Please reach out to your insurance provider with any questions.    If during the course of the call the physician/provider feels a video visit is not appropriate, you will not be charged for this service.\"    Patient has given verbal consent for Video visit? Yes  How would you like to obtain your AVS? MyChart  If you are dropped from the video visit, the video invite should be resent to: Send to e-mail at: [email or web address]  Will anyone else be joining your video visit? No  {If patient encounters technical issues they should call [phone]      Video-Visit Details    Type of service:  Video Visit    Video Start Time: 1:30 PM  Video End Time: 1:53 PM    Originating Location (pt. Location): Home    Distant Location (provider location):  [facility]     Platform used for Video Visit: QualtrÃ©    During this virtual visit the patient is located in [place], patient verifies this as " "the location during the entirety of this visit.         Return Bariatric Nutrition Consultation Note    Reason For Visit: Nutrition Assessment    Sasha Hand is a 21 year old presenting today for a return bariatric nutrition consult.   Pt is interested in laparoscopic sleeve gastrectomy with Dr. Mata expected surgery in [facility].  Patient is accompanied by mother and sister.  This is pt's 4th of 3 required nutrition visits prior to surgery.     Pt referred by Komal Miller NP on January 13, 2022.  Patient with Co-morbidities of obesity including:  Type II DM no - Prediabetes   Renal Failure no  Sleep apnea no  Hypertension yes   Dyslipidemia no  Joint pain no  Back pain no  GERD yes     Support System Reviewed With Patient 1/12/2022   Who do you have in your support network that can be available to help you for the first 2 weeks after surgery? Pranay  (boyfriend) Genet (sister in law) angelo (mother)   Who can you count on for support throughout your weight loss surgery journey? Mother , boyfriend       ANTHROPOMETRICS:  Estimated body mass index is 55.56 kg/m  as calculated from the following:    Height as of 3/28/22: 1.702 m (5' 7\").    Weight as of 3/28/22: 160.9 kg (354 lb 11.5 oz).     Current weight (4/14/22): 349 lbs per pt     Required weight loss goal pre-op: -36 lbs from initial consult weight (goal weight 329 lbs or less before surgery)       1/12/2022   I have tried the following methods to lose weight Watching portions or calories, Exercise, Weight Watchers       Weight Loss Questions Reviewed With Patient 1/12/2022   How long have you been overweight? Since early childhood       SUPPLEMENT INFORMATION:  Biotin     Saxenda- reached 2.0 mg dose yesterday    NUTRITION HISTORY:  Pt has gradually gained weight since puberty. Tried weight watchers in the past. 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 If she has smaller portions she feels hungry within two hours. Snacks at " work but not at home.     2/24/22: Pt has lost weight since last visit. Pt's boyfriend has been a huge support in cooking healthy meals with her.  She has been pre planning meals ahead of time to determine calories for the day. Still wanting to increase physical activity but overall feels her main goal now is to be consistent.     3/24/22: Pt maintained weight since last visit but has not lost weight. Has not been doing any exercise. Has been struggling with life events that are causing stress. Wanting to get back into exercising regularly. Has been able to continue focusing on portion sizes.     4/14/22: Pt has been struggling with emotional eating since last visit. Mom has breast cancer and she is having a difficult time not over-snacking. Is planning to start eating using a smaller plate with sectioning to help with portion sizes.     Recall Diet Questions Reviewed With Patient 1/12/2022   Describe what you typically consume for breakfast (typical or most recent): Eggs, cheese, sasuage, taylor. Yogart.   Describe what you typically consume for lunch (typical or most recent): Soup, left over dinner. Used to be fast food. Stopped that. Somthibg sweet. Sonetimes repeat of breakfast.   Describe what you typically consume for supper (typical or most recent): Protine. Veggies, starch.   Describe what you typically consume as snacks (typical or most recent): Cheeze oliver. Jerky. Cookies . (Only at work)   How many ounces of water, or other low calorie drinks, do you drink daily (8 oz=1 glass)? 48 oz   How many ounces of caffeine (coffee, tea, pop) do you drink daily (8 oz=1 glass)? 24 oz   How many ounces of carbonated (pop, beer, sparkling water) drinks do you drinky daily (8 oz=1 glass)? 24 oz   How many ounces of juice, pop, sweet tea, sports drinks, protein drinks, other sweetened drinks, do you drink daily (8 oz=1 glass)? 8 oz   How many ounces of milk do you drink daily (8 oz=1 glass) 8 oz   Please indicate the type  of milk: whole   How often do you drink alcohol? Monthly or less   If you do drink alcohol, how many drinks might you have in a day? (one drink = 5 oz. wine, 1 can/bottle of beer, 1 shot liquor) 1 or 2       Eating Habits 1/12/2022   Do you have any dietary restrictions? No   Do you currently binge eat (eat a large amount of food in a short time)? Yes   Are you an emotional eater? Yes   Do you get up to eat after falling asleep? No   What foods do you crave? Salt and sweet.     Progress on Previous Goals  1) Increase physical activity as able   -at least 2x per week  2) Review handouts below    ADDITIONAL INFORMATION:  Physical activity: mostly sedentary job ().     Doing 40 mins 2x per week    Dining Out History Reviewed With Patient 1/12/2022   How often do you dine out? Rarely.   Where do you dine out? (select all that apply) sit-down restaurants   What types of food do you order when you dine out? Burger or pasta       Physical Activity Reviewed With Patient 1/12/2022   How often do you exercise? 1 to 2 times per week   What is the duration of your exercise (in minutes)? 20 Minutes   What types of exercise do you do? other   What keeps you from being more active?  Pain, Too tired       NUTRITION DIAGNOSIS:  Obesity r/t long history of positive energy balance aeb BMI >30 kg/m2.    INTERVENTION:  Intervention Provided/Education Provided on post-op diet guidelines, vitamins/minerals essential post-operatively, GI anatomy of bariatric surgeries, ways to help prepare for post-op diet guidelines pre-operatively, portion/calorie-control, mindful eating and sources of protein.  Patient demonstrates understanding. Provided pt with list of goals RD contact information.      Questions Reviewed With Patient 1/12/2022   How ready are you to make changes regarding your weight? Number 1 = Not ready at all to make changes up to 10 = very ready. 10   How confident are you that you can change? 1 = Not confident that  you will be successful making changes up to 10 = very confident. 7       Expected Engagement: good    GOALS:  1) Avoid snacking if able. If snack is needed use lean protein and/or fruit/vegetable. Examples:   - 2 cup popcorn   - 1 cup mixed berries   - 15 almonds, walnuts, cashews   - carrot/celery sticks and 2 tbsp low-fat ranch   - 1 hard boiled egg   - Part-skim mozzarella cheese stick   - Low-fat, low-sugar greek yogurt with 1/2 cup berries   - Med apple or pear   - sliced bell peppers with 1/2 cup salsa   - 1/2 cup roasted chickpeas   - sliced cucumbers with vinegar  2) Holiday eating tips: https://www.cdc.gov/diabetes/library/features/holidays-healthy-eating.html    Meal Replacement Shake Options:   *Protein Shake Criteria: no more than 210 Calories, at least 20 grams of protein, and less than 10 grams of sugar   [facility] smoothie (160 Calories, 20 g protein)   Premier Protein (160 Calories, 30 g protein)  Slim Fast Advanced Nutrition (180 Calories, 20 g protein)  Muscle Milk, lactose-free, 17 oz bottle (210 Calories, 30 g protein)  Integrated Supplements, no artificial sugars (110 Calories, 20 g protein)  Genepro, unflavored protein powder (60 Calories, 30 g protein)  Boost/Ensure Max (160 calories, 30 gm protein)   [facility] Core Power (170 calories, 26 gm protein)    Meal Replacement Bar Options:  [facility] Protein Shake (160 Calories, 15 g protein)  Quest Protein Bars (190 Calories, 20 g protein)  Built Bar (170 Calories, 15-20 g protein)  One Protein Bar (210 calories, 20 g protein)  [employer] Signature Protein Bar ([employer]co) (190 Calories, 21 g protein)  Pure Protein Bars (180 Calories, 21 g protein)    Low Calorie Frozen Meal:  Healthy Choice Power Bowls  Lean Cuisine  Smart Ones  Leonel Sotelo    Protein Sources for Weight Loss  [email or web address]     Carbohydrates  [email or web address]     Mindful Eating  [email or web address]     Summary of Volumetrics Eating  Plan  [email or web address]     Diet Guidelines after Weight Loss Surgery  [email or web address]     Seated Exercises for Arms and Legs (can be done before or after surgery)  [email or web address]    Surgery Related Nutrition Handouts:    Diet Guidelines after Weight Loss Surgery  [email or web address]     Lifestyle Changes Before and After Weight Loss Surgery: Rand for Success  [email or web address]     Modified Liquid Diet (2 liquid meals, 1 meal, 2 snacks)  [email or web address]     Your Stage 1 Diet: Clear Liquids  [email or web address]     Your Stage 2 Diet: Low-fat Full Liquids  [email or web address]     Your Stage 3 Diet: Pureed Foods  [email or web address]     Your Stage 4 Diet: Soft Foods  [email or web address]    Your Stage 5 Diet: Regular Foods  [email or web address]    Supplements after Weight Loss Surgery  [email or web address]     Keeping Track of Fluids  [email or web address]    Why Take Supplements for Life after Weight Loss Surgery  [email or web address]     Keeping Up with Your Diet after Weight Loss Surgery  [email or web address]    Preventing Low Blood Sugar after Weight Loss Surgery  [email or web address]     Preventing Dumping Syndrome after Weight Loss Surgery  [email or web address]      Follow up: 1 month, prn     Time spent with patient: 23 minutes.  MARCIE GLOVER RD, VEENA

## 2022-04-15 NOTE — PATIENT INSTRUCTIONS
Harjit Baez!    Follow-up with RD in 1 month    Thank you,    Kaleigh Curran, RD, LD  If you would like to schedule or reschedule an appointment with the RD, please call [phone]    Nutrition Goals  1) Avoid snacking if able. If snack is needed use lean protein and/or fruit/vegetable. Examples:   - 2 cup popcorn   - 1 cup mixed berries   - 15 almonds, walnuts, cashews   - carrot/celery sticks and 2 tbsp low-fat ranch   - 1 hard boiled egg   - Part-skim mozzarella cheese stick   - Low-fat, low-sugar greek yogurt with 1/2 cup berries   - Med apple or pear   - sliced bell peppers with 1/2 cup salsa   - 1/2 cup roasted chickpeas   - sliced cucumbers with vinegar  2) Holiday eating tips: https://www.cdc.gov/diabetes/library/features/holidays-healthy-eating.html    Meal Replacement Shake Options:   *Protein Shake Criteria: no more than 210 Calories, at least 20 grams of protein, and less than 10 grams of sugar   [facility] smoothie (160 Calories, 20 g protein)   Premier Protein (160 Calories, 30 g protein)  Slim Fast Advanced Nutrition (180 Calories, 20 g protein)  Muscle Milk, lactose-free, 17 oz bottle (210 Calories, 30 g protein)  Integrated Supplements, no artificial sugars (110 Calories, 20 g protein)  Genepro, unflavored protein powder (60 Calories, 30 g protein)  Boost/Ensure Max (160 calories, 30 gm protein)   [employer] Core Power (170 calories, 26 gm protein)    Meal Replacement Bar Options:  [facility] Protein Shake (160 Calories, 15 g protein)  Quest Protein Bars (190 Calories, 20 g protein)  Built Bar (170 Calories, 15-20 g protein)  One Protein Bar (210 calories, 20 g protein)  [employer] Signature Protein Bar ([employer]) (190 Calories, 21 g protein)  Pure Protein Bars (180 Calories, 21 g protein)    Low Calorie Frozen Meal:  Healthy Choice Power Bowls  Lean Cuisine  Smart Ones  Leonel Sotelo      Interested in working with a health ? Health coaches work with you to improve your overall health and  wellbeing. They look at the whole person, and may involve discussion of different areas of life, including, but not limited to the four pillars of health (sleep, exercise, nutrition, and stress management). Discuss with your care team if you would like to start working a health .    Health Coaching-3 Pack:    $99 for three health coaching visits    Visits may be done in person or via phone    Coaching is a partnership between the  and the client; Coaches do not prescribe or diagnose    Coaching helps inspire the client to reach his/her personal goals    COMPREHENSIVE WEIGHT MANAGEMENT PROGRAM  VIRTUAL SUPPORT GROUPS    For Support Group Information:      We offer support groups for patients who are working on weight loss and considering, preparing for or have had weight loss surgery.   There is no cost for this opportunity.  You are invited to attend the?Virtual Support Groups?provided by any of the following locations:    [facility] via Microsoft Teams with Sangita Eastman RN  2.   [place] via mechatronic systemtechnik with Bryce Raymond, PhD, LP  3.   [place] via mechatronic systemtechnik with Tari Blue RN  4.   [facility] via Microsoft Teams with Tari Schultz [facility]    The following Support Group information can also be found on our website:  [email or web address]      [facility] Weight Loss Surgery Support Group    [facility] Weight Loss Surgery Support Group  The support group is a patient-lead forum that meets monthly to share experiences, encouragement and education. It is open to those who have had weight loss surgery, are scheduled for surgery, and those who are considering surgery.   WHEN: This group meets on the 3rd Wednesday of each month from 5:00PM - 6:00PM virtually using Microsoft Teams.   FACILITATOR: Led by Sangita Eastman, RD, LD, RN, the program's Clinical Coordinator.   TO REGISTER: Please contact the clinic via  "Av or call the nurse line directly at [phone] to inform our staff that you would like an invite sent to you and to let us know the email you would like the invite sent to. Prior to the meeting, a link with directions on how to join the meeting will be sent to you.    2022 Meetings January 19: \"Let's Talk\" a time for the group to share.  February 16: \"Let's Talk\" a time for the group to share.  March 16: Guest Speakers: Psychologists, Katherine Pearson, PhD,LP and Evelia Marte, PsyD,  April 20: Guest Speaker: Health , Nanette Alfaro, [facility],CHES, CPT  May 18: Guest Speaker: Dietitian, Marvel Cannon, RD, LP  Bhumika 15: \"Let's Talk\" a time for the group to share.  July 20: \"Let's Talk\" a time for the group to share.  August 17: TBA  September 21: TBA  October 19: Guest Speaker: Dr Ang Turner MD Pulmonologist and Sleep Medicine Physician, \"Getting a Good Night's Sleep\".  November 16: TBA  December 21: TBA    [facility] Clinics and Specialty [facility] Support Groups    Connections: Bariatric Care Support Group?  This is open to all [facility] (and those external to this program) pre- and post- operative bariatric surgery patients as well as their support system.   WHEN: This group meets the 2nd Tuesday of each month from 6:30 PM - 8:00 PM virtually using Microsoft Teams.   FACILITATOR: Led by Bryce Raymond, Ph.D who is a Licensed Psychologist with the [facility] Comprehensive Weight Management Program.   TO REGISTER: Please send an email to Bryce Raymond, Ph.D., LP at?[email or web address]?if you would like an invitation to the group and to learn about using Microsoft Teams.    2022 Meetings January 11: Kelsey Menard, PharmD, Pharmacy Resident at [facility], \"Medications and Bariatric Surgery\".  February 8: Open Forum  March 8  April 12  May 10  Bhumika 14    Connections: Post-Operative Bariatric Surgery Support Group  This is a support group for [facility] bariatric " "patients (and those external to [facility]) who have had bariatric surgery and are at least 3 months post-surgery.  WHEN: This support group meets the 4th Wednesday of the month from 11:00 AM - 12:00 PM virtually using Microsoft Teams.   FACILITATOR: Led by Certified Bariatric Nurse, Tari Blue RN.   TO REGISTER: Please send an email to Tari at [email or web address] if you would like an invitation to the group and to learn about using Microsoft Teams.    2022 Meetings  January 26  February 23  March 23  April 27  May 25  Bhumika 22    [facility] Healthy Lifestyle Virtual Support Group    Healthy Lifestyle Virtual Support Group?  This is 60 minutes of small group guided discussion, support and resources. All are welcome who want a healthy lifestyle.  WHEN: This group meets monthly on a Friday from 12:30 PM - 1:30 PM virtually using Microsoft Teams.   FACILITATOR: Led by National Board Certified Health and , Tari Schultz [facility].   TO REGISTER: Please send an email to Tari at?[email or web address] to receive monthly invites to the group or if you have any questions about having a health .  Prior to the meeting, a link with directions on how to join the meeting will be sent to you.    2022 Meetings  January 21: Lauren Lara MS, RN, CIC, CBN, \"Healthy Habits\"  February 25: Open Forum  March 18: \"Setting Limits and Boundaries\"  April 29: Hilda Murguia RD, \"Meal Planning Made Easy\"  May 20: Open Forum  June: To be determined                "

## 2022-05-05 ENCOUNTER — E-VISIT (OUTPATIENT)
Dept: URGENT CARE | Facility: CLINIC | Age: 22
End: 2022-05-05
Payer: COMMERCIAL

## 2022-05-05 DIAGNOSIS — B37.31 CANDIDAL VULVOVAGINITIS: Primary | ICD-10-CM

## 2022-05-05 PROCEDURE — 99421 OL DIG E/M SVC 5-10 MIN: CPT | Performed by: PHYSICIAN ASSISTANT

## 2022-05-05 RX ORDER — FLUCONAZOLE 150 MG/1
150 TABLET ORAL ONCE
Qty: 1 TABLET | Refills: 0 | Status: SHIPPED | OUTPATIENT
Start: 2022-05-05 | End: 2022-05-05

## 2022-05-06 NOTE — PATIENT INSTRUCTIONS
Yeast Infection (Candida Vaginal Infection)    You have a Candida vaginal infection. This is also known as a yeast infection. It's most often caused by a type of yeast (fungus) called Candida. Candida are normally found in the vagina. But if they increase in number, this can lead to infection and cause symptoms.   Symptoms of a yeast infection can include:     Clumpy or thin, white discharge, which may look like cottage cheese    Itching or burning    Burning with urination  Certain factors can make a yeast infection more likely. These can include:     Taking certain medicines, such as antibiotics or birth control pills    Pregnancy    Diabetes    Weak immune system  A yeast infection is most often treated with antifungal medicine. This may be given as a vaginal cream or pills you take by mouth. Treatment may last for about 1 to 7 days. Women with severe or recurrent infections may need longer courses of treatment.   Home care    If you re prescribed medicine, be sure to use it as directed. Finish all of the medicine, even if your symptoms go away. Don t try to treat yourself using over-the-counter products without talking with your provider first. They will let you know if this is a good option for you.    Ask your provider what steps you can take to help reduce your risk of having a yeast infection in the future.    Follow-up care  Follow up with your healthcare provider, or as directed.   When to seek medical advice  Call your healthcare provider right away if:     You have a fever of 100.4 F (38 C) or higher, or as directed by your provider.    Your symptoms worsen, or they don t go away within a few days of starting treatment.    You have new pain in the lower belly or pelvic region.    You have side effects that bother you or a reaction to the cream or pills you re prescribed.    You or any partners you have sex with have new symptoms, such as a rash, joint pain, or sores.  [place] last reviewed this  educational content on 7/1/2020 2000-2021 [employer]. All rights reserved. This information is not intended as a substitute for professional medical care. Always follow your healthcare professional's instructions.

## 2022-05-18 NOTE — PROGRESS NOTES
"Sasha Hand is a 21 year old female who is being evaluated via a billable video visit.      The patient has been notified of following:     \"This video visit will be conducted via a call between you and your physician/provider. We have found that certain health care needs can be provided without the need for an in-person physical exam.  This service lets us provide the care you need with a video conversation.  If a prescription is necessary we can send it directly to your pharmacy.  If lab work is needed we can place an order for that and you can then stop by our lab to have the test done at a later time.    Video visits are billed at different rates depending on your insurance coverage.  Please reach out to your insurance provider with any questions.    If during the course of the call the physician/provider feels a video visit is not appropriate, you will not be charged for this service.\"    Patient has given verbal consent for Video visit? Yes  How would you like to obtain your AVS? MyChart  If you are dropped from the video visit, the video invite should be resent to: Send to e-mail at: [email or web address]  Will anyone else be joining your video visit? No  {If patient encounters technical issues they should call [phone]      Video-Visit Details    Type of service:  Video Visit    Video Start Time: 1:10 PM  Video End Time: 1:21 PM    Originating Location (pt. Location): Home    Distant Location (provider location):  [facility] [place]     Platform used for Video Visit: [employer]    During this virtual visit the patient is located in [place], patient verifies this as the location during the entirety of this visit.         Return Bariatric Nutrition Consultation Note    Reason For Visit: Nutrition Assessment    Sasha Hand is a 21 year old presenting today for a return bariatric nutrition consult.   Pt is interested in laparoscopic sleeve gastrectomy with Dr. Mata expected " The physician has been notified. "surgery in TBD.  Patient is accompanied by mother and sister.  This is pt's 4th of 3 required nutrition visits prior to surgery.     Pt referred by Komal Miller NP on January 13, 2022.  Patient with Co-morbidities of obesity including:  Type II DM no - Prediabetes   Renal Failure no  Sleep apnea no  Hypertension yes   Dyslipidemia no  Joint pain no  Back pain no  GERD yes     Support System Reviewed With Patient 1/12/2022   Who do you have in your support network that can be available to help you for the first 2 weeks after surgery? Pranay  (boyfriend) Genet (sister in law) angelo (mother)   Who can you count on for support throughout your weight loss surgery journey? Mother , boyfriend       ANTHROPOMETRICS:  Estimated body mass index is 55.56 kg/m  as calculated from the following:    Height as of 3/28/22: 1.702 m (5' 7\").    Weight as of 3/28/22: 160.9 kg (354 lb 11.5 oz).     Current weight (5/19/22): 343 lbs per pt     Required weight loss goal pre-op: -36 lbs from initial consult weight (goal weight 329 lbs or less before surgery)       1/12/2022   I have tried the following methods to lose weight Watching portions or calories, Exercise, Weight Watchers       Weight Loss Questions Reviewed With Patient 1/12/2022   How long have you been overweight? Since early childhood       SUPPLEMENT INFORMATION:  Biotin     Saxenda- reached 2.0 mg dose yesterday    NUTRITION HISTORY:  Pt has gradually gained weight since puberty. Tried weight watchers in the past. 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 If she has smaller portions she feels hungry within two hours. Snacks at work but not at home.     2/24/22: Pt has lost weight since last visit. Pt's boyfriend has been a huge support in cooking healthy meals with her.  She has been pre planning meals ahead of time to determine calories for the day. Still wanting to increase physical activity but overall feels her main goal now is to " be consistent.     3/24/22: Pt maintained weight since last visit but has not lost weight. Has not been doing any exercise. Has been struggling with life events that are causing stress. Wanting to get back into exercising regularly. Has been able to continue focusing on portion sizes.     4/14/22: Pt has been struggling with emotional eating since last visit. Mom has breast cancer and she is having a difficult time not over-snacking. Is planning to start eating using a smaller plate with sectioning to help with portion sizes.     5/19/22: Pt has been using meal replacements throughout the work week that has helped her to avoid snacking and binging at work. Going to the gym 5 days per week. Using smaller plates.    Recall Diet Questions Reviewed With Patient 1/12/2022   Describe what you typically consume for breakfast (typical or most recent): Eggs, cheese, sasuage, taylor. Yogart.   Describe what you typically consume for lunch (typical or most recent): Soup, left over dinner. Used to be fast food. Stopped that. Somthibg sweet. Sonetimes repeat of breakfast.   Describe what you typically consume for supper (typical or most recent): Protine. Veggies, starch.   Describe what you typically consume as snacks (typical or most recent): Cheeze oliver. Jerky. Cookies . (Only at work)   How many ounces of water, or other low calorie drinks, do you drink daily (8 oz=1 glass)? 48 oz   How many ounces of caffeine (coffee, tea, pop) do you drink daily (8 oz=1 glass)? 24 oz   How many ounces of carbonated (pop, beer, sparkling water) drinks do you drinky daily (8 oz=1 glass)? 24 oz   How many ounces of juice, pop, sweet tea, sports drinks, protein drinks, other sweetened drinks, do you drink daily (8 oz=1 glass)? 8 oz   How many ounces of milk do you drink daily (8 oz=1 glass) 8 oz   Please indicate the type of milk: whole   How often do you drink alcohol? Monthly or less   If you do drink alcohol, how many drinks might you have in  a day? (one drink = 5 oz. wine, 1 can/bottle of beer, 1 shot liquor) 1 or 2       Eating Habits 1/12/2022   Do you have any dietary restrictions? No   Do you currently binge eat (eat a large amount of food in a short time)? Yes   Are you an emotional eater? Yes   Do you get up to eat after falling asleep? No   What foods do you crave? Salt and sweet.     Progress on Previous Goals  1) Avoid snacking if able. If snack is needed use lean protein and/or fruit/vegetable. Examples: met, continues   - 2 cup popcorn   - 1 cup mixed berries   - 15 almonds, walnuts, cashews   - carrot/celery sticks and 2 tbsp low-fat ranch   - 1 hard boiled egg   - Part-skim mozzarella cheese stick   - Low-fat, low-sugar greek yogurt with 1/2 cup berries   - Med apple or pear   - sliced bell peppers with 1/2 cup salsa   - 1/2 cup roasted chickpeas   - sliced cucumbers with vinegar  2) Holiday eating tips: https://www.cdc.gov/diabetes/library/features/holidays-healthy-eating.html    ADDITIONAL INFORMATION:  Physical activity: mostly sedentary job ().     Doing 40 mins 2x per week    Dining Out History Reviewed With Patient 1/12/2022   How often do you dine out? Rarely.   Where do you dine out? (select all that apply) sit-down restaurants   What types of food do you order when you dine out? Burger or pasta       Physical Activity Reviewed With Patient 1/12/2022   How often do you exercise? 1 to 2 times per week   What is the duration of your exercise (in minutes)? 20 Minutes   What types of exercise do you do? other   What keeps you from being more active?  Pain, Too tired       NUTRITION DIAGNOSIS:  Obesity r/t long history of positive energy balance aeb BMI >30 kg/m2.    INTERVENTION:  Intervention Provided/Education Provided on post-op diet guidelines, vitamins/minerals essential post-operatively, GI anatomy of bariatric surgeries, ways to help prepare for post-op diet guidelines pre-operatively, portion/calorie-control, mindful  eating and sources of protein.  Patient demonstrates understanding. Provided pt with list of goals RD contact information.      Questions Reviewed With Patient 1/12/2022   How ready are you to make changes regarding your weight? Number 1 = Not ready at all to make changes up to 10 = very ready. 10   How confident are you that you can change? 1 = Not confident that you will be successful making changes up to 10 = very confident. 7       Expected Engagement: good    GOALS:  1) Continue physical activity as able  2) Continue focusing on lean proteins and non-starchy vegetables    Meal Replacement Shake Options:   *Protein Shake Criteria: no more than 210 Calories, at least 20 grams of protein, and less than 10 grams of sugar   [facility] smoothie (160 Calories, 20 g protein)   Premier Protein (160 Calories, 30 g protein)  Slim Fast Advanced Nutrition (180 Calories, 20 g protein)  Muscle Milk, lactose-free, 17 oz bottle (210 Calories, 30 g protein)  Integrated Supplements, no artificial sugars (110 Calories, 20 g protein)  Genepro, unflavored protein powder (60 Calories, 30 g protein)  Boost/Ensure Max (160 calories, 30 gm protein)   [employer] Core Power (170 calories, 26 gm protein)    Meal Replacement Bar Options:  [facility] Protein Shake (160 Calories, 15 g protein)  Quest Protein Bars (190 Calories, 20 g protein)  Built Bar (170 Calories, 15-20 g protein)  One Protein Bar (210 calories, 20 g protein)  [place] Signature Protein Bar (Costco) (190 Calories, 21 g protein)  Pure Protein Bars (180 Calories, 21 g protein)    Low Calorie Frozen Meal:  Healthy Choice Power Bowls  Lean Cuisine  Smart Ones  Leonel Sotelo    Protein Sources for Weight Loss  [email or web address]     Carbohydrates  [email or web address]     Mindful Eating  [email or web address]     Summary of Volumetrics Eating Plan  [email or web address]     Diet Guidelines after Weight Loss  Surgery  [email or web address]     Seated Exercises for Arms and Legs (can be done before or after surgery)  [email or web address]    Surgery Related Nutrition Handouts:    Diet Guidelines after Weight Loss Surgery  [email or web address]     Lifestyle Changes Before and After Weight Loss Surgery: [place] for Success  [email or web address]     Modified Liquid Diet (2 liquid meals, 1 meal, 2 snacks)  [email or web address]     Your Stage 1 Diet: Clear Liquids  [email or web address]     Your Stage 2 Diet: Low-fat Full Liquids  [email or web address]     Your Stage 3 Diet: Pureed Foods  [email or web address]     Your Stage 4 Diet: Soft Foods  [email or web address]    Your Stage 5 Diet: Regular Foods  [email or web address]    Supplements after Weight Loss Surgery  [email or web address]     Keeping Track of Fluids  [email or web address]    Why Take Supplements for Life after Weight Loss Surgery  [email or web address]     Keeping Up with Your Diet after Weight Loss Surgery  [email or web address]    Preventing Low Blood Sugar after Weight Loss Surgery  [email or web address]     Preventing Dumping Syndrome after Weight Loss Surgery  [email or web address]      Follow up: 1 month, prn     Time spent with patient: 11 minutes.  MARCIE GLOVER RD, LD

## 2022-05-19 ENCOUNTER — VIRTUAL VISIT (OUTPATIENT)
Dept: ENDOCRINOLOGY | Facility: CLINIC | Age: 22
End: 2022-05-19
Payer: COMMERCIAL

## 2022-05-19 DIAGNOSIS — Z71.3 NUTRITIONAL COUNSELING: Primary | ICD-10-CM

## 2022-05-19 DIAGNOSIS — E11.9 TYPE 2 DIABETES MELLITUS WITHOUT COMPLICATION, WITHOUT LONG-TERM CURRENT USE OF INSULIN (H): ICD-10-CM

## 2022-05-19 DIAGNOSIS — E66.9 OBESITY: ICD-10-CM

## 2022-05-19 PROCEDURE — 97803 MED NUTRITION INDIV SUBSEQ: CPT | Mod: GT | Performed by: DIETITIAN, REGISTERED

## 2022-05-19 NOTE — LETTER
"5/19/2022       RE: Sasha Hand  [address]  [place] [address]     Dear Colleague,    Thank you for referring your patient, Sasha Hand, to the [facility] MANAGEMENT [facility] [place] [facility]. Please see a copy of my visit note below.    Sasha Hand is a 21 year old female who is being evaluated via a billable video visit.      The patient has been notified of following:     \"This video visit will be conducted via a call between you and your physician/provider. We have found that certain health care needs can be provided without the need for an in-person physical exam.  This service lets us provide the care you need with a video conversation.  If a prescription is necessary we can send it directly to your pharmacy.  If lab work is needed we can place an order for that and you can then stop by our lab to have the test done at a later time.    Video visits are billed at different rates depending on your insurance coverage.  Please reach out to your insurance provider with any questions.    If during the course of the call the physician/provider feels a video visit is not appropriate, you will not be charged for this service.\"    Patient has given verbal consent for Video visit? Yes  How would you like to obtain your AVS? MyChart  If you are dropped from the video visit, the video invite should be resent to: Send to e-mail at: [email or web address]  Will anyone else be joining your video visit? No  {If patient encounters technical issues they should call [phone]      Video-Visit Details    Type of service:  Video Visit    Video Start Time: 1:10 PM  Video End Time: 1:21 PM    Originating Location (pt. Location): Home    Distant Location (provider location):  [facility] WEIGHT MANAGEMENT [facility]     Platform used for Video Visit: [employer]    During this virtual visit the patient is located in [place], patient verifies this as " "the location during the entirety of this visit.         Return Bariatric Nutrition Consultation Note    Reason For Visit: Nutrition Assessment    Sasha Hand is a 21 year old presenting today for a return bariatric nutrition consult.   Pt is interested in laparoscopic sleeve gastrectomy with Dr. Mata expected surgery in [facility].  Patient is accompanied by mother and sister.  This is pt's 4th of 3 required nutrition visits prior to surgery.     Pt referred by Komal Miller NP on January 13, 2022.  Patient with Co-morbidities of obesity including:  Type II DM no - Prediabetes   Renal Failure no  Sleep apnea no  Hypertension yes   Dyslipidemia no  Joint pain no  Back pain no  GERD yes     Support System Reviewed With Patient 1/12/2022   Who do you have in your support network that can be available to help you for the first 2 weeks after surgery? Pranay  (boyfriend) Genet (sister in law) angelo (mother)   Who can you count on for support throughout your weight loss surgery journey? Mother , boyfriend       ANTHROPOMETRICS:  Estimated body mass index is 55.56 kg/m  as calculated from the following:    Height as of 3/28/22: 1.702 m (5' 7\").    Weight as of 3/28/22: 160.9 kg (354 lb 11.5 oz).     Current weight (5/19/22): 343 lbs per pt     Required weight loss goal pre-op: -36 lbs from initial consult weight (goal weight 329 lbs or less before surgery)       1/12/2022   I have tried the following methods to lose weight Watching portions or calories, Exercise, Weight Watchers       Weight Loss Questions Reviewed With Patient 1/12/2022   How long have you been overweight? Since early childhood       SUPPLEMENT INFORMATION:  Biotin     Saxenda- reached 2.0 mg dose yesterday    NUTRITION HISTORY:  Pt has gradually gained weight since puberty. Tried weight watchers in the past. 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 If she has smaller portions she feels hungry within two hours. Snacks at " work but not at home.     2/24/22: Pt has lost weight since last visit. Pt's boyfriend has been a huge support in cooking healthy meals with her.  She has been pre planning meals ahead of time to determine calories for the day. Still wanting to increase physical activity but overall feels her main goal now is to be consistent.     3/24/22: Pt maintained weight since last visit but has not lost weight. Has not been doing any exercise. Has been struggling with life events that are causing stress. Wanting to get back into exercising regularly. Has been able to continue focusing on portion sizes.     4/14/22: Pt has been struggling with emotional eating since last visit. Mom has breast cancer and she is having a difficult time not over-snacking. Is planning to start eating using a smaller plate with sectioning to help with portion sizes.     5/19/22: Pt has been using meal replacements throughout the work week that has helped her to avoid snacking and binging at work. Going to the gym 5 days per week. Using smaller plates.    Recall Diet Questions Reviewed With Patient 1/12/2022   Describe what you typically consume for breakfast (typical or most recent): Eggs, cheese, sasuage, taylor. Yogart.   Describe what you typically consume for lunch (typical or most recent): Soup, left over dinner. Used to be fast food. Stopped that. Somthibg sweet. Sonetimes repeat of breakfast.   Describe what you typically consume for supper (typical or most recent): Protine. Veggies, starch.   Describe what you typically consume as snacks (typical or most recent): Mattie boyd. Castro. Cookies . (Only at work)   How many ounces of water, or other low calorie drinks, do you drink daily (8 oz=1 glass)? 48 oz   How many ounces of caffeine (coffee, tea, pop) do you drink daily (8 oz=1 glass)? 24 oz   How many ounces of carbonated (pop, beer, sparkling water) drinks do you drinky daily (8 oz=1 glass)? 24 oz   How many ounces of juice, pop, sweet tea,  sports drinks, protein drinks, other sweetened drinks, do you drink daily (8 oz=1 glass)? 8 oz   How many ounces of milk do you drink daily (8 oz=1 glass) 8 oz   Please indicate the type of milk: whole   How often do you drink alcohol? Monthly or less   If you do drink alcohol, how many drinks might you have in a day? (one drink = 5 oz. wine, 1 can/bottle of beer, 1 shot liquor) 1 or 2       Eating Habits 1/12/2022   Do you have any dietary restrictions? No   Do you currently binge eat (eat a large amount of food in a short time)? Yes   Are you an emotional eater? Yes   Do you get up to eat after falling asleep? No   What foods do you crave? Salt and sweet.     Progress on Previous Goals  1) Avoid snacking if able. If snack is needed use lean protein and/or fruit/vegetable. Examples: met, continues   - 2 cup popcorn   - 1 cup mixed berries   - 15 almonds, walnuts, cashews   - carrot/celery sticks and 2 tbsp low-fat ranch   - 1 hard boiled egg   - Part-skim mozzarella cheese stick   - Low-fat, low-sugar greek yogurt with 1/2 cup berries   - Med apple or pear   - sliced bell peppers with 1/2 cup salsa   - 1/2 cup roasted chickpeas   - sliced cucumbers with vinegar  2) Holiday eating tips: https://www.cdc.gov/diabetes/library/features/holidays-healthy-eating.html    ADDITIONAL INFORMATION:  Physical activity: mostly sedentary job ().     Doing 40 mins 2x per week    Dining Out History Reviewed With Patient 1/12/2022   How often do you dine out? Rarely.   Where do you dine out? (select all that apply) sit-down restaurants   What types of food do you order when you dine out? Joann or maria c       Physical Activity Reviewed With Patient 1/12/2022   How often do you exercise? 1 to 2 times per week   What is the duration of your exercise (in minutes)? 20 Minutes   What types of exercise do you do? other   What keeps you from being more active?  Pain, Too tired       NUTRITION DIAGNOSIS:  Obesity r/t long history  of positive energy balance aeb BMI >30 kg/m2.    INTERVENTION:  Intervention Provided/Education Provided on post-op diet guidelines, vitamins/minerals essential post-operatively, GI anatomy of bariatric surgeries, ways to help prepare for post-op diet guidelines pre-operatively, portion/calorie-control, mindful eating and sources of protein.  Patient demonstrates understanding. Provided pt with list of goals RD contact information.      Questions Reviewed With Patient 1/12/2022   How ready are you to make changes regarding your weight? Number 1 = Not ready at all to make changes up to 10 = very ready. 10   How confident are you that you can change? 1 = Not confident that you will be successful making changes up to 10 = very confident. 7       Expected Engagement: good    GOALS:  1) Continue physical activity as able  2) Continue focusing on lean proteins and non-starchy vegetables    Meal Replacement Shake Options:   *Protein Shake Criteria: no more than 210 Calories, at least 20 grams of protein, and less than 10 grams of sugar   [facility] smoothie (160 Calories, 20 g protein)   Premier Protein (160 Calories, 30 g protein)  Slim Fast Advanced Nutrition (180 Calories, 20 g protein)  Muscle Milk, lactose-free, 17 oz bottle (210 Calories, 30 g protein)  Integrated Supplements, no artificial sugars (110 Calories, 20 g protein)  Genepro, unflavored protein powder (60 Calories, 30 g protein)  Boost/Ensure Max (160 calories, 30 gm protein)   [employer] Core Power (170 calories, 26 gm protein)    Meal Replacement Bar Options:  [facility] Protein Shake (160 Calories, 15 g protein)  Quest Protein Bars (190 Calories, 20 g protein)  Built Bar (170 Calories, 15-20 g protein)  One Protein Bar (210 calories, 20 g protein)  [place] Signature Protein Bar ([employer]) (190 Calories, 21 g protein)  Pure Protein Bars (180 Calories, 21 g protein)    Low Calorie Frozen Meal:  Healthy Choice Power Bowls  Lean Anaisine  Smart Ones  Leonel  Fan Sotelo    Protein Sources for Weight Loss  [email or web address]     Carbohydrates  [email or web address]     Mindful Eating  [email or web address]     Summary of Volumetrics Eating Plan  [email or web address]     Diet Guidelines after Weight Loss Surgery  [email or web address]     Seated Exercises for Arms and Legs (can be done before or after surgery)  [email or web address]    Surgery Related Nutrition Handouts:    Diet Guidelines after Weight Loss Surgery  [email or web address]     Lifestyle Changes Before and After Weight Loss Surgery: [place] for Success  [email or web address]     Modified Liquid Diet (2 liquid meals, 1 meal, 2 snacks)  [email or web address]     Your Stage 1 Diet: Clear Liquids  [email or web address]     Your Stage 2 Diet: Low-fat Full Liquids  [email or web address]     Your Stage 3 Diet: Pureed Foods  [email or web address]     Your Stage 4 Diet: Soft Foods  [email or web address]    Your Stage 5 Diet: Regular Foods  [email or web address]    Supplements after Weight Loss Surgery  [email or web address]     Keeping Track of Fluids  [email or web address]    Why Take Supplements for Life after Weight Loss Surgery  [email or web address]     Keeping Up with Your Diet after Weight Loss Surgery  [email or web address]    Preventing Low Blood Sugar after Weight Loss Surgery  [email or web address]     Preventing Dumping Syndrome after Weight Loss Surgery  [email or web address]      Follow up: 1 month, prn     Time spent with patient: 11 minutes.  MARCIE GLOVER RD, VEENA

## 2022-05-19 NOTE — PATIENT INSTRUCTIONS
Harjit Baez!    Follow-up with RD in 1 month    Thank you,    Kaleigh Curran, NIKITA, LD  If you would like to schedule or reschedule an appointment with the RD, please call [phone]    Nutrition Goals  1) Continue physical activity as able  2) Continue focusing on lean proteins and non-starchy vegetables    Interested in working with a health ? Health coaches work with you to improve your overall health and wellbeing. They look at the whole person, and may involve discussion of different areas of life, including, but not limited to the four pillars of health (sleep, exercise, nutrition, and stress management). Discuss with your care team if you would like to start working a health .    Health Coaching-3 Pack:    $99 for three health coaching visits    Visits may be done in person or via phone    Coaching is a partnership between the  and the client; Coaches do not prescribe or diagnose    Coaching helps inspire the client to reach his/her personal goals    COMPREHENSIVE WEIGHT MANAGEMENT PROGRAM  VIRTUAL SUPPORT GROUPS    For Support Group Information:      We offer support groups for patients who are working on weight loss and considering, preparing for or have had weight loss surgery.   There is no cost for this opportunity.  You are invited to attend the?Virtual Support Groups?provided by any of the following locations:    [facility] via Space Monkey Teams with Sangita Eastman RN  2.   [place] via [employer] with Bryce Raymond, PhD, LP  3.   [place] via [employer] with Tari Blue RN  4.   [facility] via Space Monkey Teams with Tari Schultz, [facility]    The following Support Group information can also be found on our website:  [email or web address]      [facility] Weight Loss Surgery Support Group    [facility] Weight Loss Surgery Support Group  The support group is a patient-lead forum that meets monthly to  "share experiences, encouragement and education. It is open to those who have had weight loss surgery, are scheduled for surgery, and those who are considering surgery.   WHEN: This group meets on the 3rd Wednesday of each month from 5:00PM - 6:00PM virtually using Microsoft Teams.   FACILITATOR: Led by Sangita Eastman RD, LD, RN, the program's Clinical Coordinator.   TO REGISTER: Please contact the clinic via Linekong or call the nurse line directly at [phone] to inform our staff that you would like an invite sent to you and to let us know the email you would like the invite sent to. Prior to the meeting, a link with directions on how to join the meeting will be sent to you.    2022 Meetings  January 19: \"Let's Talk\" a time for the group to share.  February 16: \"Let's Talk\" a time for the group to share.  March 16: Guest Speakers: Psychologists, Katherine Pearson, PhD,LP and Evelia Marte PsyD,  April 20: Guest Speaker: Health , Nanette Alfaro, [facility],CHES, CPT  May 18: Guest Speaker: Dietitian, Marvel Cannon RD, LP  Bhumika 15: \"Let's Talk\" a time for the group to share.  July 20: \"Let's Talk\" a time for the group to share.  August 17: TBA  September 21: TBA  October 19: Guest Speaker: Dr Ang Turner MD Pulmonologist and Sleep Medicine Physician, \"Getting a Good Night's Sleep\".  November 16: TBA  December 21: TBA    [facility] Support Groups    Connections: Bariatric Care Support Group?  This is open to all [facility] (and those external to this program) pre- and post- operative bariatric surgery patients as well as their support system.   WHEN: This group meets the 2nd Tuesday of each month from 6:30 PM - 8:00 PM virtually using Microsoft Teams.   FACILITATOR: Led by Bryce Raymond, Ph.D who is a Licensed Psychologist with the [facility].   TO REGISTER: Please send an email to Bryce Raymond, Ph.D., LP " "[email or web address]?if you would like an invitation to the group and to learn about using Microsoft Teams.    2022 Meetings January 11: Kelsey Menard, PharmD, Pharmacy Resident at [facility], \"Medications and Bariatric Surgery\".  February 8: Open Forum  March 8  April 12  May 10  Bhumika 14    Connections: Post-Operative Bariatric Surgery Support Group  This is a support group for [facility] bariatric patients (and those external to [facility]) who have had bariatric surgery and are at least 3 months post-surgery.  WHEN: This support group meets the 4th Wednesday of the month from 11:00 AM - 12:00 PM virtually using Microsoft Teams.   FACILITATOR: Led by Certified Bariatric Nurse, Tari Blue RN.   TO REGISTER: Please send an email to Tari at [email or web address] if you would like an invitation to the group and to learn about using Microsoft Teams.    2022 Meetings  January 26  February 23  March 23  April 27  May 25  Bhumika 22    [facility] Healthy Lifestyle Virtual Support Group    Healthy Lifestyle Virtual Support Group?  This is 60 minutes of small group guided discussion, support and resources. All are welcome who want a healthy lifestyle.  WHEN: This group meets monthly on a Friday from 12:30 PM - 1:30 PM virtually using Microsoft Teams.   FACILITATOR: Led by National Board Certified Health and , Tari Schultz, [facility].   TO REGISTER: Please send an email to Tari at?[email or web address] to receive monthly invites to the group or if you have any questions about having a health .  Prior to the meeting, a link with directions on how to join the meeting will be sent to you.    2022 Meetings January 21: Lauren Lara MS, RN, CIC, CBN, \"Healthy Habits\"  February 25: Open Forum  March 18: \"Setting Limits and Boundaries\"  April 29: Hilda Murguia RD, \"Meal Planning Made Easy\"  May 20: Open Forum  June: To be " determined

## 2022-06-09 ENCOUNTER — VIRTUAL VISIT (OUTPATIENT)
Dept: ENDOCRINOLOGY | Facility: CLINIC | Age: 22
End: 2022-06-09
Payer: COMMERCIAL

## 2022-06-09 DIAGNOSIS — E66.9 OBESITY: ICD-10-CM

## 2022-06-09 DIAGNOSIS — E11.9 TYPE 2 DIABETES MELLITUS WITHOUT COMPLICATION, WITHOUT LONG-TERM CURRENT USE OF INSULIN (H): ICD-10-CM

## 2022-06-09 DIAGNOSIS — Z71.3 NUTRITIONAL COUNSELING: Primary | ICD-10-CM

## 2022-06-09 PROCEDURE — 97803 MED NUTRITION INDIV SUBSEQ: CPT | Mod: GT | Performed by: DIETITIAN, REGISTERED

## 2022-06-09 NOTE — LETTER
"6/9/2022       RE: Sasha Hand  [address]  [place] [address]     Dear Colleague,    Thank you for referring your patient, Sasha Hand, to the [facility] WEIGHT MANAGEMENT CLINIC [place] at [facility]. Please see a copy of my visit note below.    Sasha Hand is a 21 year old female who is being evaluated via a billable video visit.      The patient has been notified of following:     \"This video visit will be conducted via a call between you and your physician/provider. We have found that certain health care needs can be provided without the need for an in-person physical exam.  This service lets us provide the care you need with a video conversation.  If a prescription is necessary we can send it directly to your pharmacy.  If lab work is needed we can place an order for that and you can then stop by our lab to have the test done at a later time.    Video visits are billed at different rates depending on your insurance coverage.  Please reach out to your insurance provider with any questions.    If during the course of the call the physician/provider feels a video visit is not appropriate, you will not be charged for this service.\"    Patient has given verbal consent for Video visit? Yes  How would you like to obtain your AVS? MyChart  If you are dropped from the video visit, the video invite should be resent to: Send to e-mail at: [email or web address]  Will anyone else be joining your video visit? No  {If patient encounters technical issues they should call [phone]      Video-Visit Details    Type of service:  Video Visit    Video Start Time: 1:10 PM  Video End Time: 1:20 PM    Originating Location (pt. Location): Home    Distant Location (provider location):  [facility] WEIGHT MANAGEMENT [facility]     Platform used for Video Visit: Hazelcast    During this virtual visit the patient is located in [place], patient verifies this as " "the location during the entirety of this visit.         Return Bariatric Nutrition Consultation Note    Reason For Visit: Nutrition Assessment    Sasha Hand is a 21 year old presenting today for a return bariatric nutrition consult.   Pt is interested in laparoscopic sleeve gastrectomy with Dr. Mata expected surgery in [place].  Patient is accompanied by mother and sister.  Pt has met required nutrition visits prior to surgery.     Pt referred by Komal Miller NP on January 13, 2022.  Patient with Co-morbidities of obesity including:  Type II DM no - Prediabetes   Renal Failure no  Sleep apnea no  Hypertension yes   Dyslipidemia no  Joint pain no  Back pain no  GERD yes     Support System Reviewed With Patient 1/12/2022   Who do you have in your support network that can be available to help you for the first 2 weeks after surgery? Pranay  (boyfriend) Genet (sister in law) angelo (mother)   Who can you count on for support throughout your weight loss surgery journey? Mother , boyfriend       ANTHROPOMETRICS:  Estimated body mass index is 55.56 kg/m  as calculated from the following:    Height as of 3/28/22: 1.702 m (5' 7\").    Weight as of 3/28/22: 160.9 kg (354 lb 11.5 oz).     Current weight (6/9/22): 340 lbs per pt     Required weight loss goal pre-op: -36 lbs from initial consult weight (goal weight 329 lbs or less before surgery)       1/12/2022   I have tried the following methods to lose weight Watching portions or calories, Exercise, Weight Watchers       Weight Loss Questions Reviewed With Patient 1/12/2022   How long have you been overweight? Since early childhood       SUPPLEMENT INFORMATION:  Biotin     Saxenda- reached 2.0 mg dose yesterday    NUTRITION HISTORY:  Pt has gradually gained weight since puberty. Tried weight watchers in the past. 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 If she has smaller portions she feels hungry within two hours. Snacks at work but " not at home.     2/24/22: Pt has lost weight since last visit. Pt's boyfriend has been a huge support in cooking healthy meals with her.  She has been pre planning meals ahead of time to determine calories for the day. Still wanting to increase physical activity but overall feels her main goal now is to be consistent.     3/24/22: Pt maintained weight since last visit but has not lost weight. Has not been doing any exercise. Has been struggling with life events that are causing stress. Wanting to get back into exercising regularly. Has been able to continue focusing on portion sizes.     4/14/22: Pt has been struggling with emotional eating since last visit. Mom has breast cancer and she is having a difficult time not over-snacking. Is planning to start eating using a smaller plate with sectioning to help with portion sizes.     5/19/22: Pt has been using meal replacements throughout the work week that has helped her to avoid snacking and binging at work. Going to the gym 5 days per week. Using smaller plates.    6/9/22: Pt has lost weight since last visit. Feeling frustrated with rate of weight loss. Has been exercising daily for at least 30 minutes. Continues to utilize meal replacements as needed.    Recall Diet Questions Reviewed With Patient 1/12/2022   Describe what you typically consume for breakfast (typical or most recent): Eggs, cheese, sasuage, taylor. Yogart.   Describe what you typically consume for lunch (typical or most recent): Soup, left over dinner. Used to be fast food. Stopped that. Somthibg sweet. Sonetimes repeat of breakfast.   Describe what you typically consume for supper (typical or most recent): Protine. Veggies, starch.   Describe what you typically consume as snacks (typical or most recent): Cheeze oliver. Jerky. Cookies . (Only at work)   How many ounces of water, or other low calorie drinks, do you drink daily (8 oz=1 glass)? 48 oz   How many ounces of caffeine (coffee, tea, pop) do you  drink daily (8 oz=1 glass)? 24 oz   How many ounces of carbonated (pop, beer, sparkling water) drinks do you drinky daily (8 oz=1 glass)? 24 oz   How many ounces of juice, pop, sweet tea, sports drinks, protein drinks, other sweetened drinks, do you drink daily (8 oz=1 glass)? 8 oz   How many ounces of milk do you drink daily (8 oz=1 glass) 8 oz   Please indicate the type of milk: whole   How often do you drink alcohol? Monthly or less   If you do drink alcohol, how many drinks might you have in a day? (one drink = 5 oz. wine, 1 can/bottle of beer, 1 shot liquor) 1 or 2       Eating Habits 1/12/2022   Do you have any dietary restrictions? No   Do you currently binge eat (eat a large amount of food in a short time)? Yes   Are you an emotional eater? Yes   Do you get up to eat after falling asleep? No   What foods do you crave? Salt and sweet.     Progress on Previous Goals  1) Continue physical activity as able met, continues  2) Continue focusing on lean proteins and non-starchy vegetables met, continues    ADDITIONAL INFORMATION:  Physical activity: mostly sedentary job ().     Doing 40 mins 2x per week    Dining Out History Reviewed With Patient 1/12/2022   How often do you dine out? Rarely.   Where do you dine out? (select all that apply) sit-down restaurants   What types of food do you order when you dine out? Burger or pasta       Physical Activity Reviewed With Patient 1/12/2022   How often do you exercise? 1 to 2 times per week   What is the duration of your exercise (in minutes)? 20 Minutes   What types of exercise do you do? other   What keeps you from being more active?  Pain, Too tired       NUTRITION DIAGNOSIS:  Obesity r/t long history of positive energy balance aeb BMI >30 kg/m2.    INTERVENTION:  Intervention Provided/Education Provided on post-op diet guidelines, vitamins/minerals essential post-operatively, GI anatomy of bariatric surgeries, ways to help prepare for post-op diet  guidelines pre-operatively, portion/calorie-control, mindful eating and sources of protein.  Patient demonstrates understanding. Provided pt with list of goals RD contact information.      Questions Reviewed With Patient 1/12/2022   How ready are you to make changes regarding your weight? Number 1 = Not ready at all to make changes up to 10 = very ready. 10   How confident are you that you can change? 1 = Not confident that you will be successful making changes up to 10 = very confident. 7       Expected Engagement: good    GOALS:  1) Continue physical activity as able  2) Continue focusing on balanced meals with lean proteins and non-starchy vegetables      Protein Sources for Weight Loss  [email or web address]     Carbohydrates  [email or web address]     Mindful Eating  [email or web address]     Summary of Volumetrics Eating Plan  [email or web address]     Diet Guidelines after Weight Loss Surgery  [email or web address]     Seated Exercises for Arms and Legs (can be done before or after surgery)  [email or web address]    Surgery Related Nutrition Handouts:    Diet Guidelines after Weight Loss Surgery  [email or web address]     Lifestyle Changes Before and After Weight Loss Surgery: Ty Ty for Success  [email or web address]     Modified Liquid Diet (2 liquid meals, 1 meal, 2 snacks)  [email or web address]     Your Stage 1 Diet: Clear Liquids  [email or web address]     Your Stage 2 Diet: Low-fat Full Liquids  [email or web address]     Your Stage 3 Diet: Pureed Foods  [email or web address]     Your Stage 4 Diet: Soft Foods  [email or web address]    Your Stage 5 Diet: Regular Foods  [email or web address]    Supplements after Weight Loss Surgery  [email or web address]     Keeping Track of Fluids  [email or web address]    Why Take Supplements for Life after Weight Loss  Surgery  [email or web address]     Keeping Up with Your Diet after Weight Loss Surgery  [email or web address]    Preventing Low Blood Sugar after Weight Loss Surgery  [email or web address]     Preventing Dumping Syndrome after Weight Loss Surgery  [email or web address]      Follow up: 1 month, prn     Time spent with patient: 10 minutes.  MARCIE GLOVER RD, LD

## 2022-06-09 NOTE — PROGRESS NOTES
"Sasha Hand is a 21 year old female who is being evaluated via a billable video visit.      The patient has been notified of following:     \"This video visit will be conducted via a call between you and your physician/provider. We have found that certain health care needs can be provided without the need for an in-person physical exam.  This service lets us provide the care you need with a video conversation.  If a prescription is necessary we can send it directly to your pharmacy.  If lab work is needed we can place an order for that and you can then stop by our lab to have the test done at a later time.    Video visits are billed at different rates depending on your insurance coverage.  Please reach out to your insurance provider with any questions.    If during the course of the call the physician/provider feels a video visit is not appropriate, you will not be charged for this service.\"    Patient has given verbal consent for Video visit? Yes  How would you like to obtain your AVS? MyChart  If you are dropped from the video visit, the video invite should be resent to: Send to e-mail at: [email or web address]  Will anyone else be joining your video visit? No  {If patient encounters technical issues they should call [phone]      Video-Visit Details    Type of service:  Video Visit    Video Start Time: 1:10 PM  Video End Time: 1:20 PM    Originating Location (pt. Location): Home    Distant Location (provider location):  [facility] [place]     Platform used for Video Visit: Inflection    During this virtual visit the patient is located in [place], patient verifies this as the location during the entirety of this visit.         Return Bariatric Nutrition Consultation Note    Reason For Visit: Nutrition Assessment    Sasha Hand is a 21 year old presenting today for a return bariatric nutrition consult.   Pt is interested in laparoscopic sleeve gastrectomy with Dr. Mata expected " "surgery in TBD.  Patient is accompanied by mother and sister.  Pt has met required nutrition visits prior to surgery.     Pt referred by Komal Miller NP on January 13, 2022.  Patient with Co-morbidities of obesity including:  Type II DM no - Prediabetes   Renal Failure no  Sleep apnea no  Hypertension yes   Dyslipidemia no  Joint pain no  Back pain no  GERD yes     Support System Reviewed With Patient 1/12/2022   Who do you have in your support network that can be available to help you for the first 2 weeks after surgery? Pranay  (boyfriend) Genet (sister in law) angelo (mother)   Who can you count on for support throughout your weight loss surgery journey? Mother , boyfriend       ANTHROPOMETRICS:  Estimated body mass index is 55.56 kg/m  as calculated from the following:    Height as of 3/28/22: 1.702 m (5' 7\").    Weight as of 3/28/22: 160.9 kg (354 lb 11.5 oz).     Current weight (6/9/22): 340 lbs per pt     Required weight loss goal pre-op: -36 lbs from initial consult weight (goal weight 329 lbs or less before surgery)       1/12/2022   I have tried the following methods to lose weight Watching portions or calories, Exercise, Weight Watchers       Weight Loss Questions Reviewed With Patient 1/12/2022   How long have you been overweight? Since early childhood       SUPPLEMENT INFORMATION:  Biotin     Saxenda- reached 2.0 mg dose yesterday    NUTRITION HISTORY:  Pt has gradually gained weight since puberty. Tried weight watchers in the past. 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 If she has smaller portions she feels hungry within two hours. Snacks at work but not at home.     2/24/22: Pt has lost weight since last visit. Pt's boyfriend has been a huge support in cooking healthy meals with her.  She has been pre planning meals ahead of time to determine calories for the day. Still wanting to increase physical activity but overall feels her main goal now is to be " consistent.     3/24/22: Pt maintained weight since last visit but has not lost weight. Has not been doing any exercise. Has been struggling with life events that are causing stress. Wanting to get back into exercising regularly. Has been able to continue focusing on portion sizes.     4/14/22: Pt has been struggling with emotional eating since last visit. Mom has breast cancer and she is having a difficult time not over-snacking. Is planning to start eating using a smaller plate with sectioning to help with portion sizes.     5/19/22: Pt has been using meal replacements throughout the work week that has helped her to avoid snacking and binging at work. Going to the gym 5 days per week. Using smaller plates.    6/9/22: Pt has lost weight since last visit. Feeling frustrated with rate of weight loss. Has been exercising daily for at least 30 minutes. Continues to utilize meal replacements as needed.    Recall Diet Questions Reviewed With Patient 1/12/2022   Describe what you typically consume for breakfast (typical or most recent): Eggs, cheese, sasuage, taylor. Yogart.   Describe what you typically consume for lunch (typical or most recent): Soup, left over dinner. Used to be fast food. Stopped that. Somthibg sweet. Sonetimes repeat of breakfast.   Describe what you typically consume for supper (typical or most recent): Protine. Veggies, starch.   Describe what you typically consume as snacks (typical or most recent): Cheeze oliver. Jerky. Cookies . (Only at work)   How many ounces of water, or other low calorie drinks, do you drink daily (8 oz=1 glass)? 48 oz   How many ounces of caffeine (coffee, tea, pop) do you drink daily (8 oz=1 glass)? 24 oz   How many ounces of carbonated (pop, beer, sparkling water) drinks do you drinky daily (8 oz=1 glass)? 24 oz   How many ounces of juice, pop, sweet tea, sports drinks, protein drinks, other sweetened drinks, do you drink daily (8 oz=1 glass)? 8 oz   How many ounces of milk  do you drink daily (8 oz=1 glass) 8 oz   Please indicate the type of milk: whole   How often do you drink alcohol? Monthly or less   If you do drink alcohol, how many drinks might you have in a day? (one drink = 5 oz. wine, 1 can/bottle of beer, 1 shot liquor) 1 or 2       Eating Habits 1/12/2022   Do you have any dietary restrictions? No   Do you currently binge eat (eat a large amount of food in a short time)? Yes   Are you an emotional eater? Yes   Do you get up to eat after falling asleep? No   What foods do you crave? Salt and sweet.     Progress on Previous Goals  1) Continue physical activity as able met, continues  2) Continue focusing on lean proteins and non-starchy vegetables met, continues    ADDITIONAL INFORMATION:  Physical activity: mostly sedentary job ().     Doing 40 mins 2x per week    Dining Out History Reviewed With Patient 1/12/2022   How often do you dine out? Rarely.   Where do you dine out? (select all that apply) sit-down restaurants   What types of food do you order when you dine out? Burger or pasta       Physical Activity Reviewed With Patient 1/12/2022   How often do you exercise? 1 to 2 times per week   What is the duration of your exercise (in minutes)? 20 Minutes   What types of exercise do you do? other   What keeps you from being more active?  Pain, Too tired       NUTRITION DIAGNOSIS:  Obesity r/t long history of positive energy balance aeb BMI >30 kg/m2.    INTERVENTION:  Intervention Provided/Education Provided on post-op diet guidelines, vitamins/minerals essential post-operatively, GI anatomy of bariatric surgeries, ways to help prepare for post-op diet guidelines pre-operatively, portion/calorie-control, mindful eating and sources of protein.  Patient demonstrates understanding. Provided pt with list of goals RD contact information.      Questions Reviewed With Patient 1/12/2022   How ready are you to make changes regarding your weight? Number 1 = Not ready at all  to make changes up to 10 = very ready. 10   How confident are you that you can change? 1 = Not confident that you will be successful making changes up to 10 = very confident. 7       Expected Engagement: good    GOALS:  1) Continue physical activity as able  2) Continue focusing on balanced meals with lean proteins and non-starchy vegetables      Protein Sources for Weight Loss  [email or web address]     Carbohydrates  [email or web address]     Mindful Eating  [email or web address]     Summary of Volumetrics Eating Plan  [email or web address]     Diet Guidelines after Weight Loss Surgery  [email or web address]     Seated Exercises for Arms and Legs (can be done before or after surgery)  [email or web address]    Surgery Related Nutrition Handouts:    Diet Guidelines after Weight Loss Surgery  [email or web address]     Lifestyle Changes Before and After Weight Loss Surgery: [place] for Success  [email or web address]     Modified Liquid Diet (2 liquid meals, 1 meal, 2 snacks)  [email or web address]     Your Stage 1 Diet: Clear Liquids  [email or web address]     Your Stage 2 Diet: Low-fat Full Liquids  [email or web address]     Your Stage 3 Diet: Pureed Foods  [email or web address]     Your Stage 4 Diet: Soft Foods  [email or web address]    Your Stage 5 Diet: Regular Foods  [email or web address]    Supplements after Weight Loss Surgery  [email or web address]     Keeping Track of Fluids  [email or web address]    Why Take Supplements for Life after Weight Loss Surgery  [email or web address]     Keeping Up with Your Diet after Weight Loss Surgery  [email or web address]    Preventing Low Blood Sugar after Weight Loss Surgery  [email or web address]     Preventing Dumping Syndrome after Weight Loss Surgery  [email or web address]      Follow up: 1 month, prn      Time spent with patient: 10 minutes.  MARCIE GLOVER RD, LD

## 2022-06-10 NOTE — PATIENT INSTRUCTIONS
Harjit Baez!    Follow-up with RD in 1 month    Thank you,    Kaleigh Curran, NIKITA, LD  If you would like to schedule or reschedule an appointment with the RD, please call [phone]    Nutrition Goals  1) Continue physical activity as able  2) Continue focusing on balanced meals with lean proteins and non-starchy vegetables    Interested in working with a health ? Health coaches work with you to improve your overall health and wellbeing. They look at the whole person, and may involve discussion of different areas of life, including, but not limited to the four pillars of health (sleep, exercise, nutrition, and stress management). Discuss with your care team if you would like to start working a health .    Health Coaching-3 Pack:    $99 for three health coaching visits    Visits may be done in person or via phone    Coaching is a partnership between the  and the client; Coaches do not prescribe or diagnose    Coaching helps inspire the client to reach his/her personal goals    COMPREHENSIVE WEIGHT MANAGEMENT PROGRAM  VIRTUAL SUPPORT GROUPS    For Support Group Information:      We offer support groups for patients who are working on weight loss and considering, preparing for or have had weight loss surgery.   There is no cost for this opportunity.  You are invited to attend the?Virtual Support Groups?provided by any of the following locations:    [facility] via Zenbox Teams with Sangita Eastman RN  2.   [place] via HQ plus with Bryce Raymond, PhD, LP  3.   [place] via HQ plus with Tari Blue, ELLY  4.   [facility] via Zenbox Teams with Tari Schultz, [facility]    The following Support Group information can also be found on our website:  [email or web address]      [facility] Weight Loss Surgery Support Group    [facility] Weight Loss Surgery Support Group  The support group is a patient-lead forum  "that meets monthly to share experiences, encouragement and education. It is open to those who have had weight loss surgery, are scheduled for surgery, and those who are considering surgery.   WHEN: This group meets on the 3rd Wednesday of each month from 5:00PM - 6:00PM virtually using Microsoft Teams.   FACILITATOR: Led by Sangita Eastman RD, LD, RN, the program's Clinical Coordinator.   TO REGISTER: Please contact the clinic via Thermedical or call the nurse line directly at [phone] to inform our staff that you would like an invite sent to you and to let us know the email you would like the invite sent to. Prior to the meeting, a link with directions on how to join the meeting will be sent to you.    2022 Meetings  January 19: \"Let's Talk\" a time for the group to share.  February 16: \"Let's Talk\" a time for the group to share.  March 16: Guest Speakers: Psychologists, Katherine Pearson, PhD,LP and Evelia Marte PsyD,  April 20: Guest Speaker: Health Nanette, [facility],CHES, [facility]  May 18: Guest Speaker: Dietitian, Marvel Cannon, NIKITA, LP  Bhumika 15: \"Let's Talk\" a time for the group to share.  July 20: \"Let's Talk\" a time for the group to share.  August 17: TBA  September 21: TBA  October 19: Guest Speaker: Dr Ang Turner MD Pulmonologist and Sleep Medicine Physician, \"Getting a Good Night's Sleep\".  November 16: TBA  December 21: TBA    [facility] and [facility] Support Groups    Connections: Bariatric Care Support Group?  This is open to all [facility] (and those external to this program) pre- and post- operative bariatric surgery patients as well as their support system.   WHEN: This group meets the 2nd Tuesday of each month from 6:30 PM - 8:00 PM virtually using Microsoft Teams.   FACILITATOR: Led by Bryce Raymond, Ph.D who is a Licensed Psychologist with the [facility] Comprehensive Weight Management Program.   TO REGISTER: Please send an email to Bryce Raymond, Ph.D., " "LP at?[email or web address]?if you would like an invitation to the group and to learn about using Microsoft Teams.    2022 Meetings January 11: Kelsey Menard, PharmD, Pharmacy Resident at [facility], \"Medications and Bariatric Surgery\".  February 8: Open Forum  March 8  April 12  May 10  Bhumika 14    Connections: Post-Operative Bariatric Surgery Support Group  This is a support group for [facility] bariatric patients (and those external to [facility]) who have had bariatric surgery and are at least 3 months post-surgery.  WHEN: This support group meets the 4th Wednesday of the month from 11:00 AM - 12:00 PM virtually using Microsoft Teams.   FACILITATOR: Led by Certified Bariatric Nurse, Tari Blue RN.   TO REGISTER: Please send an email to Tari at [email or web address] if you would like an invitation to the group and to learn about using Microsoft Teams.    2022 Meetings  January 26  February 23  March 23  April 27  May 25  Bhumika 22    [facility] Healthy Lifestyle Virtual Support Group    Healthy Lifestyle Virtual Support Group?  This is 60 minutes of small group guided discussion, support and resources. All are welcome who want a healthy lifestyle.  WHEN: This group meets monthly on a Friday from 12:30 PM - 1:30 PM virtually using Microsoft Teams.   FACILITATOR: Led by National Board Certified Health and , Tari Schultz [facility].   TO REGISTER: Please send an email to Tari [email or web address] to receive monthly invites to the group or if you have any questions about having a health .  Prior to the meeting, a link with directions on how to join the meeting will be sent to you.    2022 Meetings January 21: Lauren Lara MS, RN, CIC, CBN, \"Healthy Habits\"  February 25: Open Forum  March 18: \"Setting Limits and Boundaries\"  April 29: Hilda Murguia RD, \"Meal Planning Made Easy\"  May 20: Open Forum  June: To be " determined

## 2022-06-20 ENCOUNTER — PREP FOR PROCEDURE (OUTPATIENT)
Dept: ENDOCRINOLOGY | Facility: CLINIC | Age: 22
End: 2022-06-20
Payer: COMMERCIAL

## 2022-06-20 DIAGNOSIS — Z01.818 PREOPERATIVE TESTING: ICD-10-CM

## 2022-06-20 DIAGNOSIS — E66.01 MORBID OBESITY (H): Primary | ICD-10-CM

## 2022-06-20 RX ORDER — ONDANSETRON 2 MG/ML
4 INJECTION INTRAMUSCULAR; INTRAVENOUS
Status: CANCELLED | OUTPATIENT
Start: 2022-06-20

## 2022-06-20 RX ORDER — CEFAZOLIN SODIUM IN 0.9 % NACL 3 G/100 ML
3 INTRAVENOUS SOLUTION, PIGGYBACK (ML) INTRAVENOUS SEE ADMIN INSTRUCTIONS
Status: CANCELLED | OUTPATIENT
Start: 2022-06-20

## 2022-06-20 RX ORDER — CEFAZOLIN SODIUM IN 0.9 % NACL 3 G/100 ML
3 INTRAVENOUS SOLUTION, PIGGYBACK (ML) INTRAVENOUS
Status: CANCELLED | OUTPATIENT
Start: 2022-06-20

## 2022-06-20 RX ORDER — ACETAMINOPHEN 325 MG/1
975 TABLET ORAL ONCE
Status: CANCELLED | OUTPATIENT
Start: 2022-06-20 | End: 2022-06-20

## 2022-06-20 RX ORDER — ENOXAPARIN SODIUM 100 MG/ML
40 INJECTION SUBCUTANEOUS
Status: CANCELLED | OUTPATIENT
Start: 2022-06-20

## 2022-06-21 ENCOUNTER — TELEPHONE (OUTPATIENT)
Dept: SURGERY | Facility: CLINIC | Age: 22
End: 2022-06-21
Payer: COMMERCIAL

## 2022-06-22 NOTE — TELEPHONE ENCOUNTER
FUTURE VISIT INFORMATION      SURGERY INFORMATION:    Date: 07/26/22 Dr. Mata    RECORDS REQUESTED FROM:        Primary Care Provider: Oscar Medley MD  - [facility]    Pertinent Medical History: hypertension    Most recent EKG+ Tracing: 10/19/21- [facility]

## 2022-06-23 ENCOUNTER — PREP FOR PROCEDURE (OUTPATIENT)
Dept: ENDOCRINOLOGY | Facility: CLINIC | Age: 22
End: 2022-06-23

## 2022-06-23 DIAGNOSIS — E66.01 MORBID OBESITY (H): ICD-10-CM

## 2022-06-23 DIAGNOSIS — E66.01 MORBID OBESITY (H): Primary | ICD-10-CM

## 2022-06-23 DIAGNOSIS — Z01.818 PREOPERATIVE TESTING: ICD-10-CM

## 2022-06-23 RX ORDER — ONDANSETRON 2 MG/ML
4 INJECTION INTRAMUSCULAR; INTRAVENOUS
Status: CANCELLED | OUTPATIENT
Start: 2022-06-23

## 2022-06-23 RX ORDER — CEFAZOLIN SODIUM IN 0.9 % NACL 3 G/100 ML
3 INTRAVENOUS SOLUTION, PIGGYBACK (ML) INTRAVENOUS SEE ADMIN INSTRUCTIONS
Status: CANCELLED | OUTPATIENT
Start: 2022-06-23

## 2022-06-23 RX ORDER — CEFAZOLIN SODIUM IN 0.9 % NACL 3 G/100 ML
3 INTRAVENOUS SOLUTION, PIGGYBACK (ML) INTRAVENOUS
Status: CANCELLED | OUTPATIENT
Start: 2022-06-23

## 2022-06-23 RX ORDER — ACETAMINOPHEN 325 MG/1
975 TABLET ORAL ONCE
Status: CANCELLED | OUTPATIENT
Start: 2022-06-23 | End: 2022-06-23

## 2022-06-23 RX ORDER — PEN NEEDLE, DIABETIC 31 GX5/16"
NEEDLE, DISPOSABLE MISCELLANEOUS
Qty: 100 EACH | Refills: 0 | Status: SHIPPED | OUTPATIENT
Start: 2022-06-23 | End: 2022-07-14

## 2022-06-23 RX ORDER — ENOXAPARIN SODIUM 100 MG/ML
40 INJECTION SUBCUTANEOUS
Status: CANCELLED | OUTPATIENT
Start: 2022-06-23

## 2022-06-23 NOTE — TELEPHONE ENCOUNTER
Called and spoke with pharmacist in regards to Saxenda rx and directions. Requesting refill with no titration directions. Patient has appointment with Komal Miller CNP in August. Routed refill with maintenance dose to RN for sign off.

## 2022-06-24 ENCOUNTER — TELEPHONE (OUTPATIENT)
Dept: ENDOCRINOLOGY | Facility: CLINIC | Age: 22
End: 2022-06-24

## 2022-06-24 NOTE — TELEPHONE ENCOUNTER
PA Initiation    Medication: Theodore christine pending  Insurance Company: [employer] - Phone [phone] Fax [phone]  Pharmacy Filling the Rx:    Filling Pharmacy Phone:    Filling Pharmacy Fax:    Start Date: 2022    Previous PA , and pt has new insurance. Now on preferred one.     Fax sent to the plan  Your PA has been faxed to the plan as a paper copy. Please contact the plan directly if you haven't received a determination in a typical timeframe.    You will be notified of the determination electronically and via fax.  How do I know if the plan approved the PA?    Add Reminder to your Dashboard  Remind me in:   5 business days

## 2022-06-24 NOTE — TELEPHONE ENCOUNTER
PRIOR AUTHORIZATION DENIED    Medication: Saxenda pa denied    Denial Date: 6/24/2022    Denial Rational:       Appeal Information:  No option to appeal

## 2022-06-27 ENCOUNTER — TELEPHONE (OUTPATIENT)
Dept: ENDOCRINOLOGY | Facility: CLINIC | Age: 22
End: 2022-06-27

## 2022-06-27 NOTE — TELEPHONE ENCOUNTER
Received the prior authorization from  for a sleeve gastrectomy scheduled 7/26/22 with Dr. Mata. Auth # [id number] effective 6/24/22 - 6/23/23 per letter dated June 24, 2022.

## 2022-06-30 ENCOUNTER — ANESTHESIA EVENT (OUTPATIENT)
Dept: SURGERY | Facility: CLINIC | Age: 22
End: 2022-06-30

## 2022-06-30 ENCOUNTER — VIRTUAL VISIT (OUTPATIENT)
Dept: SURGERY | Facility: CLINIC | Age: 22
End: 2022-06-30
Attending: SURGERY
Payer: COMMERCIAL

## 2022-06-30 ENCOUNTER — PRE VISIT (OUTPATIENT)
Dept: SURGERY | Facility: CLINIC | Age: 22
End: 2022-06-30

## 2022-06-30 DIAGNOSIS — Z01.818 PREOP EXAMINATION: Primary | ICD-10-CM

## 2022-06-30 DIAGNOSIS — E66.01 MORBID OBESITY (H): ICD-10-CM

## 2022-06-30 PROCEDURE — 99204 OFFICE O/P NEW MOD 45 MIN: CPT | Mod: 95 | Performed by: PHYSICIAN ASSISTANT

## 2022-06-30 ASSESSMENT — PAIN SCALES - GENERAL: PAINLEVEL: NO PAIN (0)

## 2022-06-30 NOTE — PATIENT INSTRUCTIONS
Preparing for Your Surgery      Name:  Sasha Hand   MRN:  [id number]   :  2000   Today's Date:  2022       Arriving for surgery:  Surgery date:  22  Arrival time:  10:50AM    Restrictions due to COVID 19       Effective 22 [facility] is implementing the following visitor policy:     1 person may accompany the patient through the Pre-Op process.      That same person may wait in the Surgery Waiting room, provided there is enough room to social distance           Visitors must wear a mask.      Visitors must not be ill.        Inpatients are allowed 2 visitors per day for the duration of their stay.      Visiting hours are 8 am to 8 pm.    [employer]([facility] parking is available for anyone with mobility limitations or disabilities.  ([facility]  24 hours/ 7 days a week; [facility]  7 am- 3:30 pm, Mon- Fri)    Please come to:     [facility] [address]  [address] [place] [address]    - ?[employer]([facility]) parking is available in front of the hospital      -   Parking is available in the Patient Visitor Ramp on [facility].     -   When entering the hospital you will be asked COVID screening questions, you will then be directed to Registration.  Registration will direct you to the 3rd floor Surgery waiting room.     -   Please ask if you need an escort or a wheelchair to the Surgery Waiting Room.  Preop number- [phone]      What can I eat or drink?  -  Follow instructions from Bariatric surgery, begin Clear Liquid diet 22.   -  You may have sips of water until 3 hours before surgery. (Until 22, 9:50AM)    Examples of clear liquids:  Water  Clear broth  Juices (apple, white grape, white cranberry  and cider) without pulp  Noncarbonated, powder based beverages  (lemonade and Jamie-Aid)  Coffee or tea (without milk or cream)  Gatorade    -  No Alcohol for at least 24 hours before surgery     Which medicines can I  take?    Hold Aspirin for 7 days before surgery.   Hold Multivitamins for 7 days before surgery.  Hold Supplements for 7 days before surgery.  Hold Ibuprofen (Advil, Motrin) for 1 day before surgery--unless otherwise directed by surgeon.  Hold Naproxen (Aleve) for 4 days before surgery.    -  DO NOT take these medications the day of surgery:    Vitamin D2    -  PLEASE TAKE these medications the day of surgery:    Metoprolol   Omeprazole(Prilosec)  -As Needed:    Albuterol(Ventolin) inhaler, use as needed and bring the day of surgery    Fexofenadine(Allegra)    Duoneb         How do I prepare myself?  - Please take 2 showers before surgery using Scrubcare or Hibiclens soap.    Use this soap only from the neck to your toes.     Leave the soap on your skin for one minute--then rinse thoroughly.      You may use your own shampoo and conditioner; no other hair products.   - Please remove all jewelry and body piercings.  - No lotions, deodorants or fragrance.  - No makeup or fingernail polish.   - Bring your ID and insurance card.    -If you have a Deep Brain Stimulator, Spinal Cord Stimulator or any neuro stimulator device---you must bring the remote control to the hospital         Questions or Concerns:    - For any questions regarding the day of surgery or your hospital stay, please contact the Pre Admission Nursing Office at [phone].       - If you have health changes between today and your surgery please call your surgeon.       For questions after surgery please call your surgeons office.

## 2022-06-30 NOTE — PROGRESS NOTES
Sasha is a 22 year old who is being evaluated via a billable video visit.      How would you like to obtain your AVS? MyChart  If the video visit is dropped, the invitation should be resent by: Text to cell phone: [phone]     HPI         Review of Systems         Objective    Vitals - Patient Reported  Pain Score: No Pain (0)        Physical Exam     .  ..

## 2022-06-30 NOTE — H&P
Pre-Operative H & P     CC:  Preoperative exam to assess for increased cardiopulmonary risk while undergoing surgery and anesthesia.    Date of Encounter: 6/30/2022  Primary Care Physician:  Ana Gerardo     Reason for visit:   Encounter Diagnoses   Name Primary?     Preop examination Yes     Morbid obesity (H)        HPI  Sasha Hand is a 22 year old female who presents for pre-operative H & P in preparation for  Procedure Information     Date/Time: 7/26/22     Procedure: GASTRECTOMY, SLEEVE, LAPAROSCOPIC, possible HERNIORRHAPHY, HIATAL, LAPAROSCOPIC    Anesthesia type: General    Pre-op diagnosis: morbid obesity    Location: [facility]    Providers: Dr. Mata          Sasha is a 22-year-old female with past medical history significant for hypertension, allergic rhinitis, asthma, morbid obesity, GERD, depression and anxiety.  She has tried various methods to lose weight to include portion control, calorie counting, exercise, prescription medication, and weight watchers.  She has been followed by the bariatric clinic for weight loss and is ready to proceed.    Of note, patient had a positive at home Covid test on 3/10/22. Her symptoms were mild and have resolved. She has a picture of the positive test and will bring it with her on DOS.    History is obtained from the patient and chart review    Hx of abnormal bleeding or anti-platelet use: denies    Menstrual history: No LMP recorded. Patient has had an implant.:      Past Medical History  Past Medical History:   Diagnosis Date     Closed head injury, subsequent encounter 4/5/2018     History of PCR DNA positive for HSV1      Moderate major depression (H) 1/20/2014     Nexplanon placed 11/14/17 11/14/2017     Nexplanon placed 11/14/17 (removed 1/16/2020) 11/14/2017     Nexplanon placed 12/14/2021 12/14/2021     Obesity 9/14/2010     Pyelonephritis 2/19/2018     Skin tag (right collar line and left labial  region 11/21/2019     Tobacco use disorder 5/4/2016     Uncomplicated asthma        Past Surgical History  Past Surgical History:   Procedure Laterality Date     ARTHROSCOPY KNEE WITH MENISCAL REPAIR Right 5/10/2017    Procedure: ARTHROSCOPY KNEE WITH MENISCAL REPAIR;  arthroscopy right knee with lateral meniscus repair;  Surgeon: Nathaniel Hicks MD;  Location: [facility]     ESOPHAGOSCOPY, GASTROSCOPY, DUODENOSCOPY (EGD), COMBINED N/A 3/28/2022    Procedure: ESOPHAGOGASTRODUODENOSCOPY (EGD);  Surgeon: Joseph Mata MD;  Location: [facility] OR       Prior to Admission Medications  Current Outpatient Medications   Medication Sig Dispense Refill     albuterol (PROAIR HFA/PROVENTIL HFA/VENTOLIN HFA) 108 (90 Base) MCG/ACT inhaler Inhale 1-2 puffs into the lungs every 4 hours as needed for shortness of breath / dyspnea or wheezing 8.5 g 5     clotrimazole (LOTRIMIN) 1 % external cream Apply topically 2 times daily (Patient taking differently: Apply topically as needed) 15 g 1     etonogestrel (NEXPLANON) 68 MG IMPL 1 each (68 mg) by Subdermal route once       Fexofenadine HCl (ALLEGRA PO) Take by mouth daily as needed for allergies       ipratropium - albuterol 0.5 mg/2.5 mg/3 mL (DUONEB) 0.5-2.5 (3) MG/3ML neb solution Take 1 vial (3 mLs) by nebulization every 6 hours as needed for shortness of breath / dyspnea or wheezing 30 vial 1     metoprolol succinate ER (TOPROL-XL) 50 MG 24 hr tablet Take 1 tablet (50 mg) by mouth daily (Patient taking differently: Take 50 mg by mouth every morning) 90 tablet 3     Multiple Vitamins-Minerals (MULTIVITAMIN WOMEN PO) Take 1 tablet by mouth every morning With iron       omeprazole (PRILOSEC) 20 MG DR capsule Take 20 mg by mouth every morning       VENTOLIN  (90 Base) MCG/ACT inhaler Inhale 1-2 puffs into the lungs every 4 hours as needed for shortness of breath / dyspnea or wheezing (Patient taking differently: as needed) 18 g 0     Vitamin D2, Ergocalciferol, 50 MCG (2000  UT) CAPS Take 1 tablet by mouth every morning       blood glucose (NO BRAND SPECIFIED) test strip Use to test blood sugar 3 times daily or as directed. 100 strip 1     blood glucose monitoring (NO BRAND SPECIFIED) meter device kit Use to test blood sugar 3 times daily or as directed. 1 kit 1     insulin pen needle (31G X 5 MM) 31G X 5 MM miscellaneous Use 1 pen needles daily or as directed. 100 each 1     insulin pen needle (B-D U/F) 31G X 8 MM miscellaneous Use 1 pen needles daily or as directed. 100 each 0     liraglutide - Weight Management (SAXENDA) 18 MG/3ML pen Inject 3 mg Subcutaneous daily (Patient not taking: Reported on 2022) 15 mL 1     Microlet Lancets MISC 1 each 3 times daily 100 each 3       Allergies  Allergies   Allergen Reactions     No Known Drug Allergies      Seasonal Allergies      Steri Strips      Got boils on incision after knee surgery from steri strips       Social History  Social History     Socioeconomic History     Marital status: Single     Spouse name: Not on file     Number of children: Not on file     Years of education: Not on file     Highest education level: Not on file   Occupational History     Occupation: student     Occupation: [employer]     Comment: [employer]   Tobacco Use     Smoking status: Former Smoker     Packs/day: 1.00     Types: Cigarettes     Quit date: 2021     Years since quittin.4     Smokeless tobacco: Former User   Vaping Use     Vaping Use: Never used   Substance and Sexual Activity     Alcohol use: No     Alcohol/week: 0.0 standard drinks     Drug use: No     Sexual activity: Yes     Partners: Male     Birth control/protection: Condom   Other Topics Concern     Parent/sibling w/ CABG, MI or angioplasty before 65F 55M? Not Asked   Social History Narrative     Not on file     Social Determinants of Health     Financial Resource Strain: Not on file   Food Insecurity: Not on file   Transportation Needs: Not on file   Physical Activity: Not  on file   Stress: Not on file   Social Connections: Not on file   Intimate Partner Violence: Not on file   Housing Stability: Not on file       Family History  Family History   Problem Relation Age of Onset     Asthma Mother      Hypertension Mother      Asthma Father      Diabetes Father      Cancer Father 60        metastatic to brain     Cervical Cancer Sister 19     Cancer Paternal Grandmother      Anesthesia Reaction No family hx of      Bleeding Disorder No family hx of      Clotting Disorder No family hx of        Review of Systems  The complete review of systems is negative other than noted in the HPI or here.     Anesthesia Evaluation   Pt has had prior anesthetic.     History of anesthetic complications   pt reports difficulty with mouth guard during EGD in March.  This led to her biting her lip during the procedure..    ROS/MED HX  ENT/Pulmonary:     (+) MADIHA risk factors, hypertension, obese, allergic rhinitis, Intermittent, asthma Last exacerbation: none recent,  Treatment: Inhaler prn,  recent URI, covid positive test, home test, on 6/10.  mild URI symptoms.  cough is resolved.:     Neurologic:  - neg neurologic ROS     Cardiovascular:     (+) hypertension-----    METS/Exercise Tolerance: >4 METS    Hematologic:  - neg hematologic  ROS  (-) history of blood clots and history of blood transfusion   Musculoskeletal:  - neg musculoskeletal ROS     GI/Hepatic:     (+) GERD, Asymptomatic on medication,     Renal/Genitourinary:  - neg Renal ROS     Endo:     (+) Obesity,  (-) Type I DM, Type II DM and thyroid disease   Psychiatric/Substance Use:     (+) psychiatric history anxiety and depression     Infectious Disease:       Malignancy:  - neg malignancy ROS     Other:  - neg other ROS          Virtual visit -  No vitals were obtained    Physical Exam  Constitutional: Awake, alert, cooperative, no apparent distress, and appears stated age.  HENT: Normocephalic  Respiratory: non labored breathing   Neurologic:  Awake, alert, oriented to name, place and time.   Neuropsychiatric: Calm, cooperative. Normal affect.      Prior Labs/Diagnostic Studies   All labs and imaging personally reviewed     Component      Latest Ref Rng & Units 3/25/2022   Hemoglobin A1C      0.0 - 5.6 % 5.1     Component      Latest Ref Rng & Units 7/14/2022   Color Urine      Colorless, Straw, Light Yellow, Yellow Yellow   Appearance Urine      Clear Clear   Glucose Urine      Negative mg/dL Negative   Bilirubin Urine      Negative Negative   Ketones Urine      Negative mg/dL Negative   Specific Gravity Urine      1.003 - 1.035 1.008   Blood Urine      Negative Negative   pH Urine      5.0 - 7.0 7.0   Protein Albumin Urine      Negative mg/dL Negative   Urobilinogen mg/dL      Normal, 2.0 mg/dL Normal   Nitrite Urine      Negative Negative   Leukocyte Esterase Urine      Negative Negative   Sodium      133 - 144 mmol/L 141   Potassium      3.4 - 5.3 mmol/L 4.0   Chloride      94 - 109 mmol/L 112 (H)   Carbon Dioxide      20 - 32 mmol/L 23   Anion Gap      3 - 14 mmol/L 6   Urea Nitrogen      7 - 30 mg/dL 9   Creatinine      0.52 - 1.04 mg/dL 0.52   Calcium      8.5 - 10.1 mg/dL 8.9   Glucose      70 - 99 mg/dL 90   GFR Estimate      >60 mL/min/1.73m2 >90   WBC      4.0 - 11.0 10e3/uL 7.8   RBC Count      3.80 - 5.20 10e6/uL 5.22 (H)   Hemoglobin      11.7 - 15.7 g/dL 14.9   Hematocrit      35.0 - 47.0 % 44.1   MCV      78 - 100 fL 85   MCH      26.5 - 33.0 pg 28.5   MCHC      31.5 - 36.5 g/dL 33.8   RDW      10.0 - 15.0 % 12.8   Platelet Count      150 - 450 10e3/uL 307   Hemoglobin A1C      0.0 - 5.6 % 5.0     EKG/ stress test - if available please see in ROS above       The patient's records and results personally reviewed by this provider.     Outside records reviewed from: Care Everywhere      Assessment      Sasha Hand is a 22 year old female seen as a PAC referral for risk assessment and optimization for anesthesia.    Plan/Recommendations  Pt  "will be optimized for the proposed procedure.  See below for details on the assessment, risk, and preoperative recommendations    NEUROLOGY  - No history of TIA, CVA or seizure  - Post Op delirium risk factors:  No risk identified    ENT  - No current airway concerns.  Will need to be reassessed day of surgery.  Mallampati: Unable to assess  TM: Unable to assess    CARDIAC  - denies chest pain, SOB, LANCE, palpitations, syncope, near syncope, edema   - h/o HTN, continue metoprolol    - METS (Metabolic Equivalents), patient exercises regularly on treadmill.  She states she sometimes feels she cannot take a deep full breath when exercising. No associated symptoms such as lightheadedness, chest pain or pressure.  This has been ongoing >1 year and has not changed or gotten worse.  She has not tried using her albuterol inhaler for exercise.    Patient performs 4 or more METS exercise without symptoms            Total Score: 0      RCRI-Low risk: Class 2 0.9% complication rate            Total Score: 1    RCRI: High Risk Surgery        PULMONARY    MADIHA Low Risk            Total Score: 2    MADIHA: Hypertension    MADIHA: BMI over 35 kg/m2      - Asthma  Well controlled per patient.  She rarely uses albuterol inhaler.    - Tobacco History      History   Smoking Status     Former Smoker     Packs/day: 1.00     Types: Cigarettes     Quit date: 2/1/2021   Smokeless Tobacco     Former User       GI  - GERD  Controlled on medications: Proton Pump Inhibitor  PONV High Risk  Total Score: 3           1 AN PONV: Pt is Female    1 AN PONV: Patient is not a current smoker    1 AN PONV: Intended Post Op Opioids        /RENAL  - Creatinine 0.50, Jan 2022    ENDOCRINE    - BMI: Estimated body mass index is 55.56 kg/m  as calculated from the following:    Height as of 3/28/22: 1.702 m (5' 7\").    Weight as of 3/28/22: 160.9 kg (354 lb 11.5 oz).  Class 3 Obesity (BMI > 40)  - No history of Diabetes Mellitus    HEME  VTE Low Risk 0.26%         "    Total Score: 1    VTE: BMI greater than 39      - No history of abnormal bleeding or antiplatelet use.      PSYCH  - h/o anxiety and depression well controlled.  No current medications.    OTHER  - patient had a positive at home Covid test on 3/10/22. Her symptoms were mild and have resolved. She has a picture of the positive test and will bring it with her on DOS.  Do not recommend PCR.      The patient is optimized for their procedure. AVS with information on surgery time/arrival time, meds and NPO status given by nursing staff. No further diagnostic testing indicated.    Please refer to the physical examination documented by the anesthesiologist in the anesthesia record on the day of surgery.    Video-Visit Details    Type of service:  Video Visit    Patient verbally consented to video service today: YES    Video Start Time: 0845  Video End Time (time video stopped): 0904    Originating Location (pt. Location): Home    Distant Location (provider location):  home    Mode of Communication:  Video Conference via [employer]     On the day of service:     Prep time: 10 minutes  Visit time: 19 minutes  Documentation time: 14 minutes  ------------------------------------------  Total time: 45 minutes      Ana Gerardo PA-C  Preoperative Assessment Center  [facility]  Phone: [phone]  Fax: [phone]

## 2022-06-30 NOTE — H&P (VIEW-ONLY)
Pre-Operative H & P     CC:  Preoperative exam to assess for increased cardiopulmonary risk while undergoing surgery and anesthesia.    Date of Encounter: 6/30/2022  Primary Care Physician:  Ana Gerardo     Reason for visit:   Encounter Diagnoses   Name Primary?     Preop examination Yes     Morbid obesity (H)        HPI  Sasha Hand is a 22 year old female who presents for pre-operative H & P in preparation for  Procedure Information     Date/Time: 7/26/22     Procedure: GASTRECTOMY, SLEEVE, LAPAROSCOPIC, possible HERNIORRHAPHY, HIATAL, LAPAROSCOPIC    Anesthesia type: General    Pre-op diagnosis: morbid obesity    Location: [facility]    Providers: Dr. Mata          Sasha is a 22-year-old female with past medical history significant for hypertension, allergic rhinitis, asthma, morbid obesity, GERD, depression and anxiety.  She has tried various methods to lose weight to include portion control, calorie counting, exercise, prescription medication, and weight watchers.  She has been followed by the bariatric clinic for weight loss and is ready to proceed.    Of note, patient had a positive at home Covid test on 3/10/22. Her symptoms were mild and have resolved. She has a picture of the positive test and will bring it with her on DOS.    History is obtained from the patient and chart review    Hx of abnormal bleeding or anti-platelet use: denies    Menstrual history: No LMP recorded. Patient has had an implant.:      Past Medical History  Past Medical History:   Diagnosis Date     Closed head injury, subsequent encounter 4/5/2018     History of PCR DNA positive for HSV1      Moderate major depression (H) 1/20/2014     Nexplanon placed 11/14/17 11/14/2017     Nexplanon placed 11/14/17 (removed 1/16/2020) 11/14/2017     Nexplanon placed 12/14/2021 12/14/2021     Obesity 9/14/2010     Pyelonephritis 2/19/2018     Skin tag (right collar line and left labial  region 11/21/2019     Tobacco use disorder 5/4/2016     Uncomplicated asthma        Past Surgical History  Past Surgical History:   Procedure Laterality Date     ARTHROSCOPY KNEE WITH MENISCAL REPAIR Right 5/10/2017    Procedure: ARTHROSCOPY KNEE WITH MENISCAL REPAIR;  arthroscopy right knee with lateral meniscus repair;  Surgeon: Nathaniel Hicks MD;  Location: [facility]     ESOPHAGOSCOPY, GASTROSCOPY, DUODENOSCOPY (EGD), COMBINED N/A 3/28/2022    Procedure: ESOPHAGOGASTRODUODENOSCOPY (EGD);  Surgeon: Joseph Mata MD;  Location: [facility]       Prior to Admission Medications  Current Outpatient Medications   Medication Sig Dispense Refill     albuterol (PROAIR HFA/PROVENTIL HFA/VENTOLIN HFA) 108 (90 Base) MCG/ACT inhaler Inhale 1-2 puffs into the lungs every 4 hours as needed for shortness of breath / dyspnea or wheezing 8.5 g 5     clotrimazole (LOTRIMIN) 1 % external cream Apply topically 2 times daily (Patient taking differently: Apply topically as needed) 15 g 1     etonogestrel (NEXPLANON) 68 MG IMPL 1 each (68 mg) by Subdermal route once       Fexofenadine HCl (ALLEGRA PO) Take by mouth daily as needed for allergies       ipratropium - albuterol 0.5 mg/2.5 mg/3 mL (DUONEB) 0.5-2.5 (3) MG/3ML neb solution Take 1 vial (3 mLs) by nebulization every 6 hours as needed for shortness of breath / dyspnea or wheezing 30 vial 1     metoprolol succinate ER (TOPROL-XL) 50 MG 24 hr tablet Take 1 tablet (50 mg) by mouth daily (Patient taking differently: Take 50 mg by mouth every morning) 90 tablet 3     Multiple Vitamins-Minerals (MULTIVITAMIN WOMEN PO) Take 1 tablet by mouth every morning With iron       omeprazole (PRILOSEC) 20 MG DR capsule Take 20 mg by mouth every morning       VENTOLIN  (90 Base) MCG/ACT inhaler Inhale 1-2 puffs into the lungs every 4 hours as needed for shortness of breath / dyspnea or wheezing (Patient taking differently: as needed) 18 g 0     Vitamin D2, Ergocalciferol, 50 MCG (2000  UT) CAPS Take 1 tablet by mouth every morning       blood glucose (NO BRAND SPECIFIED) test strip Use to test blood sugar 3 times daily or as directed. 100 strip 1     blood glucose monitoring (NO BRAND SPECIFIED) meter device kit Use to test blood sugar 3 times daily or as directed. 1 kit 1     insulin pen needle (31G X 5 MM) 31G X 5 MM miscellaneous Use 1 pen needles daily or as directed. 100 each 1     insulin pen needle (B-D U/F) 31G X 8 MM miscellaneous Use 1 pen needles daily or as directed. 100 each 0     liraglutide - Weight Management (SAXENDA) 18 MG/3ML pen Inject 3 mg Subcutaneous daily (Patient not taking: Reported on 2022) 15 mL 1     Microlet Lancets MISC 1 each 3 times daily 100 each 3       Allergies  Allergies   Allergen Reactions     No Known Drug Allergies      Seasonal Allergies      Steri Strips      Got boils on incision after knee surgery from steri strips       Social History  Social History     Socioeconomic History     Marital status: Single     Spouse name: Not on file     Number of children: Not on file     Years of education: Not on file     Highest education level: Not on file   Occupational History     Occupation: student     Occupation: [employer]     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 Not Asked   Social History Narrative     Not on file     Social Determinants of Health     Financial Resource Strain: Not on file   Food Insecurity: Not on file   Transportation Needs: Not on file   Physical Activity: Not  on file   Stress: Not on file   Social Connections: Not on file   Intimate Partner Violence: Not on file   Housing Stability: Not on file       Family History  Family History   Problem Relation Age of Onset     Asthma Mother      Hypertension Mother      Asthma Father      Diabetes Father      Cancer Father 60        metastatic to brain     Cervical Cancer Sister 19     Cancer Paternal Grandmother      Anesthesia Reaction No family hx of      Bleeding Disorder No family hx of      Clotting Disorder No family hx of        Review of Systems  The complete review of systems is negative other than noted in the HPI or here.     Anesthesia Evaluation   Pt has had prior anesthetic.     History of anesthetic complications   pt reports difficulty with mouth guard during EGD in March.  This led to her biting her lip during the procedure..    ROS/MED HX  ENT/Pulmonary:     (+) MADIHA risk factors, hypertension, obese, allergic rhinitis, Intermittent, asthma Last exacerbation: none recent,  Treatment: Inhaler prn,  recent URI, covid positive test, home test, on 6/10.  mild URI symptoms.  cough is resolved.:     Neurologic:  - neg neurologic ROS     Cardiovascular:     (+) hypertension-----    METS/Exercise Tolerance: >4 METS    Hematologic:  - neg hematologic  ROS  (-) history of blood clots and history of blood transfusion   Musculoskeletal:  - neg musculoskeletal ROS     GI/Hepatic:     (+) GERD, Asymptomatic on medication,     Renal/Genitourinary:  - neg Renal ROS     Endo:     (+) Obesity,  (-) Type I DM, Type II DM and thyroid disease   Psychiatric/Substance Use:     (+) psychiatric history anxiety and depression     Infectious Disease:       Malignancy:  - neg malignancy ROS     Other:  - neg other ROS          Virtual visit -  No vitals were obtained    Physical Exam  Constitutional: Awake, alert, cooperative, no apparent distress, and appears stated age.  HENT: Normocephalic  Respiratory: non labored breathing   Neurologic:  Awake, alert, oriented to name, place and time.   Neuropsychiatric: Calm, cooperative. Normal affect.      Prior Labs/Diagnostic Studies   All labs and imaging personally reviewed     Component      Latest Ref Rng & Units 3/25/2022   Hemoglobin A1C      0.0 - 5.6 % 5.1     Component      Latest Ref Rng & Units 7/14/2022   Color Urine      Colorless, Straw, Light Yellow, Yellow Yellow   Appearance Urine      Clear Clear   Glucose Urine      Negative mg/dL Negative   Bilirubin Urine      Negative Negative   Ketones Urine      Negative mg/dL Negative   Specific Gravity Urine      1.003 - 1.035 1.008   Blood Urine      Negative Negative   pH Urine      5.0 - 7.0 7.0   Protein Albumin Urine      Negative mg/dL Negative   Urobilinogen mg/dL      Normal, 2.0 mg/dL Normal   Nitrite Urine      Negative Negative   Leukocyte Esterase Urine      Negative Negative   Sodium      133 - 144 mmol/L 141   Potassium      3.4 - 5.3 mmol/L 4.0   Chloride      94 - 109 mmol/L 112 (H)   Carbon Dioxide      20 - 32 mmol/L 23   Anion Gap      3 - 14 mmol/L 6   Urea Nitrogen      7 - 30 mg/dL 9   Creatinine      0.52 - 1.04 mg/dL 0.52   Calcium      8.5 - 10.1 mg/dL 8.9   Glucose      70 - 99 mg/dL 90   GFR Estimate      >60 mL/min/1.73m2 >90   WBC      4.0 - 11.0 10e3/uL 7.8   RBC Count      3.80 - 5.20 10e6/uL 5.22 (H)   Hemoglobin      11.7 - 15.7 g/dL 14.9   Hematocrit      35.0 - 47.0 % 44.1   MCV      78 - 100 fL 85   MCH      26.5 - 33.0 pg 28.5   MCHC      31.5 - 36.5 g/dL 33.8   RDW      10.0 - 15.0 % 12.8   Platelet Count      150 - 450 10e3/uL 307   Hemoglobin A1C      0.0 - 5.6 % 5.0     EKG/ stress test - if available please see in ROS above       The patient's records and results personally reviewed by this provider.     Outside records reviewed from: Care Everywhere      Assessment      Sasha Hand is a 22 year old female seen as a PAC referral for risk assessment and optimization for anesthesia.    Plan/Recommendations  Pt  "will be optimized for the proposed procedure.  See below for details on the assessment, risk, and preoperative recommendations    NEUROLOGY  - No history of TIA, CVA or seizure  - Post Op delirium risk factors:  No risk identified    ENT  - No current airway concerns.  Will need to be reassessed day of surgery.  Mallampati: Unable to assess  TM: Unable to assess    CARDIAC  - denies chest pain, SOB, LANCE, palpitations, syncope, near syncope, edema   - h/o HTN, continue metoprolol    - METS (Metabolic Equivalents), patient exercises regularly on treadmill.  She states she sometimes feels she cannot take a deep full breath when exercising. No associated symptoms such as lightheadedness, chest pain or pressure.  This has been ongoing >1 year and has not changed or gotten worse.  She has not tried using her albuterol inhaler for exercise.    Patient performs 4 or more METS exercise without symptoms            Total Score: 0      RCRI-Low risk: Class 2 0.9% complication rate            Total Score: 1    RCRI: High Risk Surgery        PULMONARY    MADIHA Low Risk            Total Score: 2    MADIHA: Hypertension    MADIHA: BMI over 35 kg/m2      - Asthma  Well controlled per patient.  She rarely uses albuterol inhaler.    - Tobacco History      History   Smoking Status     Former Smoker     Packs/day: 1.00     Types: Cigarettes     Quit date: 2/1/2021   Smokeless Tobacco     Former User       GI  - GERD  Controlled on medications: Proton Pump Inhibitor  PONV High Risk  Total Score: 3           1 AN PONV: Pt is Female    1 AN PONV: Patient is not a current smoker    1 AN PONV: Intended Post Op Opioids        /RENAL  - Creatinine 0.50, Jan 2022    ENDOCRINE    - BMI: Estimated body mass index is 55.56 kg/m  as calculated from the following:    Height as of 3/28/22: 1.702 m (5' 7\").    Weight as of 3/28/22: 160.9 kg (354 lb 11.5 oz).  Class 3 Obesity (BMI > 40)  - No history of Diabetes Mellitus    HEME  VTE Low Risk 0.26%         "    Total Score: 1    VTE: BMI greater than 39      - No history of abnormal bleeding or antiplatelet use.      PSYCH  - h/o anxiety and depression well controlled.  No current medications.    OTHER  - patient had a positive at home Covid test on 3/10/22. Her symptoms were mild and have resolved. She has a picture of the positive test and will bring it with her on DOS.  Do not recommend PCR.      The patient is optimized for their procedure. AVS with information on surgery time/arrival time, meds and NPO status given by nursing staff. No further diagnostic testing indicated.    Please refer to the physical examination documented by the anesthesiologist in the anesthesia record on the day of surgery.    Video-Visit Details    Type of service:  Video Visit    Patient verbally consented to video service today: YES    Video Start Time: 0845  Video End Time (time video stopped): 0904    Originating Location (pt. Location): Home    Distant Location (provider location):  home    Mode of Communication:  Video Conference via [employer]     On the day of service:     Prep time: 10 minutes  Visit time: 19 minutes  Documentation time: 14 minutes  ------------------------------------------  Total time: 45 minutes      Ana Gerardo PA-C  Preoperative Assessment Center  [facility]  Cl[facility] and [facility] Center  Phone: [phone]  Fax: [phone]

## 2022-07-06 ENCOUNTER — TELEPHONE (OUTPATIENT)
Dept: ENDOCRINOLOGY | Facility: CLINIC | Age: 22
End: 2022-07-06

## 2022-07-06 NOTE — TELEPHONE ENCOUNTER
Reason for Call:  Medication or medication refill:    Do you use a [facility]?  Name of the pharmacy and phone number for the current request:  [facility] - [phone]    Name of the medication requested: liraglutide - Weight Management (SAXENDA) 18 MG/3ML pen    Other request: Pharmacy stated that they have been trying for a month to get the Saxenda refilled with no response back.   Can we leave a detailed message on this number? Not Applicable    Phone number patient can be reached at: Other phone number:  [phone]     Best Time: Anytime   Call taken on 7/6/2022 at 4:36 PM by Abril Gaines

## 2022-07-06 NOTE — TELEPHONE ENCOUNTER
Call to pharmacy, reviewed prescription sent 6/23/22 15 ml 1 refill, pharmacist reports prescription not received.  Took verbal order from the prescription from 6/23/22

## 2022-07-07 ENCOUNTER — VIRTUAL VISIT (OUTPATIENT)
Dept: ENDOCRINOLOGY | Facility: CLINIC | Age: 22
End: 2022-07-07
Payer: COMMERCIAL

## 2022-07-07 ENCOUNTER — TRANSCRIBE ORDERS (OUTPATIENT)
Dept: OTHER | Age: 22
End: 2022-07-07

## 2022-07-07 ENCOUNTER — PATIENT OUTREACH (OUTPATIENT)
Dept: ONCOLOGY | Facility: CLINIC | Age: 22
End: 2022-07-07

## 2022-07-07 DIAGNOSIS — E11.9 TYPE 2 DIABETES MELLITUS WITHOUT COMPLICATION, WITHOUT LONG-TERM CURRENT USE OF INSULIN (H): ICD-10-CM

## 2022-07-07 DIAGNOSIS — Z71.3 NUTRITIONAL COUNSELING: Primary | ICD-10-CM

## 2022-07-07 DIAGNOSIS — E66.9 OBESITY: ICD-10-CM

## 2022-07-07 DIAGNOSIS — Z84.81 FAMILY HISTORY OF BRCA GENE POSITIVE: Primary | ICD-10-CM

## 2022-07-07 PROCEDURE — 97803 MED NUTRITION INDIV SUBSEQ: CPT | Mod: GT | Performed by: DIETITIAN, REGISTERED

## 2022-07-07 NOTE — PROGRESS NOTES
Patient has a family history of breast cancer in her mother and in referral states there is a known BRCA mutation in family.  Writer received referral, reviewed for appropriate plan, and sent to New Patient Scheduling for completion.

## 2022-07-07 NOTE — PATIENT INSTRUCTIONS
Harjit Baez!    Follow-up with RD 1 week after surgery    Thank you,    Kaleigh Curran, RD, LD  If you would like to schedule or reschedule an appointment with the RD, please call [phone]    Nutrition Goals  1) Practice chewing thoroughly and taking 20-30 minutes per meal  2) Practice  fluids from meals and for 30 minutes after    Goals after surgery:   1. Follow the bariatric post-op diet advancement schedule (see below)  2. Sip on 48-64 oz (or greater) fluids daily, recording intake to help stay on-track.  - Drink at least 1-2 oz of fluid every 15-30 min.  3. Stop vitamins/minerals 1 week before surgery. We will discuss starting a chewable multivitamin at the one week post-op visit.   4. Work towards consuming 60 gm protein daily.     Chewable Multivitamin Options for After Surgery:  Bariatric Advantage Advanced Multi EA chewable (https://www.bariatricBevSpot.Selenokhod/item/chewable-advanced-multi-ea)  - Discount code for Bariatric Advantage vitamin/mineral supplements: UOFMINN (for 15% off order)     GeoGraffitite Multi Complete 45 (https://PickPark/products/txpds-mjhbycko-82?[email or web address])    Flinstone's Complete, or generic (with 18 mg iron per chew)   (https://www.SuperSport.Selenokhod/p/kids-39-complete-multivitamin-chewable-tablets-orange-grape-38-cherry-150ct-up-38-up-8482/-/A-53008222#lnk=sametab)     Bariatric Fusion   https://www.bariatricfusion.com/collections/bariatric-multivitamins     Post-op Diet Advancement Schedule:  Clear Liquid Diet (stage 1): 7/25-7/26  Low-Fat Full Liquid Diet (stage 2): 7/27-8/10  Pureed Diet (stage 3): 8/11-8/25  Soft Diet (stage 4): 8/26-9/23  Regular Diet (stage 5): 9/24     Post-op Diet Handouts:  Diet Guidelines after Weight-loss Surgery  [email or web address]     Your Stage 1 Diet: Clear Liquids  [email or web address]     Your Stage 2 Diet: Low-fat Full Liquids  [email or web address]     Your Stage 3 Diet: Pureed  Foods  [email or web address]     Pureed Recipes  [email or web address]    Your Stage 4 Diet: Soft Foods  [email or web address]    Your Stage 5 Diet: Regular Foods  [email or web address]    Supplements after Weight Loss Surgery  [email or web address]     Keeping Track of Fluids  [email or web address]    Interested in working with a health ? Health coaches work with you to improve your overall health and wellbeing. They look at the whole person, and may involve discussion of different areas of life, including, but not limited to the four pillars of health (sleep, exercise, nutrition, and stress management). Discuss with your care team if you would like to start working a health .    Health Coaching-3 Pack:    $99 for three health coaching visits    Visits may be done in person or via phone    Coaching is a partnership between the  and the client; Coaches do not prescribe or diagnose    Coaching helps inspire the client to reach his/her personal goals    COMPREHENSIVE WEIGHT MANAGEMENT PROGRAM  VIRTUAL SUPPORT GROUPS    For Support Group Information:      We offer support groups for patients who are working on weight loss and considering, preparing for or have had weight loss surgery.   There is no cost for this opportunity.  You are invited to attend the?Virtual Support Groups?provided by any of the following locations:    [facility] via [employer] Teams with Sangita Eastman RN  2.   [place] via [employer] Teams with Bryce Raymond, PhD, LP  3.   [place] via [employer] Teams with Tari Blue RN  4.   [facility] via Aptito Teams with Tari Schultz [facility]    The following Support Group information can also be found on our website:  [email or web address]      [facility] Weight Loss Surgery Support Group    [facility] Weight Loss Surgery Support Group  The support  "group is a patient-lead forum that meets monthly to share experiences, encouragement and education. It is open to those who have had weight loss surgery, are scheduled for surgery, and those who are considering surgery.   WHEN: This group meets on the 3rd Wednesday of each month from 5:00PM - 6:00PM virtually using Microsoft Teams.   FACILITATOR: Led by Sangita Eastman, NIKITA, LD, RN, the program's Clinical Coordinator.   TO REGISTER: Please contact the clinic via SECU4 or call the nurse line directly at [phone] to inform our staff that you would like an invite sent to you and to let us know the email you would like the invite sent to. Prior to the meeting, a link with directions on how to join the meeting will be sent to you.    2022 Meetings  January 19: \"Let's Talk\" a time for the group to share.  February 16: \"Let's Talk\" a time for the group to share.  March 16: Guest Speakers: Psychologists, Katherine Pearson, PhD,LP and Evelia Marte PsyD,  April 20: Guest Speaker: Health , Nanette Alfaro, [facility],CHES, CPT  May 18: Guest Speaker: Dietitian, Marvel Cannon, NIKITA, LP  Bhumika 15: \"Let's Talk\" a time for the group to share.  July 20: \"Let's Talk\" a time for the group to share.  August 17: TBA  September 21: TBA  October 19: Guest Speaker: Dr Ang Turner MD Pulmonologist and Sleep Medicine Physician, \"Getting a Good Night's Sleep\".  November 16: TBA  December 21: TBA    [facility] Support Groups    Connections: Bariatric Care Support Group?  This is open to all [facility] (and those external to this program) pre- and post- operative bariatric surgery patients as well as their support system.   WHEN: This group meets the 2nd Tuesday of each month from 6:30 PM - 8:00 PM virtually using Microsoft Teams.   FACILITATOR: Led by Bryce Raymond, Ph.D who is a Licensed Psychologist with the [facility] Comprehensive Weight Management Program.   TO REGISTER: Please send an " "email to Bryce Raymond, Ph.D., LP [email or web address]?if you would like an invitation to the group and to learn about using Microsoft Teams.    2022 Meetings January 11: Kelsey Menard, PharmD, Pharmacy Resident at [facility], \"Medications and Bariatric Surgery\".  February 8: Open Forum  March 8  April 12  May 10  Bhumika 14    Connections: Post-Operative Bariatric Surgery Support Group  This is a support group for [facility] bariatric patients (and those external to [facility]) who have had bariatric surgery and are at least 3 months post-surgery.  WHEN: This support group meets the 4th Wednesday of the month from 11:00 AM - 12:00 PM virtually using Microsoft Teams.   FACILITATOR: Led by Certified Bariatric Nurse, Tari Blue RN.   TO REGISTER: Please send an email to Tari at [email or web address] if you would like an invitation to the group and to learn about using Microsoft Teams.    2022 Meetings  January 26  February 23  March 23  April 27  May 25  Bhumika 22    [facility] Healthy Lifestyle Virtual Support Group    Healthy Lifestyle Virtual Support Group?  This is 60 minutes of small group guided discussion, support and resources. All are welcome who want a healthy lifestyle.  WHEN: This group meets monthly on a Friday from 12:30 PM - 1:30 PM virtually using Microsoft Teams.   FACILITATOR: Led by National Board Certified Health and , Tari Schultz [facility].   TO REGISTER: Please send an email to Tari [email or web address] to receive monthly invites to the group or if you have any questions about having a health .  Prior to the meeting, a link with directions on how to join the meeting will be sent to you.    2022 Meetings January 21: Lauren Lara MS, RN, CIC, CBN, \"Healthy Habits\"  February 25: Open Forum  March 18: \"Setting Limits and Boundaries\"  April 29: Hilda Murguia RD, \"Meal Planning Made Easy\"  May 20: Open " Forum  Bhumika: To be determined

## 2022-07-07 NOTE — PROGRESS NOTES
"Sasha Hand is a 22 year old female who is being evaluated via a billable video visit.      The patient has been notified of following:     \"This video visit will be conducted via a call between you and your physician/provider. We have found that certain health care needs can be provided without the need for an in-person physical exam.  This service lets us provide the care you need with a video conversation.  If a prescription is necessary we can send it directly to your pharmacy.  If lab work is needed we can place an order for that and you can then stop by our lab to have the test done at a later time.    Video visits are billed at different rates depending on your insurance coverage.  Please reach out to your insurance provider with any questions.    If during the course of the call the physician/provider feels a video visit is not appropriate, you will not be charged for this service.\"    Patient has given verbal consent for Video visit? Yes  How would you like to obtain your AVS? MyChart  If you are dropped from the video visit, the video invite should be resent to: Send to e-mail at: [email or web address]  Will anyone else be joining your video visit? No  {If patient encounters technical issues they should call [phone]      Video-Visit Details    Type of service:  Video Visit    Video Start Time: 2:00 PM  Video End Time: 2:15 PM    Originating Location (pt. Location): Home    Distant Location (provider location):  [facility] [place]     Platform used for Video Visit: MeeVee    During this virtual visit the patient is located in [place], patient verifies this as the location during the entirety of this visit.     Bariatric Nutrition Consultation Note    Reason For Visit: Nutrition Assessment    Sasha Hand is a 22 year old presenting today for bariatric nutrition consult and nutrition education regarding clear and low-fat full liquid diet for post-bariatric surgery " (7/26/22).   Pt is interested in laparoscopic sleeve gastrectomy with Dr. Dr. Mata.  Patient is accompanied by self.  Pt has met required nutrition visits prior to surgery. Pt referred by Komal Miller NP.     Patient with Co-morbidities of obesity including:  Type II DM no - Prediabetes   Renal Failure no  Sleep apnea no  Hypertension yes   Dyslipidemia no  Joint pain no  Back pain no  GERD yes     ANTHROPOMETRICS:  Initial Consult Weight: 365 lbs   Current Weight: 331 lbs    Required weight loss goal pre-op: -36 lbs from initial consult weight (goal weight 329 lbs or less before surgery)  Diet History:  Pt reports no history of receiving clear and low-fat full liquid diet education after bariatric surgery in the past. Pt has been losing weight since last visit. Planning to utilize modified liquid diet 1 week before surgery.    SUPPLEMENT INFORMATION:  Biotin     Saxenda    PROGRESS WITH PREVIOUS GOALS:  1) Continue physical activity as able met, continues  2) Continue focusing on balanced meals with lean proteins and non-starchy vegetables met, continues    NUTRITION DIAGNOSIS:  Obesity r/t positive energy balance aeb BMI >30 kg/m2.  And  Food- and Nutrition-related knowledge deficit r/t lack of prior exposure to information AEB pt scheduled for upcoming bariatric surgery and pt interest in diet education/review    INTERVENTION:  Intervention Provided/Education Provided/Reviewed previous goals and encouraged patient to continue goals prior to surgery.     Provided instruction on bariatric clear and low-fat full liquid diets.  Provided the following handouts: Diet Guidelines for Bariatric Surgery, Sources of Protein, Keeping Track of Your Fluids, list of recommended vitamin/mineral supplementation after sleeve gastrectomy surgery and RD contact information.       Patient Engagement: good    Goals:  1) Practice chewing thoroughly and taking 20-30 minutes per meal  2) Practice  fluids from meals and for 30  minutes after    Goals after surgery:   1. Follow the bariatric post-op diet advancement schedule (see below)  2. Sip on 48-64 oz (or greater) fluids daily, recording intake to help stay on-track.  - Drink at least 1-2 oz of fluid every 15-30 min.  3. Stop vitamins/minerals 1 week before surgery. We will discuss starting a chewable multivitamin at the one week post-op visit.   4. Work towards consuming 60 gm protein daily.     Chewable Multivitamin Options for After Surgery:  Bariatric Advantage Advanced Multi EA chewable (https://www.bariatricAfrican Grain Company.Tumri/item/chewable-advanced-multi-ea)  - Discount code for Bariatric Advantage vitamin/mineral supplements: UOFMINN (for 15% off order)     Cherryrate Multi Complete 45 (https://BioVascular/products/koxkc-emsgtwtv-67?uqiwhgq=[email or web address])    Flinstone's Complete, or generic (with 18 mg iron per chew)   (https://www.[employer].Tumri/p/kids-39-complete-multivitamin-chewable-tablets-orange-grape-38-cherry-150ct-up-38-up-8482/-/A-21092988#lnk=sametab)     Bariatric Fusion   https://www.bariatricfusion.com/collections/bariatric-multivitamins     Post-op Diet Advancement Schedule:  Clear Liquid Diet (stage 1): 7/25-7/26  Low-Fat Full Liquid Diet (stage 2): 7/27-8/10  Pureed Diet (stage 3): 8/11-8/25  Soft Diet (stage 4): 8/26-9/23  Regular Diet (stage 5): 9/24     Post-op Diet Handouts:  Diet Guidelines after Weight-loss Surgery  [email or web address]     Your Stage 1 Diet: Clear Liquids  [email or web address]     Your Stage 2 Diet: Low-fat Full Liquids  [email or web address]     Your Stage 3 Diet: Pureed Foods  [email or web address]     Pureed Recipes  [email or web address]    Your Stage 4 Diet: Soft Foods  [email or web address]    Your Stage 5 Diet: Regular Foods  [email or web address]    Supplements after Weight Loss Surgery  [email or web address]     Keeping Track of  Fluids  [email or web address]://[email or web address]    Follow up: 1 week post op, prn    Time spent with patient: 15 minutes.  MARCIE GLOVER RD, LD

## 2022-07-07 NOTE — LETTER
"7/7/2022       RE: Sasha Hand  [address]  [facility] [address]     Dear Colleague,    Thank you for referring your patient, Sasha Hand, to the [facility] [place] [facility]. Please see a copy of my visit note below.    Sasha Hand is a 22 year old female who is being evaluated via a billable video visit.      The patient has been notified of following:     \"This video visit will be conducted via a call between you and your physician/provider. We have found that certain health care needs can be provided without the need for an in-person physical exam.  This service lets us provide the care you need with a video conversation.  If a prescription is necessary we can send it directly to your pharmacy.  If lab work is needed we can place an order for that and you can then stop by our lab to have the test done at a later time.    Video visits are billed at different rates depending on your insurance coverage.  Please reach out to your insurance provider with any questions.    If during the course of the call the physician/provider feels a video visit is not appropriate, you will not be charged for this service.\"    Patient has given verbal consent for Video visit? Yes  How would you like to obtain your AVS? MyChart  If you are dropped from the video visit, the video invite should be resent to: Send to e-mail at: [email or web address]  Will anyone else be joining your video visit? No  {If patient encounters technical issues they should call [phone]      Video-Visit Details    Type of service:  Video Visit    Video Start Time: 2:00 PM  Video End Time: 2:15 PM    Originating Location (pt. Location): Home    Distant Location (provider location):  [facility]     Platform used for Video Visit: Finovera    During this virtual visit the patient is located in [place], patient verifies this as " the location during the entirety of this visit.     Bariatric Nutrition Consultation Note    Reason For Visit: Nutrition Assessment    Sasha Hand is a 22 year old presenting today for bariatric nutrition consult and nutrition education regarding clear and low-fat full liquid diet for post-bariatric surgery (7/26/22).   Pt is interested in laparoscopic sleeve gastrectomy with Dr. Dr. Mata.  Patient is accompanied by self.  Pt has met required nutrition visits prior to surgery. Pt referred by Komal Miller NP.     Patient with Co-morbidities of obesity including:  Type II DM no - Prediabetes   Renal Failure no  Sleep apnea no  Hypertension yes   Dyslipidemia no  Joint pain no  Back pain no  GERD yes     ANTHROPOMETRICS:  Initial Consult Weight: 365 lbs   Current Weight: 331 lbs    Required weight loss goal pre-op: -36 lbs from initial consult weight (goal weight 329 lbs or less before surgery)  Diet History:  Pt reports no history of receiving clear and low-fat full liquid diet education after bariatric surgery in the past. Pt has been losing weight since last visit. Planning to utilize modified liquid diet 1 week before surgery.    SUPPLEMENT INFORMATION:  Biotin     Saxenda    PROGRESS WITH PREVIOUS GOALS:  1) Continue physical activity as able met, continues  2) Continue focusing on balanced meals with lean proteins and non-starchy vegetables met, continues    NUTRITION DIAGNOSIS:  Obesity r/t positive energy balance aeb BMI >30 kg/m2.  And  Food- and Nutrition-related knowledge deficit r/t lack of prior exposure to information AEB pt scheduled for upcoming bariatric surgery and pt interest in diet education/review    INTERVENTION:  Intervention Provided/Education Provided/Reviewed previous goals and encouraged patient to continue goals prior to surgery.     Provided instruction on bariatric clear and low-fat full liquid diets.  Provided the following handouts: Diet Guidelines for Bariatric Surgery, Sources of  Protein, Keeping Track of Your Fluids, list of recommended vitamin/mineral supplementation after sleeve gastrectomy surgery and RD contact information.       Patient Engagement: good    Goals:  1) Practice chewing thoroughly and taking 20-30 minutes per meal  2) Practice  fluids from meals and for 30 minutes after    Goals after surgery:   1. Follow the bariatric post-op diet advancement schedule (see below)  2. Sip on 48-64 oz (or greater) fluids daily, recording intake to help stay on-track.  - Drink at least 1-2 oz of fluid every 15-30 min.  3. Stop vitamins/minerals 1 week before surgery. We will discuss starting a chewable multivitamin at the one week post-op visit.   4. Work towards consuming 60 gm protein daily.     Chewable Multivitamin Options for After Surgery:  Bariatric Advantage Advanced Multi EA chewable (https://www.[employer]Zen.[employer]/item/chewable-advanced-multi-ea)  - Discount code for Bariatric Advantage vitamin/mineral supplements: UOFMINN (for 15% off order)     DiscoveRX Multi Complete 45 (https://[employer]i [employer]aceuticals/products/lanmp-fwikiuvj-81?vmlnkhy=[id number])    GetAutoBids's Complete, or generic (with 18 mg iron per chew)   (https://www.[employer]eWorld.[employer]/p/kids-39-complete-multivitamin-chewable-tablets-orange-grape-38-cherry-150ct-up-38-up-8482/-/A-57196922#lnk=sametab)     Bariatric Fusion   https://www.bariatricfusion.com/collections/bariatric-multivitamins     Post-op Diet Advancement Schedule:  Clear Liquid Diet (stage 1): 7/25-7/26  Low-Fat Full Liquid Diet (stage 2): 7/27-8/10  Pureed Diet (stage 3): 8/11-8/25  Soft Diet (stage 4): 8/26-9/23  Regular Diet (stage 5): 9/24     Post-op Diet Handouts:  Diet Guidelines after Weight-loss Surgery  [email or web address]     Your Stage 1 Diet: Clear Liquids  [email or web address]     Your Stage 2 Diet: Low-fat Full Liquids  [email or web address]     Your Stage 3 Diet: Pureed  Foods  [email or web address]     Pureed Recipes  [email or web address]    Your Stage 4 Diet: Soft Foods  [email or web address]    Your Stage 5 Diet: Regular Foods  [email or web address]    Supplements after Weight Loss Surgery  [email or web address]     Keeping Track of Fluids  [email or web address]    Follow up: 1 week post op, prn    Time spent with patient: 15 minutes.  MARCIE GLOVER RD, LD

## 2022-07-12 ENCOUNTER — TELEPHONE (OUTPATIENT)
Dept: ENDOCRINOLOGY | Facility: CLINIC | Age: 22
End: 2022-07-12

## 2022-07-12 NOTE — TELEPHONE ENCOUNTER
Called [facility] [phone] to check if the sleeve has been authorized. Authorization ref # [id number] effective 7/26/22 - 7/27/22.

## 2022-07-14 ENCOUNTER — OFFICE VISIT (OUTPATIENT)
Dept: FAMILY MEDICINE | Facility: CLINIC | Age: 22
End: 2022-07-14
Payer: COMMERCIAL

## 2022-07-14 ENCOUNTER — LAB (OUTPATIENT)
Dept: LAB | Facility: CLINIC | Age: 22
End: 2022-07-14
Payer: COMMERCIAL

## 2022-07-14 VITALS
WEIGHT: 293 LBS | RESPIRATION RATE: 20 BRPM | HEIGHT: 68 IN | OXYGEN SATURATION: 98 % | SYSTOLIC BLOOD PRESSURE: 122 MMHG | HEART RATE: 114 BPM | TEMPERATURE: 98.5 F | BODY MASS INDEX: 44.41 KG/M2 | DIASTOLIC BLOOD PRESSURE: 78 MMHG

## 2022-07-14 DIAGNOSIS — E66.01 MORBID OBESITY (H): ICD-10-CM

## 2022-07-14 DIAGNOSIS — Z01.818 PREOP EXAMINATION: ICD-10-CM

## 2022-07-14 DIAGNOSIS — I10 ESSENTIAL HYPERTENSION: ICD-10-CM

## 2022-07-14 DIAGNOSIS — J45.30 MILD PERSISTENT ASTHMA WITHOUT COMPLICATION: ICD-10-CM

## 2022-07-14 DIAGNOSIS — Z11.3 SCREEN FOR STD (SEXUALLY TRANSMITTED DISEASE): ICD-10-CM

## 2022-07-14 DIAGNOSIS — Z12.4 CERVICAL CANCER SCREENING: ICD-10-CM

## 2022-07-14 DIAGNOSIS — Z00.01 ENCOUNTER FOR ROUTINE ADULT MEDICAL EXAM WITH ABNORMAL FINDINGS: ICD-10-CM

## 2022-07-14 LAB
ALBUMIN UR-MCNC: NEGATIVE MG/DL
ANION GAP SERPL CALCULATED.3IONS-SCNC: 6 MMOL/L (ref 3–14)
APPEARANCE UR: CLEAR
BILIRUB UR QL STRIP: NEGATIVE
BUN SERPL-MCNC: 9 MG/DL (ref 7–30)
CALCIUM SERPL-MCNC: 8.9 MG/DL (ref 8.5–10.1)
CHLORIDE BLD-SCNC: 112 MMOL/L (ref 94–109)
CO2 SERPL-SCNC: 23 MMOL/L (ref 20–32)
COLOR UR AUTO: YELLOW
CREAT SERPL-MCNC: 0.52 MG/DL (ref 0.52–1.04)
CREAT UR-MCNC: 60 MG/DL
ERYTHROCYTE [DISTWIDTH] IN BLOOD BY AUTOMATED COUNT: 12.8 % (ref 10–15)
GFR SERPL CREATININE-BSD FRML MDRD: >90 ML/MIN/1.73M2
GLUCOSE BLD-MCNC: 90 MG/DL (ref 70–99)
GLUCOSE UR STRIP-MCNC: NEGATIVE MG/DL
HBA1C MFR BLD: 5 % (ref 0–5.6)
HCT VFR BLD AUTO: 44.1 % (ref 35–47)
HGB BLD-MCNC: 14.9 G/DL (ref 11.7–15.7)
HGB UR QL STRIP: NEGATIVE
KETONES UR STRIP-MCNC: NEGATIVE MG/DL
LEUKOCYTE ESTERASE UR QL STRIP: NEGATIVE
MCH RBC QN AUTO: 28.5 PG (ref 26.5–33)
MCHC RBC AUTO-ENTMCNC: 33.8 G/DL (ref 31.5–36.5)
MCV RBC AUTO: 85 FL (ref 78–100)
MICROALBUMIN UR-MCNC: <5 MG/L
MICROALBUMIN/CREAT UR: NORMAL MG/G{CREAT}
NITRATE UR QL: NEGATIVE
PH UR STRIP: 7 [PH] (ref 5–7)
PLATELET # BLD AUTO: 307 10E3/UL (ref 150–450)
POTASSIUM BLD-SCNC: 4 MMOL/L (ref 3.4–5.3)
RBC # BLD AUTO: 5.22 10E6/UL (ref 3.8–5.2)
SODIUM SERPL-SCNC: 141 MMOL/L (ref 133–144)
SP GR UR STRIP: 1.01 (ref 1–1.03)
UROBILINOGEN UR STRIP-MCNC: NORMAL MG/DL
WBC # BLD AUTO: 7.8 10E3/UL (ref 4–11)

## 2022-07-14 PROCEDURE — 90677 PCV20 VACCINE IM: CPT | Performed by: FAMILY MEDICINE

## 2022-07-14 PROCEDURE — 99395 PREV VISIT EST AGE 18-39: CPT | Mod: 25 | Performed by: FAMILY MEDICINE

## 2022-07-14 PROCEDURE — 99213 OFFICE O/P EST LOW 20 MIN: CPT | Mod: 25 | Performed by: FAMILY MEDICINE

## 2022-07-14 PROCEDURE — 85027 COMPLETE CBC AUTOMATED: CPT

## 2022-07-14 PROCEDURE — G0145 SCR C/V CYTO,THINLAYER,RESCR: HCPCS | Performed by: FAMILY MEDICINE

## 2022-07-14 PROCEDURE — 81003 URINALYSIS AUTO W/O SCOPE: CPT

## 2022-07-14 PROCEDURE — 87591 N.GONORRHOEAE DNA AMP PROB: CPT | Performed by: FAMILY MEDICINE

## 2022-07-14 PROCEDURE — 80048 BASIC METABOLIC PNL TOTAL CA: CPT | Performed by: FAMILY MEDICINE

## 2022-07-14 PROCEDURE — 36415 COLL VENOUS BLD VENIPUNCTURE: CPT

## 2022-07-14 PROCEDURE — 90471 IMMUNIZATION ADMIN: CPT | Performed by: FAMILY MEDICINE

## 2022-07-14 PROCEDURE — 87491 CHLMYD TRACH DNA AMP PROBE: CPT | Performed by: FAMILY MEDICINE

## 2022-07-14 PROCEDURE — 83036 HEMOGLOBIN GLYCOSYLATED A1C: CPT | Performed by: FAMILY MEDICINE

## 2022-07-14 PROCEDURE — 82043 UR ALBUMIN QUANTITATIVE: CPT | Performed by: FAMILY MEDICINE

## 2022-07-14 RX ORDER — FLUTICASONE PROPIONATE AND SALMETEROL 250; 50 UG/1; UG/1
1 POWDER RESPIRATORY (INHALATION) EVERY 12 HOURS
Qty: 60 EACH | Refills: 11 | Status: SHIPPED | OUTPATIENT
Start: 2022-07-14 | End: 2024-08-15

## 2022-07-14 RX ORDER — CYCLOSPORINE 0.5 MG/ML
EMULSION OPHTHALMIC
COMMUNITY
Start: 2022-06-25 | End: 2023-07-20

## 2022-07-14 RX ORDER — BETAMETHASONE DIPROPIONATE 0.5 MG/G
CREAM TOPICAL
COMMUNITY
Start: 2022-06-25 | End: 2024-01-18

## 2022-07-14 ASSESSMENT — ENCOUNTER SYMPTOMS
FREQUENCY: 0
COUGH: 0
ARTHRALGIAS: 1
MYALGIAS: 0
PARESTHESIAS: 0
DYSURIA: 0
HEADACHES: 0
FEVER: 0
PALPITATIONS: 0
ABDOMINAL PAIN: 0
DIZZINESS: 0
EYE PAIN: 0
BREAST MASS: 0
WEAKNESS: 0
HEMATURIA: 0
NERVOUS/ANXIOUS: 0
CONSTIPATION: 0
JOINT SWELLING: 0
CHILLS: 0
SORE THROAT: 0
SHORTNESS OF BREATH: 1
HEARTBURN: 1
DIARRHEA: 0
HEMATOCHEZIA: 0
NAUSEA: 1

## 2022-07-14 ASSESSMENT — PATIENT HEALTH QUESTIONNAIRE - PHQ9
SUM OF ALL RESPONSES TO PHQ QUESTIONS 1-9: 3
10. IF YOU CHECKED OFF ANY PROBLEMS, HOW DIFFICULT HAVE THESE PROBLEMS MADE IT FOR YOU TO DO YOUR WORK, TAKE CARE OF THINGS AT HOME, OR GET ALONG WITH OTHER PEOPLE: SOMEWHAT DIFFICULT
SUM OF ALL RESPONSES TO PHQ QUESTIONS 1-9: 3

## 2022-07-14 ASSESSMENT — ASTHMA QUESTIONNAIRES: ACT_TOTALSCORE: 18

## 2022-07-14 ASSESSMENT — PAIN SCALES - GENERAL: PAINLEVEL: NO PAIN (0)

## 2022-07-14 NOTE — PROGRESS NOTES
SUBJECTIVE:   CC: Sasha Hand is an 22 year old woman who presents for preventive health visit.   Patient has been advised of split billing requirements and indicates understanding: Yes  Healthy Habits:     Getting at least 3 servings of Calcium per day:  Yes    Bi-annual eye exam:  Yes    Dental care twice a year:  Yes    Sleep apnea or symptoms of sleep apnea:  None    Diet:  Low salt, Low fat/cholesterol, Carbohydrate counting and Other    Frequency of exercise:  4-5 days/week    Duration of exercise:  45-60 minutes    Taking medications regularly:  Yes    Medication side effects:  Other    PHQ-2 Total Score: 0    Additional concerns today:  Yes    Answers for HPI/ROS submitted by the patient on 2022  If you checked off any problems, how difficult have these problems made it for you to do your work, take care of things at home, or get along with other people?: Somewhat difficult  PHQ9 TOTAL SCORE: 3      PROBLEMS TO ADD ON...  Patient says no stomach good control.  She has noticed worsening shortness of breath when she is exercising on the treadmill and needs to kind of cramped over to take a deeper breath.  She has no issues when she is not exercising or resting.  She has never been on a controller agent.  She stopped smoking a year and a half ago both of her parents have chronic asthma and her dad had bad COPD.  He is  from metastatic melanoma.  Her blood pressure is well controlled on her medication.  She is due for a Pap smear today.  And she is scheduled to have a gastric sleeve done at the end of this month.    Today's PHQ-2 Score:   PHQ-2 (  [employer]) 2022   Q1: Little interest or pleasure in doing things 0   Q2: Feeling down, depressed or hopeless 0   PHQ-2 Score 0   PHQ-2 Total Score (12-17 Years)- Positive if 3 or more points; Administer PHQ-A if positive -   Q1: Little interest or pleasure in doing things Not at all   Q2: Feeling down, depressed or hopeless Not at all   PHQ-2  Score 0       Abuse: Current or Past (Physical, Sexual or Emotional) - No  Do you feel safe in your environment? Yes    Have you ever done Advance Care Planning? (For example, a Health Directive, POLST, or a discussion with a medical provider or your loved ones about your wishes): No, advance care planning information given to patient to review.  Patient plans to discuss their wishes with loved ones or provider.      Social History     Tobacco Use     Smoking status: Former Smoker     Packs/day: 1.00     Types: Cigarettes     Quit date: 2021     Years since quittin.4     Smokeless tobacco: Former User   Substance Use Topics     Alcohol use: No     Alcohol/week: 0.0 standard drinks     If you drink alcohol do you typically have >3 drinks per day or >7 drinks per week? No    Alcohol Use 2022   Prescreen: >3 drinks/day or >7 drinks/week? No   Prescreen: >3 drinks/day or >7 drinks/week? -   No flowsheet data found.    Reviewed orders with patient.  Reviewed health maintenance and updated orders accordingly - Yes  Lab work is in process  Labs reviewed in EPIC  BP Readings from Last 3 Encounters:   22 122/78   22 137/71   22 (!) 142/90    Wt Readings from Last 3 Encounters:   22 (!) 154.2 kg (340 lb)   22 (!) 160.9 kg (354 lb 11.5 oz)   22 (!) 161 kg (355 lb)                  Patient Active Problem List   Diagnosis     Morbid obesity (H)     Tear of lateral cartilage or meniscus of knee, current     Menorrhagia     Dysmenorrhea     Wheezing     Genital herpes simplex, unspecified site     Mild persistent asthma without complication     Nexplanon placed 2021     DELIA (generalized anxiety disorder)     Major depressive disorder, single episode, moderate (H)     Depression     BMI 50.0-59.9, adult (H)     Essential hypertension     Past Surgical History:   Procedure Laterality Date     ARTHROSCOPY KNEE WITH MENISCAL REPAIR Right 5/10/2017    Procedure: ARTHROSCOPY KNEE  WITH MENISCAL REPAIR;  arthroscopy right knee with lateral meniscus repair;  Surgeon: Nathaniel Hicks MD;  Location: [facility]     ESOPHAGOSCOPY, GASTROSCOPY, DUODENOSCOPY (EGD), COMBINED N/A 3/28/2022    Procedure: ESOPHAGOGASTRODUODENOSCOPY (EGD);  Surgeon: Joseph Mata MD;  Location: [facility] OR       Social History     Tobacco Use     Smoking status: Former Smoker     Packs/day: 1.00     Types: Cigarettes     Quit date: 2021     Years since quittin.4     Smokeless tobacco: Former User   Substance Use Topics     Alcohol use: No     Alcohol/week: 0.0 standard drinks     Family History   Problem Relation Age of Onset     Asthma Mother      Hypertension Mother      Asthma Father      Diabetes Father      Cancer Father 60        metastatic to brain     Cervical Cancer Sister 19     Cancer Paternal Grandmother      Anesthesia Reaction No family hx of      Bleeding Disorder No family hx of      Clotting Disorder No family hx of          Current Outpatient Medications   Medication Sig Dispense Refill     albuterol (PROAIR HFA/PROVENTIL HFA/VENTOLIN HFA) 108 (90 Base) MCG/ACT inhaler Inhale 1-2 puffs into the lungs every 4 hours as needed for shortness of breath / dyspnea or wheezing 8.5 g 5     betamethasone dipropionate (DIPROSONE) 0.05 % external cream APPLY TO AFFECTED AREA(S) ONCE DAILY       etonogestrel (NEXPLANON) 68 MG IMPL 1 each (68 mg) by Subdermal route once       Fexofenadine HCl (ALLEGRA PO) Take by mouth daily as needed for allergies       fluticasone-salmeterol (ADVAIR) 250-50 MCG/ACT inhaler Inhale 1 puff into the lungs every 12 hours 60 each 11     metoprolol succinate ER (TOPROL-XL) 50 MG 24 hr tablet Take 1 tablet (50 mg) by mouth daily (Patient taking differently: Take 50 mg by mouth every morning) 90 tablet 3     Multiple Vitamins-Minerals (MULTIVITAMIN WOMEN PO) Take 1 tablet by mouth every morning With iron       omeprazole (PRILOSEC) 20 MG DR capsule Take 20 mg by mouth every morning        RESTASIS 0.05 % ophthalmic emulsion INSTILL 1 DROP INTO EACH EYE TWICE DAILY       VENTOLIN  (90 Base) MCG/ACT inhaler Inhale 1-2 puffs into the lungs every 4 hours as needed for shortness of breath / dyspnea or wheezing (Patient taking differently: as needed) 18 g 0     Vitamin D2, Ergocalciferol, 50 MCG (2000 UT) CAPS Take 1 tablet by mouth every morning       blood glucose (NO BRAND SPECIFIED) test strip Use to test blood sugar 3 times daily or as directed. (Patient not taking: Reported on 7/14/2022) 100 strip 1     blood glucose monitoring (NO BRAND SPECIFIED) meter device kit Use to test blood sugar 3 times daily or as directed. (Patient not taking: Reported on 7/14/2022) 1 kit 1     clotrimazole (LOTRIMIN) 1 % external cream Apply topically 2 times daily (Patient not taking: Reported on 7/14/2022) 15 g 1     ipratropium - albuterol 0.5 mg/2.5 mg/3 mL (DUONEB) 0.5-2.5 (3) MG/3ML neb solution Take 1 vial (3 mLs) by nebulization every 6 hours as needed for shortness of breath / dyspnea or wheezing (Patient not taking: Reported on 7/14/2022) 30 vial 1     Microlet Lancets MISC 1 each 3 times daily (Patient not taking: Reported on 7/14/2022) 100 each 3     Allergies   Allergen Reactions     No Known Drug Allergies      Seasonal Allergies      Steri Strips      Got boils on incision after knee surgery from steri strips     Recent Labs   Lab Test 07/14/22  1002 03/25/22  0909 01/27/22  1047 01/20/22  1140 12/14/21  0921 06/24/21  1009 11/21/19  0948 05/31/18  2235 03/12/18  0740   A1C 5.0 5.1 5.4 9.1*   < >  --   --   --   --    LDL  --   --  74  --   --  89 76  --  43   HDL  --   --  38*  --   --  58 42*  --  37*   TRIG  --   --  72  --   --  128 120  --  58   ALT  --   --   --  96*  --   --   --  49 36   CR  --   --  0.50* 0.54   < > 0.56  --  0.63 0.62   GFRESTIMATED  --   --  >90 >90   < > >90  --  >90 >90   GFRESTBLACK  --   --   --   --   --  >90  --  >90 >90   POTASSIUM  --   --  3.9 3.8   < > 4.1   --  3.7 3.9   TSH  --   --   --   --   --  3.10 3.07  --  0.96    < > = values in this interval not displayed.        Breast Cancer Screening:        History of abnormal Pap smear: NO - age 21-29 PAP every 3 years recommended  PAP / HPV 11/21/2019   PAP (Historical) NIL     Reviewed and updated as needed this visit by clinical staff    Allergies  Meds                Reviewed and updated as needed this visit by Provider                   Past Medical History:   Diagnosis Date     Closed head injury, subsequent encounter 4/5/2018     History of PCR DNA positive for HSV1      Moderate major depression (H) 1/20/2014     Nexplanon placed 11/14/17 11/14/2017     Nexplanon placed 11/14/17 (removed 1/16/2020) 11/14/2017     Nexplanon placed 12/14/2021 12/14/2021     Obesity 9/14/2010     Pyelonephritis 2/19/2018     Skin tag (right collar line and left labial region 11/21/2019     Tobacco use disorder 5/4/2016     Uncomplicated asthma       Past Surgical History:   Procedure Laterality Date     ARTHROSCOPY KNEE WITH MENISCAL REPAIR Right 5/10/2017    Procedure: ARTHROSCOPY KNEE WITH MENISCAL REPAIR;  arthroscopy right knee with lateral meniscus repair;  Surgeon: Nathaniel Hicks MD;  Location[facility]     ESOPHAGOSCOPY, GASTROSCOPY, DUODENOSCOPY (EGD), COMBINED N/A 3/28/2022    Procedure: ESOPHAGOGASTRODUODENOSCOPY (EGD);  Surgeon: Joseph Mata MD;  Location: [facility] OR       Review of Systems   Constitutional: Negative for chills and fever.   HENT: Negative for congestion, ear pain, hearing loss and sore throat.    Eyes: Negative for pain and visual disturbance.   Respiratory: Positive for shortness of breath. Negative for cough.    Cardiovascular: Negative for chest pain, palpitations and peripheral edema.   Gastrointestinal: Positive for heartburn and nausea. Negative for abdominal pain, constipation, diarrhea and hematochezia.   Breasts:  Negative for tenderness, breast mass and discharge.   Genitourinary:  "Negative for dysuria, frequency, genital sores, hematuria, pelvic pain, urgency, vaginal bleeding and vaginal discharge.   Musculoskeletal: Positive for arthralgias. Negative for joint swelling and myalgias.   Skin: Negative for rash.   Neurological: Negative for dizziness, weakness, headaches and paresthesias.   Psychiatric/Behavioral: Negative for mood changes. The patient is not nervous/anxious.      Her heartburn is well controlled on her PPI that she takes daily.  We are going to consider putting her on a controller agent for her asthma.  Her joint aches are most likely due to her weight.  Her BMI is 51.55.     OBJECTIVE:   /78   Pulse 114   Temp 98.5  F (36.9  C) (Temporal)   Resp 20   Ht 1.73 m (5' 8.1\")   Wt (!) 154.2 kg (340 lb)   SpO2 98%   BMI 51.55 kg/m    Physical Exam  GENERAL: healthy, alert and no distress  EYES: Eyes grossly normal to inspection, PERRL and conjunctivae and sclerae normal  HENT: ear canals and TM's normal, nose and mouth without ulcers or lesions  NECK: no adenopathy, no asymmetry, masses, or scars and thyroid normal to palpation  RESP: lungs clear to auscultation - no rales, rhonchi or wheezes  BREAST: Not indicated we did discuss breast awareness and what to watch for.  CV: regular rate and rhythm, normal S1 S2, no S3 or S4, no murmur, click or rub, no peripheral edema and peripheral pulses strong  ABDOMEN: Morbidly obese soft and nontender throughout.   (female): Normal external genitalia.  Her cervix slightly atrophic and did have some bleeding after the Pap smear was obtained.  No bimanual exam was indicated.  MS: no gross musculoskeletal defects noted, no edema  SKIN: no suspicious lesions or rashes  NEURO: Normal strength and tone, mentation intact and speech normal  PSYCH: mentation appears normal, affect normal/bright    Diagnostic Test Results:  Labs reviewed in Epic  Results for orders placed or performed in visit on 07/14/22 (from the past 24 hour(s)) "   HEMOGLOBIN A1C   Result Value Ref Range    Hemoglobin A1C 5.0 0.0 - 5.6 %       ASSESSMENT/PLAN:   (Z00.01) Encounter for routine adult medical exam with abnormal findings  Comment: Adult female physical.  Overall she is doing well but is having some issues with her asthma  Plan: See below.    (Z12.4) Cervical cancer screening  Comment: Due for Pap smear  Plan: Pap Screen only - recommended age 21 - 24 years        Obtained without incident    (J45.30) Mild persistent asthma without complication  Comment: Asthma is poorly controlled.  Plan: Pneumococcal 20 Valent Conjugate (Prevnar 20),         fluticasone-salmeterol (ADVAIR) 250-50 MCG/ACT         inhaler        We will start her on a controller agent and have her do this and twice daily.  This will hopefully improve her breathing and minimize her use of her albuterol rescue inhaler.    (E66.01) Morbid obesity (H)  Comment: BMI 51.55  Plan: HEMOGLOBIN A1C        She had an elevated A1c about 9 months ago of 9.1 but today's was only 5.0    (Z11.3) Screen for STD (sexually transmitted disease)  Comment: Due for STD screening  Plan: NEISSERIA GONORRHOEA PCR, CHLAMYDIA TRACHOMATIS        PCR        This was done through a urine test.    (I10) Essential hypertension  Comment: Blood pressure well controlled  Plan: Basic metabolic panel  (Ca, Cl, CO2, Creat,         Gluc, K, Na, BUN), Albumin Random Urine         Quantitative with Creat Ratio        Basic profile abdomen obtained today.      Patient has been advised of split billing requirements and indicates understanding: Yes    COUNSELING:  Reviewed preventive health counseling, as reflected in patient instructions       Regular exercise       Healthy diet/nutrition       Vision screening       Immunizations    Vaccinated for: Pneumococcal         Contraception, her Nexplanon is working well.  She gets no menses at all.    Estimated body mass index is 51.55 kg/m  as calculated from the following:    Height as of this  "encounter: 1.73 m (5' 8.1\").    Weight as of this encounter: 154.2 kg (340 lb).    Weight management plan: Patient is scheduled for a gastric sleeve at the end of the month.    She reports that she quit smoking about 17 months ago. Her smoking use included cigarettes. She smoked 1.00 pack per day. She has quit using smokeless tobacco.      Counseling Resources:  ATP IV Guidelines  Pooled Cohorts Equation Calculator  Breast Cancer Risk Calculator  BRCA-Related Cancer Risk Assessment: FHS-7 Tool  FRAX Risk Assessment  ICSI Preventive Guidelines  Dietary Guidelines for Americans, 2010  USDA's MyPlate  ASA Prophylaxis  Lung CA Screening    Electronically signed by:  Oscar Medley M.D.  7/14/2022    "

## 2022-07-15 ENCOUNTER — VIRTUAL VISIT (OUTPATIENT)
Dept: ENDOCRINOLOGY | Facility: CLINIC | Age: 22
End: 2022-07-15
Payer: COMMERCIAL

## 2022-07-15 DIAGNOSIS — Z91.89 AT HIGH RISK FOR POSTOPERATIVE COMPLICATIONS: ICD-10-CM

## 2022-07-15 DIAGNOSIS — E66.9 OBESITY: Primary | ICD-10-CM

## 2022-07-15 PROCEDURE — 99207 PR NO CHARGE NURSE ONLY: CPT | Mod: GT

## 2022-07-15 RX ORDER — AMOXICILLIN 250 MG
2 CAPSULE ORAL DAILY PRN
Qty: 30 TABLET | Refills: 1 | Status: SHIPPED | OUTPATIENT
Start: 2022-07-15 | End: 2022-11-03

## 2022-07-15 RX ORDER — PROCHLORPERAZINE MALEATE 5 MG
5 TABLET ORAL EVERY 6 HOURS PRN
Qty: 30 TABLET | Refills: 1 | Status: SHIPPED | OUTPATIENT
Start: 2022-07-15 | End: 2022-08-25

## 2022-07-15 RX ORDER — HYOSCYAMINE SULFATE 0.125 MG
0.12 TABLET ORAL EVERY 4 HOURS PRN
Qty: 30 TABLET | Refills: 1 | Status: SHIPPED | OUTPATIENT
Start: 2022-07-15 | End: 2022-08-25

## 2022-07-15 NOTE — PATIENT INSTRUCTIONS
Sasha Hand,    Below is a recap of our conversation regarding your upcoming Sleeve Gastrectomy on 7/26/22 with Dr. Madhu Mata.      BEFORE SURGERY    DAY BEFORE YOUR SURGERY  Follow a clear liquid diet.  Stay away from red liquids and liquids with pulp.  Ensure you are well hydrated all day!  Strive for at least 64 ounces.  You may have clear liquids up to 3 hours before your surgery.   Take your medications as directed from the PAC clinic.    SHOWER  Follow instructions for pre-op shower using the required soap (4% CHG, Hibiclens, Exidine, chlorhexidine).    You will take a shower the night before surgery and a shower the morning of surgery.  The soap can be very drying.  Do not put any deodorant, lotion or powder on after your shower.    TRANSPORTATION & POSTOP CARE  You will need a  to take you to the hospital the day of surgery.  You will need a  to pick you up from the hospital the day of discharge (usually the afternoon/evening the day after surgery).  You will need someone to stay with you for the first 1-2 days after surgery, especially if you are taking prescription pain medicine.      AFTER SURGERY    REGULARLY SCHEDULED MEDICATIONS    Depending on the size and number of medications you take, you may need to space/change the time you take your medications, so you do not overfill your stomach.  Make sure you follow-up with your primary provider to make medication changes needed.  DIABETES: If you have diabetes, monitor your blood sugars more frequently after surgery.  Your blood sugars may normalize quickly.  Please contact your prescribing provider if you need your medication adjusted.  HIGH BLOOD PRESSURE: If you have high blood pressure, monitor your blood pressure after surgery.  Your blood pressure may normalize quickly.  Please contact your prescribing provider if you need your medication adjusted.      POSTOPERATIVE MEDICATIONS  Some of your postoperative medications will be sent to  your pharmacy today.  Please plan to pick these up before surgery and have them at home for when you are discharged.  Pain medication will be prescribed upon discharge from the hospital.      Prilosec (omeprazole):   This medication is for control of stomach acid.  You will take this medication daily for the first three months after surgery.  TO TAKE: Open the capsule and put the beads in applesauce.  Take every morning.  If you are currently on a medication for GERD or Reflux, you will just continue that medication.    Levsin (hyoscyamine):   This medication is to help control spasms/cramping in the stomach and intestines.    Take one tablet every 4 hours as needed for cramping.    It may be helpful to take in the morning before taking any other medications.    Zofran (ondansetron):    This medication is for nausea.    To Take: Place one tablet on the tongue and let it dissolve.  You can take this every 8 hours, as needed.    Senna:   This medication is a stool softener:  Take as directed to help avoid constipation.  It is important to take this medication if you are taking a narcotic pain medicine as the pain medication can be constipating.    If you experience diarrhea, please stop taking the Senna.      Pain Medication:   Take the minimal amount as directed for severe pain. The pain medicine can cause constipation.  Do not drive while taking narcotic pain medication.    For mild to moderate pain, you can take Tylenol or Extra Strength Tylenol per the bottle instructions.    PAIN CONTROL  Take your pain medicine as needed, as directed.  Start with the minimal amount prescribed and supplement with Tylenol or Extra Strength Tylenol.  You can use Tylenol or Tylenol Extra Strength if you are mild to moderate pain.  DO NOT TAKE NSAIDS: IBUPROFEN, ASPIRIN OR NAPROXEN.  ICE: Use an ice pack on the incisions for the first 24 - 48 hours:  Use a barrier between the ice pack and the skin.  Do not leave the ice on longer  than 20 minutes at each time.    Change positions frequently.  Walking around once an hour will also help.  HEAT: You may also try a heating pad on your abdomen to help soothe cramping.  Use a barrier between the heating pad and your skin.  Do not leave on longer than 20 minutes at each time.    HYDRATION  Your goal is 48 to 64 ounces each day (4-6 ounces each hour).  This amount will help prevent dehydration.    It is important to measure and keep a tally sheet of your intake.  Keep your tally sheet next to you and xander each time you drink one ounce of fluid.  You should have at least 4-6 marks for each hour.  Try keeping a water bottle by your bed.  Drink a little before going to sleep, if you wake in the middle of the night, and in the morning before getting out of bed.  Keep an eye on your urine.  It is a good indicator of your hydration status.  Your urine should be clear yellow or clear light yellow.      DIET    For Dr. Mata Patients:  You will be following the Stage 2 - Full Liquid diet upon discharge.  Ensure you have a variety of proper full liquids at home to support you in taking in the proper nutrition.  Please refer to the Stage 2 - Full Liquid diet handout provided by the Dietician.  Stage 3 Start Date: 8/9/22  Stage 4 Start Date: 8/23/22  Stage 5 Start Date: 9/23/22     BREATHING AND ACTIVITY  Use your incentive spirometer each hour while awake.  Sitting up or standing, take 5 - 10 slow, deep breaths each time, focusing on expanding your lungs.  This should be done for the first couple of weeks after surgery.  Get up and walk around each hour while you are awake - at least 10 minutes.  Walking will help with circulation, bowel regulation and will help you feel better.  Do not lift anything greater than 20 lb for the first 4 weeks.  Refer to postop instructions for further information regarding exercise.    WOUND CARE  You will have steri-strips over your incision after surgery. They will fall off  over the first 7-10 days after surgery.  Showering: you may shower the day you get home unless your surgeon tells you differently.  Wash gently around incisions with warm soapy water, rinse well, and gently pat dry.    No tub baths or swimming until all incisions are healed.      FOLLOW-UP CALL  You will receive a post-op phone call two to three days after surgery to check in and see how you are doing (If your surgery is on a Friday, your phone call will be on the Monday following your surgery).    You can always send us a message via T-ZONE if you have any questions or concerns.    GET WELL LOOP  Track your fluid intake!  Reminders to set up follow up appointments.  Notifications of frequently asked questions.  Please use T-ZONE for any concerns regarding your health.    SURGERY CANCELLATION  If something occurs in which you need to cancel your surgery, please contact Scott at [phone], as soon as possible.    Please let us know if you have any additional questions.    Sincerely,    Amanda Melendrez RN, BSN  RN Care Coordinator  Bariatric Surgery and Comprehensive Weight Management  Phone: [phone]

## 2022-07-15 NOTE — PROGRESS NOTES
This patient is having sleeve gastrectomy by Dr. Mata 7/26/22.    The following handouts were reviewed with the patient :  Before Your Surgery, Patient Checklist, Weight Loss Surgery Pre-operative Class, Preop Recommendations Quick Reference Guide, History and Physical, Medications to Avoid, Shower or Bathing Before Surgery, Bowel Preparation, Powerful Choices and [place] [employer] Directive.  Questions were addressed and understanding of content was verbalized.  Contact information was provided.    Patient goal weight: 329  Weight today: 340 at doctor's office, states her scale at home was 333.    Call [phone] Scott Burk to confirm surgery date   Call [phone] to schedule your pre-operative history and physical with our PAC clinic.      Patient currently taking opioid/narcotic pain medications? No.     Patient is on a modified liquid diet. Was told to start a full liquid starting Monday.

## 2022-07-15 NOTE — LETTER
7/15/2022       RE: Sasha Hand  [address] [place] [facility] [address]     Dear Colleague,    Thank you for referring your patient, Sasha Hand, to the [facility] [place] [facility]. Please see a copy of my visit note below.    This patient is having sleeve gastrectomy by Dr. Mata 7/26/22.    The following handouts were reviewed with the patient :  Before Your Surgery, Patient Checklist, Weight Loss Surgery Pre-operative Class, Preop Recommendations Quick Reference Guide, History and Physical, Medications to Avoid, Shower or Bathing Before Surgery, Bowel Preparation, Powerful Choices and [place] [employer] Directive.  Questions were addressed and understanding of content was verbalized.  Contact information was provided.    Patient goal weight: 329  Weight today: 340 at doctor's office, states her scale at home was 333.    Call [phone] Scott Burk to confirm surgery date   Call [phone] to schedule your pre-operative history and physical with our PAC clinic.      Patient currently taking opioid/narcotic pain medications? No.     Patient is on a modified liquid diet. Was told to start a full liquid starting Monday.        Sincerely,    Amanda Melendrez RN

## 2022-07-16 LAB
C TRACH DNA SPEC QL NAA+PROBE: NEGATIVE
N GONORRHOEA DNA SPEC QL NAA+PROBE: NEGATIVE

## 2022-07-16 NOTE — RESULT ENCOUNTER NOTE
Natacha Baez,    Your test results are attached.  Your labs are good for surgery.         Lianne Mayes DNP, RN, ANP-C

## 2022-07-18 LAB
BKR LAB AP GYN ADEQUACY: NORMAL
BKR LAB AP GYN INTERPRETATION: NORMAL
BKR LAB AP HPV REFLEX: NO
BKR LAB AP PREVIOUS ABNORMAL: NORMAL
PATH REPORT.COMMENTS IMP SPEC: NORMAL
PATH REPORT.COMMENTS IMP SPEC: NORMAL
PATH REPORT.RELEVANT HX SPEC: NORMAL

## 2022-07-25 ENCOUNTER — ANESTHESIA EVENT (OUTPATIENT)
Dept: SURGERY | Facility: CLINIC | Age: 22
DRG: 621 | End: 2022-07-25
Payer: COMMERCIAL

## 2022-07-25 ENCOUNTER — PREP FOR PROCEDURE (OUTPATIENT)
Dept: ENDOCRINOLOGY | Facility: CLINIC | Age: 22
End: 2022-07-25

## 2022-07-25 NOTE — ANESTHESIA PREPROCEDURE EVALUATION
Anesthesia Pre-Procedure Evaluation    Patient: Sasha Hand   MRN: [id number] : 2000        Procedure : Procedure(s):  GASTRECTOMY, SLEEVE, LAPAROSCOPIC  possible HERNIORRHAPHY, HIATAL, LAPAROSCOPIC          Past Medical History:   Diagnosis Date     Closed head injury, subsequent encounter 2018     History of PCR DNA positive for HSV1      Moderate major depression (H) 2014     Nexplanon placed 17     Nexplanon placed 17 (removed 2020) 2017     Nexplanon placed 2021     Obesity 2010     Pyelonephritis 2018     Skin tag (right collar line and left labial region 2019     Tobacco use disorder 2016     Uncomplicated asthma       Past Surgical History:   Procedure Laterality Date     ARTHROSCOPY KNEE WITH MENISCAL REPAIR Right 5/10/2017    Procedure: ARTHROSCOPY KNEE WITH MENISCAL REPAIR;  arthroscopy right knee with lateral meniscus repair;  Surgeon: Nathaniel Hicks MD;  Location: [facility]     ESOPHAGOSCOPY, GASTROSCOPY, DUODENOSCOPY (EGD), COMBINED N/A 3/28/2022    Procedure: ESOPHAGOGASTRODUODENOSCOPY (EGD);  Surgeon: Joseph Mata MD;  Location: [facility] OR      Allergies   Allergen Reactions     Zofran [Ondansetron] Headache     No Known Drug Allergies      Seasonal Allergies      Steri Strips      Got boils on incision after knee surgery from steri strips      Social History     Tobacco Use     Smoking status: Former Smoker     Packs/day: 1.00     Types: Cigarettes     Quit date: 2021     Years since quittin.4     Smokeless tobacco: Former User   Substance Use Topics     Alcohol use: No     Alcohol/week: 0.0 standard drinks      Wt Readings from Last 1 Encounters:   22 (!) 154.2 kg (340 lb)        Anesthesia Evaluation   Pt has had prior anesthetic.     History of anesthetic complications   pt reports difficulty with mouth guard during EGD in March.  This led to her biting her lip during the  procedure..    ROS/MED HX  ENT/Pulmonary:     (+) MADIHA risk factors, hypertension, obese, allergic rhinitis, Intermittent, asthma Last exacerbation: none recent,  Treatment: Inhaler prn,  recent URI, covid positive test, home test, on 6/10.  mild URI symptoms.  cough is resolved.:     Neurologic:  - neg neurologic ROS     Cardiovascular:     (+) hypertension-----    METS/Exercise Tolerance: >4 METS    Hematologic:  - neg hematologic  ROS  (-) history of blood clots and history of blood transfusion   Musculoskeletal:  - neg musculoskeletal ROS     GI/Hepatic:     (+) GERD, Asymptomatic on medication,     Renal/Genitourinary:  - neg Renal ROS     Endo:     (+) Obesity,  (-) Type I DM, Type II DM and thyroid disease   Psychiatric/Substance Use:     (+) psychiatric history anxiety and depression     Infectious Disease:       Malignancy:  - neg malignancy ROS     Other:  - neg other ROS          Physical Exam    Airway        Mallampati: I   TM distance: > 3 FB   Neck ROM: full   Mouth opening: > 3 cm    Respiratory Devices and Support         Dental         B=Bridge, C=Chipped, L=Loose, M=Missing    Cardiovascular          Rhythm and rate: regular and normal     Pulmonary           breath sounds clear to auscultation           OUTSIDE LABS:  CBC:   Lab Results   Component Value Date    WBC 7.8 07/14/2022    WBC 9.3 03/25/2022    HGB 14.9 07/14/2022    HGB 14.2 03/25/2022    HCT 44.1 07/14/2022    HCT 42.4 03/25/2022     07/14/2022     03/25/2022     BMP:   Lab Results   Component Value Date     07/14/2022     01/27/2022    POTASSIUM 4.0 07/14/2022    POTASSIUM 3.9 01/27/2022    CHLORIDE 112 (H) 07/14/2022    CHLORIDE 112 (H) 01/27/2022    CO2 23 07/14/2022    CO2 23 01/27/2022    BUN 9 07/14/2022    BUN 8 01/27/2022    CR 0.52 07/14/2022    CR 0.50 (L) 01/27/2022    GLC 90 07/14/2022    GLC 88 03/28/2022     COAGS: No results found for: PTT, INR, FIBR  POC:   Lab Results   Component Value Date     HCG Negative 03/28/2022    HCGS Negative 04/22/2021     HEPATIC:   Lab Results   Component Value Date    ALBUMIN 3.7 01/20/2022    PROTTOTAL 7.5 01/20/2022    ALT 96 (H) 01/20/2022    AST 36 01/20/2022    ALKPHOS 59 01/20/2022    BILITOTAL 0.4 01/20/2022     OTHER:   Lab Results   Component Value Date    A1C 5.0 07/14/2022    DELIA 8.9 07/14/2022    LIPASE 86 05/31/2018    TSH 3.10 06/24/2021    CRP <2.9 05/31/2018       Anesthesia Plan    ASA Status:  3   NPO Status:  NPO Appropriate    Anesthesia Type: General.     - Airway: ETT   Induction: RSI.   Maintenance: Balanced.        Consents    Anesthesia Plan(s) and associated risks, benefits, and realistic alternatives discussed. Questions answered and patient/representative(s) expressed understanding.    - Discussed:     - Discussed with:  Patient      - Extended Intubation/Ventilatory Support Discussed: No.      - Patient is DNR/DNI Status: No    Use of blood products discussed: Yes.     - Discussed with: Patient.     - Consented: consented to blood products            Reason for refusal: other.     Postoperative Care    Pain management: IV analgesics.   PONV prophylaxis: Ondansetron (or other 5HT-3), Dexamethasone or Solumedrol     Comments:                Genaro Madsen MD

## 2022-07-26 ENCOUNTER — HOSPITAL ENCOUNTER (INPATIENT)
Facility: CLINIC | Age: 22
LOS: 1 days | Discharge: HOME | DRG: 621 | End: 2022-07-27
Attending: SURGERY | Admitting: SURGERY
Payer: COMMERCIAL

## 2022-07-26 ENCOUNTER — ANESTHESIA (OUTPATIENT)
Dept: SURGERY | Facility: CLINIC | Age: 22
DRG: 621 | End: 2022-07-26
Payer: COMMERCIAL

## 2022-07-26 DIAGNOSIS — E66.01 MORBID OBESITY (H): ICD-10-CM

## 2022-07-26 DIAGNOSIS — Z98.84 S/P LAPAROSCOPIC SLEEVE GASTRECTOMY: Primary | ICD-10-CM

## 2022-07-26 LAB
CREAT SERPL-MCNC: 0.65 MG/DL (ref 0.51–0.95)
GFR SERPL CREATININE-BSD FRML MDRD: >90 ML/MIN/1.73M2
GLUCOSE BLDC GLUCOMTR-MCNC: 80 MG/DL (ref 70–99)
HOLD SPECIMEN: NORMAL

## 2022-07-26 PROCEDURE — 710N000010 HC RECOVERY PHASE 1, LEVEL 2, PER MIN: Performed by: SURGERY

## 2022-07-26 PROCEDURE — 258N000003 HC RX IP 258 OP 636: Performed by: STUDENT IN AN ORGANIZED HEALTH CARE EDUCATION/TRAINING PROGRAM

## 2022-07-26 PROCEDURE — 272N000001 HC OR GENERAL SUPPLY STERILE: Performed by: SURGERY

## 2022-07-26 PROCEDURE — 250N000011 HC RX IP 250 OP 636

## 2022-07-26 PROCEDURE — 999N000141 HC STATISTIC PRE-PROCEDURE NURSING ASSESSMENT: Performed by: SURGERY

## 2022-07-26 PROCEDURE — 250N000025 HC SEVOFLURANE, PER MIN: Performed by: SURGERY

## 2022-07-26 PROCEDURE — 0DB64Z3 EXCISION OF STOMACH, PERCUTANEOUS ENDOSCOPIC APPROACH, VERTICAL: ICD-10-PCS | Performed by: SURGERY

## 2022-07-26 PROCEDURE — 250N000009 HC RX 250: Performed by: ANESTHESIOLOGY

## 2022-07-26 PROCEDURE — 250N000009 HC RX 250

## 2022-07-26 PROCEDURE — 370N000017 HC ANESTHESIA TECHNICAL FEE, PER MIN: Performed by: SURGERY

## 2022-07-26 PROCEDURE — 43775 LAP SLEEVE GASTRECTOMY: CPT | Performed by: SURGERY

## 2022-07-26 PROCEDURE — 250N000013 HC RX MED GY IP 250 OP 250 PS 637

## 2022-07-26 PROCEDURE — 250N000009 HC RX 250: Performed by: STUDENT IN AN ORGANIZED HEALTH CARE EDUCATION/TRAINING PROGRAM

## 2022-07-26 PROCEDURE — 250N000011 HC RX IP 250 OP 636: Performed by: SURGERY

## 2022-07-26 PROCEDURE — 360N000080 HC SURGERY LEVEL 7, PER MIN: Performed by: SURGERY

## 2022-07-26 PROCEDURE — 88305 TISSUE EXAM BY PATHOLOGIST: CPT | Mod: TC | Performed by: SURGERY

## 2022-07-26 PROCEDURE — 250N000013 HC RX MED GY IP 250 OP 250 PS 637: Performed by: SURGERY

## 2022-07-26 PROCEDURE — 8E0W4CZ ROBOTIC ASSISTED PROCEDURE OF TRUNK REGION, PERCUTANEOUS ENDOSCOPIC APPROACH: ICD-10-PCS | Performed by: SURGERY

## 2022-07-26 PROCEDURE — 120N000002 HC R&B MED SURG/OB UMMC

## 2022-07-26 PROCEDURE — 258N000003 HC RX IP 258 OP 636: Performed by: SURGERY

## 2022-07-26 PROCEDURE — 250N000011 HC RX IP 250 OP 636: Performed by: STUDENT IN AN ORGANIZED HEALTH CARE EDUCATION/TRAINING PROGRAM

## 2022-07-26 PROCEDURE — 36415 COLL VENOUS BLD VENIPUNCTURE: CPT | Performed by: SURGERY

## 2022-07-26 PROCEDURE — 258N000003 HC RX IP 258 OP 636

## 2022-07-26 PROCEDURE — 82565 ASSAY OF CREATININE: CPT | Performed by: SURGERY

## 2022-07-26 RX ORDER — OXYCODONE HYDROCHLORIDE 5 MG/1
5 TABLET ORAL
Status: DISCONTINUED | OUTPATIENT
Start: 2022-07-26 | End: 2022-07-27 | Stop reason: HOSPADM

## 2022-07-26 RX ORDER — HYOSCYAMINE SULFATE 0.125 MG
125 TABLET ORAL EVERY 4 HOURS PRN
Status: DISCONTINUED | OUTPATIENT
Start: 2022-07-26 | End: 2022-07-27 | Stop reason: HOSPADM

## 2022-07-26 RX ORDER — ONDANSETRON 2 MG/ML
4 INJECTION INTRAMUSCULAR; INTRAVENOUS EVERY 6 HOURS PRN
Status: DISCONTINUED | OUTPATIENT
Start: 2022-07-26 | End: 2022-07-27 | Stop reason: HOSPADM

## 2022-07-26 RX ORDER — ENALAPRILAT 1.25 MG/ML
1.25 INJECTION INTRAVENOUS EVERY 6 HOURS PRN
Status: DISCONTINUED | OUTPATIENT
Start: 2022-07-26 | End: 2022-07-27 | Stop reason: HOSPADM

## 2022-07-26 RX ORDER — METOPROLOL TARTRATE 25 MG/1
25 TABLET, FILM COATED ORAL 2 TIMES DAILY
Status: DISCONTINUED | OUTPATIENT
Start: 2022-07-27 | End: 2022-07-27 | Stop reason: HOSPADM

## 2022-07-26 RX ORDER — NALOXONE HYDROCHLORIDE 0.4 MG/ML
0.2 INJECTION, SOLUTION INTRAMUSCULAR; INTRAVENOUS; SUBCUTANEOUS
Status: DISCONTINUED | OUTPATIENT
Start: 2022-07-26 | End: 2022-07-27 | Stop reason: HOSPADM

## 2022-07-26 RX ORDER — LABETALOL 20 MG/4 ML (5 MG/ML) INTRAVENOUS SYRINGE
PRN
Status: DISCONTINUED | OUTPATIENT
Start: 2022-07-26 | End: 2022-07-26

## 2022-07-26 RX ORDER — NALOXONE HYDROCHLORIDE 0.4 MG/ML
0.4 INJECTION, SOLUTION INTRAMUSCULAR; INTRAVENOUS; SUBCUTANEOUS
Status: DISCONTINUED | OUTPATIENT
Start: 2022-07-26 | End: 2022-07-27 | Stop reason: HOSPADM

## 2022-07-26 RX ORDER — LIDOCAINE HYDROCHLORIDE 20 MG/ML
INJECTION, SOLUTION INFILTRATION; PERINEURAL PRN
Status: DISCONTINUED | OUTPATIENT
Start: 2022-07-26 | End: 2022-07-26

## 2022-07-26 RX ORDER — PROPOFOL 10 MG/ML
INJECTION, EMULSION INTRAVENOUS PRN
Status: DISCONTINUED | OUTPATIENT
Start: 2022-07-26 | End: 2022-07-26

## 2022-07-26 RX ORDER — SODIUM CHLORIDE, SODIUM LACTATE, POTASSIUM CHLORIDE, CALCIUM CHLORIDE 600; 310; 30; 20 MG/100ML; MG/100ML; MG/100ML; MG/100ML
INJECTION, SOLUTION INTRAVENOUS CONTINUOUS PRN
Status: DISCONTINUED | OUTPATIENT
Start: 2022-07-26 | End: 2022-07-26

## 2022-07-26 RX ORDER — ONDANSETRON 4 MG/1
4 TABLET, ORALLY DISINTEGRATING ORAL EVERY 30 MIN PRN
Status: DISCONTINUED | OUTPATIENT
Start: 2022-07-26 | End: 2022-07-26 | Stop reason: HOSPADM

## 2022-07-26 RX ORDER — AMOXICILLIN 250 MG
2 CAPSULE ORAL 2 TIMES DAILY
Status: DISCONTINUED | OUTPATIENT
Start: 2022-07-26 | End: 2022-07-27 | Stop reason: HOSPADM

## 2022-07-26 RX ORDER — PROCHLORPERAZINE MALEATE 5 MG
10 TABLET ORAL EVERY 6 HOURS PRN
Status: DISCONTINUED | OUTPATIENT
Start: 2022-07-26 | End: 2022-07-27 | Stop reason: HOSPADM

## 2022-07-26 RX ORDER — HYDROMORPHONE HYDROCHLORIDE 1 MG/ML
0.5 INJECTION, SOLUTION INTRAMUSCULAR; INTRAVENOUS; SUBCUTANEOUS
Status: DISCONTINUED | OUTPATIENT
Start: 2022-07-26 | End: 2022-07-27 | Stop reason: HOSPADM

## 2022-07-26 RX ORDER — KETOROLAC TROMETHAMINE 30 MG/ML
30 INJECTION, SOLUTION INTRAMUSCULAR; INTRAVENOUS EVERY 6 HOURS
Status: DISCONTINUED | OUTPATIENT
Start: 2022-07-26 | End: 2022-07-27 | Stop reason: HOSPADM

## 2022-07-26 RX ORDER — BUPIVACAINE HYDROCHLORIDE 2.5 MG/ML
INJECTION, SOLUTION INFILTRATION; PERINEURAL PRN
Status: DISCONTINUED | OUTPATIENT
Start: 2022-07-26 | End: 2022-07-26 | Stop reason: HOSPADM

## 2022-07-26 RX ORDER — SODIUM CHLORIDE, SODIUM LACTATE, POTASSIUM CHLORIDE, CALCIUM CHLORIDE 600; 310; 30; 20 MG/100ML; MG/100ML; MG/100ML; MG/100ML
INJECTION, SOLUTION INTRAVENOUS CONTINUOUS
Status: DISCONTINUED | OUTPATIENT
Start: 2022-07-26 | End: 2022-07-26 | Stop reason: HOSPADM

## 2022-07-26 RX ORDER — ESMOLOL HYDROCHLORIDE 10 MG/ML
INJECTION INTRAVENOUS PRN
Status: DISCONTINUED | OUTPATIENT
Start: 2022-07-26 | End: 2022-07-26

## 2022-07-26 RX ORDER — IPRATROPIUM BROMIDE AND ALBUTEROL SULFATE 2.5; .5 MG/3ML; MG/3ML
1 SOLUTION RESPIRATORY (INHALATION) EVERY 6 HOURS PRN
Status: DISCONTINUED | OUTPATIENT
Start: 2022-07-26 | End: 2022-07-27 | Stop reason: HOSPADM

## 2022-07-26 RX ORDER — LIDOCAINE 40 MG/G
CREAM TOPICAL
Status: DISCONTINUED | OUTPATIENT
Start: 2022-07-26 | End: 2022-07-26 | Stop reason: HOSPADM

## 2022-07-26 RX ORDER — ENOXAPARIN SODIUM 100 MG/ML
40 INJECTION SUBCUTANEOUS
Status: COMPLETED | OUTPATIENT
Start: 2022-07-26 | End: 2022-07-26

## 2022-07-26 RX ORDER — EPHEDRINE SULFATE 50 MG/ML
INJECTION, SOLUTION INTRAMUSCULAR; INTRAVENOUS; SUBCUTANEOUS PRN
Status: DISCONTINUED | OUTPATIENT
Start: 2022-07-26 | End: 2022-07-26

## 2022-07-26 RX ORDER — FENTANYL CITRATE 50 UG/ML
25 INJECTION, SOLUTION INTRAMUSCULAR; INTRAVENOUS EVERY 5 MIN PRN
Status: DISCONTINUED | OUTPATIENT
Start: 2022-07-26 | End: 2022-07-26 | Stop reason: HOSPADM

## 2022-07-26 RX ORDER — CEFAZOLIN SODIUM/WATER 3 G/30 ML
3 SYRINGE (ML) INTRAVENOUS
Status: COMPLETED | OUTPATIENT
Start: 2022-07-26 | End: 2022-07-26

## 2022-07-26 RX ORDER — DIPHENHYDRAMINE HYDROCHLORIDE 50 MG/ML
25 INJECTION INTRAMUSCULAR; INTRAVENOUS EVERY 6 HOURS PRN
Status: DISCONTINUED | OUTPATIENT
Start: 2022-07-26 | End: 2022-07-27 | Stop reason: HOSPADM

## 2022-07-26 RX ORDER — SODIUM CHLORIDE, SODIUM LACTATE, POTASSIUM CHLORIDE, CALCIUM CHLORIDE 600; 310; 30; 20 MG/100ML; MG/100ML; MG/100ML; MG/100ML
INJECTION, SOLUTION INTRAVENOUS CONTINUOUS
Status: DISCONTINUED | OUTPATIENT
Start: 2022-07-26 | End: 2022-07-27 | Stop reason: HOSPADM

## 2022-07-26 RX ORDER — ONDANSETRON 4 MG/1
4 TABLET, ORALLY DISINTEGRATING ORAL EVERY 6 HOURS PRN
Status: DISCONTINUED | OUTPATIENT
Start: 2022-07-26 | End: 2022-07-27 | Stop reason: HOSPADM

## 2022-07-26 RX ORDER — ONDANSETRON 2 MG/ML
4 INJECTION INTRAMUSCULAR; INTRAVENOUS
Status: DISCONTINUED | OUTPATIENT
Start: 2022-07-26 | End: 2022-07-26 | Stop reason: HOSPADM

## 2022-07-26 RX ORDER — ALBUTEROL SULFATE 90 UG/1
2 AEROSOL, METERED RESPIRATORY (INHALATION) EVERY 6 HOURS PRN
Status: DISCONTINUED | OUTPATIENT
Start: 2022-07-26 | End: 2022-07-26

## 2022-07-26 RX ORDER — ENOXAPARIN SODIUM 100 MG/ML
40 INJECTION SUBCUTANEOUS EVERY 24 HOURS
Status: DISCONTINUED | OUTPATIENT
Start: 2022-07-27 | End: 2022-07-27 | Stop reason: HOSPADM

## 2022-07-26 RX ORDER — ACETAMINOPHEN 325 MG/1
650 TABLET ORAL EVERY 4 HOURS PRN
Status: DISCONTINUED | OUTPATIENT
Start: 2022-07-26 | End: 2022-07-27 | Stop reason: HOSPADM

## 2022-07-26 RX ORDER — FENTANYL CITRATE 50 UG/ML
INJECTION, SOLUTION INTRAMUSCULAR; INTRAVENOUS PRN
Status: DISCONTINUED | OUTPATIENT
Start: 2022-07-26 | End: 2022-07-26

## 2022-07-26 RX ORDER — HALOPERIDOL 5 MG/ML
1 INJECTION INTRAMUSCULAR ONCE
Status: COMPLETED | OUTPATIENT
Start: 2022-07-26 | End: 2022-07-26

## 2022-07-26 RX ORDER — HYDROMORPHONE HCL IN WATER/PF 6 MG/30 ML
0.2 PATIENT CONTROLLED ANALGESIA SYRINGE INTRAVENOUS EVERY 5 MIN PRN
Status: DISCONTINUED | OUTPATIENT
Start: 2022-07-26 | End: 2022-07-26 | Stop reason: HOSPADM

## 2022-07-26 RX ORDER — OXYCODONE HYDROCHLORIDE 10 MG/1
10 TABLET ORAL
Status: DISCONTINUED | OUTPATIENT
Start: 2022-07-26 | End: 2022-07-27 | Stop reason: HOSPADM

## 2022-07-26 RX ORDER — DIPHENHYDRAMINE HCL 25 MG
25 CAPSULE ORAL EVERY 6 HOURS PRN
Status: DISCONTINUED | OUTPATIENT
Start: 2022-07-26 | End: 2022-07-27 | Stop reason: HOSPADM

## 2022-07-26 RX ORDER — ALBUTEROL SULFATE 90 UG/1
1-2 AEROSOL, METERED RESPIRATORY (INHALATION) EVERY 4 HOURS PRN
Status: DISCONTINUED | OUTPATIENT
Start: 2022-07-26 | End: 2022-07-27 | Stop reason: HOSPADM

## 2022-07-26 RX ORDER — LIDOCAINE 40 MG/G
CREAM TOPICAL
Status: DISCONTINUED | OUTPATIENT
Start: 2022-07-26 | End: 2022-07-27 | Stop reason: HOSPADM

## 2022-07-26 RX ORDER — CEFAZOLIN SODIUM/WATER 3 G/30 ML
3 SYRINGE (ML) INTRAVENOUS SEE ADMIN INSTRUCTIONS
Status: DISCONTINUED | OUTPATIENT
Start: 2022-07-26 | End: 2022-07-26 | Stop reason: HOSPADM

## 2022-07-26 RX ORDER — OXYCODONE HYDROCHLORIDE 5 MG/1
5 TABLET ORAL EVERY 4 HOURS PRN
Status: DISCONTINUED | OUTPATIENT
Start: 2022-07-26 | End: 2022-07-26 | Stop reason: HOSPADM

## 2022-07-26 RX ORDER — DEXAMETHASONE SODIUM PHOSPHATE 4 MG/ML
INJECTION, SOLUTION INTRA-ARTICULAR; INTRALESIONAL; INTRAMUSCULAR; INTRAVENOUS; SOFT TISSUE PRN
Status: DISCONTINUED | OUTPATIENT
Start: 2022-07-26 | End: 2022-07-26

## 2022-07-26 RX ORDER — ONDANSETRON 2 MG/ML
4 INJECTION INTRAMUSCULAR; INTRAVENOUS EVERY 30 MIN PRN
Status: DISCONTINUED | OUTPATIENT
Start: 2022-07-26 | End: 2022-07-26 | Stop reason: HOSPADM

## 2022-07-26 RX ORDER — ACETAMINOPHEN 325 MG/1
975 TABLET ORAL ONCE
Status: COMPLETED | OUTPATIENT
Start: 2022-07-26 | End: 2022-07-26

## 2022-07-26 RX ADMIN — LIDOCAINE HYDROCHLORIDE 100 MG: 20 INJECTION, SOLUTION INFILTRATION; PERINEURAL at 13:57

## 2022-07-26 RX ADMIN — ROCURONIUM BROMIDE 50 MG: 50 INJECTION, SOLUTION INTRAVENOUS at 14:00

## 2022-07-26 RX ADMIN — HYDROMORPHONE HYDROCHLORIDE 0.2 MG: 0.2 INJECTION, SOLUTION INTRAMUSCULAR; INTRAVENOUS; SUBCUTANEOUS at 17:25

## 2022-07-26 RX ADMIN — PHENYLEPHRINE HYDROCHLORIDE 200 MCG: 10 INJECTION INTRAVENOUS at 15:01

## 2022-07-26 RX ADMIN — HYDROMORPHONE HYDROCHLORIDE 0.5 MG: 1 INJECTION, SOLUTION INTRAMUSCULAR; INTRAVENOUS; SUBCUTANEOUS at 16:24

## 2022-07-26 RX ADMIN — PROCHLORPERAZINE EDISYLATE 5 MG: 5 INJECTION INTRAMUSCULAR; INTRAVENOUS at 16:49

## 2022-07-26 RX ADMIN — FENTANYL CITRATE 25 MCG: 50 INJECTION, SOLUTION INTRAMUSCULAR; INTRAVENOUS at 16:52

## 2022-07-26 RX ADMIN — ACETAMINOPHEN 975 MG: 325 TABLET, FILM COATED ORAL at 13:13

## 2022-07-26 RX ADMIN — OXYCODONE HYDROCHLORIDE 5 MG: 5 TABLET ORAL at 19:57

## 2022-07-26 RX ADMIN — ONDANSETRON 4 MG: 2 INJECTION INTRAMUSCULAR; INTRAVENOUS at 19:58

## 2022-07-26 RX ADMIN — HYDROMORPHONE HYDROCHLORIDE 0.5 MG: 1 INJECTION, SOLUTION INTRAMUSCULAR; INTRAVENOUS; SUBCUTANEOUS at 14:54

## 2022-07-26 RX ADMIN — ROCURONIUM BROMIDE 20 MG: 50 INJECTION, SOLUTION INTRAVENOUS at 14:40

## 2022-07-26 RX ADMIN — HYOSCYAMINE SULFATE 125 MCG: 0.12 TABLET ORAL at 23:02

## 2022-07-26 RX ADMIN — HYDROMORPHONE HYDROCHLORIDE 0.5 MG: 1 INJECTION, SOLUTION INTRAMUSCULAR; INTRAVENOUS; SUBCUTANEOUS at 22:07

## 2022-07-26 RX ADMIN — FENTANYL CITRATE 25 MCG: 50 INJECTION, SOLUTION INTRAMUSCULAR; INTRAVENOUS at 16:57

## 2022-07-26 RX ADMIN — ESMOLOL HYDROCHLORIDE 20 MG: 10 INJECTION, SOLUTION INTRAVENOUS at 15:59

## 2022-07-26 RX ADMIN — PROPOFOL 40 MG: 10 INJECTION, EMULSION INTRAVENOUS at 15:32

## 2022-07-26 RX ADMIN — SUGAMMADEX 300 MG: 100 INJECTION, SOLUTION INTRAVENOUS at 16:26

## 2022-07-26 RX ADMIN — ROCURONIUM BROMIDE 10 MG: 50 INJECTION, SOLUTION INTRAVENOUS at 15:50

## 2022-07-26 RX ADMIN — Medication 5 MG: at 15:01

## 2022-07-26 RX ADMIN — HALOPERIDOL LACTATE 1 MG: 5 INJECTION, SOLUTION INTRAMUSCULAR at 17:00

## 2022-07-26 RX ADMIN — SODIUM CHLORIDE, POTASSIUM CHLORIDE, SODIUM LACTATE AND CALCIUM CHLORIDE: 600; 310; 30; 20 INJECTION, SOLUTION INTRAVENOUS at 16:37

## 2022-07-26 RX ADMIN — PHENYLEPHRINE HYDROCHLORIDE 200 MCG: 10 INJECTION INTRAVENOUS at 14:22

## 2022-07-26 RX ADMIN — SUCCINYLCHOLINE CHLORIDE 140 MG: 20 INJECTION, SOLUTION INTRAMUSCULAR; INTRAVENOUS; PARENTERAL at 13:57

## 2022-07-26 RX ADMIN — Medication 3 G: at 14:04

## 2022-07-26 RX ADMIN — SODIUM CHLORIDE, POTASSIUM CHLORIDE, SODIUM LACTATE AND CALCIUM CHLORIDE: 600; 310; 30; 20 INJECTION, SOLUTION INTRAVENOUS at 17:18

## 2022-07-26 RX ADMIN — FENTANYL CITRATE 50 MCG: 50 INJECTION, SOLUTION INTRAMUSCULAR; INTRAVENOUS at 14:46

## 2022-07-26 RX ADMIN — ESMOLOL HYDROCHLORIDE 20 MG: 10 INJECTION, SOLUTION INTRAVENOUS at 15:34

## 2022-07-26 RX ADMIN — ENOXAPARIN SODIUM 40 MG: 40 INJECTION SUBCUTANEOUS at 13:44

## 2022-07-26 RX ADMIN — ROCURONIUM BROMIDE 10 MG: 50 INJECTION, SOLUTION INTRAVENOUS at 15:19

## 2022-07-26 RX ADMIN — FENTANYL CITRATE 50 MCG: 50 INJECTION, SOLUTION INTRAMUSCULAR; INTRAVENOUS at 14:43

## 2022-07-26 RX ADMIN — MIDAZOLAM 2 MG: 1 INJECTION INTRAMUSCULAR; INTRAVENOUS at 13:43

## 2022-07-26 RX ADMIN — FENTANYL CITRATE 25 MCG: 50 INJECTION, SOLUTION INTRAMUSCULAR; INTRAVENOUS at 17:04

## 2022-07-26 RX ADMIN — LABETALOL 20 MG/4 ML (5 MG/ML) INTRAVENOUS SYRINGE 10 MG: at 16:05

## 2022-07-26 RX ADMIN — HYDROMORPHONE HYDROCHLORIDE 0.2 MG: 0.2 INJECTION, SOLUTION INTRAMUSCULAR; INTRAVENOUS; SUBCUTANEOUS at 17:17

## 2022-07-26 RX ADMIN — FENTANYL CITRATE 100 MCG: 50 INJECTION, SOLUTION INTRAMUSCULAR; INTRAVENOUS at 13:57

## 2022-07-26 RX ADMIN — KETOROLAC TROMETHAMINE 30 MG: 30 INJECTION, SOLUTION INTRAMUSCULAR; INTRAVENOUS at 19:11

## 2022-07-26 RX ADMIN — SODIUM CHLORIDE, POTASSIUM CHLORIDE, SODIUM LACTATE AND CALCIUM CHLORIDE: 600; 310; 30; 20 INJECTION, SOLUTION INTRAVENOUS at 13:49

## 2022-07-26 RX ADMIN — FENTANYL CITRATE 25 MCG: 50 INJECTION, SOLUTION INTRAMUSCULAR; INTRAVENOUS at 17:09

## 2022-07-26 RX ADMIN — PROPOFOL 200 MG: 10 INJECTION, EMULSION INTRAVENOUS at 13:57

## 2022-07-26 RX ADMIN — DEXAMETHASONE SODIUM PHOSPHATE 8 MG: 4 INJECTION, SOLUTION INTRA-ARTICULAR; INTRALESIONAL; INTRAMUSCULAR; INTRAVENOUS; SOFT TISSUE at 13:57

## 2022-07-26 RX ADMIN — PHENYLEPHRINE HYDROCHLORIDE 200 MCG: 10 INJECTION INTRAVENOUS at 14:10

## 2022-07-26 ASSESSMENT — ACTIVITIES OF DAILY LIVING (ADL)
ADLS_ACUITY_SCORE: 22

## 2022-07-26 NOTE — OP NOTE
[facility]    Operative Note    Pre-operative diagnosis: Morbid obesity (H) [E66.01]   Post-operative diagnosis * No post-op diagnosis entered *   Procedure: Procedure(s):  GASTRECTOMY, SLEEVE, ROBOT-ASSISTED, LAPAROSCOPIC   Surgeon: Surgeon(s) and Role:     * Joseph Mata MD - Primary     * Martinez Pugh MD - Assisting   Assistant Surgeon      Anesthesia: Dr. Pugh  The assistance of Dr. Pugh was required in this case due to advanced laparoscopy technique; Dr. Pugh assisted by performing port assistance and providing exposure to patient bedside while I was dissecting with and controlling the robot.    I attest that no qualified resident or fellow was available to assist for this surgery due adequate training and skills to assist with this technically challenging case and instrumentation; as a consequence, I attest that Dr. Pugh performed these needed skills.        General    Estimated blood loss: Less than 50 ml   Drains: None   Specimens: ID Type Source Tests Collected by Time Destination   1 : Partial Gastrectomy Tissue Stomach SURGICAL PATHOLOGY EXAM Joseph Mata MD 7/26/2022  4:17 PM       Findings: None.   Complications: None.   Implants: None.         BOUGIE SIZE: 40 FR  DISTANCE FROM PYLORUS: 10 CM  STAPLE LINE REINFORCEMENT: NO  STAPLE LINE OVERSEW: NO  COMORBIDITIES:   Past Medical History:   Diagnosis Date     Closed head injury, subsequent encounter 4/5/2018     History of PCR DNA positive for HSV1      Moderate major depression (H) 1/20/2014     Nexplanon placed 11/14/17 11/14/2017     Nexplanon placed 11/14/17 (removed 1/16/2020) 11/14/2017     Nexplanon placed 12/14/2021 12/14/2021     Obesity 9/14/2010     Pyelonephritis 2/19/2018     Skin tag (right collar line and left labial region 11/21/2019     Tobacco use disorder 5/4/2016     Uncomplicated asthma        INDICATIONS FOR PROCEDURE  Sasha Hand is a 22 year old female  "who is morbidly obese.  Numerous weight loss attempts without surgery have been without success.     After understanding the risks and benefits of proceeding with a laparoscopic vertical sleeve gastrectomy, she agreed to an operation as outlined by me.    I reviewed the risks of surgery with Sasha Hand.    These include, but are not limited to, death, myocardial infarction, pneumonia, urinary tract infection, deep venous thrombosis with or without pulmonary embolus, abdominal infection from bowel injury or abscess, bowel obstruction, wound infection, and bleeding.    More specific risks related to vertical sleeve gastrectomy were detailed at the bariatric informational seminar and include the followin.) leak at the vertical sleeve staple line, 2.) stricture in the sleeve, 3.) nausea, vomiting, and dehydration for several months, 4.) adhesions causing bowel obstruction, 5.) rapid weight loss causing a higher rate of gallstone formation during the first 6 months after surgery, 6.) decreased absorption of vitamins because of the reduced stomach size, 7.) weight regain if inappropriate food intake occurs.    The BMI that we are treating this patient for was measured at the initial consultation visit in our bariatric program and it was: 55.5 kg/m2 (as calculated just below).      The initial consult height, weight, and BMI are as follows:    Height: 5' 7\"   BARIATRIC WEIGHT TRACKING 3/17/2022   Initial BMI 55.5       Our weight loss surgery program requires weight loss prior to bariatric surgery and currently the height, weight, and BMI are as follows:    Height: 170.2 cm (5' 7\"), Weight: 149.5 kg (329 lb 9.4 oz), and currently the Body mass index is 51.62 kg/m .    Due to the patient's comorbidity conditions of class III obesity in association with elevated body mass index, bariatric surgery has been recommended and is being performed today.    Moreover, as the surgeon performing this procedure, I certify that " the following are true in regards to this patient at or prior to the day of surgery:    1. The patient's body mass index (BMI) is or has been greater than or equal to 35 kg/m2.  2. The patient has at least one co-morbidity related to obesity (as outlined above).  3. The patient has been previously unsuccessful with medical treatment for obesity.  Please note that some of this information has been documented as part of a comprehensive pre-bariatric surgery process in the outpatient clinic and is NOT immediately available in the inpatient encounter for this bariatric operation.      OPERATIVE PROCEDURE:     Sasha Hand was brought to the operating room and prepared in routine fashion. Under the benefits of general anesthesia, a left upper quadrant Veress needle was inserted and pneumoperitoneum was established using carbon dioxide gas to a maximum pressure of 15 mmHg. A total of five ports were placed into the abdomen.    A liver retractor was placed through the rightmost port and this provided a view of the upper stomach.    The robot was docked.    The operation was started by dividing the short gastric vessels off the greater curvature of the stomach. This dissection was carried up to the angle of His, and tissue sealer dissector was used for hemostasis.     A bougie (size noted above) was passed into the stomach and I used 4 blue loads and 1 white load of the Davinci linear cutter stapler device to create a vertical sleeve gastrectomy with the bougie as a template. The bougie was removed.    A transabdominal properitoneal anesthetic block was performed using 30 ml 0.5% bupivicaine diluted with 90 ml saline (120 mL total); this was injected using 22gauge spinal needle into the properitoneal planes around the ports and laterally along the anterior axillary line under direct optical guidance. Some of the mixture was used to inject local anesthetic at the skin of each incision as well.    The sleeve gastrectomy  specimen (partial gastrectomy) was now removed from the abdomen through the 12 mm port.    Hemostasis was secured, and the liver retractor and all ports were removed from the abdomen under direct visualization.     All needle and sponge counts were correct x2 at the end of the operation, and I was present for all critical components of the procedure.     Skin incisions were closed using skin staples, and sterile dressings were placed.     Joseph Mata MD  Surgery  [phone] (hospital )  [phone] (clinic nurses)

## 2022-07-26 NOTE — INTERVAL H&P NOTE
"I have reviewed the surgical (or preoperative) H&P that is linked to this encounter, and examined the patient. There are no significant changes    Clinical Conditions Present on Arrival:  Clinically Significant Risk Factors Present on Admission                   # Severe Obesity: Estimated body mass index is 51.62 kg/m  as calculated from the following:    Height as of this encounter: 1.702 m (5' 7\").    Weight as of this encounter: 149.5 kg (329 lb 9.4 oz).       "

## 2022-07-26 NOTE — ANESTHESIA CARE TRANSFER NOTE
Patient: Sasha Hand    Procedure: Procedure(s):  GASTRECTOMY, SLEEVE, ROBOT-ASSISTED, LAPAROSCOPIC       Diagnosis: Morbid obesity (H) [E66.01]  Diagnosis Additional Information: No value filed.    Anesthesia Type:   General     Note:    Oropharynx: oropharynx clear of all foreign objects  Level of Consciousness: awake  Oxygen Supplementation: face mask  Level of Supplemental Oxygen (L/min / FiO2): 8  Independent Airway: airway patency satisfactory and stable  Dentition: dentition unchanged  Vital Signs Stable: post-procedure vital signs reviewed and stable  Report to RN Given: handoff report given  Patient transferred to: PACU    Handoff Report: Identifed the Patient, Identified the Reponsible Provider, Reviewed the pertinent medical history, Discussed the surgical course, Reviewed Intra-OP anesthesia mangement and issues during anesthesia, Set expectations for post-procedure period and Allowed opportunity for questions and acknowledgement of understanding      Vitals:  Vitals Value Taken Time   /83 07/26/22  1643   Temp     Pulse 90 07/26/22 1643   Resp 12 07/26/22  1643   SpO2 98 % 07/26/22 1643   Vitals shown include unvalidated device data.    Electronically Signed By: SAVI Santiago CRNA  July 26, 2022  4:44 PM

## 2022-07-26 NOTE — ANESTHESIA POSTPROCEDURE EVALUATION
Patient: Sasha Hand    Procedure: Procedure(s):  GASTRECTOMY, SLEEVE, ROBOT-ASSISTED, LAPAROSCOPIC       Anesthesia Type:  General    Note:  Disposition: Admission   Postop Pain Control: Uneventful            Sign Out: Well controlled pain   PONV: No   Neuro/Psych: Uneventful            Sign Out: Acceptable/Baseline neuro status   Airway/Respiratory: Uneventful            Sign Out: Acceptable/Baseline resp. status   CV/Hemodynamics: Uneventful            Sign Out: Acceptable CV status; No obvious hypovolemia; No obvious fluid overload   Other NRE: NONE   DID A NON-ROUTINE EVENT OCCUR? No           Last vitals:  Vitals Value Taken Time   /78 07/26/22 1745   Temp 36.7  C (98  F) 07/26/22 1730   Pulse 106 07/26/22 1745   Resp 18 07/26/22 1745   SpO2 95 % 07/26/22 1745   Vitals shown include unvalidated device data.    Electronically Signed By: Mervin Ledesma MD  July 26, 2022  5:53 PM   (*)     All other components within normal limits    Narrative:     Performed at:  [facility] [address] [employer]  [place], [address]   Phone [phone]   MICROSCOPIC URINALYSIS - Abnormal; Notable for the following:     Casts 0-1 Hyaline (*)     CASTS 2 0-1 Coarse Gran (*)     Mucus, UA 3+ (*)     RBC, UA 10-20 (*)     Renal Epithelial, Urine 3-5 (*)     Crystals Few Bilirubin (*)     All other components within normal limits    Narrative:     Performed at:  [facility] [address] [employer]  [place], [address]   Phone [phone]   [address] - Abnormal; Notable for the following:     Amphetamine Screen, Urine POSITIVE (*)     Benzodiazepine Screen, Urine POSITIVE (*)     All other components within normal limits    Narrative:     Performed at:  [facility] [address] [employer]  [place], [address]   Phone [phone]   ACETAMINOPHEN LEVEL - Abnormal; Notable for the following:     Acetaminophen Level <5 (*)     All other components within normal limits    Narrative:     Performed at:  [facility] [address] [employer]  [place], [address]   Phone [phone]   LIPASE    Narrative:     Performed at:  [facility] [address] [employer]  [place], [address]   Phone [phone]   LACTIC ACID, PLASMA    Narrative:     Performed at:  [facility] [address] [employer] [address]  [place], [address]   Phone [phone]   AMMONIA    Narrative:     Performed at:  [facility] [address], [address]   Phone [phone]   ETHANOL    Narrative:     Performed at:  [facility] [address] [place], [address]   Phone [phone]   HEPATITIS PANEL, ACUTE     RADIOLOGY:  None     ED COURSE:    Medications administered:  Medications   0.9 % sodium chloride

## 2022-07-26 NOTE — ANESTHESIA PROCEDURE NOTES
Airway       Patient location during procedure: OR       Procedure Start/Stop Times: 7/26/2022 1:59 PM  Staff -        CRNA: Jennifer Solomon APRN CRNA       Other Anesthesia Staff: Bren Khan       Performed By: DESTINI  Consent for Airway        Urgency: elective  Indications and Patient Condition       Indications for airway management: faraz-procedural       Induction type:RSI       Mask difficulty assessment: 1 - vent by mask    Final Airway Details       Final airway type: endotracheal airway       Successful airway: ETT - single  Endotracheal Airway Details        ETT size (mm): 7.0       Cuffed: yes       Successful intubation technique: direct laryngoscopy       DL Blade Type: [place] 2       Grade View of Cords: 1       Adjucts: stylet       Position: Right       Measured from: gums/teeth       Secured at (cm): 22       Bite block used: None    Post intubation assessment        Placement verified by: capnometry, equal breath sounds and chest rise        Number of attempts at approach: 1       Secured with: silk tape       Ease of procedure: easy       Dentition: Intact and Unchanged    Medication(s) Administered   Medication Administration Time: 7/26/2022 1:59 PM

## 2022-07-27 VITALS
RESPIRATION RATE: 20 BRPM | SYSTOLIC BLOOD PRESSURE: 133 MMHG | HEART RATE: 64 BPM | OXYGEN SATURATION: 95 % | TEMPERATURE: 96.2 F | DIASTOLIC BLOOD PRESSURE: 60 MMHG | BODY MASS INDEX: 45.99 KG/M2 | WEIGHT: 293 LBS | HEIGHT: 67 IN

## 2022-07-27 LAB
GLUCOSE BLDC GLUCOMTR-MCNC: 100 MG/DL (ref 70–99)
HOLD SPECIMEN: NORMAL
PLATELET # BLD AUTO: 318 10E3/UL (ref 150–450)

## 2022-07-27 PROCEDURE — 258N000003 HC RX IP 258 OP 636: Performed by: SURGERY

## 2022-07-27 PROCEDURE — 250N000011 HC RX IP 250 OP 636: Performed by: SURGERY

## 2022-07-27 PROCEDURE — 36415 COLL VENOUS BLD VENIPUNCTURE: CPT | Performed by: SURGERY

## 2022-07-27 PROCEDURE — 250N000013 HC RX MED GY IP 250 OP 250 PS 637

## 2022-07-27 PROCEDURE — 85049 AUTOMATED PLATELET COUNT: CPT | Performed by: SURGERY

## 2022-07-27 PROCEDURE — 250N000013 HC RX MED GY IP 250 OP 250 PS 637: Performed by: SURGERY

## 2022-07-27 RX ORDER — OXYCODONE HYDROCHLORIDE 5 MG/1
5 TABLET ORAL
Qty: 12 TABLET | Refills: 0 | Status: SHIPPED | OUTPATIENT
Start: 2022-07-27 | End: 2022-07-27

## 2022-07-27 RX ORDER — OXYCODONE HYDROCHLORIDE 5 MG/1
5 TABLET ORAL
Qty: 12 TABLET | Refills: 0 | Status: SHIPPED | OUTPATIENT
Start: 2022-07-27 | End: 2022-08-25

## 2022-07-27 RX ADMIN — SODIUM CHLORIDE, POTASSIUM CHLORIDE, SODIUM LACTATE AND CALCIUM CHLORIDE: 600; 310; 30; 20 INJECTION, SOLUTION INTRAVENOUS at 00:35

## 2022-07-27 RX ADMIN — OXYCODONE HYDROCHLORIDE 10 MG: 10 TABLET ORAL at 00:35

## 2022-07-27 RX ADMIN — METOPROLOL TARTRATE 25 MG: 25 TABLET, FILM COATED ORAL at 07:42

## 2022-07-27 RX ADMIN — SENNOSIDES AND DOCUSATE SODIUM 2 TABLET: 8.6; 5 TABLET ORAL at 07:41

## 2022-07-27 RX ADMIN — OMEPRAZOLE 20 MG: 20 CAPSULE, DELAYED RELEASE ORAL at 07:41

## 2022-07-27 RX ADMIN — HYOSCYAMINE SULFATE 125 MCG: 0.12 TABLET ORAL at 07:05

## 2022-07-27 RX ADMIN — HYOSCYAMINE SULFATE 125 MCG: 0.12 TABLET ORAL at 12:44

## 2022-07-27 RX ADMIN — OXYCODONE HYDROCHLORIDE 10 MG: 10 TABLET ORAL at 04:09

## 2022-07-27 RX ADMIN — HYOSCYAMINE SULFATE 125 MCG: 0.12 TABLET ORAL at 01:49

## 2022-07-27 RX ADMIN — KETOROLAC TROMETHAMINE 30 MG: 30 INJECTION, SOLUTION INTRAMUSCULAR; INTRAVENOUS at 07:05

## 2022-07-27 RX ADMIN — OXYCODONE HYDROCHLORIDE 10 MG: 10 TABLET ORAL at 12:40

## 2022-07-27 RX ADMIN — FLUTICASONE FUROATE AND VILANTEROL TRIFENATATE 1 PUFF: 100; 25 POWDER RESPIRATORY (INHALATION) at 07:49

## 2022-07-27 RX ADMIN — KETOROLAC TROMETHAMINE 30 MG: 30 INJECTION, SOLUTION INTRAMUSCULAR; INTRAVENOUS at 00:43

## 2022-07-27 RX ADMIN — PROCHLORPERAZINE EDISYLATE 10 MG: 5 INJECTION INTRAMUSCULAR; INTRAVENOUS at 00:40

## 2022-07-27 RX ADMIN — OXYCODONE HYDROCHLORIDE 10 MG: 10 TABLET ORAL at 08:18

## 2022-07-27 ASSESSMENT — ACTIVITIES OF DAILY LIVING (ADL)
ADLS_ACUITY_SCORE: 22

## 2022-07-27 NOTE — PLAN OF CARE
Patient discharged home following hospital stay for: lap sleeve gastrectomy    Vitals stable.  Oxygen status: room air  Patient tolerating diet: renny fulls    Belongings sent home with patient. IV site discontinued. Discharge meds to discharge pharmacy. AVS and education gone over with patient; signed copy in patient chart. Pt to follow up as outpatient and with PCP. Pt verbalized understanding of all education and information.

## 2022-07-27 NOTE — DISCHARGE INSTRUCTIONS
After Bariatric Surgery Discharge Instructions  [facility]     Note: Ask your nurse to order your medications from the pharmacy. Be sure you have your medications with you when you leave.  Diet on discharge: bariatric full liquid.   How much fluid should I drink?  Strive for 48-64 ounces daily.  Carry a water bottle with you without a straw or sports top. Drink from it often.  Keep track of how much fluid you drink in a day.  Remember:  -Do not use straws, chew gum or suck on hard candies. They may cause painful gas.    -Sip, don't slurp when you drink.    -Practice small sips using a medicine cup for the first week postop.   -No ice or cold drinks. This could cause gas or spasms.   -No coffee, soda pop or drinks with caffeine. These may cause stomach pain.   -No alcohol. It is bad for your liver and will cause stomach pain.    How often should I do my deep breathing and coughing?  Use your incentive spirometer (small plastic breathing device) every hour while awake after you get home. Using the incentive spirometer helps you deep breath. Continuing to cough and deep breath will help prevent fevers and pneumonia.   If you do not have a fever after one week, you can stop using the incentive spirometer and discard it.     You can continue to take deep breaths without the incentive spirometer every one to two hours while awake for the month after surgery.    What kinds of activity can I do?  Get plenty of rest the first few days after surgery and try to balance rest and activity. You will need some time to recover - you may be more tired than you realize at first. You'll feel better and heal faster if you take good care of yourself.  For 4 weeks after surgery (Please review restrictions at your one week visit, they could change based on how well you are doing):  -Don't lift more than 20 pounds.   -Take 4-5 short walks every day.  -Don't jog, run, or do belly exercises.  Don't  swim, bathe or use a hot tub until your cuts are healed (scabs are gone).  You may shower  Don't plan to fly or take a road trip within the first 1 to 3 months after surgery.  You could get a blood clot in your legs. If you must travel, get up and move around every hour for at least 5 minutes before continuing on your journey.  Your care team can help you decide when it's safe for you to travel.     What can I do for pain control?  You had major belly surgery that involves all layers of your belly muscles. Pain is expected, even for some as far out as 6-8 weeks after surgery. Moving, sneezing, coughing, and breathing will cause discomfort because these activities use your belly muscles.   Please see your after visit summary medication review for what pain medication will be continued, discontinued and newly started for you.    You can take opioid pain medicine if prescribed and if needed. Try to wean off from it as soon as you feel comfortable.   Do not drive while you are taking opioid pain medicine. This is dangerous.  You can take acetaminophen (Tylenol) between your prescribed pain medicine on a scheduled basis OR take it scheduled every 6 hours (check with your care team for specifics).  -Acetaminophen formulation options:    -Liquid   -Caplet (Cut caplet in half before taking)   -Do not take more than 3000 mg Tylenol in a 24 hour period.  -You may also take Tylenol for pain in place of the opioid pain medicine (check with your care team for specifics).   You can apply ice or heat to the affected area(s). Just remember to wrap the ice in something and limit icing sessions to 20 minutes. Excessive icing can irritate the skin or cause skin damage.  You can apply heat with a hot, wet towel or heating pad. Just like cold therapy, limit heat application to 20 minutes. Never sleep with a heating pad on. It could cause severe burns to your skin.  Wear your binder to support your belly muscles if you have one.  Take  this off a little more each day and try to be off completely by 2 weeks after surgery. If you don't need your binder for comfort or support, you don't need to wear it.   You may not be able to sleep in a comfortable position for a few weeks after surgery. This is normal. You may be more comfortable sleeping in a recliner or propped up with 3 pillows for the first couple of weeks after surgery.  Do not take NSAID's (Non-Steroidal Anti-Inflammatory Drugs) (examples: ibuprofen, Motrin, Advil, Aleve, and Naproxen), aspirin, or use pain patches with NSAID's. They will increase your risk of bleeding or getting an ulcer.    Please call the clinic for any of the following pain concerns, we would like to talk to you:  -pain that does not improve with rest  -pain that gets worse and worse  -pain that is not controlled by your pain medicine  -a sudden severe increase in pain    What medications will I need to take after surgery?  You may be discharged with the following types of medications: omeprazole 20 mg once daily for heartburn symptom prevention, zofran as needed for nausea and a medication called hyoscyamine (levsin) as needed for cramping.Please see your after visit summary medication review for what will be continued, discontinued and newly started.  It is important to reduce the amount of acid in your new stomach for a couple of months after surgery while it is still healing. We will prescribe an acid reducer or antacid. Take it as directed. This will help prevent ulcers, heartburn and acid reflux.  If you took an acid reducer before you had surgery, your care team will let you know what acid reducer you will take after surgery.   It is okay to swallow any medications smaller than   inch in size.   -If it is larger than   inch, it may need to be cut, crushed, or in a liquid form. Check with your care team about which way is most appropriate for you and your medications.    What should I know about my incisions  (cuts)?  Your incisions are covered with white steri strips or butterfly tape and have band aids or gauze over the top. If you have gauze or band aids, they can come off in the hospital.  Leave your steri strips on until they fall off on their own. If the steri strips don't fall off after 1 to 2 weeks, you can take them off. If they fall off earlier, replace them with clean band aids trying to avoid touching the incision itself.   If you have gauze covered by a clear dressing, remove 2-3 days after surgery or as directed by your care team.  You may shower in the hospital after surgery and can get your incision coverings wet.  Do not submerge in water (e.g. No baths, swimming pools, hot tubs) until your care team tells you it is okay and your incisions are completely healed.    Call the clinic if you have any of these signs or symptoms of infection:  -Redness around the site.  -Drainage that smells bad.  -Drainage that is thick yellow or green.  -An increase in pain around the incision site  -An increase in swelling around the incision site  -Heat or warmth around incision site   -Fever of 101.5  F (38.3  C) or higher when taken under the tongue.    -Chills    Will my urine or bowel movements change?   Your first bowel movements (stools) will likely be liquid. You may also notice old blood or a darker color (black or maroon color) in your bowel movements.  This is not unusual and usually goes away after the first week, if not sooner. You may not have a bowel movement for a week.   If you have not had a bowel movement for at least three days after your surgery date and are passing gas, you can use over the counter stool softeners.  Please stop taking the stool softeners and laxatives if your stools are loose.  Increasing fluids and activity as well as getting off narcotics will help prevent constipation.    Call the clinic if:   -You have stomach pain.   -You continue to have constipation.  -You have excessive  "bloating after walking and passing gas.    How can I prevent dehydration if I feel nauseated (sick to my stomach) and vomit (throw up)?   Vomiting is not normal after surgery. If you continue to have nausea and vomiting, call the clinic.   Nausea can be a sign of dehydration. That is why it is very important to track your fluids.  Do not nap more than one hour during the day. Set a timer to wake yourself up, if needed. Too much sleep will keep you from drinking enough fluid during the day and lead to dehydration.  No outside activity in hot, humid weather until you can drink 48 to 64 ounces of fluid in 24 hours. If you sweat a lot, your body may lose too much water.  Try to take a 1 ounce sip of water (one medicine cup) every 15 minutes.  Set a timer to remind yourself.    Call the clinic if you have any of these signs or symptoms of dehydration:  -Dark colored urine  -Urinating (pass water) less than 2-3 times per day  -Lack of energy  -Nausea  -Dizziness  -Headache    Call the clinic ANY TIME at [phone] if:  -Your pain medicine is not working.  -You have a fever ? 101.5 F.  -You have belly or left shoulder pain that gets worse and worse.  -You have a swollen leg with redness, warmth, or pain behind the knee or calf.  -You have chest pain   -You feel very short of breath.  -You have a sudden severe increase in heart rate.  -You have vomiting that gets worse and worse.  -You have constant nausea (feeling sick to your stomach) that does not go away with medication.  -You have trouble swallowing.  -You have an increasing feeling that \"something is not right\".  -You have hiccups that do not stop.  -You have any questions or concerns.    AFTER HOURS QUESTIONS OR CONCERNS: Call [phone] and ask to speak with surgery resident if you are having troubles in the evenings, at night, or on weekends. Please call if you experience increasing abdominal pain, nausea, vomiting, increasing drainage from your wounds, chills, " or fever >101.5    If you have to go to the Emergency Room, we prefer you go to the hospital that did your surgery. Please let them know that you had bariatric surgery and to notify your surgeon.    When should I go back to the clinic?  Follow up with your care team in 1-2 weeks.   If this appointment was not already made, please call: [phone]    Appointments located at [facility] clinic:  [facility] ([facility] [place]    [address]  [place] [address]

## 2022-07-27 NOTE — PLAN OF CARE
"BP (!) 162/74 (BP Location: Right arm)   Pulse 103   Temp 97.6  F (36.4  C) (Oral)   Resp 22   Ht 1.702 m (5' 7\")   Wt 149.5 kg (329 lb 9.4 oz)   SpO2 95%   BMI 51.62 kg/m      Status: S/p lap sleeve  Activity: SBA to bathroom, up ad thony at baseline.  Neuros: A&Ox4, no deficits noted.  Cardiac: WDL, denies chest pain.  Respiratory: WDL on RA, denies SOB.  GI/: +BS, LBM before surgery. Not passing gas yet. Voids spont with AUOP.  Diet: Tolerating bariatric fulls.  Skin/Incisions: WDL ex 5 lap sites dressed with primapore, CDI.  Lines/Drains: L PIV running LR at 125.  Pain/Nausea: Pain managed with PRN oxycodone, PRN levsin, and IV dilaudid x1. Int nausea managed with PRN IV antiemetics, no emesis overnight.  New Changes: Pt up to bathroom, able to void spont/adequately.    "

## 2022-07-27 NOTE — CARE PLAN
RN: [id number]    Status: S/P gastric sleeve  Pain/Nausea:  8/10 in stomach that radiates to back. Scheduled Ketorolac given  Mobility: up SBA  Diet: Bariatric clears  Labs: reviewed  LDAs: L PIV infusing LR at 75mL/hr  Skin/incisions: 4 port sites on abdomen with primapore in place, CDI  Neuro: AOX4  Respiratory: Denies SOB  Cardiac: Denies chest pain  GI/: voids spontaneously without difficutlty  Plan: Continue with plan of care

## 2022-07-27 NOTE — PHARMACY-ADMISSION MEDICATION HISTORY
Admission Medication History Completed by Pharmacy    See [facility] Admission Navigator for allergy information, preferred outpatient pharmacy, prior to admission medications and immunization status.     Medication History Sources:     Reviewed Surescripts dispense history    Pre-op RN updated last dose times on PTA medication list    Changes made to PTA medication list (reason):    Added: None    Deleted:   o DUPLICATE albuterol inhaler    Changed: None    Additional Information:    None    Prior to Admission medications    Medication Sig Last Dose Taking? Auth Provider Long Term End Date   albuterol (PROAIR HFA/PROVENTIL HFA/VENTOLIN HFA) 108 (90 Base) MCG/ACT inhaler Inhale 1-2 puffs into the lungs every 4 hours as needed for shortness of breath / dyspnea or wheezing Past Month at Unknown time Yes Oscar Medley MD Yes    betamethasone dipropionate (DIPROSONE) 0.05 % external cream APPLY TO AFFECTED AREA(S) ONCE DAILY 7/25/2022 at Unknown time Yes Reported, Patient     etonogestrel (NEXPLANON) 68 MG IMPL 1 each (68 mg) by Subdermal route once 7/26/2022 at Unknown time Yes Oscar Medley MD Yes    Fexofenadine HCl (ALLEGRA PO) Take by mouth daily as needed for allergies Past Week at Unknown time Yes Reported, Patient     fluticasone-salmeterol (ADVAIR) 250-50 MCG/ACT inhaler Inhale 1 puff into the lungs every 12 hours 7/26/2022 at 0900 Yes Oscar Medley MD Yes    hyoscyamine (LEVSIN) 0.125 MG tablet Take 1 tablet (125 mcg) by mouth every 4 hours as needed for cramping More than a month at Unknown time Yes Joseph Mata MD     ipratropium - albuterol 0.5 mg/2.5 mg/3 mL (DUONEB) 0.5-2.5 (3) MG/3ML neb solution Take 1 vial (3 mLs) by nebulization every 6 hours as needed for shortness of breath / dyspnea or wheezing More than a month at Unknown time Yes Raúl Jain MD Yes    metoprolol succinate ER (TOPROL-XL) 50 MG 24 hr tablet Take 1 tablet (50 mg) by mouth daily  Patient taking  differently: Take 50 mg by mouth every morning 7/26/2022 at 0900 Yes Oscar Medley MD Yes    Multiple Vitamins-Minerals (MULTIVITAMIN WOMEN PO) Take 1 tablet by mouth every morning With iron Past Month at Unknown time Yes Reported, Patient     omeprazole (PRILOSEC) 20 MG DR capsule Take 20 mg by mouth every morning 7/26/2022 at 0900 Yes Reported, Patient     prochlorperazine (COMPAZINE) 5 MG tablet Take 1 tablet (5 mg) by mouth every 6 hours as needed for nausea or vomiting Take 1-2 tablets by mouth every 6 hours for nausea or vomiting. More than a month at Unknown time Yes Joseph Mata MD     RESTASIS 0.05 % ophthalmic emulsion INSTILL 1 DROP INTO EACH EYE TWICE DAILY 7/26/2022 at 0900 Yes Reported, Patient     senna-docusate (SENOKOT-S/PERICOLACE) 8.6-50 MG tablet Take 2 tablets by mouth daily as needed for constipation (While taking narcotic pain medications.  Stop taking if having loose stools.) More than a month at Unknown time Yes Joseph Mata MD     Vitamin D2, Ergocalciferol, 50 MCG (2000 UT) CAPS Take 1 tablet by mouth every morning Past Week at Unknown time Yes Reported, Patient     blood glucose (NO BRAND SPECIFIED) test strip Use to test blood sugar 3 times daily or as directed.  Patient not taking: Reported on 7/14/2022   Arlyn Diamond NP     blood glucose monitoring (NO BRAND SPECIFIED) meter device kit Use to test blood sugar 3 times daily or as directed.  Patient not taking: Reported on 7/14/2022   Komal Miller NP     Microlet Lancets MISC 1 each 3 times daily  Patient not taking: Reported on 7/14/2022   Komal Miller NP         Date completed: 07/26/22    Medication history completed by: Kristen Abdul, Pharm.D., [facility]  Pager [phone]

## 2022-07-27 NOTE — PHARMACY-CONSULT NOTE
Bariatric Consult    Medications evaluated as requested per Bariatric Consult. Patient may swallow tablets/capsules ONLY if less than   inch.  Larger tablets/capsules should be broken, crushed or split.    The following changes have been made based on the Bariatric Medication Management Policy:   - metoprolol XL 50mg by mouth daily changed to metoprolol IR 25mg by mouth twice daily per Bariatric Dose/Frequency adjustment policy.    The pharmacist will continue to follow as new medications are ordered.    Kristen Abdul, Pharm.D., [facility]  Pager [phone]

## 2022-07-27 NOTE — DISCHARGE SUMMARY
"[facility]   MIS Discharge Summary    Date of Admission: 7/26/2022  Date of Discharge: 7/27/2022    Admission Diagnosis:  1. Morbid Obesity    Discharge Diagnosis:  1. Same as above  2. S/p sleeve gastrectomy    Consultations:  Pharmacy     Procedures:  1. Vertical Sleeve Gastrectomy by Dr Esperanza Hart HPI:  Early onset struggles with weight, more significantly after puberty. No meaningful or sustainable weight loss despite numerous attempts over the years. Co-morbidities include hypertension, prediabetes, reflux. Has decided to pursue bariatric surgery and completed all pre surgery clearances.    Hospital Course:  The patient was admitted and underwent the above procedure. The patient tolerated the procedure well. There were no complications. The patient's diet was slowly advanced as bowel function returned. Pain was controlled with oral pain medication and the patient was able to ambulate and void without difficulty. The patient received appropriate education post operatively. On POD #1 the patient was discharged to home.    Discharge Physical Exam:  /60 (BP Location: Right arm)   Pulse 64   Temp (!) 96.2  F (35.7  C) (Oral)   Resp 20   Ht 1.702 m (5' 7\")   Wt 149.5 kg (329 lb 9.4 oz)   SpO2 95%   BMI 51.62 kg/m      Gen: NAD  Lungs: non-labored breathing  CV: regular rhythm, normal rate   Abd: obese, soft, nondistended, appropriately tender, incisions are c/d/i  Ext: no peripheral edema  Neuro: AOx3    Meds:       Review of your medicines      START taking      Dose / Directions   oxyCODONE 5 MG tablet  Commonly known as: ROXICODONE  Used for: Morbid obesity (H), S/P laparoscopic sleeve gastrectomy      Dose: 5 mg  Take 1 tablet (5 mg) by mouth every 3 hours as needed for moderate to severe pain  Quantity: 12 tablet  Refills: 0        CONTINUE these medicines which may have CHANGED, or have new prescriptions. If we are uncertain of the size of tablets/capsules " you have at home, strength may be listed as something that might have changed.      Dose / Directions   metoprolol succinate ER 50 MG 24 hr tablet  Commonly known as: TOPROL XL  This may have changed: when to take this  Used for: Benign essential hypertension      Dose: 50 mg  Take 1 tablet (50 mg) by mouth daily  Quantity: 90 tablet  Refills: 3     * omeprazole 20 MG DR capsule  Commonly known as: priLOSEC  This may have changed: You were already taking a medication with the same name, and this prescription was added. Make sure you understand how and when to take each.  Used for: S/P laparoscopic sleeve gastrectomy      Dose: 20 mg  Take 1 capsule (20 mg) by mouth daily  Quantity: 30 capsule  Refills: 0     * omeprazole 20 MG DR capsule  Commonly known as: priLOSEC  This may have changed: Another medication with the same name was added. Make sure you understand how and when to take each.      Dose: 20 mg  Take 20 mg by mouth every morning  Refills: 0         * This list has 2 medication(s) that are the same as other medications prescribed for you. Read the directions carefully, and ask your doctor or other care provider to review them with you.            CONTINUE these medicines which have NOT CHANGED      Dose / Directions   albuterol 108 (90 Base) MCG/ACT inhaler  Commonly known as: PROAIR HFA/PROVENTIL HFA/VENTOLIN HFA  Used for: Wheezing      Dose: 1-2 puff  Inhale 1-2 puffs into the lungs every 4 hours as needed for shortness of breath / dyspnea or wheezing  Quantity: 8.5 g  Refills: 5     ALLEGRA PO      Take by mouth daily as needed for allergies  Refills: 0     betamethasone dipropionate 0.05 % external cream  Commonly known as: DIPROSONE      APPLY TO AFFECTED AREA(S) ONCE DAILY  Refills: 0     blood glucose monitoring meter device kit  Commonly known as: NO BRAND SPECIFIED  Used for: Type 2 diabetes mellitus without complication, without long-term current use of insulin (H)      Use to test blood sugar 3  times daily or as directed.  Quantity: 1 kit  Refills: 1     blood glucose test strip  Commonly known as: NO BRAND SPECIFIED  Used for: Type 2 diabetes mellitus with hyperglycemia, without long-term current use of insulin (H)      Use to test blood sugar 3 times daily or as directed.  Quantity: 100 strip  Refills: 1     etonogestrel 68 MG Impl  Commonly known as: NEXPLANON  Used for: Insertion of implantable subdermal contraceptive      Dose: 1 each  1 each (68 mg) by Subdermal route once  Refills: 0     fluticasone-salmeterol 250-50 MCG/ACT inhaler  Commonly known as: ADVAIR  Used for: Mild persistent asthma without complication      Dose: 1 puff  Inhale 1 puff into the lungs every 12 hours  Quantity: 60 each  Refills: 11     hyoscyamine 0.125 MG tablet  Commonly known as: LEVSIN  Used for: Obesity      Dose: 125 mcg  Take 1 tablet (125 mcg) by mouth every 4 hours as needed for cramping  Quantity: 30 tablet  Refills: 1     ipratropium - albuterol 0.5 mg/2.5 mg/3 mL 0.5-2.5 (3) MG/3ML neb solution  Commonly known as: DUONEB      Dose: 1 vial  Take 1 vial (3 mLs) by nebulization every 6 hours as needed for shortness of breath / dyspnea or wheezing  Quantity: 30 vial  Refills: 1     Microlet Lancets Misc  Used for: Type 2 diabetes mellitus without complication, without long-term current use of insulin (H)      Dose: 1 each  1 each 3 times daily  Quantity: 100 each  Refills: 3     prochlorperazine 5 MG tablet  Commonly known as: COMPAZINE  Used for: Obesity, At high risk for postoperative complications      Dose: 5 mg  Take 1 tablet (5 mg) by mouth every 6 hours as needed for nausea or vomiting Take 1-2 tablets by mouth every 6 hours for nausea or vomiting.  Quantity: 30 tablet  Refills: 1     Restasis 0.05 % ophthalmic emulsion  Generic drug: cycloSPORINE      INSTILL 1 DROP INTO EACH EYE TWICE DAILY  Refills: 0     senna-docusate 8.6-50 MG tablet  Commonly known as: SENOKOT-S/PERICOLACE  Used for: Obesity      Dose:  2 tablet  Take 2 tablets by mouth daily as needed for constipation (While taking narcotic pain medications.  Stop taking if having loose stools.)  Quantity: 30 tablet  Refills: 1        STOP taking    MULTIVITAMIN WOMEN PO        Vitamin D2 (Ergocalciferol) 50 MCG (2000 UT) Caps              Where to get your medicines      These medications were sent to [facility] - [place], [place] - [address]  [address], [facility] [address]    Phone: [phone]     omeprazole 20 MG DR capsule    oxyCODONE 5 MG tablet         Additional instructions:  After Care     Future Labs/Procedures    Activity     Comments:    Your activity upon discharge: activity as tolerated and no lifting of more than 20lb for 4 weeks    Contact Surgeon     Comments:    Contact your Surgeon IF:  ~  Your pain is not controlled by pain medication or pain that suddenly increases;  ~  You develop a fever greater than 101 and/or chills 24 hours after surgery;  ~  You notice redness or bad smelling drainage at the surgery site;  ~  You notice a large amount of bleeding from the surgery site that does not stop when moderate pressure is applied;  ~  You are unable to pass urine within 8-10 hours after surgery;  ~  You have an upset stomach or vomiting lasting more than a day;  ~  You have a skin reaction to tape or dressing such as redness, itching or rash at the surgery site.    Diet     Comments:    Follow this diet upon discharge: bariatric full liquids    Discharge diet     Comments:    Phase II: Full liquid diet, room temperature with one protein shake per day. Goal water intake: 40oz per day by post-operative day #4. Meet with Bariatric Dietitian in 1 week to discuss dietary changes.    Do not drive     Comments:    Do NOT drive until after you have been completely off narcotics for a minimum of 24 hours.    Follow-up Care - Dietician     Comments:    Follow-up with your bariatric dietitian in 1 week.    Return to  Work     Comments:    May return to work in 2 weeks if cleared by Bariatric surgeon.    Shower/Bathing     Comments:    Okay to shower. Do NOT soak in tub for 2-4 weeks.    Surgical Site Care     Comments:    If you have skin tapes on your surgical site(s), leave them on the surgical site(s) until they fall off on their own or 1 week after surgery date. May remove Band-Aid one day after surgery.    Wash your hands     Comments:    Wash your hands with soap and warm water before you touch the site of surgery.    Weight Restrictions     Comments:    Do not lift over 20 pounds for 2 weeks.              Follow Up:  Follow up with Komal Miller NP 8/2/2022 in Clinic      BARIATRIC PATIENTS:  Call [phone] to schedule or to reach your care team    GENERAL SURGERY PATIENTS: Call [phone] for scheduling needs or to reach your care team      Komal Miller CNP  [facility] [place]

## 2022-07-27 NOTE — PROGRESS NOTES
"Post Op Check    07/26/22    Sasha Hand is a 22 year old female who is POD0 from a robot-assisted sleeve gastrectomy.    Patient seen at the bedside awake, alert, and interactive. Pt reports pain is fairly well controlled on current regimen, though she has recently been having some cramping pain. Has been out of bed to walk to the bathroom. Had some dizziness with standing for the first time, but is now resolved. Denies SOB or chest pain. Has had some sips of clears and denies nausea/vomiting. Voiding freely with adequate UOP.    BP (!) 162/74 (BP Location: Right arm)   Pulse 103   Temp 97.6  F (36.4  C) (Oral)   Resp 22   Ht 1.702 m (5' 7\")   Wt 149.5 kg (329 lb 9.4 oz)   SpO2 95%   BMI 51.62 kg/m      Gen: A&O x3, NAD  Chest: breathing non-labored on room air  Abdomen: soft, appropriately tender, non-distended  Incision: dressings clean and intact without strikethrough  Extremities: warm and well perfused    A/P:   -Levsin added to pain regimen  -Continue the rest of the plan of care per primary team.    Alma Gimenez  PGY-1 Surgery Resident        "

## 2022-07-28 ENCOUNTER — PATIENT OUTREACH (OUTPATIENT)
Dept: CARE COORDINATION | Facility: CLINIC | Age: 22
End: 2022-07-28

## 2022-07-28 DIAGNOSIS — Z71.89 OTHER SPECIFIED COUNSELING: ICD-10-CM

## 2022-07-28 LAB
PATH REPORT.COMMENTS IMP SPEC: NORMAL
PATH REPORT.FINAL DX SPEC: NORMAL
PATH REPORT.GROSS SPEC: NORMAL
PATH REPORT.MICROSCOPIC SPEC OTHER STN: NORMAL
PATH REPORT.RELEVANT HX SPEC: NORMAL
PHOTO IMAGE: NORMAL

## 2022-07-28 PROCEDURE — 88305 TISSUE EXAM BY PATHOLOGIST: CPT | Mod: 26 | Performed by: PATHOLOGY

## 2022-07-28 NOTE — PROGRESS NOTES
Clinic Care Coordination Contact  [facility]/Voicemail       Clinical Data: Care Coordinator Outreach  Outreach attempted x 1.  Left message on patient's voicemail with call back information and requested return call.  Plan: Care Coordinator will try to reach patient again in 1-2 business days.            KATIE Harman  [phone]  [facility]

## 2022-07-29 NOTE — PROGRESS NOTES
Clinic Care Coordination Contact  Community Health Worker Initial Outreach    Patient accepts CC: No, Pt declined needs for extra support.     Clinic Care Coordination Contact  [facility]: Post-Discharge Note  SITUATION                                                      Admission:    Admission Date: 07/26/22   Reason for Admission: 1. Morbid Obesity  Discharge:   Discharge Date: 07/27/22  Discharge Diagnosis: 1. Morbid Obesity    BACKGROUND                                                      Per hospital discharge summary and inpatient provider notes:    The patient was admitted and underwent the above procedure. The patient tolerated the procedure well. There were no complications. The patient's diet was slowly advanced as bowel function returned. Pain was controlled with oral pain medication and the patient was able to ambulate and void without difficulty.    ASSESSMENT      Enrollment  Primary Care Care Coordination Status: Declined    Discharge Assessment  How are you doing now that you are home?: doing well  How are your symptoms? (Red Flag symptoms escalate to triage hotline per guidelines): Improved  Do you feel your condition is stable enough to be safe at home until your provider visit?: Yes  Does the patient have their discharge instructions? : Yes  Does the patient have questions regarding their discharge instructions? : No  Were you started on any new medications or were there changes to any of your previous medications? : Yes  Does the patient have all of their medications?: Yes  Do you have questions regarding any of your medications? : No  Do you have all of your needed medical supplies or equipment (DME)?  (i.e. oxygen tank, CPAP, cane, etc.): Yes  Discharge follow-up appointment scheduled within 14 calendar days? : Yes  Discharge Follow Up Appointment Date: 08/02/22    Post-op ([facility] Only)  If the patient had a surgery or procedure, do they have any questions for a nurse?: No          PLAN                                                      Outpatient Plan:      Follow-up with your bariatric dietitian in 1 week.    Future Appointments   Date Time Provider Department Center   8/2/2022  8:30 AM Komal Miller NP [facility]   8/2/2022 11:00 AM Kaleigh Curran RD [facility]   8/25/2022  7:30 AM Komal Miller NP [facility]   8/25/2022 10:00 AM Kaleigh Curran RD [facility]   9/22/2022 11:15 AM Lizbet Arana GC [facility]         For any urgent concerns, please contact our 24 hour nurse triage line: [phone] ([phone])       KATIE Harman  [phone]  Connected Care Resource [facility]

## 2022-08-02 ENCOUNTER — VIRTUAL VISIT (OUTPATIENT)
Dept: ENDOCRINOLOGY | Facility: CLINIC | Age: 22
End: 2022-08-02

## 2022-08-02 ENCOUNTER — OFFICE VISIT (OUTPATIENT)
Dept: ENDOCRINOLOGY | Facility: CLINIC | Age: 22
End: 2022-08-02
Payer: COMMERCIAL

## 2022-08-02 VITALS
HEIGHT: 67 IN | WEIGHT: 293 LBS | OXYGEN SATURATION: 97 % | HEART RATE: 95 BPM | BODY MASS INDEX: 45.99 KG/M2 | DIASTOLIC BLOOD PRESSURE: 78 MMHG | SYSTOLIC BLOOD PRESSURE: 148 MMHG

## 2022-08-02 DIAGNOSIS — Z71.3 NUTRITIONAL COUNSELING: Primary | ICD-10-CM

## 2022-08-02 DIAGNOSIS — Z98.84 S/P LAPAROSCOPIC SLEEVE GASTRECTOMY: ICD-10-CM

## 2022-08-02 DIAGNOSIS — E66.9 OBESITY: ICD-10-CM

## 2022-08-02 DIAGNOSIS — E11.9 TYPE 2 DIABETES MELLITUS WITHOUT COMPLICATION, WITHOUT LONG-TERM CURRENT USE OF INSULIN (H): ICD-10-CM

## 2022-08-02 DIAGNOSIS — Z98.84 S/P LAPAROSCOPIC SLEEVE GASTRECTOMY: Primary | ICD-10-CM

## 2022-08-02 PROCEDURE — 97803 MED NUTRITION INDIV SUBSEQ: CPT | Mod: GT | Performed by: DIETITIAN, REGISTERED

## 2022-08-02 PROCEDURE — 99024 POSTOP FOLLOW-UP VISIT: CPT | Performed by: NURSE PRACTITIONER

## 2022-08-02 RX ORDER — URSODIOL 300 MG/1
300 CAPSULE ORAL 2 TIMES DAILY
Qty: 60 CAPSULE | Refills: 5 | Status: SHIPPED | OUTPATIENT
Start: 2022-08-02 | End: 2023-02-01

## 2022-08-02 ASSESSMENT — PAIN SCALES - GENERAL: PAINLEVEL: NO PAIN (0)

## 2022-08-02 NOTE — NURSING NOTE
"(   Chief Complaint   Patient presents with     Post-Op - General Surgery     1 week post op for sleeve gastrectomy    )    ( Weight: 145.1 kg (319 lb 14.4 oz) )  ( Height: 170.2 cm (5' 7\") )  ( BMI (Calculated): 50.1 )  (   )  (   )  (   )  (   )  (   )  (   )    ( BP: (!) 148/78 )  (   )  (   )  (   )  ( Pulse: 95 )  (   )  ( SpO2: 97 % )    (   Patient Active Problem List   Diagnosis     Morbid obesity (H)     Tear of lateral cartilage or meniscus of knee, current     Menorrhagia     Dysmenorrhea     Wheezing     Genital herpes simplex, unspecified site     Mild persistent asthma without complication     Nexplanon placed 12/14/2021     DELIA (generalized anxiety disorder)     Major depressive disorder, single episode, moderate (H)     Depression     BMI 50.0-59.9, adult (H)     Essential hypertension    )  (   Current Outpatient Medications   Medication Sig Dispense Refill     albuterol (PROAIR HFA/PROVENTIL HFA/VENTOLIN HFA) 108 (90 Base) MCG/ACT inhaler Inhale 1-2 puffs into the lungs every 4 hours as needed for shortness of breath / dyspnea or wheezing 8.5 g 5     betamethasone dipropionate (DIPROSONE) 0.05 % external cream APPLY TO AFFECTED AREA(S) ONCE DAILY       etonogestrel (NEXPLANON) 68 MG IMPL 1 each (68 mg) by Subdermal route once       Fexofenadine HCl (ALLEGRA PO) Take by mouth daily as needed for allergies       fluticasone-salmeterol (ADVAIR) 250-50 MCG/ACT inhaler Inhale 1 puff into the lungs every 12 hours 60 each 11     ipratropium - albuterol 0.5 mg/2.5 mg/3 mL (DUONEB) 0.5-2.5 (3) MG/3ML neb solution Take 1 vial (3 mLs) by nebulization every 6 hours as needed for shortness of breath / dyspnea or wheezing 30 vial 1     metoprolol succinate ER (TOPROL-XL) 50 MG 24 hr tablet Take 1 tablet (50 mg) by mouth daily (Patient taking differently: Take 50 mg by mouth every morning) 90 tablet 3     omeprazole (PRILOSEC) 20 MG DR capsule Take 1 capsule (20 mg) by mouth daily 30 capsule 0     omeprazole " (PRILOSEC) 20 MG DR capsule Take 20 mg by mouth every morning       oxyCODONE (ROXICODONE) 5 MG tablet Take 1 tablet (5 mg) by mouth every 3 hours as needed for moderate to severe pain 12 tablet 0     RESTASIS 0.05 % ophthalmic emulsion INSTILL 1 DROP INTO EACH EYE TWICE DAILY       senna-docusate (SENOKOT-S/PERICOLACE) 8.6-50 MG tablet Take 2 tablets by mouth daily as needed for constipation (While taking narcotic pain medications.  Stop taking if having loose stools.) 30 tablet 1     blood glucose (NO BRAND SPECIFIED) test strip Use to test blood sugar 3 times daily or as directed. (Patient not taking: No sig reported) 100 strip 1     blood glucose monitoring (NO BRAND SPECIFIED) meter device kit Use to test blood sugar 3 times daily or as directed. (Patient not taking: No sig reported) 1 kit 1     hyoscyamine (LEVSIN) 0.125 MG tablet Take 1 tablet (125 mcg) by mouth every 4 hours as needed for cramping (Patient not taking: Reported on 8/2/2022) 30 tablet 1     Microlet Lancets MISC 1 each 3 times daily (Patient not taking: No sig reported) 100 each 3     prochlorperazine (COMPAZINE) 5 MG tablet Take 1 tablet (5 mg) by mouth every 6 hours as needed for nausea or vomiting Take 1-2 tablets by mouth every 6 hours for nausea or vomiting. (Patient not taking: Reported on 8/2/2022) 30 tablet 1    )  ( Diabetes Eval:    )    ( Pain Eval:  No Pain (0) )    ( Wound Eval:       )    (   History   Smoking Status     Former Smoker     Packs/day: 1.00     Types: Cigarettes     Quit date: 2/1/2021   Smokeless Tobacco     Former User    )    ( Signed By:  Louie Guo, KEKE; August 2, 2022; 8:28 AM )

## 2022-08-02 NOTE — PATIENT INSTRUCTIONS
"Thank you for allowing us the privilege of caring for you. We hope we provided you with the excellent service you deserve.   Please let us know if there is anything else we can do for you so that we can be sure you are completely satisfied with your care experience.    To ensure the quality of our services you may be receiving a patient satisfaction survey from an independent patient satisfaction monitoring company.    The greatest compliment you can give is a \"Likely to Recommend\"    Your visit was with Komal Miller NP today.    Instructions per today's visit:     Harjit Hand, it was great to visit with you today.  Here is a review of our visit.  If our clinic scheduler is not able to reach you please call [phone] to schedule your next appointments.    -keep up the great work  -keep pushing fluids   -consider ursodiol when 4 weeks out from surgery   -follow up 8/25/2022       Information about Video Visits with [employer]: video visit information  _________________________________________________________________________________________________________________________________________________________  If you are asked by your clinic team to have your blood pressure checked:  [facility] do offer several locations for blood pressure checks. Please follow the below link to schedule an appointment. Scheduling an appointment at the pharmacy for a blood pressure check is now preferred.    Appointment Plus (appointment-plus.[facility])  _________________________________________________________________________________________________________________________________________________________  Important contact and scheduling information:  Please call our contact center at [phone] to schedule your next appointments.  To find a lab location near you, please call [phone].  For any nursing questions or concerns call Keke Feliciano LPN at [phone] or Amanda Rivas RN at [phone]  Please call during " clinic hours Monday through Friday 8:00a - 4:00p if you have questions or you can contact us via [employer] at anytime and we will reply during clinic hours.    Lab results will be communicated through My Chart or letter (if My Chart not used). Please call the clinic if you have not received communication after 1 week or if you have any questions.?  Clinic Fax: [phone]    _________________________________________________________________________________________________________________________________________________________  Meal Replacement Products:    Here is the link to our new e-store where you can purchase our meal replacement products    [employer] E-Store  [employer]/store    The one week starter kit is a great way to sample a variety of products and see what works for you.    If you want more information about the product go to: [employer]    If you are an employee or [facility] or  [place] please contact your care team for a 10% estore discount    Free Shipping for orders over $75     Benefits of meal replacements products:    Portion and calorie control  Improved nutrition  Structured eating  Simplified food choices  Avoid contact with trigger foods  _________________________________________________________________________________________________________________________________________________________  Interested in working with a health ?  Health coaches work with you to improve your overall health and wellbeing.  They look at the whole person, and may involve discussion of different areas of life, including, but not limited to the four pillars of health (sleep, exercise, nutrition, and stress management). Discuss with your care team if you would like to start working a health .  Health Coaching-3 Pack: Schedule by calling [phone]    $99 for three health coaching visits    Visits may be done in person or via phone     Coaching is a partnership between the  and the client; Coaches do not prescribe or diagnose    Coaching helps inspire the client to reach his/her personal goals   _________________________________________________________________________________________________________________________________________________________  24 Week Healthy Lifestyle Plan:    Our mission in the 24-week Healthy Lifestyle Plan is to provide you with individualized care by giving you the tools, education and support you need to lose weight and maintain a healthy lifestyle. In your 24-week journey, you ll be supported by a dedicated weight loss team that includes registered dietitians, medical weight management providers, health coaches, and nurses -- all with special expertise in weight loss -- to help you every step of the way.     Monthly meetings with your registered dietician or medical weight management provider help to review your progress, update your care plan, and make any adjustments needed to ensure success. Between these visits, weekly and bi-weekly health  visits will help you focus on the four pillars of weight loss -- stress, sleep, nutrition, and exercise -- and how you can best adapt each to achieve sustainable weight loss results.    In addition, you will be given exclusive access to online wellbeing classes through [employer].  Your initial visit will be with a medical weight management provider who will help to understand your weight loss goals and ensure this program is the right fit for you. Please let our team know if you are interested in the 24 week plan by sending a message to your care team or calling [phone] to schedule.  _________________________________________________________________________________________________________________________________________________________    COMPREHENSIVE WEIGHT MANAGEMENT PROGRAM  VIRTUAL SUPPORT GROUPS    For Support Group Information:      We offer support groups  "for patients who are working on weight loss and considering, preparing for or have had weight loss surgery.   There is no cost for this opportunity.  You are invited to attend the?Virtual Support Groups?provided by any of the following locations:    [facility] via Microsoft Teams with Sangita Eastman RN  2.   [place] via [employer] with Bryce Raymond, PhD, LP  3.   [place] via [employer] with Tari Blue RN  4.   [facility] via Microsoft Teams with Tari Schultz [facility]    The following Support Group information can also be found on our website:  [email or web address]    [facility] Weight Loss Surgery Support Group    [facility] Weight Loss Surgery Support Group  The support group is a patient-lead forum that meets monthly to share experiences, encouragement and education. It is open to those who have had weight loss surgery, are scheduled for surgery, and those who are considering surgery.   WHEN: This group meets on the 3rd Wednesday of each month from 5:00PM - 6:00PM virtually using Microsoft Teams.   FACILITATOR: Led by Sangita Eastman, RD, LD, RN, the program's Clinical Coordinator.   TO REGISTER: Please contact the clinic via [employer] or call the nurse line directly at [phone] to inform our staff that you would like an invite sent to you and to let us know the email you would like the invite sent to. Prior to the meeting, a link with directions on how to join the meeting will be sent to you.    2022 Meetings  Bhumika 15: \"Let's Talk\" a time for the group to share.  July 20: \"Let's Talk\" a time for the group to share.  August 17: \"Let's Talk\" a time for the group to share.  September 21: \"Let's Talk\" a time for the group to share.  October 19: Guest Speaker: Dr Ang Turner MD Pulmonologist and Sleep Medicine Physician, \"Getting a Good Night's Sleep\".  November 16: \"Let's Talk\" a time for the group to " "share.  December 21: \"Let's Talk\" a time for the group to share.    [facility] Support Groups    Connections: Bariatric Care Support Group?  This is open to all [facility] (and those external to this program) pre- and post- operative bariatric surgery patients as well as their support system.   WHEN: This group meets the 2nd Tuesday of each month from 6:30 PM - 8:00 PM virtually using Microsoft Teams.   FACILITATOR: Led by Bryce Raymond, Ph.D who is a Licensed Psychologist with the [facility] Comprehensive Weight Management Program.   TO REGISTER: Please send an email to Bryce Raymond, Ph.D., LP at?[email or web address]?if you would like an invitation to the group and to learn about using Microsoft Teams.    2022 Meetings  June 14: Liya Marques RD, LD at [facility], \"Nutritional Labeling\"  July 12 August 2 (Please Note Date Change)  September 13 October 11 November 8 December 13    Connections: Post-Operative Bariatric Surgery Support Group  This is a support group for [facility] bariatric patients (and those external to [facility]) who have had bariatric surgery and are at least 3 months post-surgery.  WHEN: This support group meets the 4th Wednesday of the month from 11:00 AM - 12:00 PM virtually using Microsoft Teams.   FACILITATOR: Led by Certified Bariatric Nurse, Tari Blue RN.   TO REGISTER: Please send an email to Tari at [email or web address] if you would like an invitation to the group and to learn about using Microsoft Teams.    2022 Meetings June 22 July 27 August 24 September 28 October 26 November 23 December 28      [facility] Healthy Lifestyle Virtual Support Group    Healthy Lifestyle Virtual Support Group?  This is 60 minutes of small group guided discussion, support and resources. All are welcome who want a healthy lifestyle.  WHEN: This group meets " "monthly on a Friday from 12:30 PM - 1:30 PM virtually using Microsoft Teams.   FACILITATOR: Led by National Board Certified Health and , Tari Schultz [facility].   TO REGISTER: Please send an email to Tari [email or web address] to receive monthly invites to the group or if you have any questions about having a health .  Prior to the meeting, a link with directions on how to join the meeting will be sent to you.    2022 Meetings  June 24: Tari Schultz [facility], \"Setting Limits and Boundaries\".  Jul 29: Open Forum  August 26: Guest Speaker: Kaleigh Curran Registered Dietitian  September 30: Open Forum  October 28th: Guest Speaker: Claudia Ruth [facility], Health , \"Gratitude Practices\".  November 18: Guest Speaker: Hilda Murguia RD Registered Dietitian, \"Navigating How to Eat around the Holidays\".  December 16: Guest Speaker: Nanette Alfaro [facility], \"Changing Your Relationship with Movement\".    ____________________________________________________________________________________________________________________________________________________________________________  [place] [facility] Get Moving Program  Our team of physical therapists is trained to help you understand and take control of your condition. They will perform a thorough evaluation to determine your ability for activity and develop a customized plan to fit your goals and physical ability.  Scheduling: Unsure if the Get Moving program is right for you? Discuss the program with your medical provider or diabetes educator. You can also call us at [phone] to ask questions or schedule an appointment.   ANGEL[facility] Get Moving Program  ____________________________________________________________________________________________________________________________________________________________________________  [facility] Diabetes Prevention Program (DPP)  If you have prediabetes and Medicare please contact us via MyChart to " learn more about the Diabetes Prevention Program (DPP)  Program Details:  [facility] offers the year-long Diabetes Prevention Program (DPP). The program helps you to make lifestyle changes that prevent or delay type 2 diabetes by supporting healthy eating, increased physical activity, stress reduction and use of coping skills.   On average, previous [facility] DPP cohorts have lost and maintained at least 5% of their starting weight throughout the program and averaged more than 150 minutes of physical activity per week.  Participants meet weekly for one-hour group sessions over sixteen weeks, every other week for the next 8 weeks, and monthly for the last six months.   A year-long maintenance program is also available for participants who complete the first year.   Location & Cost:   During the COVID-19 Public Health Emergency, the program is offered virtually. When in-person classes can resume, they will be held at [facility].  For people with Medicare, the program is covered in full. A self-pay option will also be available for those with non-Medicare insurance plans.   _________________________________________________________________________________________________________________________________________________________  Bluetooth Scale:    We hope to provide you with high quality virtual healthcare visits while social distancing for COVID-19 is necessary, as well as in the future when virtual visits may be more convenient for you.     Our technology team made it possible for Bluetooth scales to send weight measurements to our electronic medical record. This allows weights from you weighing at home to securely flow into the medical record, which will improve telephone and virtual visits.   Additionally, studies have shown that adults actually lose more weight when their weights are automatically sent to someone else, and also that this process is not stressful for  those adults.    Below is a link for purchasing the scale, with a discount code for our patients. You may call your insurance company to see if they will reimburse you for the cost of the scale, as a piece of durable medical equipment. The scales only go up to a weight of 400 pounds. This is an issue and we are working with the developer on increasing this. We found no scales that go over 400lb that have blue-tooth for connecting to [facility].    Scale to purchase: the [employer]  Body  Scale: [email or web address]=aw.ds    Discount Code: We have a discount code for our patients to bring the cost down to $50, Discount code is: [place]_Scale_20%off  _______________________________________________________________________________________________________________________________________________________________________________    To work with a Behavioral Health Psychologist:    Call to schedule:    Aron Nice - [phone]  Hannah Casas - [phone]  Samira Prescott - [phone]  Michelle Breaux - [phone]   Stephanie Leal PhD (cannot accept Medicare) [phone]        Thank you,   [facility] Comprehensive Weight Management Team

## 2022-08-02 NOTE — LETTER
"8/2/2022       RE: Sasha Hand  [address] [place] [address]     Dear Colleague,    Thank you for referring your patient, Sasha Hand, to the [facility] WEIGHT MANAGEMENT CLINIC [place] at [facility]. Please see a copy of my visit note below.    Postoperative bariatric surgery visit.    Patient underwent sleeve gastrectomy 1 week ago.  7/26/2022 Dr. Mata     Tolerating liquids: challenging first few days now getting at least 50oz fluids daily   Lightheadedness: with going from sitting to standing   Abdominal pain: resolved   Bowel movements: normal with senna, has used metamucil in the past   Fevers/shakes/chills: none  GERD: none Taking omeprazole daily (preop)   Leg/calf pain: none    No dyspnea     How many opioid pain medications used after surgery?  What did you do with extra pills?  Were any opioid pain medication refills provided after surgery?  Were any opioid pain medications needed after 30 days postop?    BP (!) 148/78 (BP Location: Left arm, Patient Position: Sitting, Cuff Size: Adult Large)   Pulse 95   Ht 1.702 m (5' 7\")   Wt 145.1 kg (319 lb 14.4 oz)   SpO2 97%   BMI 50.10 kg/m     Wt Readings from Last 5 Encounters:   08/02/22 145.1 kg (319 lb 14.4 oz)   07/26/22 149.5 kg (329 lb 9.4 oz)   07/14/22 (!) 154.2 kg (340 lb)   03/28/22 (!) 160.9 kg (354 lb 11.5 oz)   03/25/22 (!) 161 kg (355 lb)      NAD  Overall looks great  Incisions c/d/i; non-tender     STOMACH, PARTIAL GASTRECTOMY:  -Gastric mucosa with no significant histologic abnormality  -Negative for intestinal metaplasia and dysplasia  -No H. pylori-like organisms identified on routine stain    Plan:  1. RD visit today.  2. Start vitamin supplements per RD directions.  3. Advance diet per RD directions.  4. Follow-up:8/25/2022   5. Actigall prescription sent to pharmacy- going to hold off until can swallow whole   6. B12 SL or injection **  7. Pathology reviewed normal "   8. Weight loss medications: stopped saxenda preop   9. Need to restart statin? RANDOLPH Miller CNP  [facility] WEIGHT MANAGEMENT CLINIC [place]

## 2022-08-02 NOTE — LETTER
"8/2/2022       RE: Sasha Hand  [address]  [place] [address]     Dear Colleague,    Thank you for referring your patient, Sasha Hand, to the [facility] WEIGHT MANAGEMENT CLINIC [place] at [facility]. Please see a copy of my visit note below.    Sasha Hand is a 22 year old female who is being evaluated via a billable video visit.      The patient has been notified of following:     \"This video visit will be conducted via a call between you and your physician/provider. We have found that certain health care needs can be provided without the need for an in-person physical exam.  This service lets us provide the care you need with a video conversation.  If a prescription is necessary we can send it directly to your pharmacy.  If lab work is needed we can place an order for that and you can then stop by our lab to have the test done at a later time.    Video visits are billed at different rates depending on your insurance coverage.  Please reach out to your insurance provider with any questions.    If during the course of the call the physician/provider feels a video visit is not appropriate, you will not be charged for this service.\"    Patient has given verbal consent for Video visit? Yes  How would you like to obtain your AVS? MyChart  If you are dropped from the video visit, the video invite should be resent to: Send to e-mail at: [email or web address]  Will anyone else be joining your video visit? No  {If patient encounters technical issues they should call [phone]      Video-Visit Details    Type of service:  Video Visit    Video Start Time: 10:55 AM  Video End Time: 11:09 AM    Originating Location (pt. Location): Home    Distant Location (provider location):  [facility] WEIGHT MANAGEMENT [facility]     Platform used for Video Visit: [employer]    During this virtual visit the patient is located in [place], patient verifies this as " the location during the entirety of this visit.     Nutrition Assessment  Reason For Visit:  Sasha Hand is a 22 year old female presenting today for nutrition follow-up, 1 week s/p 7/26/22 with Dr Mata.  Patient referred by Komal Miller NP on August 2, 2022.    Anthropometrics  Initial Consult Weight: 365 lbs  Day of Surgery Weight (7/26/22): 329 lbs  Current Weight: 319 lbs per pt  Weight loss: -46 lbs from initial consult; -10 lbs from day of surgery    Current Vitamins/Minerals: none    Nutrition History  Pt reports consuming and tolerating bariatric clear and low-fat full liquid diets. Fluid intake appears adequate, consuming 48-64 oz/day.    Nutrition Prescription:  Grams Protein: 60 (minimum)  Amount of Fluid: 48-64 oz      Nutrition Diagnosis  Food and nutrition-related knowledge deficit r/t lack of prior exposure to diet advancements beyond bariatric low-fat full liquid diet aeb recent bariatric surgery and pt interest in diet education/review    Intervention  Intervention At Appointment:  Materials/education provided on bariatric pureed and soft diets, protein intake, fluid intake, eating pace, portion control, avoiding excess sugar and fat, recommended vitamin/mineral supplements. Patient demonstrates understanding.     Expected Engagement: good    Goals:  1) Follow bariatric low-fat full liquid diet through day 13 post-op, then to progress to pureed diet x 2 weeks (8/11/22).  If tolerating, may advance on day 29 post-op to bariatric soft diet (8/26/22).   2) Work towards 60 gm protein/day.  3) Consume 48-64+ oz fluids daily- between meals only once on puree diet  4) Eat slowly (>20 min/meal), chewing well to smooth consistency once on the bariatric soft diet.  5) Limit portions to ~1/4 to 1/2 cup/meal.  6) Start chewable/liquid multivitamin/minerals daily.    Clear Liquid Diet (stage 1): 7/25-7/26  Low-Fat Full Liquid Diet (stage 2): 7/27-8/10  Pureed Diet (stage 3): 8/11-8/25  Soft Diet (stage 4):  8/26-9/23  Regular Diet (stage 5): 9/24    Take the following 1 month after a Sleeve Gastrectomy:  - Multivitamin/minerals: adult dose 1-2 times daily  Ideally want one that provides:   5,000 - 10,000 international units vitamin A daily   800 mg oral folate daily  8 - 22 mg zinc and 1 - 2 mg copper daily  12 mg Thiamin  - Iron: 45-60 mg elemental (18-36 mg if low risk) - may partly or fully be covered in multivitamin   - Calcium Citrate containing vitamin D: 500 mg 3 times daily or 600 mg 2 times daily     - Separate the calcium from your multivitamin or iron by at least 2 hours.     - Must be a chewable calcium citrate until post-op 3 months     - Options for calcium citrate: Dong calcium citrate chewable, bariatric advantage calcium citrate chewable, Celebrate vitamins calcium citrate chewable, Bariatric Fusion calcium citrate chewable  - Vitamin D - at least 3,000 international units/day between all supplements  - Vitamin B12: sublingual form of at least 500 mcg daily or injection of 1000 mcg monthly     Post-op Diet Handouts:  Diet Guidelines after Weight-loss Surgery  [email or web address]     Your Stage 1 Diet: Clear Liquids  [email or web address]     Your Stage 2 Diet: Low-fat Full Liquids  [email or web address]     Your Stage 3 Diet: Pureed Foods  [email or web address]     Pureed Recipes  [email or web address]    Your Stage 4 Diet: Soft Foods  [email or web address]    Your Stage 5 Diet: Regular Foods  [email or web address]    Supplements after Sleeve Gastrectomy, Gastric Bypass or Single Anastomosis Duodenal Switch  [email or web address]    Keeping Track of Fluids  [email or web address]    Exercise Guidelines after Weight Loss Surgery (1st 4-6 weeks)  [email or web address]      Follow-Up: 3 weeks or prn    Time spent with patient: 14 minutes.  MARCIE GLOVER RD, LD

## 2022-08-02 NOTE — PATIENT INSTRUCTIONS
Harjit Baez!    Follow-up with RD in 3 weeks    Thank you,    Kaleigh Curran, RD, LD  If you would like to schedule or reschedule an appointment with the RD, please call [phone]    Nutrition Goals  1) Follow bariatric low-fat full liquid diet through day 13 post-op, then to progress to pureed diet x 2 weeks (8/11/22).  If tolerating, may advance on day 29 post-op to bariatric soft diet (8/26/22).   2) Work towards 60 gm protein/day.  3) Consume 48-64+ oz fluids daily- between meals only once on puree diet  4) Eat slowly (>20 min/meal), chewing well to smooth consistency once on the bariatric soft diet.  5) Limit portions to ~1/4 to 1/2 cup/meal.  6) Start chewable/liquid multivitamin/minerals daily.    Clear Liquid Diet (stage 1): 7/25-7/26  Low-Fat Full Liquid Diet (stage 2): 7/27-8/10  Pureed Diet (stage 3): 8/11-8/25  Soft Diet (stage 4): 8/26-9/23  Regular Diet (stage 5): 9/24    Take the following 1 month after a Sleeve Gastrectomy:  - Multivitamin/minerals: adult dose 1-2 times daily  Ideally want one that provides:   5,000 - 10,000 international units vitamin A daily   800 mg oral folate daily  8 - 22 mg zinc and 1 - 2 mg copper daily  12 mg Thiamin  - Iron: 45-60 mg elemental (18-36 mg if low risk) - may partly or fully be covered in multivitamin   - Calcium Citrate containing vitamin D: 500 mg 3 times daily or 600 mg 2 times daily     - Separate the calcium from your multivitamin or iron by at least 2 hours.     - Must be a chewable calcium citrate until post-op 3 months     - Options for calcium citrate: Dong calcium citrate chewable, bariatric advantage calcium citrate chewable, Celebrate vitamins calcium citrate chewable, Bariatric Fusion calcium citrate chewable  - Vitamin D - at least 3,000 international units/day between all supplements  - Vitamin B12: sublingual form of at least 500 mcg daily or injection of 1000 mcg monthly     Post-op Diet Handouts:  Diet Guidelines after Weight-loss  Surgery  [email or web address]     Your Stage 1 Diet: Clear Liquids  [email or web address]     Your Stage 2 Diet: Low-fat Full Liquids  [email or web address]     Your Stage 3 Diet: Pureed Foods  [email or web address]     Pureed Recipes  [email or web address]    Your Stage 4 Diet: Soft Foods  [email or web address]    Your Stage 5 Diet: Regular Foods  [email or web address]    Supplements after Sleeve Gastrectomy, Gastric Bypass or Single Anastomosis Duodenal Switch  [email or web address]    Keeping Track of Fluids  [email or web address]    Exercise Guidelines after Weight Loss Surgery (1st 4-6 weeks)  [email or web address]    Interested in working with a health ? Health coaches work with you to improve your overall health and wellbeing. They look at the whole person, and may involve discussion of different areas of life, including, but not limited to the four pillars of health (sleep, exercise, nutrition, and stress management). Discuss with your care team if you would like to start working a health .    Health Coaching-3 Pack:    $99 for three health coaching visits    Visits may be done in person or via phone    Coaching is a partnership between the  and the client; Coaches do not prescribe or diagnose    Coaching helps inspire the client to reach his/her personal goals    COMPREHENSIVE WEIGHT MANAGEMENT PROGRAM  VIRTUAL SUPPORT GROUPS    For Support Group Information:      We offer support groups for patients who are working on weight loss and considering, preparing for or have had weight loss surgery.   There is no cost for this opportunity.  You are invited to attend the?Virtual Support Groups?provided by any of the following locations:    [facility] via [employer] Teams with Sangita Eastman RN  2.   [place] via [employer] with Bryce Raymond, PhD, LP  3.   [place] via [employer] with Tari Blue RN  4.  "  [facility] via Microsoft Teams with Tari Schultz [facility]    The following Support Group information can also be found on our website:  [email or web address]      [facility] Weight Loss Surgery Support Group    [facility] Weight Loss Surgery Support Group  The support group is a patient-lead forum that meets monthly to share experiences, encouragement and education. It is open to those who have had weight loss surgery, are scheduled for surgery, and those who are considering surgery.   WHEN: This group meets on the 3rd Wednesday of each month from 5:00PM - 6:00PM virtually using Microsoft Teams.   FACILITATOR: Led by Sangita Eastman RD, LD, RN, the program's Clinical Coordinator.   TO REGISTER: Please contact the clinic via [employer] or call the nurse line directly at [phone] to inform our staff that you would like an invite sent to you and to let us know the email you would like the invite sent to. Prior to the meeting, a link with directions on how to join the meeting will be sent to you.    2022 Meetings  January 19: \"Let's Talk\" a time for the group to share.  February 16: \"Let's Talk\" a time for the group to share.  March 16: Guest Speakers: Psychologists, Katherine Pearson, PhD,LP and Evelia Marte, PsyD,  April 20: Guest Speaker: Health , Nanette Alfaro, CH,CHES, CPT  May 18: Guest Speaker: DietitianMarvel RD, LP  Bhumika 15: \"Let's Talk\" a time for the group to share.  July 20: \"Let's Talk\" a time for the group to share.  August 17: TBA  September 21: TBA  October 19: Guest Speaker: Dr Ang Turner MD Pulmonologist and Sleep Medicine Physician, \"Getting a Good Night's Sleep\".  November 16: TBA  December 21: TBA    [facility] and [facility] Support Groups    Connections: Bariatric Care Support Group?  This is open to all [facility] (and those external to this program) " "pre- and post- operative bariatric surgery patients as well as their support system.   WHEN: This group meets the 2nd Tuesday of each month from 6:30 PM - 8:00 PM virtually using Microsoft Teams.   FACILITATOR: Led by Bryce Raymond, Ph.D who is a Licensed Psychologist with the [facility] Comprehensive Weight Management Program.   TO REGISTER: Please send an email to Bryce Raymond, Ph.D., LP at?[email or web address]?if you would like an invitation to the group and to learn about using Microsoft Teams.    2022 Meetings January 11: Kelsey Menard, PharmD, Pharmacy Resident at [facility], \"Medications and Bariatric Surgery\".  February 8: Open Forum  March 8  April 12  May 10  Bhumika 14    Connections: Post-Operative Bariatric Surgery Support Group  This is a support group for [facility] bariatric patients (and those external to [facility]) who have had bariatric surgery and are at least 3 months post-surgery.  WHEN: This support group meets the 4th Wednesday of the month from 11:00 AM - 12:00 PM virtually using Microsoft Teams.   FACILITATOR: Led by Certified Bariatric Nurse, Tari Blue RN.   TO REGISTER: Please send an email to Tari at [email or web address] if you would like an invitation to the group and to learn about using Microsoft Teams.    2022 Meetings  January 26  February 23  March 23  April 27  May 25  Bhumika 22    [facility] Healthy Lifestyle Virtual Support Group    Healthy Lifestyle Virtual Support Group?  This is 60 minutes of small group guided discussion, support and resources. All are welcome who want a healthy lifestyle.  WHEN: This group meets monthly on a Friday from 12:30 PM - 1:30 PM virtually using Microsoft Teams.   FACILITATOR: Led by National Board Certified Health and , Tari Schultz [facility].   TO REGISTER: Please send an email to Tari [email or web address] to receive monthly invites to the group or " "if you have any questions about having a health .  Prior to the meeting, a link with directions on how to join the meeting will be sent to you.    2022 Meetings  January 21: Lauren Lara MS, RN, CIC, CBN, \"Healthy Habits\"  February 25: Open Forum  March 18: \"Setting Limits and Boundaries\"  April 29: Hilda Murguia RD, \"Meal Planning Made Easy\"  May 20: Open Forum  June: To be determined                "

## 2022-08-02 NOTE — PROGRESS NOTES
"Postoperative bariatric surgery visit.    Patient underwent sleeve gastrectomy 1 week ago.  7/26/2022 Dr. Mata     Tolerating liquids: challenging first few days now getting at least 50oz fluids daily   Lightheadedness: with going from sitting to standing   Abdominal pain: resolved   Bowel movements: normal with senna, has used metamucil in the past   Fevers/shakes/chills: none  GERD: none Taking omeprazole daily (preop)   Leg/calf pain: none    No dyspnea     How many opioid pain medications used after surgery?  What did you do with extra pills?  Were any opioid pain medication refills provided after surgery?  Were any opioid pain medications needed after 30 days postop?    BP (!) 148/78 (BP Location: Left arm, Patient Position: Sitting, Cuff Size: Adult Large)   Pulse 95   Ht 1.702 m (5' 7\")   Wt 145.1 kg (319 lb 14.4 oz)   SpO2 97%   BMI 50.10 kg/m     Wt Readings from Last 5 Encounters:   08/02/22 145.1 kg (319 lb 14.4 oz)   07/26/22 149.5 kg (329 lb 9.4 oz)   07/14/22 (!) 154.2 kg (340 lb)   03/28/22 (!) 160.9 kg (354 lb 11.5 oz)   03/25/22 (!) 161 kg (355 lb)      NAD  Overall looks great  Incisions c/d/i; non-tender     STOMACH, PARTIAL GASTRECTOMY:  -Gastric mucosa with no significant histologic abnormality  -Negative for intestinal metaplasia and dysplasia  -No H. pylori-like organisms identified on routine stain    Plan:  1. RD visit today.  2. Start vitamin supplements per RD directions.  3. Advance diet per RD directions.  4. Follow-up:8/25/2022   5. Actigall prescription sent to pharmacy- going to hold off until can swallow whole   6. B12 SL or injection **  7. Pathology reviewed normal   8. Weight loss medications: stopped saxenda preop   9. Need to restart statin? RANDOLPH Miller, CNP  [facility] [place]     "

## 2022-08-24 VITALS — BODY MASS INDEX: 45.99 KG/M2 | HEIGHT: 67 IN | WEIGHT: 293 LBS

## 2022-08-24 NOTE — PROGRESS NOTES
Virtual Visit Check-In    During this virtual visit the patient is located in [place], patient verifies this as the location during the entirety of this visit.     Sasha is a 22 year old who is being evaluated via a billable video visit.      How would you like to obtain your AVS? MyChart  If the video visit is dropped, the invitation should be resent by: Text to cell phone: [phone]  Will anyone else be joining your video visit? No        Video-Visit Details    Video Start Time: 0730    Type of service:  Video Visit    Video End Time:7:49 AM    Originating Location (pt. Location): Home    Distant Location (provider location):  [facility] [place]     Platform used for Video Visit: Mitch Oshea NREMT

## 2022-08-24 NOTE — NURSING NOTE
Chief Complaint   Patient presents with     Surgical Followup       Vitals:    08/24/22 1614   Weight: 305 lb       Body mass index is 47.77 kg/m .      Yobany Oshea, EMT  Surgery Clinic

## 2022-08-25 ENCOUNTER — VIRTUAL VISIT (OUTPATIENT)
Dept: ENDOCRINOLOGY | Facility: CLINIC | Age: 22
End: 2022-08-25
Payer: COMMERCIAL

## 2022-08-25 DIAGNOSIS — Z98.84 S/P LAPAROSCOPIC SLEEVE GASTRECTOMY: Primary | ICD-10-CM

## 2022-08-25 DIAGNOSIS — Z98.84 S/P LAPAROSCOPIC SLEEVE GASTRECTOMY: ICD-10-CM

## 2022-08-25 DIAGNOSIS — E11.9 TYPE 2 DIABETES MELLITUS WITHOUT COMPLICATION, WITHOUT LONG-TERM CURRENT USE OF INSULIN (H): ICD-10-CM

## 2022-08-25 DIAGNOSIS — E66.9 OBESITY: ICD-10-CM

## 2022-08-25 DIAGNOSIS — Z71.3 NUTRITIONAL COUNSELING: Primary | ICD-10-CM

## 2022-08-25 PROCEDURE — 97803 MED NUTRITION INDIV SUBSEQ: CPT | Mod: GT | Performed by: DIETITIAN, REGISTERED

## 2022-08-25 PROCEDURE — 99024 POSTOP FOLLOW-UP VISIT: CPT | Mod: GT | Performed by: NURSE PRACTITIONER

## 2022-08-25 RX ORDER — CYANOCOBALAMIN 1000 UG/ML
1 INJECTION, SOLUTION INTRAMUSCULAR; SUBCUTANEOUS
Qty: 1 ML | Refills: 11 | Status: SHIPPED | OUTPATIENT
Start: 2022-08-25 | End: 2024-01-12

## 2022-08-25 RX ORDER — NEEDLES, SAFETY 18GX1 1/2"
1 NEEDLE, DISPOSABLE MISCELLANEOUS
Qty: 1 EACH | Refills: 11 | Status: SHIPPED | OUTPATIENT
Start: 2022-08-25 | End: 2024-07-18

## 2022-08-25 RX ORDER — GLUCOSAMINE HCL 500 MG
1 TABLET ORAL DAILY
Qty: 30 TABLET | Refills: 11 | Status: SHIPPED | OUTPATIENT
Start: 2022-08-25 | End: 2023-02-01

## 2022-08-25 NOTE — PROGRESS NOTES
"Postoperative bariatric surgery visit.    Patient underwent sleeve gastrectomy 4.5 weeks ago.  7/26/2022 Dr. 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  What did you do with extra pills?  Were any opioid pain medication refills provided after surgery?  Were any opioid pain medications needed after 30 days postop?    Ht 1.702 m (5' 7\")   Wt 138.3 kg (305 lb)   BMI 47.77 kg/m     Wt Readings from Last 5 Encounters:   08/24/22 138.3 kg (305 lb)   08/02/22 145.1 kg (319 lb 14.4 oz)   07/26/22 149.5 kg (329 lb 9.4 oz)   07/14/22 (!) 154.2 kg (340 lb)   03/28/22 (!) 160.9 kg (354 lb 11.5 oz)      NAD  Overall looks great  Incisions c/d/i; non-tender     Plan:  1. RD visit today.  2. Start vitamin supplements per RD directions.  3. Advance diet per RD directions.   4. Follow-up: 2 months with labs   5. Actigall prescription- sent 1 week postop, was going to start when able to swallow whole **going to check now at pharmacy for it   6. B12 SL or injection **  7. Pathology reviewed normal  8. Weight loss medications stopped saxenda preop   9. Need to restart statin?       Komal Miller, CNP  [facility]NAG[facility] [place]   "

## 2022-08-25 NOTE — LETTER
"8/25/2022       RE: Sasha Hand  [address]  [place] [address]     Dear Colleague,    Thank you for referring your patient, Sasha Hand, to the [facility]NA[facility] [place] at [facility]. Please see a copy of my visit note below.    Virtual Visit Check-In    During this virtual visit the patient is located in [place], patient verifies this as the location during the entirety of this visit.     Sasha is a 22 year old who is being evaluated via a billable video visit.      How would you like to obtain your AVS? MyChart  If the video visit is dropped, the invitation should be resent by: Text to cell phone: [phone]  Will anyone else be joining your video visit? No        Video-Visit Details    Video Start Time: 0730    Type of service:  Video Visit    Video End Time:7:49 AM    Originating Location (pt. Location): Home    Distant Location (provider location):  [facility] [place]     Platform used for Video Visit: Mitch Oshea NREMT        Postoperative bariatric surgery visit.    Patient underwent sleeve gastrectomy 4.5 weeks ago.  7/26/2022 Dr. 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  What did you do with extra pills?  Were any opioid pain medication refills provided after surgery?  Were any opioid pain medications needed after 30 days postop?    Ht 1.702 m (5' 7\")   Wt 138.3 kg (305 lb)   BMI 47.77 kg/m     Wt Readings from Last 5 Encounters: "   08/24/22 138.3 kg (305 lb)   08/02/22 145.1 kg (319 lb 14.4 oz)   07/26/22 149.5 kg (329 lb 9.4 oz)   07/14/22 (!) 154.2 kg (340 lb)   03/28/22 (!) 160.9 kg (354 lb 11.5 oz)      NAD  Overall looks great  Incisions c/d/i; non-tender     Plan:  1. RD visit today.  2. Start vitamin supplements per RD directions.  3. Advance diet per RD directions.   4. Follow-up: 2 months with labs   5. Actigall prescription- sent 1 week postop, was going to start when able to swallow whole **going to check now at pharmacy for it   6. B12 SL or injection **  7. Pathology reviewed normal  8. Weight loss medications stopped saxenda preop   9. Need to restart statin?       Komal Miller, CNP  [facility] [place]

## 2022-08-25 NOTE — PATIENT INSTRUCTIONS
"Thank you for allowing us the privilege of caring for you. We hope we provided you with the excellent service you deserve.   Please let us know if there is anything else we can do for you so that we can be sure you are completely satisfied with your care experience.    To ensure the quality of our services you may be receiving a patient satisfaction survey from an independent patient satisfaction monitoring company.    The greatest compliment you can give is a \"Likely to Recommend\"    Your visit was with Komal Miller NP today.    Instructions per today's visit:     Harjit Hand, it was great to visit with you today.  Here is a review of our visit.  If our clinic scheduler is not able to reach you please call [phone] to schedule your next appointments.    -can try miralax or colace for constipation       Information about Video Visits with [facility]: video visit information  _________________________________________________________________________________________________________________________________________________________  If you are asked by your clinic team to have your blood pressure checked:  [facility] do offer several locations for blood pressure checks. Please follow the below link to schedule an appointment. Scheduling an appointment at the pharmacy for a blood pressure check is now preferred.    Appointment Plus (appointment-plus.Plan Me Up)  _________________________________________________________________________________________________________________________________________________________  Important contact and scheduling information:  Please call our contact center at [phone] to schedule your next appointments.  To find a lab location near you, please call [phone].  For any nursing questions or concerns call Keke Feliciano LPN at [phone] or Amanda Rivas RN at [phone]  Please call during clinic hours Monday through Friday 8:00a - 4:00p if you have questions or " you can contact us via Splyst at anytime and we will reply during clinic hours.    Lab results will be communicated through My Chart or letter (if My Chart not used). Please call the clinic if you have not received communication after 1 week or if you have any questions.?  Clinic Fax: [phone]    _________________________________________________________________________________________________________________________________________________________  Meal Replacement Products:    Here is the link to our new e-store where you can purchase our meal replacement products    [employer] E-Store  Fromography/store    The one week starter kit is a great way to sample a variety of products and see what works for you.    If you want more information about the product go to: Fresh Steps Meals  [employer]    If you are an employee or [facility] or [employer] please contact your care team for a 10% estore discount    Free Shipping for orders over $75     Benefits of meal replacements products:    Portion and calorie control  Improved nutrition  Structured eating  Simplified food choices  Avoid contact with trigger foods  _________________________________________________________________________________________________________________________________________________________  Interested in working with a health ?  Health coaches work with you to improve your overall health and wellbeing.  They look at the whole person, and may involve discussion of different areas of life, including, but not limited to the four pillars of health (sleep, exercise, nutrition, and stress management). Discuss with your care team if you would like to start working a health .  Health Coaching-3 Pack: Schedule by calling [phone]    $99 for three health coaching visits    Visits may be done in person or via phone    Coaching is a partnership between the  and the client; Coaches do not  prescribe or diagnose    Coaching helps inspire the client to reach his/her personal goals   _________________________________________________________________________________________________________________________________________________________  24 Week Healthy Lifestyle Plan:    Our mission in the 24-week Healthy Lifestyle Plan is to provide you with individualized care by giving you the tools, education and support you need to lose weight and maintain a healthy lifestyle. In your 24-week journey, you ll be supported by a dedicated weight loss team that includes registered dietitians, medical weight management providers, health coaches, and nurses -- all with special expertise in weight loss -- to help you every step of the way.     Monthly meetings with your registered dietician or medical weight management provider help to review your progress, update your care plan, and make any adjustments needed to ensure success. Between these visits, weekly and bi-weekly health  visits will help you focus on the four pillars of weight loss -- stress, sleep, nutrition, and exercise -- and how you can best adapt each to achieve sustainable weight loss results.    In addition, you will be given exclusive access to online wellbeing classes through [employer].  Your initial visit will be with a medical weight management provider who will help to understand your weight loss goals and ensure this program is the right fit for you. Please let our team know if you are interested in the 24 week plan by sending a message to your care team or calling [phone] to schedule.  _________________________________________________________________________________________________________________________________________________________    COMPREHENSIVE WEIGHT MANAGEMENT PROGRAM  VIRTUAL SUPPORT GROUPS    For Support Group Information:      We offer support groups for patients who are working on weight loss and considering, preparing for or  "have had weight loss surgery.   There is no cost for this opportunity.  You are invited to attend the?Virtual Support Groups?provided by any of the following locations:    [facility] via Microsoft Teams with Sangita Eastman RN  2.   [place] via Spring with Bryce Raymond, PhD, LP  3.   [place] via Spring with Tari Blue RN  4.   [facility] via Taskhero.com Teams with Tari Schultz [facility]    The following Support Group information can also be found on our website:  [email or web address]    [facility] Weight Loss Surgery Support Group    [facility] Weight Loss Surgery Support Group  The support group is a patient-lead forum that meets monthly to share experiences, encouragement and education. It is open to those who have had weight loss surgery, are scheduled for surgery, and those who are considering surgery.   WHEN: This group meets on the 3rd Wednesday of each month from 5:00PM - 6:00PM virtually using Microsoft Teams.   FACILITATOR: Led by Sangita Eastman, RD, LD, RN, the program's Clinical Coordinator.   TO REGISTER: Please contact the clinic via [employer] or call the nurse line directly at [phone] to inform our staff that you would like an invite sent to you and to let us know the email you would like the invite sent to. Prior to the meeting, a link with directions on how to join the meeting will be sent to you.    2022 Meetings  Bhumika 15: \"Let's Talk\" a time for the group to share.  July 20: \"Let's Talk\" a time for the group to share.  August 17: \"Let's Talk\" a time for the group to share.  September 21: \"Let's Talk\" a time for the group to share.  October 19: Guest Speaker: Dr Ang Turner MD Pulmonologist and Sleep Medicine Physician, \"Getting a Good Night's Sleep\".  November 16: \"Let's Talk\" a time for the group to share.  December 21: \"Let's Talk\" a time for the group to share.    [facility] Support Groups    Connections: Bariatric Care Support Group?  This is open to all [facility] (and those external to this program) pre- and post- operative bariatric surgery patients as well as their support system.   WHEN: This group meets the 2nd Tuesday of each month from 6:30 PM - 8:00 PM virtually using Microsoft Teams.   FACILITATOR: Led by Bryce Raymnod, Ph.D who is a Licensed Psychologist with the [facility] Comprehensive Weight Management Program.   TO REGISTER: Please send an email to Bryce Raymond, Ph.D., LP at?[email or web address]?if you would like an invitation to the group and to learn about using Microsoft Teams.    2022 Meetings  June 14: Liya Marques, NIKITA, LD at [facility], \"Nutritional Labeling\"  July 12 August 2 (Please Note Date Change)  September 13 October 11 November 8 December 13    Connections: Post-Operative Bariatric Surgery Support Group  This is a support group for [facility] bariatric patients (and those external to [facility]) who have had bariatric surgery and are at least 3 months post-surgery.  WHEN: This support group meets the 4th Wednesday of the month from 11:00 AM - 12:00 PM virtually using Microsoft Teams.   FACILITATOR: Led by Certified Bariatric Nurse, Tari Blue RN.   TO REGISTER: Please send an email to Tari at [email or web address] if you would like an invitation to the group and to learn about using Microsoft Teams.    2022 Meetings June 22 July 27 August 24 September 28 October 26 November 23 December 28      [facility] Healthy Lifestyle Virtual Support Group    Healthy Lifestyle Virtual Support Group?  This is 60 minutes of small group guided discussion, support and resources. All are welcome who want a healthy lifestyle.  WHEN: This group meets monthly on a Friday from 12:30 PM - 1:30 PM virtually using Microsoft Teams. " "  FACILITATOR: Led by National Board Certified Health and , Tari Schultz [facility].   TO REGISTER: Please send an email to Tari at?[email or web address] to receive monthly invites to the group or if you have any questions about having a health .  Prior to the meeting, a link with directions on how to join the meeting will be sent to you.    2022 Meetings  June 24: Tari Schultz [facility], \"Setting Limits and Boundaries\".  Jul 29: Open Forum  August 26: Guest Speaker: Kaleigh Curran Registered Dietitian  September 30: Open Forum  October 28th: Guest Speaker: Claudia Ruth [facility], Health , \"Gratitude Practices\".  November 18: Guest Speaker: Hilda Murguia RD Registered Dietitian, \"Navigating How to Eat around the Holidays\".  December 16: Guest Speaker: Nanette Alfaro [facility], \"Changing Your Relationship with Movement\".    ____________________________________________________________________________________________________________________________________________________________________________  [place] [facility] Get Moving Program  Our team of physical therapists is trained to help you understand and take control of your condition. They will perform a thorough evaluation to determine your ability for activity and develop a customized plan to fit your goals and physical ability.  Scheduling: Unsure if the Get Moving program is right for you? Discuss the program with your medical provider or diabetes educator. You can also call us at [phone] to ask questions or schedule an appointment.   [facility] Get Moving Program  ____________________________________________________________________________________________________________________________________________________________________________  [facility] Diabetes Prevention Program (DPP)  If you have prediabetes and Medicare please contact us via MyChart to learn more about the Diabetes Prevention Program (DPP)  Program Details:  [facility] offers the year-long Diabetes Prevention Program (DPP). The program helps you to make lifestyle changes that prevent or delay type 2 diabetes by supporting healthy eating, increased physical activity, stress reduction and use of coping skills.   On average, previous [facility] DPP cohorts have lost and maintained at least 5% of their starting weight throughout the program and averaged more than 150 minutes of physical activity per week.  Participants meet weekly for one-hour group sessions over sixteen weeks, every other week for the next 8 weeks, and monthly for the last six months.   A year-long maintenance program is also available for participants who complete the first year.   Location & Cost:   During the COVID-19 Public Health Emergency, the program is offered virtually. When in-person classes can resume, they will be held at [facility].  For people with Medicare, the program is covered in full. A self-pay option will also be available for those with non-Medicare insurance plans.   _________________________________________________________________________________________________________________________________________________________  Bluetooth Scale:    We hope to provide you with high quality virtual healthcare visits while social distancing for COVID-19 is necessary, as well as in the future when virtual visits may be more convenient for you.     Our technology team made it possible for Bluetooth scales to send weight measurements to our electronic medical record. This allows weights from you weighing at home to securely flow into the medical record, which will improve telephone and virtual visits.   Additionally, studies have shown that adults actually lose more weight when their weights are automatically sent to someone else, and also that this process is not stressful for those adults.    Below is a link for purchasing the scale, with a discount  code for our patients. You may call your insurance company to see if they will reimburse you for the cost of the scale, as a piece of durable medical equipment. The scales only go up to a weight of 400 pounds. This is an issue and we are working with the developer on increasing this. We found no scales that go over 400lb that have blue-tooth for connecting to [employer].    Scale to purchase: the [employer]  Body  Scale: https://www.[email or web address].[employer]/us/en/body/shop?[email or web address]=aw.ds    Discount Code: We have a discount code for our patients to bring the cost down to $50, Discount code is: [place]_Scale_20%off  _______________________________________________________________________________________________________________________________________________________________________________    To work with a Behavioral Health Psychologist:    Call to schedule:    Aron Nice - [phone]  Hannah Casas - [phone]  Samira Prescott - [phone]  Michelle Breaux - [phone]   Stephanie Leal PhD (cannot accept Medicare) [phone]        Thank you,   [facility] Comprehensive Weight Management Team

## 2022-08-25 NOTE — PATIENT INSTRUCTIONS
Harjit Baez!    Follow-up with RD in 2 months    Thank you,    Kaleigh Curran, RD, LD  If you would like to schedule or reschedule an appointment with the RD, please call [phone]    Nutrition Goals  1) Follow soft diet for 4 weeks, then to progress to bariatric regular diet.   2) Consume 60+ grams of protein/day.  3) Sip on 48-64+ oz of fluids/day- between meals only.  4) Eat slowly (>20 min/meal), chewing foods well (to applesauce-like consistency).  5) Limit portions to ~1/2 cup/meal until 3 months post op  6) Schedule 3 month provider/RD appointments  7) Get 3 month labs done before next appointments   8) Take the following after a Sleeve Gastrectomy:  - Multivitamin/minerals: adult dose 1-2 times daily  Ideally want one that provides:   5,000 - 10,000 international units vitamin A daily   800 mg oral folate daily  8 - 22 mg zinc and 1 - 2 mg copper daily  12 mg Thiamine  - Iron: 45-60 mg elemental (18-36 mg if low risk) - may partly or fully be covered in multivitamin   - Calcium Citrate containing vitamin D: 500 mg 3 times daily or 600 mg 2 times daily     - Separate the calcium from your multivitamin or iron by at least 2 hours.     - Must be a chewable calcium citrate until post-op 3 months     - Options for calcium citrate: Dong calcium citrate chewable, bariatric advantage calcium citrate chewable, Celebrate vitamins calcium citrate chewable, Bariatric Fusion calcium citrate chewable  - Vitamin D - at least 3,000 international units/day between all supplements  - Vitamin B12: sublingual form of at least 500 mcg daily or injection of 1000 mcg monthly     Clear Liquid Diet (stage 1): 7/25-7/26  Low-Fat Full Liquid Diet (stage 2): 7/27-8/10  Pureed Diet (stage 3): 8/11-8/25  Soft Diet (stage 4): 8/26-9/23  Regular Diet (stage 5): 9/24    Post-op Diet Handouts:  Diet Guidelines after Weight-loss Surgery  [email or web address]     Your Stage 4 Diet: Soft Foods  [email or web address]    Your Stage  5 Diet: Regular Foods  [email or web address]    Supplements after Sleeve Gastrectomy, Gastric Bypass or Single Anastomosis Duodenal Switch  [email or web address]    Keeping Track of Fluids  [email or web address]    Exercises after Weight Loss Surgery (strengthening, when no weight lifting restrictions)  [email or web address]      Interested in working with a health ? Health coaches work with you to improve your overall health and wellbeing. They look at the whole person, and may involve discussion of different areas of life, including, but not limited to the four pillars of health (sleep, exercise, nutrition, and stress management). Discuss with your care team if you would like to start working a health .    Health Coaching-3 Pack:    $99 for three health coaching visits    Visits may be done in person or via phone    Coaching is a partnership between the  and the client; Coaches do not prescribe or diagnose    Coaching helps inspire the client to reach his/her personal goals    COMPREHENSIVE WEIGHT MANAGEMENT PROGRAM  VIRTUAL SUPPORT GROUPS    For Support Group Information:      We offer support groups for patients who are working on weight loss and considering, preparing for or have had weight loss surgery.   There is no cost for this opportunity.  You are invited to attend the?Virtual Support Groups?provided by any of the following locations:    [facility] via Ark Teams with Sangita Eastman RN  2.   [place] via Ark Teams with Bryce Raymond, PhD, LP  3.   [place] via Ark Teams with Tari Blue RN  4.   [facility] via Ark Teams with Tari Schultz [facility]    The following Support Group information can also be found on our website:  [email or web address]      [facility] Weight Loss Surgery Support Group    [facility] Weight Loss Surgery Support  "Group  The support group is a patient-lead forum that meets monthly to share experiences, encouragement and education. It is open to those who have had weight loss surgery, are scheduled for surgery, and those who are considering surgery.   WHEN: This group meets on the 3rd Wednesday of each month from 5:00PM - 6:00PM virtually using Microsoft Teams.   FACILITATOR: Led by Sangita Eastman RD, LD, RN, the program's Clinical Coordinator.   TO REGISTER: Please contact the clinic via digitalbox or call the nurse line directly at [phone] to inform our staff that you would like an invite sent to you and to let us know the email you would like the invite sent to. Prior to the meeting, a link with directions on how to join the meeting will be sent to you.    2022 Meetings  January 19: \"Let's Talk\" a time for the group to share.  February 16: \"Let's Talk\" a time for the group to share.  March 16: Guest Speakers: Psychologists, Katherine Pearson, PhD,LP and Evelia Marte PsyD,  April 20: Guest Speaker: Health Nanette, [facility],CHES, CPT  May 18: Guest Speaker: Dietitian, Marvel Cannon, NIKITA, LP  Bhumika 15: \"Let's Talk\" a time for the group to share.  July 20: \"Let's Talk\" a time for the group to share.  August 17: TBA  September 21: TBA  October 19: Guest Speaker: Dr Ang Turner MD Pulmonologist and Sleep Medicine Physician, \"Getting a Good Night's Sleep\".  November 16: TBA  December 21: TBA    [facility] Support Groups    Connections: Bariatric Care Support Group?  This is open to all [facility] (and those external to this program) pre- and post- operative bariatric surgery patients as well as their support system.   WHEN: This group meets the 2nd Tuesday of each month from 6:30 PM - 8:00 PM virtually using Microsoft Teams.   FACILITATOR: Led by Bryce Raymond, Ph.D who is a Licensed Psychologist with the [facility] Comprehensive Weight Management Program.   TO " "REGISTER: Please send an email to Bryce Raymond, Ph.D., LP at?[email or web address]?if you would like an invitation to the group and to learn about using Microsoft Teams.    2022 Meetings January 11: Kelsey Menard, PharmD, Pharmacy Resident at [facility], \"Medications and Bariatric Surgery\".  February 8: Open Forum  March 8  April 12  May 10  Bhumika 14    Connections: Post-Operative Bariatric Surgery Support Group  This is a support group for [facility] bariatric patients (and those external to [facility]) who have had bariatric surgery and are at least 3 months post-surgery.  WHEN: This support group meets the 4th Wednesday of the month from 11:00 AM - 12:00 PM virtually using Microsoft Teams.   FACILITATOR: Led by Certified Bariatric Nurse, Tari Blue RN.   TO REGISTER: Please send an email to Tari at [email or web address] if you would like an invitation to the group and to learn about using Microsoft Teams.    2022 Meetings  January 26  February 23  March 23  April 27  May 25  Bhumika 22    [facility] Healthy Lifestyle Virtual Support Group    Healthy Lifestyle Virtual Support Group?  This is 60 minutes of small group guided discussion, support and resources. All are welcome who want a healthy lifestyle.  WHEN: This group meets monthly on a Friday from 12:30 PM - 1:30 PM virtually using Microsoft Teams.   FACILITATOR: Led by National Board Certified Health and , Tari Schultz [facility].   TO REGISTER: Please send an email to Tari at?[email or web address] to receive monthly invites to the group or if you have any questions about having a health .  Prior to the meeting, a link with directions on how to join the meeting will be sent to you.    2022 Meetings January 21: Lauren Lara MS, RN, CIC, CBN, \"Healthy Habits\"  February 25: Open Forum  March 18: \"Setting Limits and Boundaries\"  April 29: Hilda Murguia RD, \"Meal Planning " "Made Easy\"  May 20: Open Forum  Bhumika: To be determined                "

## 2022-08-25 NOTE — LETTER
"8/25/2022       RE: Sasha Hand  [address]  [facility] [address]     Dear Colleague,    Thank you for referring your patient, Sasha Hand, to the [facility]NA[facility] [place] at [facility]. Please see a copy of my visit note below.    Sasha Hand is a 22 year old female who is being evaluated via a billable video visit.      The patient has been notified of following:     \"This video visit will be conducted via a call between you and your physician/provider. We have found that certain health care needs can be provided without the need for an in-person physical exam.  This service lets us provide the care you need with a video conversation.  If a prescription is necessary we can send it directly to your pharmacy.  If lab work is needed we can place an order for that and you can then stop by our lab to have the test done at a later time.    Video visits are billed at different rates depending on your insurance coverage.  Please reach out to your insurance provider with any questions.    If during the course of the call the physician/provider feels a video visit is not appropriate, you will not be charged for this service.\"    Patient has given verbal consent for Video visit? Yes  How would you like to obtain your AVS? MyChart  If you are dropped from the video visit, the video invite should be resent to: Send to e-mail at: [email or web address]  Will anyone else be joining your video visit? No  {If patient encounters technical issues they should call [phone]      Video-Visit Details    Type of service:  Video Visit    Video Start Time: 9:40 AM  Video End Time: 9:50 AM    Originating Location (pt. Location): Home    Distant Location (provider location):  [facility] WEIGHT MANAGEMENT [facility]     Platform used for Video Visit: [employer]    During this virtual visit the patient is located in [place], patient verifies this as " the location during the entirety of this visit.     Nutrition Reassessment  Reason For Visit:  Sasha Hand is a 22 year old female presenting today for nutrition follow-up, 1 month s/p 7/26/22.    Anthropometrics:  Initial Consult Weight: 365 lbs  Day of Surgery Weight (7/26/22): 329 lbs  Current Weight: 304 lbs  Weight loss: -61 lbs from initial consult; -25 lbs from day of surgery    Current Vitamins/Minerals: MVI/minerals BID    Nutrition History:  Pt has been tolerating pureed diet. Has been having ground/shredded meats and has been chewing thoroughly. Max volume is 2.3 oz.     Progress with Previous Goals:  1) Follow bariatric low-fat full liquid diet through day 13 post-op, then to progress to pureed diet x 2 weeks.  If tolerating, may advance on day 29 post-op to bariatric soft diet. Met, continues  2) Work towards 60 gm protein/day. Met, continues  3) Consume 48-64+ oz fluids daily- between meals. Met, continues   4) Eat slowly (>20 min/meal), chewing well to smooth consistency once on the bariatric soft diet. Met, continues  5) Limit portions to ~1/2 cup/meal. Met, continues  6) Start chewable/liquid multivitamin/minerals daily met, continues    Nutrition Prescription:  Grams Protein: 60 (minimum)   Amount of Fluid: 48-64 oz    Nutrition Diagnosis  Previous: Food and nutrition-related knowledge deficit r/t lack of prior exposure to diet advancements beyond bariatric low-fat full liquid diet aeb recent bariatric surgery and pt interest in diet education/review     Current: Food and nutrition-related knowledge deficit r/t lack of prior exposure to diet advancements beyond bariatric pureed diet aeb recent bariatric surgery and pt interest in diet education/review     Intervention  Materials/Education provided on bariatric soft and regular consistency diets, protein intake, fluid intake, eating pace, chewing foods well, portion control, sugar/fat intake, recommended vitamin/mineral supplements. Patient  demonstrates understanding.       Expected Engagement: good    Goals:  1) Follow soft diet for 4 weeks, then to progress to bariatric regular diet.   2) Consume 60+ grams of protein/day.  3) Sip on 48-64+ oz of fluids/day- between meals only.  4) Eat slowly (>20 min/meal), chewing foods well (to applesauce-like consistency).  5) Limit portions to ~1/2 cup/meal until 3 months post op  6) Schedule 3 month provider/RD appointments  7) Get 3 month labs done before next appointments   8) Take the following after a Sleeve Gastrectomy:  - Multivitamin/minerals: adult dose 1-2 times daily  Ideally want one that provides:   5,000 - 10,000 international units vitamin A daily   800 mg oral folate daily  8 - 22 mg zinc and 1 - 2 mg copper daily  12 mg Thiamine  - Iron: 45-60 mg elemental (18-36 mg if low risk) - may partly or fully be covered in multivitamin   - Calcium Citrate containing vitamin D: 500 mg 3 times daily or 600 mg 2 times daily     - Separate the calcium from your multivitamin or iron by at least 2 hours.     - Must be a chewable calcium citrate until post-op 3 months     - Options for calcium citrate: Dong calcium citrate chewable, bariatric advantage calcium citrate chewable, Celebrate vitamins calcium citrate chewable, Bariatric Fusion calcium citrate chewable  - Vitamin D - at least 3,000 international units/day between all supplements  - Vitamin B12: sublingual form of at least 500 mcg daily or injection of 1000 mcg monthly     Clear Liquid Diet (stage 1): 7/25-7/26  Low-Fat Full Liquid Diet (stage 2): 7/27-8/10  Pureed Diet (stage 3): 8/11-8/25  Soft Diet (stage 4): 8/26-9/23  Regular Diet (stage 5): 9/24    Post-op Diet Handouts:  Diet Guidelines after Weight-loss Surgery  [email or web address]     Your Stage 4 Diet: Soft Foods  [email or web address]    Your Stage 5 Diet: Regular Foods  [email or web address]    Supplements after Sleeve Gastrectomy, Gastric Bypass or Single  Anastomosis Duodenal Switch  [email or web address]    Keeping Track of Fluids  [email or web address]    Exercises after Weight Loss Surgery (strengthening, when no weight lifting restrictions)  [email or web address]         Follow-Up: 3 months post op/prn    Time spent with patient: 10 minutes.  MARCIE GLOVER RD LD

## 2022-08-25 NOTE — PROGRESS NOTES
"Sasha aHnd is a 22 year old female who is being evaluated via a billable video visit.      The patient has been notified of following:     \"This video visit will be conducted via a call between you and your physician/provider. We have found that certain health care needs can be provided without the need for an in-person physical exam.  This service lets us provide the care you need with a video conversation.  If a prescription is necessary we can send it directly to your pharmacy.  If lab work is needed we can place an order for that and you can then stop by our lab to have the test done at a later time.    Video visits are billed at different rates depending on your insurance coverage.  Please reach out to your insurance provider with any questions.    If during the course of the call the physician/provider feels a video visit is not appropriate, you will not be charged for this service.\"    Patient has given verbal consent for Video visit? Yes  How would you like to obtain your AVS? MyChart  If you are dropped from the video visit, the video invite should be resent to: Send to e-mail at: [email or web address]  Will anyone else be joining your video visit? No  {If patient encounters technical issues they should call [phone]      Video-Visit Details    Type of service:  Video Visit    Video Start Time: 9:40 AM  Video End Time: 9:50 AM    Originating Location (pt. Location): Home    Distant Location (provider location):  [facility]NAG[facility] CLIN[facility] [place]     Platform used for Video Visit: [employer]    During this virtual visit the patient is located in [place], patient verifies this as the location during the entirety of this visit.     Nutrition Reassessment  Reason For Visit:  Sasha Hand is a 22 year old female presenting today for nutrition follow-up, 1 month s/p 7/26/22.    Anthropometrics:  Initial Consult Weight: 365 lbs  Day of Surgery Weight (7/26/22): 329 lbs  Current Weight: 304 " lbs  Weight loss: -61 lbs from initial consult; -25 lbs from day of surgery    Current Vitamins/Minerals: MVI/minerals BID    Nutrition History:  Pt has been tolerating pureed diet. Has been having ground/shredded meats and has been chewing thoroughly. Max volume is 2.3 oz.     Progress with Previous Goals:  1) Follow bariatric low-fat full liquid diet through day 13 post-op, then to progress to pureed diet x 2 weeks.  If tolerating, may advance on day 29 post-op to bariatric soft diet. Met, continues  2) Work towards 60 gm protein/day. Met, continues  3) Consume 48-64+ oz fluids daily- between meals. Met, continues   4) Eat slowly (>20 min/meal), chewing well to smooth consistency once on the bariatric soft diet. Met, continues  5) Limit portions to ~1/2 cup/meal. Met, continues  6) Start chewable/liquid multivitamin/minerals daily met, continues    Nutrition Prescription:  Grams Protein: 60 (minimum)   Amount of Fluid: 48-64 oz    Nutrition Diagnosis  Previous: Food and nutrition-related knowledge deficit r/t lack of prior exposure to diet advancements beyond bariatric low-fat full liquid diet aeb recent bariatric surgery and pt interest in diet education/review     Current: Food and nutrition-related knowledge deficit r/t lack of prior exposure to diet advancements beyond bariatric pureed diet aeb recent bariatric surgery and pt interest in diet education/review     Intervention  Materials/Education provided on bariatric soft and regular consistency diets, protein intake, fluid intake, eating pace, chewing foods well, portion control, sugar/fat intake, recommended vitamin/mineral supplements. Patient demonstrates understanding.       Expected Engagement: good    Goals:  1) Follow soft diet for 4 weeks, then to progress to bariatric regular diet.   2) Consume 60+ grams of protein/day.  3) Sip on 48-64+ oz of fluids/day- between meals only.  4) Eat slowly (>20 min/meal), chewing foods well (to applesauce-like  consistency).  5) Limit portions to ~1/2 cup/meal until 3 months post op  6) Schedule 3 month provider/RD appointments  7) Get 3 month labs done before next appointments   8) Take the following after a Sleeve Gastrectomy:  - Multivitamin/minerals: adult dose 1-2 times daily  Ideally want one that provides:   5,000 - 10,000 international units vitamin A daily   800 mg oral folate daily  8 - 22 mg zinc and 1 - 2 mg copper daily  12 mg Thiamine  - Iron: 45-60 mg elemental (18-36 mg if low risk) - may partly or fully be covered in multivitamin   - Calcium Citrate containing vitamin D: 500 mg 3 times daily or 600 mg 2 times daily     - Separate the calcium from your multivitamin or iron by at least 2 hours.     - Must be a chewable calcium citrate until post-op 3 months     - Options for calcium citrate: Dong calcium citrate chewable, bariatric advantage calcium citrate chewable, Celebrate vitamins calcium citrate chewable, Bariatric Fusion calcium citrate chewable  - Vitamin D - at least 3,000 international units/day between all supplements  - Vitamin B12: sublingual form of at least 500 mcg daily or injection of 1000 mcg monthly     Clear Liquid Diet (stage 1): 7/25-7/26  Low-Fat Full Liquid Diet (stage 2): 7/27-8/10  Pureed Diet (stage 3): 8/11-8/25  Soft Diet (stage 4): 8/26-9/23  Regular Diet (stage 5): 9/24    Post-op Diet Handouts:  Diet Guidelines after Weight-loss Surgery  [email or web address]     Your Stage 4 Diet: Soft Foods  [email or web address]    Your Stage 5 Diet: Regular Foods  [email or web address]    Supplements after Sleeve Gastrectomy, Gastric Bypass or Single Anastomosis Duodenal Switch  [email or web address]    Keeping Track of Fluids  [email or web address]    Exercises after Weight Loss Surgery (strengthening, when no weight lifting restrictions)  [email or web address]         Follow-Up: 3 months post op/prn    Time spent with patient: 10  minutes.  MARCIE GLOVER RD LD

## 2022-10-09 ENCOUNTER — HEALTH MAINTENANCE LETTER (OUTPATIENT)
Age: 22
End: 2022-10-09

## 2022-10-27 ENCOUNTER — VIRTUAL VISIT (OUTPATIENT)
Dept: ENDOCRINOLOGY | Facility: CLINIC | Age: 22
End: 2022-10-27
Payer: COMMERCIAL

## 2022-10-27 DIAGNOSIS — Z71.3 NUTRITIONAL COUNSELING: Primary | ICD-10-CM

## 2022-10-27 DIAGNOSIS — E66.9 OBESITY: ICD-10-CM

## 2022-10-27 DIAGNOSIS — Z98.84 S/P LAPAROSCOPIC SLEEVE GASTRECTOMY: ICD-10-CM

## 2022-10-27 DIAGNOSIS — E11.9 TYPE 2 DIABETES MELLITUS WITHOUT COMPLICATION, WITHOUT LONG-TERM CURRENT USE OF INSULIN (H): ICD-10-CM

## 2022-10-27 PROCEDURE — 97803 MED NUTRITION INDIV SUBSEQ: CPT | Mod: GT | Performed by: DIETITIAN, REGISTERED

## 2022-10-27 NOTE — LETTER
"10/27/2022       RE: Sasha Hand  [address]  [facility] [address]     Dear Colleague,    Thank you for referring your patient, Sasha Hand, to the [facility]NAG[facility]INIC [place] [facility]. Please see a copy of my visit note below.    Sasha Hand is a 22 year old female who is being evaluated via a billable video visit.      The patient has been notified of following:     \"This video visit will be conducted via a call between you and your physician/provider. We have found that certain health care needs can be provided without the need for an in-person physical exam.  This service lets us provide the care you need with a video conversation.  If a prescription is necessary we can send it directly to your pharmacy.  If lab work is needed we can place an order for that and you can then stop by our lab to have the test done at a later time.    Video visits are billed at different rates depending on your insurance coverage.  Please reach out to your insurance provider with any questions.    If during the course of the call the physician/provider feels a video visit is not appropriate, you will not be charged for this service.\"    Patient has given verbal consent for Video visit? Yes  How would you like to obtain your AVS? MyChart  If you are dropped from the video visit, the video invite should be resent to: Text to cell phone: [phone]  Will anyone else be joining your video visit? No  {If patient encounters technical issues they should call [phone]      Video-Visit Details    Type of service:  Video Visit    Video Start Time: 9:45 AM  Video End Time: 10:01 AM    Originating Location (pt. Location): Home    Distant Location (provider location):   Offsite (providers home)     Platform used for Video Visit: [employer]    During this virtual visit the patient is located in [place], patient verifies this as the location during the entirety of " this visit.       Nutrition Reassessment  Reason For Visit:  Sasha Hand is a 22 year old female presenting today for nutrition follow-up, 3 month s/p 7/26/22.    Anthropometrics:  Initial Consult Weight: 365 lbs  Day of Surgery Weight (7/26/22): 329 lbs  Current Weight: 289 lbs  Weight loss: -76 lbs from initial consult; -40 lbs from day of surgery    Current Vitamins/Minerals: MVI Women's One A Day, calcium + vitamin D TID, B complex, B12    Nutrition History:  Pt recently struggling with using food as a comfort d/t a death in the family.   Does not tolerate spicy foods.  3/4-1 cup of food per meal.  Has been cooking and meal prepping with boyfriend.    Progress with Previous Goals:  1) Continue bariatric regular diet, as tolerated met, continues  2) Consume 60+ grams of protein/day. Met, continues  3) Sip on 48-64+ oz of fluids/day- between meals only. Met, continues  4) Eat slowly (>20 min/meal), chewing foods well (to applesauce-like consistency). Met, continues  5) Limit portions to ~1/2 until 3 months post op  Met, continues  6) Take the following after a Sleeve Gastrectomy: met, continues  - Multivitamin/minerals: adult dose 1-2 times daily  Ideally want one that provides:   5,000 - 10,000 international units vitamin A daily   800 mg oral folate daily  8 - 22 mg zinc and 1 - 2 mg copper daily  12 mg Thiamine  - Iron: 45-60 mg elemental (18-36 mg if low risk) - may partly or fully be covered in multivitamin   - Calcium Citrate containing vitamin D: 500 mg 3 times daily or 600 mg 2 times daily     - Separate the calcium from your multivitamin or iron by at least 2 hours.     - Must be a chewable calcium citrate until post-op 3 months     - Options for calcium citrate: Dong calcium citrate chewable, bariatric advantage calcium citrate chewable, Celebrate vitamins calcium citrate chewable, Bariatric Fusion calcium citrate chewable  - Vitamin D - at least 3,000 international units/day between all supplements  -  Vitamin B12: sublingual form of at least 500 mcg daily or injection of 1000 mcg monthly     Nutrition Prescription:  Grams Protein: 60 (minimum)   Amount of Fluid: 48-64+ oz    Nutrition Diagnosis  Previous: Food and nutrition-related knowledge deficit r/t lack of prior exposure to diet advancements beyond bariatric pureed diet aeb recent bariatric surgery and pt interest in diet education/review    Current: Food and nutrition-related knowledge deficit r/t lack of prior exposure to diet instruction beyond 3 months s/p SG as evidenced by recent bariatric surgery and pt interest in diet education/review      Intervention  Materials/Education provided, reviewed bariatric regular consistency diet, protein intake, fluid intake, eating pace, chewing foods well, portion control, sugar/fat intake, recommended vitamin/mineral supplements. Patient demonstrates understanding.     Expected Engagement: good     Goals:  1) Follow bariatric regular diet.   2) Consume 60+ grams of protein/day.  3) Sip on 48-64+ oz of fluids/day- between meals only.  4) Eat slowly (>20 min/meal), chewing foods well (to applesauce-like consistency).  5) Limit portions to ~1/2 - 3/4 cup/meal until 6 months post op.  6) Take the following after a Sleeve Gastrectomy:  - Multivitamin/minerals: adult dose 1-2 times daily  Ideally want one that provides:   5,000 - 10,000 international units vitamin A daily   800 mg oral folate daily  8 - 22 mg zinc and 1 - 2 mg copper daily  12 mg Thiamine  - Iron: 45-60 mg elemental (18-36 mg if low risk) - may partly or fully be covered in multivitamin   - Calcium Citrate containing vitamin D: 500 mg 3 times daily or 600 mg 2 times daily     - Separate the calcium from your multivitamin or iron by at least 2 hours.     - Must be a chewable calcium citrate until post-op 3 months     - Options for calcium citrate: Dong calcium citrate chewable, bariatric advantage calcium citrate chewable, Celebrate vitamins calcium citrate  chewable, Bariatric Fusion calcium citrate chewable  - Vitamin D - at least 3,000 international units/day between all supplements  - Vitamin B12: sublingual form of at least 500 mcg daily or injection of 1000 mcg monthly     Your Stage 5 Diet: Regular Foods  [email or web address]     Keeping Up Your Diet after Weight Loss Surgery  [email or web address]    Exercises after Weight Loss Surgery (strengthening, when no weight lifting restrictions)  [email or web address]    Why Take Supplements for Life after Weight Loss Surgery  [email or web address]      Preventing Low Blood Sugar after Weight Loss Surgery  [email or web address]      Preventing Dumping Syndrome after Weight Loss Surgery  [email or web address]    Supplements after Sleeve Gastrectomy, Gastric Bypass or Single Anastomosis Duodenal Switch  [email or web address]       Follow-Up: 6 months post op/prn    Time spent with patient: 16 minutes.  MARCIE GLOVER RD LD

## 2022-10-27 NOTE — PROGRESS NOTES
"Sasha Hand is a 22 year old female who is being evaluated via a billable video visit.      The patient has been notified of following:     \"This video visit will be conducted via a call between you and your physician/provider. We have found that certain health care needs can be provided without the need for an in-person physical exam.  This service lets us provide the care you need with a video conversation.  If a prescription is necessary we can send it directly to your pharmacy.  If lab work is needed we can place an order for that and you can then stop by our lab to have the test done at a later time.    Video visits are billed at different rates depending on your insurance coverage.  Please reach out to your insurance provider with any questions.    If during the course of the call the physician/provider feels a video visit is not appropriate, you will not be charged for this service.\"    Patient has given verbal consent for Video visit? Yes  How would you like to obtain your AVS? MyChart  If you are dropped from the video visit, the video invite should be resent to: Text to cell phone: [phone]  Will anyone else be joining your video visit? No  {If patient encounters technical issues they should call [phone]      Video-Visit Details    Type of service:  Video Visit    Video Start Time: 9:45 AM  Video End Time: 10:01 AM    Originating Location (pt. Location): Home    Distant Location (provider location):   Offsite (providers home)     Platform used for Video Visit: [employer]    During this virtual visit the patient is located in [place], patient verifies this as the location during the entirety of this visit.       Nutrition Reassessment  Reason For Visit:  Sasha Hand is a 22 year old female presenting today for nutrition follow-up, 3 month s/p 7/26/22.    Anthropometrics:  Initial Consult Weight: 365 lbs  Day of Surgery Weight (7/26/22): 329 lbs  Current Weight: 289 lbs  Weight loss: -76 lbs from initial " consult; -40 lbs from day of surgery    Current Vitamins/Minerals: MVI Women's One A Day, calcium + vitamin D TID, B complex, B12    Nutrition History:  Pt recently struggling with using food as a comfort d/t a death in the family.   Does not tolerate spicy foods.  3/4-1 cup of food per meal.  Has been cooking and meal prepping with boyfriend.    Progress with Previous Goals:  1) Continue bariatric regular diet, as tolerated met, continues  2) Consume 60+ grams of protein/day. Met, continues  3) Sip on 48-64+ oz of fluids/day- between meals only. Met, continues  4) Eat slowly (>20 min/meal), chewing foods well (to applesauce-like consistency). Met, continues  5) Limit portions to ~1/2 until 3 months post op  Met, continues  6) Take the following after a Sleeve Gastrectomy: met, continues  - Multivitamin/minerals: adult dose 1-2 times daily  Ideally want one that provides:   5,000 - 10,000 international units vitamin A daily   800 mg oral folate daily  8 - 22 mg zinc and 1 - 2 mg copper daily  12 mg Thiamine  - Iron: 45-60 mg elemental (18-36 mg if low risk) - may partly or fully be covered in multivitamin   - Calcium Citrate containing vitamin D: 500 mg 3 times daily or 600 mg 2 times daily     - Separate the calcium from your multivitamin or iron by at least 2 hours.     - Must be a chewable calcium citrate until post-op 3 months     - Options for calcium citrate: Dong calcium citrate chewable, bariatric advantage calcium citrate chewable, Celebrate vitamins calcium citrate chewable, Bariatric Fusion calcium citrate chewable  - Vitamin D - at least 3,000 international units/day between all supplements  - Vitamin B12: sublingual form of at least 500 mcg daily or injection of 1000 mcg monthly     Nutrition Prescription:  Grams Protein: 60 (minimum)   Amount of Fluid: 48-64+ oz    Nutrition Diagnosis  Previous: Food and nutrition-related knowledge deficit r/t lack of prior exposure to diet advancements beyond bariatric  pureed diet aeb recent bariatric surgery and pt interest in diet education/review    Current: Food and nutrition-related knowledge deficit r/t lack of prior exposure to diet instruction beyond 3 months s/p SG as evidenced by recent bariatric surgery and pt interest in diet education/review      Intervention  Materials/Education provided, reviewed bariatric regular consistency diet, protein intake, fluid intake, eating pace, chewing foods well, portion control, sugar/fat intake, recommended vitamin/mineral supplements. Patient demonstrates understanding.     Expected Engagement: good     Goals:  1) Follow bariatric regular diet.   2) Consume 60+ grams of protein/day.  3) Sip on 48-64+ oz of fluids/day- between meals only.  4) Eat slowly (>20 min/meal), chewing foods well (to applesauce-like consistency).  5) Limit portions to ~1/2 - 3/4 cup/meal until 6 months post op.  6) Take the following after a Sleeve Gastrectomy:  - Multivitamin/minerals: adult dose 1-2 times daily  Ideally want one that provides:   5,000 - 10,000 international units vitamin A daily   800 mg oral folate daily  8 - 22 mg zinc and 1 - 2 mg copper daily  12 mg Thiamine  - Iron: 45-60 mg elemental (18-36 mg if low risk) - may partly or fully be covered in multivitamin   - Calcium Citrate containing vitamin D: 500 mg 3 times daily or 600 mg 2 times daily     - Separate the calcium from your multivitamin or iron by at least 2 hours.     - Must be a chewable calcium citrate until post-op 3 months     - Options for calcium citrate: Dong calcium citrate chewable, bariatric advantage calcium citrate chewable, Celebrate vitamins calcium citrate chewable, Bariatric Fusion calcium citrate chewable  - Vitamin D - at least 3,000 international units/day between all supplements  - Vitamin B12: sublingual form of at least 500 mcg daily or injection of 1000 mcg monthly     Your Stage 5 Diet: Regular Foods  [email or web address]     Keeping Up Your Diet  after Weight Loss Surgery  [email or web address]    Exercises after Weight Loss Surgery (strengthening, when no weight lifting restrictions)  [email or web address]    Why Take Supplements for Life after Weight Loss Surgery  [email or web address]      Preventing Low Blood Sugar after Weight Loss Surgery  [email or web address]      Preventing Dumping Syndrome after Weight Loss Surgery  [email or web address]    Supplements after Sleeve Gastrectomy, Gastric Bypass or Single Anastomosis Duodenal Switch  [email or web address]       Follow-Up: 6 months post op/prn    Time spent with patient: 16 minutes.  MARCIE GLOVER RD LD

## 2022-10-27 NOTE — PATIENT INSTRUCTIONS
Harjit Baez!    Follow-up with RD in 3 months    Thank you,    Kaleigh Curran, RD, LD  If you would like to schedule or reschedule an appointment with the RD, please call [phone]    Nutrition Goals  1) Follow bariatric regular diet.   2) Consume 60+ grams of protein/day.  3) Sip on 48-64+ oz of fluids/day- between meals only.  4) Eat slowly (>20 min/meal), chewing foods well (to applesauce-like consistency).  5) Limit portions to ~1/2 - 3/4 cup/meal until 6 months post op.  6) Take the following after a Sleeve Gastrectomy:  - Multivitamin/minerals: adult dose 1-2 times daily  Ideally want one that provides:   5,000 - 10,000 international units vitamin A daily   800 mg oral folate daily  8 - 22 mg zinc and 1 - 2 mg copper daily  12 mg Thiamine  - Iron: 45-60 mg elemental (18-36 mg if low risk) - may partly or fully be covered in multivitamin   - Calcium Citrate containing vitamin D: 500 mg 3 times daily or 600 mg 2 times daily     - Separate the calcium from your multivitamin or iron by at least 2 hours.     - Must be a chewable calcium citrate until post-op 3 months     - Options for calcium citrate: Dong calcium citrate chewable, bariatric advantage calcium citrate chewable, Celebrate vitamins calcium citrate chewable, Bariatric Fusion calcium citrate chewable  - Vitamin D - at least 3,000 international units/day between all supplements  - Vitamin B12: sublingual form of at least 500 mcg daily or injection of 1000 mcg monthly     Your Stage 5 Diet: Regular Foods  [email or web address]     Keeping Up Your Diet after Weight Loss Surgery  [email or web address]    Exercises after Weight Loss Surgery (strengthening, when no weight lifting restrictions)  [email or web address]    Why Take Supplements for Life after Weight Loss Surgery  [email or web address]      Preventing Low Blood Sugar after Weight Loss Surgery  [email or web address]      Preventing Dumping Syndrome after  Weight Loss Surgery  [email or web address]    Supplements after Sleeve Gastrectomy, Gastric Bypass or Single Anastomosis Duodenal Switch  [email or web address]    Interested in working with a health ? Health coaches work with you to improve your overall health and wellbeing. They look at the whole person, and may involve discussion of different areas of life, including, but not limited to the four pillars of health (sleep, exercise, nutrition, and stress management). Discuss with your care team if you would like to start working a health .    Health Coaching-3 Pack:    $99 for three health coaching visits    Visits may be done in person or via phone    Coaching is a partnership between the  and the client; Coaches do not prescribe or diagnose    Coaching helps inspire the client to reach his/her personal goals    COMPREHENSIVE WEIGHT MANAGEMENT PROGRAM  VIRTUAL SUPPORT GROUPS    For Support Group Information:      We offer support groups for patients who are working on weight loss and considering, preparing for or have had weight loss surgery.   There is no cost for this opportunity.  You are invited to attend the?Virtual Support Groups?provided by any of the following locations:    [facility] via [employer] Teams with Sangita Eastman RN  2.   [place] via [employer] with Bryce Raymond, PhD, LP  3.   [place] via [employer] with Tari Blue RN  4.   [facility] via [employer] Teams with Tari Schultz [facility]    The following Support Group information can also be found on our website:  [email or web address]      [facility] Weight Loss Surgery Support Group    [facility] Weight Loss Surgery Support Group  The support group is a patient-lead forum that meets monthly to share experiences, encouragement and education. It is open to those who have had weight loss surgery, are scheduled for  "surgery, and those who are considering surgery.   WHEN: This group meets on the 3rd Wednesday of each month from 5:00PM - 6:00PM virtually using [employer] Teams.   FACILITATOR: Led by Sangita Eastman, NIKITA, LD, RN, the program's Clinical Coordinator.   TO REGISTER: Please contact the clinic via [employer] or call the nurse line directly at [phone] to inform our staff that you would like an invite sent to you and to let us know the email you would like the invite sent to. Prior to the meeting, a link with directions on how to join the meeting will be sent to you.    2022 Meetings  January 19: \"Let's Talk\" a time for the group to share.  February 16: \"Let's Talk\" a time for the group to share.  March 16: Guest Speakers: Psychologists, Katherine Pearson, PhD,LP and Evelia Marte PsyD,  April 20: Guest Speaker: Health , Nanette Aflaro, [facility],CHES, [facility]  May 18: Guest Speaker: DietitianMarvel, NIKITA, LP  Bhumika 15: \"Let's Talk\" a time for the group to share.  July 20: \"Let's Talk\" a time for the group to share.  August 17: TBA  September 21: TBA  October 19: Guest Speaker: Dr Ang Turner MD Pulmonologist and Sleep Medicine Physician, \"Getting a Good Night's Sleep\".  November 16: TBA  December 21: TBA    [facility] Support Groups    Connections: Bariatric Care Support Group?  This is open to all [facility] (and those external to this program) pre- and post- operative bariatric surgery patients as well as their support system.   WHEN: This group meets the 2nd Tuesday of each month from 6:30 PM - 8:00 PM virtually using Microsoft Teams.   FACILITATOR: Led by Bryce Raymond, Ph.D who is a Licensed Psychologist with the [facility] Comprehensive Weight Management Program.   TO REGISTER: Please send an email to Bryce Raymond, Ph.D., LP at?[email or web address]?if you would like an invitation to the group and to learn about using Microsoft Teams.    2022 " "Meetings  January 11: Kelsey Menard, PharmD, Pharmacy Resident at [facility], \"Medications and Bariatric Surgery\".  February 8: Open Forum  March 8  April 12  May 10  Bhumika 14    Connections: Post-Operative Bariatric Surgery Support Group  This is a support group for [facility] bariatric patients (and those external to [facility]) who have had bariatric surgery and are at least 3 months post-surgery.  WHEN: This support group meets the 4th Wednesday of the month from 11:00 AM - 12:00 PM virtually using Microsoft Teams.   FACILITATOR: Led by Certified Bariatric Nurse, Tari Blue RN.   TO REGISTER: Please send an email to Tari at [email or web address] if you would like an invitation to the group and to learn about using Microsoft Teams.    2022 Meetings  January 26  February 23  March 23  April 27  May 25  Bhumika 22    [facility] Healthy Lifestyle Virtual Support Group    Healthy Lifestyle Virtual Support Group?  This is 60 minutes of small group guided discussion, support and resources. All are welcome who want a healthy lifestyle.  WHEN: This group meets monthly on a Friday from 12:30 PM - 1:30 PM virtually using Microsoft Teams.   FACILITATOR: Led by National Board Certified Health and , Tari Schultz [facility].   TO REGISTER: Please send an email to Tari [email or web address] to receive monthly invites to the group or if you have any questions about having a health .  Prior to the meeting, a link with directions on how to join the meeting will be sent to you.    2022 Meetings  January 21: Lauren Lara MS, RN, CIC, CBN, \"Healthy Habits\"  February 25: Open Forum  March 18: \"Setting Limits and Boundaries\"  April 29: Hilda Murguia RD, \"Meal Planning Made Easy\"  May 20: Open Forum  June: To be determined                "

## 2022-11-02 ENCOUNTER — E-VISIT (OUTPATIENT)
Dept: FAMILY MEDICINE | Facility: CLINIC | Age: 22
End: 2022-11-02
Payer: COMMERCIAL

## 2022-11-02 DIAGNOSIS — H00.019 HORDEOLUM EXTERNUM, UNSPECIFIED LATERALITY: Primary | ICD-10-CM

## 2022-11-02 PROCEDURE — 99422 OL DIG E/M SVC 11-20 MIN: CPT | Performed by: FAMILY MEDICINE

## 2022-11-03 ENCOUNTER — LAB (OUTPATIENT)
Dept: LAB | Facility: OTHER | Age: 22
End: 2022-11-03
Payer: COMMERCIAL

## 2022-11-03 DIAGNOSIS — Z98.84 S/P LAPAROSCOPIC SLEEVE GASTRECTOMY: ICD-10-CM

## 2022-11-03 PROBLEM — H00.019 HORDEOLUM EXTERNUM, UNSPECIFIED LATERALITY: Status: ACTIVE | Noted: 2022-11-03

## 2022-11-03 LAB
ALBUMIN SERPL-MCNC: 3.7 G/DL (ref 3.4–5)
ALP SERPL-CCNC: 81 U/L (ref 40–150)
ALT SERPL W P-5'-P-CCNC: 23 U/L (ref 0–50)
ANION GAP SERPL CALCULATED.3IONS-SCNC: 5 MMOL/L (ref 3–14)
AST SERPL W P-5'-P-CCNC: 13 U/L (ref 0–45)
BILIRUB SERPL-MCNC: 0.5 MG/DL (ref 0.2–1.3)
BUN SERPL-MCNC: 10 MG/DL (ref 7–30)
CALCIUM SERPL-MCNC: 9.1 MG/DL (ref 8.5–10.1)
CHLORIDE BLD-SCNC: 107 MMOL/L (ref 94–109)
CHOLEST SERPL-MCNC: 133 MG/DL
CO2 SERPL-SCNC: 28 MMOL/L (ref 20–32)
CREAT SERPL-MCNC: 0.8 MG/DL (ref 0.52–1.04)
DEPRECATED CALCIDIOL+CALCIFEROL SERPL-MC: 52 UG/L (ref 20–75)
ERYTHROCYTE [DISTWIDTH] IN BLOOD BY AUTOMATED COUNT: 12.8 % (ref 10–15)
FASTING STATUS PATIENT QL REPORTED: YES
FERRITIN SERPL-MCNC: 98 NG/ML (ref 12–150)
GFR SERPL CREATININE-BSD FRML MDRD: >90 ML/MIN/1.73M2
GLUCOSE BLD-MCNC: 97 MG/DL (ref 70–99)
HBA1C MFR BLD: 5.1 % (ref 0–5.6)
HCT VFR BLD AUTO: 42.8 % (ref 35–47)
HDLC SERPL-MCNC: 51 MG/DL
HGB BLD-MCNC: 14.3 G/DL (ref 11.7–15.7)
LDLC SERPL CALC-MCNC: 64 MG/DL
MCH RBC QN AUTO: 28.6 PG (ref 26.5–33)
MCHC RBC AUTO-ENTMCNC: 33.4 G/DL (ref 31.5–36.5)
MCV RBC AUTO: 86 FL (ref 78–100)
NONHDLC SERPL-MCNC: 82 MG/DL
PLATELET # BLD AUTO: 286 10E3/UL (ref 150–450)
POTASSIUM BLD-SCNC: 4.2 MMOL/L (ref 3.4–5.3)
PROT SERPL-MCNC: 6.9 G/DL (ref 6.8–8.8)
PTH-INTACT SERPL-MCNC: 31 PG/ML (ref 15–65)
RBC # BLD AUTO: 5 10E6/UL (ref 3.8–5.2)
SODIUM SERPL-SCNC: 140 MMOL/L (ref 133–144)
TRIGL SERPL-MCNC: 88 MG/DL
VIT B12 SERPL-MCNC: 650 PG/ML (ref 232–1245)
WBC # BLD AUTO: 7.6 10E3/UL (ref 4–11)

## 2022-11-03 PROCEDURE — 99000 SPECIMEN HANDLING OFFICE-LAB: CPT

## 2022-11-03 PROCEDURE — 80061 LIPID PANEL: CPT

## 2022-11-03 PROCEDURE — 83970 ASSAY OF PARATHORMONE: CPT

## 2022-11-03 PROCEDURE — 82728 ASSAY OF FERRITIN: CPT

## 2022-11-03 PROCEDURE — 80053 COMPREHEN METABOLIC PANEL: CPT

## 2022-11-03 PROCEDURE — 84590 ASSAY OF VITAMIN A: CPT | Mod: 90

## 2022-11-03 PROCEDURE — 82607 VITAMIN B-12: CPT

## 2022-11-03 PROCEDURE — 82306 VITAMIN D 25 HYDROXY: CPT

## 2022-11-03 PROCEDURE — 85027 COMPLETE CBC AUTOMATED: CPT

## 2022-11-03 PROCEDURE — 36415 COLL VENOUS BLD VENIPUNCTURE: CPT

## 2022-11-03 PROCEDURE — 83036 HEMOGLOBIN GLYCOSYLATED A1C: CPT

## 2022-11-03 RX ORDER — POLYMYXIN B SULFATE AND TRIMETHOPRIM 1; 10000 MG/ML; [USP'U]/ML
SOLUTION OPHTHALMIC
Qty: 10 ML | Refills: 1 | Status: SHIPPED | OUTPATIENT
Start: 2022-11-03 | End: 2023-02-01

## 2022-11-06 LAB
ANNOTATION COMMENT IMP: NORMAL
RETINYL PALMITATE SERPL-MCNC: <0.02 MG/L
VIT A SERPL-MCNC: 0.49 MG/L

## 2022-11-27 ENCOUNTER — E-VISIT (OUTPATIENT)
Dept: URGENT CARE | Facility: CLINIC | Age: 22
End: 2022-11-27
Payer: COMMERCIAL

## 2022-11-27 DIAGNOSIS — R21 RASH: Primary | ICD-10-CM

## 2022-11-27 PROCEDURE — 99207 PR NON-BILLABLE SERV PER CHARTING: CPT | Performed by: NURSE PRACTITIONER

## 2022-11-28 NOTE — PATIENT INSTRUCTIONS
Dear Sasha Hand?     After reviewing your responses, I am unable to make a diagnosis that can be treated online.    You will not be charged for this eVisit.     We are dedicated to helping you achieve your best health and would like to see you in one of our many clinic locations - a primary care provider would be ideal for your concern.    Please use [email or web address] to schedule a visit with a provider or call [phone] ([phone]) to schedule at any of our locations.    Thanks for choosing?us?as your health care partner,?   ?   Tori Xavier NP?

## 2022-12-01 ENCOUNTER — VIRTUAL VISIT (OUTPATIENT)
Dept: ONCOLOGY | Facility: CLINIC | Age: 22
End: 2022-12-01
Attending: GENETIC COUNSELOR, MS
Payer: COMMERCIAL

## 2022-12-01 DIAGNOSIS — Z80.49 FAMILY HISTORY OF CERVICAL CANCER: ICD-10-CM

## 2022-12-01 DIAGNOSIS — Z80.8 FAMILY HISTORY OF MALIGNANT MELANOMA: ICD-10-CM

## 2022-12-01 DIAGNOSIS — Z84.89 FAMILY HISTORY OF BENIGN NEOPLASM OF PARATHYROID GLAND: ICD-10-CM

## 2022-12-01 DIAGNOSIS — Z80.3 FAMILY HISTORY OF MALIGNANT NEOPLASM OF BREAST: ICD-10-CM

## 2022-12-01 DIAGNOSIS — Z80.42 FAMILY HISTORY OF PROSTATE CANCER: ICD-10-CM

## 2022-12-01 DIAGNOSIS — Z80.0 FAMILY HISTORY OF COLON CANCER: ICD-10-CM

## 2022-12-01 DIAGNOSIS — Z84.81 FAMILY HISTORY OF BRCA2 GENE POSITIVE: Primary | ICD-10-CM

## 2022-12-01 PROCEDURE — 96040 HC GENETIC COUNSELING, EACH 30 MINUTES: CPT | Mod: GT | Performed by: GENETIC COUNSELOR, MS

## 2022-12-01 NOTE — PROGRESS NOTES
Sasha is a 22 year old who is being evaluated via a billable video visit.      How would you like to obtain your AVS? MyChart  If the video visit is dropped, the invitation should be resent by: Text to cell phone: [phone]  Will anyone else be joining your video visit? No    Video-Visit Details  Video Start Time: 11:15am  Type of service:  Video Visit  Video End Time:12:10pm  Originating Location (pt. Location): Home  Distant Location (provider location):  Off-site  Platform used for Video Visit: [employer]     12/2/2022    Referring Provider: Referred Self    Present for Today's Visit: Sasha    Presenting Information:   I met with Sasha Hand today for genetic counseling (video visit due to covid19) to discuss her family history of cancer and the known BRCA2 mutation in her family.  She is here today to review this history, cancer screening recommendations, and available genetic testing options.    Personal History:  Sasha is a 22 year old female. She does not have any personal history of cancer. She is s/p partial gastrectomy in July 2022.       She had her first menstrual period at age 9, she does not currently have children, and is premenopausal. Sasha has her ovaries, fallopian tubes and uterus in place.  She reports past history of oral contraceptive use for about 3 years (currently has a nexplanon) and that she has never been on hormone replacement therapy.      Her most recent OB-GYN exam and Pap smear in 2022 were normal. She has not yet started regular mammogram screening given her age. She has not yet started colonoscopy screening given her age. She does not regularly do any other cancer screening at this time.  Sasha reported past tobacco use (quit in 2021), and very rare alcohol use. 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     Family History: Cancer family history and pertinent information reviewed below (Please see scanned pedigree for detailed family history  information)    Sasha's mother, age 54, was diagnosed with breast earlier this year and underwent a lumpectomy. Sasha reports her mother has tested positive for a BRCA2 mutation. She has undergone a BSO.     Maternal half-sister, age 36, has a history of parathyroid issues and an unspecified vascular problem in the back of her head.     Maternal grandfather's sister passed in her 50's from breast cancer initially diagnosed at age 50 and contralateral breast cancer at age 57.     Another of her maternal grandfather's sisters has a history of breast cancer diagnosed in her 50's.     Father passed at age 60 from metastatic melanoma (unknown primary site; stage 4 at diagnosis) diagnosed at age 59. He also had prostate cancer diagnosed in his 50's. Sasha reports that her father grew up on a farm and was a  (had significant sun exposure) and that her father had a larger head size and had freckles and moles in his groin area.     Paternal half-sister with a history of cervical cancer diagnosed in her 20's.     Sasha's six paternal uncles and one paternal aunt have all had melanomas; one uncle with multiple melanomas beginning in his 40's (he worked construction), one uncle also had prostate cancer in his 60's, one uncle had a blood/lymphoma cancer and is s/p BMT, and her father's fraternal twin brother also had a gastrointestinal tract cancer at age 61.     Paternal grandmother passed in her late 80's with a history of lung cancer (she was a smoker) and colon cancer diagnosed in her 60's.     There is no known Ashkenazi Taoist ancestry on either side of her family. There is no reported consanguinity.    Discussion:    Sasha's family history of breast cancer with a known BRCA2 mutation in her mother as well as her paternal family history of melanoma and other cancers is suggestive of a hereditary cancer syndrome.    We reviewed the features of sporadic, familial, and hereditary cancers. We discussed the natural  history and genetics of hereditary cancers. A detailed handout regarding the information we discussed will be sent to Sasha via [employer]. Topics included: inheritance pattern, cancer risks, cancer screening recommendations, and also risks, benefits and limitations of testing.    We reviewed her mother's positive BRCA2 status. Sasha did not have a copy of her mother's test report for our review today, so I was not able to confirm the mutation identified or the testing completed. I did explain the importance of Sasha passing along that information. She expressed understanding and that she would ask her mother for a copy.   We reviewed that the most common cause of hereditary breast cancer is Hereditary Breast and Ovarian Cancer (HBOC) syndrome, which is caused by mutations in the genes BRCA1 and BRCA2. Women with a mutation in either of these genes are at increased risk for breast and ovarian cancer. There is also an increased risk for a second primary breast cancer for women. Men with a mutation in either BRCA1 or BRCA2 are at increased risk for male breast and prostate cancer. Both women and men may also be at increased risk for pancreatic cancer and melanoma.   We also discussed her paternal family history of melanoma. We discussed the environmental contributions to melanoma as well as hereditary melanoma syndromes including but not limited to Familial Atypical Multiple Mole Melanoma syndrome (FAMMM). Familial Atypical Multiple Mole Melanoma Syndrome (FAMMM) is caused by a single mutation in the CDKN2A gene.  The lifetime risk for melanoma is between 58-92% (PMID 32909344, PMID 05634130). Some studies indicate that these risks may vary, due to different factors. Individuals with FAMMM typically have many moles (more than 50), which can be atypical. People with FAMMM have increased risks for pancreatic cancer, and possibly other cancers. The exact risk of pancreatic cancer can depend on a person s specific CDKN2A  mutation, but has been reported as about 17% by age 75 by one larger study (PMID 41919095). We discussed screening for melanoma (frequent skin exams) and pancreatic cancer (endoscopic ultrasonography (EUS) and/or MRI/magnetic resonance).     Based on her family history, Sasha meets current National Comprehensive Cancer Network (NCCN) criteria for genetic testing of the familial BRCA2 mutation as well as melanoma predisposition genes.      We discussed that there are additional genes that could cause increased risk for breast cancer, melanoma, and related cancers. As many of these genes present with overlapping features in a family and accurate cancer risk cannot always be established based upon the pedigree analysis alone, it would be reasonable for Sasha to consider panel genetic testing to analyze multiple genes at once.    We reviewed genetic testing options given Sasha's family history including targeted and expanded options. After our discussion, Sasha opted to proceed with genetic testing via BRCA1/2 analysis and Melanoma panel with automatic reflex to the Multi-Cancer panel through [facility].   The [facility] Multi-Cancer Panel analyzes 84 genes associated with an increased risk for cancer: AIP, ALK, APC, SHEA, AXIN2, BAP1, BARD1, BLM, BMPR1A, BRCA1, BRCA2, BRIP1, CASR,CDC73, CDH1, CDK4, CDKN1B, CDKN1C, CDKN2A, CEBPA, CHEK2, CTNNA1, DICER1,DIS3L2, EGFR, EPCAM, FH, FLCN, GATA2, GPC3, GREM1, HOXB13, HRAS, KIT, MAX,MEN1, MET, MITF, MLH1, MSH2, MSH3, MSH6, MUTYH, NBN, NF1, NF2, NTHL1,PALB2, PDGFRA, PHOX2B, PMS2, POLD1, POLE, POT1, DOOIJ3W, PTCH1, PTEN, RAD50,RAD51C, RAD51D, RB1, RECQL4, RET, RUNX1, SDHA, SDHAF2, SDHB, SDHC, SDHD,SMAD4, SMARCA4, SMARCB1, SMARCE1, STK11, SUFU, TERC, TERT, [id number], TP53,TSC1, TSC2, VHL, WRN, WT1.  This panel includes genes associated with hereditary cancers across multiple major organ systems, including:  breast and gynecologic (breast, ovarian, uterine)  gastrointestinal (colorectal,  gastric, pancreatic)  endocrine (thyroid, paraganglioma/pheochromocytoma, parathyroid, pituitary)  genitourinary (renal/urinary tract, prostate)  skin (melanoma, basal cell carcinoma)  brain/nervous system   sarcoma  hematologic (myelodysplastic syndrome/leukemia)  Individuals who are found to carry a pathogenic variant in one of these genes have an increased risk of developing certain cancers/tumors.  Identifying those at elevated risk may guide implementation of additional screening, surveillance and interventions.      Consent was obtained over video with no witness required due to the current covid19 global pandemic.    Medical Management: For Sasha, we reviewed that the information from genetic testing may determine:    additional cancer screening for which Sasha may qualify (i.e. mammogram and breast MRI, more frequent colonoscopies, more frequent dermatologic exams, etc.),    options for risk reducing surgeries Sasha could consider (i.e. bilateral mastectomy, surgery to remove her ovaries and/or uterus, etc.),      and targeted chemotherapies for Sasha if she were to develop certain cancers in the future (i.e. immunotherapy for individuals with Butt syndrome, PARP inhibitors, etc.).     These recommendations will be discussed in detail once genetic testing is completed.     Sasha opted to have a salivia kit sent to her home to complete the genetic testing. A kit was ordered from [employer] and a saliva collection kit will be sent to Sasha's home address. I confirmed with Sasha that the address we have on file is her current and correct address. We discussed some of the limitations of completing genetic testing via saliva and Sasha was instructed to follow the instruction provided in the kit to ensure the best possibility of success for saliva genetic testing.         Plan:  1) Today Sasha elected to proceed with BRCA1/2 analysis and Melanoma Panel with automatic reflex to Multi-Cancer panel genetic testing (84 genes)  through [facility].  2) This information should be available in approximately 3 weeks.  3) Sasha will be contacted by our scheduling department to set up a result disclosure appointment.     Anjana العراقي MS, CGC  Licensed, Certified Genetic Counselor  [facility]  [email or web address]

## 2022-12-01 NOTE — LETTER
12/1/2022         RE: Sasha Hand  [address]  [place] [address]        Dear Colleague,    Thank you for referring your patient, Sasha Hand, to the [facility]. Please see a copy of my visit note below.    12/2/2022    Referring Provider: Referred Self    Present for Today's Visit: Sasha    Presenting Information:   I met with Sasha Hand today for genetic counseling (video visit due to covid19) to discuss her family history of cancer and the known BRCA2 mutation in her family.  She is here today to review this history, cancer screening recommendations, and available genetic testing options.    Personal History:  Sasha is a 22 year old female. She does not have any personal history of cancer. She is s/p partial gastrectomy in July 2022.       She had her first menstrual period at age 9, she does not currently have children, and is premenopausal. Sasha has her ovaries, fallopian tubes and uterus in place.  She reports past history of oral contraceptive use for about 3 years (currently has a nexplanon) and that she has never been on hormone replacement therapy.      Her most recent OB-GYN exam and Pap smear in 2022 were normal. She has not yet started regular mammogram screening given her age. She has not yet started colonoscopy screening given her age. She does not regularly do any other cancer screening at this time.  Sasha reported past tobacco use (quit in 2021), and very rare alcohol use. 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     Family History: Cancer family history and pertinent information reviewed below (Please see scanned pedigree for detailed family history information)    Sasha's mother, age 54, was diagnosed with breast earlier this year and underwent a lumpectomy. Sasha reports her mother has tested positive for a BRCA2 mutation. She has undergone a BSO.     Maternal half-sister, age 36, has a history of parathyroid issues and an  unspecified vascular problem in the back of her head.     Maternal grandfather's sister passed in her 50's from breast cancer initially diagnosed at age 50 and contralateral breast cancer at age 57.     Another of her maternal grandfather's sisters has a history of breast cancer diagnosed in her 50's.     Father passed at age 60 from metastatic melanoma (unknown primary site; stage 4 at diagnosis) diagnosed at age 59. He also had prostate cancer diagnosed in his 50's. Sasha reports that her father grew up on a farm and was a  (had significant sun exposure) and that her father had a larger head size and had freckles and moles in his groin area.     Paternal half-sister with a history of cervical cancer diagnosed in her 20's.     Sasha's six paternal uncles and one paternal aunt have all had melanomas; one uncle with multiple melanomas beginning in his 40's (he worked construction), one uncle also had prostate cancer in his 60's, one uncle had a blood/lymphoma cancer and is s/p BMT, and her father's fraternal twin brother also had a gastrointestinal tract cancer at age 61.     Paternal grandmother passed in her late 80's with a history of lung cancer (she was a smoker) and colon cancer diagnosed in her 60's.     There is no known Ashkenazi Methodist ancestry on either side of her family. There is no reported consanguinity.    Discussion:    Sasha's family history of breast cancer with a known BRCA2 mutation in her mother as well as her paternal family history of melanoma and other cancers is suggestive of a hereditary cancer syndrome.    We reviewed the features of sporadic, familial, and hereditary cancers. We discussed the natural history and genetics of hereditary cancers. A detailed handout regarding the information we discussed will be sent to Sasha via [employer]. Topics included: inheritance pattern, cancer risks, cancer screening recommendations, and also risks, benefits and limitations of testing.    We  reviewed her mother's positive BRCA2 status. Sasha did not have a copy of her mother's test report for our review today, so I was not able to confirm the mutation identified or the testing completed. I did explain the importance of Sasha passing along that information. She expressed understanding and that she would ask her mother for a copy.   We reviewed that the most common cause of hereditary breast cancer is Hereditary Breast and Ovarian Cancer (HBOC) syndrome, which is caused by mutations in the genes BRCA1 and BRCA2. Women with a mutation in either of these genes are at increased risk for breast and ovarian cancer. There is also an increased risk for a second primary breast cancer for women. Men with a mutation in either BRCA1 or BRCA2 are at increased risk for male breast and prostate cancer. Both women and men may also be at increased risk for pancreatic cancer and melanoma.   We also discussed her paternal family history of melanoma. We discussed the environmental contributions to melanoma as well as hereditary melanoma syndromes including but not limited to Familial Atypical Multiple Mole Melanoma syndrome (FAMMM). Familial Atypical Multiple Mole Melanoma Syndrome (FAMMM) is caused by a single mutation in the CDKN2A gene.  The lifetime risk for melanoma is between 58-92% (PMID 25406651, PMID 47680415). Some studies indicate that these risks may vary, due to different factors. Individuals with FAMMM typically have many moles (more than 50), which can be atypical. People with FAMMM have increased risks for pancreatic cancer, and possibly other cancers. The exact risk of pancreatic cancer can depend on a person s specific CDKN2A mutation, but has been reported as about 17% by age 75 by one larger study (PMID 61481182). We discussed screening for melanoma (frequent skin exams) and pancreatic cancer (endoscopic ultrasonography (EUS) and/or MRI/magnetic resonance).     Based on her family history, Sasha meets  current National Comprehensive Cancer Network (NCCN) criteria for genetic testing of the familial BRCA2 mutation as well as melanoma predisposition genes.      We discussed that there are additional genes that could cause increased risk for breast cancer, melanoma, and related cancers. As many of these genes present with overlapping features in a family and accurate cancer risk cannot always be established based upon the pedigree analysis alone, it would be reasonable for Sasha to consider panel genetic testing to analyze multiple genes at once.    We reviewed genetic testing options given Sasha's family history including targeted and expanded options. After our discussion, Sasha opted to proceed with genetic testing via BRCA1/2 analysis and Melanoma panel with automatic reflex to the Multi-Cancer panel through [facility].   The [facility] Multi-Cancer Panel analyzes 84 genes associated with an increased risk for cancer: AIP, ALK, APC, SHEA, AXIN2, BAP1, BARD1, BLM, BMPR1A, BRCA1, BRCA2, BRIP1, CASR,CDC73, CDH1, CDK4, CDKN1B, CDKN1C, CDKN2A, CEBPA, CHEK2, CTNNA1, DICER1,DIS3L2, EGFR, EPCAM, FH, FLCN, GATA2, GPC3, GREM1, HOXB13, HRAS, KIT, MAX,MEN1, MET, MITF, MLH1, MSH2, MSH3, MSH6, MUTYH, NBN, NF1, NF2, NTHL1,PALB2, PDGFRA, PHOX2B, PMS2, POLD1, POLE, POT1, HRMMD3M, PTCH1, PTEN, RAD50,RAD51C, RAD51D, RB1, RECQL4, RET, RUNX1, SDHA, SDHAF2, SDHB, SDHC, SDHD,SMAD4, SMARCA4, SMARCB1, SMARCE1, STK11, SUFU, TERC, TERT, ESPO039, TP53,TSC1, TSC2, VHL, WRN, WT1.  This panel includes genes associated with hereditary cancers across multiple major organ systems, including:  breast and gynecologic (breast, ovarian, uterine)  gastrointestinal (colorectal, gastric, pancreatic)  endocrine (thyroid, paraganglioma/pheochromocytoma, parathyroid, pituitary)  genitourinary (renal/urinary tract, prostate)  skin (melanoma, basal cell carcinoma)  brain/nervous system   sarcoma  hematologic (myelodysplastic syndrome/leukemia)  Individuals who are  found to carry a pathogenic variant in one of these genes have an increased risk of developing certain cancers/tumors.  Identifying those at elevated risk may guide implementation of additional screening, surveillance and interventions.      Consent was obtained over video with no witness required due to the current covid19 global pandemic.    Medical Management: For Sasha, we reviewed that the information from genetic testing may determine:    additional cancer screening for which Sasha may qualify (i.e. mammogram and breast MRI, more frequent colonoscopies, more frequent dermatologic exams, etc.),    options for risk reducing surgeries Sasha could consider (i.e. bilateral mastectomy, surgery to remove her ovaries and/or uterus, etc.),      and targeted chemotherapies for Sasha if she were to develop certain cancers in the future (i.e. immunotherapy for individuals with Butt syndrome, PARP inhibitors, etc.).     These recommendations will be discussed in detail once genetic testing is completed.     Sasha opted to have a salivia kit sent to her home to complete the genetic testing. A kit was ordered from [employer] and a saliva collection kit will be sent to Sasha's home address. I confirmed with Sasha that the address we have on file is her current and correct address. We discussed some of the limitations of completing genetic testing via saliva and Sasha was instructed to follow the instruction provided in the kit to ensure the best possibility of success for saliva genetic testing.         Plan:  1) Today Sasha elected to proceed with BRCA1/2 analysis and Melanoma Panel with automatic reflex to Multi-Cancer panel genetic testing (84 genes) through [facility].  2) This information should be available in approximately 3 weeks.  3) Sasha will be contacted by our scheduling department to set up a result disclosure appointment.     Anjana العراقي MS, CGC  Licensed, Certified Genetic Counselor  [facility]  [facility]  [email or web address]

## 2022-12-02 NOTE — PATIENT INSTRUCTIONS
Assessing Cancer Risk  Only about 5-10% of cancers are thought to be due to an inherited cancer susceptibility gene.    These families often have:  Several people with the same or related types of cancer  Cancers diagnosed at a young age (before age 50)  Individuals with more than one primary cancer  Multiple generations of the family affected with cancer    Some people may be candidates for genetic testing of more than one gene.  For these families, genetic testing using a cancer panel may be offered.  These panels will test different genes known to increase the risk for breast, ovarian, uterine, and/or other cancers. All of the genes discussed below have published clinical management guidelines for individuals who are found to carry a mutation. The purpose of this handout is to serve as a brief summary of the genes analyzed by the panels used to inquire about hereditary breast and gynecologic cancer:  SHEA, BRCA1, BRCA2, BRIP1, CDH1, CHEK2, MLH1, MSH2, MSH6, PMS2, EPCAM, PTEN, PALB2, RAD51C, RAD51D, and TP53.  ______________________________________________________________________________  Hereditary Breast and Ovarian Cancer Syndrome   (BRCA1 and BRCA2)  A single mutation in one of the copies of BRCA1 or BRCA2 increases the risk for breast and ovarian cancer, among others.  The risk for pancreatic cancer and melanoma may also be slightly increased in some families.  The chart below shows the chance that someone with a BRCA mutation would develop cancer in his or her lifetime1,2,3,4.        A person s ethnic background is also important to consider, as individuals of Ashkenazi Worship ancestry have a higher chance of having a BRCA gene mutation.  There are three BRCA mutations that occur more frequently in this population.    Butt Syndrome   (MLH1, MSH2, MSH6, PMS2, and EPCAM)  Currently five genes are known to cause Butt Syndrome: MLH1, MSH2, MSH6, PMS2, and EPCAM.  A single mutation in one of the Butt  Syndrome genes increases the risk for colon, endometrial, ovarian, and stomach cancers.  Other cancers that occur less commonly in Butt Syndrome include urinary tract, skin, and brain cancers.  The chart below shows the chance that a person with Butt syndrome would develop cancer in his or her lifetime5.      *Cancer risk varies depending on Butt syndrome gene found    Cowden Syndrome   (PTEN)  Cowden syndrome is a hereditary condition that increases the risk for breast, thyroid, endometrial, colon, and kidney cancer.  Cowden syndrome is caused by a mutation in the PTEN gene.  A single mutation in one of the copies of PTEN causes Cowden syndrome and increases cancer risk.  The chart below shows the chance that someone with a PTEN mutation would develop cancer in their lifetime6,7.  Other benign features seen in some individuals with Cowden syndrome include benign skin lesions (facial papules, keratoses, lipomas), learning disability, autism, thyroid nodules, colon polyps, and larger head size.      *One recent study found breast cancer risk to be increased to 85%    Li-Fraumeni Syndrome   (TP53)  Li-Fraumeni Syndrome (LFS) is a cancer predisposition syndrome caused by a mutation in the TP53 gene. A single mutation in one of the copies of TP53 increases the risk for multiple cancers. Individuals with LFS are at an increased risk for developing cancer at a young age. The lifetime risk for development of a LFS-associated cancer is 50% by age 30 and 90% by age 60.   Core Cancers: Sarcomas, Breast, Brain, Lung, Leukemias/Lymphomas, Adrenocortical carcinomas  Other Cancers: Gastrointestinal, Thyroid, Skin, Genitourinary    Hereditary Diffuse Gastric Cancer   (CDH1)  Currently, one gene is known to cause hereditary diffuse gastric cancer (HDGC): CDH1.  Individuals with HDGC are at increased risk for diffuse gastric cancer and lobular breast cancer. Of people diagnosed with HDGC, 30-50% have a mutation in the CDH1 gene.   This suggests there are likely other genes that may cause HDGC that have not been identified yet.      Lifetime Cancer Risks    General Population HDGC    Diffuse Gastric  <1% ~80%   Breast 12% 39-52%         Additional Genes  SHEA  SHEA is a moderate-risk breast cancer gene. Women who have a mutation in SHEA can have between a 2-4 fold increased risk for breast cancer compared to the general population8. SHEA mutations have also been associated with increased risk for pancreatic cancer, however an estimate of this cancer risk is not well understood9. Individuals who inherit two SHEA mutations have a condition called ataxia-telangiectasia (AT).  This rare autosomal recessive condition affects the nervous system and immune system, and is associated with progressive cerebellar ataxia beginning in childhood.  Individuals with ataxia-telangiectasia often have a weakened immune system and have an increased risk for childhood cancers.    PALB2  Mutations in PALB2 have been shown to increase the risk of breast cancer up to 33-58% in some families; where individuals fall within this risk range is dependent upon family [id number]. PALB2 mutations have also been associated with increased risk for pancreatic cancer, although this risk has not been quantified yet.  Individuals who inherit two PALB2 mutations--one from their mother and one from their father--have a condition called Fanconi Anemia.  This rare autosomal recessive condition is associated with short stature, developmental delay, bone marrow failure, and increased risk for childhood cancers.    CHEK2   CHEK2 is a moderate-risk breast cancer gene.  Women who have a mutation in CHEK2 have around a 2-fold increased risk for breast cancer compared to the general population, and this risk may be higher depending upon family history.11,12,13 Mutations in CHEK2 have also been shown to increase the risk of a number of other cancers, including colon and prostate, however these  cancer risks are currently not well understood.    BRIP1, RAD51C and RAD51D  Mutations in BRIP1, RAD51C, and RAD51D have been shown to increase the risk of ovarian cancer and possibly female breast cancer as well14,15 .       Lifetime Cancer Risk    General Population BRIP1 RAD51C RAD51D   Ovarian 1-2% ~5-8% ~5-9% ~7-15%           Inheritance  All of the cancer syndromes reviewed above are inherited in an autosomal dominant pattern.  This means that if a parent has a mutation, each of his or her children will have a 50% chance of inheriting that same mutation.  Therefore, each child--male or female--would have a 50% chance of being at increased risk for developing cancer.      Image obtained from Genetics Home Reference, 2013     Mutations in some genes can occur de isaias, which means that a person s mutation occurred for the first time in them and was not inherited from a parent.  Now that they have the mutation, however, it can be passed on to future generations.    Genetic Testing  Genetic testing involves a blood test and will look at the genetic information in the SHEA, BRCA1, BRCA2, BRIP1, CDH1, CHEK2, MLH1, MSH2, MSH6, PMS2, EPCAM, PTEN, PALB2, RAD51C, RAD51D, and TP53 genes for any harmful mutations that are associated with increased cancer risk.  If possible, it is recommended that the person(s) who has had cancer be tested before other family members.  That person will give us the most useful information about whether or not a specific gene is associated with the cancer in the family.    Results  There are three possible results of genetic testing:  Positive--a harmful mutation was identified in one or more of the genes  Negative--no mutation was identified in any of the genes on this panel  Variant of unknown significance--a variation in one of the genes was identified, but it is unclear how this impacts cancer risk in the family    Advantages and Disadvantages   There are advantages and disadvantages to  genetic testing.    Advantages  May clarify your cancer risk  Can help you make medical decisions  May explain the cancers in your family  May give useful information to your family members (if you share your results)    Disadvantages  Possible negative emotional impact of learning about inherited cancer risk  Uncertainty in interpreting a negative test result in some situations  Possible genetic discrimination concerns (see below)    Genetic Information Nondiscrimination Act (HARMAN)  HARMAN is a federal law that protects individuals from health insurance or employment discrimination based on a genetic test result alone.  Although rare, there are currently no legal discrimination protections in terms of life insurance, long term care, or disability insurances.  Visit the [employer] [place] website to learn more.    Reducing Cancer Risk  All of the genes described above have nationally recognized cancer screening guidelines that would be recommended for individuals who test positive.  In addition to increased cancer screening, surgeries may be offered or recommended to reduce cancer risk.  Recommendations are based upon an individual s genetic test result as well as their personal and family history of cancer.    Questions to Think About Regarding Genetic Testing:  What effect will the test result have on me and my relationship with my family members if I have an inherited gene mutation?  If I don t have a gene mutation?  Should I share my test results, and how will my family react to this news, which may also affect them?  Are my children ready to learn new information that may one day affect their own health?    Hereditary Cancer Resources    FORCE: Facing Our Risk of Cancer Empowered facingourrisk.org   Bright Pink bebrightpink.org   Li-Fraumeni Syndrome Association lfsassociation.org   PTEN World PTENworld.AB [employer] [place]   No stomach for cancer, Inc. nostomachforcancer.org   Stomach cancer relief network  Scrnet.org   Collaborative Group of the Americas on Inherited Colorectal Cancer (CGA) cgaicc.com    Cancer Care cancercare.org   American Cancer Society (ACS) cancer.org   National Cancer [place] (NCI) cancer.gov     Please call us if you have any questions or concerns.   Cancer Risk Management Program [phone]-[facility]-CANCER ([phone])      References  Aj JUDGE, Guicho PDP, Jennifer S, Fish BANSAL, Fito JE, Paco JL, Pat N, Willie H, Gage O, Tabitha A, Patrizia B, Cristi P, Manclay S, Artemio DM, Tobar N, Irma E, Arabella H, Abhijeet E, Miriam J, Nicolasa J, Magaly B, Tyrese H, Malachi S, Eerola H, Marco Antonio H, Mitzy K, Padilla OP. Average risks of breast and ovarian cancer associated with BRCA1 or BRCA2 mutations detected in case series unselected for family history: a combined analysis of 222 studies. Am J Hum Leana. 2003;72:1117-30.  Zoran N, Carmen M, Ishan G.  BRCA1 and BRCA2 Hereditary Breast and Ovarian Cancer. Gene Reviews online. 2013.  Abad YC, Nico S, Sandra G, Jauregui S. Breast cancer risk among male BRCA1 and BRCA2 mutation carriers. J Natl Cancer Inst. 2007;99:1811-4.  Reinaldo DG, Rima I, Saeed J, Jerel E, Harsh ER, Lalorena F. Risk of breast cancer in male BRCA2 carriers. J Med Leana. 2010;47:710-1.  National Comprehensive Cancer Network. Clinical practice guidelines in oncology, colorectal cancer screening. Available online (registration required). 2015.  Gomez MH, Vilma J, Roseann J, Sebastián LA, Oralejandro MS, Eng C. Lifetime cancer risks in individuals with germline PTEN mutations. Clin Cancer Res. 2012;18:400-7.  Lele ELLIOTT. Cowden Syndrome: A Critical Review of the Clinical Literature. J Leana . 2009:18:13-27.  Regis JUDGE, Armando D, Mikhail S, Arlyn P, Emily T, Costa M, Brian B, Avis SAGE, Rebecca R, Rommel K, Edgardo L, Reinaldo DG, Artemio D, Federico DF, Navjot SHIN, The Breast Cancer Susceptibility Collaboration ([place]) & Bre GAN. SHEA mutations that cause  ataxia-telangiectasia are breast cancer susceptibility alleles. Nature Genetics. 2006;38:873-875  Farrar N , Mara Y, Jenny J, Salena L, Marilyn GM , Justin ML, Jumana S, Osorio AG, Syngal S, Teofilo ML, Rony J , Irma R, Melanie SZ, Marva JR, Loreta VE, Shemar M, Vogelstein B, Erica N, Keara RH, Fransisca KW, and Alton AP. SHEA mutations in patients with hereditary pancreatic cancer. Cancer Discover. 2012;2:41-46  Aj SCALES, et al. Breast-Cancer Risk in Families with Mutations in PALB2. NEJM. 2014; 371(6):497-506.  CHEK2 Breast Cancer Case-Control Consortium. CHEK2*1100delC and susceptibility to breast cancer: A collaborative analysis involving 10,860 breast cancer cases and 9,065 controls from 10 studies. Am J Hum Leana, 74 (2004), pp. 0819-2870  Tylor T, Arpita S, Beryl K, et al. Spectrum of Mutations in BRCA1, BRCA2, CHEK2, and TP53 in Families at High Risk of Breast Cancer. KIMANI. 2006;295(12):2457-4326.   Aiden C, Fadumo D, Delia A, et al. Risk of breast cancer in women with a CHEK2 mutation with and without a family history of breast cancer. J Clin Oncol. 2011;29:6446-6602.  Dale H, Yoel E, Ram SJ, et al. Contribution of germline mutations in the RAD51B, RAD51C, and RAD51D genes to ovarian cancer in the population. J Clin Oncol. 2015;33(26):8510-9300. Doi:10.1200/JCO.2015.61.2408.  Ta T, Zafar DF, Julita P, et al. Mutations in BRIP1 confer high risk of ovarian cancer. Zeenat Leana. 2011;43(11):3828-7559. doi:10.1038/ng.955.          Assessing Cancer Risk  Only about 5-10% of cancers are thought to be due to an inherited cancer susceptibility gene.    These families often have:  Several people with the same or related types of cancer  Cancers diagnosed at a young age (before age 50)  Individuals with more than one primary cancer  Multiple generations of the family affected with cancer    Melanoma Genes  We each inherit two copies of every gene in our bodies: one  from our mother, and one from our father.  Each gene has a specific job to do.  When a gene has a mistake or  mutation  in it, it does not work like it should.  When someone inherits a mutation in a melanoma gene, this increases their risk to develop melanoma above that of the general population risk (about 1.6% lifetime risk).  Inheriting a gene mutation does not guarantee that a person will develop melanoma or other associated cancers, but it does significantly increase his or her risk above that of the general population     Below is a list of genes commonly associated with melanoma. A single mutation in any of these genes increases the risk for melanoma, among other cancers.     BAP1  BAP1-tumor predisposition syndrome (BAP1-TPDS) is caused by mutations in one copy of the BAP1 gene. It is associated with an increased risk for different skin findings such as atypical Spitz tumors, cutaneous (skin) melanoma, and basal cell carcinoma, as well as other cancers, such as uveal (eye) melanoma, malignant mesothelioma, and kidney cancer. The exact lifetime risks for these cancers are unknown at this time. Other suspected cancer risks include breast cancer, cholangiocarcinoma, non-small cell lung cancer, meningioma and neuroendocrine tumors.       BRCA2  Mutations in one copy of the BRCA2 gene result in Hereditary Breast and Ovarian Cancer (HBOC) syndrome. Individuals with HBOC have elevated breast, ovarian, male breast, prostate, pancreatic and melanoma risks. Risks for both cutaneous and ocular melanoma may be elevated in some families with a BRCA2 mutation, but not all.     General Population BRCA2 Mutation   Breast 12% 40-80%   Ovarian 1-2% 10-20%   Male Breast <1% 5-10%   Prostate 16% 20%   Pancreatic 2-3% Increased   Melanoma 1.6% Increased     CDK4  Individuals with a mutation in one copy of their CDK4 gene have familial cutaneous malignant melanoma. They are at an increased risk for developing atypical moles and  melanoma, though the exact lifetime risk has not yet been defined.     CDKN2A  Mutations in one copy of the CDKN2A gene is associated with Familial Atypical Multiple Mole Melanoma (FAMMM) syndrome. It is characterized by multiple melanocytic nevi and a family history of melanoma. The likelihood of developing melanoma varies by geographic location, and has been estimated to be 50-75% in the [place]. CDKN2A are also associated with an increased risk for pancreatic cancer, among others.  The likelihood of developing pancreatic cancer has been estimated to be approximately 17%; in some families, though, this risk may be higher.    MITF  Individuals with the E318K mutation in the MITF gene have an elevated risk for melanoma. This risk has been estimated to be nearly 3 times higher than the general population risk. Other mutations in the MITF are not currently known to be associated with this increased risk.    PTEN  PTEN hamartoma tumor syndrome (PHTS) is caused by mutations in one copy of the PTEN gene. It is characterized by increased risk for breast, thyroid (especially follicular type), endometrial and other cancers as well as macrocephaly and intellectual disability. Melanoma and other cutaneous features such as trichilemmoma, facial papules and palmoplantar keratoses can be seen as well.     General Population PTEN Mutation   Breast 12% 60-80%   Thyroid 1-2% 20-40%   Endometrial 2-3% 20-30%   Colon 4.5% 10-20%   Renal 1-2% 10-30%   Melanoma 1.6% 2-6%      RB1  Retinoblastoma is caused by mutations in one copy of the RB1 gene. It is characterized by a significantly increased risk for malignant tumors in the retina, typically found in young children. Melanomas, osteosarcomas, and soft tissues sarcoma can also be seen in affected individuals, though the exact lifetime risks are unknown.     TP53  Mutations in one copy of the TP53 gene cause Li-Fraumeni syndrome (LFS). LFS is associated with the development of  many different types of cancer (i.e. sarcomas, adrenocortical tumors, breast cancer) beginning in childhood or young adulthood. Increased risk for melanoma has also been reported in families with LFS.     Genetic Testing  Genetic testing involves a simple blood test and will look at the genetic information in genes for any harmful mutations that are associated with increased melanoma and other cancer risks. If possible, it is recommended that the person(s) who has had cancer be tested before other family members.  That person will give us the most useful information about whether or not a specific gene is associated with the cancer in the family.     Results  There are three possible results genetic testing:  Positive--a harmful mutation was identified  Negative--no mutation was identified  Variant of unknown significance--a variation in one of the genes was identified, but it is unclear how this impacts cancer risk in the family    Advantages and Disadvantages  There are advantages and disadvantages to genetic testing of these genes.    Advantages  May clarify your cancer risk  Can help you make medical decisions  May explain the cancers in your family  May give useful information to your family members (if you share your results)    Disadvantages  Possible negative emotional impact of learning about inherited cancer risk  Uncertainty in interpreting a negative test result in some situations  Possible genetic discrimination concerns (see below)    Inheritance   Mutations in genes associated with melanoma are inherited in an autosomal dominant pattern.  This means that if a parent has a mutation, each of his or her children will have a 50% chance of inheriting that same mutation.  Therefore, each child--male or female--would have a 50% chance of being at increased risk for developing cancer.                                            Image obtained from [employer] Home Reference, 2013       Genetic Information  Nondiscrimination Act (HARMAN)  HARMAN is a federal law that protects individuals from health insurance or employment discrimination based on a genetic test result alone.  Although rare, there are currently no legal protections in terms of life insurance, long term care, or disability insurances.  Visit the National Human Genome Research [place] at Genome.gov/31222606 to learn more.    Reducing Cancer Risk  If a harmful mutation is found in a cancer risk gene, there may be certain screens or preventative surgeries that can be offered. For example, screening recommendations for individuals with a melanoma-associated gene mutation may include dermatology exams, ophthalmology exams, or other screening, depending on the gene mutation identified. This information will be discussed after genetic testing is completed.    If no mutations are found on genetic testing, screening is then recommended based on personal and/or family history of cancer.     Both men and women with a gene mutation should talk to their doctor or genetic counselor about mutation specific screening as different mutations present with different risk and screening recommendations. In addition, the age at which to start screening may be modified based on family history of young cancer.    Questions to Think About Regarding Genetic Testing  What effect will the test result have on me and my relationship with my family members if I have an inherited gene mutation?  If I don t have a gene mutation?  Should I share my test results, and how will my family react to this news, which may also affect them?  Are my children ready to learn new information that may one day affect their own health?    Resources  Melanoma Research Foundation https://www.melanoma.org/   AIM at Melanoma Foundation [email or web address].[email or web address]   American Cancer Society (ACS) cancer.org   National Cancer [place] (NCI) cancer.gov     Please call us if you have any questions or concerns.    Cancer Risk Management Program [phone]-[facility]-CANCER ([phone])      References  Zayda E, Mitali P, Pearl K, Michelle A, Rose H. Hereditary Melanoma: Update on Syndromes and Management - Genetics of familial atypical multiple mole melanoma syndrome. Journal of the American Academy of Dermatology. 2016;74(3):395-407. doi:10.1016/j.jaad.2015.08.038.  Waqar K, Lele R, Sabrina CM, Lissy-Díaz . Comprehensive review of BAP1 tumor predisposition syndrome with report of two new cases. Clinical genetics. 2016;89(3):285-294. doi:10.1111/cge.73507.  Shanna, Julian Lino, Aditya, Guy, Jimmy, . . . Federico. (2013). Cancer Risks for BRCA1 and BRCA2 Mutation Carriers: Results From Prospective Analysis of EMBRACE. Journal Of The National Cancer [place], 105(11), 812-822.  Tanja Pagan, Nathaniel Gregory, Charlie Humphrey, Leena Bright, Eduar Amin, . . . Arron Marquez. (1996). Germline mutations in the [id number] binding domain of CDK4 in familial melanoma. Nature Genetics, 12(1), 97-99.  CHERYL Coto., MAMI Etienne, CHERYL Nolen., CURLY Thomason., ELVIA Mcduffie., Spatz, A., . . . SHANT Abarca (2007). BRCA1, BRCA2, TP53, and CDKN2A germline mutations in patients with breast cancer and cutaneous melanoma. Familial Cancer, 6(4), 453-461.  MAMI Escalante, & TREY Galvan. (2012). Genetic alterations of PTEN in human melanoma. Cellular and Molecular Life Sciences, 69(9), 8819-5261.  Dorie Dickson, Lindsay Linn, Saeid Jeffrey, Carroll Calloway, Muriel Sotomayor, . . . Vivian Schreiber. (2011). A SUMOylation-defective MITF germline mutation predisposes to melanoma and renal carcinoma. Nature, 480(9281), 94-8.  KAMINI Rivera., MAMI Green, SHANT Ortega, & PHILLY Dillon (2008). Identification of VXTNYJ19 , IIRSP7T , FGFR3, and RB1 mutations in melanoma by inhibition of nonsense-mediated mRNA decay. Genes, Chromosomes and Cancer, 4712, 3594-6193.  JOSE Esparza, & JULIANO Francis  (2015). Cowden syndrome: Recognizing and managing a not?so?rare hereditary cancer syndrome. Journal of Surgical Oncology, 111(1), 125-130.  Prevalence of the E318K MITF germline mutation in [place] melanoma patients: Associations with histological subtypes and family cancer history. (2013). Pigment Cell & Melanoma Research, 26(2), 259-262.  Zayda E, Mitali P, Pearl K, Michelle A, Rose H. Hereditary Melanoma: Update on Syndromes and Management - Genetics of familial atypical multiple mole melanoma syndrome. Journal of the American Academy of Dermatology. 2016;74(3):395-407. Doi:10.1016/j.jaad.2015.08.038.  Kristyn RR, Galina ED, Micah KG, et al. Prevalence of CDKN2A mutations in pancreatic cancer patients: implications for genetic counseling.  Journal of Human Genetics. 2011;19(4):472-478. Doi:10.1038/ejhg.2010.198.  RANDY Carson Demenais, Florence, Goldstein, Alisa M., Brittney Juan Bishop, Julia Newton, Paillerets, Brigitte Bressac-de, . . . Debo Henderson. (2002). Geographical Variation in the Penetrance of CDKN2A Mutations for Melanoma. Journal of the National Cancer [place], 94(12), 894-903.  Tevin Bright, Nyla Monge [place]ward, Avril, . . . Rogelio. (2006). High-risk Melanoma Susceptibility Genes and Pancreatic Cancer, Neural System Tumors, and Uveal Melanoma across [employer]. Cancer Research., 66(20), 7636-5204.

## 2022-12-16 ENCOUNTER — E-VISIT (OUTPATIENT)
Dept: URGENT CARE | Facility: CLINIC | Age: 22
End: 2022-12-16
Payer: COMMERCIAL

## 2022-12-16 DIAGNOSIS — J01.90 ACUTE SINUSITIS WITH SYMPTOMS > 10 DAYS: Primary | ICD-10-CM

## 2022-12-16 PROCEDURE — 99421 OL DIG E/M SVC 5-10 MIN: CPT | Performed by: INTERNAL MEDICINE

## 2022-12-16 NOTE — PATIENT INSTRUCTIONS
You may want to try a nasal lavage (also known as nasal irrigation). You can find over-the-counter products, such as Neti-Pot, at retail locations or make your own at home. Instructions for homemade nasal lavage and more information on the process are available online at http://www.aafp.org/afp/2009/1115/p1121.html.    Dear Sasha Hand    After reviewing your responses, I've been able to diagnose you with a sinus infection    Based on your responses and diagnosis, I have prescribed augmentin   to treat your symptoms. I have sent this to your pharmacy.?     It is also important to stay well hydrated, get lots of rest and take over-the-counter decongestants,?tylenol?or ibuprofen if you?are able to?take those medications per your primary care provider to help relieve discomfort.?     It is important that you take?all of?your prescribed medication even if your symptoms are improving after a few doses.? Taking?all of?your medicine helps prevent the symptoms from returning.?     If your symptoms worsen, you develop severe headache, vomiting, high fever (>102), or are not improving in 7 days, please contact your primary care provider for an appointment or visit any of our convenient Walk-in Care or Urgent Care Centers to be seen which can be found on our website?here.?     Thanks again for choosing?us?as your health care partner,?   ?  Amanda Travis MD?

## 2023-01-03 ENCOUNTER — MYC MEDICAL ADVICE (OUTPATIENT)
Dept: FAMILY MEDICINE | Facility: CLINIC | Age: 23
End: 2023-01-03

## 2023-01-03 NOTE — TELEPHONE ENCOUNTER
Discussed Covid symptoms with patient. She is not interested in any antiviral treatments and stated she is already on Day 4 of symptoms and has had it in the past and it seems milder this time around. She will manage symptoms at home and call back with further concerns. Discussed red flag symptoms and when to go to the ED. She has albuterol inhaler at home as needed for asthma. Denies SOB at this time.    Farhad Hernandez,SAVANNAHN, RN

## 2023-01-11 NOTE — PROGRESS NOTES
Sasha is a 22 year old who is being evaluated via a billable video visit.      How would you like to obtain your AVS? [employer]  If the video visit is dropped, the invitation should be resent by: Text to cell phone: [phone]  Will anyone else be joining your video visit? No      Video-Visit Details  Type of service:  Video Visit   Video Start Time: 7:56am  Video End Time:8:08am  Originating Location (pt. Location): Home  Distant Location (provider location):  Off-site  Platform used for Video Visit: [employer]     1/12/2023    Referring Provider: self    Presenting Information:   I met with Sasha via video visit today to review her genetic testing results. She submitted a saliva sample to [facility] on 12/7/2022 and Multi-Cancer panel testing was orderd. This testing was done because of her family history of breast cancer and her mother having tested positive for a BRCA2 gene mutation.     Genetic Testing Results: POSITIVE  Sasha is POSITIVE for a BRCA2 mutation. Specifically her mutation is called [id number] ([id number]). We discussed that this mutation is associated with a diagnosis of Hereditary Breast and Ovarian Cancer Syndrome and increased risk for breast, ovarian, and other cancers. We discussed the impact of this testing on Sasha in detail.     Of note, Sasha is negative for mutations in the following genes: AIP, ALK, APC, SHEA, AXIN2, BAP1, BARD1, BLM, BMPR1A, BRCA1, BRCA2, BRIP1, CASR, CDC73, CDH1, CDK4, CDKN1B, CDKN1C, CDKN2A, CEBPA, CHEK2, CTNNA1, DICER1, DIS3L2, EGFR, EPCAM, FH, FLCN, GATA2, GPC3, GREM1, HOXB13, HRAS, KIT, MAX, MEN1, MET, MITF, MLH1, MSH2, MSH3, MSH6, MUTYH, NBN, NF1, NF2, NTHL1, PALB2, PDGFRA, PHOX2B, PMS2, POLD1, POLE, POT1, JMGRT8L, PTCH1, PTEN, RAD50, RAD51C, RAD51D, RB1, RECQL4, RET, RUNX1, SDHA, SDHAF2, SDHB, SDHC, SDHD, SMAD4, SMARCA4, SMARCB1, SMARCE1, STK11, SUFU, TERC, TERT, EXVX479, TP53, TSC1, TSC2, VHL, WRN, WT1, AP2S1, GNA11, and MC1R genes.  Please see copy of scanned  "test report for complete details regarding testing methodology/limitations.      A copy of the test report can be found in the Laboratory tab, dated 12/7/2022, and named \"LABORATORY MISCELLANEOUS ORDER\". The report is scanned in as a linked document.    BRCA2 Cancer Risks:    Breast cancer risk (first primary):  o Women who carry a BRCA2 pathogenic/likely pathogenic variant have a 60-80% lifetime risk of developing breast cancer. This is much greater than the general population breast cancer risk of 12%  o Male breast cancer risks are also increased: approximately 1.8-7.1% by age 70.      Ovarian cancer risk: 13-29% (compared to the general population risk of 1-2%)    Pancreatic cancer risk: 5-10%     Prostate cancer risk: up to 19-61%, including a higher likelihood for advanced or metastatic disease.     Increased risk for other cancers, including melanoma, may be present. No exact lifetime risks are available at this time.     Cancer Screening and Prevention:  The following recommendations are based on current National Comprehensive Cancer Network guidelines for BRCA2.    Earlier and more frequent breast screening is recommended:  o Breast awareness starting at age 18  o Clinical breast exams starting at age 25; repeated every 6-12 months  o Annual breast MRI from age 25-29  o Annual mammograms with consideration of tomosynthesis and breast MRI alternating every 6 months starting at age 30  o Consider risk reducing mastectomy, which reduces breast cancer risk by 90%    Screening for male breast cancer includes self breast exams annual clinical breast exams beginning at age 35  o Annual mammogram screening may be considered in men with gynecomastia starting at age 50 or 10 years prior to the earliest male breast cancer in the family    Bilateral salpingo-oophorectomy (removal of both ovaries and fallopian tubes) reduces the risk of ovarian cancer and is typically recommended between ages 35-40, or after child " bearing  o Per current NCCN guidelines, individuals at risk for ovarian cancer due to BRCA2 may choose to delay this surgery to ages 40-45. For some patients with a BRCA2 mutation, though, surgery can be considered at an earlier age, based on family history. As with any surgery, risks are involved, and this option should be discussed in greater detail with a gyn-oncologist.    o Consider transvaginal ultrasound and a  blood test starting at age 30-35, though the benefits of this screening are unclear.    Screening for pancreatic cancer may be considered for some families.   o Consider pancreatic cancer screening beginning at age 50 (or 10 years younger than the earliest pancreatic cancer diagnosis) for individuals with a family history of pancreatic cancer in a first or second degree relative (who presumably carries the BRCA2 variant). We discussed that, at this time, Sasha does not have any family history of pancreatic cancer.   o This screening typically involves endoscopic ultrasonography (EUS) and/or MRI/magnetic resonance cholangiopancreatography.    Individuals at risk for prostate cancer may begin prostate screening at age 40, as recommended in the NCCN Guidelines for Prostate Early Detection    General melanoma risk management, including annual full body skin exams and safe sun practices are appropriate.      Chemoprevention with Tamoxifen can reduce breast cancer risk in some women.  Surgical risk reduction options and Tamoxifen use should be discussed with Sasha's physician.      Using oral contraceptives for five years or more has been shown to reduce ovarian cancer risk by around 50%.  The data are still inconclusive as to whether or not using oral contraceptives will increase breast cancer risk in BRCA2 mutation carriers.     Some chemotherapies for certain cancers may be more effective in individuals with BRCA2 mutations. Sasha should discuss this with her physicians, if chemotherapy is indicated for  her in the future.     Other screening based on Sasha's personal and family history:    Due to the close family history of melanoma on her paternal side and the above noted BRCA2 mutation, Sasha remains at some increased risk for developing melanoma. According to the American Cancer Society, Sasha is encouraged to speak to her primary care provider about having regular skin exams and safe skin practices (i.e. sunscreen, self skin exams, limited sun exposure, etc.).    Other population cancer screening options, such as those recommended by the American Cancer Society and the National Comprehensive Cancer Network (NCCN), are also appropriate for Sasha and her family. These screening recommendations may change if there are changes to Sasha's personal and/or family history. Final screening recommendations should be made by each individual's managing physician.    We discussed that Sasha could participate in our Cancer Risk Management Program in which our nursing specialist provides an individual screening plan and assists with medical management. A referral was placed to see NITA Moss for this service.    Of note, the above information is based on our current understanding of Sasha's genetic findings. Sasha is encouraged to reach out to me regularly regarding any pertinent updates to her personal and/or family history of cancer, as our understanding of the genetic findings in her family may change over time.     Implications for Family Members:  We reviewed that mutations in the BRCA2 gene are inherited in an autosomal dominant pattern. This means that each of Sasha's future children have a 50% chance of inheriting the same mutation. Likewise, each of her maternal half-siblings have a 50% risk of having the same mutation. Extended relatives may also carry this mutation, and she is encouraged to share this information with her family members on both sides of the family. I am happy to help her relatives connect with  "a genetic counselor in their area if they would like to discuss testing.    In rare situations in which both parents have a mutation in the BRCA2 gene, their children each have a 25% risk for Fanconi anemia. Fanconi anemia is a rare condition with onset in childhood that often results in physical abnormalities, growth retardation, bone marrow failure, and increased risk for cancer. For individuals of childbearing age with BRCA2 mutations, genetic counseling and genetic testing may be advised for their partners    Additional Testing Considerations:  Even though Sasha's genetic testing result was positive, other relatives may carry a different gene mutation associated with personal or family history cancer. Genetic counseling is recommended for her maternal half-siblings, her maternal uncles, and her paternal aunts and uncles (given the prevalence of melanoma in her paternal family) to discuss genetic testing options. If any of these relatives do pursue genetic testing, Sasha is encouraged to contact me so that we may review the impact of their test results on her.    Support Resources:  I provided Sasha with information regarding national and local support resources.    Plan:  1.  I provided Sasha with a copy of her test results and support resources today.  2.  I will provide a \"Dear Relative\" letter for Sasha to share with her family members.  3.  She plans to follow up with her primary care providers for routine care and to get set up with dermatology for regular skin exams.  4.  A referral was placed for Sasha to meet with NITA Moss to discuss screening associated with a BRCA2 mutation.    Anjana العراقي MS, [facility]  Licensed, Certified Genetic Counselor  [facility]  [email or web address]        "

## 2023-01-12 ENCOUNTER — VIRTUAL VISIT (OUTPATIENT)
Dept: ONCOLOGY | Facility: CLINIC | Age: 23
End: 2023-01-12
Attending: GENETIC COUNSELOR, MS
Payer: COMMERCIAL

## 2023-01-12 ENCOUNTER — NURSE TRIAGE (OUTPATIENT)
Dept: FAMILY MEDICINE | Facility: CLINIC | Age: 23
End: 2023-01-12

## 2023-01-12 ENCOUNTER — MYC MEDICAL ADVICE (OUTPATIENT)
Dept: FAMILY MEDICINE | Facility: CLINIC | Age: 23
End: 2023-01-12

## 2023-01-12 DIAGNOSIS — Z80.3 FAMILY HISTORY OF MALIGNANT NEOPLASM OF BREAST: ICD-10-CM

## 2023-01-12 DIAGNOSIS — Z80.0 FAMILY HISTORY OF COLON CANCER: ICD-10-CM

## 2023-01-12 DIAGNOSIS — Z84.81 FAMILY HISTORY OF BRCA2 GENE POSITIVE: ICD-10-CM

## 2023-01-12 DIAGNOSIS — Z80.42 FAMILY HISTORY OF PROSTATE CANCER: ICD-10-CM

## 2023-01-12 DIAGNOSIS — Z15.01 BRCA2 POSITIVE: Primary | ICD-10-CM

## 2023-01-12 DIAGNOSIS — Z80.8 FAMILY HISTORY OF MALIGNANT MELANOMA: ICD-10-CM

## 2023-01-12 DIAGNOSIS — Z15.09 BRCA2 POSITIVE: Primary | ICD-10-CM

## 2023-01-12 PROCEDURE — 999N000069 HC STATISTIC GENETIC COUNSELING, < 16 MIN: Mod: GT,95 | Performed by: GENETIC COUNSELOR, MS

## 2023-01-12 NOTE — LETTER
1/12/2023         RE: Sasha Hand  [address]  [place] [address]        Dear Colleague,    Thank you for referring your patient, Sasha Hand, to the [facility]. Please see a copy of my visit note below.    Sasha is a 22 year old who is being evaluated via a billable video visit.      How would you like to obtain your AVS? MyChart  If the video visit is dropped, the invitation should be resent by: Text to cell phone: [phone]  Will anyone else be joining your video visit? No      Video-Visit Details  Type of service:  Video Visit   Video Start Time: 7:56am  Video End Time:8:08am  Originating Location (pt. Location): Home  Distant Location (provider location):  Off-site  Platform used for Video Visit: [employer]     1/12/2023    Referring Provider: self    Presenting Information:   I met with Sasha via video visit today to review her genetic testing results. She submitted a saliva sample to [facility] on 12/7/2022 and Multi-Cancer panel testing was orderd. This testing was done because of her family history of breast cancer and her mother having tested positive for a BRCA2 gene mutation.     Genetic Testing Results: POSITIVE  Sasha is POSITIVE for a BRCA2 mutation. Specifically her mutation is called [id number] ([id number]). We discussed that this mutation is associated with a diagnosis of Hereditary Breast and Ovarian Cancer Syndrome and increased risk for breast, ovarian, and other cancers. We discussed the impact of this testing on Sasha in detail.     Of note, Sasha is negative for mutations in the following genes: AIP, ALK, APC, SHEA, AXIN2, BAP1, BARD1, BLM, BMPR1A, BRCA1, BRCA2, BRIP1, CASR, CDC73, CDH1, CDK4, CDKN1B, CDKN1C, CDKN2A, CEBPA, CHEK2, CTNNA1, DICER1, DIS3L2, EGFR, EPCAM, FH, FLCN, GATA2, GPC3, GREM1, HOXB13, HRAS, KIT, MAX, MEN1, MET, MITF, MLH1, MSH2, MSH3, MSH6, MUTYH, NBN, NF1, NF2, NTHL1, PALB2, PDGFRA, PHOX2B, PMS2, POLD1, POLE, POT1, RDWUC7S,  "PTCH1, PTEN, RAD50, RAD51C, RAD51D, RB1, RECQL4, RET, RUNX1, SDHA, SDHAF2, SDHB, SDHC, SDHD, SMAD4, SMARCA4, SMARCB1, SMARCE1, STK11, SUFU, TERC, TERT, [id number], TP53, TSC1, TSC2, VHL, WRN, WT1, AP2S1, GNA11, and MC1R genes.  Please see copy of scanned test report for complete details regarding testing methodology/limitations.      A copy of the test report can be found in the Laboratory tab, dated 12/7/2022, and named \"LABORATORY MISCELLANEOUS ORDER\". The report is scanned in as a linked document.    BRCA2 Cancer Risks:    Breast cancer risk (first primary):  o Women who carry a BRCA2 pathogenic/likely pathogenic variant have a 60-80% lifetime risk of developing breast cancer. This is much greater than the general population breast cancer risk of 12%  o Male breast cancer risks are also increased: approximately 1.8-7.1% by age 70.      Ovarian cancer risk: 13-29% (compared to the general population risk of 1-2%)    Pancreatic cancer risk: 5-10%     Prostate cancer risk: up to 19-61%, including a higher likelihood for advanced or metastatic disease.     Increased risk for other cancers, including melanoma, may be present. No exact lifetime risks are available at this time.     Cancer Screening and Prevention:  The following recommendations are based on current National Comprehensive Cancer Network guidelines for BRCA2.    Earlier and more frequent breast screening is recommended:  o Breast awareness starting at age 18  o Clinical breast exams starting at age 25; repeated every 6-12 months  o Annual breast MRI from age 25-29  o Annual mammograms with consideration of tomosynthesis and breast MRI alternating every 6 months starting at age 30  o Consider risk reducing mastectomy, which reduces breast cancer risk by 90%    Screening for male breast cancer includes self breast exams annual clinical breast exams beginning at age 35  o Annual mammogram screening may be considered in men with gynecomastia starting at age 50 " or 10 years prior to the earliest male breast cancer in the family    Bilateral salpingo-oophorectomy (removal of both ovaries and fallopian tubes) reduces the risk of ovarian cancer and is typically recommended between ages 35-40, or after child bearing  o Per current NCCN guidelines, individuals at risk for ovarian cancer due to BRCA2 may choose to delay this surgery to ages 40-45. For some patients with a BRCA2 mutation, though, surgery can be considered at an earlier age, based on family history. As with any surgery, risks are involved, and this option should be discussed in greater detail with a gyn-oncologist.    o Consider transvaginal ultrasound and a  blood test starting at age 30-35, though the benefits of this screening are unclear.    Screening for pancreatic cancer may be considered for some families.   o Consider pancreatic cancer screening beginning at age 50 (or 10 years younger than the earliest pancreatic cancer diagnosis) for individuals with a family history of pancreatic cancer in a first or second degree relative (who presumably carries the BRCA2 variant). We discussed that, at this time, Sasha does not have any family history of pancreatic cancer.   o This screening typically involves endoscopic ultrasonography (EUS) and/or MRI/magnetic resonance cholangiopancreatography.    Individuals at risk for prostate cancer may begin prostate screening at age 40, as recommended in the NCCN Guidelines for Prostate Early Detection    General melanoma risk management, including annual full body skin exams and safe sun practices are appropriate.      Chemoprevention with Tamoxifen can reduce breast cancer risk in some women.  Surgical risk reduction options and Tamoxifen use should be discussed with Sasha's physician.      Using oral contraceptives for five years or more has been shown to reduce ovarian cancer risk by around 50%.  The data are still inconclusive as to whether or not using oral  contraceptives will increase breast cancer risk in BRCA2 mutation carriers.     Some chemotherapies for certain cancers may be more effective in individuals with BRCA2 mutations. Sasha should discuss this with her physicians, if chemotherapy is indicated for her in the future.     Other screening based on Sasha's personal and family history:    Due to the close family history of melanoma on her paternal side and the above noted BRCA2 mutation, Sasha remains at some increased risk for developing melanoma. According to the American Cancer Society, Sasha is encouraged to speak to her primary care provider about having regular skin exams and safe skin practices (i.e. sunscreen, self skin exams, limited sun exposure, etc.).    Other population cancer screening options, such as those recommended by the American Cancer Society and the National Comprehensive Cancer Network (NCCN), are also appropriate for Sasha and her family. These screening recommendations may change if there are changes to Sasha's personal and/or family history. Final screening recommendations should be made by each individual's managing physician.    We discussed that Sasha could participate in our Cancer Risk Management Program in which our nursing specialist provides an individual screening plan and assists with medical management. A referral was placed to see NITA Moss for this service.    Of note, the above information is based on our current understanding of Sasha's genetic findings. Sasha is encouraged to reach out to me regularly regarding any pertinent updates to her personal and/or family history of cancer, as our understanding of the genetic findings in her family may change over time.     Implications for Family Members:  We reviewed that mutations in the BRCA2 gene are inherited in an autosomal dominant pattern. This means that each of Sasha's future children have a 50% chance of inheriting the same mutation. Likewise, each of her  "maternal half-siblings have a 50% risk of having the same mutation. Extended relatives may also carry this mutation, and she is encouraged to share this information with her family members on both sides of the family. I am happy to help her relatives connect with a genetic counselor in their area if they would like to discuss testing.    In rare situations in which both parents have a mutation in the BRCA2 gene, their children each have a 25% risk for Fanconi anemia. Fanconi anemia is a rare condition with onset in childhood that often results in physical abnormalities, growth retardation, bone marrow failure, and increased risk for cancer. For individuals of childbearing age with BRCA2 mutations, genetic counseling and genetic testing may be advised for their partners    Additional Testing Considerations:  Even though Sasha's genetic testing result was positive, other relatives may carry a different gene mutation associated with personal or family history cancer. Genetic counseling is recommended for her maternal half-siblings, her maternal uncles, and her paternal aunts and uncles (given the prevalence of melanoma in her paternal family) to discuss genetic testing options. If any of these relatives do pursue genetic testing, Sasha is encouraged to contact me so that we may review the impact of their test results on her.    Support Resources:  I provided Sasha with information regarding national and local support resources.    Plan:  1.  I provided Sasha with a copy of her test results and support resources today.  2.  I will provide a \"Dear Relative\" letter for Sasha to share with her family members.  3.  She plans to follow up with her primary care providers for routine care and to get set up with dermatology for regular skin exams.  4.  A referral was placed for Sasha to meet with NITA Moss to discuss screening associated with a BRCA2 mutation.    Anjana العراقي MS, CGC  Licensed, Certified Genetic " Counselor  [facility]  [email or web address]

## 2023-01-12 NOTE — LETTER
Cancer Risk Management  Program Locations    [facility]  [facility]  Mailing Address  Cancer Risk Management Program  [address]  [place] [address]    New patient appointments  [phone]  January 12, 2023    Sasha Hand  [address] [place] [address]      Dear Sasha,    It was a pleasure speaking with you on 1/12/2022.  Here is a copy of the progress note from our discussion. If you have any additional questions, please feel free to call.    Referring Provider: self    Presenting Information:   I met with Sasha via video visit today to review her genetic testing results. She submitted a saliva sample to [facility] on 12/7/2022 and Multi-Cancer panel testing was orderd. This testing was done because of her family history of breast cancer and her mother having tested positive for a BRCA2 gene mutation.     Genetic Testing Results: POSITIVE  Sasha is POSITIVE for a BRCA2 mutation. Specifically her mutation is called [id number] ([id number]). We discussed that this mutation is associated with a diagnosis of Hereditary Breast and Ovarian Cancer Syndrome and increased risk for breast, ovarian, and other cancers. We discussed the impact of this testing on Sasha in detail.     Of note, Sasha is negative for mutations in the following genes: AIP, ALK, APC, SHEA, AXIN2, BAP1, BARD1, BLM, BMPR1A, BRCA1, BRCA2, BRIP1, CASR, CDC73, CDH1, CDK4, CDKN1B, CDKN1C, CDKN2A, CEBPA, CHEK2, CTNNA1, DICER1, DIS3L2, EGFR, EPCAM, FH, FLCN, GATA2, GPC3, GREM1, HOXB13, HRAS, KIT, MAX, MEN1, MET, MITF, MLH1, MSH2, MSH3, MSH6, MUTYH, NBN, NF1, NF2, NTHL1, PALB2, PDGFRA, PHOX2B, PMS2, POLD1, POLE, POT1, ERACO7X, PTCH1, PTEN, RAD50, RAD51C, RAD51D, RB1, RECQL4, RET, RUNX1, SDHA, SDHAF2, SDHB, SDHC, SDHD, SMAD4, SMARCA4,  "SMARCB1, SMARCE1, STK11, SUFU, TERC, TERT, UKHA306, TP53, TSC1, TSC2, VHL, WRN, WT1, AP2S1, GNA11, and MC1R genes.  Please see copy of scanned test report for complete details regarding testing methodology/limitations.      A copy of the test report can be found in the Laboratory tab, dated 12/7/2022, and named \"LABORATORY MISCELLANEOUS ORDER\". The report is scanned in as a linked document.    BRCA2 Cancer Risks:    Breast cancer risk (first primary):  o Women who carry a BRCA2 pathogenic/likely pathogenic variant have a 60-80% lifetime risk of developing breast cancer. This is much greater than the general population breast cancer risk of 12%  o Male breast cancer risks are also increased: approximately 1.8-7.1% by age 70.      Ovarian cancer risk: 13-29% (compared to the general population risk of 1-2%)    Pancreatic cancer risk: 5-10%     Prostate cancer risk: up to 19-61%, including a higher likelihood for advanced or metastatic disease.     Increased risk for other cancers, including melanoma, may be present. No exact lifetime risks are available at this time.     Cancer Screening and Prevention:  The following recommendations are based on current National Comprehensive Cancer Network guidelines for BRCA2.    Earlier and more frequent breast screening is recommended:  o Breast awareness starting at age 18  o Clinical breast exams starting at age 25; repeated every 6-12 months  o Annual breast MRI from age 25-29  o Annual mammograms with consideration of tomosynthesis and breast MRI alternating every 6 months starting at age 30  o Consider risk reducing mastectomy, which reduces breast cancer risk by 90%    Screening for male breast cancer includes self breast exams annual clinical breast exams beginning at age 35  o Annual mammogram screening may be considered in men with gynecomastia starting at age 50 or 10 years prior to the earliest male breast cancer in the family    Bilateral salpingo-oophorectomy " (removal of both ovaries and fallopian tubes) reduces the risk of ovarian cancer and is typically recommended between ages 35-40, or after child bearing  o Per current NCCN guidelines, individuals at risk for ovarian cancer due to BRCA2 may choose to delay this surgery to ages 40-45. For some patients with a BRCA2 mutation, though, surgery can be considered at an earlier age, based on family history. As with any surgery, risks are involved, and this option should be discussed in greater detail with a gyn-oncologist.    o Consider transvaginal ultrasound and a  blood test starting at age 30-35, though the benefits of this screening are unclear.    Screening for pancreatic cancer may be considered for some families.   o Consider pancreatic cancer screening beginning at age 50 (or 10 years younger than the earliest pancreatic cancer diagnosis) for individuals with a family history of pancreatic cancer in a first or second degree relative (who presumably carries the BRCA2 variant). We discussed that, at this time, Sasha does not have any family history of pancreatic cancer.   o This screening typically involves endoscopic ultrasonography (EUS) and/or MRI/magnetic resonance cholangiopancreatography.    Individuals at risk for prostate cancer may begin prostate screening at age 40, as recommended in the NCCN Guidelines for Prostate Early Detection    General melanoma risk management, including annual full body skin exams and safe sun practices are appropriate.      Chemoprevention with Tamoxifen can reduce breast cancer risk in some women.  Surgical risk reduction options and Tamoxifen use should be discussed with Sasha's physician.      Using oral contraceptives for five years or more has been shown to reduce ovarian cancer risk by around 50%.  The data are still inconclusive as to whether or not using oral contraceptives will increase breast cancer risk in BRCA2 mutation carriers.     Some chemotherapies for certain  cancers may be more effective in individuals with BRCA2 mutations. Sasha should discuss this with her physicians, if chemotherapy is indicated for her in the future.     Other screening based on Sasha's personal and family history:    Due to the close family history of melanoma on her paternal side and the above noted BRCA2 mutation, Sasha remains at some increased risk for developing melanoma. According to the American Cancer Society, Sasha is encouraged to speak to her primary care provider about having regular skin exams and safe skin practices (i.e. sunscreen, self skin exams, limited sun exposure, etc.).    Other population cancer screening options, such as those recommended by the American Cancer Society and the National Comprehensive Cancer Network (NCCN), are also appropriate for Sasha and her family. These screening recommendations may change if there are changes to Sasha's personal and/or family history. Final screening recommendations should be made by each individual's managing physician.    We discussed that Sasha could participate in our Cancer Risk Management Program in which our nursing specialist provides an individual screening plan and assists with medical management. A referral was placed to see NITA Moss for this service.    Of note, the above information is based on our current understanding of Sasha's genetic findings. Sasha is encouraged to reach out to me regularly regarding any pertinent updates to her personal and/or family history of cancer, as our understanding of the genetic findings in her family may change over time.     Implications for Family Members:  We reviewed that mutations in the BRCA2 gene are inherited in an autosomal dominant pattern. This means that each of Sasha's future children have a 50% chance of inheriting the same mutation. Likewise, each of her maternal half-siblings have a 50% risk of having the same mutation. Extended relatives may also carry this mutation,  "and she is encouraged to share this information with her family members on both sides of the family. I am happy to help her relatives connect with a genetic counselor in their area if they would like to discuss testing.    In rare situations in which both parents have a mutation in the BRCA2 gene, their children each have a 25% risk for Fanconi anemia. Fanconi anemia is a rare condition with onset in childhood that often results in physical abnormalities, growth retardation, bone marrow failure, and increased risk for cancer. For individuals of childbearing age with BRCA2 mutations, genetic counseling and genetic testing may be advised for their partners    Additional Testing Considerations:  Even though Sasha's genetic testing result was positive, other relatives may carry a different gene mutation associated with personal or family history cancer. Genetic counseling is recommended for her maternal half-siblings, her maternal uncles, and her paternal aunts and uncles (given the prevalence of melanoma in her paternal family) to discuss genetic testing options. If any of these relatives do pursue genetic testing, Sasha is encouraged to contact me so that we may review the impact of their test results on her.    Support Resources:  I provided Sasha with information regarding national and local support resources.    Plan:  1.  I provided Sasha with a copy of her test results and support resources today.  2.  I will provide a \"Dear Relative\" letter for Sasha to share with her family members.  3.  She plans to follow up with her primary care providers for routine care and to get set up with dermatology for regular skin exams.  4.  A referral was placed for Sasha to meet with NITA Moss to discuss screening associated with a BRCA2 mutation.    Anjana العراقي MS, [facility]  Licensed, Certified Genetic Counselor  [facility]  [email or web address]                                "

## 2023-01-12 NOTE — PATIENT INSTRUCTIONS
Resources for Hereditary Breast and Ovarian Cancer  Bright Pink    www.[employer].org  AdviceIQ is the only national non-profit organization focused on prevention and early detection of breast and ovarian cancer in young women.  Bright Pink aims to reach the 52 million young women in the [place] between the ages of 18 and 45 with innovative, life-saving breast and ovarian health programs, thereby empowering this and future generations of women to live healthier, happier, and longer lives.  FORCE: Facing Our Risk of Cancer Empowered  www.[employer]  FORCE s mission is to improve the lives of individuals and families affected by hereditary breast, ovarian, and related cancers by creating awareness, supplying information and support to the community, advocating for and supporting research, and working with the research and medical communities.   Helpline: 1-163.205.5476  Message boards  Peer Navigation and local support groups  Talking About BRCA in Your Family Tree http://www.[employer].org/understanding-brca-and-hboc/publications/documents/livhzmm-fjithge-hvgdw-brca-family.pdf  This free booklet helps parents discuss BRCA-related issues with their children and loved ones  MOCA: [place] [facility] [place] http://mnovarian.org/  MOCA was started in 1999 by a small group of ovarian cancer survivors who came together to fund ovarian cancer research, raise awareness of the disease, and provide support to women with ovarian cancer and their families.  Firefly Sisterhood   www.fireflysisterhood.org  Based in the [facility] the Firefly Sisterhood connects women diagnosed with breast cancer with trained breast cancer survivors to offer support, guidance and hope.  [employer] [place] www.51edjsclubtwincities.org  [employer] [place] is a 501(c)3 nonprofit and the local affiliate of the Cancer Support Community, a network of more than 54 supportive, free and welcoming  clubhouses  where  everyone living with cancer can come for social, emotional and psychological support. Clubs are healing environments where individuals learn from each other with guidance from licensed professionals.  BRCA Information Support Group  People with BRCA1 or BRAC2 mutations face complex emotional challenges and complicated medical decisions due to high cancer risks and their impact on individuals and families. This group brings people with a BRAC1 or BRAC2 genetic diagnosis together with others facing similar challenges. The group meets nine times per year. For information, please contact [email or web address] or call [phone].  National Society of Genetic Counselors https://www.nsgc.org  The National Society of Genetic Counselors advances the various roles of genetic counselors in health care by fostering education, research, and public policy to ensure the availability of quality genetic services.  A number of resources are available through this website for providers and patients seeking additional information about genetic counseling services.   Books:  The Breast Reconstruction Guidebook: Issues and Answers from Research to Recovery (2012), by Geraldine Kline  Confronting Hereditary Breast and Ovarian Cancer: Identify Your Risk, Understand Your Options, Change Your Linh (2012), by Mary Jane Botello  Positive Results: Making the Best Decisions When You re at High Risk for Breast or Ovarian Cancer (2010), by Zaria Ochoa and Dr. Helena Swanson.   Previvors: Facing the Breast Cancer Gene and Making Life-Changing Decisions (2010), by Cyndi Viveros

## 2023-01-12 NOTE — TELEPHONE ENCOUNTER
S-(situation): Patient states she has been vomiting for 2 days.  She states she is unable to keep solids down, but she is able to keep liquids down.    B-(background): patient had a gastric sleeve on 7-26-22    A-(assessment): Even if she takes tiny bites- she will vomit later up to hours later.  She vomits once or twice.  She states her breakfast will stay in her stomach until lunch time and then she will vomit.  No fever.  She feels well despite the vomiting.  She states she can drink protein drinks without any trouble.    She has had nausea for almost 3 weeks.  patient had a migraine yesterday, but no headache today.    No urinary symptoms.    R-(recommendations): per protocol patient advised to be seen. Her bariatric team can not see her until April.    Please advise.    Raya Marie RN on 1/12/2023 at 1:31 PM            Reason for Disposition    MILD to MODERATE vomiting (e.g., 1-5 times/day) and lasts > 48 hours (2 days)    Additional Information    Negative: Shock suspected (e.g., cold/pale/clammy skin, too weak to stand, low BP, rapid pulse)    Negative: Difficult to awaken or acting confused (e.g., disoriented, slurred speech)    Negative: Sounds like a life-threatening emergency to the triager    Negative: Vomiting occurs only while coughing    Negative: Pregnant < 20 Weeks and nausea/vomiting began in early pregnancy (i.e., 4-8 weeks pregnant)    Negative: Chest pain    Negative: Headache is main symptom    Negative: Vomiting red blood or black (coffee ground) material    Negative: Vomiting and hernia is more painful or swollen than usual    Negative: Recent head injury (within 3 days)    Negative: Recent abdominal injury (within 7 days)    Negative: Insulin-dependent diabetes and glucose > 240 mg/dL (13 mmol/L)    Negative: Severe pain in one eye    Negative: SEVERE vomiting (e.g., 6 or more times/day)  (Exception: Patient sounds well, is drinking liquids, does not sound dehydrated, and vomiting has  lasted less than 24 hours.)    Negative: MODERATE vomiting (e.g., 3 - 5 times/day) and age > 60 years    Negative: Constant abdominal pain lasting > 2 hours    Negative: High-risk adult (e.g., brain tumor, V-P shunt, hernia)    Negative: Drinking very little and has signs of dehydration (e.g., no urine > 12 hours, very dry mouth, very lightheaded)    Negative: Patient sounds very sick or weak to the triager    Negative: Vomiting and abdomen looks much more swollen than usual    Negative: Vomiting contains bile (green color)    Negative: Fever > 103 F (39.4 C)    Negative: Fever > 101 F (38.3 C) and over 60 years of age    Negative: Fever > 100.0 F (37.8 C) and has a weak immune system (e.g., HIV positive, cancer chemo, organ transplant, splenectomy, chronic steroids)    Negative: Fever > 100.0 F (37.8 C) and bedridden (e.g., nursing home patient, stroke, chronic illness, recovering from surgery)    Negative: Taking any of the following medications: digoxin (Lanoxin), lithium, theophylline, phenytoin (Dilantin)    Negative: SEVERE headache and vomiting    Protocols used: VOMITING-A-OH

## 2023-01-15 ENCOUNTER — E-VISIT (OUTPATIENT)
Dept: URGENT CARE | Facility: CLINIC | Age: 23
End: 2023-01-15
Payer: COMMERCIAL

## 2023-01-15 DIAGNOSIS — R19.5 NONSPECIFIC ABNORMAL FINDING IN STOOL CONTENTS: Primary | ICD-10-CM

## 2023-01-15 PROCEDURE — 99207 PR NON-BILLABLE SERV PER CHARTING: CPT | Performed by: PHYSICIAN ASSISTANT

## 2023-01-16 ENCOUNTER — DOCUMENTATION ONLY (OUTPATIENT)
Dept: LAB | Facility: CLINIC | Age: 23
End: 2023-01-16
Payer: COMMERCIAL

## 2023-01-16 ENCOUNTER — DOCUMENTATION ONLY (OUTPATIENT)
Dept: LAB | Facility: OTHER | Age: 23
End: 2023-01-16

## 2023-01-16 ENCOUNTER — DOCUMENTATION ONLY (OUTPATIENT)
Dept: LAB | Facility: CLINIC | Age: 23
End: 2023-01-16

## 2023-01-16 NOTE — PATIENT INSTRUCTIONS
Dear Sasha Hand,    We are sorry you are not feeling well. Based on the responses you provided, it is recommended that you be seen in-person in urgent care so we can better evaluate your symptoms. Please click here to find the nearest urgent care location to you.   You will not be charged for this Visit. Thank you for trusting us with your care.    Hermann Esposito PA-C    Dear Sasha Hand,    We are sorry you are not feeling well. Based on the responses you provided, you may be experiencing a serious health condition that needs immediate in-person attention. It is recommended that you be seen in the emergency room in order to better evaluate your symptoms. Please click here to find the nearest Emergency Room.     Hermann Esposito PA-C

## 2023-01-17 ENCOUNTER — TELEPHONE (OUTPATIENT)
Dept: ENDOCRINOLOGY | Facility: CLINIC | Age: 23
End: 2023-01-17

## 2023-01-17 NOTE — TELEPHONE ENCOUNTER
Has the patient talked with the bariatric clinic where she had her surgery?  This may need to be addressed by them since she recently had a gastric sleeve done.  It is good that she is tolerating liquids and I would have her just avoid eating any solids right now and just stick with full liquids until she talks with the bariatric clinic.  If this is just a gastroenteritis then it should clear on its own, but typically if it is a viral etiology she should be vomiting both liquids and solids.  If she is having pain associated with this, she probably needs to go into the emergency department.    Electronically signed by:  Oscar Medley M.D.  1/17/2023

## 2023-01-25 NOTE — PROGRESS NOTES
RECORDS STATUS - ALL OTHER DIAGNOSIS      BRCA2 positive  Family history of BRCA2 gene positive  Family history of malignant neoplasm of breast  Family history of prostate cancer  Family history of malignant melanoma  Family history of colon cancer. Appt per pt.  RECORDS RECEIVED FROM: [facility]   DATE RECEIVED: 2/2/2023    Action    Action Taken 1/25/2023 2:26pm KESAL     I called pt Sasha - unavailable. I tried calling again- still no luck.       NOTES STATUS DETAILS   OFFICE NOTE from referring provider Complete Epic   Referred by Anjana العراقي GC   OFFICE NOTE from medical oncologist     OPERATIVE REPORT     LABS     PATHOLOGY REPORTS  12/7/2022   Other Laboratory; [facility]; [facility] BRCA1/2 + Melanoma panel with automatic reflex to the Multi-Cancer panel (Laboratory Miscellaneous Order)       ANYTHING RELATED TO DIAGNOSIS     GENONOMIC TESTING     TYPE:     IMAGING (NEED IMAGES & REPORT)     CT SCANS     MRI     MAMMO     ULTRASOUND     PET

## 2023-01-26 ENCOUNTER — VIRTUAL VISIT (OUTPATIENT)
Dept: ENDOCRINOLOGY | Facility: CLINIC | Age: 23
End: 2023-01-26
Payer: COMMERCIAL

## 2023-01-26 DIAGNOSIS — Z98.84 S/P LAPAROSCOPIC SLEEVE GASTRECTOMY: ICD-10-CM

## 2023-01-26 DIAGNOSIS — Z71.3 NUTRITIONAL COUNSELING: Primary | ICD-10-CM

## 2023-01-26 DIAGNOSIS — E66.9 OBESITY: ICD-10-CM

## 2023-01-26 DIAGNOSIS — E11.9 TYPE 2 DIABETES MELLITUS WITHOUT COMPLICATION, WITHOUT LONG-TERM CURRENT USE OF INSULIN (H): ICD-10-CM

## 2023-01-26 PROCEDURE — 97803 MED NUTRITION INDIV SUBSEQ: CPT | Mod: GT | Performed by: DIETITIAN, REGISTERED

## 2023-01-26 NOTE — PROGRESS NOTES
"Sasha Hand is a 22 year old female who is being evaluated via a billable video visit.      The patient has been notified of following:     \"This video visit will be conducted via a call between you and your physician/provider. We have found that certain health care needs can be provided without the need for an in-person physical exam.  This service lets us provide the care you need with a video conversation.  If a prescription is necessary we can send it directly to your pharmacy.  If lab work is needed we can place an order for that and you can then stop by our lab to have the test done at a later time.    Video visits are billed at different rates depending on your insurance coverage.  Please reach out to your insurance provider with any questions.    If during the course of the call the physician/provider feels a video visit is not appropriate, you will not be charged for this service.\"    Patient has given verbal consent for Video visit? Yes  How would you like to obtain your AVS? MyChart  If you are dropped from the video visit, the video invite should be resent to: Text to cell phone: [phone]  Will anyone else be joining your video visit? No  {If patient encounters technical issues they should call [phone]      Video-Visit Details    Type of service:  Video Visit    Video Start Time: 9:30 AM  Video End Time: 9:45 AM    Originating Location (pt. Location): Home    Distant Location (provider location):   Offsite (providers home)     Platform used for Video Visit: Digital H2O    During this virtual visit the patient is located in [place], patient verifies this as the location during the entirety of this visit.       Nutrition Reassessment  Reason For Visit:  Sasha Hand is a 22 year old female presenting today for nutrition follow-up, 6 month s/p 7/26/22.    Anthropometrics:  Initial Consult Weight: 365 lbs  Day of Surgery Weight (7/26/22): 329 lbs  Current Weight: 286 lbs per pt  Weight loss: -79 lbs from " initial consult; -43 lbs from day of surgery    Current Vitamins/Minerals: MVI Women's One A Day, calcium + vitamin D TID, B complex, B12 injection     Nutrition History:  Pt recently struggling with using food as a comfort d/t a death in the family.   Does not tolerate spicy foods.  3/4-1 cup of food per meal.  Has been cooking and meal prepping with boyfriend.    1/26/23: Pt feeling frustrated she is not losing weight faster. Is doing more weights in the gym and wonders if she is gaining some muscle.     Progress with Previous Goals:  1) Follow bariatric regular diet. Met, continues   2) Consume 60+ grams of protein/day. Met, continues   3) Sip on 48-64+ oz of fluids/day- between meals only. Met, continues   4) Eat slowly (>20 min/meal), chewing foods well (to applesauce-like consistency). Met, continues   5) Limit portions to ~1/2 - 3/4 cup/meal until 6 months post op. Met, continues   6) Take the following after a Sleeve Gastrectomy: met, continues   - Multivitamin/minerals: adult dose 1-2 times daily  Ideally want one that provides:   5,000 - 10,000 international units vitamin A daily   800 mg oral folate daily  8 - 22 mg zinc and 1 - 2 mg copper daily  12 mg Thiamine  - Iron: 45-60 mg elemental (18-36 mg if low risk) - may partly or fully be covered in multivitamin   - Calcium Citrate containing vitamin D: 500 mg 3 times daily or 600 mg 2 times daily     - Separate the calcium from your multivitamin or iron by at least 2 hours.     - Must be a chewable calcium citrate until post-op 3 months     - Options for calcium citrate: Dong calcium citrate chewable, bariatric advantage calcium citrate chewable, Celebrate vitamins calcium citrate chewable, Bariatric Fusion calcium citrate chewable  - Vitamin D - at least 3,000 international units/day between all supplements  - Vitamin B12: sublingual form of at least 500 mcg daily or injection of 1000 mcg monthly     Nutrition Prescription:  Grams Protein: 60 (minimum)    Amount of Fluid: 48-64+ oz    Nutrition Diagnosis  Previous: Food and nutrition-related knowledge deficit r/t lack of prior exposure to diet advancements beyond bariatric pureed diet aeb recent bariatric surgery and pt interest in diet education/review    Current: Food and nutrition-related knowledge deficit r/t lack of prior exposure to diet instruction beyond 3 months s/p SG as evidenced by recent bariatric surgery and pt interest in diet education/review      Intervention  Materials/Education provided, reviewed bariatric regular consistency diet, protein intake, fluid intake, eating pace, chewing foods well, portion control, sugar/fat intake, recommended vitamin/mineral supplements. Patient demonstrates understanding.     Expected Engagement: good     Goals:  Take the following after a Sleeve Gastrectomy:  - Multivitamin/minerals: adult dose 1-2 times daily  Ideally want one that provides:   5,000 - 10,000 international units vitamin A daily   800 mg oral folate daily  8 - 22 mg zinc and 1 - 2 mg copper daily  12 mg Thiamin  - Iron: 45-60 mg elemental (18-36 mg if low risk) - may partly or fully be covered in multivitamin   - Calcium Citrate containing vitamin D: 500 mg 3 times daily or 600 mg 2 times daily     - Separate the calcium from your multivitamin or iron by at least 2 hours.     - Must be a chewable calcium citrate until post-op 3 months     - Options for calcium citrate: Dong calcium citrate chewable, bariatric advantage calcium citrate chewable, Celebrate vitamins calcium citrate chewable, Bariatric Fusion calcium citrate chewable  - Vitamin D - at least 3,000 international units/day between all supplements  - Vitamin B12: sublingual form of at least 500 mcg daily or injection of 1000 mcg monthly     Your Stage 5 Diet: Regular Foods  [email or web address]     Keeping Up Your Diet after Weight Loss Surgery  [email or web address]    Exercises after Weight Loss Surgery (strengthening,  when no weight lifting restrictions)  [email or web address]    Why Take Supplements for Life after Weight Loss Surgery  [email or web address]      Preventing Low Blood Sugar after Weight Loss Surgery  [email or web address]      Preventing Dumping Syndrome after Weight Loss Surgery  [email or web address]    Supplements after Sleeve Gastrectomy, Gastric Bypass or Single Anastomosis Duodenal Switch  [email or web address]       Follow-Up: 12 months post op/prn    Time spent with patient: 15 minutes.  MARCIE GLOVER RD LD

## 2023-01-26 NOTE — LETTER
"1/26/2023       RE: Sasha Hand  [address]  [place] [address]     Dear Colleague,    Thank you for referring your patient, Sasha Hand, to the [facility] [place] at [facility]. Please see a copy of my visit note below.    Sasha Hand is a 22 year old female who is being evaluated via a billable video visit.      The patient has been notified of following:     \"This video visit will be conducted via a call between you and your physician/provider. We have found that certain health care needs can be provided without the need for an in-person physical exam.  This service lets us provide the care you need with a video conversation.  If a prescription is necessary we can send it directly to your pharmacy.  If lab work is needed we can place an order for that and you can then stop by our lab to have the test done at a later time.    Video visits are billed at different rates depending on your insurance coverage.  Please reach out to your insurance provider with any questions.    If during the course of the call the physician/provider feels a video visit is not appropriate, you will not be charged for this service.\"    Patient has given verbal consent for Video visit? Yes  How would you like to obtain your AVS? MyChart  If you are dropped from the video visit, the video invite should be resent to: Text to cell phone: [phone]  Will anyone else be joining your video visit? No  {If patient encounters technical issues they should call [phone]      Video-Visit Details    Type of service:  Video Visit    Video Start Time: 9:30 AM  Video End Time: 9:45 AM    Originating Location (pt. Location): Home    Distant Location (provider location):   Offsite (providers home)     Platform used for Video Visit: TVbeat    During this virtual visit the patient is located in [place], patient verifies this as the location during the entirety of " this visit.       Nutrition Reassessment  Reason For Visit:  Sasha Hand is a 22 year old female presenting today for nutrition follow-up, 6 month s/p 7/26/22.    Anthropometrics:  Initial Consult Weight: 365 lbs  Day of Surgery Weight (7/26/22): 329 lbs  Current Weight: 286 lbs per pt  Weight loss: -79 lbs from initial consult; -43 lbs from day of surgery    Current Vitamins/Minerals: MVI Women's One A Day, calcium + vitamin D TID, B complex, B12 injection     Nutrition History:  Pt recently struggling with using food as a comfort d/t a death in the family.   Does not tolerate spicy foods.  3/4-1 cup of food per meal.  Has been cooking and meal prepping with boyfriend.    1/26/23: Pt feeling frustrated she is not losing weight faster. Is doing more weights in the gym and wonders if she is gaining some muscle.     Progress with Previous Goals:  1) Follow bariatric regular diet. Met, continues   2) Consume 60+ grams of protein/day. Met, continues   3) Sip on 48-64+ oz of fluids/day- between meals only. Met, continues   4) Eat slowly (>20 min/meal), chewing foods well (to applesauce-like consistency). Met, continues   5) Limit portions to ~1/2 - 3/4 cup/meal until 6 months post op. Met, continues   6) Take the following after a Sleeve Gastrectomy: met, continues   - Multivitamin/minerals: adult dose 1-2 times daily  Ideally want one that provides:   5,000 - 10,000 international units vitamin A daily   800 mg oral folate daily  8 - 22 mg zinc and 1 - 2 mg copper daily  12 mg Thiamine  - Iron: 45-60 mg elemental (18-36 mg if low risk) - may partly or fully be covered in multivitamin   - Calcium Citrate containing vitamin D: 500 mg 3 times daily or 600 mg 2 times daily     - Separate the calcium from your multivitamin or iron by at least 2 hours.     - Must be a chewable calcium citrate until post-op 3 months     - Options for calcium citrate: Dong calcium citrate chewable, bariatric advantage calcium citrate chewable,  Celebrate vitamins calcium citrate chewable, Bariatric Fusion calcium citrate chewable  - Vitamin D - at least 3,000 international units/day between all supplements  - Vitamin B12: sublingual form of at least 500 mcg daily or injection of 1000 mcg monthly     Nutrition Prescription:  Grams Protein: 60 (minimum)   Amount of Fluid: 48-64+ oz    Nutrition Diagnosis  Previous: Food and nutrition-related knowledge deficit r/t lack of prior exposure to diet advancements beyond bariatric pureed diet aeb recent bariatric surgery and pt interest in diet education/review    Current: Food and nutrition-related knowledge deficit r/t lack of prior exposure to diet instruction beyond 3 months s/p SG as evidenced by recent bariatric surgery and pt interest in diet education/review      Intervention  Materials/Education provided, reviewed bariatric regular consistency diet, protein intake, fluid intake, eating pace, chewing foods well, portion control, sugar/fat intake, recommended vitamin/mineral supplements. Patient demonstrates understanding.     Expected Engagement: good     Goals:  Take the following after a Sleeve Gastrectomy:  - Multivitamin/minerals: adult dose 1-2 times daily  Ideally want one that provides:   5,000 - 10,000 international units vitamin A daily   800 mg oral folate daily  8 - 22 mg zinc and 1 - 2 mg copper daily  12 mg Thiamin  - Iron: 45-60 mg elemental (18-36 mg if low risk) - may partly or fully be covered in multivitamin   - Calcium Citrate containing vitamin D: 500 mg 3 times daily or 600 mg 2 times daily     - Separate the calcium from your multivitamin or iron by at least 2 hours.     - Must be a chewable calcium citrate until post-op 3 months     - Options for calcium citrate: Dong calcium citrate chewable, bariatric advantage calcium citrate chewable, Celebrate vitamins calcium citrate chewable, Bariatric Fusion calcium citrate chewable  - Vitamin D - at least 3,000 international units/day between  all supplements  - Vitamin B12: sublingual form of at least 500 mcg daily or injection of 1000 mcg monthly     Your Stage 5 Diet: Regular Foods  [email or web address]     Keeping Up Your Diet after Weight Loss Surgery  [email or web address]    Exercises after Weight Loss Surgery (strengthening, when no weight lifting restrictions)  [email or web address]    Why Take Supplements for Life after Weight Loss Surgery  [email or web address]      Preventing Low Blood Sugar after Weight Loss Surgery  [email or web address]      Preventing Dumping Syndrome after Weight Loss Surgery  [email or web address]    Supplements after Sleeve Gastrectomy, Gastric Bypass or Single Anastomosis Duodenal Switch  [email or web address]       Follow-Up: 12 months post op/prn    Time spent with patient: 15 minutes.  MARCIE GLOVER RD LD

## 2023-01-26 NOTE — PATIENT INSTRUCTIONS
Harjit Baez!    Follow-up with RD in 6 months    Thank you,    Kaleigh Curran, RD, LD  If you would like to schedule or reschedule an appointment with the RD, please call [phone]    Nutrition Goals  Take the following after a Sleeve Gastrectomy:  - Multivitamin/minerals: adult dose 1-2 times daily  Ideally want one that provides:   5,000 - 10,000 international units vitamin A daily   800 mg oral folate daily  8 - 22 mg zinc and 1 - 2 mg copper daily  12 mg Thiamin  - Iron: 45-60 mg elemental (18-36 mg if low risk) - may partly or fully be covered in multivitamin   - Calcium Citrate containing vitamin D: 500 mg 3 times daily or 600 mg 2 times daily     - Separate the calcium from your multivitamin or iron by at least 2 hours.     - Must be a chewable calcium citrate until post-op 3 months     - Options for calcium citrate: Dong calcium citrate chewable, bariatric advantage calcium citrate chewable, Celebrate vitamins calcium citrate chewable, Bariatric Fusion calcium citrate chewable  - Vitamin D - at least 3,000 international units/day between all supplements  - Vitamin B12: sublingual form of at least 500 mcg daily or injection of 1000 mcg monthly     Your Stage 5 Diet: Regular Foods  [email or web address]     Keeping Up Your Diet after Weight Loss Surgery  [email or web address]    Exercises after Weight Loss Surgery (strengthening, when no weight lifting restrictions)  [email or web address]    Why Take Supplements for Life after Weight Loss Surgery  [email or web address]      Preventing Low Blood Sugar after Weight Loss Surgery  [email or web address]      Preventing Dumping Syndrome after Weight Loss Surgery  [email or web address]    Supplements after Sleeve Gastrectomy, Gastric Bypass or Single Anastomosis Duodenal Switch  [email or web address]         Interested in working with a health ? Health coaches work with you to improve your overall  health and wellbeing. They look at the whole person, and may involve discussion of different areas of life, including, but not limited to the four pillars of health (sleep, exercise, nutrition, and stress management). Discuss with your care team if you would like to start working a health .    Health Coaching-3 Pack:    $99 for three health coaching visits    Visits may be done in person or via phone    Coaching is a partnership between the  and the client; Coaches do not prescribe or diagnose    Coaching helps inspire the client to reach his/her personal goals    COMPREHENSIVE WEIGHT MANAGEMENT PROGRAM  VIRTUAL SUPPORT GROUPS    For Support Group Information:      We offer support groups for patients who are working on weight loss and considering, preparing for or have had weight loss surgery.   There is no cost for this opportunity.  You are invited to attend the?Virtual Support Groups?provided by any of the following locations:    [facility] via Microsoft Teams with Sangita Eastman RN  2.   [place] via [employer] with Bryce Raymond, PhD, LP  3.   [place] via [employer] with Tari Blue RN  4.   [facility] via Microsoft Teams with Tari Schultz [facility]    The following Support Group information can also be found on our website:  [email or web address]      [facility] Weight Loss Surgery Support Group    [facility] Weight Loss Surgery Support Group  The support group is a patient-lead forum that meets monthly to share experiences, encouragement and education. It is open to those who have had weight loss surgery, are scheduled for surgery, and those who are considering surgery.   WHEN: This group meets on the 3rd Wednesday of each month from 5:00PM - 6:00PM virtually using Microsoft Teams.   FACILITATOR: Led by Sangita Eastman RD, LD, RN, the program's Clinical Coordinator.   TO REGISTER: Please contact the  "clinic via [facility] or call the nurse line directly at [phone] to inform our staff that you would like an invite sent to you and to let us know the email you would like the invite sent to. Prior to the meeting, a link with directions on how to join the meeting will be sent to you.    2022 Meetings January 19: \"Let's Talk\" a time for the group to share.  February 16: \"Let's Talk\" a time for the group to share.  March 16: Guest Speakers: Psychologists, Katherine Pearson, PhD,LP and Evelia Marte PsyD,  April 20: Guest Speaker: Health , Nanette Alfaro, [facility],CHES, CPT  May 18: Guest Speaker: Dietitian, Marvel Cannon, NIKITA,   Bhumika 15: \"Let's Talk\" a time for the group to share.  July 20: \"Let's Talk\" a time for the group to share.  August 17: TBA  September 21: TBA  October 19: Guest Speaker: Dr Ang Turner MD Pulmonologist and Sleep Medicine Physician, \"Getting a Good Night's Sleep\".  November 16: TBA  December 21: TBA    [facility] Support Groups    Connections: Bariatric Care Support Group?  This is open to all [facility] (and those external to this program) pre- and post- operative bariatric surgery patients as well as their support system.   WHEN: This group meets the 2nd Tuesday of each month from 6:30 PM - 8:00 PM virtually using Microsoft Teams.   FACILITATOR: Led by Bryce Raymond, Ph.D who is a Licensed Psychologist with the [facility] Comprehensive Weight Management Program.   TO REGISTER: Please send an email to Bryce Raymond, Ph.D.,  at?[email or web address]?if you would like an invitation to the group and to learn about using Microsoft Teams.    2022 Meetings  January 11: Kelsey Menard, PharmD, Pharmacy Resident at [facility], \"Medications and Bariatric Surgery\".  February 8: Open Forum  March 8  April 12  May 10  Bhumika 14    Connections: Post-Operative Bariatric Surgery Support Group  This is a support group for [facility] " "bariatric patients (and those external to [facility]) who have had bariatric surgery and are at least 3 months post-surgery.  WHEN: This support group meets the 4th Wednesday of the month from 11:00 AM - 12:00 PM virtually using Microsoft Teams.   FACILITATOR: Led by Certified Bariatric Nurse, Tari Blue RN.   TO REGISTER: Please send an email to Tari at [email or web address] if you would like an invitation to the group and to learn about using Microsoft Teams.    2022 Meetings  January 26  February 23  March 23  April 27  May 25  Bhumika 22    [facility] Healthy Lifestyle Virtual Support Group    Healthy Lifestyle Virtual Support Group?  This is 60 minutes of small group guided discussion, support and resources. All are welcome who want a healthy lifestyle.  WHEN: This group meets monthly on a Friday from 12:30 PM - 1:30 PM virtually using Microsoft Teams.   FACILITATOR: Led by National Board Certified Health and , Tari Schultz [facility].   TO REGISTER: Please send an email to Tari [email or web address] to receive monthly invites to the group or if you have any questions about having a health .  Prior to the meeting, a link with directions on how to join the meeting will be sent to you.    2022 Meetings  January 21: Lauren Lara MS, RN, CIC, CBN, \"Healthy Habits\"  February 25: Open Forum  March 18: \"Setting Limits and Boundaries\"  April 29: Hilda Murguia RD, \"Meal Planning Made Easy\"  May 20: Open Forum  June: To be determined                "

## 2023-01-27 ENCOUNTER — DOCUMENTATION ONLY (OUTPATIENT)
Dept: OTHER | Age: 23
End: 2023-01-27

## 2023-02-01 PROBLEM — Z15.01 BRCA2 GENE MUTATION POSITIVE: Status: ACTIVE | Noted: 2023-02-01

## 2023-02-01 PROBLEM — Z15.09 BRCA2 GENE MUTATION POSITIVE: Status: ACTIVE | Noted: 2023-02-01

## 2023-02-01 NOTE — PROGRESS NOTES
Sasha is a 22 year old who is being evaluated via a billable video visit.      How would you like to obtain your AVS? MyChart  If the video visit is dropped, the invitation should be resent by: Text to cell phone: [phone]  Will anyone else be joining your video visit? No      Video-Visit Details    Type of service:  Video Visit   Video Start Time: 857  Video End Time:9:18 AM    Originating Location (pt. Location): Home    Distant Location (provider location):  Off-site  Platform used for Video Visit: Mitch Mcmahan    Oncology Risk Management Consultation:  Date on this visit: 2023    Sasha Hand  is referred by Anjana العراقي [facility],  for an oncology risk management consultation. She requires high risk screening and surveillance to reduce  her risk of cancer secondary to a deleterious BRCA2 mutation.  She is  considered to be at high risk for hereditary breast and ovarian cancer.    Primary Physician: Oscar Medley MD    History Of Present Illness:  Ms. Hand is a very pleasant, healthy 22 year old female who presents with family history of breast and ovarian cancer and a personal diagnosis of a BRCA2 mutation.       Gen[facility] Testin2022 Genetic Testing Results: POSITIVE for a BRCA2 mutation. Specifically her mutation is called [id number] ([id number])  identified using a Multi-Cancer panel through [facility].  This testing was done because of her family history of breast cancer and her mother having tested positive for a BRCA2 gene mutation.      Of note, Sasha is negative for mutations in the following genes: AIP, ALK, APC, SHEA, AXIN2, BAP1, BARD1, BLM, BMPR1A, BRCA1, BRCA2, BRIP1, CASR, CDC73, CDH1, CDK4, CDKN1B, CDKN1C, CDKN2A, CEBPA, CHEK2, CTNNA1, DICER1, DIS3L2, EGFR, EPCAM, FH, FLCN, GATA2, GPC3, GREM1, HOXB13, HRAS, KIT, MAX, MEN1, MET, MITF, MLH1, MSH2, MSH3, MSH6, MUTYH, NBN, NF1, NF2, NTHL1, PALB2, PDGFRA, PHOX2B, PMS2, POLD1, POLE, POT1, JGUHF9L, PTCH1, PTEN,  RAD50, RAD51C, RAD51D, RB1, RECQL4, RET, RUNX1, SDHA, SDHAF2, SDHB, SDHC, SDHD, SMAD4, SMARCA4, SMARCB1, SMARCE1, STK11, SUFU, TERC, TERT, JBVJ000, TP53, TSC1, TSC2, VHL, WRN, WT1, AP2S1, GNA11, and MC1R genes.        Pertinent history:  7/28/2022- s/p partial gastrectomy (bariatric surgery). Pathology:  STOMACH, PARTIAL GASTRECTOMY:  -Gastric mucosa with no significant histologic abnormality  -Negative for intestinal metaplasia and dysplasia  -No H. pylori-like organisms identified on routine stain      Menarche at age 9  Nulliparous.  Premenopausal.  Ovaries, fallopian tubes and uterus in place.    History of oral contraceptive use for about 3 years   Currently has a Nexplanon, inserted in 2021  No hx of hormone replacement therapy.    No hx of breast biopsy  No hx of hyperplasia, atypia or malignancy     Pertinent screening history:  6/1/2020- Dermatology screening, Marvel Oliver MD - Skin, left calf: -Granuloma annulare    At this visit, she denies new fatigue, breast pain, asymmetry, lumps, masses, thickening, nipple discharge and skin changes in her breasts.    Past Medical/Surgical History:  Past Medical History:   Diagnosis Date     BRCA2 gene mutation positive 02/01/2023     Closed head injury, subsequent encounter 04/05/2018     History of PCR DNA positive for HSV1      Moderate major depression (H) 01/20/2014     Nexplanon placed 11/14/17 11/14/2017     Nexplanon placed 11/14/17 (removed 1/16/2020) 11/14/2017     Nexplanon placed 12/14/2021 12/14/2021     Obesity 09/14/2010     Pyelonephritis 02/19/2018     Skin tag (right collar line and left labial region 11/21/2019     Tobacco use disorder 05/04/2016     Uncomplicated asthma      Past Surgical History:   Procedure Laterality Date     ARTHROSCOPY KNEE WITH MENISCAL REPAIR Right 5/10/2017    Procedure: ARTHROSCOPY KNEE WITH MENISCAL REPAIR;  arthroscopy right knee with lateral meniscus repair;  Surgeon: Nathaniel Hicks MD;  Location:  OR      EFRAIN GASTRIC SLEEVE N/A 7/26/2022    Procedure: GASTRECTOMY, SLEEVE, ROBOT-ASSISTED, LAPAROSCOPIC;  Surgeon: Joseph Mata MD;  Location: [facility] OR     ESOPHAGOSCOPY, GASTROSCOPY, DUODENOSCOPY (EGD), COMBINED N/A 3/28/2022    Procedure: ESOPHAGOGASTRODUODENOSCOPY (EGD);  Surgeon: Joseph Mata MD;  Location: [facility] OR       Allergies:  Allergies as of 02/02/2023 - Reviewed 02/02/2023   Allergen Reaction Noted     Ondansetron Headache 07/15/2022     No known drug allergies  02/07/2002     Seasonal allergies  07/22/2009     Steri strips  03/24/2022       Current Medications:  Current Outpatient Medications   Medication Sig Dispense Refill     albuterol (PROAIR HFA/PROVENTIL HFA/VENTOLIN HFA) 108 (90 Base) MCG/ACT inhaler Inhale 1-2 puffs into the lungs every 4 hours as needed for shortness of breath / dyspnea or wheezing 8.5 g 5     betamethasone dipropionate (DIPROSONE) 0.05 % external cream APPLY TO AFFECTED AREA(S) ONCE DAILY       buPROPion (WELLBUTRIN XL) 150 MG 24 hr tablet Take 1 tablet (150 mg) by mouth every morning 90 tablet 3     cyanocobalamin (CYANOCOBALAMIN) 1000 MCG/ML injection Inject 1 mL (1,000 mcg) Subcutaneous every 30 days 1 mL 11     etonogestrel (NEXPLANON) 68 MG IMPL 1 each (68 mg) by Subdermal route once       Fexofenadine HCl (ALLEGRA PO) Take by mouth daily as needed for allergies       fluticasone-salmeterol (ADVAIR) 250-50 MCG/ACT inhaler Inhale 1 puff into the lungs every 12 hours 60 each 11     ipratropium - albuterol 0.5 mg/2.5 mg/3 mL (DUONEB) 0.5-2.5 (3) MG/3ML neb solution Take 1 vial (3 mLs) by nebulization every 6 hours as needed for shortness of breath / dyspnea or wheezing 30 vial 1     LORazepam (ATIVAN) 1 MG tablet Take 1 tablet (1 mg) by mouth once as needed for anxiety (prior to breast MRI) Take with you to appointment and check with the nurse as to the appropriate time to take the medication. Do not operate a vehicle after taking this medication 1 tablet 0      "RESTASIS 0.05 % ophthalmic emulsion INSTILL 1 DROP INTO EACH EYE TWICE DAILY       Syringe/Needle, Disp, (BD ECLIPSE SYRINGE/NEEDLE) 25G X 5/8\" 3 ML MISC 1 Syringe every 30 days 1 each 11        Family History:  Family History   Problem Relation Age of Onset     Asthma Mother      Hypertension Mother      Breast Cancer Mother 54        lumpectomy and BSO     Hereditary Breast and Ovarian Cancer Syndrome Mother         BRCA2+     Asthma Father      Diabetes Father      Melanoma Father 59        metastatic to brain     Cervical Cancer Sister 19     Parathyroid Disorders Sister      Vascular Disease Sister         back of head     Lung Cancer Paternal Grandmother         smoker,  in late 80s     Colon Cancer Paternal Grandmother 60     Heart Disease Paternal Grandfather          in 70s     Melanoma Paternal Aunt      Prostate Cancer Paternal Uncle 40     Colon Cancer Paternal Uncle      Melanoma Paternal Uncle      Cancer Paternal Uncle         hematological cancer; s/p SCT     Melanoma Paternal Uncle      Melanoma Paternal Uncle      Cancer Paternal Uncle 61        GI tract; cause of death at 62; father's twin     Melanoma Paternal Uncle      Breast Cancer Other 50        contralateral occurrence at 57; ; maternal grandfather's sister     Breast Cancer Other 50        paternal grandfather's sister     Anesthesia Reaction No family hx of      Bleeding Disorder No family hx of      Clotting Disorder No family hx of        Social History:  Social History     Socioeconomic History     Marital status: Single     Spouse name: Not on file     Number of children: 0     Years of education: Not on file     Highest education level: Not on file   Occupational History     Occupation: Reception     Comment: [employer]   Tobacco Use     Smoking status: Former     Packs/day: 1.00     Types: Cigarettes     Quit date: 2021     Years since quittin.0     Smokeless tobacco: Former   Vaping Use     Vaping " Use: Never used   Substance and Sexual Activity     Alcohol use: No     Alcohol/week: 0.0 standard drinks     Drug use: No     Sexual activity: Yes     Partners: Male     Birth control/protection: Condom, Implant   Other Topics Concern     Parent/sibling w/ CABG, MI or angioplasty before 65F 55M? Not Asked   Social History Narrative     Not on file     Social Determinants of Health     Financial Resource Strain: Not on file   Food Insecurity: Not on file   Transportation Needs: Not on file   Physical Activity: Not on file   Stress: Not on file   Social Connections: Not on file   Intimate Partner Violence: Not on file   Housing Stability: Not on file       There were no vitals taken for this visit.  GENERAL: Healthy, alert and no distress  EYES: Eyes grossly normal to inspection.  No discharge or erythema, or obvious scleral/conjunctival abnormalities.  RESP: No audible wheeze, cough, or visible cyanosis.  No visible retractions or increased work of breathing.    SKIN: Visible skin clear. No significant rash, abnormal pigmentation or lesions.  NEURO: Cranial nerves grossly intact.  Mentation and speech appropriate for age.  PSYCH: Mentation appears normal, affect normal/bright, judgement and insight intact, normal speech and appearance well-groomed.      Laboratory/Imaging Studies  No results found for any visits on 02/02/23.    ASSESSMENT  We discussed the differences between cancer risk for the general population and those with BRCA mutations. It is estimated that 10% of all women with breast cancer have BRCA mutations.  It is estimated that 15% of all women with ovarian cancer have BRCA mutations. We also discussed that inheriting a mutation does not mean that a person will develop cancer, but rather that they are at increased risk.     Women with BRCA2 mutations have an estimated 45-49% cumulative risk of breast cancer by age 70, compared to the general population risk of 12%.  The risk of developing  contralateral breast cancer within BRCA2 carriers  is higher when the age at first diagnosis is less than age 40. It is estimated that in women whose breast cancer have a 34.6% risk for contralateral breast cancer within 10- years of their initial diagnosis (with no intervention.)    The risk for ovarian cancer, including primary peritoneal and fallopian tube cancer, by age 70 is estimated to be between 11-18%. Male BRCA2+ carriers have a cumulative breast cancer risk of more than 6% by age 70. Both men and women have an increased risk of pancreatic cancer,  Melanoma, gall bladder, bile duct and stomach cancer compared to the general population, although the exact risks have not been well defined.     We also discussed the risks and benefits of prophylactic bilateral salpingo-ooperectomy. Research involving 10 studies of BRCA1 and BRCA2 mutation carriers showed an 80% reduction the risk for fallopian tube and ovarian cancer following risk reducing salpingo-oophorectomy, with a residual risk of peritoneal carcinoma of 1-2%. Research shows the a bilateral salpingo-ooperectomy can also reduce the risk of breast cancer by 50%, especially in women 40 years old and younger.      She is currently using a Nexplanon implant, inserted in  and reports a history of ovarian cysts.  The Nexplanon's mechanism of action is to suppress ovulation, which may be helpful in reducing her risk for ovarian cancer, as research has shown that combination birth control pills, with the same mechanism of action, can reduce a woman's risk for ovarian cancer by 50%.    She and her fiance are planning to start a family and she would like to have an RRSO after her family is complete.      We discussed that the absolute risk for pancreatic cancer is between 5-10% for BRCA2+ carriers. Screening is based on family history of pancreatic cancer and may not be needed for families who do not have such history. In her family, her father  of  malignant melanoma and she is interested in starting routine dermatology screening in the future, possibly next year, as she is busy planning her wedding for November 2023.      Individualized Surveillance Plan for women  Hereditary Breast and/or Ovarian Cancer Syndrome   Per NCCN Guidelines Version 2.2023   Recommended screening Test or procedure Last done Next Scheduled    Breast self awareness starting at age 18.    Breast cancer risk >60% Women should be familiar with their breasts and promptly report changes to their care provider. Periodic, consistent self exam may facilitate breast self awareness. Premenopausal women may find self exams to be most informative when performed at the end of menses.   2/2/2023 October 2023   Breast screening, starting at age 25 Clinical breast exams every 6 -12 months Exam deferred October 2023   Breast screening   Age 25-29 Annual breast MRI screening with contrast (or mammogram if MRI is unavailable) or individualized based on family history if a breast cancer diagnosis before age 30 is present.       Breast MRI is performed preferably on day 7-15 of menstrual cycle for premenopausal women.     None to date   Baseline mammogram ordered for April 2023    Breast MRI ordered for October 2023   Breast screening   Age >30-75 years     Annual mammogram (consider tomosynthesis mammogram) and annual screening MRI.     Breast MRI is performed preferably on day 7-15 of menstrual cycle for premenopausal women.    Age>75 years, management should be considered on an individual basis.   NA   NA   Ovarian cancer screening, starting at age 30-35    Absolute risk for epithelial ovarian cancer -39-58% for BRCA1+ carriers and 13-29% for BRCA2+ carriers   Consider transvaginal ultrasound and  tests.   NA   Begin at 30   Pancreatic Cancer Screening beginning at age 50 or 10 years prior to the earliest pancreatic cancer on the BRCA-side of the family  In families with exocrine pancreatic  cancer in a first or second degree relative    Risk is <5% for BRCA1+ carriers    5-10% for BRCA2+ carriers     Consider Annual MRI/MRCP and/or Endoscopic Ultrasound   None in family   Discuss at future visits   Recommendation: risk-reducing salpingo-oophorectomy(RRSO), typically between 35 and 40 years old and  upon completion of child bearing.     Because ovarian cancer onset in patients with BRCA2 mutations is on average of 8-10 years later than in patients with BRCA1 mutations, it is reasonable to delay RRSO until 40-45 years in patients with BRCA2 mutations  unless age at diagnosis in the family warrants earlier age for consideration for prophylactic surgery.    Limited data suggest that there may be a slightly increased risk of serous uterine cancer among women with BRCA1+ pathogenic variants. The provider and the patient should discuss the risks and benefits of a concurrent hysterectomy at the time of RRSO for women with a BRCA1+ pathogenic variant /like pathogenic variant prior to surgery.     Salpingectomy alone is not the standard of care for risk reduction although clinical trials are ongoing.     Discuss option of risk-reducing mastectomy.    Consider risks and benefits of risk reducing agents, such as tamoxifen and raloxifene for breast and ovarian cancer.    Consider a full body skin and eye exam for melanoma screening for both BRCA1+ and BRCA2+.     NOTE: Women with BRCA mutation who are treated for breast cancer should have screening of the remaining breast tissue with annual mammography and breast MRI.       I spent a total of 38 minutes on the day of the visit. Please see the note for further information on patient assessment and treatment.    Veronica Mckeon, SAVI-CNS, OCN, AGN-BC  Clinical Nurse Specialist  Cancer Risk Management Program  [facility]    CC: Oscar Medley MD

## 2023-02-02 ENCOUNTER — PRE VISIT (OUTPATIENT)
Dept: ONCOLOGY | Facility: CLINIC | Age: 23
End: 2023-02-02

## 2023-02-02 ENCOUNTER — TELEPHONE (OUTPATIENT)
Dept: ENDOCRINOLOGY | Facility: CLINIC | Age: 23
End: 2023-02-02

## 2023-02-02 ENCOUNTER — VIRTUAL VISIT (OUTPATIENT)
Dept: ENDOCRINOLOGY | Facility: CLINIC | Age: 23
End: 2023-02-02
Payer: COMMERCIAL

## 2023-02-02 ENCOUNTER — VIRTUAL VISIT (OUTPATIENT)
Dept: ONCOLOGY | Facility: CLINIC | Age: 23
End: 2023-02-02
Attending: GENETIC COUNSELOR, MS
Payer: COMMERCIAL

## 2023-02-02 VITALS — HEIGHT: 67 IN | BODY MASS INDEX: 44.42 KG/M2 | WEIGHT: 283 LBS

## 2023-02-02 DIAGNOSIS — Z80.0 FAMILY HISTORY OF COLON CANCER: ICD-10-CM

## 2023-02-02 DIAGNOSIS — Z15.09 BRCA2 GENE MUTATION POSITIVE: Primary | ICD-10-CM

## 2023-02-02 DIAGNOSIS — Z12.39 BREAST CANCER SCREENING, HIGH RISK PATIENT: ICD-10-CM

## 2023-02-02 DIAGNOSIS — F41.9 ANXIETY: ICD-10-CM

## 2023-02-02 DIAGNOSIS — E66.01 CLASS 3 SEVERE OBESITY WITH SERIOUS COMORBIDITY AND BODY MASS INDEX (BMI) OF 40.0 TO 44.9 IN ADULT, UNSPECIFIED OBESITY TYPE (H): Primary | ICD-10-CM

## 2023-02-02 DIAGNOSIS — Z98.84 S/P LAPAROSCOPIC SLEEVE GASTRECTOMY: ICD-10-CM

## 2023-02-02 DIAGNOSIS — Z80.42 FAMILY HISTORY OF PROSTATE CANCER: ICD-10-CM

## 2023-02-02 DIAGNOSIS — R19.7 DIARRHEA, UNSPECIFIED TYPE: ICD-10-CM

## 2023-02-02 DIAGNOSIS — Z80.3 FAMILY HISTORY OF MALIGNANT NEOPLASM OF BREAST: ICD-10-CM

## 2023-02-02 DIAGNOSIS — Z80.8 FAMILY HISTORY OF MALIGNANT MELANOMA: ICD-10-CM

## 2023-02-02 DIAGNOSIS — Z15.01 BRCA2 GENE MUTATION POSITIVE: Primary | ICD-10-CM

## 2023-02-02 DIAGNOSIS — E66.813 CLASS 3 SEVERE OBESITY WITH SERIOUS COMORBIDITY AND BODY MASS INDEX (BMI) OF 40.0 TO 44.9 IN ADULT, UNSPECIFIED OBESITY TYPE (H): Primary | ICD-10-CM

## 2023-02-02 PROCEDURE — 99203 OFFICE O/P NEW LOW 30 MIN: CPT | Mod: GT | Performed by: CLINICAL NURSE SPECIALIST

## 2023-02-02 PROCEDURE — 99214 OFFICE O/P EST MOD 30 MIN: CPT | Mod: GT

## 2023-02-02 RX ORDER — LORAZEPAM 1 MG/1
1 TABLET ORAL
Qty: 1 TABLET | Refills: 0 | Status: SHIPPED | OUTPATIENT
Start: 2023-02-02 | End: 2023-02-02

## 2023-02-02 RX ORDER — SEMAGLUTIDE 0.25 MG/.5ML
0.25 INJECTION, SOLUTION SUBCUTANEOUS WEEKLY
Qty: 2 ML | Refills: 0 | Status: SHIPPED | OUTPATIENT
Start: 2023-02-02 | End: 2023-04-25 | Stop reason: ALTCHOICE

## 2023-02-02 RX ORDER — LORAZEPAM 1 MG/1
1 TABLET ORAL
Qty: 1 TABLET | Refills: 0 | Status: SHIPPED | OUTPATIENT
Start: 2023-02-02 | End: 2023-10-01

## 2023-02-02 RX ORDER — SEMAGLUTIDE 0.5 MG/.5ML
0.5 INJECTION, SOLUTION SUBCUTANEOUS WEEKLY
Qty: 2 ML | Refills: 1 | Status: SHIPPED | OUTPATIENT
Start: 2023-03-02 | End: 2023-05-16

## 2023-02-02 RX ORDER — CHOLECALCIFEROL (VITAMIN D3) 25 MCG
TABLET ORAL
COMMUNITY
Start: 2022-10-26

## 2023-02-02 RX ORDER — METOPROLOL SUCCINATE 50 MG/1
1 TABLET, EXTENDED RELEASE ORAL DAILY
COMMUNITY
Start: 2022-11-03 | End: 2023-02-22

## 2023-02-02 ASSESSMENT — PAIN SCALES - GENERAL: PAINLEVEL: NO PAIN (0)

## 2023-02-02 NOTE — LETTER
2023       RE: Sasha Hand  [address]  [facility] [address]     Dear Colleague,    Thank you for referring your patient, Sasha Hand, to the [facility] [place] at [facility]. Please see a copy of my visit note below.    Sasha Hand is a 22 year old who is being evaluated via a billable video visit.      How would you like to obtain your AVS? MyChart  If the video visit is dropped, the invitation should be resent by: Text to cell phone: [phone]  Will anyone else be joining your video visit? No  If patient encounters technical issues they should call [phone]    During this virtual visit the patient is located in [place], patient verifies this as the location during the entirety of this visit.     Video-Visit Details  Video Start Time: 3:35PM    Type of service:  Video Visit    Video End Time:4:00PM    Originating Location (pt. Location): Home    Distant Location (provider location):  On-site    Platform used for Video Visit: [facility] Bariatric Surgery Note    RE: Sasha Hand  MR#: [id number]  : 2000  VISIT DATE: 2023      Dear Oscar Medley,    I had the pleasure of seeing your patient, Sasha Hand, in my post-bariatric surgery assessment clinic.    Assessment & Plan   Problem List Items Addressed This Visit        Digestive    Class 3 severe obesity with serious comorbidity and body mass index (BMI) of 40.0 to 44.9 in adult (H) - Primary     S/p gastric sleeve 7 months ago with Dr. Mata.     She is currently struggling with increased hunger over the past few months. Has lost 82lbs since initial visit. Discussed restating a GLP-1 again today. Prior to surgery was on Saxenda, and found it very helpful. Had no side effects. Will start Wegovy today. Discussed side effects, benefits, and risks.          Relevant Medications    Semaglutide-Weight Management (WEGOVY) 0.25 MG/0.5ML SOAJ     Semaglutide-Weight Management (WEGOVY) 0.5 MG/0.5ML SOAJ (Start on 3/2/2023)       Other    S/P laparoscopic sleeve gastrectomy    Diarrhea, unspecified type     History of constipation and diarrhea prior to surgery. Since surgery, symptoms have improved. However, diarrhea has worsened over the past month. Does not believe it was food related. GI referral placed. Will keep a log until visit.          Relevant Orders    Adult GI  Referral - Consult Only      1. Start Wegovy 0.25mg once weekly for 4 weeks, then increase to 0.5mg once weekly.   2. GI referral placed for post op diarrhea. She will start a log to track food and diarrhea symptoms.   3. Follow up with Komal 2 months       30 minutes spent on the date of the encounter doing chart review, history and exam, documentation and further activities per the note    CHIEF COMPLAINT: Post-bariatric surgery follow-up. 7 months s/p gastric sleeve with Dr. Mata.    HISTORY OF PRESENT ILLNESS:  Questions Regarding Prior Weight Loss Surgery Reviewed With Patient 1/31/2023   I had the following weight loss procedure: Sleeve Gastrectomy   What year was your surgery? 2022   How has your weight changed since your last visit? I have stayed about the same   Are you currently taking any weight loss medications? No   Do you currently have any of the following: Heartburn, acid reflux, or GERD (acid reflux disease)?, Hypertension (high blood pressure)?   Have you been to the Emergency room since your last visit with us? Yes   Were you in the hospital since your last visit with us? No   Do you have any concerns today? Appetite increased       Previously was on Saxenda - helpful with weight and hunger. No side effects    Feels like she as at a stall. Has dropped clothes size.     Anti-obesity medications:     Current:   Wellbutrin - no side effects. No effect on hunger or weight       Recent diet changes:  3 meals a day, with snacks. Eating every 2 hours. Around 4oz for  meals, and 2oz per snack. Eating granula, carrots, chicken, protein shakes, fruit cups, chips. Not tolerating milk, causes stomach cramps. Cannot just have red meat or will have dumping syndrome, will have to eat with another side. Otherwise tolerating all foods. Increased hunger, all the time. Denies dysphagia, odynophagia, nausea, vomiting, constipation. Drink stevia tea with caffeine daily.     -Protein? Getting around 60g  -Water? 64oz daily     Recent exercise/activity changes: Harder through the holidays. Just getting back into cardio and weight lifting. Gym 2xweek, at home 3xweek.     Recent stressors: Stable, getting  in November    Recent sleep changes: Stable    Vitamins/Labs: [place] [facility] w/Iron 2 tabs daily, Vitamin D every other day, B12 injection weekly. Labs reviewed     ETOH - rarely. No nicotine, NSAIDs.     Diarrhea - 1xweek. Not triggered by food. No urgency. No blood in stool or melena. Prior to surgery fluctuated between diarrhea or constipation.     GERD - symptoms include acid in throat, nausea. Taking omeprazole 20mg once a day. Gets it OTC.     Weight History:     1/31/2023   What is your highest lifetime weight? 365   What is your lowest weight since surgery? (In pounds) 283     Initial Weight (lbs): 365 lbs  Weight: 128.4 kg (283 lb) (Pt reported)  Last Visits Weight: 138.3 kg (305 lb)  Cumulative weight loss (lbs): 82  Weight Loss Percentage: 22.47%     Wt Readings from Last 5 Encounters:   02/02/23 128.4 kg (283 lb)   08/24/22 138.3 kg (305 lb)   08/02/22 145.1 kg (319 lb 14.4 oz)   07/26/22 149.5 kg (329 lb 9.4 oz)   07/14/22 (!) 154.2 kg (340 lb)         Questions Regarding Co-Morbidities and Health Concerns Reviewed With Patient 1/31/2023   Pre-diabetes: Never   Diabetes II: Never   High Blood Pressure: Stayed the same   High cholesterol: Never   Heartburn/Reflux: Worsened   Are you taking daily medication for heartburn, acid reflux, or GERD (acid reflux disease)? Yes   Sleep  "apnea: Never   PCOS: Never   Back pain: Gone away   Joint pain: Improved   Lower leg swelling: Improved       Eating Habits 1/31/2023   How many meals do you eat per day? 3   Do you snack between meals? Yes   How much food are you eating at each meal? 1/2 cup to 1 cup   Are you able to separate your meals and liquids by at least 30 minutes? Yes   Are you able to avoid liquid calories? Sometimes       Exercise Questions Reviewed With Patient 1/31/2023   How often do you exercise? 1 to 2 times per week   What is the duration of your exercise (in minutes)? 30 Minutes   What types of exercise do you do? walking, home gym, weightlifting   What keeps you from being more active?  Lack of Time       Social History:      1/31/2023   Are you smoking? No   Are you drinking alcohol? Yes   How much alcohol? 1-2 monthy 1-2 drinks       Medications:  Current Outpatient Medications   Medication     albuterol (PROAIR HFA/PROVENTIL HFA/VENTOLIN HFA) 108 (90 Base) MCG/ACT inhaler     betamethasone dipropionate (DIPROSONE) 0.05 % external cream     buPROPion (WELLBUTRIN XL) 150 MG 24 hr tablet     Cholecalciferol (VITAMIN D3) 25 MCG TABS     cyanocobalamin (CYANOCOBALAMIN) 1000 MCG/ML injection     etonogestrel (NEXPLANON) 68 MG IMPL     Fexofenadine HCl (ALLEGRA PO)     fluticasone-salmeterol (ADVAIR) 250-50 MCG/ACT inhaler     ipratropium - albuterol 0.5 mg/2.5 mg/3 mL (DUONEB) 0.5-2.5 (3) MG/3ML neb solution     LORazepam (ATIVAN) 1 MG tablet     metoprolol succinate ER (TOPROL XL) 50 MG 24 hr tablet     RESTASIS 0.05 % ophthalmic emulsion     Semaglutide-Weight Management (WEGOVY) 0.25 MG/0.5ML SOAJ     [START ON 3/2/2023] Semaglutide-Weight Management (WEGOVY) 0.5 MG/0.5ML SOAJ     Syringe/Needle, Disp, (BD ECLIPSE SYRINGE/NEEDLE) 25G X 5/8\" 3 ML MISC     No current facility-administered medications for this visit.         1/31/2023   Do you avoid NSAIDs such as (Ibuprofen, Aleve, Naproxen, Advil)?   Yes       ROS:  GI:      " "1/31/2023   Vomiting: No   Diarrhea: Yes   Constipation: No   Swallowing trouble: No   Abdominal pain: No   Heartburn: Yes   Rash in skin folds: Yes   Depression: No   Stress urinary incontinence No     Skin:   BAR RBS ROS - SKIN 1/31/2023   Rash in skin folds: Yes     Psych:      1/31/2023   Depression: No   Anxiety: No     Female Only:   GUILLE RBS ROS - FEMALE ONLY 1/31/2023   Female only: Birth control       Age less than 45, no DEXA indicated.      Objective     Ht 1.702 m (5' 7\")   Wt 128.4 kg (283 lb)   BMI 44.32 kg/m         Physical Exam   GENERAL: Healthy, alert and no distress  EYES: Eyes grossly normal to inspection.  No discharge or erythema, or obvious scleral/conjunctival abnormalities.  RESP: No audible wheeze, cough, or visible cyanosis.  No visible retractions or increased work of breathing.    SKIN: Visible skin clear. No significant rash, abnormal pigmentation or lesions.  NEURO: Cranial nerves grossly intact.  Mentation and speech appropriate for age.  PSYCH: Mentation appears normal, affect normal/bright, judgement and insight intact, normal speech and appearance well-groomed.        Sincerely,    LATRELL HAMEED PA-C    "

## 2023-02-02 NOTE — PROGRESS NOTES
"Sasha Hand is a 22 year old who is being evaluated via a billable video visit.      How would you like to obtain your AVS? MyChart  If the video visit is dropped, the invitation should be resent by: Text to cell phone: [phone]  Will anyone else be joining your video visit? No  If patient encounters technical issues they should call [phone]    During this virtual visit the patient is located in [place], patient verifies this as the location during the entirety of this visit.     Video-Visit Details  Video Start Time: {video visit start/end time for provider to select:468405}    Type of service:  Video Visit    Video End Time:{video visit start/end time for provider to select:152948}    Originating Location (pt. Location): {video visit patient location:789760::\"Home\"}    Distant Location (provider location):  {virtual location provider:725498}    Platform used for Video Visit: {Virtual Visit Platforms:806962::\"[employer]\"}    Louie Guo, EMT 2/2/2023      2:55 PM    "

## 2023-02-02 NOTE — TELEPHONE ENCOUNTER
Prior authorization is needed. Please start PA.    PAID/[facility]    RX BIN: [id number]  RX PCN: [id number]  RX ID: [id number]  RX GRP: [id number]      Thank you,     Albaro Dong [facility]  Pharmacy Clinic [facility]  [email or web address]   Phone: [phone]  Fax: [phone]

## 2023-02-02 NOTE — PATIENT INSTRUCTIONS
Individualized Surveillance Plan for women  Hereditary Breast and/or Ovarian Cancer Syndrome   Per NCCN Guidelines Version 2.2023   Recommended screening Test or procedure Last done Next Scheduled    Breast self awareness starting at age 18.    Breast cancer risk >60% Women should be familiar with their breasts and promptly report changes to their care provider. Periodic, consistent self exam may facilitate breast self awareness. Premenopausal women may find self exams to be most informative when performed at the end of menses.   2/2/2023 October 2023   Breast screening, starting at age 25 Clinical breast exams every 6 -12 months Exam deferred October 2023   Breast screening   Age 25-29 Annual breast MRI screening with contrast (or mammogram if MRI is unavailable) or individualized based on family history if a breast cancer diagnosis before age 30 is present.       Breast MRI is performed preferably on day 7-15 of menstrual cycle for premenopausal women.     None to date   Baseline mammogram ordered for April 2023    Breast MRI ordered for October 2023   Breast screening   Age >30-75 years     Annual mammogram (consider tomosynthesis mammogram) and annual screening MRI.     Breast MRI is performed preferably on day 7-15 of menstrual cycle for premenopausal women.    Age>75 years, management should be considered on an individual basis.   NA   NA   Ovarian cancer screening, starting at age 30-35    Absolute risk for epithelial ovarian cancer -39-58% for BRCA1+ carriers and 13-29% for BRCA2+ carriers   Consider transvaginal ultrasound and  tests.   NA   Begin at 30   Pancreatic Cancer Screening beginning at age 50 or 10 years prior to the earliest pancreatic cancer on the BRCA-side of the family  In families with exocrine pancreatic cancer in a first or second degree relative    Risk is <5% for BRCA1+ carriers    5-10% for BRCA2+ carriers     Consider Annual MRI/MRCP and/or Endoscopic Ultrasound   None in family    Discuss at future visits   Recommendation: risk-reducing salpingo-oophorectomy(RRSO), typically between 35 and 40 years old and  upon completion of child bearing.     Because ovarian cancer onset in patients with BRCA2 mutations is on average of 8-10 years later than in patients with BRCA1 mutations, it is reasonable to delay RRSO until 40-45 years in patients with BRCA2 mutations  unless age at diagnosis in the family warrants earlier age for consideration for prophylactic surgery.    Limited data suggest that there may be a slightly increased risk of serous uterine cancer among women with BRCA1+ pathogenic variants. The provider and the patient should discuss the risks and benefits of a concurrent hysterectomy at the time of RRSO for women with a BRCA1+ pathogenic variant /like pathogenic variant prior to surgery.     Salpingectomy alone is not the standard of care for risk reduction although clinical trials are ongoing.     Discuss option of risk-reducing mastectomy.    Consider risks and benefits of risk reducing agents, such as tamoxifen and raloxifene for breast and ovarian cancer.    Consider a full body skin and eye exam for melanoma screening for both BRCA1+ and BRCA2+.     NOTE: Women with BRCA mutation who are treated for breast cancer should have screening of the remaining breast tissue with annual mammography and breast MRI.

## 2023-02-02 NOTE — NURSING NOTE
"(   Chief Complaint   Patient presents with     RECHECK     Follow up    )    ( Weight: 128.4 kg (283 lb) (Pt reported) )  ( Height: 170.2 cm (5' 7\") )  ( BMI (Calculated): 44.32 )  (   )  (   )  (   )  (   )  (   )  (   )    (   )  (   )  (   )  (   )  (   )  (   )  (   )    (   Patient Active Problem List   Diagnosis     Morbid obesity (H)     Tear of lateral cartilage or meniscus of knee, current     Menorrhagia     Dysmenorrhea     Wheezing     Genital herpes simplex, unspecified site     Mild persistent asthma without complication     Nexplanon placed 12/14/2021     DELIA (generalized anxiety disorder)     Major depressive disorder, single episode, moderate (H)     Depression     BMI 50.0-59.9, adult (H)     Essential hypertension     Hordeolum externum, unspecified laterality     BRCA2 gene mutation positive    )  (   Current Outpatient Medications   Medication Sig Dispense Refill     albuterol (PROAIR HFA/PROVENTIL HFA/VENTOLIN HFA) 108 (90 Base) MCG/ACT inhaler Inhale 1-2 puffs into the lungs every 4 hours as needed for shortness of breath / dyspnea or wheezing 8.5 g 5     betamethasone dipropionate (DIPROSONE) 0.05 % external cream APPLY TO AFFECTED AREA(S) ONCE DAILY       buPROPion (WELLBUTRIN XL) 150 MG 24 hr tablet Take 1 tablet (150 mg) by mouth every morning 90 tablet 3     cyanocobalamin (CYANOCOBALAMIN) 1000 MCG/ML injection Inject 1 mL (1,000 mcg) Subcutaneous every 30 days 1 mL 11     etonogestrel (NEXPLANON) 68 MG IMPL 1 each (68 mg) by Subdermal route once       Fexofenadine HCl (ALLEGRA PO) Take by mouth daily as needed for allergies       fluticasone-salmeterol (ADVAIR) 250-50 MCG/ACT inhaler Inhale 1 puff into the lungs every 12 hours 60 each 11     ipratropium - albuterol 0.5 mg/2.5 mg/3 mL (DUONEB) 0.5-2.5 (3) MG/3ML neb solution Take 1 vial (3 mLs) by nebulization every 6 hours as needed for shortness of breath / dyspnea or wheezing 30 vial 1     LORazepam (ATIVAN) 1 MG tablet Take 1 tablet " "(1 mg) by mouth once as needed for anxiety (prior to breast MRI) Take with you to appointment and check with the nurse as to the appropriate time to take the medication. Do not operate a vehicle after taking this medication 1 tablet 0     RESTASIS 0.05 % ophthalmic emulsion INSTILL 1 DROP INTO EACH EYE TWICE DAILY       Syringe/Needle, Disp, (BD ECLIPSE SYRINGE/NEEDLE) 25G X 5/8\" 3 ML MISC 1 Syringe every 30 days 1 each 11    )  ( Diabetes Eval:    )    ( Pain Eval:  No Pain (0) )    ( Wound Eval:       )    (   History   Smoking Status     Former     Packs/day: 1.00     Types: Cigarettes     Quit date: 2/1/2021   Smokeless Tobacco     Former    )    ( Signed By:  Louie Guo, EMT; February 2, 2023; 3:16 PM )    "

## 2023-02-02 NOTE — PROGRESS NOTES
Sasha Hand is a 22 year old who is being evaluated via a billable video visit.      How would you like to obtain your AVS? MyChart  If the video visit is dropped, the invitation should be resent by: Text to cell phone: [phone]  Will anyone else be joining your video visit? No  If patient encounters technical issues they should call [phone]    During this virtual visit the patient is located in [place], patient verifies this as the location during the entirety of this visit.     Video-Visit Details  Video Start Time: 3:35PM    Type of service:  Video Visit    Video End Time:4:00PM    Originating Location (pt. Location): Home    Distant Location (provider location):  On-site    Platform used for Video Visit: [facility] Bariatric Surgery Note    RE: Sasha Hand  MR#: [id number]  : 2000  VISIT DATE: 2023      Dear Oscar Medley,    I had the pleasure of seeing your patient, Sasha Hand, in my post-bariatric surgery assessment clinic.    Assessment & Plan   Problem List Items Addressed This Visit        Digestive    Class 3 severe obesity with serious comorbidity and body mass index (BMI) of 40.0 to 44.9 in adult (H) - Primary     S/p gastric sleeve 7 months ago with Dr. Mata.     She is currently struggling with increased hunger over the past few months. Has lost 82lbs since initial visit. Discussed restating a GLP-1 again today. Prior to surgery was on Saxenda, and found it very helpful. Had no side effects. Will start Wegovy today. Discussed side effects, benefits, and risks.          Relevant Medications    Semaglutide-Weight Management (WEGOVY) 0.25 MG/0.5ML SOAJ    Semaglutide-Weight Management (WEGOVY) 0.5 MG/0.5ML SOAJ (Start on 3/2/2023)       Other    S/P laparoscopic sleeve gastrectomy    Diarrhea, unspecified type     History of constipation and diarrhea prior to surgery. Since surgery, symptoms have improved. However, diarrhea has worsened over the past month. Does not  believe it was food related. GI referral placed. Will keep a log until visit.          Relevant Orders    Adult GI  Referral - Consult Only      1. Start Wegovy 0.25mg once weekly for 4 weeks, then increase to 0.5mg once weekly.   2. GI referral placed for post op diarrhea. She will start a log to track food and diarrhea symptoms.   3. Follow up with Komal 2 months       30 minutes spent on the date of the encounter doing chart review, history and exam, documentation and further activities per the note    CHIEF COMPLAINT: Post-bariatric surgery follow-up. 7 months s/p gastric sleeve with Dr. Mata.    HISTORY OF PRESENT ILLNESS:  Questions Regarding Prior Weight Loss Surgery Reviewed With Patient 1/31/2023   I had the following weight loss procedure: Sleeve Gastrectomy   What year was your surgery? 2022   How has your weight changed since your last visit? I have stayed about the same   Are you currently taking any weight loss medications? No   Do you currently have any of the following: Heartburn, acid reflux, or GERD (acid reflux disease)?, Hypertension (high blood pressure)?   Have you been to the Emergency room since your last visit with us? Yes   Were you in the hospital since your last visit with us? No   Do you have any concerns today? Appetite increased       Previously was on Saxenda - helpful with weight and hunger. No side effects    Feels like she as at a stall. Has dropped clothes size.     Anti-obesity medications:     Current:   Wellbutrin - no side effects. No effect on hunger or weight       Recent diet changes:  3 meals a day, with snacks. Eating every 2 hours. Around 4oz for meals, and 2oz per snack. Eating granula, carrots, chicken, protein shakes, fruit cups, chips. Not tolerating milk, causes stomach cramps. Cannot just have red meat or will have dumping syndrome, will have to eat with another side. Otherwise tolerating all foods. Increased hunger, all the time. Denies dysphagia,  odynophagia, nausea, vomiting, constipation. Drink stevia tea with caffeine daily.     -Protein? Getting around 60g  -Water? 64oz daily     Recent exercise/activity changes: Harder through the holidays. Just getting back into cardio and weight lifting. Gym 2xweek, at home 3xweek.     Recent stressors: Stable, getting  in November    Recent sleep changes: Stable    Vitamins/Labs: [place] [facility] w/Iron 2 tabs daily, Vitamin D every other day, B12 injection weekly. Labs reviewed     ETOH - rarely. No nicotine, NSAIDs.     Diarrhea - 1xweek. Not triggered by food. No urgency. No blood in stool or melena. Prior to surgery fluctuated between diarrhea or constipation.     GERD - symptoms include acid in throat, nausea. Taking omeprazole 20mg once a day. Gets it OTC.     Weight History:     1/31/2023   What is your highest lifetime weight? 365   What is your lowest weight since surgery? (In pounds) 283     Initial Weight (lbs): 365 lbs  Weight: 128.4 kg (283 lb) (Pt reported)  Last Visits Weight: 138.3 kg (305 lb)  Cumulative weight loss (lbs): 82  Weight Loss Percentage: 22.47%     Wt Readings from Last 5 Encounters:   02/02/23 128.4 kg (283 lb)   08/24/22 138.3 kg (305 lb)   08/02/22 145.1 kg (319 lb 14.4 oz)   07/26/22 149.5 kg (329 lb 9.4 oz)   07/14/22 (!) 154.2 kg (340 lb)         Questions Regarding Co-Morbidities and Health Concerns Reviewed With Patient 1/31/2023   Pre-diabetes: Never   Diabetes II: Never   High Blood Pressure: Stayed the same   High cholesterol: Never   Heartburn/Reflux: Worsened   Are you taking daily medication for heartburn, acid reflux, or GERD (acid reflux disease)? Yes   Sleep apnea: Never   PCOS: Never   Back pain: Gone away   Joint pain: Improved   Lower leg swelling: Improved       Eating Habits 1/31/2023   How many meals do you eat per day? 3   Do you snack between meals? Yes   How much food are you eating at each meal? 1/2 cup to 1 cup   Are you able to separate your meals and  "liquids by at least 30 minutes? Yes   Are you able to avoid liquid calories? Sometimes       Exercise Questions Reviewed With Patient 1/31/2023   How often do you exercise? 1 to 2 times per week   What is the duration of your exercise (in minutes)? 30 Minutes   What types of exercise do you do? walking, home gym, weightlifting   What keeps you from being more active?  Lack of Time       Social History:      1/31/2023   Are you smoking? No   Are you drinking alcohol? Yes   How much alcohol? 1-2 monthy 1-2 drinks       Medications:  Current Outpatient Medications   Medication     albuterol (PROAIR HFA/PROVENTIL HFA/VENTOLIN HFA) 108 (90 Base) MCG/ACT inhaler     betamethasone dipropionate (DIPROSONE) 0.05 % external cream     buPROPion (WELLBUTRIN XL) 150 MG 24 hr tablet     Cholecalciferol (VITAMIN D3) 25 MCG TABS     cyanocobalamin (CYANOCOBALAMIN) 1000 MCG/ML injection     etonogestrel (NEXPLANON) 68 MG IMPL     Fexofenadine HCl (ALLEGRA PO)     fluticasone-salmeterol (ADVAIR) 250-50 MCG/ACT inhaler     ipratropium - albuterol 0.5 mg/2.5 mg/3 mL (DUONEB) 0.5-2.5 (3) MG/3ML neb solution     LORazepam (ATIVAN) 1 MG tablet     metoprolol succinate ER (TOPROL XL) 50 MG 24 hr tablet     RESTASIS 0.05 % ophthalmic emulsion     Semaglutide-Weight Management (WEGOVY) 0.25 MG/0.5ML SOAJ     [START ON 3/2/2023] Semaglutide-Weight Management (WEGOVY) 0.5 MG/0.5ML SOAJ     Syringe/Needle, Disp, (BD ECLIPSE SYRINGE/NEEDLE) 25G X 5/8\" 3 ML MISC     No current facility-administered medications for this visit.         1/31/2023   Do you avoid NSAIDs such as (Ibuprofen, Aleve, Naproxen, Advil)?   Yes       ROS:  GI:      1/31/2023   Vomiting: No   Diarrhea: Yes   Constipation: No   Swallowing trouble: No   Abdominal pain: No   Heartburn: Yes   Rash in skin folds: Yes   Depression: No   Stress urinary incontinence No     Skin:   BAR RBS ROS - SKIN 1/31/2023   Rash in skin folds: Yes     Psych:      1/31/2023   Depression: No " "  Anxiety: No     Female Only:   BAR RBS ROS - FEMALE ONLY 1/31/2023   Female only: Birth control       Age less than 45, no DEXA indicated.      Objective    Ht 1.702 m (5' 7\")   Wt 128.4 kg (283 lb)   BMI 44.32 kg/m         Physical Exam   GENERAL: Healthy, alert and no distress  EYES: Eyes grossly normal to inspection.  No discharge or erythema, or obvious scleral/conjunctival abnormalities.  RESP: No audible wheeze, cough, or visible cyanosis.  No visible retractions or increased work of breathing.    SKIN: Visible skin clear. No significant rash, abnormal pigmentation or lesions.  NEURO: Cranial nerves grossly intact.  Mentation and speech appropriate for age.  PSYCH: Mentation appears normal, affect normal/bright, judgement and insight intact, normal speech and appearance well-groomed.        Sincerely,    LATRELL HAMEED PA-C    "

## 2023-02-02 NOTE — LETTER
2023         RE: Sasha Hand  [address]  [facility] [address]        Dear Colleague,    Thank you for referring your patient, Sasha Hand, to the [facility]. Please see a copy of my visit note below.    Oncology Risk Management Consultation:  Date on this visit: 2023    Sasha Hand  is referred by Anjana العراقي [facility],  for an oncology risk management consultation. She requires high risk screening and surveillance to reduce  her risk of cancer secondary to a deleterious BRCA2 mutation.  She is  considered to be at high risk for hereditary breast and ovarian cancer.    Primary Physician: Oscar Medley MD    History Of Present Illness:  Ms. Hand is a very pleasant, healthy 22 year old female who presents with family history of breast and ovarian cancer and a personal diagnosis of a BRCA2 mutation.       Genetic Testin2022 Genetic Testing Results: POSITIVE for a BRCA2 mutation. Specifically her mutation is called [id number] ([id number])  identified using a Multi-Cancer panel through [employer].  This testing was done because of her family history of breast cancer and her mother having tested positive for a BRCA2 gene mutation.      Of note, Sasha is negative for mutations in the following genes: AIP, ALK, APC, SHEA, AXIN2, BAP1, BARD1, BLM, BMPR1A, BRCA1, BRCA2, BRIP1, CASR, CDC73, CDH1, CDK4, CDKN1B, CDKN1C, CDKN2A, CEBPA, CHEK2, CTNNA1, DICER1, DIS3L2, EGFR, EPCAM, FH, FLCN, GATA2, GPC3, GREM1, HOXB13, HRAS, KIT, MAX, MEN1, MET, MITF, MLH1, MSH2, MSH3, MSH6, MUTYH, NBN, NF1, NF2, NTHL1, PALB2, PDGFRA, PHOX2B, PMS2, POLD1, POLE, POT1, ARUSS0Q, PTCH1, PTEN, RAD50, RAD51C, RAD51D, RB1, RECQL4, RET, RUNX1, SDHA, SDHAF2, SDHB, SDHC, SDHD, SMAD4, SMARCA4, SMARCB1, SMARCE1, STK11, SUFU, TERC, TERT, WIQT060, TP53, TSC1, TSC2, VHL, WRN, WT1, AP2S1, GNA11, and MC1R genes.        Pertinent history:  2022- s/p partial gastrectomy (bariatric  surgery). Pathology:  STOMACH, PARTIAL GASTRECTOMY:  -Gastric mucosa with no significant histologic abnormality  -Negative for intestinal metaplasia and dysplasia  -No H. pylori-like organisms identified on routine stain      Menarche at age 9  Nulliparous.  Premenopausal.  Ovaries, fallopian tubes and uterus in place.    History of oral contraceptive use for about 3 years   Currently has a Nexplanon, inserted in 2021  No hx of hormone replacement therapy.    No hx of breast biopsy  No hx of hyperplasia, atypia or malignancy     Pertinent screening history:  6/1/2020- Dermatology screening, Marvel Oliver MD - Skin, left calf: -Granuloma annulare    At this visit, she denies new fatigue, breast pain, asymmetry, lumps, masses, thickening, nipple discharge and skin changes in her breasts.    Past Medical/Surgical History:  Past Medical History:   Diagnosis Date     BRCA2 gene mutation positive 02/01/2023     Closed head injury, subsequent encounter 04/05/2018     History of PCR DNA positive for HSV1      Moderate major depression (H) 01/20/2014     Nexplanon placed 11/14/17 11/14/2017     Nexplanon placed 11/14/17 (removed 1/16/2020) 11/14/2017     Nexplanon placed 12/14/2021 12/14/2021     Obesity 09/14/2010     Pyelonephritis 02/19/2018     Skin tag (right collar line and left labial region 11/21/2019     Tobacco use disorder 05/04/2016     Uncomplicated asthma      Past Surgical History:   Procedure Laterality Date     ARTHROSCOPY KNEE WITH MENISCAL REPAIR Right 5/10/2017    Procedure: ARTHROSCOPY KNEE WITH MENISCAL REPAIR;  arthroscopy right knee with lateral meniscus repair;  Surgeon: Nathaniel Hicks MD;  Location:  OR     [facility] GASTRIC SLEEVE N/A 7/26/2022    Procedure: GASTRECTOMY, SLEEVE, ROBOT-ASSISTED, LAPAROSCOPIC;  Surgeon: Joseph Mata MD;  Location: [facility] OR     ESOPHAGOSCOPY, GASTROSCOPY, DUODENOSCOPY (EGD), COMBINED N/A 3/28/2022    Procedure: ESOPHAGOGASTRODUODENOSCOPY (EGD);   "Surgeon: Joseph Mata MD;  Location: [facility] OR       Allergies:  Allergies as of 02/02/2023 - Reviewed 02/02/2023   Allergen Reaction Noted     Ondansetron Headache 07/15/2022     No known drug allergies  02/07/2002     Seasonal allergies  07/22/2009     Steri strips  03/24/2022       Current Medications:  Current Outpatient Medications   Medication Sig Dispense Refill     albuterol (PROAIR HFA/PROVENTIL HFA/VENTOLIN HFA) 108 (90 Base) MCG/ACT inhaler Inhale 1-2 puffs into the lungs every 4 hours as needed for shortness of breath / dyspnea or wheezing 8.5 g 5     betamethasone dipropionate (DIPROSONE) 0.05 % external cream APPLY TO AFFECTED AREA(S) ONCE DAILY       buPROPion (WELLBUTRIN XL) 150 MG 24 hr tablet Take 1 tablet (150 mg) by mouth every morning 90 tablet 3     cyanocobalamin (CYANOCOBALAMIN) 1000 MCG/ML injection Inject 1 mL (1,000 mcg) Subcutaneous every 30 days 1 mL 11     etonogestrel (NEXPLANON) 68 MG IMPL 1 each (68 mg) by Subdermal route once       Fexofenadine HCl (ALLEGRA PO) Take by mouth daily as needed for allergies       fluticasone-salmeterol (ADVAIR) 250-50 MCG/ACT inhaler Inhale 1 puff into the lungs every 12 hours 60 each 11     ipratropium - albuterol 0.5 mg/2.5 mg/3 mL (DUONEB) 0.5-2.5 (3) MG/3ML neb solution Take 1 vial (3 mLs) by nebulization every 6 hours as needed for shortness of breath / dyspnea or wheezing 30 vial 1     LORazepam (ATIVAN) 1 MG tablet Take 1 tablet (1 mg) by mouth once as needed for anxiety (prior to breast MRI) Take with you to appointment and check with the nurse as to the appropriate time to take the medication. Do not operate a vehicle after taking this medication 1 tablet 0     RESTASIS 0.05 % ophthalmic emulsion INSTILL 1 DROP INTO EACH EYE TWICE DAILY       Syringe/Needle, Disp, (BD ECLIPSE SYRINGE/NEEDLE) 25G X 5/8\" 3 ML MISC 1 Syringe every 30 days 1 each 11        Family History:  Family History   Problem Relation Age of Onset     Asthma Mother      " Hypertension Mother      Breast Cancer Mother 54        lumpectomy and BSO     Hereditary Breast and Ovarian Cancer Syndrome Mother         BRCA2+     Asthma Father      Diabetes Father      Melanoma Father 59        metastatic to brain     Cervical Cancer Sister 19     Parathyroid Disorders Sister      Vascular Disease Sister         back of head     Lung Cancer Paternal Grandmother         smoker,  in late 80s     Colon Cancer Paternal Grandmother 60     Heart Disease Paternal Grandfather          in 70s     Melanoma Paternal Aunt      Prostate Cancer Paternal Uncle 40     Colon Cancer Paternal Uncle      Melanoma Paternal Uncle      Cancer Paternal Uncle         hematological cancer; s/p SCT     Melanoma Paternal Uncle      Melanoma Paternal Uncle      Cancer Paternal Uncle 61        GI tract; cause of death at 62; father's twin     Melanoma Paternal Uncle      Breast Cancer Other 50        contralateral occurrence at 57; ; maternal grandfather's sister     Breast Cancer Other 50        paternal grandfather's sister     Anesthesia Reaction No family hx of      Bleeding Disorder No family hx of      Clotting Disorder No family hx of        Social History:  Social History     Socioeconomic History     Marital status: Single     Spouse name: Not on file     Number of children: 0     Years of education: Not on file     Highest education level: Not on file   Occupational History     Occupation: Reception     Comment: [employer]   Tobacco Use     Smoking status: Former     Packs/day: 1.00     Types: Cigarettes     Quit date: 2021     Years since quittin.0     Smokeless tobacco: Former   Vaping Use     Vaping Use: Never used   Substance and Sexual Activity     Alcohol use: No     Alcohol/week: 0.0 standard drinks     Drug use: No     Sexual activity: Yes     Partners: Male     Birth control/protection: Condom, Implant   Other Topics Concern     Parent/sibling w/ CABG, MI or angioplasty  before 65F 55M? Not Asked   Social History Narrative     Not on file     Social Determinants of Health     Financial Resource Strain: Not on file   Food Insecurity: Not on file   Transportation Needs: Not on file   Physical Activity: Not on file   Stress: Not on file   Social Connections: Not on file   Intimate Partner Violence: Not on file   Housing Stability: Not on file       There were no vitals taken for this visit.  GENERAL: Healthy, alert and no distress  EYES: Eyes grossly normal to inspection.  No discharge or erythema, or obvious scleral/conjunctival abnormalities.  RESP: No audible wheeze, cough, or visible cyanosis.  No visible retractions or increased work of breathing.    SKIN: Visible skin clear. No significant rash, abnormal pigmentation or lesions.  NEURO: Cranial nerves grossly intact.  Mentation and speech appropriate for age.  PSYCH: Mentation appears normal, affect normal/bright, judgement and insight intact, normal speech and appearance well-groomed.      Laboratory/Imaging Studies  No results found for any visits on 02/02/23.    ASSESSMENT  We discussed the differences between cancer risk for the general population and those with BRCA mutations. It is estimated that 10% of all women with breast cancer have BRCA mutations.  It is estimated that 15% of all women with ovarian cancer have BRCA mutations. We also discussed that inheriting a mutation does not mean that a person will develop cancer, but rather that they are at increased risk.     Women with BRCA2 mutations have an estimated 45-49% cumulative risk of breast cancer by age 70, compared to the general population risk of 12%.  The risk of developing contralateral breast cancer within BRCA2 carriers  is higher when the age at first diagnosis is less than age 40. It is estimated that in women whose breast cancer have a 34.6% risk for contralateral breast cancer within 10- years of their initial diagnosis (with no intervention.)    The risk  for ovarian cancer, including primary peritoneal and fallopian tube cancer, by age 70 is estimated to be between 11-18%. Male BRCA2+ carriers have a cumulative breast cancer risk of more than 6% by age 70. Both men and women have an increased risk of pancreatic cancer,  Melanoma, gall bladder, bile duct and stomach cancer compared to the general population, although the exact risks have not been well defined.     We also discussed the risks and benefits of prophylactic bilateral salpingo-ooperectomy. Research involving 10 studies of BRCA1 and BRCA2 mutation carriers showed an 80% reduction the risk for fallopian tube and ovarian cancer following risk reducing salpingo-oophorectomy, with a residual risk of peritoneal carcinoma of 1-2%. Research shows the a bilateral salpingo-ooperectomy can also reduce the risk of breast cancer by 50%, especially in women 40 years old and younger.      She is currently using a Nexplanon implant, inserted in  and reports a history of ovarian cysts.  The Nexplanon's mechanism of action is to suppress ovulation, which may be helpful in reducing her risk for ovarian cancer, as research has shown that combination birth control pills, with the same mechanism of action, can reduce a woman's risk for ovarian cancer by 50%.    She and her fiance are planning to start a family and she would like to have an RRSO after her family is complete.      We discussed that the absolute risk for pancreatic cancer is between 5-10% for BRCA2+ carriers. Screening is based on family history of pancreatic cancer and may not be needed for families who do not have such history. In her family, her father  of malignant melanoma and she is interested in starting routine dermatology screening in the future, possibly next year, as she is busy planning her wedding for 2023.      Individualized Surveillance Plan for women  Hereditary Breast and/or Ovarian Cancer Syndrome   Per NCCN Guidelines Version  2.2023   Recommended screening Test or procedure Last done Next Scheduled    Breast self awareness starting at age 18.    Breast cancer risk >60% Women should be familiar with their breasts and promptly report changes to their care provider. Periodic, consistent self exam may facilitate breast self awareness. Premenopausal women may find self exams to be most informative when performed at the end of menses.   2/2/2023 October 2023   Breast screening, starting at age 25 Clinical breast exams every 6 -12 months Exam deferred October 2023   Breast screening   Age 25-29 Annual breast MRI screening with contrast (or mammogram if MRI is unavailable) or individualized based on family history if a breast cancer diagnosis before age 30 is present.       Breast MRI is performed preferably on day 7-15 of menstrual cycle for premenopausal women.     None to date   Baseline mammogram ordered for April 2023    Breast MRI ordered for October 2023   Breast screening   Age >30-75 years     Annual mammogram (consider tomosynthesis mammogram) and annual screening MRI.     Breast MRI is performed preferably on day 7-15 of menstrual cycle for premenopausal women.    Age>75 years, management should be considered on an individual basis.   NA   NA   Ovarian cancer screening, starting at age 30-35    Absolute risk for epithelial ovarian cancer -39-58% for BRCA1+ carriers and 13-29% for BRCA2+ carriers   Consider transvaginal ultrasound and  tests.   NA   Begin at 30   Pancreatic Cancer Screening beginning at age 50 or 10 years prior to the earliest pancreatic cancer on the BRCA-side of the family  In families with exocrine pancreatic cancer in a first or second degree relative    Risk is <5% for BRCA1+ carriers    5-10% for BRCA2+ carriers     Consider Annual MRI/MRCP and/or Endoscopic Ultrasound   None in family   Discuss at future visits   Recommendation: risk-reducing salpingo-oophorectomy(RRSO), typically between 35 and 40 years  old and  upon completion of child bearing.     Because ovarian cancer onset in patients with BRCA2 mutations is on average of 8-10 years later than in patients with BRCA1 mutations, it is reasonable to delay RRSO until 40-45 years in patients with BRCA2 mutations  unless age at diagnosis in the family warrants earlier age for consideration for prophylactic surgery.    Limited data suggest that there may be a slightly increased risk of serous uterine cancer among women with BRCA1+ pathogenic variants. The provider and the patient should discuss the risks and benefits of a concurrent hysterectomy at the time of RRSO for women with a BRCA1+ pathogenic variant /like pathogenic variant prior to surgery.     Salpingectomy alone is not the standard of care for risk reduction although clinical trials are ongoing.     Discuss option of risk-reducing mastectomy.    Consider risks and benefits of risk reducing agents, such as tamoxifen and raloxifene for breast and ovarian cancer.    Consider a full body skin and eye exam for melanoma screening for both BRCA1+ and BRCA2+.     NOTE: Women with BRCA mutation who are treated for breast cancer should have screening of the remaining breast tissue with annual mammography and breast MRI.       I spent a total of 38 minutes on the day of the visit. Please see the note for further information on patient assessment and treatment.    Veronica Mckeon, SAVI-CNS, OCN, AGN-BC  Clinical Nurse Specialist  Cancer Risk Management Program  [facility] [place]    CC: Oscar Medley MD

## 2023-02-03 ENCOUNTER — TELEPHONE (OUTPATIENT)
Dept: ENDOCRINOLOGY | Facility: CLINIC | Age: 23
End: 2023-02-03
Payer: COMMERCIAL

## 2023-02-03 NOTE — TELEPHONE ENCOUNTER
Prior authorization is needed. Please start PA.     PAID/for; to (do)     RX BIN: [id number]  RX PCN: [id number]  RX ID: [id number]  RX GRP: [id number]        Thank you,     Albrao Dong [facility]  Pharmacy Clinic [facility]  [email or web address]   Phone: [phone]  Fax: [phone]

## 2023-02-03 NOTE — TELEPHONE ENCOUNTER
Prior Authorization Retail Medication Request    Medication/Dose: Wegovy  ICD code (if different than what is on RX):  Class 3 severe obesity with serious comorbidity and body mass index (BMI) of 40.0 to 44.9 in adult, unspecified obesity type (H) [E66.01, Z68.41]  - Primary     Previously Tried and Failed: Bariatric surgery, diet, exercise, Wellbutrin    Rationale:    BMI 50.0-59.9, adult (H)  HTN  Asthma  Morbid Obesity         Insurance Name:    Insurance ID:        Pharmacy Information (if different than what is on RX)  Name:  [facility] [address] [place] [id number] [facility]  Phone:  [phone]

## 2023-02-05 PROBLEM — Z98.84 S/P LAPAROSCOPIC SLEEVE GASTRECTOMY: Status: ACTIVE | Noted: 2023-02-05

## 2023-02-05 PROBLEM — R19.7 DIARRHEA, UNSPECIFIED TYPE: Status: ACTIVE | Noted: 2023-02-05

## 2023-02-06 NOTE — ASSESSMENT & PLAN NOTE
S/p gastric sleeve 7 months ago with Dr. Mata.     She is currently struggling with increased hunger over the past few months. Has lost 82lbs since initial visit. Discussed restating a GLP-1 again today. Prior to surgery was on Saxenda, and found it very helpful. Had no side effects. Will start Wegovy today. Discussed side effects, benefits, and risks.

## 2023-02-06 NOTE — PATIENT INSTRUCTIONS
"Thank you for allowing us the privilege of caring for you. We hope we provided you with the excellent service you deserve.   Please let us know if there is anything else we can do for you so that we can be sure you are completely satisfied with your care experience.    To ensure the quality of our services you may be receiving a patient satisfaction survey from an independent patient satisfaction monitoring company.    The greatest compliment you can give is a \"Likely to Recommend\"    Your visit was with LATRELL HAMEED PA-C today.    Instructions per today's visit:     Harjit Hand, it was great to visit with you today.  Here is a review of our visit.  If our clinic scheduler is not able to reach you please call [phone] to schedule your next appointments.    1. Start Wegovy 0.25mg once weekly for 4 weeks, then increase to 0.5mg once weekly.   2. GI referral placed for post op diarrhea. Please start a log to track food and diarrhea symptoms.   3. Follow up with Komal 2 months       Information about Video Visits with [facility]: video visit information  _________________________________________________________________________________________________________________________________________________________  If you are asked by your clinic team to have your blood pressure checked:  [facility] do offer several locations for blood pressure checks. Please follow the below link to schedule an appointment. Scheduling an appointment at the pharmacy for a blood pressure check is now preferred.    Appointment Plus (appointment-plus.ScalingData)  _________________________________________________________________________________________________________________________________________________________  Important contact and scheduling information:  Please call our contact center at [phone] to schedule your next appointments.  To find a lab location near you, please call [phone].  For any nursing questions or " concerns call Keke Feliciano LPN at [phone] or Amanda Rivas RN at [phone]  Please call during clinic hours Monday through Friday 8:00a - 4:00p if you have questions or you can contact us via Qzzrt at anytime and we will reply during clinic hours.    Lab results will be communicated through My Chart or letter (if My Chart not used). Please call the clinic if you have not received communication after 1 week or if you have any questions.?  Clinic Fax: [phone]    _________________________________________________________________________________________________________________________________________________________  Meal Replacement Products:    Here is the link to our new e-store where you can purchase our meal replacement products    [facility] E-Store  [email or web address]    The one week starter kit is a great way to sample a variety of products and see what works for you.    If you want more information about the product go to: [email or web address]    If you are an employee or [facility] or [facility] please contact your care team for a 10% estore discount    Free Shipping for orders over $75     Benefits of meal replacements products:    Portion and calorie control  Improved nutrition  Structured eating  Simplified food choices  Avoid contact with trigger foods  _________________________________________________________________________________________________________________________________________________________  Interested in working with a health ?  Health coaches work with you to improve your overall health and wellbeing.  They look at the whole person, and may involve discussion of different areas of life, including, but not limited to the four pillars of health (sleep, exercise, nutrition, and stress management). Discuss with your care team if you would like to start working a health .  Health Coaching-3 Pack: Schedule by  calling [phone]    $99 for three health coaching visits    Visits may be done in person or via phone    Coaching is a partnership between the  and the client; Coaches do not prescribe or diagnose    Coaching helps inspire the client to reach his/her personal goals   _________________________________________________________________________________________________________________________________________________________  24 Week Healthy Lifestyle Plan:    Our mission in the 24-week Healthy Lifestyle Plan is to provide you with individualized care by giving you the tools, education and support you need to lose weight and maintain a healthy lifestyle. In your 24-week journey, you ll be supported by a dedicated weight loss team that includes registered dietitians, medical weight management providers, health coaches, and nurses -- all with special expertise in weight loss -- to help you every step of the way.     Monthly meetings with your registered dietician or medical weight management provider help to review your progress, update your care plan, and make any adjustments needed to ensure success. Between these visits, weekly and bi-weekly health  visits will help you focus on the four pillars of weight loss -- stress, sleep, nutrition, and exercise -- and how you can best adapt each to achieve sustainable weight loss results.    In addition, you will be given exclusive access to online wellbeing classes through [employer].  Your initial visit will be with a medical weight management provider who will help to understand your weight loss goals and ensure this program is the right fit for you. Please let our team know if you are interested in the 24 week plan by sending a message to your care team or calling [phone] to schedule.  _________________________________________________________________________________________________________________________________________________________    COMPREHENSIVE WEIGHT  "MANAGEMENT PROGRAM  VIRTUAL SUPPORT GROUPS    For Support Group Information:      We offer support groups for patients who are working on weight loss and considering, preparing for or have had weight loss surgery.   There is no cost for this opportunity.  You are invited to attend the?Virtual Support Groups?provided by any of the following locations:    [facility] via Microsoft Teams with Sangita Eastman RN  2.   [place] via Dasher Teams with Bryce Raymond, PhD, LP  3.   [place] via TraNet'te with Tari Blue RN  4.   [facility] via Dasher Teams with Tari Schultz [facility]    The following Support Group information can also be found on our website:  [email or web address]    [facility] Weight Loss Surgery Support Group    [facility] Weight Loss Surgery Support Group  The support group is a patient-lead forum that meets monthly to share experiences, encouragement and education. It is open to those who have had weight loss surgery, are scheduled for surgery, and those who are considering surgery.   WHEN: This group meets on the 3rd Wednesday of each month from 5:00PM - 6:00PM virtually using Microsoft Teams.   FACILITATOR: Led by Sangita Eastman RD, LD, RN, the program's Clinical Coordinator.   TO REGISTER: Please contact the clinic via Proenza Schouer or call the nurse line directly at [phone] to inform our staff that you would like an invite sent to you and to let us know the email you would like the invite sent to. Prior to the meeting, a link with directions on how to join the meeting will be sent to you.    2022 Meetings  Bhumika 15: \"Let's Talk\" a time for the group to share.  July 20: \"Let's Talk\" a time for the group to share.  August 17: \"Let's Talk\" a time for the group to share.  September 21: \"Let's Talk\" a time for the group to share.  October 19: Guest Speaker: Dr Ang Turner MD Pulmonologist and Sleep Medicine " "Physician, \"Getting a Good Night's Sleep\".  November 16: \"Let's Talk\" a time for the group to share.  December 21: \"Let's Talk\" a time for the group to share.    [facility] Support Groups    Connections: Bariatric Care Support Group?  This is open to all [facility] (and those external to this program) pre- and post- operative bariatric surgery patients as well as their support system.   WHEN: This group meets the 2nd Tuesday of each month from 6:30 PM - 8:00 PM virtually using Microsoft Teams.   FACILITATOR: Led by Bryce Raymond, Ph.D who is a Licensed Psychologist with the [facility] Comprehensive Weight Management Program.   TO REGISTER: Please send an email to Bryce Raymond, Ph.D., LP at?[email or web address]?if you would like an invitation to the group and to learn about using Microsoft Teams.    2022 Meetings  June 14: Liya Marques, NIKITA, LD at [facility], \"Nutritional Labeling\"  July 12 August 2 (Please Note Date Change)  September 13 October 11 November 8 December 13    Connections: Post-Operative Bariatric Surgery Support Group  This is a support group for [facility] bariatric patients (and those external to [facility]) who have had bariatric surgery and are at least 3 months post-surgery.  WHEN: This support group meets the 4th Wednesday of the month from 11:00 AM - 12:00 PM virtually using Microsoft Teams.   FACILITATOR: Led by Certified Bariatric Nurse, Tari Blue RN.   TO REGISTER: Please send an email to Tari at [email or web address] if you would like an invitation to the group and to learn about using Microsoft Teams.    2022 Meetings June 22 July 27 August 24 September 28 October 26 November 23 December 28      [facility] Healthy Lifestyle Virtual Support Group    Healthy Lifestyle Virtual Support Group?  This is 60 minutes of small group guided " "discussion, support and resources. All are welcome who want a healthy lifestyle.  WHEN: This group meets monthly on a Friday from 12:30 PM - 1:30 PM virtually using Microsoft Teams.   FACILITATOR: Led by National Board Certified Health and , Tari Schultz [facility].   TO REGISTER: Please send an email to Tari [email or web address] to receive monthly invites to the group or if you have any questions about having a health .  Prior to the meeting, a link with directions on how to join the meeting will be sent to you.    2022 Meetings  June 24: Tari Schultz [facility], \"Setting Limits and Boundaries\".  Jul 29: Open Forum  August 26: Guest Speaker: Kaleigh Curran Registered Dietitian  September 30: Open Forum  October 28th: Guest Speaker: Claudia Ruth [facility], Health , \"Gratitude Practices\".  November 18: Guest Speaker: Hilda Murguia RD Registered Dietitian, \"Navigating How to Eat around the Holidays\".  December 16: Guest Speaker: Nanette Alfaro [facility], \"Changing Your Relationship with Movement\".    ____________________________________________________________________________________________________________________________________________________________________________  [place] [facility] Get Moving Program  Our team of physical therapists is trained to help you understand and take control of your condition. They will perform a thorough evaluation to determine your ability for activity and develop a customized plan to fit your goals and physical ability.  Scheduling: Unsure if the Get Moving program is right for you? Discuss the program with your medical provider or diabetes educator. You can also call us at [phone] to ask questions or schedule an appointment.   [facility] Get Moving Program  ____________________________________________________________________________________________________________________________________________________________________________  [facility] " Diabetes Prevention Program (DPP)  If you have prediabetes and Medicare please contact us via PersonSpot to learn more about the Diabetes Prevention Program (DPP)  Program Details:  [facility] offers the year-long Diabetes Prevention Program (DPP). The program helps you to make lifestyle changes that prevent or delay type 2 diabetes by supporting healthy eating, increased physical activity, stress reduction and use of coping skills.   On average, previous [facility] DPP cohorts have lost and maintained at least 5% of their starting weight throughout the program and averaged more than 150 minutes of physical activity per week.  Participants meet weekly for one-hour group sessions over sixteen weeks, every other week for the next 8 weeks, and monthly for the last six months.   A year-long maintenance program is also available for participants who complete the first year.   Location & Cost:   During the COVID-19 Public Health Emergency, the program is offered virtually. When in-person classes can resume, they will be held at [facility].  For people with Medicare, the program is covered in full. A self-pay option will also be available for those with non-Medicare insurance plans.   _________________________________________________________________________________________________________________________________________________________  Bluetooth Scale:    We hope to provide you with high quality virtual healthcare visits while social distancing for COVID-19 is necessary, as well as in the future when virtual visits may be more convenient for you.     Our technology team made it possible for Bluetooth scales to send weight measurements to our electronic medical record. This allows weights from you weighing at home to securely flow into the medical record, which will improve telephone and virtual visits.   Additionally, studies have shown that adults actually lose more weight when  their weights are automatically sent to someone else, and also that this process is not stressful for those adults.    Below is a link for purchasing the scale, with a discount code for our patients. You may call your insurance company to see if they will reimburse you for the cost of the scale, as a piece of durable medical equipment. The scales only go up to a weight of 400 pounds. This is an issue and we are working with the developer on increasing this. We found no scales that go over 400lb that have blue-tooth for connecting to [employer].    Scale to purchase: the NCR  Body  Scale: [email or web address] [email or web address]/body/[email or web address]=aw.ds    Discount Code: We have a discount code for our patients to bring the cost down to $50, Discount code is: [place]_Scale_20%off  _______________________________________________________________________________________________________________________________________________________________________________    To work with a Behavioral Health Psychologist:    Call to schedule:    Aron Nice - [phone]  Hannah Casas - [phone]  Samira Prescott - [phone]  Michelle Breaux - [phone]   Stephanie Leal PhD (cannot accept Medicare) [phone]        Thank you,   [facility] Comprehensive Weight Management Team  WEGOVY (semaglutide)    What is Wegovy?    Wegovy (semaglutide) injection 2.4 mg is an injectable prescription medicine used for adults with obesity (BMI ?30) or overweight (excess weight) (BMI ?27) who also have weight-related medical problems to help them lose weight and keep the weight off.    1.  Start Wegovy (semaglutide) 0.25 mg once weekly for 4 weeks, then if tolerating increase to 0.5 mg weekly for 4 weeks, then if tolerating increase to 1 mg weekly for 4 weeks, then if tolerating increase to 1.7 mg weekly for 4 weeks, then if tolerating increase to 2.4 mg weekly thereafter.     -Each Wegovy pen is a once weekly single-dose prefilled pen with a pen injector already built within the pen. Discard the Wegovy pen after use in sharps container.     2. Storage: make sure that when you get the prescription that you store the prescription in the refrigerator until it is time to use the Wegovy pen.  Once it is time to use the Wegovy pen, you can keep the pen at room temperature and it is good for up to 28 days at room temperature.     3.  Potential common side effects: nausea, headache, diarrhea, stomach upset.  If these become unmanageable or concerning symptoms, please make sure to call or [employer].      Go to site: Wegovy video to learn more and watch instruction videos.      For any questions or concerns please send a Kuponjo message to our team or call our weight management call center at [phone] during regular business hours. For questions during evenings or weekends your messages will be addressed during the next business day.  For emergencies please call 911 or seek immediate medical care.

## 2023-02-07 NOTE — ORAL ONC MGMT
Prior Authorization Approval    Authorization Effective Date: 1/8/2023  Authorization Expiration Date: 9/5/2023  Medication: Wegovy  Approved Dose/Quantity:   Reference #:     Insurance Company: [employer] - Phone [phone] Fax [phone]  Expected CoPay:       CoPay Card Available:      Foundation Assistance Needed:    Which Pharmacy is filling the prescription (Not needed for infusion/clinic administered): [facility] [address] [place] [id number] [facility]  Pharmacy Notified: No  Patient Notified: No

## 2023-02-18 ENCOUNTER — HEALTH MAINTENANCE LETTER (OUTPATIENT)
Age: 23
End: 2023-02-18

## 2023-02-19 DIAGNOSIS — I10 ESSENTIAL HYPERTENSION: Primary | ICD-10-CM

## 2023-02-21 NOTE — TELEPHONE ENCOUNTER
Pending Prescriptions:                       Disp   Refills    metoprolol succinate ER (TOPROL XL) 50 MG *90 tab*0        Sig: TAKE ONE TABLET BY MOUTH ONE TIME DAILY      Routing refill request to provider for review/approval because:  Blood pressure is out of range    Raya Marie RN on 2/21/2023 at 10:53 AM

## 2023-02-22 RX ORDER — METOPROLOL SUCCINATE 50 MG/1
TABLET, EXTENDED RELEASE ORAL
Qty: 90 TABLET | Refills: 1 | Status: SHIPPED | OUTPATIENT
Start: 2023-02-22 | End: 2023-07-20

## 2023-02-22 NOTE — TELEPHONE ENCOUNTER
Patient is due for blood pressure follow-up and lab work.  She will be due for her annual exam in July so she just wants to schedule that that would be fine.    Electronically signed by:  Oscar Medley M.D.  2/22/2023

## 2023-02-22 NOTE — TELEPHONE ENCOUNTER
Approved Electronically signed by:  Oscar Medley M.D.  2/21/2023  What is she taking this for?  I need to link it to a diagnosis.    Electronically signed by:  Oscra Medley M.D.  2/21/2023

## 2023-02-23 NOTE — TELEPHONE ENCOUNTER
Patient informed via mychart to schedule. 2/28 reminder if not read to send letter.     Closing encounter.   Cathy Nicole MA

## 2023-04-01 ENCOUNTER — APPOINTMENT (OUTPATIENT)
Dept: FAMILY MEDICINE | Facility: CLINIC | Age: 23
End: 2023-04-01
Payer: COMMERCIAL

## 2023-04-05 NOTE — PROGRESS NOTES
Virtual Visit Check-In    During this virtual visit the patient is located in [place], patient verifies this as the location during the entirety of this visit.     Sasha is a 22 year old who is being evaluated via a billable video visit.      How would you like to obtain your AVS? MyChart  If the video visit is dropped, the invitation should be resent by: Text to cell phone: [phone]  Will anyone else be joining your video visit? No        Video-Visit Details    Originating Location (pt. Location): Home    Distant Location (provider location):  Off-site  Platform used for Video Visit: Patient Access Solutions         Video Start Time: 1519  Video End Time:1532    Yobany Lazcano, EMT

## 2023-04-06 ENCOUNTER — VIRTUAL VISIT (OUTPATIENT)
Dept: ENDOCRINOLOGY | Facility: CLINIC | Age: 23
End: 2023-04-06
Payer: COMMERCIAL

## 2023-04-06 VITALS — WEIGHT: 290 LBS | BODY MASS INDEX: 45.52 KG/M2 | HEIGHT: 67 IN

## 2023-04-06 DIAGNOSIS — B37.2 INTERTRIGINOUS CANDIDIASIS: ICD-10-CM

## 2023-04-06 DIAGNOSIS — E66.813 CLASS 3 SEVERE OBESITY WITH SERIOUS COMORBIDITY AND BODY MASS INDEX (BMI) OF 40.0 TO 44.9 IN ADULT, UNSPECIFIED OBESITY TYPE (H): Primary | ICD-10-CM

## 2023-04-06 DIAGNOSIS — Z98.84 S/P LAPAROSCOPIC SLEEVE GASTRECTOMY: ICD-10-CM

## 2023-04-06 DIAGNOSIS — E66.01 CLASS 3 SEVERE OBESITY WITH SERIOUS COMORBIDITY AND BODY MASS INDEX (BMI) OF 40.0 TO 44.9 IN ADULT, UNSPECIFIED OBESITY TYPE (H): Primary | ICD-10-CM

## 2023-04-06 PROCEDURE — 99213 OFFICE O/P EST LOW 20 MIN: CPT | Mod: VID | Performed by: NURSE PRACTITIONER

## 2023-04-06 RX ORDER — NYSTATIN 100000 [USP'U]/G
POWDER TOPICAL 3 TIMES DAILY PRN
Qty: 60 G | Refills: 3 | Status: SHIPPED | OUTPATIENT
Start: 2023-04-06 | End: 2024-09-27

## 2023-04-06 RX ORDER — SEMAGLUTIDE 1 MG/.5ML
1 INJECTION, SOLUTION SUBCUTANEOUS
Qty: 2 ML | Refills: 3 | Status: SHIPPED | OUTPATIENT
Start: 2023-04-06 | End: 2023-07-05 | Stop reason: ALTCHOICE

## 2023-04-06 NOTE — PROGRESS NOTES
Return Bariatric Surgery Note    RE: Sasha Hand  MR#: [id number]  : 2000  VISIT DATE: 2023      Dear Oscar Medley,    I had the pleasure of seeing your patient, Sasha Hand, in my post-bariatric surgery assessment clinic.    Assessment & Plan   Problem List Items Addressed This Visit        Digestive    Class 3 severe obesity with serious comorbidity and body mass index (BMI) of 40.0 to 44.9 in adult (H) - Primary     Starting to see decreased hunger since increasing to the 0.5mg wegovy. Some constipation but manageable with metamucil. Reflux has improved in the last 2 weeks , now taking omeprazole every other day. Would like to try going up to 1mg wegovy.     Discussed progress with weight loss. Discussed that hunger may not have been better controled with RNY as she was concerned she should have done that now that she is struggling with weight loss.     Increased frequency of rashes, discussed management.     Plan:  Increase wegovy to 1mg   Continue to work on increasing protein  Continue to work on adequate hydration  Continue omeprazole every other day   Nystatin as needed for rashes  Can try cotton barrier in skin folds to prevent rashes  Follow up 3 months- labs at that time          Relevant Medications    Semaglutide-Weight Management (WEGOVY) 1 MG/0.5ML pen    Other Relevant Orders    CBC with platelets    Comprehensive metabolic panel    Ferritin    Hemoglobin A1c    Parathyroid Hormone Intact    Vitamin A    Vitamin B12    Vitamin D Deficiency    Lipid panel reflex to direct LDL Fasting       Other    S/P laparoscopic sleeve gastrectomy    Relevant Medications    Semaglutide-Weight Management (WEGOVY) 1 MG/0.5ML pen    Other Relevant Orders    CBC with platelets    Comprehensive metabolic panel    Ferritin    Hemoglobin A1c    Parathyroid Hormone Intact    Vitamin A    Vitamin B12    Vitamin D Deficiency    Lipid panel reflex to direct LDL Fasting   Other Visit Diagnoses      "Intertriginous candidiasis        Relevant Medications    nystatin (MYCOSTATIN) 348180 UNIT/GM external powder             20 minutes spent by me on the date of the encounter doing chart review, history and exam, documentation and further activities per the note    CHIEF COMPLAINT: Post-bariatric surgery follow-up. 9 months s/p sleeve with Dr. Mata.    HISTORY OF PRESENT ILLNESS:      4/1/2023     5:29 PM   Questions Regarding Prior Weight Loss Surgery Reviewed With Patient   I had the following weight loss procedure Sleeve Gastrectomy   What year was your surgery? 2022   How has your weight changed since your last visit? I have stayed about the same   Do you currently have any of the following Heartburn, acid reflux, or GERD (acid reflux disease)?    Hypertension (high blood pressure)?   Do you have any concerns today? No     Last seen 2/2/2023- Abril Ji PA-C  Increased hunger. Restarted wegovy. Referral to GI for diarrhea- had some preop, worse postop     Reflux- 20mg omeprazole every other day, well controlled     Scale was off last couple of visits (battery issues)     Discouraged with slow weight loss. Wishing she would have done the RNY as she has a coworker who is \"melting away\" post RNYGB.     Anti-obesity medications:     Current:   wegovy- 0.5 mg- starting to see decreased hunger     Recent diet changes:   Was feeling really hungry- every 2 hours   Now, feeling like she eats only 3 meals and a couple snacks     -Protein- improving, doing 1 protein a shake some days  -Water- at least 32 oz daily   --keeps water with her but really busy at work     Recent exercise/activity changes:   Monitoring activity on fitness watch     Recent stressors:   Looking to buy a house   Planning wedding     Recent sleep changes: melatonin helps     Vitamins/Labs: due 7/2023     Weight History:      4/1/2023     5:29 PM   --   What is your highest lifetime weight? 365   What is your lowest weight since surgery? (In " pounds) 283     Initial Weight (lbs): 365 lbs  Weight: 131.5 kg (290 lb)  Last Visits Weight: 128.4 kg (283 lb)  Cumulative weight loss (lbs): 75  Weight Loss Percentage: 20.55%   Wt Readings from Last 5 Encounters:   04/06/23 131.5 kg (290 lb)   02/02/23 128.4 kg (283 lb)   08/24/22 138.3 kg (305 lb)   08/02/22 145.1 kg (319 lb 14.4 oz)   07/26/22 149.5 kg (329 lb 9.4 oz)            4/1/2023     5:29 PM   Questions Regarding Co-Morbidities and Health Concerns Reviewed With Patient   Pre-diabetes Never   Diabetes II Never   High Blood Pressure Stayed the same   High cholesterol Never   Heartburn/Reflux Worsened   Sleep apnea Never   PCOS Never   Back pain Improved   Joint pain Improved   Lower leg swelling Gone away     Rashes in skin folds- using creams over the counter (anti fungal)         4/1/2023     5:29 PM   Eating Habits   How many meals do you eat per day? 3   Do you snack between meals? Sometimes   How much food are you eating at each meal? 1/2 cup to 1 cup   Are you able to separate your meals and liquids by at least 30 minutes? Yes   Are you able to avoid liquid calories? Sometimes           4/1/2023     5:29 PM   Exercise Questions Reviewed With Patient   How often do you exercise? 1 to 2 times per week   What is the duration of your exercise (in minutes)? 30 Minutes   What types of exercise do you do? walking    home gym    weightlifting   What keeps you from being more active? Too tired       Social History:      4/1/2023     5:29 PM   --   How much alcohol? 1 -2 x monthly . 1-2 drinks       Medications:  Current Outpatient Medications   Medication     nystatin (MYCOSTATIN) 893489 UNIT/GM external powder     Semaglutide-Weight Management (WEGOVY) 0.5 MG/0.5ML SOAJ     Semaglutide-Weight Management (WEGOVY) 1 MG/0.5ML pen     albuterol (PROAIR HFA/PROVENTIL HFA/VENTOLIN HFA) 108 (90 Base) MCG/ACT inhaler     betamethasone dipropionate (DIPROSONE) 0.05 % external cream     buPROPion (WELLBUTRIN XL) 150  "MG 24 hr tablet     Cholecalciferol (VITAMIN D3) 25 MCG TABS     cyanocobalamin (CYANOCOBALAMIN) 1000 MCG/ML injection     etonogestrel (NEXPLANON) 68 MG IMPL     Fexofenadine HCl (ALLEGRA PO)     fluticasone-salmeterol (ADVAIR) 250-50 MCG/ACT inhaler     ipratropium - albuterol 0.5 mg/2.5 mg/3 mL (DUONEB) 0.5-2.5 (3) MG/3ML neb solution     LORazepam (ATIVAN) 1 MG tablet     metoprolol succinate ER (TOPROL XL) 50 MG 24 hr tablet     RESTASIS 0.05 % ophthalmic emulsion     Semaglutide-Weight Management (WEGOVY) 0.25 MG/0.5ML SOAJ     Syringe/Needle, Disp, (BD ECLIPSE SYRINGE/NEEDLE) 25G X 5/8\" 3 ML MISC     No current facility-administered medications for this visit.         1/31/2023     2:16 PM   --   Do you avoid NSAIDs such as (Ibuprofen, Aleve, Naproxen, Advil)?   Yes       ROS:  GI:       4/1/2023     5:29 PM   --   Stress urinary incontinence No     Skin:       1/31/2023     2:16 PM   BAR RBS ROS - SKIN   Rash in skin folds: Yes     Psych:       1/31/2023     2:16 PM   --   Depression: No   Anxiety: No     Female Only:       4/1/2023     5:29 PM   BAR RBS ROS - FEMALE ONLY   Female only Birth control       Lab on 11/03/2022   Component Date Value Ref Range Status     WBC Count 11/03/2022 7.6  4.0 - 11.0 10e3/uL Final     RBC Count 11/03/2022 5.00  3.80 - 5.20 10e6/uL Final     Hemoglobin 11/03/2022 14.3  11.7 - 15.7 g/dL Final     Hematocrit 11/03/2022 42.8  35.0 - 47.0 % Final     MCV 11/03/2022 86  78 - 100 fL Final     MCH 11/03/2022 28.6  26.5 - 33.0 pg Final     MCHC 11/03/2022 33.4  31.5 - 36.5 g/dL Final     RDW 11/03/2022 12.8  10.0 - 15.0 % Final     Platelet Count 11/03/2022 286  150 - 450 10e3/uL Final     Sodium 11/03/2022 140  133 - 144 mmol/L Final     Potassium 11/03/2022 4.2  3.4 - 5.3 mmol/L Final     Chloride 11/03/2022 107  94 - 109 mmol/L Final     Carbon Dioxide (CO2) 11/03/2022 28  20 - 32 mmol/L Final     Anion Gap 11/03/2022 5  3 - 14 mmol/L Final     Urea Nitrogen 11/03/2022 10  7 - " 30 mg/dL Final     Creatinine 11/03/2022 0.80  0.52 - 1.04 mg/dL Final     Calcium 11/03/2022 9.1  8.5 - 10.1 mg/dL Final     Glucose 11/03/2022 97  70 - 99 mg/dL Final     Alkaline Phosphatase 11/03/2022 81  40 - 150 U/L Final     AST 11/03/2022 13  0 - 45 U/L Final     ALT 11/03/2022 23  0 - 50 U/L Final     Protein Total 11/03/2022 6.9  6.8 - 8.8 g/dL Final     Albumin 11/03/2022 3.7  3.4 - 5.0 g/dL Final     Bilirubin Total 11/03/2022 0.5  0.2 - 1.3 mg/dL Final     GFR Estimate 11/03/2022 >90  >60 mL/min/1.73m2 Final    Effective December 21, 2021 eGFRcr in adults is calculated using the 2021 CKD-EPI creatinine equation which includes age and gender (Addis et al., [facility], DOI: 10.1056/RASVva4049412)     Hemoglobin A1C 11/03/2022 5.1  0.0 - 5.6 % Final    Normal <5.7%   Prediabetes 5.7-6.4%    Diabetes 6.5% or higher     Note: Adopted from ADA consensus guidelines.     Ferritin 11/03/2022 98  12 - 150 ng/mL Final     Parathyroid Hormone Intact 11/03/2022 31  15 - 65 pg/mL Final     Vitamin A 11/03/2022 0.49  0.30 - 1.20 mg/L Final     Retinol Palmitate 11/03/2022 <0.02  0.00 - 0.10 mg/L Final     Vitamin A Interp 11/03/2022 Normal   Final      This test was developed and its performance characteristics   determined by [facility]. It has not been cleared or   approved by the US Food and Drug Administration. This test   was performed in a CLIA certified laboratory and is   intended for clinical purposes.  Performed By: [facility]  [address] [place] [id number]  : Jose Aguilar MD, PhD     Vitamin B12 11/03/2022 650  232 - 1,245 pg/mL Final     Vitamin D, Total (25-Hydroxy) 11/03/2022 52  20 - 75 ug/L Final     Cholesterol 11/03/2022 133  <200 mg/dL Final     Triglycerides 11/03/2022 88  <150 mg/dL Final     Direct Measure HDL 11/03/2022 51  >=50 mg/dL Final     LDL Cholesterol Calculated 11/03/2022 64  <=100 mg/dL Final     Non HDL Cholesterol 11/03/2022 82  <130  "mg/dL Final     Patient Fasting > 8hrs? 11/03/2022 Yes   Final         PHYSICAL EXAM:  Objective    Ht 1.702 m (5' 7\")   Wt 131.5 kg (290 lb)   BMI 45.42 kg/m           Vitals:  No vitals were obtained today due to virtual visit.    Physical Exam   GENERAL: Healthy, alert and no distress  EYES: Eyes grossly normal to inspection.  No discharge or erythema, or obvious scleral/conjunctival abnormalities.  RESP: No audible wheeze, cough, or visible cyanosis.  No visible retractions or increased work of breathing.    SKIN: Visible skin clear. No significant rash, abnormal pigmentation or lesions.  NEURO: Cranial nerves grossly intact.  Mentation and speech appropriate for age.  PSYCH: Mentation appears normal, affect normal/bright, judgement and insight intact, normal speech and appearance well-groomed.        Sincerely,    Komal Miller NP    "

## 2023-04-06 NOTE — ASSESSMENT & PLAN NOTE
Starting to see decreased hunger since increasing to the 0.5mg wegovy. Some constipation but manageable with metamucil. Reflux has improved in the last 2 weeks , now taking omeprazole every other day. Would like to try going up to 1mg wegovy.     Discussed progress with weight loss. Discussed that hunger may not have been better controled with RNY as she was concerned she should have done that now that she is struggling with weight loss.     Increased frequency of rashes, discussed management.     Plan:  Increase wegovy to 1mg   Continue to work on increasing protein  Continue to work on adequate hydration  Continue omeprazole every other day   Nystatin as needed for rashes  Can try cotton barrier in skin folds to prevent rashes  Follow up 3 months- labs at that time

## 2023-04-06 NOTE — NURSING NOTE
Chief Complaint   Patient presents with     Follow Up     Return after bariatric surgery.         Vitals:    04/06/23 1425   Weight: 290 lb       Body mass index is 45.42 kg/m .      Yobany Oshea, EMT  Surgery Clinic

## 2023-04-06 NOTE — PATIENT INSTRUCTIONS
"Thank you for allowing us the privilege of caring for you. We hope we provided you with the excellent service you deserve.   Please let us know if there is anything else we can do for you so that we can be sure you are completely satisfied with your care experience.    To ensure the quality of our services you may be receiving a patient satisfaction survey from an independent patient satisfaction monitoring company.    The greatest compliment you can give is a \"Likely to Recommend\"    Your visit was with Komal Miller NP today.    Instructions per today's visit:     Harjit Hand, it was great to visit with you today.  Here is a review of our visit.  If our clinic scheduler is not able to reach you please call [phone] to schedule your next appointments.    Plan:  Increase wegovy to 1mg   Continue to work on increasing protein  Continue to work on adequate hydration  Continue omeprazole every other day   Nystatin as needed for rashes  Can try cotton barrier in skin folds to prevent rashes  Follow up 3 months- labs at that time       Information about Video Visits with [facility]: video visit information  _________________________________________________________________________________________________________________________________________________________  If you are asked by your clinic team to have your blood pressure checked:  [facility] do offer several locations for blood pressure checks. Please follow the below link to schedule an appointment. Scheduling an appointment at the pharmacy for a blood pressure check is now preferred.    Appointment Plus (appointment-plus.Shiftboard Online Scheduling)  _________________________________________________________________________________________________________________________________________________________  Important contact and scheduling information:  Please call our contact center at [phone] to schedule your next appointments.  To find a lab location near you, please " call [phone].  For any nursing questions or concerns call Keke Feliciano LPN at [phone] or Amanda Rivas RN at [phone]  Please call during clinic hours Monday through Friday 8:00a - 4:00p if you have questions or you can contact us via Hipuihart at anytime and we will reply during clinic hours.    Lab results will be communicated through My Chart or letter (if My Chart not used). Please call the clinic if you have not received communication after 1 week or if you have any questions.?  Clinic Fax: [phone]    _________________________________________________________________________________________________________________________________________________________  Meal Replacement Products:    Here is the link to our new e-store where you can purchase our meal replacement products    [facility] E-Store  [email or web address]    The one week starter kit is a great way to sample a variety of products and see what works for you.    If you want more information about the product go to: [email or web address]    If you are an employee or [facility] or [facility] please contact your care team for a 10% estore discount    Free Shipping for orders over $75     Benefits of meal replacements products:    Portion and calorie control  Improved nutrition  Structured eating  Simplified food choices  Avoid contact with trigger foods  _________________________________________________________________________________________________________________________________________________________  Interested in working with a health ?  Health coaches work with you to improve your overall health and wellbeing.  They look at the whole person, and may involve discussion of different areas of life, including, but not limited to the four pillars of health (sleep, exercise, nutrition, and stress management). Discuss with your care team if you would like to start working a  health .  Health Coaching-3 Pack: Schedule by calling [phone]    $99 for three health coaching visits    Visits may be done in person or via phone    Coaching is a partnership between the  and the client; Coaches do not prescribe or diagnose    Coaching helps inspire the client to reach his/her personal goals   _________________________________________________________________________________________________________________________________________________________  24 Week Healthy Lifestyle Plan:    Our mission in the 24-week Healthy Lifestyle Plan is to provide you with individualized care by giving you the tools, education and support you need to lose weight and maintain a healthy lifestyle. In your 24-week journey, you ll be supported by a dedicated weight loss team that includes registered dietitians, medical weight management providers, health coaches, and nurses -- all with special expertise in weight loss -- to help you every step of the way.     Monthly meetings with your registered dietician or medical weight management provider help to review your progress, update your care plan, and make any adjustments needed to ensure success. Between these visits, weekly and bi-weekly health  visits will help you focus on the four pillars of weight loss -- stress, sleep, nutrition, and exercise -- and how you can best adapt each to achieve sustainable weight loss results.    In addition, you will be given exclusive access to online wellbeing classes through [employer].  Your initial visit will be with a medical weight management provider who will help to understand your weight loss goals and ensure this program is the right fit for you. Please let our team know if you are interested in the 24 week plan by sending a message to your care team or calling [phone] to  schedule.  _________________________________________________________________________________________________________________________________________________________  __________  [facility] Get Moving Program  Our team of physical therapists is trained to help you understand and take control of your condition. They will perform a thorough evaluation to determine your ability for activity and develop a customized plan to fit your goals and physical ability.  Scheduling: Unsure if the Get Moving program is right for you? Discuss the program with your medical provider or diabetes educator. You can also call us at [phone] to ask questions or schedule an appointment.   [facility] Get Moving Program  ____________________________________________________________________________________________________________________________________________________________________________   [employer] Diabetes Prevention Program (DPP)  If you have prediabetes and Medicare please contact us via Veroseet to learn more about the Diabetes Prevention Program (DPP)  Program Details:   [employer] offers the year-long Diabetes Prevention Program (DPP). The program helps you to make lifestyle changes that prevent or delay type 2 diabetes by supporting healthy eating, increased physical activity, stress reduction and use of coping skills.   On average, previous [facility] DPP cohorts have lost and maintained at least 5% of their starting weight throughout the program and averaged more than 150 minutes of physical activity per week.  Participants meet weekly for one-hour group sessions over sixteen weeks, every other week for the next 8 weeks, and monthly for the last six months.   A year-long maintenance program is also available for participants who complete the first year.   Location & Cost:   During the COVID-19 Public Health Emergency, the program is offered virtually. When in-person classes can resume, they  will be held at [facility].  For people with Medicare, the program is covered in full. A self-pay option will also be available for those with non-Medicare insurance plans.   ______________________________________________________________________________________________________________________________________________________________________________________________________________________________    To work with a Behavioral Health Psychologist:    Call to schedule:    Aron Nice - [phone]  Hannah Casas - [phone]  Samira Prescott - [phone]  Michelle Breaux - [phone]   Stephanie Leal PhD (cannot accept Medicare) [phone]        Thank you,   [facility] Comprehensive Weight Management Team

## 2023-04-06 NOTE — LETTER
2023       RE: Sasha Hand  [address]  [facility] [address]     Dear Colleague,    Thank you for referring your patient, Sasha Hand, to the [facility] [place] at [facility]. Please see a copy of my visit note below.    Virtual Visit Check-In    During this virtual visit the patient is located in [place], patient verifies this as the location during the entirety of this visit.     Sasha is a 22 year old who is being evaluated via a billable video visit.      How would you like to obtain your AVS? MyChart  If the video visit is dropped, the invitation should be resent by: Text to cell phone: [phone]  Will anyone else be joining your video visit? No        Video-Visit Details    Originating Location (pt. Location): Home    Distant Location (provider location):  Off-site  Platform used for Video Visit: Infomous         Video Start Time:   Video End Time:    KEKE Nesbitt          Return Bariatric Surgery Note    RE: Sasha Hand  MR#: [id number]  : 2000  VISIT DATE: 2023      Dear Oscar Medley,    I had the pleasure of seeing your patient, Sasha Hand, in my post-bariatric surgery assessment clinic.    Assessment & Plan   Problem List Items Addressed This Visit          Digestive    Class 3 severe obesity with serious comorbidity and body mass index (BMI) of 40.0 to 44.9 in adult (H) - Primary     Starting to see decreased hunger since increasing to the 0.5mg wegovy. Some constipation but manageable with metamucil. Reflux has improved in the last 2 weeks , now taking omeprazole every other day. Would like to try going up to 1mg wegovy.     Discussed progress with weight loss. Discussed that hunger may not have been better controled with RNY as she was concerned she should have done that now that she is struggling with weight loss.     Increased frequency of rashes, discussed management.      Plan:  Increase wegovy to 1mg   Continue to work on increasing protein  Continue to work on adequate hydration  Continue omeprazole every other day   Nystatin as needed for rashes  Can try cotton barrier in skin folds to prevent rashes  Follow up 3 months- labs at that time          Relevant Medications    Semaglutide-Weight Management (WEGOVY) 1 MG/0.5ML pen    Other Relevant Orders    CBC with platelets    Comprehensive metabolic panel    Ferritin    Hemoglobin A1c    Parathyroid Hormone Intact    Vitamin A    Vitamin B12    Vitamin D Deficiency    Lipid panel reflex to direct LDL Fasting       Other    S/P laparoscopic sleeve gastrectomy    Relevant Medications    Semaglutide-Weight Management (WEGOVY) 1 MG/0.5ML pen    Other Relevant Orders    CBC with platelets    Comprehensive metabolic panel    Ferritin    Hemoglobin A1c    Parathyroid Hormone Intact    Vitamin A    Vitamin B12    Vitamin D Deficiency    Lipid panel reflex to direct LDL Fasting     Other Visit Diagnoses       Intertriginous candidiasis        Relevant Medications    nystatin (MYCOSTATIN) 053449 UNIT/GM external powder               20 minutes spent by me on the date of the encounter doing chart review, history and exam, documentation and further activities per the note    CHIEF COMPLAINT: Post-bariatric surgery follow-up. 9 months s/p sleeve with Dr. Mata.    HISTORY OF PRESENT ILLNESS:      4/1/2023     5:29 PM   Questions Regarding Prior Weight Loss Surgery Reviewed With Patient   I had the following weight loss procedure Sleeve Gastrectomy   What year was your surgery? 2022   How has your weight changed since your last visit? I have stayed about the same   Do you currently have any of the following Heartburn, acid reflux, or GERD (acid reflux disease)?    Hypertension (high blood pressure)?   Do you have any concerns today? No     Last seen 2/2/2023- Abril Ji PA-C  Increased hunger. Restarted wegovy. Referral to GI for  "diarrhea- had some preop, worse postop     Reflux- 20mg omeprazole every other day, well controlled     Scale was off last couple of visits (battery issues)     Discouraged with slow weight loss. Wishing she would have done the RNY as she has a coworker who is \"melting away\" post RNYGB.     Anti-obesity medications:     Current:   wegovy- 0.5 mg- starting to see decreased hunger     Recent diet changes:   Was feeling really hungry- every 2 hours   Now, feeling like she eats only 3 meals and a couple snacks     -Protein- improving, doing 1 protein a shake some days  -Water- at least 32 oz daily   --keeps water with her but really busy at work     Recent exercise/activity changes:   Monitoring activity on fitness watch     Recent stressors:   Looking to buy a house   Planning wedding     Recent sleep changes: melatonin helps     Vitamins/Labs: due 7/2023     Weight History:      4/1/2023     5:29 PM   --   What is your highest lifetime weight? 365   What is your lowest weight since surgery? (In pounds) 283     Initial Weight (lbs): 365 lbs  Weight: 131.5 kg (290 lb)  Last Visits Weight: 128.4 kg (283 lb)  Cumulative weight loss (lbs): 75  Weight Loss Percentage: 20.55%   Wt Readings from Last 5 Encounters:   04/06/23 131.5 kg (290 lb)   02/02/23 128.4 kg (283 lb)   08/24/22 138.3 kg (305 lb)   08/02/22 145.1 kg (319 lb 14.4 oz)   07/26/22 149.5 kg (329 lb 9.4 oz)            4/1/2023     5:29 PM   Questions Regarding Co-Morbidities and Health Concerns Reviewed With Patient   Pre-diabetes Never   Diabetes II Never   High Blood Pressure Stayed the same   High cholesterol Never   Heartburn/Reflux Worsened   Sleep apnea Never   PCOS Never   Back pain Improved   Joint pain Improved   Lower leg swelling Gone away     Rashes in skin folds- using creams over the counter (anti fungal)         4/1/2023     5:29 PM   Eating Habits   How many meals do you eat per day? 3   Do you snack between meals? Sometimes   How much food are " "you eating at each meal? 1/2 cup to 1 cup   Are you able to separate your meals and liquids by at least 30 minutes? Yes   Are you able to avoid liquid calories? Sometimes           4/1/2023     5:29 PM   Exercise Questions Reviewed With Patient   How often do you exercise? 1 to 2 times per week   What is the duration of your exercise (in minutes)? 30 Minutes   What types of exercise do you do? walking    home gym    weightlifting   What keeps you from being more active? Too tired       Social History:      4/1/2023     5:29 PM   --   How much alcohol? 1 -2 x monthly . 1-2 drinks       Medications:  Current Outpatient Medications   Medication    nystatin (MYCOSTATIN) 130076 UNIT/GM external powder    Semaglutide-Weight Management (WEGOVY) 0.5 MG/0.5ML SOAJ    Semaglutide-Weight Management (WEGOVY) 1 MG/0.5ML pen    albuterol (PROAIR HFA/PROVENTIL HFA/VENTOLIN HFA) 108 (90 Base) MCG/ACT inhaler    betamethasone dipropionate (DIPROSONE) 0.05 % external cream    buPROPion (WELLBUTRIN XL) 150 MG 24 hr tablet    Cholecalciferol (VITAMIN D3) 25 MCG TABS    cyanocobalamin (CYANOCOBALAMIN) 1000 MCG/ML injection    etonogestrel (NEXPLANON) 68 MG IMPL    Fexofenadine HCl (ALLEGRA PO)    fluticasone-salmeterol (ADVAIR) 250-50 MCG/ACT inhaler    ipratropium - albuterol 0.5 mg/2.5 mg/3 mL (DUONEB) 0.5-2.5 (3) MG/3ML neb solution    LORazepam (ATIVAN) 1 MG tablet    metoprolol succinate ER (TOPROL XL) 50 MG 24 hr tablet    RESTASIS 0.05 % ophthalmic emulsion    Semaglutide-Weight Management (WEGOVY) 0.25 MG/0.5ML SOAJ    Syringe/Needle, Disp, (BD ECLIPSE SYRINGE/NEEDLE) 25G X 5/8\" 3 ML MISC     No current facility-administered medications for this visit.         1/31/2023     2:16 PM   --   Do you avoid NSAIDs such as (Ibuprofen, Aleve, Naproxen, Advil)?   Yes       ROS:  GI:       4/1/2023     5:29 PM   --   Stress urinary incontinence No     Skin:       1/31/2023     2:16 PM   BAR RBS ROS - SKIN   Rash in skin folds: Yes "     Psych:       1/31/2023     2:16 PM   --   Depression: No   Anxiety: No     Female Only:       4/1/2023     5:29 PM   BAR RBS ROS - FEMALE ONLY   Female only Birth control       Lab on 11/03/2022   Component Date Value Ref Range Status    WBC Count 11/03/2022 7.6  4.0 - 11.0 10e3/uL Final    RBC Count 11/03/2022 5.00  3.80 - 5.20 10e6/uL Final    Hemoglobin 11/03/2022 14.3  11.7 - 15.7 g/dL Final    Hematocrit 11/03/2022 42.8  35.0 - 47.0 % Final    MCV 11/03/2022 86  78 - 100 fL Final    MCH 11/03/2022 28.6  26.5 - 33.0 pg Final    MCHC 11/03/2022 33.4  31.5 - 36.5 g/dL Final    RDW 11/03/2022 12.8  10.0 - 15.0 % Final    Platelet Count 11/03/2022 286  150 - 450 10e3/uL Final    Sodium 11/03/2022 140  133 - 144 mmol/L Final    Potassium 11/03/2022 4.2  3.4 - 5.3 mmol/L Final    Chloride 11/03/2022 107  94 - 109 mmol/L Final    Carbon Dioxide (CO2) 11/03/2022 28  20 - 32 mmol/L Final    Anion Gap 11/03/2022 5  3 - 14 mmol/L Final    Urea Nitrogen 11/03/2022 10  7 - 30 mg/dL Final    Creatinine 11/03/2022 0.80  0.52 - 1.04 mg/dL Final    Calcium 11/03/2022 9.1  8.5 - 10.1 mg/dL Final    Glucose 11/03/2022 97  70 - 99 mg/dL Final    Alkaline Phosphatase 11/03/2022 81  40 - 150 U/L Final    AST 11/03/2022 13  0 - 45 U/L Final    ALT 11/03/2022 23  0 - 50 U/L Final    Protein Total 11/03/2022 6.9  6.8 - 8.8 g/dL Final    Albumin 11/03/2022 3.7  3.4 - 5.0 g/dL Final    Bilirubin Total 11/03/2022 0.5  0.2 - 1.3 mg/dL Final    GFR Estimate 11/03/2022 >90  >60 mL/min/1.73m2 Final    Effective December 21, 2021 eGFRcr in adults is calculated using the 2021 CKD-EPI creatinine equation which includes age and gender (Addis et al., NEJ, DOI: 10.1056/GFIMwb7339276)    Hemoglobin A1C 11/03/2022 5.1  0.0 - 5.6 % Final    Normal <5.7%   Prediabetes 5.7-6.4%    Diabetes 6.5% or higher     Note: Adopted from ADA consensus guidelines.    Ferritin 11/03/2022 98  12 - 150 ng/mL Final    Parathyroid Hormone Intact 11/03/2022 31  15 -  "65 pg/mL Final    Vitamin A 11/03/2022 0.49  0.30 - 1.20 mg/L Final    Retinol Palmitate 11/03/2022 <0.02  0.00 - 0.10 mg/L Final    Vitamin A Interp 11/03/2022 Normal   Final      This test was developed and its performance characteristics   determined by [facility]. It has not been cleared or   approved by the US Food and Drug Administration. This test   was performed in a CLIA certified laboratory and is   intended for clinical purposes.  Performed By: [facility]  [address] [place] [id number]  : Jose Aguilar MD, PhD    Vitamin B12 11/03/2022 650  232 - 1,245 pg/mL Final    Vitamin D, Total (25-Hydroxy) 11/03/2022 52  20 - 75 ug/L Final    Cholesterol 11/03/2022 133  <200 mg/dL Final    Triglycerides 11/03/2022 88  <150 mg/dL Final    Direct Measure HDL 11/03/2022 51  >=50 mg/dL Final    LDL Cholesterol Calculated 11/03/2022 64  <=100 mg/dL Final    Non HDL Cholesterol 11/03/2022 82  <130 mg/dL Final    Patient Fasting > 8hrs? 11/03/2022 Yes   Final         PHYSICAL EXAM:  Objective    Ht 1.702 m (5' 7\")   Wt 131.5 kg (290 lb)   BMI 45.42 kg/m           Vitals:  No vitals were obtained today due to virtual visit.    Physical Exam   GENERAL: Healthy, alert and no distress  EYES: Eyes grossly normal to inspection.  No discharge or erythema, or obvious scleral/conjunctival abnormalities.  RESP: No audible wheeze, cough, or visible cyanosis.  No visible retractions or increased work of breathing.    SKIN: Visible skin clear. No significant rash, abnormal pigmentation or lesions.  NEURO: Cranial nerves grossly intact.  Mentation and speech appropriate for age.  PSYCH: Mentation appears normal, affect normal/bright, judgement and insight intact, normal speech and appearance well-groomed.        Sincerely,    Komal Miller NP    "

## 2023-04-16 NOTE — PATIENT INSTRUCTIONS
Before Your Surgery      Call your surgeon if there is any change in your health. This includes signs of a cold or flu (such as a sore throat, runny nose, cough, rash or fever).    Do not smoke, drink alcohol or take over the counter medicine (unless your surgeon or primary care doctor tells you to) for the 24 hours before and after surgery.    If you take prescribed drugs: Follow your doctor s orders about which medicines to take and which to stop until after surgery.    Eating and drinking prior to surgery: follow the instructions from your surgeon    Take a shower or bath the night before surgery. Use the soap your surgeon gave you to gently clean your skin. If you do not have soap from your surgeon, use your regular soap. Do not shave or scrub the surgery site.  Wear clean pajamas and have clean sheets on your bed.    "    DATE: 4/16/2023    Hospital Day 1  Parapneumonic effusion and pneumonia .    INTERVAL EVENTS:  IR for chest tube pending  On Vanco and Zosyn  Cleared for VTE prophylaxis post procedure  Resume diet post procedure     REVIEW OF SYSTEMS:  Review of Systems   Constitutional: Negative.    Respiratory:  Positive for cough, sputum production and shortness of breath.    Gastrointestinal:  Negative for abdominal pain, nausea and vomiting.   Genitourinary:         Voidng   Musculoskeletal: Negative.    Psychiatric/Behavioral: Negative.     All other systems reviewed and are negative.    PHYSICAL EXAMINATION:  Vital Signs: BP (!) 154/79   Pulse 84   Temp 36.6 °C (97.9 °F) (Temporal)   Resp 18   Ht 1.727 m (5' 8\")   Wt (!) 128 kg (281 lb 12 oz)   SpO2 92%   Physical Exam  Vitals and nursing note reviewed.   Constitutional:       General: She is not in acute distress.     Appearance: She is not ill-appearing.   HENT:      Mouth/Throat:      Mouth: Mucous membranes are moist.      Pharynx: Oropharynx is clear.   Eyes:      General:         Right eye: No discharge.         Left eye: No discharge.      Extraocular Movements: Extraocular movements intact.   Pulmonary:      Effort: Pulmonary effort is normal. No respiratory distress.      Comments: Diminished right lung   Abdominal:      General: There is no distension.      Palpations: Abdomen is soft.      Tenderness: There is no abdominal tenderness.   Skin:     General: Skin is warm and dry.      Capillary Refill: Capillary refill takes less than 2 seconds.   Neurological:      Mental Status: She is alert and oriented to person, place, and time.   Psychiatric:         Behavior: Behavior normal.         Thought Content: Thought content normal.     Core Measures & Quality Metrics    ASSESSMENT AND PLAN:   * Parapneumonic effusion- (present on admission)  Assessment & Plan  Recommend IR for intervention  ABX per medicine  ACS gray    Discussed patient condition with RN, " Patient, and Dr. Mendez .

## 2023-04-17 ASSESSMENT — ASTHMA QUESTIONNAIRES
QUESTION_1 LAST FOUR WEEKS HOW MUCH OF THE TIME DID YOUR ASTHMA KEEP YOU FROM GETTING AS MUCH DONE AT WORK, SCHOOL OR AT HOME: NONE OF THE TIME
QUESTION_2 LAST FOUR WEEKS HOW OFTEN HAVE YOU HAD SHORTNESS OF BREATH: ONCE OR TWICE A WEEK
ACT_TOTALSCORE: 23
QUESTION_3 LAST FOUR WEEKS HOW OFTEN DID YOUR ASTHMA SYMPTOMS (WHEEZING, COUGHING, SHORTNESS OF BREATH, CHEST TIGHTNESS OR PAIN) WAKE YOU UP AT NIGHT OR EARLIER THAN USUAL IN THE MORNING: NOT AT ALL
QUESTION_5 LAST FOUR WEEKS HOW WOULD YOU RATE YOUR ASTHMA CONTROL: WELL CONTROLLED
QUESTION_4 LAST FOUR WEEKS HOW OFTEN HAVE YOU USED YOUR RESCUE INHALER OR NEBULIZER MEDICATION (SUCH AS ALBUTEROL): NOT AT ALL

## 2023-04-18 ENCOUNTER — MYC MEDICAL ADVICE (OUTPATIENT)
Dept: FAMILY MEDICINE | Facility: CLINIC | Age: 23
End: 2023-04-18
Payer: COMMERCIAL

## 2023-04-24 ENCOUNTER — OFFICE VISIT (OUTPATIENT)
Dept: FAMILY MEDICINE | Facility: CLINIC | Age: 23
End: 2023-04-24
Payer: COMMERCIAL

## 2023-04-24 VITALS
WEIGHT: 293 LBS | HEART RATE: 72 BPM | BODY MASS INDEX: 45.99 KG/M2 | DIASTOLIC BLOOD PRESSURE: 68 MMHG | TEMPERATURE: 98 F | RESPIRATION RATE: 18 BRPM | SYSTOLIC BLOOD PRESSURE: 104 MMHG | HEIGHT: 67 IN

## 2023-04-24 DIAGNOSIS — I10 ESSENTIAL HYPERTENSION: Primary | ICD-10-CM

## 2023-04-24 DIAGNOSIS — J45.20 MILD INTERMITTENT ASTHMA WITHOUT COMPLICATION: ICD-10-CM

## 2023-04-24 LAB
CREAT UR-MCNC: 83 MG/DL
MICROALBUMIN UR-MCNC: <12 MG/L
MICROALBUMIN/CREAT UR: NORMAL MG/G{CREAT}

## 2023-04-24 PROCEDURE — 99214 OFFICE O/P EST MOD 30 MIN: CPT | Performed by: FAMILY MEDICINE

## 2023-04-24 PROCEDURE — 82043 UR ALBUMIN QUANTITATIVE: CPT | Performed by: FAMILY MEDICINE

## 2023-04-24 PROCEDURE — 82570 ASSAY OF URINE CREATININE: CPT | Performed by: FAMILY MEDICINE

## 2023-04-24 ASSESSMENT — PATIENT HEALTH QUESTIONNAIRE - PHQ9
SUM OF ALL RESPONSES TO PHQ QUESTIONS 1-9: 3
SUM OF ALL RESPONSES TO PHQ QUESTIONS 1-9: 3
10. IF YOU CHECKED OFF ANY PROBLEMS, HOW DIFFICULT HAVE THESE PROBLEMS MADE IT FOR YOU TO DO YOUR WORK, TAKE CARE OF THINGS AT HOME, OR GET ALONG WITH OTHER PEOPLE: NOT DIFFICULT AT ALL

## 2023-04-24 ASSESSMENT — PAIN SCALES - GENERAL: PAINLEVEL: MILD PAIN (2)

## 2023-04-24 NOTE — LETTER
Asthma Action Plan    For:  Sasha aHnd  Physician's office:  [address] [id number] [phone]  SEVERITY:  Mild Intermittent  TRIGGERS:  Colds, Dust, and Pollen  ==================== GREEN ZONE ====================  doing well  Symptoms     *Breathing is good     *No cough or wheeze     *Can work and play without cough or wheeze     *Sleeps at night without cough or wheeze    Control Medications to be taken regularly to prevent asthma symptoms.  None recommended.    Quick Relief Medications to be used when sick or having asthma symptoms.  Albuterol 2 puffs every four hours as needed for relief of symptoms.    Be sure to get the influenza (flu) vaccine every fall.  ==================== YELLOW ZONE ====================  Getting Worse  Symptoms     *Some problems breathing     *Cough, wheeze or chest tight     *Problems working or playing     *Waking at night with cough or wheezing    Take your quick relief medicine now.    IF your symptoms return to the Green Zone after one hour of the quick relief treatment, THEN:  Take the quick relief medicine again now.  Take the quick relief medicine every 4-6 hours for 1-2 days.    IF your symptoms DO NOT return to the Green Zone after one hour of the quick relief treatment, THEN:  Call the clinic now.    CALL THE OFFICE IF:     *You are having problems staying in the green zone    ====================== RED ZONE =====================  EMERGENCY!  Symptoms     *Lots of problems breathing     *Cannot work or play     *Getting worse instead of better     *Medicine is not helping    Take your quick relief medicine now.  Continue your control medicines.  Call the office immediately!  Call the office for an appointment.    Call an ambulance immediately if the following danger signs are present:     *You are worried about how you will get through the next 30  minutes     *Trouble walking/talking due to shortness of breath     *Lips or fingernails are blue    Go to the hospital or call for an ambulance (911) IF:     *Still in the red zone after rescue medication     *You have not been able to reach your physician/office for help    If you return to the YELLOW ZONE in 4 hours, continue with the Yellow Zone instructions.

## 2023-04-24 NOTE — PROGRESS NOTES
"  Assessment & Plan     (I10) Essential hypertension  (primary encounter diagnosis)  Comment: Patient is doing well this is the best her blood pressure has been since her gastric sleeve procedure.  Plan: Albumin Random Urine Quantitative with Creat         Ratio        She is due for other labs in July which will include her kidney function.  We will do her microalbumin today.  If she continues to lose weight which is helpful, we may be able to lower her dose of her blood pressure medication to 25 mg of the metoprolol XL.    (J45.20) Mild intermittent asthma without complication  Comment: stable since she quit smoking.    Plan: rescue inhaler as needed.                  BMI:   Estimated body mass index is 46.3 kg/m  as calculated from the following:    Height as of this encounter: 1.702 m (5' 7\").    Weight as of this encounter: 134.1 kg (295 lb 9.6 oz).           Oscar Medley MD, MD  [facility] MARY Baez is a 23 year old, presenting for the following health issues:  Hypertension         View : No data to display.              History of Present Illness       Hypertension: She presents for follow up of hypertension.  She does not check blood pressure  regularly outside of the clinic. Outpatient blood pressures have not been over 140/90. She follows a low salt diet.     She eats 2-3 servings of fruits and vegetables daily.She consumes 0 sweetened beverage(s) daily.She exercises with enough effort to increase her heart rate 20 to 29 minutes per day.  She exercises with enough effort to increase her heart rate 3 or less days per week. She is missing 1 dose(s) of medications per week.  She is not taking prescribed medications regularly due to remembering to take.    Today's PHQ-9         PHQ-9 Total Score: 3    PHQ-9 Q9 Thoughts of better off dead/self-harm past 2 weeks :   Not at all    How difficult have these problems made it for you to do your work, take care of things at " "home, or get along with other people: Not difficult at all         Hypertension Follow-up      Do you check your blood pressure regularly outside of the clinic? No     Are you following a low salt diet? No    Are your blood pressures ever more than 140 on the top number (systolic) OR more   than 90 on the bottom number (diastolic), for example 140/90? No    Asthma Follow-Up    Was ACT completed today?  Yes       Do you have a cough?  YES    Are you experiencing any wheezing in your chest?  No    Do you have any shortness of breath?  No     How often are you using a short-acting (rescue) inhaler or nebulizer, such as Albuterol?  Never    How many days per week do you miss taking your asthma controller medication?  I do not have an asthma controller medication     Please describe any recent triggers for your asthma: smoke, upper respiratory infections and pollens    Have you had any Emergency Room Visits, Urgent Care Visits, or Hospital Admissions since your last office visit?  No      How many servings of fruits and vegetables do you eat daily?  2-3    On average, how many sweetened beverages do you drink each day (Examples: soda, juice, sweet tea, etc.  Do NOT count diet or artificially sweetened beverages)?   0    How many days per week do you exercise enough to make your heart beat faster? 3 or less    How many minutes a day do you exercise enough to make your heart beat faster? 20 - 29    How many days per week do you miss taking your medication? 0        Review of Systems   Constitutional, HEENT, cardiovascular, pulmonary, GI, , musculoskeletal, neuro, skin, endocrine and psych systems are negative, except as otherwise noted.      Objective    /68   Pulse 72   Temp 98  F (36.7  C) (Temporal)   Resp 18   Ht 1.702 m (5' 7\")   Wt 134.1 kg (295 lb 9.6 oz)   LMP 04/24/2023 (Exact Date)   BMI 46.30 kg/m    Body mass index is 46.3 kg/m .  Physical Exam   GENERAL: healthy, alert and no distress  NECK: no " adenopathy, no asymmetry, masses, or scars and thyroid normal to palpation  RESP: lungs clear to auscultation - no rales, rhonchi or wheezes  CV: regular rate and rhythm, normal S1 S2, no S3 or S4, no murmur, click or rub, no peripheral edema and peripheral pulses strong  ABDOMEN: soft, nontender, no hepatosplenomegaly, no masses and bowel sounds normal    Results for orders placed or performed in visit on 04/24/23 (from the past 24 hour(s))   Albumin Random Urine Quantitative with Creat Ratio   Result Value Ref Range    Creatinine Urine mg/dL 83.0 mg/dL    Albumin Urine mg/L <12.0 mg/L    Albumin Urine mg/g Cr

## 2023-04-25 PROBLEM — J45.20 MILD INTERMITTENT ASTHMA: Status: ACTIVE | Noted: 2023-04-25

## 2023-04-26 ASSESSMENT — ASTHMA QUESTIONNAIRES: ACT_TOTALSCORE: 25

## 2023-05-03 DIAGNOSIS — E66.813 CLASS 3 SEVERE OBESITY WITH SERIOUS COMORBIDITY AND BODY MASS INDEX (BMI) OF 40.0 TO 44.9 IN ADULT, UNSPECIFIED OBESITY TYPE (H): ICD-10-CM

## 2023-05-03 DIAGNOSIS — E66.01 CLASS 3 SEVERE OBESITY WITH SERIOUS COMORBIDITY AND BODY MASS INDEX (BMI) OF 40.0 TO 44.9 IN ADULT, UNSPECIFIED OBESITY TYPE (H): ICD-10-CM

## 2023-05-03 RX ORDER — SEMAGLUTIDE 0.5 MG/.5ML
0.5 INJECTION, SOLUTION SUBCUTANEOUS WEEKLY
Qty: 2 ML | Refills: 1 | OUTPATIENT
Start: 2023-05-03

## 2023-05-15 ENCOUNTER — E-VISIT (OUTPATIENT)
Dept: FAMILY MEDICINE | Facility: CLINIC | Age: 23
End: 2023-05-15
Payer: COMMERCIAL

## 2023-05-15 DIAGNOSIS — J32.9 SINUSITIS, UNSPECIFIED CHRONICITY, UNSPECIFIED LOCATION: Primary | ICD-10-CM

## 2023-05-15 PROCEDURE — 99422 OL DIG E/M SVC 11-20 MIN: CPT | Performed by: FAMILY MEDICINE

## 2023-05-15 NOTE — ASSESSMENT & PLAN NOTE
History of constipation and diarrhea prior to surgery. Since surgery, symptoms have improved. However, diarrhea has worsened over the past month. Does not believe it was food related. GI referral placed. Will keep a log until visit.    15-May-2023 13:40

## 2023-05-16 RX ORDER — AZITHROMYCIN 250 MG/1
TABLET, FILM COATED ORAL
Qty: 6 TABLET | Refills: 0 | Status: SHIPPED | OUTPATIENT
Start: 2023-05-16 | End: 2023-07-20

## 2023-05-16 NOTE — TELEPHONE ENCOUNTER
Provider E-Visit time total (minutes): 11    Patient has tried over-the-counter meds things are getting worse I did tell her we can try Z-Rodo as directed if she has no improvement she will contact us     Marvel Elizondo MD

## 2023-05-18 ENCOUNTER — MYC MEDICAL ADVICE (OUTPATIENT)
Dept: FAMILY MEDICINE | Facility: CLINIC | Age: 23
End: 2023-05-18
Payer: COMMERCIAL

## 2023-05-18 DIAGNOSIS — I10 PRIMARY HYPERTENSION: Primary | ICD-10-CM

## 2023-05-18 NOTE — TELEPHONE ENCOUNTER
1) patient messaging  back with spironolactone dose    2) pt needs to be put on the schedule for a blood pressure check in 7-10 days with nursing staff per

## 2023-05-18 NOTE — TELEPHONE ENCOUNTER
Please review patient's medication question. She was seen 4/24/23.    Farhad Hernandez,SAVANNAHN, RN

## 2023-05-21 ENCOUNTER — HEALTH MAINTENANCE LETTER (OUTPATIENT)
Age: 23
End: 2023-05-21

## 2023-06-15 ENCOUNTER — OFFICE VISIT (OUTPATIENT)
Dept: ORTHOPEDICS | Facility: OTHER | Age: 23
End: 2023-06-15
Payer: COMMERCIAL

## 2023-06-15 ENCOUNTER — ANCILLARY PROCEDURE (OUTPATIENT)
Dept: GENERAL RADIOLOGY | Facility: OTHER | Age: 23
End: 2023-06-15
Attending: STUDENT IN AN ORGANIZED HEALTH CARE EDUCATION/TRAINING PROGRAM
Payer: COMMERCIAL

## 2023-06-15 VITALS — DIASTOLIC BLOOD PRESSURE: 74 MMHG | BODY MASS INDEX: 43.85 KG/M2 | WEIGHT: 280 LBS | SYSTOLIC BLOOD PRESSURE: 121 MMHG

## 2023-06-15 DIAGNOSIS — M25.562 ACUTE PAIN OF LEFT KNEE: Primary | ICD-10-CM

## 2023-06-15 DIAGNOSIS — M25.562 ACUTE PAIN OF LEFT KNEE: ICD-10-CM

## 2023-06-15 DIAGNOSIS — S83.242A TEAR OF MEDIAL MENISCUS OF LEFT KNEE, CURRENT, UNSPECIFIED TEAR TYPE, INITIAL ENCOUNTER: ICD-10-CM

## 2023-06-15 PROCEDURE — 99204 OFFICE O/P NEW MOD 45 MIN: CPT | Performed by: STUDENT IN AN ORGANIZED HEALTH CARE EDUCATION/TRAINING PROGRAM

## 2023-06-15 PROCEDURE — 73564 X-RAY EXAM KNEE 4 OR MORE: CPT | Mod: TC | Performed by: RADIOLOGY

## 2023-06-15 ASSESSMENT — PAIN SCALES - GENERAL: PAINLEVEL: NO PAIN (0)

## 2023-06-15 NOTE — PATIENT INSTRUCTIONS
Natacha Sasha Hand ,     A copy of your assessment and our treatment plan that we discussed together is included below, as written in your medical chart.   If you have any questions, please feel free to call the clinic.     --------------------------------------------------  Sasha Hand is a 23 year old female that presents with subacute left knee pain, clicking, and instability for the past several months.  Significant history of similar pain and symptoms on the right side years ago that also had significant locking symptoms, which resolved after an arthroscopic lateral meniscus repair. History and physical exam today appear most consistent with a possible medial meniscus tear of the left knee.  Based on her exam and history of similar symptoms on the right, it will be important to evaluate the integrity of the soft tissues of the knee with advanced imaging (MRI).  Discussed the nature of the condition and treatment options and mutually agreed upon the following plan:     - Imaging:                - Reviewed relevant imaging in the chart.  - XR L knee ordered today pre-clinic and independently interpreted by myself.    - Reviewed results and images with patient.   - Medications:                - Discussed pharmacologic options for pain relief.   -  May use NSAIDs (Ibuprofen, Naproxen) or Acetaminophen (Tylenol) as needed for pain control.   - May also use topical medications such as lidocaine, IcyHot, BioFreeze, or Voltaren gel as needed for pain control.   - Injections:                - Discussed possible injection options and alternatives.               - Options include intra-articular knee joint injection with corticosteroid, viscosupplementation, PRP.              - Deferred injections today in favor of obtaining the results of the MRI first, and will consider them in the future as needed.    - Therapy:                - Discussed the benefits of physical therapy vs home exercise program for optimization of  range of motion, flexibility, strength, stability and function.   - We will formalize a therapy plan that incorporates the results of the MRI when available.  - Modalities:                - May use ice, heat, massage or other modalities as needed.   - Bracing:                - Discussed bracing options and may consider using a knee stability brace during walking and other exacerbating activities.  We will formalize bracing recommendations that incorporates the results of the MRI when available.  - Surgery:                - Discussed non-operative and operative treatment options for the patient's condition.  Good results from an arthroscopic lateral meniscus repair on the right side.  We will consider surgical options if indicated based on the results of the MRI and progress.  - Activity:                - Encouraged to remain active and participate in regular activities as symptoms allow.  Avoid or modify exacerbating activities is much as possible.  Avoid activities that are high risk for falls.  - Follow up:                - When results of the MRI are obtained to discuss results, for re-evaluation and update to treatment plan, or sooner for new/worsening symptoms.  - Patient has clinic contact information for questions or concerns.   --------------------------------------------------    It was a pleasure seeing you today. Thank you for choosing [facility] for your care.       Gareth Smith DO, SAADIA  [facility] - Sports Medicine  [facility] - Department of Orthopedic Surgery     Disclaimer:  This note was prepared and written using Dragon Medical dictation software. As a result, there may be errors in the script that have gone undetected. Please consider this when interpreting the information in this note.   --------------------------------------------  Advanced imaging is done by appointment. Please call [facility] ([facility]/[place])  [phone]  to schedule your MRI.     Some insurance companies may require a prior authorization to be completed which can delay the time until you are able to schedule your appointment.       If you are active on [employer], you may have access to your test results before your provider is able to review the study and advise on next steps.      Please make a follow up appointment in the clinic at least 2 days following your MRI by calling [phone].

## 2023-06-15 NOTE — LETTER
6/15/2023         RE: Sasha Hand  [address] [place] [address]        Dear Colleague,    Thank you for referring your patient, Sasha Hand, to the [facility] [place]. Please see a copy of my visit note below.    Sasha Hand  :  2000  DOS: 6/15/2023  MRN: [id number]  PCP: Oscar Medley    Sports Medicine Clinic Visit      HPI  Sasha Hand is a 23 year old female who is seen as a self referral presenting with left knee pain.    - Mechanism of Injury:  No inciting injury.   - Prior evaluation:  None for the left knee specifically.  - 2021: UC and 2021: ED for atraumatic left leg localized swelling after recent Nexplanon placement.  No DVT on ultrasound, negative Homans.  Diagnosed with superficial thrombophlebitis versus varicose vein.    - Pain Character:  Pain has been present for 2 months.  Pain is well localized to the anteromedial left knee and deep to the patella. Also notes medial jointline pain without significant radiation.   - Endorses: CARLYN position causes feeling of joint apprehension, impending dislocation.  Instability when walking.  Occasional clicking and mechanical locking symptoms that are significantly less than what she experienced on the right knee in the past.  - Denies:  swelling, numbness, tingling, radicular shooting pain, weakness.   - Alleviating factors: Rest, activity modification.  - Aggravating factors: CARLYN position, prolonged walking, knee flexion activities  - Treatments tried: ice, rest, activity modifications.    - Patient Goals:  discuss treatment options  - Social History: works as a     - Pertinent PMH:    - 2021: left superficial thrombophlebitis vs varicose vein.   - Past smoker. Type 2 diabetic, s/p sleeve gastrectomy  - S/p right knee arthroscopy with lateral meniscus repair for a hypermobile lateral meniscus with a tear.  Also with patellofemoral pain syndrome on that  side.    Review of Systems  Musculoskeletal: as above  Remainder of review of systems is negative including constitutional, CV, pulmonary, GI, Skin and Neurologic except as noted in HPI or medical history.    Past Medical History:   Diagnosis Date     BRCA2 gene mutation positive 2023     Closed head injury, subsequent encounter 2018     History of PCR DNA positive for HSV1      Moderate major depression (H) 2014     Nexplanon placed 15     Nexplanon placed 17 (removed 2020) 2017     Nexplanon placed 2021     Obesity 2010     Pyelonephritis 2018     Skin tag (right collar line and left labial region 2019     Tobacco use disorder 2016     Uncomplicated asthma      Past Surgical History:   Procedure Laterality Date     ARTHROSCOPY KNEE WITH MENISCAL REPAIR Right 5/10/2017    Procedure: ARTHROSCOPY KNEE WITH MENISCAL REPAIR;  arthroscopy right knee with lateral meniscus repair;  Surgeon: Nathaniel Hicks MD;  Location:  OR     DAVINCI GASTRIC SLEEVE N/A 2022    Procedure: GASTRECTOMY, SLEEVE, ROBOT-ASSISTED, LAPAROSCOPIC;  Surgeon: Joseph Mata MD;  Location: [facility] OR     ESOPHAGOSCOPY, GASTROSCOPY, DUODENOSCOPY (EGD), COMBINED N/A 3/28/2022    Procedure: ESOPHAGOGASTRODUODENOSCOPY (EGD);  Surgeon: Joseph Mata MD;  Location: [facility] OR     Family History   Problem Relation Age of Onset     Asthma Mother      Hypertension Mother      Breast Cancer Mother 54        lumpectomy and BSO     Hereditary Breast and Ovarian Cancer Syndrome Mother         BRCA2+     Asthma Father      Diabetes Father      Melanoma Father 59        metastatic to brain     Cervical Cancer Sister 19     Parathyroid Disorders Sister      Vascular Disease Sister         back of head     Lung Cancer Paternal Grandmother         smoker,  in late 80s     Colon Cancer Paternal Grandmother 60     Heart Disease Paternal Grandfather           in 70s     Melanoma Paternal Aunt      Prostate Cancer Paternal Uncle 40     Colon Cancer Paternal Uncle      Melanoma Paternal Uncle      Cancer Paternal Uncle         hematological cancer; s/p SCT     Melanoma Paternal Uncle      Melanoma Paternal Uncle      Cancer Paternal Uncle 61        GI tract; cause of death at 62; father's twin     Melanoma Paternal Uncle      Breast Cancer Other 50        contralateral occurrence at 57; ; maternal grandfather's sister     Breast Cancer Other 50        paternal grandfather's sister     Anesthesia Reaction No family hx of      Bleeding Disorder No family hx of      Clotting Disorder No family hx of          Objective  /74   Wt 127 kg (280 lb)   LMP 2023 (Exact Date)   BMI 43.85 kg/m        General: healthy, alert and in no acute distress.      HEENT: no scleral icterus or conjunctival erythema.     Skin: no suspicious lesions or rash. No jaundice.     CV: regular rhythm by palpation, 2+ distal pulses.    Resp: normal respiratory effort without conversational dyspnea.     Psych: normal mood and affect.      Gait: nonantalgic, appropriate coordination and balance.     Neuro:        - Sensation to light touch:    - Intact throughout the BLE including all peripheral nerve distributions.     MSK - Knee:       - Inspection:    - No significant swelling, erythema, warmth, ecchymosis, lesion.   - There is a varicose vein present from the anterior thigh wrapping down to the posterior lower leg with mild swelling, no surrounding erythema, warmth, tenderness to palpation.       - ROM:    - Full and painless AROM/PROM.       - Palpation:    - TTP at the medial joint line.  - NTTP elsewhere.        - Strength:  (*antalgic)  RLE LLE  - Hip Flexion  5 5    - Hip Abduction 5 5   - Hip Adduction 5 5  - Knee Flexion  5 5  - Knee Extension 5 5  - Dorsiflexion  5 5  - Plantarflexion 5 5  - Ext. Jean-Paul. Longus 5 5  - Inversion  5 5  - Eversion  5 5       - Special tests:         - Lachman:  Neg        - A/P drawer:  Neg       - Pivot shift:  Neg   - Lev:  ++Pos for sharp pain, click, and locking in the medial compartment     - Varus stress:  Neg for laxity or pain     - Valgus stress:  Neg for laxity or pain    - Patellar grind:  Neg    - Thessaly: Mild positive for medial compartment pain    Radiology  I independently reviewed the available relevant imaging, with the following interpretation:   - XR L knee 6/15/2023 with normal anatomic alignment without significant degenerative changes, fractures, or dislocations.  No joint effusion.      Assessment  1. Acute pain of left knee    2. Tear of medial meniscus of left knee, current, unspecified tear type, initial encounter        Plan  Sasha Hand is a 23 year old female that presents with subacute left knee pain, clicking, and instability for the past several months.  Significant history of similar pain and symptoms on the right side years ago that also had significant locking symptoms, which resolved after an arthroscopic lateral meniscus repair. History and physical exam today appear most consistent with a possible medial meniscus tear of the left knee.  Based on her exam and history of similar symptoms on the right, it will be important to evaluate the integrity of the soft tissues of the knee with advanced imaging (MRI).  Discussed the nature of the condition and treatment options and mutually agreed upon the following plan:    - Imaging:          - Reviewed relevant imaging in the chart.  - XR L knee ordered today pre-clinic and independently interpreted by myself.    - Reviewed results and images with patient.   - Medications:          - Discussed pharmacologic options for pain relief.   -  May use NSAIDs (Ibuprofen, Naproxen) or Acetaminophen (Tylenol) as needed for pain control.   - May also use topical medications such as lidocaine, IcyHot, BioFreeze, or Voltaren gel as needed for pain control.   - Injections:          -  Discussed possible injection options and alternatives.    - Options include intra-articular knee joint injection with corticosteroid, viscosupplementation, PRP.   - Deferred injections today in favor of obtaining the results of the MRI first, and will consider them in the future as needed.    - Therapy:          - Discussed the benefits of physical therapy vs home exercise program for optimization of range of motion, flexibility, strength, stability and function.   - We will formalize a therapy plan that incorporates the results of the MRI when available.  - Modalities:          - May use ice, heat, massage or other modalities as needed.   - Bracing:          - Discussed bracing options and may consider using a knee stability brace during walking and other exacerbating activities.  We will formalize bracing recommendations that incorporates the results of the MRI when available.  - Surgery:          - Discussed non-operative and operative treatment options for the patient's condition.  Good results from an arthroscopic lateral meniscus repair on the right side.  We will consider surgical options if indicated based on the results of the MRI and progress.  - Activity:          - Encouraged to remain active and participate in regular activities as symptoms allow.  Avoid or modify exacerbating activities is much as possible.  Avoid activities that are high risk for falls.  - Follow up:          - When results of the MRI are obtained to discuss results, for re-evaluation and update to treatment plan, or sooner for new/worsening symptoms.  - Patient has clinic contact information for questions or concerns.       Gareth Smith DO, SAADIA  [facility] Sports Medicine  [facility] - Department of Orthopedic Surgery       Disclaimer:  This note was prepared and written using Dragon Medical dictation software. As a result, there may be errors in the script that have gone undetected. Please  consider this when interpreting the information in this note.         Again, thank you for allowing me to participate in the care of your patient.        Sincerely,        Gareth Smith, DO

## 2023-06-30 ENCOUNTER — MYC MEDICAL ADVICE (OUTPATIENT)
Dept: FAMILY MEDICINE | Facility: CLINIC | Age: 23
End: 2023-06-30

## 2023-06-30 ENCOUNTER — E-VISIT (OUTPATIENT)
Dept: URGENT CARE | Facility: CLINIC | Age: 23
End: 2023-06-30
Payer: COMMERCIAL

## 2023-06-30 DIAGNOSIS — B35.4 TINEA CORPORIS: Primary | ICD-10-CM

## 2023-06-30 DIAGNOSIS — L30.9 DERMATITIS: Primary | ICD-10-CM

## 2023-06-30 PROCEDURE — 99207 PR NON-BILLABLE SERV PER CHARTING: CPT | Performed by: PHYSICIAN ASSISTANT

## 2023-06-30 RX ORDER — TRIAMCINOLONE ACETONIDE 1 MG/G
CREAM TOPICAL
Qty: 80 G | Refills: 0 | Status: SHIPPED | OUTPATIENT
Start: 2023-06-30 | End: 2023-07-14

## 2023-06-30 RX ORDER — NYSTATIN 100000 [USP'U]/G
POWDER TOPICAL 2 TIMES DAILY
Qty: 60 G | Refills: 1 | Status: SHIPPED | OUTPATIENT
Start: 2023-06-30 | End: 2023-07-05

## 2023-06-30 NOTE — PATIENT INSTRUCTIONS
"  Dear Sasha Hand    After reviewing your responses, I've been able to diagnose you with \"tinea\" which is a common skin condition caused by a fungal infection. There are many common names for this depending on where it is located and it's appearance (jock itch, athlete's foot, ringworm, etc).  Based on your responses, I have prescribed Nystatin Powder to treat this. Please follow the instructions on the medication. If you experience irritation of your skin, new rash, or any other new symptoms, you should stop using this medication and contact your primary care provider.  If this treatment does not work for you in 2 weeks or after completing treatment, please plan to follow-up with your primary care provider to evaluate in-person.  Self-care:  Wash all items that come into contact with infected skin: Wash all towels, clothes, and bedding in hot water. Use laundry soap. Clean shower stalls, mats, and floors with a germ-killing or fungus-killing .   Do not share personal items: Do not share towels, brushes, branch, or hair accessories.    Keep your skin, hair, and nails clean and dry: Bathe every day, and dry your skin before you put medicine on the infected area. Wash your hands often. Do not scratch your sores. This may cause the infection to spread.   Avoid infected pets: A patch of missing fur is a sign of infection in a pet. Take your infected pet to a  for treatment.  Contact your primary healthcare provider if:  You have a fever.    Your infection continues to spread after 7 days of treatment.   Your rash is not gone in 2 weeks.   The area around your rash becomes red, warm, tender, swollen, or smells bad.   You have questions or concerns about your condition or care.    Thanks for choosing?us?as your health care partner,    Mey Nevarez PA-C  "

## 2023-07-02 ENCOUNTER — E-VISIT (OUTPATIENT)
Dept: FAMILY MEDICINE | Facility: CLINIC | Age: 23
End: 2023-07-02
Payer: COMMERCIAL

## 2023-07-02 DIAGNOSIS — H61.893 IRRITATION OF EXTERNAL EAR CANAL, BILATERAL: Primary | ICD-10-CM

## 2023-07-02 PROCEDURE — 99422 OL DIG E/M SVC 11-20 MIN: CPT | Performed by: FAMILY MEDICINE

## 2023-07-05 PROBLEM — H61.893: Status: ACTIVE | Noted: 2023-07-05

## 2023-07-06 ENCOUNTER — HOSPITAL ENCOUNTER (OUTPATIENT)
Dept: MRI IMAGING | Facility: CLINIC | Age: 23
Discharge: HOME | End: 2023-07-06
Attending: STUDENT IN AN ORGANIZED HEALTH CARE EDUCATION/TRAINING PROGRAM | Admitting: STUDENT IN AN ORGANIZED HEALTH CARE EDUCATION/TRAINING PROGRAM
Payer: COMMERCIAL

## 2023-07-06 DIAGNOSIS — S83.242A TEAR OF MEDIAL MENISCUS OF LEFT KNEE, CURRENT, UNSPECIFIED TEAR TYPE, INITIAL ENCOUNTER: ICD-10-CM

## 2023-07-06 PROCEDURE — 73721 MRI JNT OF LWR EXTRE W/O DYE: CPT | Mod: LT

## 2023-07-10 NOTE — PROGRESS NOTES
7/10/2023    Noemi Sanchez MD  [address]  [facility] [address]    RE: Kandis Gallagher       Dear Colleague,     I had the pleasure of seeing Kandis Gallagher in the [facility].    Electrophysiology Clinic Progress Note  Kandis Gallagher MRN# [id number]   YOB: 1940 Age: 82 year old     Primary cardiologist: Dr. Herzog (general) and Dr. Hoffman (EP)    Reason for visit: 1 month follow-up post catheter ablation for SVT    History of presenting illness:    Kandis Gallagher is a pleasant 82 year old patient with past medical history significant for:    Paroxysmal SVT  Hypertension  Hyperlipidemia  Bicuspid aortic valve status post AVR in 2016 at [facility]  Lymphedema  Multiple myeloma   Amyloidosis with cardiac involvement    The patient is a longstanding history of PSVT requiring ED visits.  Her most recent ED visit was 2/14/2023 when she presented with sustained tachycardia that was self terminated while on the cardiac monitor.  The patient was evaluated by Dr. Herzog on 3/8/2023 and due to concerns for ongoing cardiac amyloidosis beta-blockers were discontinued.  The Zio patch was obtained that showed frequent episodes of SVT and therefore she was referred to Dr. Hoffman on 4/17/2023.    They discussed pharmacological therapy versus catheter ablation.  The patient elected to think about the options but ultimately elected to proceed with the ablation.  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    Today the patient presents with her daughter for a 1 month follow-up post ablation.  From an EP standpoint she is doing overall well.  Her groin site has moderate bruising without any discomfort or tenderness on palpitation.  She has not had any recurrent episodes of palpitations or tachycardia.    Dr. Herzog had previously recommended that she start spironolactone  Sasha Hand  :  2000  DOS: 6/15/2023  MRN: [id number]  PCP: Oscar Medley    Sports Medicine Clinic Visit      HPI  Sasha Hand is a 23 year old female who is seen as a self referral presenting with left knee pain.    - Mechanism of Injury:  No inciting injury.   - Prior evaluation:  None for the left knee specifically.  - 2021: UC and 2021: ED for atraumatic left leg localized swelling after recent Nexplanon placement.  No DVT on ultrasound, negative Homans.  Diagnosed with superficial thrombophlebitis versus varicose vein.    - Pain Character:  Pain has been present for 2 months.  Pain is well localized to the anteromedial left knee and deep to the patella. Also notes medial jointline pain without significant radiation.   - Endorses: CARLYN position causes feeling of joint apprehension, impending dislocation.  Instability when walking.  Occasional clicking and mechanical locking symptoms that are significantly less than what she experienced on the right knee in the past.  - Denies:  swelling, numbness, tingling, radicular shooting pain, weakness.   - Alleviating factors: Rest, activity modification.  - Aggravating factors: CARLYN position, prolonged walking, knee flexion activities  - Treatments tried: ice, rest, activity modifications.    - Patient Goals:  discuss treatment options  - Social History: works as a     - Pertinent PMH:    - 2021: left superficial thrombophlebitis vs varicose vein.   - Past smoker. Type 2 diabetic, s/p sleeve gastrectomy  - S/p right knee arthroscopy with lateral meniscus repair for a hypermobile lateral meniscus with a tear.  Also with patellofemoral pain syndrome on that side.    Review of Systems  Musculoskeletal: as above  Remainder of review of systems is negative including constitutional, CV, pulmonary, GI, Skin and Neurologic except as noted in HPI or medical history.    Past Medical History:   Diagnosis Date     BRCA2 gene mutation  for hypertension management.  Unfortunately, she developed severe diarrhea with the spironolactone and has had approximately 10 to 14 pound weight loss.  She is also noting increased fatigue with the weight loss and frequent stools.  EKG today notes sinus rhythm    Diagnotic studies:  Zio patch (3/8/2023): 165 runs of PSVT (up to 49 minutes) and 29 runs of Non sustained VT (up to 17 beats)  Cardiac MRI (1/17/2023): EF of 54%.  Mild LVH.  Extensive circumferential late gadolinium enhancement of the LV, suggestive of cardiac amyloidosis.  Echocardiogram (10/2022): LVEF of 50 to 55% with mild LAE and dilatation, mild MR, mild TR, RVSP consistent with moderate PH.  Normal bioprosthetic valve gradient            Assessment and Plan:     ASSESSMENT:    Paroxysmal SVT  Status post catheter ablation for typical AVNRT on 6/7/2023 with Dr. Hoffman  EKG today sinus rhythm    Bicuspid aortic valve with severe aortic valve stenosis  Status post SAVR in 2016 at [facility] with a 23 mm Magna Ease bioprosthesis  No significant AR on recent cardiac MRI 1/2023 and normal gradient on echo from 10/2022    Hypertension  Controlled  Spironolactone 12.5 mg daily, Lasix 20 mg daily  Concern for spironolactone causing loose stools    Infiltrative cardiomyopathy  Noted on cardiac MRI from 1/2023 and most likely cardiac amyloidosis    Chronic HFpEF  Compensated    PLAN:     Discontinue spironolactone  Continue good hydration with water and Gatorade  BMP in 1 week  Monitor home blood pressure 2-4 times a week and will reassess need for an additional agent  Follow-up with RAUL who works with Dr. Herzog in approximately 6 months or sooner if needed       Orders this Visit:  Orders Placed This Encounter   Procedures    EKG 12-lead complete w/read - Clinics (performed today)     No orders of the defined types were placed in this encounter.    There are no discontinued medications.    Today's clinic visit entailed:  Review of the  positive 2023     Closed head injury, subsequent encounter 2018     History of PCR DNA positive for HSV1      Moderate major depression (H) 2014     Nexplanon placed 17     Nexplanon placed 17 (removed 2020) 2017     Nexplanon placed 2021     Obesity 2010     Pyelonephritis 2018     Skin tag (right collar line and left labial region 2019     Tobacco use disorder 2016     Uncomplicated asthma      Past Surgical History:   Procedure Laterality Date     ARTHROSCOPY KNEE WITH MENISCAL REPAIR Right 5/10/2017    Procedure: ARTHROSCOPY KNEE WITH MENISCAL REPAIR;  arthroscopy right knee with lateral meniscus repair;  Surgeon: Nathaniel Hicks MD;  Location: [facility] OR     DAVINCI GASTRIC SLEEVE N/A 2022    Procedure: GASTRECTOMY, SLEEVE, ROBOT-ASSISTED, LAPAROSCOPIC;  Surgeon: Joseph Mata MD;  Location: [facility] OR     ESOPHAGOSCOPY, GASTROSCOPY, DUODENOSCOPY (EGD), COMBINED N/A 3/28/2022    Procedure: ESOPHAGOGASTRODUODENOSCOPY (EGD);  Surgeon: Joseph Mata MD;  Location: [facility] OR     Family History   Problem Relation Age of Onset     Asthma Mother      Hypertension Mother      Breast Cancer Mother 54        lumpectomy and BSO     Hereditary Breast and Ovarian Cancer Syndrome Mother         BRCA2+     Asthma Father      Diabetes Father      Melanoma Father 59        metastatic to brain     Cervical Cancer Sister 19     Parathyroid Disorders Sister      Vascular Disease Sister         back of head     Lung Cancer Paternal Grandmother         smoker,  in late 80s     Colon Cancer Paternal Grandmother 60     Heart Disease Paternal Grandfather          in 70s     Melanoma Paternal Aunt      Prostate Cancer Paternal Uncle 40     Colon Cancer Paternal Uncle      Melanoma Paternal Uncle      Cancer Paternal Uncle         hematological cancer; s/p SCT     Melanoma Paternal Uncle      Melanoma Paternal Uncle       Cancer Paternal Uncle 61        GI tract; cause of death at 62; father's twin     Melanoma Paternal Uncle      Breast Cancer Other 50        contralateral occurrence at 57; ; maternal grandfather's sister     Breast Cancer Other 50        paternal grandfather's sister     Anesthesia Reaction No family hx of      Bleeding Disorder No family hx of      Clotting Disorder No family hx of          Objective  /74   Wt 127 kg (280 lb)   LMP 2023 (Exact Date)   BMI 43.85 kg/m        General: healthy, alert and in no acute distress.      HEENT: no scleral icterus or conjunctival erythema.     Skin: no suspicious lesions or rash. No jaundice.     CV: regular rhythm by palpation, 2+ distal pulses.    Resp: normal respiratory effort without conversational dyspnea.     Psych: normal mood and affect.      Gait: nonantalgic, appropriate coordination and balance.     Neuro:        - Sensation to light touch:    - Intact throughout the BLE including all peripheral nerve distributions.     MSK - Knee:       - Inspection:    - No significant swelling, erythema, warmth, ecchymosis, lesion.   - There is a varicose vein present from the anterior thigh wrapping down to the posterior lower leg with mild swelling, no surrounding erythema, warmth, tenderness to palpation.       - ROM:    - Full and painless AROM/PROM.       - Palpation:    - TTP at the medial joint line.  - NTTP elsewhere.        - Strength:  (*antalgic)  RLE LLE  - Hip Flexion  5 5    - Hip Abduction 5 5   - Hip Adduction 5 5  - Knee Flexion  5 5  - Knee Extension 5 5  - Dorsiflexion  5 5  - Plantarflexion 5 5  - Ext. Jean-Paul. Longus 5 5  - Inversion  5 5  - Eversion  5 5       - Special tests:        - Lachman:  Neg        - A/P drawer:  Neg       - Pivot shift:  Neg   - Lev:  ++Pos for sharp pain, click, and locking in the medial compartment     - Varus stress:  Neg for laxity or pain     - Valgus stress:  Neg for laxity or pain    - Patellar grind:   result(s) of each unique test - EKG, echo, CMRI, BMP  Assessment requiring an independent historian(s) - family - Daughter  Ordering of each unique test  Prescription drug management  35 minutes spent by me on the date of the encounter doing chart review, history and exam, documentation and further activities per the note  Provider  Link to [facility] Help Grid     The level of medical decision making during this visit was of moderate complexity.           Review of Systems:     Review of Systems:  Skin:        Eyes:       ENT:       Respiratory:       Cardiovascular:       Gastroenterology:      Genitourinary:       Musculoskeletal:       Neurologic:       Psychiatric:       Heme/Lymph/Imm:       Endocrine:                 Physical Exam:     Vitals: There were no vitals taken for this visit.  Constitutional: Frail and thin  Eyes: Pupils equal, round. Sclerae anicteric.   HEENT: Normocephalic, atraumatic.   Neck: Supple.  Respiratory: Breathing non-labored. Lungs clear to auscultation bilaterally. No crackles, wheezes, rhonchi, or rales.  Cardiovascular: Regular rate and rhythm, normal S1 and S2. No murmur, rub, or gallop.  Skin: Warm, dry. No rashes, cyanosis, or xanthelasma.  Extremities: No edema.  Neurologic: No gross motor deficits. Alert, awake, and oriented to person, place and time.  Psychiatric: Affect appropriate.        CURRENT MEDICATIONS:  Current Outpatient Medications   Medication Sig Dispense Refill    acyclovir (ZOVIRAX) 400 MG tablet TAKE 1 TABLET(400 MG) BY MOUTH EVERY 12 HOURS 180 tablet 0    aspirin 81 MG EC tablet Take 81 mg by mouth      atorvastatin (LIPITOR) 10 MG tablet Take 1 tablet (10 mg) by mouth daily 90 tablet 3    Biotin w/ Vitamins C & E (HAIR SKIN & NAILS GUMMIES PO)       Calcium-Phosphorus-Vitamin D (CALCIUM/VITAMIN D3/ADULT GUMMY PO)       Cholecalciferol (VITAMIN D3) 25 MCG (1000 UT) CAPS Take 2,000 Units by mouth daily 180 capsule 3    dexamethasone (DECADRON) 4 MG tablet TAKE 3  Neg    - Thessaly: Mild positive for medial compartment pain    Radiology  I independently reviewed the available relevant imaging, with the following interpretation:   - XR L knee 6/15/2023 with normal anatomic alignment without significant degenerative changes, fractures, or dislocations.  No joint effusion.      Assessment  1. Acute pain of left knee    2. Tear of medial meniscus of left knee, current, unspecified tear type, initial encounter        Plan  Sasha Hand is a 23 year old female that presents with subacute left knee pain, clicking, and instability for the past several months.  Significant history of similar pain and symptoms on the right side years ago that also had significant locking symptoms, which resolved after an arthroscopic lateral meniscus repair. History and physical exam today appear most consistent with a possible medial meniscus tear of the left knee.  Based on her exam and history of similar symptoms on the right, it will be important to evaluate the integrity of the soft tissues of the knee with advanced imaging (MRI).  Discussed the nature of the condition and treatment options and mutually agreed upon the following plan:    - Imaging:          - Reviewed relevant imaging in the chart.  - XR L knee ordered today pre-clinic and independently interpreted by myself.    - Reviewed results and images with patient.   - Medications:          - Discussed pharmacologic options for pain relief.   -  May use NSAIDs (Ibuprofen, Naproxen) or Acetaminophen (Tylenol) as needed for pain control.   - May also use topical medications such as lidocaine, IcyHot, BioFreeze, or Voltaren gel as needed for pain control.   - Injections:          - Discussed possible injection options and alternatives.    - Options include intra-articular knee joint injection with corticosteroid, viscosupplementation, PRP.   - Deferred injections today in favor of obtaining the results of the MRI first, and will consider them  TABLETS BY MOUTH 1 TIME WEEKLY (Patient not taking: Reported on 6/13/2023)      diphenhydrAMINE HCl (BENADRYL PO) Take by mouth every 7 days (Patient not taking: Reported on 6/13/2023)      FEROSUL 325 (65 Fe) MG tablet TAKE 1 TABLET(325 MG) BY MOUTH DAILY WITH BREAKFAST 90 tablet 1    furosemide (LASIX) 20 MG tablet TAKE 1 TABLET(20 MG) BY MOUTH DAILY 90 tablet 1    KLOR-CON 20 MEQ packet MIX 1 PACKET IN LIQUID AND DRINK BY MOUTH ONCE A DAY 90 packet 1    pantoprazole sodium (PROTONIX) 40 MG packet Take 1 packet by mouth daily      potassium chloride ER (KLOR-CON M) 20 MEQ CR tablet Take 20 mEq by mouth daily Do not crush (Patient not taking: Reported on 6/13/2023)      Probiotic Product (PROBIOTIC GUMMIES PO)       spironolactone (ALDACTONE) 25 MG tablet Take 0.5 tablets (12.5 mg) by mouth daily (Patient not taking: Reported on 6/13/2023) 45 tablet 3       ALLERGIES  Allergies   Allergen Reactions    Carvedilol Other (See Comments)    Lidocaine Nausea and Vomiting     Vomiting with general anesthesia    Lisinopril Cough    Seasonal Allergies Other (See Comments)     Problems with narcotics - oxygen desaturates    Spironolactone          PAST MEDICAL HISTORY:  Past Medical History:   Diagnosis Date    Congenital bicuspid aortic valve     H/O aortic valve replacement     23 mm Magna Ease     Hyperlipidemia     Hypertension     Hyperthyroidism     Severe aortic stenosis     Thyroiditis        PAST SURGICAL HISTORY:  Past Surgical History:   Procedure Laterality Date    EP ABLATION SVT N/A 6/7/2023    Procedure: Ablation Supraventricular Tachycardia;  Surgeon: Aaron Hoffman MD;  Location: [facility] CARDIAC CATH LAB       FAMILY HISTORY:  Family History   Problem Relation Age of Onset    Breast Cancer Sister     Ovarian Cancer Sister     Colon Cancer Brother     Liver Cancer Brother     Breast Cancer Daughter     Colon Cancer Daughter        SOCIAL HISTORY:  Social History     Socioeconomic History    Marital  in the future as needed.    - Therapy:          - Discussed the benefits of physical therapy vs home exercise program for optimization of range of motion, flexibility, strength, stability and function.   - We will formalize a therapy plan that incorporates the results of the MRI when available.  - Modalities:          - May use ice, heat, massage or other modalities as needed.   - Bracing:          - Discussed bracing options and may consider using a knee stability brace during walking and other exacerbating activities.  We will formalize bracing recommendations that incorporates the results of the MRI when available.  - Surgery:          - Discussed non-operative and operative treatment options for the patient's condition.  Good results from an arthroscopic lateral meniscus repair on the right side.  We will consider surgical options if indicated based on the results of the MRI and progress.  - Activity:          - Encouraged to remain active and participate in regular activities as symptoms allow.  Avoid or modify exacerbating activities is much as possible.  Avoid activities that are high risk for falls.  - Follow up:          - When results of the MRI are obtained to discuss results, for re-evaluation and update to treatment plan, or sooner for new/worsening symptoms.  - Patient has clinic contact information for questions or concerns.       Gareth Smith DO, NALLELYM  [facility] - Sports Medicine  [facility] - Department of Orthopedic Surgery       Disclaimer:  This note was prepared and written using Dragon Medical dictation software. As a result, there may be errors in the script that have gone undetected. Please consider this when interpreting the information in this note.      status:    Tobacco Use    Smoking status: Never    Smokeless tobacco: Never   Vaping Use    Vaping Use: Never used   Substance and Sexual Activity    Alcohol use: No    Drug use: No              Thank you for allowing me to participate in the care of your patient.      Sincerely,     JOANN Steward [facility]  cc:   No referring provider defined for this encounter.

## 2023-07-13 ENCOUNTER — VIRTUAL VISIT (OUTPATIENT)
Dept: ENDOCRINOLOGY | Facility: CLINIC | Age: 23
End: 2023-07-13
Payer: COMMERCIAL

## 2023-07-13 ENCOUNTER — LAB (OUTPATIENT)
Dept: LAB | Facility: OTHER | Age: 23
End: 2023-07-13
Payer: COMMERCIAL

## 2023-07-13 VITALS — WEIGHT: 277.3 LBS | HEIGHT: 67 IN | BODY MASS INDEX: 43.52 KG/M2

## 2023-07-13 DIAGNOSIS — E66.813 CLASS 3 SEVERE OBESITY WITH SERIOUS COMORBIDITY AND BODY MASS INDEX (BMI) OF 40.0 TO 44.9 IN ADULT, UNSPECIFIED OBESITY TYPE (H): ICD-10-CM

## 2023-07-13 DIAGNOSIS — Z98.84 S/P LAPAROSCOPIC SLEEVE GASTRECTOMY: ICD-10-CM

## 2023-07-13 DIAGNOSIS — R11.0 NAUSEA: ICD-10-CM

## 2023-07-13 DIAGNOSIS — E66.01 CLASS 3 SEVERE OBESITY WITH SERIOUS COMORBIDITY AND BODY MASS INDEX (BMI) OF 40.0 TO 44.9 IN ADULT, UNSPECIFIED OBESITY TYPE (H): ICD-10-CM

## 2023-07-13 DIAGNOSIS — E66.01 CLASS 3 SEVERE OBESITY WITH SERIOUS COMORBIDITY AND BODY MASS INDEX (BMI) OF 40.0 TO 44.9 IN ADULT, UNSPECIFIED OBESITY TYPE (H): Primary | ICD-10-CM

## 2023-07-13 DIAGNOSIS — E66.813 CLASS 3 SEVERE OBESITY WITH SERIOUS COMORBIDITY AND BODY MASS INDEX (BMI) OF 40.0 TO 44.9 IN ADULT, UNSPECIFIED OBESITY TYPE (H): Primary | ICD-10-CM

## 2023-07-13 LAB
ALBUMIN SERPL BCG-MCNC: 4.3 G/DL (ref 3.5–5.2)
ALP SERPL-CCNC: 65 U/L (ref 35–104)
ALT SERPL W P-5'-P-CCNC: 18 U/L (ref 0–50)
ANION GAP SERPL CALCULATED.3IONS-SCNC: 9 MMOL/L (ref 7–15)
AST SERPL W P-5'-P-CCNC: 16 U/L (ref 0–45)
BILIRUB SERPL-MCNC: 0.6 MG/DL
BUN SERPL-MCNC: 10.4 MG/DL (ref 6–20)
CALCIUM SERPL-MCNC: 9.2 MG/DL (ref 8.6–10)
CHLORIDE SERPL-SCNC: 106 MMOL/L (ref 98–107)
CHOLEST SERPL-MCNC: 132 MG/DL
CREAT SERPL-MCNC: 0.72 MG/DL (ref 0.51–0.95)
DEPRECATED HCO3 PLAS-SCNC: 25 MMOL/L (ref 22–29)
ERYTHROCYTE [DISTWIDTH] IN BLOOD BY AUTOMATED COUNT: 11.8 % (ref 10–15)
FERRITIN SERPL-MCNC: 210 NG/ML (ref 6–175)
GFR SERPL CREATININE-BSD FRML MDRD: >90 ML/MIN/1.73M2
GLUCOSE SERPL-MCNC: 92 MG/DL (ref 70–99)
HBA1C MFR BLD: 4.8 % (ref 0–5.6)
HCT VFR BLD AUTO: 42.3 % (ref 35–47)
HDLC SERPL-MCNC: 49 MG/DL
HGB BLD-MCNC: 14.6 G/DL (ref 11.7–15.7)
LDLC SERPL CALC-MCNC: 68 MG/DL
MCH RBC QN AUTO: 29.6 PG (ref 26.5–33)
MCHC RBC AUTO-ENTMCNC: 34.5 G/DL (ref 31.5–36.5)
MCV RBC AUTO: 86 FL (ref 78–100)
NONHDLC SERPL-MCNC: 83 MG/DL
PLATELET # BLD AUTO: 279 10E3/UL (ref 150–450)
POTASSIUM SERPL-SCNC: 4.2 MMOL/L (ref 3.4–5.3)
PROT SERPL-MCNC: 6.7 G/DL (ref 6.4–8.3)
PTH-INTACT SERPL-MCNC: 36 PG/ML (ref 15–65)
RBC # BLD AUTO: 4.93 10E6/UL (ref 3.8–5.2)
SODIUM SERPL-SCNC: 140 MMOL/L (ref 136–145)
TRIGL SERPL-MCNC: 75 MG/DL
VIT B12 SERPL-MCNC: 677 PG/ML (ref 232–1245)
WBC # BLD AUTO: 5.4 10E3/UL (ref 4–11)

## 2023-07-13 PROCEDURE — 80061 LIPID PANEL: CPT

## 2023-07-13 PROCEDURE — 36415 COLL VENOUS BLD VENIPUNCTURE: CPT

## 2023-07-13 PROCEDURE — 82728 ASSAY OF FERRITIN: CPT

## 2023-07-13 PROCEDURE — 82306 VITAMIN D 25 HYDROXY: CPT

## 2023-07-13 PROCEDURE — 85027 COMPLETE CBC AUTOMATED: CPT

## 2023-07-13 PROCEDURE — 84590 ASSAY OF VITAMIN A: CPT | Mod: 90

## 2023-07-13 PROCEDURE — 99213 OFFICE O/P EST LOW 20 MIN: CPT | Mod: VID | Performed by: NURSE PRACTITIONER

## 2023-07-13 PROCEDURE — 83970 ASSAY OF PARATHORMONE: CPT

## 2023-07-13 PROCEDURE — 99000 SPECIMEN HANDLING OFFICE-LAB: CPT

## 2023-07-13 PROCEDURE — 82607 VITAMIN B-12: CPT

## 2023-07-13 PROCEDURE — 83036 HEMOGLOBIN GLYCOSYLATED A1C: CPT

## 2023-07-13 PROCEDURE — 80053 COMPREHEN METABOLIC PANEL: CPT

## 2023-07-13 RX ORDER — PROCHLORPERAZINE MALEATE 10 MG
10 TABLET ORAL EVERY 8 HOURS PRN
Qty: 30 TABLET | Refills: 0 | Status: SHIPPED | OUTPATIENT
Start: 2023-07-13 | End: 2024-01-18

## 2023-07-13 RX ORDER — SEMAGLUTIDE 1.7 MG/.75ML
1.7 INJECTION, SOLUTION SUBCUTANEOUS
Qty: 3 ML | Refills: 3 | Status: SHIPPED | OUTPATIENT
Start: 2023-07-13 | End: 2023-08-02

## 2023-07-13 ASSESSMENT — PAIN SCALES - GENERAL: PAINLEVEL: NO PAIN (0)

## 2023-07-13 ASSESSMENT — ENCOUNTER SYMPTOMS
NAUSEA: 1
HEMATOCHEZIA: 0
CHILLS: 0
COUGH: 0
CONSTIPATION: 1
EYE PAIN: 0
HEARTBURN: 0
NERVOUS/ANXIOUS: 0
JOINT SWELLING: 0
FEVER: 0
WEAKNESS: 0
FREQUENCY: 0
PARESTHESIAS: 0
ARTHRALGIAS: 1
SORE THROAT: 0
SHORTNESS OF BREATH: 0
DIARRHEA: 1
MYALGIAS: 0
HEMATURIA: 0
DYSURIA: 0
DIZZINESS: 1
BREAST MASS: 0
PALPITATIONS: 0
HEADACHES: 1
ABDOMINAL PAIN: 0

## 2023-07-13 NOTE — LETTER
"2023       RE: Sasha Hand  [address] [facility] [address]     Dear Colleague,    Thank you for referring your patient, Sasha Hand, to the [facility] [place] at [facility]. Please see a copy of my visit note below.    Return Bariatric Surgery Note    RE: Sasha Hand  MR#: [id number]  : 2000  VISIT DATE: 2023      Dear Oscar Medley,    I had the pleasure of seeing your patient, Sasha Hand, in my post-bariatric surgery assessment clinic.    Assessment & Plan   Problem List Items Addressed This Visit          Digestive    Class 3 severe obesity with serious comorbidity and body mass index (BMI) of 40.0 to 44.9 in adult (H) - Primary     Try podcast \"nothing much happens\"   Can do omeprazole just as needed   Set a timer for hydration   Continue wegovy 1.7mg   Okay to continue compazine as needed   Follow up in 3 months          Relevant Medications    prochlorperazine (COMPAZINE) 10 MG tablet    Semaglutide-Weight Management (WEGOVY) 1.7 MG/0.75ML pen       Other    S/P laparoscopic sleeve gastrectomy    Relevant Medications    prochlorperazine (COMPAZINE) 10 MG tablet     Other Visit Diagnoses       Nausea        Relevant Medications    prochlorperazine (COMPAZINE) 10 MG tablet               22 minutes spent by me on the date of the encounter doing chart review, history and exam, documentation and further activities per the note    CHIEF COMPLAINT: Post-bariatric surgery follow-up. 1 years s/p sleeve with Dr. Mata. 3/28/2023    HISTORY OF PRESENT ILLNESS:      7/10/2023    10:24 AM   Questions Regarding Prior Weight Loss Surgery Reviewed With Patient   I had the following weight loss procedure Sleeve Gastrectomy   What year was your surgery?    How has your weight changed since your last visit? I have lost weight   Do you currently have any of the following Heartburn, acid reflux, or GERD " (acid reflux disease)?    Hypertension (high blood pressure)?   Do you have any concerns today? Skin rashs.   last seen 4/6/2023    Reflux- omeprazole every other day   Rashes- nystatin   -worse recently   -needed to add steroid cream under belly fold - skin was turning purple   -uses cotton barrier to help avoid this     Anti-obesity medications:     Current:   wegovy 1.7mg - no side effects, earlier satiety, less hunger, doesn't think about food all day   -needing compazine twice a month for am nausea- (zofran causes headaches)     Recent diet changes:   Meal prepping   Stopping sooner at meals   Days off are more difficult - busy     -Protein- improving - 70g daily   -Water- still struggles - keeps water with her at work and plans to set timer - never really feeling thirsty     Recent exercise/activity changes:   Walking   Tore mensicus which is limiting activity   Meets with surgeon next week     Recent stressors:   Work busy   Knee injury   Planning wedding in November  [employer]    Recent sleep changes: taking melatonin as needed (not tired)     Vitamins/Labs: did labs today  CBC and A1c normal. Waiting on rest       Weight History:      7/10/2023    10:24 AM   --   What is your highest lifetime weight? 365   What is your lowest weight since surgery? (In pounds) 277     Initial Weight (lbs): 365 lbs  Weight: 125.8 kg (277 lb 4.8 oz)  Last Visits Weight: 127 kg (280 lb)  Cumulative weight loss (lbs): 87.7  Weight Loss Percentage: 24.03%        7/10/2023    10:24 AM   Questions Regarding Co-Morbidities and Health Concerns Reviewed With Patient   Pre-diabetes Never   Diabetes II Never   High Blood Pressure Improved   High cholesterol Never   Heartburn/Reflux Improved   Sleep apnea Never   PCOS Never   Back pain Improved   Joint pain Improved   Lower leg swelling Gone away           7/10/2023    10:24 AM   Eating Habits   How many meals do you eat per day? 3   Do you snack between meals? Sometimes   How much food  "are you eating at each meal? Less than 1/2 cup   Are you able to separate your meals and liquids by at least 30 minutes? Yes   Are you able to avoid liquid calories? Sometimes           7/10/2023    10:24 AM   Exercise Questions Reviewed With Patient   How often do you exercise? Less than 1 time per week   What is the duration of your exercise (in minutes)? 30 Minutes   What types of exercise do you do? walking   What keeps you from being more active? Pain    I should be more active but I just have not gotten around to it    My ability to walk or move around is limited       Social History:      7/10/2023    10:24 AM   --   Are you smoking? No   Are you drinking alcohol? No       Medications:  Current Outpatient Medications   Medication    prochlorperazine (COMPAZINE) 10 MG tablet    Semaglutide-Weight Management (WEGOVY) 1.7 MG/0.75ML pen    albuterol (PROAIR HFA/PROVENTIL HFA/VENTOLIN HFA) 108 (90 Base) MCG/ACT inhaler    azithromycin (ZITHROMAX) 250 MG tablet    betamethasone dipropionate (DIPROSONE) 0.05 % external cream    buPROPion (WELLBUTRIN XL) 150 MG 24 hr tablet    Cholecalciferol (VITAMIN D3) 25 MCG TABS    cyanocobalamin (CYANOCOBALAMIN) 1000 MCG/ML injection    etonogestrel (NEXPLANON) 68 MG IMPL    Fexofenadine HCl (ALLEGRA PO)    fluticasone-salmeterol (ADVAIR) 250-50 MCG/ACT inhaler    ipratropium - albuterol 0.5 mg/2.5 mg/3 mL (DUONEB) 0.5-2.5 (3) MG/3ML neb solution    LORazepam (ATIVAN) 1 MG tablet    metoprolol succinate ER (TOPROL XL) 50 MG 24 hr tablet    nystatin (MYCOSTATIN) 495171 UNIT/GM external powder    RESTASIS 0.05 % ophthalmic emulsion    Syringe/Needle, Disp, (BD ECLIPSE SYRINGE/NEEDLE) 25G X 5/8\" 3 ML MISC    triamcinolone (KENALOG) 0.1 % external cream     No current facility-administered medications for this visit.         7/10/2023    10:24 AM   --   Do you avoid NSAIDs such as (Ibuprofen, Aleve, Naproxen, Advil)? No       ROS:  GI:       7/10/2023    10:24 AM   --   Vomiting No " "  Diarrhea Yes   Constipation Yes   Swallowing trouble No   Abdominal pain No   Heartburn Yes     Skin:       7/10/2023    10:24 AM   BAR RBS ROS - SKIN   Rash in skin folds Yes     Psych:       7/10/2023    10:24 AM   --   Depression No   Anxiety No     Female Only:       7/10/2023    10:24 AM   BAR RBS ROS -    Female only Irregular menstrual cycles   Stress urinary incontinence No       Lab on 07/13/2023   Component Date Value Ref Range Status    Hemoglobin A1C 07/13/2023 4.8  0.0 - 5.6 % Final    Normal <5.7%   Prediabetes 5.7-6.4%    Diabetes 6.5% or higher     Note: Adopted from ADA consensus guidelines.    WBC Count 07/13/2023 5.4  4.0 - 11.0 10e3/uL Final    RBC Count 07/13/2023 4.93  3.80 - 5.20 10e6/uL Final    Hemoglobin 07/13/2023 14.6  11.7 - 15.7 g/dL Final    Hematocrit 07/13/2023 42.3  35.0 - 47.0 % Final    MCV 07/13/2023 86  78 - 100 fL Final    MCH 07/13/2023 29.6  26.5 - 33.0 pg Final    MCHC 07/13/2023 34.5  31.5 - 36.5 g/dL Final    RDW 07/13/2023 11.8  10.0 - 15.0 % Final    Platelet Count 07/13/2023 279  150 - 450 10e3/uL Final       PHYSICAL EXAM:  Objective    Ht 1.702 m (5' 7.01\")   Wt 125.8 kg (277 lb 4.8 oz)   BMI 43.42 kg/m    Vitals - Patient Reported  Pain Score: No Pain (0)      Vitals:  No vitals were obtained today due to virtual visit.    Physical Exam   GENERAL: Healthy, alert and no distress  EYES: Eyes grossly normal to inspection.  No discharge or erythema, or obvious scleral/conjunctival abnormalities.  RESP: No audible wheeze, cough, or visible cyanosis.  No visible retractions or increased work of breathing.    SKIN: Visible skin clear. No significant rash, abnormal pigmentation or lesions.  NEURO: Cranial nerves grossly intact.  Mentation and speech appropriate for age.  PSYCH: Mentation appears normal, affect normal/bright, judgement and insight intact, normal speech and appearance well-groomed.        Sincerely,    Komal Miller NP      Virtual Visit Details    Type of " service:  Video Visit   Video Start Time: 1005  Video End Time:1022    Originating Location (pt. Location): Home    Distant Location (provider location):  Off-site  Platform used for Video Visit: Mitch

## 2023-07-13 NOTE — PROGRESS NOTES
"Return Bariatric Surgery Note    RE: Sasha Hand  MR#: [id number]  : 2000  VISIT DATE: 2023      Dear Oscar Medley,    I had the pleasure of seeing your patient, Sasha Hand, in my post-bariatric surgery assessment clinic.    Assessment & Plan   Problem List Items Addressed This Visit        Digestive    Class 3 severe obesity with serious comorbidity and body mass index (BMI) of 40.0 to 44.9 in adult (H) - Primary     Try podcast \"nothing much happens\"   Can do omeprazole just as needed   Set a timer for hydration   Continue wegovy 1.7mg   Okay to continue compazine as needed   Follow up in 3 months          Relevant Medications    prochlorperazine (COMPAZINE) 10 MG tablet    Semaglutide-Weight Management (WEGOVY) 1.7 MG/0.75ML pen       Other    S/P laparoscopic sleeve gastrectomy    Relevant Medications    prochlorperazine (COMPAZINE) 10 MG tablet   Other Visit Diagnoses     Nausea        Relevant Medications    prochlorperazine (COMPAZINE) 10 MG tablet             22 minutes spent by me on the date of the encounter doing chart review, history and exam, documentation and further activities per the note    CHIEF COMPLAINT: Post-bariatric surgery follow-up. 1 years s/p sleeve with Dr. Mata. 3/28/2023    HISTORY OF PRESENT ILLNESS:      7/10/2023    10:24 AM   Questions Regarding Prior Weight Loss Surgery Reviewed With Patient   I had the following weight loss procedure Sleeve Gastrectomy   What year was your surgery?    How has your weight changed since your last visit? I have lost weight   Do you currently have any of the following Heartburn, acid reflux, or GERD (acid reflux disease)?    Hypertension (high blood pressure)?   Do you have any concerns today? Skin rashs.   last seen 2023    Reflux- omeprazole every other day   Rashes- nystatin   -worse recently   -needed to add steroid cream under belly fold - skin was turning purple   -uses cotton barrier to help avoid this "     Anti-obesity medications:     Current:   wegovy 1.7mg - no side effects, earlier satiety, less hunger, doesn't think about food all day   -needing compazine twice a month for am nausea- (zofran causes headaches)     Recent diet changes:   Meal prepping   Stopping sooner at meals   Days off are more difficult - busy     -Protein- improving - 70g daily   -Water- still struggles - keeps water with her at work and plans to set timer - never really feeling thirsty     Recent exercise/activity changes:   Walking   Tore mensicus which is limiting activity   Meets with surgeon next week     Recent stressors:   Work busy   Knee injury   Planning wedding in November  [facility]    Recent sleep changes: taking melatonin as needed (not tired)     Vitamins/Labs: did labs today  CBC and A1c normal. Waiting on rest       Weight History:      7/10/2023    10:24 AM   --   What is your highest lifetime weight? 365   What is your lowest weight since surgery? (In pounds) 277     Initial Weight (lbs): 365 lbs  Weight: 125.8 kg (277 lb 4.8 oz)  Last Visits Weight: 127 kg (280 lb)  Cumulative weight loss (lbs): 87.7  Weight Loss Percentage: 24.03%        7/10/2023    10:24 AM   Questions Regarding Co-Morbidities and Health Concerns Reviewed With Patient   Pre-diabetes Never   Diabetes II Never   High Blood Pressure Improved   High cholesterol Never   Heartburn/Reflux Improved   Sleep apnea Never   PCOS Never   Back pain Improved   Joint pain Improved   Lower leg swelling Gone away           7/10/2023    10:24 AM   Eating Habits   How many meals do you eat per day? 3   Do you snack between meals? Sometimes   How much food are you eating at each meal? Less than 1/2 cup   Are you able to separate your meals and liquids by at least 30 minutes? Yes   Are you able to avoid liquid calories? Sometimes           7/10/2023    10:24 AM   Exercise Questions Reviewed With Patient   How often do you exercise? Less than 1 time per week   What is the  "duration of your exercise (in minutes)? 30 Minutes   What types of exercise do you do? walking   What keeps you from being more active? Pain    I should be more active but I just have not gotten around to it    My ability to walk or move around is limited       Social History:      7/10/2023    10:24 AM   --   Are you smoking? No   Are you drinking alcohol? No       Medications:  Current Outpatient Medications   Medication     prochlorperazine (COMPAZINE) 10 MG tablet     Semaglutide-Weight Management (WEGOVY) 1.7 MG/0.75ML pen     albuterol (PROAIR HFA/PROVENTIL HFA/VENTOLIN HFA) 108 (90 Base) MCG/ACT inhaler     azithromycin (ZITHROMAX) 250 MG tablet     betamethasone dipropionate (DIPROSONE) 0.05 % external cream     buPROPion (WELLBUTRIN XL) 150 MG 24 hr tablet     Cholecalciferol (VITAMIN D3) 25 MCG TABS     cyanocobalamin (CYANOCOBALAMIN) 1000 MCG/ML injection     etonogestrel (NEXPLANON) 68 MG IMPL     Fexofenadine HCl (ALLEGRA PO)     fluticasone-salmeterol (ADVAIR) 250-50 MCG/ACT inhaler     ipratropium - albuterol 0.5 mg/2.5 mg/3 mL (DUONEB) 0.5-2.5 (3) MG/3ML neb solution     LORazepam (ATIVAN) 1 MG tablet     metoprolol succinate ER (TOPROL XL) 50 MG 24 hr tablet     nystatin (MYCOSTATIN) 185207 UNIT/GM external powder     RESTASIS 0.05 % ophthalmic emulsion     Syringe/Needle, Disp, (BD ECLIPSE SYRINGE/NEEDLE) 25G X 5/8\" 3 ML MISC     triamcinolone (KENALOG) 0.1 % external cream     No current facility-administered medications for this visit.         7/10/2023    10:24 AM   --   Do you avoid NSAIDs such as (Ibuprofen, Aleve, Naproxen, Advil)? No       ROS:  GI:       7/10/2023    10:24 AM   --   Vomiting No   Diarrhea Yes   Constipation Yes   Swallowing trouble No   Abdominal pain No   Heartburn Yes     Skin:       7/10/2023    10:24 AM   BAR RBS ROS - SKIN   Rash in skin folds Yes     Psych:       7/10/2023    10:24 AM   --   Depression No   Anxiety No     Female Only:       7/10/2023    10:24 AM   BAR " "RBS ROS -    Female only Irregular menstrual cycles   Stress urinary incontinence No       Lab on 07/13/2023   Component Date Value Ref Range Status     Hemoglobin A1C 07/13/2023 4.8  0.0 - 5.6 % Final    Normal <5.7%   Prediabetes 5.7-6.4%    Diabetes 6.5% or higher     Note: Adopted from ADA consensus guidelines.     WBC Count 07/13/2023 5.4  4.0 - 11.0 10e3/uL Final     RBC Count 07/13/2023 4.93  3.80 - 5.20 10e6/uL Final     Hemoglobin 07/13/2023 14.6  11.7 - 15.7 g/dL Final     Hematocrit 07/13/2023 42.3  35.0 - 47.0 % Final     MCV 07/13/2023 86  78 - 100 fL Final     MCH 07/13/2023 29.6  26.5 - 33.0 pg Final     MCHC 07/13/2023 34.5  31.5 - 36.5 g/dL Final     RDW 07/13/2023 11.8  10.0 - 15.0 % Final     Platelet Count 07/13/2023 279  150 - 450 10e3/uL Final       PHYSICAL EXAM:  Objective    Ht 1.702 m (5' 7.01\")   Wt 125.8 kg (277 lb 4.8 oz)   BMI 43.42 kg/m    Vitals - Patient Reported  Pain Score: No Pain (0)      Vitals:  No vitals were obtained today due to virtual visit.    Physical Exam   GENERAL: Healthy, alert and no distress  EYES: Eyes grossly normal to inspection.  No discharge or erythema, or obvious scleral/conjunctival abnormalities.  RESP: No audible wheeze, cough, or visible cyanosis.  No visible retractions or increased work of breathing.    SKIN: Visible skin clear. No significant rash, abnormal pigmentation or lesions.  NEURO: Cranial nerves grossly intact.  Mentation and speech appropriate for age.  PSYCH: Mentation appears normal, affect normal/bright, judgement and insight intact, normal speech and appearance well-groomed.        Sincerely,    Komal Miller NP    "

## 2023-07-13 NOTE — PATIENT INSTRUCTIONS
"Hi Komal Miller NP , it was nice to meet you today!  Thank you for allowing us the privilege of caring for you. We hope we provided you with the excellent service you deserve.   Please let us know if there is anything else we can do for you so that we can be sure you are completely satisfied with your care experience.    To ensure the quality of our services you may be receiving a patient satisfaction survey from an independent patient satisfaction monitoring company.    The greatest compliment you can give is a \"Likely to Recommend\"    Your visit was with Komal Miller NP today.    Instructions per today's visit:     Follow up  plan:  Try podcast \"nothing much happens\"   Can do omeprazole just as needed   Set a timer for hydration   Continue wegovy 1.7mg   Okay to continue compazine as needed   Follow up in 3 months     ___________________________________________________________________________  Important contact and scheduling information:  Please call our contact center at [phone] to schedule your next appointments.  For any nursing questions or concerns call Keke Feliciano LPN at [phone] or Amanda Rivas RN at [phone]  Please call during clinic hours Monday through Friday 8:00a - 4:00p if you have questions or you can contact us via Plaid[employer] at anytime and we will reply during clinic hours.    Lab results will be communicated through My Chart or letter (if My Chart not used). Please call the clinic if you have not received communication after 1 week or if you have any questions.?  Clinic Fax: [phone]  __________________________________________________________________________    If labs were ordered today:    Please make an appointment to have them drawn at your convenience.     To schedule the Lab Appointment using [employer]:  Select \"Schedule an Appointment\"  Select \"Lab Only\"  For \"A couple of questions\", select \"Other\"  For \"Which locations work for you?, select the location and set up the " appointment    To schedule by phone call [phone] to schedule a lab only appointment at any [facility] lab.  ___________________________________________________________________________  Work with A Health !  Virtual Sessions are Available through [facility]    To learn more, call to schedule a free, Health  Q&A appointment: [phone]     What is Health Coaching?  Do you know what you are supposed to do, but you just aren't doing it?  Then, HEALTH COACHING may help you!   Get unstuck and move forward with the support of a professionally trained [employer]-HWC (National Board-Certified Health and ) who uses evidence-based approaches to help you move forward with healthy lifestyle changes in the areas of weight loss, stress management and overall well-being.    Health Coaches help you identify goals that will work best for you. Health Coaches provide support and encouragement with overcoming barriers and help you to find inspiration and motivation to lead a healthy lifestyle.    Option one:  Health Coaching 3-Pack; Three, 30-minute Health Coaching Visits, for $99  Visits are done virtually (phone or video)  This is a self pay service; we do not accept insurance for yong coaching.    Option two:   The 24 week Plan; 11 Health Coaching Visits, and a 7 months subscription to [employer]-- on-demand fitness, nutrition and mindfulness classes, for $499 (employee discounts may be available). Participants will also meet regularly with a weight management Medical Provider and a Registered/Licensed Dietician.  This is a self-pay service; we do not accept insurance for health coaching.    To Schedule a free Health  Q&A appointment to learn more,  call [phone].  ____________________________________________________________________    [facility]   Healthy Lifestyle Virtual Support Group    Healthy Lifestyle Virtual  Support Group?  This is 60 minutes of small group guided discussion, support and resources. All are welcome who want a healthy lifestyle.  WHEN: Starting in July 2023, this group meets the 1st Friday of the month from 12:30 PM - 1:30 PM virtually using Microsoft Teams.    FACILITATOR: Led by National Board Certified Health and , Tari Schultz [facility].   TO REGISTER: Please send an email to Tari [email or web address] to receive monthly invites to the group or if you have any questions about having a health .  Prior to the meeting, a link with directions on how to join the meeting will be sent to you.    2023 Meetings  May 19: Let's Talk  June 9: Create Your Coaching Toolkit: Learn How to  Yourself  July 7: Let's Talk  August 4: Benefits of Fiber with NIKITA Mayo  September 1: Show and Tell (share your aps, podcasts, recipes, hacks, books)  October 6 :Let's Talk  November 3: Introduction to Mindfulness   December 1: Let's Talk    If you would like bariatric surgery specific support group info please let your care team know.         Thank you,   [facility] Comprehensive Weight Management Team

## 2023-07-13 NOTE — ASSESSMENT & PLAN NOTE
"Try podcast \"nothing much happens\"   Can do omeprazole just as needed   Set a timer for hydration   Continue wegovy 1.7mg   Okay to continue compazine as needed   Follow up in 3 months   "

## 2023-07-13 NOTE — NURSING NOTE
Is the patient currently in the state of [place]? YES    Visit mode:VIDEO    If the visit is dropped, the patient can be reconnected by: VIDEO VISIT: Text to cell phone: [phone]    Will anyone else be joining the visit? NO      How would you like to obtain your AVS? MyChart    Are changes needed to the allergy or medication list? NO    Reason for visit: Follow Up

## 2023-07-13 NOTE — PROGRESS NOTES
Virtual Visit Details    Type of service:  Video Visit   Video Start Time: 1005  Video End Time:1022    Originating Location (pt. Location): Home    Distant Location (provider location):  Off-site  Platform used for Video Visit: [employer] Directions

## 2023-07-14 LAB — DEPRECATED CALCIDIOL+CALCIFEROL SERPL-MC: 32 UG/L (ref 20–75)

## 2023-07-17 LAB
ANNOTATION COMMENT IMP: NORMAL
RETINYL PALMITATE SERPL-MCNC: <0.02 MG/L
VIT A SERPL-MCNC: 0.64 MG/L

## 2023-07-19 ENCOUNTER — TELEPHONE (OUTPATIENT)
Dept: ENDOCRINOLOGY | Facility: CLINIC | Age: 23
End: 2023-07-19
Payer: COMMERCIAL

## 2023-07-20 ENCOUNTER — OFFICE VISIT (OUTPATIENT)
Dept: FAMILY MEDICINE | Facility: CLINIC | Age: 23
End: 2023-07-20
Payer: COMMERCIAL

## 2023-07-20 VITALS
WEIGHT: 284.5 LBS | OXYGEN SATURATION: 100 % | BODY MASS INDEX: 42.14 KG/M2 | RESPIRATION RATE: 16 BRPM | HEART RATE: 56 BPM | HEIGHT: 69 IN | DIASTOLIC BLOOD PRESSURE: 70 MMHG | TEMPERATURE: 97.5 F | SYSTOLIC BLOOD PRESSURE: 116 MMHG

## 2023-07-20 DIAGNOSIS — L70.0 ACNE VULGARIS: ICD-10-CM

## 2023-07-20 DIAGNOSIS — I10 ESSENTIAL HYPERTENSION: ICD-10-CM

## 2023-07-20 DIAGNOSIS — Z98.84 S/P LAPAROSCOPIC SLEEVE GASTRECTOMY: ICD-10-CM

## 2023-07-20 DIAGNOSIS — Z00.01 ENCOUNTER FOR ROUTINE ADULT MEDICAL EXAM WITH ABNORMAL FINDINGS: Primary | ICD-10-CM

## 2023-07-20 DIAGNOSIS — E66.01 MORBID OBESITY (H): ICD-10-CM

## 2023-07-20 DIAGNOSIS — F32.1 MAJOR DEPRESSIVE DISORDER, SINGLE EPISODE, MODERATE (H): ICD-10-CM

## 2023-07-20 LAB
CREAT UR-MCNC: 308.3 MG/DL
ERYTHROCYTE [DISTWIDTH] IN BLOOD BY AUTOMATED COUNT: 12 % (ref 10–15)
FERRITIN SERPL-MCNC: 191 NG/ML (ref 6–175)
HCT VFR BLD AUTO: 43.8 % (ref 35–47)
HGB BLD-MCNC: 14.9 G/DL (ref 11.7–15.7)
IRON BINDING CAPACITY (ROCHE): 328 UG/DL (ref 240–430)
IRON SATN MFR SERPL: 34 % (ref 15–46)
IRON SERPL-MCNC: 113 UG/DL (ref 37–145)
MCH RBC QN AUTO: 29.4 PG (ref 26.5–33)
MCHC RBC AUTO-ENTMCNC: 34 G/DL (ref 31.5–36.5)
MCV RBC AUTO: 86 FL (ref 78–100)
MICROALBUMIN UR-MCNC: 25.9 MG/L
MICROALBUMIN/CREAT UR: 8.4 MG/G CR (ref 0–25)
PLATELET # BLD AUTO: 312 10E3/UL (ref 150–450)
RBC # BLD AUTO: 5.07 10E6/UL (ref 3.8–5.2)
WBC # BLD AUTO: 6.3 10E3/UL (ref 4–11)

## 2023-07-20 PROCEDURE — 99213 OFFICE O/P EST LOW 20 MIN: CPT | Mod: 25 | Performed by: FAMILY MEDICINE

## 2023-07-20 PROCEDURE — 85027 COMPLETE CBC AUTOMATED: CPT | Performed by: FAMILY MEDICINE

## 2023-07-20 PROCEDURE — 36415 COLL VENOUS BLD VENIPUNCTURE: CPT | Performed by: FAMILY MEDICINE

## 2023-07-20 PROCEDURE — 82043 UR ALBUMIN QUANTITATIVE: CPT | Performed by: FAMILY MEDICINE

## 2023-07-20 PROCEDURE — 83550 IRON BINDING TEST: CPT | Performed by: FAMILY MEDICINE

## 2023-07-20 PROCEDURE — 82570 ASSAY OF URINE CREATININE: CPT | Performed by: FAMILY MEDICINE

## 2023-07-20 PROCEDURE — 82728 ASSAY OF FERRITIN: CPT | Performed by: FAMILY MEDICINE

## 2023-07-20 PROCEDURE — 99395 PREV VISIT EST AGE 18-39: CPT | Performed by: FAMILY MEDICINE

## 2023-07-20 PROCEDURE — 83540 ASSAY OF IRON: CPT | Performed by: FAMILY MEDICINE

## 2023-07-20 RX ORDER — SPIRONOLACTONE 100 MG/1
100 TABLET, FILM COATED ORAL DAILY
Qty: 90 TABLET | Refills: 3
Start: 2023-07-20 | End: 2024-07-18

## 2023-07-20 RX ORDER — BUPROPION HYDROCHLORIDE 150 MG/1
150 TABLET ORAL EVERY MORNING
Qty: 90 TABLET | Refills: 3 | Status: SHIPPED | OUTPATIENT
Start: 2023-07-20 | End: 2024-02-28

## 2023-07-20 ASSESSMENT — ENCOUNTER SYMPTOMS
FREQUENCY: 0
JOINT SWELLING: 0
DIZZINESS: 1
HEADACHES: 1
HEARTBURN: 0
BREAST MASS: 0
MYALGIAS: 0
HEMATURIA: 0
ABDOMINAL PAIN: 0
WEAKNESS: 0
EYE PAIN: 0
DIARRHEA: 1
NERVOUS/ANXIOUS: 0
ARTHRALGIAS: 1
PALPITATIONS: 0
HEMATOCHEZIA: 0
FEVER: 0
DYSURIA: 0
SHORTNESS OF BREATH: 0
PARESTHESIAS: 0
NAUSEA: 1
CHILLS: 0
COUGH: 0
CONSTIPATION: 1
SORE THROAT: 0

## 2023-07-20 NOTE — PROGRESS NOTES
SUBJECTIVE:   CC: Sasha is an 23 year old who presents for preventive health visit.       2023    10:19 AM   Additional Questions   Roomed by Yvonne SAGE MA     Healthy Habits:     Getting at least 3 servings of Calcium per day:  NO    Bi-annual eye exam:  Yes    Dental care twice a year:  NO    Sleep apnea or symptoms of sleep apnea:  None    Diet:  Other    Frequency of exercise:  2-3 days/week    Duration of exercise:  30-45 minutes    Taking medications regularly:  Yes    Medication side effects:  Lightheadedness    Additional concerns today:  Yes            PROBLEMS TO ADD ON...  Torn meniscus left knee, meeting with ortho to discuss treatment options.      Social History     Tobacco Use    Smoking status: Former     Packs/day: 1.00     Years: 5.00     Pack years: 5.00     Types: Cigarettes     Quit date: 2021     Years since quittin.5    Smokeless tobacco: Never   Substance Use Topics    Alcohol use: Not Currently             2023     8:52 AM   Alcohol Use   Prescreen: >3 drinks/day or >7 drinks/week? No     Reviewed orders with patient.  Reviewed health maintenance and updated orders accordingly - Yes  Lab work is in process  Labs reviewed in EPIC  BP Readings from Last 3 Encounters:   23 116/70   06/15/23 121/74   23 104/68    Wt Readings from Last 3 Encounters:   23 129 kg (284 lb 8 oz)   23 125.8 kg (277 lb 4.8 oz)   06/15/23 127 kg (280 lb)                  Patient Active Problem List   Diagnosis    Morbid obesity (H)    Tear of lateral cartilage or meniscus of knee, current    Menorrhagia    Dysmenorrhea    Wheezing    Genital herpes simplex, unspecified site    Mild persistent asthma without complication    Nexplanon placed 2021    DELIA (generalized anxiety disorder)    Major depressive disorder, single episode, moderate (H)    Depression    Class 3 severe obesity with serious comorbidity and body mass index (BMI) of 40.0 to 44.9 in adult (H)     Essential hypertension    Hordeolum externum, unspecified laterality    BRCA2 gene mutation positive    S/P laparoscopic sleeve gastrectomy    Diarrhea, unspecified type    Mild intermittent asthma    Irritation of external ear canal, bilateral     Past Surgical History:   Procedure Laterality Date    ARTHROSCOPY KNEE WITH MENISCAL REPAIR Right 05/10/2017    Procedure: ARTHROSCOPY KNEE WITH MENISCAL REPAIR;  arthroscopy right knee with lateral meniscus repair;  Surgeon: Nathaniel Hicks MD;  Location: [facility]    BIOPSY      Whole punch taken in calf to check for scleroderma.    DAVINCI GASTRIC SLEEVE N/A 2022    Procedure: GASTRECTOMY, SLEEVE, ROBOT-ASSISTED, LAPAROSCOPIC;  Surgeon: Joseph Mata MD;  Location: [facility]    ESOPHAGOSCOPY, GASTROSCOPY, DUODENOSCOPY (EGD), COMBINED N/A 2022    Procedure: ESOPHAGOGASTRODUODENOSCOPY (EGD);  Surgeon: Joseph Mata MD;  Location: [facility]       Social History     Tobacco Use    Smoking status: Former     Packs/day: 1.00     Years: 5.00     Pack years: 5.00     Types: Cigarettes     Quit date: 2021     Years since quittin.5    Smokeless tobacco: Never   Substance Use Topics    Alcohol use: Not Currently     Family History   Problem Relation Age of Onset    Asthma Mother     Hypertension Mother     Breast Cancer Mother         lumpectomy and BSO    Hereditary Breast and Ovarian Cancer Syndrome Mother         BRCA2+    Obesity Mother     Asthma Father     Diabetes Father     Melanoma Father 59        metastatic to brain    Hypertension Father     Hyperlipidemia Father     Prostate Cancer Father     Other Cancer Father         Melanomia    Depression Father     Anxiety Disorder Father     Anesthesia Reaction Father         Asthma, hard wake up.    Obesity Father     Cervical Cancer Sister 19    Parathyroid Disorders Sister     Vascular Disease Sister         back of head    Colon Cancer Maternal Grandmother     Other Cancer Maternal Grandmother          Lung cancer    Lung Cancer Paternal Grandmother         smoker,  in late 80s    Colon Cancer Paternal Grandmother 60    Heart Disease Paternal Grandfather          in 70s    Melanoma Paternal Aunt     Prostate Cancer Paternal Uncle 40    Colon Cancer Paternal Uncle     Melanoma Paternal Uncle     Cancer Paternal Uncle         hematological cancer; s/p SCT    Melanoma Paternal Uncle     Melanoma Paternal Uncle     Cancer Paternal Uncle 61        GI tract; cause of death at 62; father's twin    Melanoma Paternal Uncle     Breast Cancer Other 50        contralateral occurrence at 57; ; maternal grandfather's sister    Breast Cancer Other 50        paternal grandfather's sister    Bleeding Disorder No family hx of     Clotting Disorder No family hx of          Current Outpatient Medications   Medication Sig Dispense Refill    buPROPion (WELLBUTRIN XL) 150 MG 24 hr tablet Take 1 tablet (150 mg) by mouth every morning 90 tablet 3    Cholecalciferol (VITAMIN D3) 25 MCG TABS       cyanocobalamin (CYANOCOBALAMIN) 1000 MCG/ML injection Inject 1 mL (1,000 mcg) Subcutaneous every 30 days 1 mL 11    etonogestrel (NEXPLANON) 68 MG IMPL 1 each (68 mg) by Subdermal route once      Fexofenadine HCl (ALLEGRA PO) Take by mouth daily as needed for allergies      LORazepam (ATIVAN) 1 MG tablet Take 1 tablet (1 mg) by mouth once as needed for anxiety (prior to breast MRI) Take with you to appointment and check with the nurse as to the appropriate time to take the medication. Do not operate a vehicle after taking this medication 1 tablet 0    prochlorperazine (COMPAZINE) 10 MG tablet Take 1 tablet (10 mg) by mouth every 8 hours as needed for nausea or vomiting 30 tablet 0    Semaglutide-Weight Management (WEGOVY) 1.7 MG/0.75ML pen Inject 1.7 mg Subcutaneous every 7 days 3 mL 3    spironolactone (ALDACTONE) 100 MG tablet Take 1 tablet (100 mg) by mouth daily 90 tablet 3    Syringe/Needle, Disp, (BD ECLIPSE  "SYRINGE/NEEDLE) 25G X 5/8\" 3 ML MISC 1 Syringe every 30 days 1 each 11    albuterol (PROAIR HFA/PROVENTIL HFA/VENTOLIN HFA) 108 (90 Base) MCG/ACT inhaler Inhale 1-2 puffs into the lungs every 4 hours as needed for shortness of breath / dyspnea or wheezing (Patient not taking: Reported on 7/20/2023) 8.5 g 5    betamethasone dipropionate (DIPROSONE) 0.05 % external cream APPLY TO AFFECTED AREA(S) ONCE DAILY (Patient not taking: Reported on 7/20/2023)      fluticasone-salmeterol (ADVAIR) 250-50 MCG/ACT inhaler Inhale 1 puff into the lungs every 12 hours (Patient not taking: Reported on 4/24/2023) 60 each 11    ipratropium - albuterol 0.5 mg/2.5 mg/3 mL (DUONEB) 0.5-2.5 (3) MG/3ML neb solution Take 1 vial (3 mLs) by nebulization every 6 hours as needed for shortness of breath / dyspnea or wheezing (Patient not taking: Reported on 4/24/2023) 30 vial 1    nystatin (MYCOSTATIN) 606104 UNIT/GM external powder Apply topically 3 times daily as needed (rash in skin folds) (Patient not taking: Reported on 4/24/2023) 60 g 3    RESTASIS 0.05 % ophthalmic emulsion INSTILL 1 DROP INTO EACH EYE TWICE DAILY (Patient not taking: Reported on 4/24/2023)       Allergies   Allergen Reactions    Ondansetron Headache     Other reaction(s): Headache    Seasonal Allergies     Steri Strips      Got boils on incision after knee surgery from steri strips     Recent Labs   Lab Test 07/13/23  0807 11/03/22  0912 07/14/22  1052 07/14/22  1002 03/25/22  0909 01/27/22  1047 01/20/22  1140 12/14/21  0921 06/24/21  1009 11/21/19  0948 05/31/18  2235   A1C 4.8 5.1  --  5.0   < > 5.4 9.1*   < >  --   --   --    LDL 68 64  --   --   --  74  --   --  89 76  --    HDL 49* 51  --   --   --  38*  --   --  58 42*  --    TRIG 75 88  --   --   --  72  --   --  128 120  --    ALT 18 23  --   --   --   --  96*  --   --   --  49   CR 0.72 0.80   < >  --   --  0.50* 0.54   < > 0.56  --  0.63   GFRESTIMATED >90 >90   < >  --   --  >90 >90   < > >90  --  >90 "   GFRESTBLACK  --   --   --   --   --   --   --   --  >90  --  >90   POTASSIUM 4.2 4.2   < >  --   --  3.9 3.8   < > 4.1  --  3.7   TSH  --   --   --   --   --   --   --   --  3.10 3.07  --     < > = values in this interval not displayed.        Breast Cancer Screening:    FHS-7:        No data to display                Patient under 40 years of age: Routine Mammogram Screening not recommended.   Pertinent mammograms are reviewed under the imaging tab.    History of abnormal Pap smear: NO - age 21-29 PAP every 3 years recommended      7/14/2022    10:26 AM 11/21/2019    10:11 AM   PAP / HPV   PAP Negative for Intraepithelial Lesion or Malignancy (NILM)     PAP (Historical)  NIL      Reviewed and updated as needed this visit by clinical staff   Tobacco  Allergies  Meds              Reviewed and updated as needed this visit by Provider                 Past Medical History:   Diagnosis Date    BRCA2 gene mutation positive 02/01/2023    Closed head injury, subsequent encounter 04/05/2018    Depressive disorder     History of PCR DNA positive for HSV1     Hypertension     Moderate major depression (H) 01/20/2014    Nexplanon placed 11/14/17 11/14/2017    Nexplanon placed 11/14/17 (removed 1/16/2020) 11/14/2017    Nexplanon placed 12/14/2021 12/14/2021    Obesity 09/14/2010    Pyelonephritis 02/19/2018    Skin tag (right collar line and left labial region 11/21/2019    Tobacco use disorder 05/04/2016    Uncomplicated asthma       Past Surgical History:   Procedure Laterality Date    ARTHROSCOPY KNEE WITH MENISCAL REPAIR Right 05/10/2017    Procedure: ARTHROSCOPY KNEE WITH MENISCAL REPAIR;  arthroscopy right knee with lateral meniscus repair;  Surgeon: Nathaniel Hicks MD;  Location: [facility]    BIOPSY      Whole punch taken in calf to check for scleroderma.    DAVINCI GASTRIC SLEEVE N/A 07/26/2022    Procedure: GASTRECTOMY, SLEEVE, ROBOT-ASSISTED, LAPAROSCOPIC;  Surgeon: Joseph Mata MD;  [facility]     ESOPHAGOSCOPY, GASTROSCOPY, DUODENOSCOPY (EGD), COMBINED N/A 03/28/2022    Procedure: ESOPHAGOGASTRODUODENOSCOPY (EGD);  Surgeon: Joseph Mata MD;  Location: [facility] OR     OB History   No obstetric history on file.       Review of Systems   Constitutional: Negative for chills and fever.   HENT: Negative for congestion, ear pain, hearing loss and sore throat.    Eyes: Negative for pain and visual disturbance.   Respiratory: Negative for cough and shortness of breath.    Cardiovascular: Negative for chest pain, palpitations and peripheral edema.   Gastrointestinal: Positive for constipation, diarrhea and nausea. Negative for abdominal pain, heartburn and hematochezia.   Breasts:  Negative for tenderness, breast mass and discharge.   Genitourinary: Negative for dysuria, frequency, genital sores, hematuria, pelvic pain, urgency, vaginal bleeding and vaginal discharge.   Musculoskeletal: Positive for arthralgias. Negative for joint swelling and myalgias.   Skin: Positive for rash.   Neurological: Positive for dizziness and headaches. Negative for weakness and paresthesias.   Psychiatric/Behavioral: Negative for mood changes. The patient is not nervous/anxious.      Patient can sometimes have some loose stools but other times constipated depending on what she eats.  This is ever since she had her gastric band.  She does get some joint and muscle aches but this is improving since she has lost almost 90 pounds.  Her headaches have improved since her blood pressure has come down.  Is been very stable on her metoprolol sometimes she does get dizzy which may be secondary to dropping her pressures too much.  She had decreased the dose of metoprolol to half a tablet and with her pressures as low as they are working to consider stopping it altogether.  She and her boyfriend are considering having a baby come sprang so she will contact me when she wants her Nexplanon out.     OBJECTIVE:   /70   Pulse 56   Temp 97.5  F  "(36.4  C) (Temporal)   Resp 16   Ht 1.74 m (5' 8.5\")   Wt 129 kg (284 lb 8 oz)   SpO2 100%   BMI 42.63 kg/m    Physical Exam  GENERAL: healthy, alert and no distress  EYES: Eyes grossly normal to inspection, PERRL and conjunctivae and sclerae normal  HENT: ear canals and TM's normal, nose and mouth without ulcers or lesions  NECK: no adenopathy, no asymmetry, masses, or scars and thyroid normal to palpation  RESP: lungs clear to auscultation - no rales, rhonchi or wheezes  BREAST: No breast exam done, we discussed self breast awareness and what to be concerned about, ie; retraction of a nipple, dimpling of the skin or any nipple discharge or aerolar rash that does not clear.   CV: regular rate and rhythm, normal S1 S2, no S3 or S4, no murmur, click or rub, no peripheral edema and peripheral pulses strong  ABDOMEN: soft, nontender, no hepatosplenomegaly, no masses and bowel sounds normal   (female): Exam deferred, patient not due for a pap smear and she is without complaints or concerns today.   MS: no gross musculoskeletal defects noted, no edema  SKIN: no suspicious lesions or rashes  NEURO: Normal strength and tone, mentation intact and speech normal  PSYCH: mentation appears normal, affect normal/bright    Diagnostic Test Results:  Labs reviewed in Epic  Results for orders placed or performed in visit on 07/20/23 (from the past 24 hour(s))   CBC with platelets   Result Value Ref Range    WBC Count 6.3 4.0 - 11.0 10e3/uL    RBC Count 5.07 3.80 - 5.20 10e6/uL    Hemoglobin 14.9 11.7 - 15.7 g/dL    Hematocrit 43.8 35.0 - 47.0 %    MCV 86 78 - 100 fL    MCH 29.4 26.5 - 33.0 pg    MCHC 34.0 31.5 - 36.5 g/dL    RDW 12.0 10.0 - 15.0 %    Platelet Count 312 150 - 450 10e3/uL   Ferritin   Result Value Ref Range    Ferritin 191 (H) 6 - 175 ng/mL   Iron and iron binding capacity   Result Value Ref Range    Iron 113 37 - 145 ug/dL    Iron Binding Capacity 328 240 - 430 ug/dL    Iron Sat Index 34 15 - 46 %   Albumin " "Random Urine Quantitative with Creat Ratio   Result Value Ref Range    Creatinine Urine mg/dL 308.3 mg/dL    Albumin Urine mg/L 25.9 mg/L    Albumin Urine mg/g Cr 8.40 0.00 - 25.00 mg/g Cr       ASSESSMENT/PLAN:   (Z00.01) Encounter for routine adult medical exam with abnormal findings  (primary encounter diagnosis)  Comment: Overall doing well.  Plan: Chronic medical problems are stable.    (F32.1) Major depressive disorder, single episode, moderate (H)  Comment: Depression is stable on her current medication  Plan: Continue her Wellbutrin.  Refill sent to the Pharmacy for her.    (L70.0) Acne vulgaris  Comment: Very stable on her spironolactone through dermatology.  Plan: spironolactone (ALDACTONE) 100 MG tablet        Refills per dermatology.    (I10) Essential hypertension  Comment: Hypertension is markedly improved since she has lost weight.  He is actually dropped a little bit too low at times causing her some low blood pressure so the metoprolol was cut in half.  Plan: Albumin Random Urine Quantitative with Creat         Ratio        Blood pressure is excellent today so we are going to stop her metoprolol together and have her back in 2 months for blood pressure recheck.    (Z98.84) S/P laparoscopic sleeve gastrectomy  Comment: Status post gastric sleeve  Plan: Down 90 pounds.  She would like to lose significantly more weight.  She is working with a weight loss program at the  to do this.    Patient has been advised of split billing requirements and indicates understanding: Yes      COUNSELING:  Reviewed preventive health counseling, as reflected in patient instructions       Regular exercise       Healthy diet/nutrition       Alcohol Use       Contraception       Family planning      BMI:   Estimated body mass index is 42.63 kg/m  as calculated from the following:    Height as of this encounter: 1.74 m (5' 8.5\").    Weight as of this encounter: 129 kg (284 lb 8 oz).   Weight management plan: Status post a " gastric sleeve and down 90 pounds since surgery.  She wants to continue to lose weight and is working with the comprehensive weight loss program to continue with this.      She reports that she quit smoking about 2 years ago. Her smoking use included cigarettes. She has a 5.00 pack-year smoking history. She has never used smokeless tobacco.          Electronically signed by:  Oscar Medley M.D.  7/20/2023

## 2023-07-26 NOTE — PROGRESS NOTES
Sasha Hand  :  2000  DOS: 2023  MRN: [id number]  PCP: Oscar Medley    Sports Medicine Clinic Visit  Interim History - 2023  - Last seen in clinic on 6/15/2023 for left knee pain.  - MRI on file for left knee. Here today for results.    -MRI L knee 2023 shows a complex tear of the medial meniscus posterior horn with both vertical and horizontal components, small parameniscal cyst along the peripheral posterior aspect of the posterior horn.  No lateral meniscus or ligament damage.  No articular cartilage defects, fractures, or other osseous abnormality.    - Since the last visit, she notes that she has not had any change in previously described symptoms. Left knee is painful over the anteromedial knee. Treatment currently consists of rest only.  - No interim injury.       Initial Visit: Bhumika 15, 2023  HPI  Sasha Hand is a 23 year old female who is seen as a self referral presenting with left knee pain.    - Mechanism of Injury:  No inciting injury.   - Prior evaluation:  None for the left knee specifically.  - 2021: UC and 2021: ED for atraumatic left leg localized swelling after recent Nexplanon placement.  No DVT on ultrasound, negative Homans.  Diagnosed with superficial thrombophlebitis versus varicose vein.    - Pain Character:  Pain has been present for  2 months .  Pain is well localized to the anteromedial left knee and deep to the patella. Also notes medial jointline pain without significant radiation.   - Endorses: CARLYN position causes feeling of joint apprehension, impending dislocation.  Instability when walking.  Occasional clicking and mechanical locking symptoms that are significantly less than what she experienced on the right knee in the past.  - Denies:  swelling, numbness, tingling, radicular shooting pain, weakness.   - Alleviating factors: Rest, activity modification.  - Aggravating factors: CARLYN position, prolonged walking, knee flexion  activities  - Treatments tried: ice, rest, activity modifications.    - Patient Goals:  discuss treatment options  - Social History:  works as a     - Pertinent PMH:    - 12/29/2021: left superficial thrombophlebitis vs varicose vein.   - Past smoker. Type 2 diabetic, s/p sleeve gastrectomy  - S/p right knee arthroscopy with lateral meniscus repair for a hypermobile lateral meniscus with a tear.  Also with patellofemoral pain syndrome on that side.    Review of Systems  Musculoskeletal: as above  Remainder of review of systems is negative including constitutional, CV, pulmonary, GI, Skin and Neurologic except as noted in HPI or medical history.    Past Medical History:   Diagnosis Date    BRCA2 gene mutation positive 02/01/2023    Closed head injury, subsequent encounter 04/05/2018    Depressive disorder     History of PCR DNA positive for HSV1     Hypertension     Moderate major depression (H) 01/20/2014    Nexplanon placed 11/14/17 11/14/2017    Nexplanon placed 11/14/17 (removed 1/16/2020) 11/14/2017    Nexplanon placed 12/14/2021 12/14/2021    Obesity 09/14/2010    Pyelonephritis 02/19/2018    Skin tag (right collar line and left labial region 11/21/2019    Tobacco use disorder 05/04/2016    Uncomplicated asthma      Past Surgical History:   Procedure Laterality Date    ARTHROSCOPY KNEE WITH MENISCAL REPAIR Right 05/10/2017    Procedure: ARTHROSCOPY KNEE WITH MENISCAL REPAIR;  arthroscopy right knee with lateral meniscus repair;  Surgeon: Nathaniel Hicks MD;  Location: [facility]    BIOPSY      Whole punch taken in calf to check for scleroderma.    EFRAIN GASTRIC SLEEVE N/A 07/26/2022    Procedure: GASTRECTOMY, SLEEVE, ROBOT-ASSISTED, LAPAROSCOPIC;  Surgeon: Joseph Mata MD;  Location: [facility] OR    ESOPHAGOSCOPY, GASTROSCOPY, DUODENOSCOPY (EGD), COMBINED N/A 03/28/2022    Procedure: ESOPHAGOGASTRODUODENOSCOPY (EGD);  Surgeon: Joseph Mata MD;  Location: [facility] OR     Family History    Problem Relation Age of Onset    Asthma Mother     Hypertension Mother     Breast Cancer Mother         lumpectomy and BSO    Hereditary Breast and Ovarian Cancer Syndrome Mother         BRCA2+    Obesity Mother     Asthma Father     Diabetes Father     Melanoma Father 59        metastatic to brain    Hypertension Father     Hyperlipidemia Father     Prostate Cancer Father     Other Cancer Father         Melanomia    Depression Father     Anxiety Disorder Father     Anesthesia Reaction Father         Asthma, hard wake up.    Obesity Father     Cervical Cancer Sister 19    Parathyroid Disorders Sister     Vascular Disease Sister         back of head    Colon Cancer Maternal Grandmother     Other Cancer Maternal Grandmother         Lung cancer    Lung Cancer Paternal Grandmother         smoker,  in late 80s    Colon Cancer Paternal Grandmother 60    Heart Disease Paternal Grandfather          in 70s    Melanoma Paternal Aunt     Prostate Cancer Paternal Uncle 40    Colon Cancer Paternal Uncle     Melanoma Paternal Uncle     Cancer Paternal Uncle         hematological cancer; s/p SCT    Melanoma Paternal Uncle     Melanoma Paternal Uncle     Cancer Paternal Uncle 61        GI tract; cause of death at 62; father's twin    Melanoma Paternal Uncle     Breast Cancer Other 50        contralateral occurrence at 57; ; maternal grandfather's sister    Breast Cancer Other 50        paternal grandfather's sister    Bleeding Disorder No family hx of     Clotting Disorder No family hx of          Objective  /69   Pulse 83   Wt 128.8 kg (284 lb)   BMI 42.55 kg/m      General: healthy, alert and in no acute distress.    HEENT: no scleral icterus or conjunctival erythema.   Skin: no suspicious lesions or rash. No jaundice.   CV: regular rhythm by palpation, 2+ distal pulses.  Resp: normal respiratory effort without conversational dyspnea.   Psych: normal mood and affect.    Gait: nonantalgic, appropriate  coordination and balance.     Neuro:        - Sensation to light touch:    - Intact throughout the BLE including all peripheral nerve distributions.     MSK - Knee:       - Inspection:    - No significant swelling, erythema, warmth, ecchymosis, lesion.   - There is a varicose vein present from the anterior thigh wrapping down to the posterior lower leg with mild swelling, no surrounding erythema, warmth, tenderness to palpation.       - ROM:    - Full and painless AROM/PROM.       - Palpation:    - TTP at the medial joint line.  - NTTP elsewhere.        - Strength:  (*antalgic)  RLE LLE  - Hip Flexion  5 5    - Hip Abduction 5 5   - Hip Adduction 5 5  - Knee Flexion  5 5  - Knee Extension 5 5  - Dorsiflexion  5 5  - Plantarflexion 5 5  - Ext. Jean-Paul. Longus 5 5  - Inversion  5 5  - Eversion  5 5       - Special tests:        - Lachman:  Neg        - A/P drawer:  Neg       - Pivot shift:  Neg   - Lev:  Pos for sharp pain, click in the medial compartment     - Varus stress:  Neg for laxity or pain     - Valgus stress:  Neg for laxity or pain    - Patellar grind:  Neg    - Thessaly: Mild positive for medial compartment pain    Radiology  I independently reviewed the available relevant imaging, with the following interpretation:   - XR L knee 6/15/2023 with normal anatomic alignment without significant degenerative changes, fractures, or dislocations.  No joint effusion.  -MRI L knee with interpretation as above in HPI.    EXAM: MR KNEE LEFT W/O CONTRAST  LOCATION: [facility]  DATE: 7/6/2023     INDICATION: Positive Lev's medially, instability, clicking locking symptoms. Concern for meniscus tear. Hx of lateral meniscus repair on the contralateral side.  COMPARISON: None.  TECHNIQUE: Unenhanced.     FINDINGS:     MEDIAL COMPARTMENT:   -Meniscus: There is a complex tear of the medial meniscus posterior horn extending into the posterior body with vertical and horizontal longitudinal  components. There is a thin 4 x 1 x 18 mm parameniscal cyst along the peripheral posterior aspect of the   posterior horn.  -Cartilage: No cartilage defect or subchondral edema.     LATERAL COMPARTMENT:  -Meniscus: No lateral meniscal tear.   -Cartilage: No cartilage defect or subchondral edema.     PATELLOFEMORAL COMPARTMENT:   -Alignment: Patella midline. No subluxation or tilting.   -Cartilage: No cartilage defect or subchondral edema.     CRUCIATE LIGAMENTS:   -ACL: Normal.  -PCL: Normal.     COLLATERAL LIGAMENTS:   -Medial collateral ligament: Intact.  -Lateral collateral ligament: Intact.     POSTEROMEDIAL CORNER:  -Distal semimembranosus tendon is normal.   -Pes anserine tendons are normal. Posteromedial corner complex ligaments are intact.     POSTEROLATERAL CORNER:   -Popliteal tendon is intact. No tendinopathy.  -Biceps femoris tendon and posterolateral corner complex ligaments are intact.     EXTENSOR MECHANISM:   -Quadriceps tendon: Normal.  -Patellar tendon: Normal.  -Patellofemoral ligaments and retinacula: Intact.     JOINT:   -No joint effusion or synovitis.     BONES:  -No fracture or concerning marrow replacing lesion.     SOFT TISSUES:   -Tiny popliteal cyst. No acute muscular injury or soft tissue mass.                                                                       IMPRESSION:  1.  Complex tear of the medial meniscus posterior horn extending into the body with vertical and horizontal longitudinal components. There is a thin 4 x 1 x 18 mm parameniscal cyst along the peripheral posterior aspect of the posterior horn.  2.  No lateral meniscal tear. The cruciate and collateral ligaments are intact.  3.  No articular cartilage defect. No fracture or bony stress injury.    Procedure  Large Joint Injection/Arthocentesis: L knee joint    Date/Time: 7/27/2023 12:42 PM    Performed by: Gareth Smith DO  Authorized by: Gareth Smith DO    Indications:  Pain  Needle Size:  22 G  Guidance: landmark  guided    Approach:  Anterolateral  Location:  Knee      Medications:  40 mg triamcinolone 40 MG/ML; 2 mL bupivacaine (PF) 0.5 %; 2 mL lidocaine 1 %  Outcome:  Tolerated well, no immediate complications  Procedure discussed: discussed risks, benefits, and alternatives    Consent Given by:  Patient  Prep: patient was prepped and draped in usual sterile fashion         PROCEDURE  Intraarticular Knee Joint Injection - Left  The patient was informed of the risks and benefits of the procedure and alternatives were discussed. A written consent was signed by the patient.   The injection site was prepped with chlorhexidine in sterile fashion.   An injectate solution containing 2 mL of 1% lidocaine, 2 mL of 0.5% bupivacaine, and 1 mL of Kenalog (40 mg/mL) was drawn up into a 5 mL syringe.  Injection was performed using sterile technique.  A 1.5-inch 22-gauge needle was used to enter the knee joint using a lateral infrapatellar approach and injectate was injected successfully. After the injection, the site was cleaned and a bandage applied.     The patient tolerated the procedure well without complications.       Assessment  1. Complex tear of medial meniscus of left knee as current injury, subsequent encounter          Plan  Sasha Hand is a 23 year old female that presents for follow-up of left knee pain.  At initial visit we discussed subacute left knee pain with clicking, and instability for the past several months.  Significant history of similar pain and symptoms on the right side years ago that also had significant locking symptoms, which resolved after an arthroscopic lateral meniscus repair.  Initial concern was for medial meniscus tear.  She obtained an MRI that confirmed a complex tear of the posterior horn of the medial meniscus.  Presenting today to discuss the MRI results and develop a treatment plan going forward.  Discussed the nature of the condition and treatment options and mutually agreed upon the  following plan:    - Imaging:          - Reviewed relevant imaging in the chart.    - Reviewed results and images with patient, including detailed discussion of MRI results.   - Medications:          - Discussed pharmacologic options for pain relief.   -  May use NSAIDs (Ibuprofen, Naproxen) or Acetaminophen (Tylenol) as needed for pain control.   - May also use topical medications such as lidocaine, IcyHot, BioFreeze, or Voltaren gel as needed for pain control.   - Injections:          - Discussed possible injection options and alternatives.    - Options include intra-articular knee joint injection with corticosteroid, viscosupplementation, PRP.   - Performed a corticosteroid injection of the intra-articular left knee joint today in clinic. Patient tolerated the procedure well without complications.   - Post-procedure instructions:    - Keep the injection site clean and dry.   - Do not submerge the injection site for 24 hours (no baths, pools). Showers are ok.   - Rest the area for 24-48 hours before resuming normal activities. Avoid overexerting the area for the first few weeks.   - It may take 2-3 days to start noticing the effects of the injection and up to 3-4 weeks to feel significant benefits.    - Therapy:          - Discussed the benefits of physical therapy vs home exercise program for optimization of range of motion, flexibility, strength, stability and function.   -Preference is for home exercise program.  HEP given today with instructions to perform daily and after exacerbations of pain.  - Modalities:          - May use ice, heat, massage or other modalities as needed.   - Bracing:          - Discussed bracing options and may consider using a hinged knee stability brace during walking and other exacerbating activities.  Options presented in clinic today and choice given to take a brace from clinic or obtain a similar brace from an outside source.  - Surgery:          - Discussed non-operative and  operative treatment options for the patient's condition.  Good results from an arthroscopic lateral meniscus repair on the right side.  Discussed the complex nature of her tear and her preference is to continue with conservative care for as long as possible before surgery would be considered, which is reasonable.  We will consider surgery in the future if symptoms persist despite conservative care.  - Activity:          - Encouraged to remain active and participate in regular activities as symptoms allow.  Avoid or modify exacerbating activities is much as possible.  Avoid activities that are high risk for falls.  - Follow up:          -As needed in the future for re-evaluation and update to treatment plan, or sooner for new/worsening symptoms.  - Patient has clinic contact information for questions or concerns.       Gareth Smith DO, SAADIA  [facility] - Sports Medicine  [facility] - Department of Orthopedic Surgery       Disclaimer:  This note was prepared and written using Dragon Medical dictation software. As a result, there may be errors in the script that have gone undetected. Please consider this when interpreting the information in this note.

## 2023-07-27 ENCOUNTER — VIRTUAL VISIT (OUTPATIENT)
Dept: ENDOCRINOLOGY | Facility: CLINIC | Age: 23
End: 2023-07-27
Payer: COMMERCIAL

## 2023-07-27 ENCOUNTER — TELEPHONE (OUTPATIENT)
Dept: ENDOCRINOLOGY | Facility: CLINIC | Age: 23
End: 2023-07-27

## 2023-07-27 ENCOUNTER — OFFICE VISIT (OUTPATIENT)
Dept: ORTHOPEDICS | Facility: CLINIC | Age: 23
End: 2023-07-27
Payer: COMMERCIAL

## 2023-07-27 VITALS
BODY MASS INDEX: 42.55 KG/M2 | WEIGHT: 284 LBS | SYSTOLIC BLOOD PRESSURE: 109 MMHG | DIASTOLIC BLOOD PRESSURE: 69 MMHG | HEART RATE: 83 BPM

## 2023-07-27 VITALS — HEIGHT: 69 IN | BODY MASS INDEX: 42.06 KG/M2 | WEIGHT: 284 LBS

## 2023-07-27 DIAGNOSIS — S83.232D COMPLEX TEAR OF MEDIAL MENISCUS OF LEFT KNEE AS CURRENT INJURY, SUBSEQUENT ENCOUNTER: Primary | ICD-10-CM

## 2023-07-27 DIAGNOSIS — E11.9 TYPE 2 DIABETES MELLITUS WITHOUT COMPLICATION, WITHOUT LONG-TERM CURRENT USE OF INSULIN (H): ICD-10-CM

## 2023-07-27 DIAGNOSIS — E66.813 CLASS 3 SEVERE OBESITY WITH SERIOUS COMORBIDITY AND BODY MASS INDEX (BMI) OF 40.0 TO 44.9 IN ADULT, UNSPECIFIED OBESITY TYPE (H): ICD-10-CM

## 2023-07-27 DIAGNOSIS — Z98.84 S/P LAPAROSCOPIC SLEEVE GASTRECTOMY: ICD-10-CM

## 2023-07-27 DIAGNOSIS — E66.01 CLASS 3 SEVERE OBESITY WITH SERIOUS COMORBIDITY AND BODY MASS INDEX (BMI) OF 40.0 TO 44.9 IN ADULT, UNSPECIFIED OBESITY TYPE (H): ICD-10-CM

## 2023-07-27 DIAGNOSIS — Z71.3 NUTRITIONAL COUNSELING: Primary | ICD-10-CM

## 2023-07-27 PROCEDURE — 99207 PR NO CHARGE LOS: CPT | Mod: VID

## 2023-07-27 PROCEDURE — 97803 MED NUTRITION INDIV SUBSEQ: CPT | Mod: VID

## 2023-07-27 PROCEDURE — 20610 DRAIN/INJ JOINT/BURSA W/O US: CPT | Mod: LT | Performed by: STUDENT IN AN ORGANIZED HEALTH CARE EDUCATION/TRAINING PROGRAM

## 2023-07-27 PROCEDURE — 99214 OFFICE O/P EST MOD 30 MIN: CPT | Mod: 25 | Performed by: STUDENT IN AN ORGANIZED HEALTH CARE EDUCATION/TRAINING PROGRAM

## 2023-07-27 RX ADMIN — LIDOCAINE HYDROCHLORIDE 2 ML: 10 INJECTION, SOLUTION INFILTRATION; PERINEURAL at 12:42

## 2023-07-27 RX ADMIN — TRIAMCINOLONE ACETONIDE 40 MG: 40 INJECTION, SUSPENSION INTRA-ARTICULAR; INTRAMUSCULAR at 12:42

## 2023-07-27 RX ADMIN — BUPIVACAINE HYDROCHLORIDE 2 ML: 5 INJECTION, SOLUTION EPIDURAL; INTRACAUDAL at 12:42

## 2023-07-27 ASSESSMENT — PAIN SCALES - GENERAL
PAINLEVEL: NO PAIN (0)
PAINLEVEL: NO PAIN (0)

## 2023-07-27 NOTE — LETTER
7/27/2023       RE: Sasha Hand  [address]  [facility] [address]     Dear Colleague,    Thank you for referring your patient, Sasha Hand, to the [facility] [place] at [facility]. Please see a copy of my visit note below.    Video-Visit Details    Type of service:  Video Visit    Video Start Time: 10:28 AM   Video End Time: 10:37 AM     Originating Location (pt. Location): Home    Distant Location (provider location): Offsite (providers home) [facility] [place]     Platform used for Video Visit: [employer]    Nutrition Reassessment  Reason For Visit:  Sasha Hand is a 23 year old female presenting today for nutrition follow-up, 1 year s/p 7/26/22.    Anthropometrics:  Initial Consult Weight: 365 lbs  Day of Surgery Weight (7/26/22): 329 lbs  Current Weight: 286 lbs per pt  Weight loss: -79 lbs from initial consult; -43 lbs from day of surgery    Weight Loss Medications: Wegovy 1.7 mg (nauseous on occasion the day or 2 afterwards)     Current Vitamins/Minerals: Vitamin D TID, B12 injection once monthly     Recent Labs: Vitamin A normal, Ferritin Elevated - could stop iron supplement, Vitamin D low end of normal (rec 2000 international unit(s), other labs are normal/stable.     Nutrition History:  Pt recently struggling with using food as a comfort d/t a death in the family.   Does not tolerate spicy foods.  3/4-1 cup of food per meal.  Has been cooking and meal prepping with boyfriend.    1/26/23: Pt feeling frustrated she is not losing weight faster. Is doing more weights in the gym and wonders if she is gaining some muscle.     July 2023: 3 meals a day, snack in between as needed. Never thirsty and forgets to drink water. Set a reminder on her phone starting last week to drink more frequently. Bases meals around protein. Currently eating about 4 ounces of a serving per meal. Limited activity  currently due to a recent torn meniscus. Will find out today if she needs.     Breakfast - 1 egg, ham sausage or taylor and onions    Progress with Previous Goals:  1) Follow bariatric regular diet. Met, continues   2) Consume 60+ grams of protein/day. Met, continues   3) Sip on 48-64+ oz of fluids/day- between meals only. Met, continues   4) Eat slowly (>20 min/meal), chewing foods well (to applesauce-like consistency). Met, continues   5) Limit portions to ~1-1.5 cup/meal. Met, continues   6) Take the following after a Sleeve Gastrectomy: met, continues   - Multivitamin/minerals: adult dose 1-2 times daily  Ideally want one that provides:   5,000 - 10,000 international units vitamin A daily   800 mg oral folate daily  8 - 22 mg zinc and 1 - 2 mg copper daily  12 mg Thiamine  - Iron: 45-60 mg elemental (18-36 mg if low risk) - may partly or fully be covered in multivitamin   - Calcium Citrate containing vitamin D: 500 mg 3 times daily or 600 mg 2 times daily     - Separate the calcium from your multivitamin or iron by at least 2 hours.     - Must be a chewable calcium citrate until post-op 3 months     - Options for calcium citrate: Dong calcium citrate chewable, bariatric advantage calcium citrate chewable, Celebrate vitamins calcium citrate chewable, Bariatric Fusion calcium citrate chewable  - Vitamin D - at least 3,000 international units/day between all supplements  - Vitamin B12: sublingual form of at least 500 mcg daily or injection of 1000 mcg monthly     Nutrition Prescription:  Grams Protein: 60 (minimum)   Amount of Fluid: 48-64+ oz    Nutrition Diagnosis  Previous: Food and nutrition-related knowledge deficit r/t lack of prior exposure to diet advancements beyond bariatric pureed diet aeb recent bariatric surgery and pt interest in diet education/review    Current: Food and nutrition-related knowledge deficit r/t lack of prior exposure to diet instruction beyond 3 months s/p SG as evidenced by recent  bariatric surgery and pt interest in diet education/review    Intervention  Materials/Education provided, reviewed bariatric regular consistency diet, protein intake, fluid intake, eating pace, chewing foods well, portion control, sugar/fat intake, recommended vitamin/mineral supplements. Patient demonstrates understanding.     Expected Engagement: good     Goals:   1) Follow bariatric regular diet.   2) Consume 60+ grams of protein/day.   3) Sip on 48-64+ oz of fluids/day- between meals only.  4) Eat slowly (>20 min/meal), chewing foods well (to applesauce-like consistency).    5) Limit portions to ~1-1.5 cup/meal.   6) Take the following after a Sleeve Gastrectomy as recommended:   - Multivitamin/minerals: adult dose 1-2 times daily  Ideally want one that provides:   5,000 - 10,000 international units vitamin A daily   800 mg oral folate daily  8 - 22 mg zinc and 1 - 2 mg copper daily  12 mg Thiamine  - Iron: 45-60 mg elemental (18-36 mg if low risk) - may partly or fully be covered in multivitamin   - Calcium Citrate containing vitamin D: 500 mg 3 times daily or 600 mg 2 times daily     - Separate the calcium from your multivitamin or iron by at least 2 hours.     - Must be a chewable calcium citrate until post-op 3 months     - Options for calcium citrate: Dong calcium citrate chewable, bariatric advantage calcium citrate chewable, Celebrate vitamins calcium citrate chewable, Bariatric Fusion calcium citrate chewable  - Vitamin D - at least 3,000 international units/day between all supplements  - Vitamin B12: sublingual form of at least 500 mcg daily or injection of 1000 mcg monthly     Your Stage 5 Diet: Regular Foods  [email or web address]     Keeping Up Your Diet after Weight Loss Surgery  [email or web address]    Exercises after Weight Loss Surgery (strengthening, when no weight lifting restrictions)  [email or web address]    Why Take Supplements for Life after Weight Loss  Surgery  [email or web address]      Preventing Low Blood Sugar after Weight Loss Surgery  [email or web address]      Preventing Dumping Syndrome after Weight Loss Surgery  [email or web address]    Supplements after Sleeve Gastrectomy, Gastric Bypass or Single Anastomosis Duodenal Switch  [email or web address]       Follow-Up: 1 year.     Time spent with patient: 9 minutes.  Cathy Barrientos RD, LD  Clinic # [phone]

## 2023-07-27 NOTE — PATIENT INSTRUCTIONS
Natacha Sasha Hand ,     A copy of your assessment and our treatment plan that we discussed together is included below, as written in your medical chart.   If you have any questions, please feel free to call the clinic.     --------------------------------------------------  Sasha Hand is a 23 year old female that presents for follow-up of left knee pain.  At initial visit we discussed subacute left knee pain with clicking, and instability for the past several months.  Significant history of similar pain and symptoms on the right side years ago that also had significant locking symptoms, which resolved after an arthroscopic lateral meniscus repair.  Initial concern was for medial meniscus tear.  She obtained an MRI that confirmed a complex tear of the posterior horn of the medial meniscus.  Presenting today to discuss the MRI results and develop a treatment plan going forward.  Discussed the nature of the condition and treatment options and mutually agreed upon the following plan:    - Imaging:          - Reviewed relevant imaging in the chart.    - Reviewed results and images with patient, including detailed discussion of MRI results.   - Medications:          - Discussed pharmacologic options for pain relief.   -  May use NSAIDs (Ibuprofen, Naproxen) or Acetaminophen (Tylenol) as needed for pain control.   - May also use topical medications such as lidocaine, IcyHot, BioFreeze, or Voltaren gel as needed for pain control.   - Injections:          - Discussed possible injection options and alternatives.    - Options include intra-articular knee joint injection with corticosteroid, viscosupplementation, PRP.   - Performed a corticosteroid injection of the intra-articular left knee joint today in clinic. Patient tolerated the procedure well without complications.   - Post-procedure instructions:    - Keep the injection site clean and dry.   - Do not submerge the injection site for 24 hours (no baths, pools).  Showers are ok.   - Rest the area for 24-48 hours before resuming normal activities. Avoid overexerting the area for the first few weeks.   - It may take 2-3 days to start noticing the effects of the injection and up to 3-4 weeks to feel significant benefits.    - Therapy:          - Discussed the benefits of physical therapy vs home exercise program for optimization of range of motion, flexibility, strength, stability and function.   -Preference is for home exercise program.  HEP given today with instructions to perform daily and after exacerbations of pain.  - Modalities:          - May use ice, heat, massage or other modalities as needed.   - Bracing:          - Discussed bracing options and may consider using a hinged knee stability brace during walking and other exacerbating activities.  Options presented in clinic today and choice given to take a brace from clinic or obtain a similar brace from an outside source.  - Surgery:          - Discussed non-operative and operative treatment options for the patient's condition.  Good results from an arthroscopic lateral meniscus repair on the right side.  Discussed the complex nature of her tear and her preference is to continue with conservative care for as long as possible before surgery would be considered, which is reasonable.  We will consider surgery in the future if symptoms persist despite conservative care.  - Activity:          - Encouraged to remain active and participate in regular activities as symptoms allow.  Avoid or modify exacerbating activities is much as possible.  Avoid activities that are high risk for falls.  - Follow up:          -As needed in the future for re-evaluation and update to treatment plan, or sooner for new/worsening symptoms.  - Patient has clinic contact information for questions or concerns.   --------------------------------------------------    It was a pleasure seeing you today. Thank you for choosing [facility] for your  care.       Gareth Smith DO CAQSM  [facility] - Department of Orthopedic Surgery     Disclaimer:  This note was prepared and written using Dragon Medical dictation software. As a result, there may be errors in the script that have gone undetected. Please consider this when interpreting the information in this note.

## 2023-07-27 NOTE — LETTER
2023         RE: Sasha Hand  [address] [facility] [address]        Dear Colleague,    Thank you for referring your patient, Sasha Hand, to the [facility] [place]. Please see a copy of my visit note below.    Sasha Hand  :  2000  DOS: 2023  MRN: [id number]  PCP: Oscar Medley    Sports Medicine Clinic Visit  Interim History - 2023  - Last seen in clinic on 6/15/2023 for left knee pain.  - MRI on file for left knee. Here today for results.    -MRI L knee 2023 shows a complex tear of the medial meniscus posterior horn with both vertical and horizontal components, small parameniscal cyst along the peripheral posterior aspect of the posterior horn.  No lateral meniscus or ligament damage.  No articular cartilage defects, fractures, or other osseous abnormality.    - Since the last visit, she notes that she has not had any change in previously described symptoms. Left knee is painful over the anteromedial knee. Treatment currently consists of rest only.  - No interim injury.       Initial Visit: Bhumika 15, 2023  HPI  Sasha Hand is a 23 year old female who is seen as a self referral presenting with left knee pain.    - Mechanism of Injury:  No inciting injury.   - Prior evaluation:  None for the left knee specifically.  - 2021: UC and 2021: ED for atraumatic left leg localized swelling after recent Nexplanon placement.  No DVT on ultrasound, negative Homans.  Diagnosed with superficial thrombophlebitis versus varicose vein.    - Pain Character:  Pain has been present for  2 months .  Pain is well localized to the anteromedial left knee and deep to the patella. Also notes medial jointline pain without significant radiation.   - Endorses: CARLYN position causes feeling of joint apprehension, impending dislocation.  Instability when walking.  Occasional clicking and mechanical locking symptoms that are significantly less than  what she experienced on the right knee in the past.  - Denies:  swelling, numbness, tingling, radicular shooting pain, weakness.   - Alleviating factors: Rest, activity modification.  - Aggravating factors: CARLYN position, prolonged walking, knee flexion activities  - Treatments tried: ice, rest, activity modifications.    - Patient Goals:  discuss treatment options  - Social History:  works as a     - Pertinent PMH:    - 12/29/2021: left superficial thrombophlebitis vs varicose vein.   - Past smoker. Type 2 diabetic, s/p sleeve gastrectomy  - S/p right knee arthroscopy with lateral meniscus repair for a hypermobile lateral meniscus with a tear.  Also with patellofemoral pain syndrome on that side.    Review of Systems  Musculoskeletal: as above  Remainder of review of systems is negative including constitutional, CV, pulmonary, GI, Skin and Neurologic except as noted in HPI or medical history.    Past Medical History:   Diagnosis Date     BRCA2 gene mutation positive 02/01/2023     Closed head injury, subsequent encounter 04/05/2018     Depressive disorder      History of PCR DNA positive for HSV1      Hypertension      Moderate major depression (H) 01/20/2014     Nexplanon placed 11/14/17 11/14/2017     Nexplanon placed 11/14/17 (removed 1/16/2020) 11/14/2017     Nexplanon placed 12/14/2021 12/14/2021     Obesity 09/14/2010     Pyelonephritis 02/19/2018     Skin tag (right collar line and left labial region 11/21/2019     Tobacco use disorder 05/04/2016     Uncomplicated asthma      Past Surgical History:   Procedure Laterality Date     ARTHROSCOPY KNEE WITH MENISCAL REPAIR Right 05/10/2017    Procedure: ARTHROSCOPY KNEE WITH MENISCAL REPAIR;  arthroscopy right knee with lateral meniscus repair;  Surgeon: Nathaniel Hicks MD;  Location: [facility] OR     BIOPSY      Whole punch taken in calf to check for scleroderma.     EFRAIN GASTRIC SLEEVE N/A 07/26/2022    Procedure: GASTRECTOMY, SLEEVE,  ROBOT-ASSISTED, LAPAROSCOPIC;  Surgeon: Joseph Mata MD;  Location: [facility] OR     ESOPHAGOSCOPY, GASTROSCOPY, DUODENOSCOPY (EGD), COMBINED N/A 2022    Procedure: ESOPHAGOGASTRODUODENOSCOPY (EGD);  Surgeon: Joseph Mata MD;  Location: [facility] OR     Family History   Problem Relation Age of Onset     Asthma Mother      Hypertension Mother      Breast Cancer Mother         lumpectomy and BSO     Hereditary Breast and Ovarian Cancer Syndrome Mother         BRCA2+     Obesity Mother      Asthma Father      Diabetes Father      Melanoma Father 59        metastatic to brain     Hypertension Father      Hyperlipidemia Father      Prostate Cancer Father      Other Cancer Father         Melanomia     Depression Father      Anxiety Disorder Father      Anesthesia Reaction Father         Asthma, hard wake up.     Obesity Father      Cervical Cancer Sister 19     Parathyroid Disorders Sister      Vascular Disease Sister         back of head     Colon Cancer Maternal Grandmother      Other Cancer Maternal Grandmother         Lung cancer     Lung Cancer Paternal Grandmother         smoker,  in late 80s     Colon Cancer Paternal Grandmother 60     Heart Disease Paternal Grandfather          in 70s     Melanoma Paternal Aunt      Prostate Cancer Paternal Uncle 40     Colon Cancer Paternal Uncle      Melanoma Paternal Uncle      Cancer Paternal Uncle         hematological cancer; s/p SCT     Melanoma Paternal Uncle      Melanoma Paternal Uncle      Cancer Paternal Uncle 61        GI tract; cause of death at 62; father's twin     Melanoma Paternal Uncle      Breast Cancer Other 50        contralateral occurrence at 57; ; maternal grandfather's sister     Breast Cancer Other 50        paternal grandfather's sister     Bleeding Disorder No family hx of      Clotting Disorder No family hx of          Objective  /69   Pulse 83   Wt 128.8 kg (284 lb)   BMI 42.55 kg/m      General: healthy, alert and in  no acute distress.    HEENT: no scleral icterus or conjunctival erythema.   Skin: no suspicious lesions or rash. No jaundice.   CV: regular rhythm by palpation, 2+ distal pulses.  Resp: normal respiratory effort without conversational dyspnea.   Psych: normal mood and affect.    Gait: nonantalgic, appropriate coordination and balance.     Neuro:        - Sensation to light touch:    - Intact throughout the BLE including all peripheral nerve distributions.     MSK - Knee:       - Inspection:    - No significant swelling, erythema, warmth, ecchymosis, lesion.   - There is a varicose vein present from the anterior thigh wrapping down to the posterior lower leg with mild swelling, no surrounding erythema, warmth, tenderness to palpation.       - ROM:    - Full and painless AROM/PROM.       - Palpation:    - TTP at the medial joint line.  - NTTP elsewhere.        - Strength:  (*antalgic)  RLE LLE  - Hip Flexion  5 5    - Hip Abduction 5 5   - Hip Adduction 5 5  - Knee Flexion  5 5  - Knee Extension 5 5  - Dorsiflexion  5 5  - Plantarflexion 5 5  - Ext. Jean-Paul. Longus 5 5  - Inversion  5 5  - Eversion  5 5       - Special tests:        - Lachman:  Neg        - A/P drawer:  Neg       - Pivot shift:  Neg   - Lev:  Pos for sharp pain, click in the medial compartment     - Varus stress:  Neg for laxity or pain     - Valgus stress:  Neg for laxity or pain    - Patellar grind:  Neg    - Thessaly: Mild positive for medial compartment pain    Radiology  I independently reviewed the available relevant imaging, with the following interpretation:   - XR L knee 6/15/2023 with normal anatomic alignment without significant degenerative changes, fractures, or dislocations.  No joint effusion.  -MRI L knee with interpretation as above in HPI.    EXAM: MR KNEE LEFT W/O CONTRAST  LOCATION: [facility]  DATE: 7/6/2023     INDICATION: Positive Lev's medially, instability, clicking locking symptoms.  Concern for meniscus tear. Hx of lateral meniscus repair on the contralateral side.  COMPARISON: None.  TECHNIQUE: Unenhanced.     FINDINGS:     MEDIAL COMPARTMENT:   -Meniscus: There is a complex tear of the medial meniscus posterior horn extending into the posterior body with vertical and horizontal longitudinal components. There is a thin 4 x 1 x 18 mm parameniscal cyst along the peripheral posterior aspect of the   posterior horn.  -Cartilage: No cartilage defect or subchondral edema.     LATERAL COMPARTMENT:  -Meniscus: No lateral meniscal tear.   -Cartilage: No cartilage defect or subchondral edema.     PATELLOFEMORAL COMPARTMENT:   -Alignment: Patella midline. No subluxation or tilting.   -Cartilage: No cartilage defect or subchondral edema.     CRUCIATE LIGAMENTS:   -ACL: Normal.  -PCL: Normal.     COLLATERAL LIGAMENTS:   -Medial collateral ligament: Intact.  -Lateral collateral ligament: Intact.     POSTEROMEDIAL CORNER:  -Distal semimembranosus tendon is normal.   -Pes anserine tendons are normal. Posteromedial corner complex ligaments are intact.     POSTEROLATERAL CORNER:   -Popliteal tendon is intact. No tendinopathy.  -Biceps femoris tendon and posterolateral corner complex ligaments are intact.     EXTENSOR MECHANISM:   -Quadriceps tendon: Normal.  -Patellar tendon: Normal.  -Patellofemoral ligaments and retinacula: Intact.     JOINT:   -No joint effusion or synovitis.     BONES:  -No fracture or concerning marrow replacing lesion.     SOFT TISSUES:   -Tiny popliteal cyst. No acute muscular injury or soft tissue mass.                                                                       IMPRESSION:  1.  Complex tear of the medial meniscus posterior horn extending into the body with vertical and horizontal longitudinal components. There is a thin 4 x 1 x 18 mm parameniscal cyst along the peripheral posterior aspect of the posterior horn.  2.  No lateral meniscal tear. The cruciate and collateral  ligaments are intact.  3.  No articular cartilage defect. No fracture or bony stress injury.    Procedure  Large Joint Injection/Arthocentesis: L knee joint    Date/Time: 7/27/2023 12:42 PM    Performed by: Gareth Smith DO  Authorized by: Gareth Smith DO    Indications:  Pain  Needle Size:  22 G  Guidance: landmark guided    Approach:  Anterolateral  Location:  Knee      Medications:  40 mg triamcinolone 40 MG/ML; 2 mL bupivacaine (PF) 0.5 %; 2 mL lidocaine 1 %  Outcome:  Tolerated well, no immediate complications  Procedure discussed: discussed risks, benefits, and alternatives    Consent Given by:  Patient  Prep: patient was prepped and draped in usual sterile fashion         PROCEDURE  Intraarticular Knee Joint Injection - Left  The patient was informed of the risks and benefits of the procedure and alternatives were discussed. A written consent was signed by the patient.   The injection site was prepped with chlorhexidine in sterile fashion.   An injectate solution containing 2 mL of 1% lidocaine, 2 mL of 0.5% bupivacaine, and 1 mL of Kenalog (40 mg/mL) was drawn up into a 5 mL syringe.  Injection was performed using sterile technique.  A 1.5-inch 22-gauge needle was used to enter the knee joint using a lateral infrapatellar approach and injectate was injected successfully. After the injection, the site was cleaned and a bandage applied.     The patient tolerated the procedure well without complications.       Assessment  1. Complex tear of medial meniscus of left knee as current injury, subsequent encounter          Plan  Sasha Hand is a 23 year old female that presents for follow-up of left knee pain.  At initial visit we discussed subacute left knee pain with clicking, and instability for the past several months.  Significant history of similar pain and symptoms on the right side years ago that also had significant locking symptoms, which resolved after an arthroscopic lateral meniscus repair.   Initial concern was for medial meniscus tear.  She obtained an MRI that confirmed a complex tear of the posterior horn of the medial meniscus.  Presenting today to discuss the MRI results and develop a treatment plan going forward.  Discussed the nature of the condition and treatment options and mutually agreed upon the following plan:    - Imaging:          - Reviewed relevant imaging in the chart.    - Reviewed results and images with patient, including detailed discussion of MRI results.   - Medications:          - Discussed pharmacologic options for pain relief.   -  May use NSAIDs (Ibuprofen, Naproxen) or Acetaminophen (Tylenol) as needed for pain control.   - May also use topical medications such as lidocaine, IcyHot, BioFreeze, or Voltaren gel as needed for pain control.   - Injections:          - Discussed possible injection options and alternatives.    - Options include intra-articular knee joint injection with corticosteroid, viscosupplementation, PRP.   - Performed a corticosteroid injection of the intra-articular left knee joint today in clinic. Patient tolerated the procedure well without complications.   - Post-procedure instructions:    - Keep the injection site clean and dry.   - Do not submerge the injection site for 24 hours (no baths, pools). Showers are ok.   - Rest the area for 24-48 hours before resuming normal activities. Avoid overexerting the area for the first few weeks.   - It may take 2-3 days to start noticing the effects of the injection and up to 3-4 weeks to feel significant benefits.    - Therapy:          - Discussed the benefits of physical therapy vs home exercise program for optimization of range of motion, flexibility, strength, stability and function.   -Preference is for home exercise program.  HEP given today with instructions to perform daily and after exacerbations of pain.  - Modalities:          - May use ice, heat, massage or other modalities as needed.   - Bracing:           - Discussed bracing options and may consider using a hinged knee stability brace during walking and other exacerbating activities.  Options presented in clinic today and choice given to take a brace from clinic or obtain a similar brace from an outside source.  - Surgery:          - Discussed non-operative and operative treatment options for the patient's condition.  Good results from an arthroscopic lateral meniscus repair on the right side.  Discussed the complex nature of her tear and her preference is to continue with conservative care for as long as possible before surgery would be considered, which is reasonable.  We will consider surgery in the future if symptoms persist despite conservative care.  - Activity:          - Encouraged to remain active and participate in regular activities as symptoms allow.  Avoid or modify exacerbating activities is much as possible.  Avoid activities that are high risk for falls.  - Follow up:          -As needed in the future for re-evaluation and update to treatment plan, or sooner for new/worsening symptoms.  - Patient has clinic contact information for questions or concerns.       Gareth Smith DO, CAQSM  [facility]  [facility] - Department of Orthopedic Surgery       Disclaimer:  This note was prepared and written using Dragon Medical dictation software. As a result, there may be errors in the script that have gone undetected. Please consider this when interpreting the information in this note.       Again, thank you for allowing me to participate in the care of your patient.        Sincerely,        Gareth Smith DO

## 2023-07-27 NOTE — TELEPHONE ENCOUNTER
"Prior Authorization Retail Medication Request    Medication/Dose: Wegovy  ICD code (if different than what is on RX):    Class 3 severe obesity with serious comorbidity and body mass index (BMI) of 40.0 to 44.9 in adult, unspecified obesity type (H) [E66.01, Z68.41]  - Primary        Previously Tried and Failed:  Diet, exercise, bariatric surgery    Rationale:  Ht 1.702 m (5' 7.01\")  Wt 125.8 kg (277 lb 4.8 oz)  BMI 43.42 kg/m    Asthma  HTN  Knee pain  Depression      Insurance Name:    Insurance ID:        Pharmacy Information (if different than what is on RX)  Name:    [facility] [address] [facility] [address] [address] [facility]     Phone:  [phone]     "

## 2023-07-27 NOTE — NURSING NOTE
Is the patient currently in the state of [place]? YES    Visit mode:VIDEO    If the visit is dropped, the patient can be reconnected by: VIDEO VISIT: Text to cell phone: [phone]    Will anyone else be joining the visit? NO      How would you like to obtain your AVS? MyChart    Are changes needed to the allergy or medication list? NO    Reason for visit: Follow-up

## 2023-07-27 NOTE — PATIENT INSTRUCTIONS
Harjit Baez,     It was nice to meet you today!    Follow-up with RD in 1 year.     Thank you,    Cathy Barrientos, RD, LD  If you would like to schedule or reschedule an appointment with the RD, please call [phone]    Nutrition Goals  1) Follow bariatric regular diet.   2) Consume 60+ grams of protein/day.   3) Sip on 48-64+ oz of fluids/day- between meals only.  4) Eat slowly (>20 min/meal), chewing foods well (to applesauce-like consistency).    5) Limit portions to ~1-1.5 cup/meal.   6) Take the following after a Sleeve Gastrectomy as recommended:   - Multivitamin/minerals: adult dose 1-2 times daily  Ideally want one that provides:   5,000 - 10,000 international units vitamin A daily   800 mg oral folate daily  8 - 22 mg zinc and 1 - 2 mg copper daily  12 mg Thiamine  - Iron: 45-60 mg elemental (18-36 mg if low risk) - may partly or fully be covered in multivitamin   - Calcium Citrate containing vitamin D: 500 mg 3 times daily or 600 mg 2 times daily     - Separate the calcium from your multivitamin or iron by at least 2 hours.     - Must be a chewable calcium citrate until post-op 3 months     - Options for calcium citrate: Dong calcium citrate chewable, bariatric advantage calcium citrate chewable, Celebrate vitamins calcium citrate chewable, Bariatric Fusion calcium citrate chewable  - Vitamin D - at least 3,000 international units/day between all supplements  - Vitamin B12: sublingual form of at least 500 mcg daily or injection of 1000 mcg monthly     Your Stage 5 Diet: Regular Foods  [email or web address]     Keeping Up Your Diet after Weight Loss Surgery  [email or web address]    Exercises after Weight Loss Surgery (strengthening, when no weight lifting restrictions)  [email or web address]    Why Take Supplements for Life after Weight Loss Surgery  [email or web address]      Preventing Low Blood Sugar after Weight Loss Surgery  [email or web address]       Preventing Dumping Syndrome after Weight Loss Surgery  [email or web address]    Supplements after Sleeve Gastrectomy, Gastric Bypass or Single Anastomosis Duodenal Switch  [email or web address]      COMPREHENSIVE WEIGHT MANAGEMENT PROGRAM  VIRTUAL SUPPORT GROUPS    For Support Group Information:      We offer support groups for patients who are working on weight loss and considering, preparing for or have had weight loss surgery.   There is no cost for this opportunity.  You are invited to attend the?Virtual Support Groups?provided by any of the following locations:    [facility] via Microsoft Teams with Sangita López RN  2.   [facility] [place] with Bryce Raymond, PhD, LP  3.   [facility] [place] with Tari Blue RN  4.   [facility] via Microsoft Teams with Tari Schultz [facility]    The following Support Group information can also be found on our website:  [email or web address]    [email or web address]    [facility] Weight Loss Surgery Support Group    [facility] Weight Loss Surgery Support Group  The support group is a patient-lead forum that meets monthly to share experiences, encouragement and education. It is open to those who have had weight loss surgery, are scheduled for surgery, or are considering surgery.   WHEN: This group meets on the 3rd Wednesday of each month from 5:00PM - 6:00PM virtually using Microsoft Teams.   FACILITATOR: Led by Sangita Eastman RD, LD, RN, the program's Clinical Coordinator.   TO REGISTER: Please contact the clinic via ClickMagic or call the nurse line directly at [phone] to inform our staff that you would like an invite sent to you and to let us know the email you would like the invite sent to. Prior to the meeting, a link with directions on how to join the meeting will  "be sent to you.    2023 Meetings April 19: Guest Speaker, Marvel Cannon, RD, LD, \"Maintaining Weight Loss after Bariatric Surgery\".  May 17: \"Let's Talk\" a time for the group to share.  June 21: \"Let's Talk\" a time for the group to share.  July 19  August 16  September 20  October 18  November 15  December 20    [facility] Support Groups    Connections Bariatric Care Support Group?  This is open to all pre- and post- operative bariatric surgery patients as well as their support system.   WHEN: This group meets the 2nd Tuesday of each month from 6:30 PM - 8:00 PM virtually using Microsoft Teams.   FACILITATOR: Led by Bryce Raymond, Ph.D who is a Licensed Psychologist with the [facility] Comprehensive Weight Management Program.   TO REGISTER: Please send an email to Bryce Raymond, Ph.D., LP at?[email or web address]?if you would like an invitation to the group and to learn about using Microsoft Teams.    2023 Meetings April 11: Guest speaker, Selma Fuentes MD, Bariatrician, [facility] Comprehensive Weight Management Program, \"Injectable Weight Loss Medications\".  May 9  Bhumika 13  July 11  August 8  September 12  October 10  November 14  December 12    Connections Post-Operative Bariatric Surgery Support Group  This is a support group for [facility] bariatric patients (and those external to [facility]) who have had bariatric surgery and are at least 3 months post-surgery.  WHEN: This support group meets the 4th Wednesday of the month from 11:00 AM - 12:00 PM virtually using Microsoft Teams.   FACILITATOR: Led by Certified Bariatric Nurse, Tari Blue RN.   TO REGISTER: Please send an email to Tari at [email or web address] if you would like an invitation to the group and to learn about using Microsoft Teams.    2023 Meetings  April 26  May 24  Bhumika 28  July 26  August 23  September 27  October 25  November 22  December 27    [facility] " [facility]   Healthy Lifestyle Virtual Support Group    Healthy Lifestyle Virtual Support Group?  This is 60 minutes of small group guided discussion, support and resources. All are welcome who want a healthy lifestyle.  WHEN: This group meets monthly on a Friday from 12:30 PM - 1:30 PM virtually using Microsoft Teams.  This group will meet the first Friday of the month beginning In July  FACILITATOR: Led by National Board Certified Health and , Tari Schultz [facility].   TO REGISTER: Please send an email to Tari [email or web address] to receive monthly invites to the group or if you have any questions about having a health .  Prior to the meeting, a link with directions on how to join the meeting will be sent to you.    2023 Meetings  May 19: Let's Talk  June 9: Create Your Coaching Toolkit: Learn How to  Yourself  July 7: Let's Talk  August 4: Benefits of Fiber with NIKITA Mayo  September 1: Show and Tell (share your aps, podcasts, recipes, hacks, books)  October 6 :Let's Talk  November 3: Introduction to Mindfulness   December 1: Let's Talk

## 2023-07-27 NOTE — PROGRESS NOTES
Video-Visit Details    Type of service:  Video Visit    Video Start Time: 10:28 AM   Video End Time: 10:37 AM     Originating Location (pt. Location): Home    Distant Location (provider location): Offsite (providers home) [facility] [place]     Platform used for Video Visit: Mitch    Nutrition Reassessment  Reason For Visit:  Sasha Hand is a 23 year old female presenting today for nutrition follow-up, 1 year s/p 7/26/22.    Anthropometrics:  Initial Consult Weight: 365 lbs  Day of Surgery Weight (7/26/22): 329 lbs  Current Weight: 286 lbs per pt  Weight loss: -79 lbs from initial consult; -43 lbs from day of surgery    Weight Loss Medications: Wegovy 1.7 mg (nauseous on occasion the day or 2 afterwards)     Current Vitamins/Minerals: Vitamin D TID, B12 injection once monthly     Recent Labs: Vitamin A normal, Ferritin Elevated - could stop iron supplement, Vitamin D low end of normal (rec 2000 international unit(s), other labs are normal/stable.     Nutrition History:  Pt recently struggling with using food as a comfort d/t a death in the family.   Does not tolerate spicy foods.  3/4-1 cup of food per meal.  Has been cooking and meal prepping with boyfriend.    1/26/23: Pt feeling frustrated she is not losing weight faster. Is doing more weights in the gym and wonders if she is gaining some muscle.     July 2023: 3 meals a day, snack in between as needed. Never thirsty and forgets to drink water. Set a reminder on her phone starting last week to drink more frequently. Bases meals around protein. Currently eating about 4 ounces of a serving per meal. Limited activity currently due to a recent torn meniscus. Will find out today if she needs.     Breakfast - 1 egg, ham sausage or taylor and onions    Progress with Previous Goals:  1) Follow bariatric regular diet. Met, continues   2) Consume 60+ grams of protein/day. Met, continues   3) Sip on 48-64+ oz of fluids/day- between  meals only. Met, continues   4) Eat slowly (>20 min/meal), chewing foods well (to applesauce-like consistency). Met, continues   5) Limit portions to ~1-1.5 cup/meal. Met, continues   6) Take the following after a Sleeve Gastrectomy: met, continues   - Multivitamin/minerals: adult dose 1-2 times daily  Ideally want one that provides:   5,000 - 10,000 international units vitamin A daily   800 mg oral folate daily  8 - 22 mg zinc and 1 - 2 mg copper daily  12 mg Thiamine  - Iron: 45-60 mg elemental (18-36 mg if low risk) - may partly or fully be covered in multivitamin   - Calcium Citrate containing vitamin D: 500 mg 3 times daily or 600 mg 2 times daily     - Separate the calcium from your multivitamin or iron by at least 2 hours.     - Must be a chewable calcium citrate until post-op 3 months     - Options for calcium citrate: Dong calcium citrate chewable, bariatric advantage calcium citrate chewable, Celebrate vitamins calcium citrate chewable, Bariatric Fusion calcium citrate chewable  - Vitamin D - at least 3,000 international units/day between all supplements  - Vitamin B12: sublingual form of at least 500 mcg daily or injection of 1000 mcg monthly     Nutrition Prescription:  Grams Protein: 60 (minimum)   Amount of Fluid: 48-64+ oz    Nutrition Diagnosis  Previous: Food and nutrition-related knowledge deficit r/t lack of prior exposure to diet advancements beyond bariatric pureed diet aeb recent bariatric surgery and pt interest in diet education/review    Current: Food and nutrition-related knowledge deficit r/t lack of prior exposure to diet instruction beyond 3 months s/p SG as evidenced by recent bariatric surgery and pt interest in diet education/review    Intervention  Materials/Education provided, reviewed bariatric regular consistency diet, protein intake, fluid intake, eating pace, chewing foods well, portion control, sugar/fat intake, recommended vitamin/mineral supplements. Patient demonstrates  understanding.     Expected Engagement: good     Goals:   1) Follow bariatric regular diet.   2) Consume 60+ grams of protein/day.   3) Sip on 48-64+ oz of fluids/day- between meals only.  4) Eat slowly (>20 min/meal), chewing foods well (to applesauce-like consistency).    5) Limit portions to ~1-1.5 cup/meal.   6) Take the following after a Sleeve Gastrectomy as recommended:   - Multivitamin/minerals: adult dose 1-2 times daily  Ideally want one that provides:   5,000 - 10,000 international units vitamin A daily   800 mg oral folate daily  8 - 22 mg zinc and 1 - 2 mg copper daily  12 mg Thiamine  - Iron: 45-60 mg elemental (18-36 mg if low risk) - may partly or fully be covered in multivitamin   - Calcium Citrate containing vitamin D: 500 mg 3 times daily or 600 mg 2 times daily     - Separate the calcium from your multivitamin or iron by at least 2 hours.     - Must be a chewable calcium citrate until post-op 3 months     - Options for calcium citrate: Dong calcium citrate chewable, bariatric advantage calcium citrate chewable, Celebrate vitamins calcium citrate chewable, Bariatric Fusion calcium citrate chewable  - Vitamin D - at least 3,000 international units/day between all supplements  - Vitamin B12: sublingual form of at least 500 mcg daily or injection of 1000 mcg monthly     Your Stage 5 Diet: Regular Foods  [email or web address]     Keeping Up Your Diet after Weight Loss Surgery  [email or web address]    Exercises after Weight Loss Surgery (strengthening, when no weight lifting restrictions)  [email or web address]    Why Take Supplements for Life after Weight Loss Surgery  [email or web address]      Preventing Low Blood Sugar after Weight Loss Surgery  [email or web address]      Preventing Dumping Syndrome after Weight Loss Surgery  [email or web address]    Supplements after Sleeve Gastrectomy, Gastric Bypass or Single Anastomosis Duodenal  Switch  [email or web address]       Follow-Up: 1 year.     Time spent with patient: 9 minutes.  Cathy Barrientos RD, LD  Clinic # [phone]

## 2023-08-01 ENCOUNTER — MYC MEDICAL ADVICE (OUTPATIENT)
Dept: ENDOCRINOLOGY | Facility: CLINIC | Age: 23
End: 2023-08-01
Payer: COMMERCIAL

## 2023-08-01 DIAGNOSIS — E66.813 CLASS 3 SEVERE OBESITY WITH SERIOUS COMORBIDITY AND BODY MASS INDEX (BMI) OF 40.0 TO 44.9 IN ADULT, UNSPECIFIED OBESITY TYPE (H): ICD-10-CM

## 2023-08-01 DIAGNOSIS — E66.01 CLASS 3 SEVERE OBESITY WITH SERIOUS COMORBIDITY AND BODY MASS INDEX (BMI) OF 40.0 TO 44.9 IN ADULT, UNSPECIFIED OBESITY TYPE (H): ICD-10-CM

## 2023-08-02 RX ORDER — SEMAGLUTIDE 1.7 MG/.75ML
1.7 INJECTION, SOLUTION SUBCUTANEOUS
Qty: 3 ML | Refills: 3 | Status: SHIPPED | OUTPATIENT
Start: 2023-08-02 | End: 2024-01-18 | Stop reason: DRUGHIGH

## 2023-08-02 NOTE — TELEPHONE ENCOUNTER
PA Initiation    Medication: WEGOVY 1.7 MG/0.75ML SC SOAJ  Insurance Company: [employer] - Phone [phone] Fax [phone]  Pharmacy Filling the Rx: [facility] [address]  Filling Pharmacy Phone: [phone]  Filling Pharmacy Fax: [phone]  Start Date: 8/2/2023     Thank you,     Albaro Dong [facility]  Pharmacy Clinic [facility]  [email or web address]   Phone: [phone]  Fax: [phone]

## 2023-08-02 NOTE — TELEPHONE ENCOUNTER
Prior Authorization Approval    Medication: WEGOVY 1.7 MG/0.75ML SC SOAJ  Authorization Effective Date: 7/3/2023  Authorization Expiration Date: 2/28/2024  Approved Dose/Quantity: 3 ml per 28 days  Reference #: [id number]   Insurance Company: [employer] - Phone [phone] Fax [phone]  Expected CoPay:       CoPay Card Available:      Financial Assistance Needed:   Which Pharmacy is filling the prescription: [facility] [address] [place] [address]          Thank you,     Albaro Dong [facility]  Pharmacy Clinic [facility]  [email or web address]   Phone: [phone]  Fax: [phone]

## 2023-08-03 ENCOUNTER — TELEPHONE (OUTPATIENT)
Dept: ENDOCRINOLOGY | Facility: CLINIC | Age: 23
End: 2023-08-03
Payer: COMMERCIAL

## 2023-08-03 NOTE — TELEPHONE ENCOUNTER
PA Initiation    Medication: WEGOVY 1.7 MG/0.75ML SC SOAJ  Insurance Company: [employer] - Phone [phone] Fax [phone]  Pharmacy Filling the Rx: [facility] [address] [facility] [address] [place] [address]  Filling Pharmacy Phone: [phone]  Filling Pharmacy Fax: [phone]  Start Date: 8/3/2023           Thank you,     Albaro Dong, [facility]  Pharmacy Clinic [facility]   [email or web address]   Phone: [phone]  Fax: [phone]

## 2023-08-03 NOTE — TELEPHONE ENCOUNTER
Prior Authorization Approval    Medication: WEGOVY 1.7 MG/0.75ML SC SOAJ  Authorization Effective Date: 7/4/2023  Authorization Expiration Date: 8/2/2024  Approved Dose/Quantity: 3 ml per 28 days   Reference #: [id number]   Insurance Company: [employer] - Phone [phone] Fax [phone]  Expected CoPay:       CoPay Card Available:      Financial Assistance Needed:   Which Pharmacy is filling the prescription: [facility] [address] [place] [address]          Thank you,     Albaro Dong [facility]  Pharmacy Clinic [facility]   [email or web address]   Phone: [phone]  Fax: [phone]

## 2023-08-04 RX ORDER — LIDOCAINE HYDROCHLORIDE 10 MG/ML
2 INJECTION, SOLUTION INFILTRATION; PERINEURAL
Status: SHIPPED | OUTPATIENT
Start: 2023-07-27

## 2023-08-04 RX ORDER — TRIAMCINOLONE ACETONIDE 40 MG/ML
40 INJECTION, SUSPENSION INTRA-ARTICULAR; INTRAMUSCULAR
Status: SHIPPED | OUTPATIENT
Start: 2023-07-27

## 2023-08-04 RX ORDER — BUPIVACAINE HYDROCHLORIDE 5 MG/ML
2 INJECTION, SOLUTION EPIDURAL; INTRACAUDAL
Status: SHIPPED | OUTPATIENT
Start: 2023-07-27

## 2023-09-06 ENCOUNTER — TELEPHONE (OUTPATIENT)
Dept: FAMILY MEDICINE | Facility: CLINIC | Age: 23
End: 2023-09-06
Payer: COMMERCIAL

## 2023-09-06 NOTE — TELEPHONE ENCOUNTER
Reason for Call:  Appointment Request    Patient requesting this type of appt: Procedure: Nexplanon removal     Requested provider: Oscar Medley    Reason patient unable to be scheduled:  Glitch with scheduling system     When does patient want to be seen/preferred time:  Any day in December, preferably mornings.     Comments: Pt would like to be seen for Nexplanon removal any day in December, preferably mornings.     Could we send this information to you in [facility] or would you prefer to receive a phone call?:   Patient would prefer a phone call   Okay to leave a detailed message?: No at Home number on file [phone] (home)    Call taken on 9/6/2023 at 3:37 PM by Justen Reardon

## 2023-09-28 ENCOUNTER — ALLIED HEALTH/NURSE VISIT (OUTPATIENT)
Dept: FAMILY MEDICINE | Facility: CLINIC | Age: 23
End: 2023-09-28
Payer: COMMERCIAL

## 2023-09-28 VITALS — SYSTOLIC BLOOD PRESSURE: 122 MMHG | DIASTOLIC BLOOD PRESSURE: 78 MMHG

## 2023-09-28 DIAGNOSIS — I10 ESSENTIAL HYPERTENSION: Primary | ICD-10-CM

## 2023-09-28 PROCEDURE — 99207 PR NO CHARGE NURSE ONLY: CPT

## 2023-09-28 NOTE — PROGRESS NOTES
Sasha Hand is a 23 year old patient who comes in today for a Blood Pressure check.  Initial BP:  /78      Data Unavailable  Disposition: follow-up as previously indicated by provider  Betzy Islas MA 9/28/2023

## 2023-10-01 ENCOUNTER — MYC REFILL (OUTPATIENT)
Dept: ONCOLOGY | Facility: CLINIC | Age: 23
End: 2023-10-01
Payer: COMMERCIAL

## 2023-10-01 DIAGNOSIS — F41.9 ANXIETY: ICD-10-CM

## 2023-10-02 RX ORDER — LORAZEPAM 1 MG/1
1 TABLET ORAL
Qty: 1 TABLET | Refills: 0 | Status: SHIPPED | OUTPATIENT
Start: 2023-10-02 | End: 2023-10-17

## 2023-10-02 NOTE — TELEPHONE ENCOUNTER
Lorazepam 1mg tab  Last prescribing provider: Veronica Mckeon on 2/2/23     Last clinic visit date: 2/2/23    Recommendations for requested medication (if none, N/A): NA    Any other pertinent information (if none, N/A): pt has MRI on 10/11/23. She reports in original message she lost her previous rx when she moved.     Refilled: Y/N, if NO, why?

## 2023-10-03 ENCOUNTER — E-VISIT (OUTPATIENT)
Dept: OBGYN | Facility: CLINIC | Age: 23
End: 2023-10-03
Payer: COMMERCIAL

## 2023-10-03 DIAGNOSIS — S69.90XA INJURY OF NAIL BED OF FINGER, UNSPECIFIED LATERALITY, INITIAL ENCOUNTER: Primary | ICD-10-CM

## 2023-10-03 PROCEDURE — 99207 PR NO CHARGE LOS: CPT | Performed by: FAMILY MEDICINE

## 2023-10-04 PROBLEM — S69.90XA: Status: ACTIVE | Noted: 2023-10-04

## 2023-10-11 ENCOUNTER — ONCOLOGY VISIT (OUTPATIENT)
Dept: ONCOLOGY | Facility: CLINIC | Age: 23
End: 2023-10-11
Payer: COMMERCIAL

## 2023-10-11 ENCOUNTER — ANCILLARY PROCEDURE (OUTPATIENT)
Dept: MRI IMAGING | Facility: CLINIC | Age: 23
End: 2023-10-11
Attending: CLINICAL NURSE SPECIALIST
Payer: COMMERCIAL

## 2023-10-11 VITALS
WEIGHT: 275 LBS | BODY MASS INDEX: 40.73 KG/M2 | DIASTOLIC BLOOD PRESSURE: 77 MMHG | HEART RATE: 86 BPM | OXYGEN SATURATION: 96 % | SYSTOLIC BLOOD PRESSURE: 130 MMHG | HEIGHT: 69 IN

## 2023-10-11 DIAGNOSIS — Z15.09 BRCA2 GENE MUTATION POSITIVE: Primary | ICD-10-CM

## 2023-10-11 DIAGNOSIS — F41.9 ANXIETY: ICD-10-CM

## 2023-10-11 DIAGNOSIS — Z12.39 BREAST CANCER SCREENING, HIGH RISK PATIENT: ICD-10-CM

## 2023-10-11 DIAGNOSIS — Z80.3 FAMILY HISTORY OF MALIGNANT NEOPLASM OF BREAST: ICD-10-CM

## 2023-10-11 DIAGNOSIS — Z15.01 BRCA2 GENE MUTATION POSITIVE: Primary | ICD-10-CM

## 2023-10-11 DIAGNOSIS — Z15.09 BRCA2 GENE MUTATION POSITIVE: ICD-10-CM

## 2023-10-11 DIAGNOSIS — Z15.01 BRCA2 GENE MUTATION POSITIVE: ICD-10-CM

## 2023-10-11 PROCEDURE — A9585 GADOBUTROL INJECTION: HCPCS | Mod: JZ

## 2023-10-11 PROCEDURE — 77049 MRI BREAST C-+ W/CAD BI: CPT

## 2023-10-11 PROCEDURE — 99212 OFFICE O/P EST SF 10 MIN: CPT | Performed by: CLINICAL NURSE SPECIALIST

## 2023-10-11 RX ORDER — GADOBUTROL 604.72 MG/ML
15 INJECTION INTRAVENOUS ONCE
Status: COMPLETED | OUTPATIENT
Start: 2023-10-11 | End: 2023-10-11

## 2023-10-11 RX ADMIN — GADOBUTROL 12.5 ML: 604.72 INJECTION INTRAVENOUS at 14:14

## 2023-10-11 ASSESSMENT — PAIN SCALES - GENERAL: PAINLEVEL: NO PAIN (0)

## 2023-10-11 NOTE — LETTER
10/11/2023         RE: Sasha Hand  [address]  [facility] [address]        Dear Colleague,    Thank you for referring your patient, Sasha Hand, to the [facility]. Please see a copy of my visit note below.    Oncology Risk Management Consultation:  Date on this visit: 10/11/2023    Sasha Hand  returns to the Cancer Risk Management Program today at the [facility] for follow up.  She requires high risk screening and surveillance to reduce  her risk of cancer secondary to a deleterious BRCA2 mutation.  She is  considered to be at high risk for hereditary breast and ovarian cancer.     Primary Physician: Oscar Medley MD     History Of Present Illness:  Ms. Hand is a very pleasant, healthy 23 year old female who presents with family history of breast and ovarian cancer and a personal diagnosis of a BRCA2 mutation.         Genetic Testin2022 -- POSITIVE for a BRCA2 mutation. Specifically [id number] ([id number]),   identified using a Multi-Cancer panel through [employer].       Of note, Sasha is negative for mutations in the following genes: AIP, ALK, APC, SHEA, AXIN2, BAP1, BARD1, BLM, BMPR1A, BRCA1, BRCA2, BRIP1, CASR, CDC73, CDH1, CDK4, CDKN1B, CDKN1C, CDKN2A, CEBPA, CHEK2, CTNNA1, DICER1, DIS3L2, EGFR, EPCAM, FH, FLCN, GATA2, GPC3, GREM1, HOXB13, HRAS, KIT, MAX, MEN1, MET, MITF, MLH1, MSH2, MSH3, MSH6, MUTYH, NBN, NF1, NF2, NTHL1, PALB2, PDGFRA, PHOX2B, PMS2, POLD1, POLE, POT1, QHAJR1Q, PTCH1, PTEN, RAD50, RAD51C, RAD51D, RB1, RECQL4, RET, RUNX1, SDHA, SDHAF2, SDHB, SDHC, SDHD, SMAD4, SMARCA4, SMARCB1, SMARCE1, STK11, SUFU, TERC, TERT, AVGK908, TP53, TSC1, TSC2, VHL, WRN, WT1, AP2S1, GNA11, and MC1R genes.         Pertinent history:  2022- s/p partial gastrectomy (bariatric surgery). Pathology:  STOMACH, PARTIAL GASTRECTOMY:  -Gastric mucosa with no significant histologic abnormality  -Negative for intestinal metaplasia and  dysplasia  -No H. pylori-like organisms identified on routine stain      Menarche at age 9  Nulliparous.  Premenopausal.  Ovaries, fallopian tubes and uterus in place.    History of oral contraceptive use for about 3 years   Currently has a Nexplanon, inserted in 2021  No hx of hormone replacement therapy.    No hx of breast biopsy  No hx of hyperplasia, atypia or malignancy     Pertinent screening history:  6/1/2020- Dermatology screening, Marvel Oliver MD - Skin, left calf: -Granuloma annulare     At this visit, she denies new fatigue, breast pain, asymmetry, lumps, masses, thickening, nipple discharge and skin changes in her breasts.     Past Medical/Surgical History:  Past Medical History:   Diagnosis Date    BRCA2 gene mutation positive 02/01/2023    Closed head injury, subsequent encounter 04/05/2018    Depressive disorder     History of PCR DNA positive for HSV1     Hypertension     Moderate major depression (H) 01/20/2014    Nexplanon placed 11/14/17 11/14/2017    Nexplanon placed 11/14/17 (removed 1/16/2020) 11/14/2017    Nexplanon placed 12/14/2021 12/14/2021    Obesity 09/14/2010    Pyelonephritis 02/19/2018    Skin tag (right collar line and left labial region 11/21/2019    Tobacco use disorder 05/04/2016    Uncomplicated asthma      Past Surgical History:   Procedure Laterality Date    ARTHROSCOPY KNEE WITH MENISCAL REPAIR Right 05/10/2017    Procedure: ARTHROSCOPY KNEE WITH MENISCAL REPAIR;  arthroscopy right knee with lateral meniscus repair;  Surgeon: Nathaniel Hicks MD;  Location: [facility] OR    BIOPSY      Whole punch taken in calf to check for scleroderma.    EFRAIN GASTRIC SLEEVE N/A 07/26/2022    Procedure: GASTRECTOMY, SLEEVE, ROBOT-ASSISTED, LAPAROSCOPIC;  Surgeon: Joseph Mata MD;  Location: [facility] OR    ESOPHAGOSCOPY, GASTROSCOPY, DUODENOSCOPY (EGD), COMBINED N/A 03/28/2022    Procedure: ESOPHAGOGASTRODUODENOSCOPY (EGD);  Surgeon: Joseph Mata MD;  Location: [facility] OR        Allergies:  Allergies as of 10/11/2023 - Reviewed 10/11/2023   Allergen Reaction Noted    Ondansetron Headache 07/15/2022    Seasonal allergies  07/22/2009    Steri strips  03/24/2022       Current Medications:  Current Outpatient Medications   Medication Sig Dispense Refill    albuterol (PROAIR HFA/PROVENTIL HFA/VENTOLIN HFA) 108 (90 Base) MCG/ACT inhaler Inhale 1-2 puffs into the lungs every 4 hours as needed for shortness of breath / dyspnea or wheezing 8.5 g 5    betamethasone dipropionate (DIPROSONE) 0.05 % external cream       buPROPion (WELLBUTRIN XL) 150 MG 24 hr tablet Take 1 tablet (150 mg) by mouth every morning 90 tablet 3    Cholecalciferol (VITAMIN D3) 25 MCG TABS       cyanocobalamin (CYANOCOBALAMIN) 1000 MCG/ML injection Inject 1 mL (1,000 mcg) Subcutaneous every 30 days 1 mL 11    etonogestrel (NEXPLANON) 68 MG IMPL 1 each (68 mg) by Subdermal route once      Fexofenadine HCl (ALLEGRA PO) Take by mouth daily as needed for allergies      fluticasone-salmeterol (ADVAIR) 250-50 MCG/ACT inhaler Inhale 1 puff into the lungs every 12 hours 60 each 11    ipratropium - albuterol 0.5 mg/2.5 mg/3 mL (DUONEB) 0.5-2.5 (3) MG/3ML neb solution Take 1 vial (3 mLs) by nebulization every 6 hours as needed for shortness of breath / dyspnea or wheezing 30 vial 1    LORazepam (ATIVAN) 1 MG tablet Take 1 tablet (1 mg) by mouth once as needed for anxiety (prior to breast MRI) Take with you to appointment and check with the nurse as to the appropriate time to take the medication. Do not operate a vehicle after taking this medication 1 tablet 0    nystatin (MYCOSTATIN) 407895 UNIT/GM external powder Apply topically 3 times daily as needed (rash in skin folds) 60 g 3    prochlorperazine (COMPAZINE) 10 MG tablet Take 1 tablet (10 mg) by mouth every 8 hours as needed for nausea or vomiting 30 tablet 0    Semaglutide-Weight Management (WEGOVY) 1.7 MG/0.75ML pen Inject 1.7 mg Subcutaneous every 7 days 3 mL 3     "spironolactone (ALDACTONE) 100 MG tablet Take 1 tablet (100 mg) by mouth daily 90 tablet 3    Syringe/Needle, Disp, (BD ECLIPSE SYRINGE/NEEDLE) 25G X 5/8\" 3 ML MISC 1 Syringe every 30 days 1 each 11        Family History:  Family History   Problem Relation Age of Onset    Asthma Mother     Hypertension Mother     Breast Cancer Mother 54        lumpectomy and BSO    Hereditary Breast and Ovarian Cancer Syndrome Mother         BRCA2+    Obesity Mother     Asthma Father     Diabetes Father     Melanoma Father 59        metastatic to brain    Hypertension Father     Hyperlipidemia Father     Prostate Cancer Father     Depression Father     Anxiety Disorder Father     Anesthesia Reaction Father         Asthma, hard wake up.    Obesity Father     Cervical Cancer Sister 19    Parathyroid Disorders Sister     Vascular Disease Sister         back of head    Colon Cancer Maternal Grandmother     Other Cancer Maternal Grandmother         Lung cancer    Lung Cancer Paternal Grandmother         smoker,  in late 80s    Colon Cancer Paternal Grandmother 60    Heart Disease Paternal Grandfather          in 70s    Melanoma Paternal Aunt     Prostate Cancer Paternal Uncle 40    Colon Cancer Paternal Uncle     Melanoma Paternal Uncle     Cancer Paternal Uncle         hematological cancer; s/p SCT    Melanoma Paternal Uncle     Melanoma Paternal Uncle     Cancer Paternal Uncle 61        GI tract; cause of death at 62; father's twin    Melanoma Paternal Uncle     Breast Cancer Other 50        contralateral occurrence at 57; ; maternal grandfather's sister    Breast Cancer Other 50        paternal grandfather's sister    Bleeding Disorder No family hx of     Clotting Disorder No family hx of        Social History:  Social History     Socioeconomic History    Marital status: Single     Spouse name: Not on file    Number of children: 0    Years of education: Not on file    Highest education level: Not on file   Occupational " "History    Occupation: Reception     Comment: [employer]   Tobacco Use    Smoking status: Former     Packs/day: 1.00     Years: 5.00     Additional pack years: 0.00     Total pack years: 5.00     Types: Cigarettes     Quit date: 2021     Years since quittin.7    Smokeless tobacco: Never   Vaping Use    Vaping Use: Never used   Substance and Sexual Activity    Alcohol use: Not Currently    Drug use: Never    Sexual activity: Yes     Partners: Male     Birth control/protection: Implant   Other Topics Concern    Parent/sibling w/ CABG, MI or angioplasty before 65F 55M? No   Social History Narrative    Not on file     Social Determinants of Health     Financial Resource Strain: Not on file   Food Insecurity: Not on file   Transportation Needs: Not on file   Physical Activity: Not on file   Stress: Not on file   Social Connections: Not on file   Interpersonal Safety: Not on file   Housing Stability: Not on file       Physical Exam:  /77 (BP Location: Right arm, Patient Position: Chair, Cuff Size: Adult Large)   Pulse 86   Ht 1.74 m (5' 8.5\")   Wt 124.7 kg (275 lb)   SpO2 96%   BMI 41.21 kg/m      GENERAL APPEARANCE: healthy, alert and no distress     NECK: no adenopathy, no asymmetry or masses     LYMPHATICS: No cervical, supraclavicular or axillary lymphadenopathy     RESP: lungs clear to auscultation - no rales, rhonchi or wheezes     CARDIOVASCULAR: regular rate and rhythm, normal S1 S2, no S3 or S4 and no murmur.   BREASTS: Examined in a sitting and lying position. Symmetrical without visible distortion, swelling or rashes. No nipple inversion, nipple discharge, breast dimpling or puckering. Breast tissue is heterogenous dense to palpation. Axillary area without masses or lymphadenopathy.  SKIN: no suspicious lesions or rashes    Laboratory/Imaging Studies  Results for orders placed or performed in visit on 10/11/23   MR Breast Bilateral w/o & w Contrast     Status: None    " Narrative    Exam: Breast MRI, with and without Contrast, and with color encoding     History/Indication: BRCA2 mutation    Comparison: CT of the abdomen and pelvis 5/31/2018    Technique: Precontrast gradient recall axial nonfat sat T1.Precontrast  gradient recall axial fat-sat T1. Precontrast STIR axial fat  sat.Postcontrast gradient recall axial fat-sat T1 with dynamic  postcontrast acquisitions at 2, 4 and 6 minutes with subtractions.  Delayed high-resolution gradient recall sagittal fat-sat T1. MIP of  subtractions, axial coronal and sagittal. Color encoding of post  contrast dynamic acquisitions.     Breast composition: Scattered fibroglandular tissue    Background parenchymal enhancement 1st post C image: Minimal    Findings: 7 x 8 mm rounded right epiphrenic lymph node not definitely  seen on prior CT dated 5/31/2018.    No suspicious enhancement in either breast is identified. No axillary  lymphadenopathy.      Impression    Impression: BI-RADS CATEGORY: 1 -  NEGATIVE.    Recommendation: Annual Mammography. Continued high risk breast cancer  screening. Prominent right epiphrenic lymph node, new since prior CT  of the abdomen and pelvis from 2018. Chest CT could be considered.    DAYLIN MCLEAN MD         SYSTEM ID:  [id number]       ASSESSMENT:  We reviewed her plan below and her interval history.  She has been doing well and has no issues today; her exam is unremarkable. She will proceed to her MRI and return in one year; she agrees with the plan below; she understands that if she becomes pregnant, routine screening typically resumes a few months after the baby is born.    Individualized Surveillance Plan for women  Hereditary Breast and/or Ovarian Cancer Syndrome   Per NCCN Guidelines Version 3.2023   Recommended screening Test or procedure Last done Next Scheduled    Breast self awareness starting at age 18.    Breast cancer risk >60% Women should be familiar with their breasts and promptly report  changes to their care provider. Periodic, consistent self exam may facilitate breast self awareness. Premenopausal women may find self exams to be most informative when performed at the end of menses.   10/11/2023   October 2024   Breast screening, starting at age 25 Clinical breast exams every 6 -12 months 10/11/2023 October 2024   Breast screening   Age 25-29 Annual breast MRI screening with contrast (or mammogram if MRI is unavailable) or individualized based on family history if a breast cancer diagnosis before age 30 is present.       Breast MRI is performed preferably on day 7-15 of menstrual cycle for premenopausal women.  MRI after visit today    MRI in one year if no pregnancy by then   Breast screening   Age >30-75 years     Annual mammogram (consider tomosynthesis mammogram) and annual screening MRI.     Breast MRI is performed preferably on day 7-15 of menstrual cycle for premenopausal women.    Age>75 years, management should be considered on an individual basis.    Begin at age 30   Ovarian cancer screening, starting at age 30-35    Absolute risk for epithelial ovarian cancer -39-58% for BRCA1+ carriers and 13-29% for BRCA2+ carriers   Consider transvaginal ultrasound and  tests.    Begin at age 30   Pancreatic Cancer Screening beginning at age 50 or 10 years prior to the earliest pancreatic cancer on the BRCA-side of the family  In families with exocrine pancreatic cancer in a first or second degree relative    Risk is <5% for BRCA1+ carriers    5-10% for BRCA2+ carriers     Consider Annual MRI/MRCP and/or Endoscopic Ultrasound   No family history of pancreatic cancer   Review at future visits   Recommendation: risk-reducing salpingo-oophorectomy(RRSO), typically between 35 and 40 years old and  upon completion of child bearing.     Because ovarian cancer onset in patients with BRCA2 mutations is on average of 8-10 years later than in patients with BRCA1 mutations, it is reasonable to delay RRSO  until 40-45 years in patients with BRCA2 mutations  unless age at diagnosis in the family warrants earlier age for consideration for prophylactic surgery.    Limited data suggest that there may be a slightly increased risk of serous uterine cancer among women with BRCA1+ pathogenic variants. The provider and the patient should discuss the risks and benefits of a concurrent hysterectomy at the time of RRSO for women with a BRCA1+ pathogenic variant /like pathogenic variant prior to surgery.     Salpingectomy alone is not the standard of care for risk reduction although clinical trials are ongoing.     Discuss option of risk-reducing mastectomy.    Consider risks and benefits of risk reducing agents, such as tamoxifen and raloxifene for breast and ovarian cancer.    Consider a full body skin and eye exam for melanoma screening for both BRCA1+ and BRCA2+.     NOTE: Women with BRCA mutation who are treated for breast cancer should have screening of the remaining breast tissue with annual mammography and breast MRI.       I spent a total of 19 minutes on the day of the visit. Please see the note for further information on patient assessment and treatment.    Veronica Mckeon, APRN-CNS, OCN, AGN-BC  Clinical Nurse Specialist  Cancer Risk Management Program  [facility]

## 2023-10-11 NOTE — NURSING NOTE
"Oncology Rooming Note    October 11, 2023 10:30 AM   Sasha Hand is a 23 year old female who presents for:    Chief Complaint   Patient presents with    Oncology Clinic Visit     Follow up     Initial Vitals: /77 (BP Location: Right arm, Patient Position: Chair, Cuff Size: Adult Large)   Pulse 86   Ht 1.74 m (5' 8.5\")   Wt 124.7 kg (275 lb)   SpO2 96%   BMI 41.21 kg/m   Estimated body mass index is 41.21 kg/m  as calculated from the following:    Height as of this encounter: 1.74 m (5' 8.5\").    Weight as of this encounter: 124.7 kg (275 lb). Body surface area is 2.45 meters squared.  No Pain (0) Comment: Data Unavailable   No LMP recorded. Patient has had an implant.  Allergies reviewed: Yes  Medications reviewed: Yes    Medications: Medication refills not needed today.  Pharmacy name entered into EPIC:    [facility]  [facility] [address] [place], [place] - [address] [facility] - [facility] [address] [facility] - [place], [place] - [address]    Clinical concerns: NO  Veronica was notified.      Genet Elise CMA              "

## 2023-10-11 NOTE — PROGRESS NOTES
Oncology Risk Management Consultation:  Date on this visit: 10/11/2023    Sasha Hand  returns to the Cancer Risk Management Program today at the [facility] for follow up.  She requires high risk screening and surveillance to reduce  her risk of cancer secondary to a deleterious BRCA2 mutation.  She is  considered to be at high risk for hereditary breast and ovarian cancer.     Primary Physician: Oscar Medley MD     History Of Present Illness:  Ms. Hand is a very pleasant, healthy 23 year old female who presents with family history of breast and ovarian cancer and a personal diagnosis of a BRCA2 mutation.         Genetic Testin2022 -- POSITIVE for a BRCA2 mutation. Specifically [id number] ([id number]),   identified using a Multi-Cancer panel through [employer].       Of note, Sasha is negative for mutations in the following genes: AIP, ALK, APC, SHEA, AXIN2, BAP1, BARD1, BLM, BMPR1A, BRCA1, BRCA2, BRIP1, CASR, CDC73, CDH1, CDK4, CDKN1B, CDKN1C, CDKN2A, CEBPA, CHEK2, CTNNA1, DICER1, DIS3L2, EGFR, EPCAM, FH, FLCN, GATA2, GPC3, GREM1, HOXB13, HRAS, KIT, MAX, MEN1, MET, MITF, MLH1, MSH2, MSH3, MSH6, MUTYH, NBN, NF1, NF2, NTHL1, PALB2, PDGFRA, PHOX2B, PMS2, POLD1, POLE, POT1, FUFIG9I, PTCH1, PTEN, RAD50, RAD51C, RAD51D, RB1, RECQL4, RET, RUNX1, SDHA, SDHAF2, SDHB, SDHC, SDHD, SMAD4, SMARCA4, SMARCB1, SMARCE1, STK11, SUFU, TERC, TERT, SWIS126, TP53, TSC1, TSC2, VHL, WRN, WT1, AP2S1, GNA11, and MC1R genes.         Pertinent history:  2022- s/p partial gastrectomy (bariatric surgery). Pathology:  STOMACH, PARTIAL GASTRECTOMY:  -Gastric mucosa with no significant histologic abnormality  -Negative for intestinal metaplasia and dysplasia  -No H. pylori-like organisms identified on routine stain      Menarche at age 9  Nulliparous.  Premenopausal.  Ovaries, fallopian tubes and uterus in place.    History of oral contraceptive use for about 3 years   Currently has a Nexplanon,  inserted in 2021  No hx of hormone replacement therapy.    No hx of breast biopsy  No hx of hyperplasia, atypia or malignancy     Pertinent screening history:  6/1/2020- Dermatology screening, Marvel Oliver MD - Skin, left calf: -Granuloma annulare     At this visit, she denies new fatigue, breast pain, asymmetry, lumps, masses, thickening, nipple discharge and skin changes in her breasts.     Past Medical/Surgical History:  Past Medical History:   Diagnosis Date    BRCA2 gene mutation positive 02/01/2023    Closed head injury, subsequent encounter 04/05/2018    Depressive disorder     History of PCR DNA positive for HSV1     Hypertension     Moderate major depression (H) 01/20/2014    Nexplanon placed 11/14/17 11/14/2017    Nexplanon placed 11/14/17 (removed 1/16/2020) 11/14/2017    Nexplanon placed 12/14/2021 12/14/2021    Obesity 09/14/2010    Pyelonephritis 02/19/2018    Skin tag (right collar line and left labial region 11/21/2019    Tobacco use disorder 05/04/2016    Uncomplicated asthma      Past Surgical History:   Procedure Laterality Date    ARTHROSCOPY KNEE WITH MENISCAL REPAIR Right 05/10/2017    Procedure: ARTHROSCOPY KNEE WITH MENISCAL REPAIR;  arthroscopy right knee with lateral meniscus repair;  Surgeon: Nathaniel Hicks MD;  Location: [facility]    BIOPSY      Whole punch taken in calf to check for scleroderma.    DAVINCI GASTRIC SLEEVE N/A 07/26/2022    Procedure: GASTRECTOMY, SLEEVE, ROBOT-ASSISTED, LAPAROSCOPIC;  Surgeon: Joseph Mata MD;  Location: [facility] OR    ESOPHAGOSCOPY, GASTROSCOPY, DUODENOSCOPY (EGD), COMBINED N/A 03/28/2022    Procedure: ESOPHAGOGASTRODUODENOSCOPY (EGD);  Surgeon: Joseph Mata MD;  Location: [facility] OR       Allergies:  Allergies as of 10/11/2023 - Reviewed 10/11/2023   Allergen Reaction Noted    Ondansetron Headache 07/15/2022    Seasonal allergies  07/22/2009    Steri strips  03/24/2022       Current Medications:  Current Outpatient Medications   Medication  "Sig Dispense Refill    albuterol (PROAIR HFA/PROVENTIL HFA/VENTOLIN HFA) 108 (90 Base) MCG/ACT inhaler Inhale 1-2 puffs into the lungs every 4 hours as needed for shortness of breath / dyspnea or wheezing 8.5 g 5    betamethasone dipropionate (DIPROSONE) 0.05 % external cream       buPROPion (WELLBUTRIN XL) 150 MG 24 hr tablet Take 1 tablet (150 mg) by mouth every morning 90 tablet 3    Cholecalciferol (VITAMIN D3) 25 MCG TABS       cyanocobalamin (CYANOCOBALAMIN) 1000 MCG/ML injection Inject 1 mL (1,000 mcg) Subcutaneous every 30 days 1 mL 11    etonogestrel (NEXPLANON) 68 MG IMPL 1 each (68 mg) by Subdermal route once      Fexofenadine HCl (ALLEGRA PO) Take by mouth daily as needed for allergies      fluticasone-salmeterol (ADVAIR) 250-50 MCG/ACT inhaler Inhale 1 puff into the lungs every 12 hours 60 each 11    ipratropium - albuterol 0.5 mg/2.5 mg/3 mL (DUONEB) 0.5-2.5 (3) MG/3ML neb solution Take 1 vial (3 mLs) by nebulization every 6 hours as needed for shortness of breath / dyspnea or wheezing 30 vial 1    LORazepam (ATIVAN) 1 MG tablet Take 1 tablet (1 mg) by mouth once as needed for anxiety (prior to breast MRI) Take with you to appointment and check with the nurse as to the appropriate time to take the medication. Do not operate a vehicle after taking this medication 1 tablet 0    nystatin (MYCOSTATIN) 770183 UNIT/GM external powder Apply topically 3 times daily as needed (rash in skin folds) 60 g 3    prochlorperazine (COMPAZINE) 10 MG tablet Take 1 tablet (10 mg) by mouth every 8 hours as needed for nausea or vomiting 30 tablet 0    Semaglutide-Weight Management (WEGOVY) 1.7 MG/0.75ML pen Inject 1.7 mg Subcutaneous every 7 days 3 mL 3    spironolactone (ALDACTONE) 100 MG tablet Take 1 tablet (100 mg) by mouth daily 90 tablet 3    Syringe/Needle, Disp, (BD ECLIPSE SYRINGE/NEEDLE) 25G X 5/8\" 3 ML MISC 1 Syringe every 30 days 1 each 11        Family History:  Family History   Problem Relation Age of Onset    " Asthma Mother     Hypertension Mother     Breast Cancer Mother 54        lumpectomy and BSO    Hereditary Breast and Ovarian Cancer Syndrome Mother         BRCA2+    Obesity Mother     Asthma Father     Diabetes Father     Melanoma Father 59        metastatic to brain    Hypertension Father     Hyperlipidemia Father     Prostate Cancer Father     Depression Father     Anxiety Disorder Father     Anesthesia Reaction Father         Asthma, hard wake up.    Obesity Father     Cervical Cancer Sister 19    Parathyroid Disorders Sister     Vascular Disease Sister         back of head    Colon Cancer Maternal Grandmother     Other Cancer Maternal Grandmother         Lung cancer    Lung Cancer Paternal Grandmother         smoker,  in late 80s    Colon Cancer Paternal Grandmother 60    Heart Disease Paternal Grandfather          in 70s    Melanoma Paternal Aunt     Prostate Cancer Paternal Uncle 40    Colon Cancer Paternal Uncle     Melanoma Paternal Uncle     Cancer Paternal Uncle         hematological cancer; s/p SCT    Melanoma Paternal Uncle     Melanoma Paternal Uncle     Cancer Paternal Uncle 61        GI tract; cause of death at 62; father's twin    Melanoma Paternal Uncle     Breast Cancer Other 50        contralateral occurrence at 57; ; maternal grandfather's sister    Breast Cancer Other 50        paternal grandfather's sister    Bleeding Disorder No family hx of     Clotting Disorder No family hx of        Social History:  Social History     Socioeconomic History    Marital status: Single     Spouse name: Not on file    Number of children: 0    Years of education: Not on file    Highest education level: Not on file   Occupational History    Occupation: Reception     Comment: [employer]   Tobacco Use    Smoking status: Former     Packs/day: 1.00     Years: 5.00     Additional pack years: 0.00     Total pack years: 5.00     Types: Cigarettes     Quit date: 2021     Years since  "quittin.7    Smokeless tobacco: Never   Vaping Use    Vaping Use: Never used   Substance and Sexual Activity    Alcohol use: Not Currently    Drug use: Never    Sexual activity: Yes     Partners: Male     Birth control/protection: Implant   Other Topics Concern    Parent/sibling w/ CABG, MI or angioplasty before 65F 55M? No   Social History Narrative    Not on file     Social Determinants of Health     Financial Resource Strain: Not on file   Food Insecurity: Not on file   Transportation Needs: Not on file   Physical Activity: Not on file   Stress: Not on file   Social Connections: Not on file   Interpersonal Safety: Not on file   Housing Stability: Not on file       Physical Exam:  /77 (BP Location: Right arm, Patient Position: Chair, Cuff Size: Adult Large)   Pulse 86   Ht 1.74 m (5' 8.5\")   Wt 124.7 kg (275 lb)   SpO2 96%   BMI 41.21 kg/m      GENERAL APPEARANCE: healthy, alert and no distress     NECK: no adenopathy, no asymmetry or masses     LYMPHATICS: No cervical, supraclavicular or axillary lymphadenopathy     RESP: lungs clear to auscultation - no rales, rhonchi or wheezes     CARDIOVASCULAR: regular rate and rhythm, normal S1 S2, no S3 or S4 and no murmur.   BREASTS: Examined in a sitting and lying position. Symmetrical without visible distortion, swelling or rashes. No nipple inversion, nipple discharge, breast dimpling or puckering. Breast tissue is heterogenous dense to palpation. Axillary area without masses or lymphadenopathy.  SKIN: no suspicious lesions or rashes    Laboratory/Imaging Studies  Results for orders placed or performed in visit on 10/11/23   MR Breast Bilateral w/o & w Contrast     Status: None    Narrative    Exam: Breast MRI, with and without Contrast, and with color encoding     History/Indication: BRCA2 mutation    Comparison: CT of the abdomen and pelvis 2018    Technique: Precontrast gradient recall axial nonfat sat T1.Precontrast  gradient recall axial " fat-sat T1. Precontrast STIR axial fat  sat.Postcontrast gradient recall axial fat-sat T1 with dynamic  postcontrast acquisitions at 2, 4 and 6 minutes with subtractions.  Delayed high-resolution gradient recall sagittal fat-sat T1. MIP of  subtractions, axial coronal and sagittal. Color encoding of post  contrast dynamic acquisitions.     Breast composition: Scattered fibroglandular tissue    Background parenchymal enhancement 1st post C image: Minimal    Findings: 7 x 8 mm rounded right epiphrenic lymph node not definitely  seen on prior CT dated 5/31/2018.    No suspicious enhancement in either breast is identified. No axillary  lymphadenopathy.      Impression    Impression: BI-RADS CATEGORY: 1 -  NEGATIVE.    Recommendation: Annual Mammography. Continued high risk breast cancer  screening. Prominent right epiphrenic lymph node, new since prior CT  of the abdomen and pelvis from 2018. Chest CT could be considered.    DAYLIN MCLEAN MD         SYSTEM ID:  [id number]       ASSESSMENT:  We reviewed her plan below and her interval history.  She has been doing well and has no issues today; her exam is unremarkable. She will proceed to her MRI and return in one year; she agrees with the plan below; she understands that if she becomes pregnant, routine screening typically resumes a few months after the baby is born.    Individualized Surveillance Plan for women  Hereditary Breast and/or Ovarian Cancer Syndrome   Per NCCN Guidelines Version 3.2023   Recommended screening Test or procedure Last done Next Scheduled    Breast self awareness starting at age 18.    Breast cancer risk >60% Women should be familiar with their breasts and promptly report changes to their care provider. Periodic, consistent self exam may facilitate breast self awareness. Premenopausal women may find self exams to be most informative when performed at the end of menses.   10/11/2023   October 2024   Breast screening, starting at age 25  Clinical breast exams every 6 -12 months 10/11/2023 October 2024   Breast screening   Age 25-29 Annual breast MRI screening with contrast (or mammogram if MRI is unavailable) or individualized based on family history if a breast cancer diagnosis before age 30 is present.       Breast MRI is performed preferably on day 7-15 of menstrual cycle for premenopausal women.  MRI after visit today    MRI in one year if no pregnancy by then   Breast screening   Age >30-75 years     Annual mammogram (consider tomosynthesis mammogram) and annual screening MRI.     Breast MRI is performed preferably on day 7-15 of menstrual cycle for premenopausal women.    Age>75 years, management should be considered on an individual basis.    Begin at age 30   Ovarian cancer screening, starting at age 30-35    Absolute risk for epithelial ovarian cancer -39-58% for BRCA1+ carriers and 13-29% for BRCA2+ carriers   Consider transvaginal ultrasound and  tests.    Begin at age 30   Pancreatic Cancer Screening beginning at age 50 or 10 years prior to the earliest pancreatic cancer on the BRCA-side of the family  In families with exocrine pancreatic cancer in a first or second degree relative    Risk is <5% for BRCA1+ carriers    5-10% for BRCA2+ carriers     Consider Annual MRI/MRCP and/or Endoscopic Ultrasound   No family history of pancreatic cancer   Review at future visits   Recommendation: risk-reducing salpingo-oophorectomy(RRSO), typically between 35 and 40 years old and  upon completion of child bearing.     Because ovarian cancer onset in patients with BRCA2 mutations is on average of 8-10 years later than in patients with BRCA1 mutations, it is reasonable to delay RRSO until 40-45 years in patients with BRCA2 mutations  unless age at diagnosis in the family warrants earlier age for consideration for prophylactic surgery.    Limited data suggest that there may be a slightly increased risk of serous uterine cancer among women with  BRCA1+ pathogenic variants. The provider and the patient should discuss the risks and benefits of a concurrent hysterectomy at the time of RRSO for women with a BRCA1+ pathogenic variant /like pathogenic variant prior to surgery.     Salpingectomy alone is not the standard of care for risk reduction although clinical trials are ongoing.     Discuss option of risk-reducing mastectomy.    Consider risks and benefits of risk reducing agents, such as tamoxifen and raloxifene for breast and ovarian cancer.    Consider a full body skin and eye exam for melanoma screening for both BRCA1+ and BRCA2+.     NOTE: Women with BRCA mutation who are treated for breast cancer should have screening of the remaining breast tissue with annual mammography and breast MRI.       I spent a total of 19 minutes on the day of the visit. Please see the note for further information on patient assessment and treatment.    SAVI Neal-CNS, OCN, AGN-BC  Clinical Nurse Specialist  Cancer Risk Management Program  [facility]

## 2023-10-11 NOTE — PATIENT INSTRUCTIONS
Individualized Surveillance Plan for women  Hereditary Breast and/or Ovarian Cancer Syndrome   Per NCCN Guidelines Version 3.2023   Recommended screening Test or procedure Last done Next Scheduled    Breast self awareness starting at age 18.    Breast cancer risk >60% Women should be familiar with their breasts and promptly report changes to their care provider. Periodic, consistent self exam may facilitate breast self awareness. Premenopausal women may find self exams to be most informative when performed at the end of menses.   10/11/2023   October 2024   Breast screening, starting at age 25 Clinical breast exams every 6 -12 months 10/11/2023 October 2024   Breast screening   Age 25-29 Annual breast MRI screening with contrast (or mammogram if MRI is unavailable) or individualized based on family history if a breast cancer diagnosis before age 30 is present.       Breast MRI is performed preferably on day 7-15 of menstrual cycle for premenopausal women.  MRI after visit today    MRI in one year if no pregnancy by then   Breast screening   Age >30-75 years     Annual mammogram (consider tomosynthesis mammogram) and annual screening MRI.     Breast MRI is performed preferably on day 7-15 of menstrual cycle for premenopausal women.    Age>75 years, management should be considered on an individual basis.    Begin at age 30   Ovarian cancer screening, starting at age 30-35    Absolute risk for epithelial ovarian cancer -39-58% for BRCA1+ carriers and 13-29% for BRCA2+ carriers   Consider transvaginal ultrasound and  tests.    Begin at age 30   Pancreatic Cancer Screening beginning at age 50 or 10 years prior to the earliest pancreatic cancer on the BRCA-side of the family  In families with exocrine pancreatic cancer in a first or second degree relative    Risk is <5% for BRCA1+ carriers    5-10% for BRCA2+ carriers     Consider Annual MRI/MRCP and/or Endoscopic Ultrasound   No family history of pancreatic cancer    Review at future visits   Recommendation: risk-reducing salpingo-oophorectomy(RRSO), typically between 35 and 40 years old and  upon completion of child bearing.     Because ovarian cancer onset in patients with BRCA2 mutations is on average of 8-10 years later than in patients with BRCA1 mutations, it is reasonable to delay RRSO until 40-45 years in patients with BRCA2 mutations  unless age at diagnosis in the family warrants earlier age for consideration for prophylactic surgery.    Limited data suggest that there may be a slightly increased risk of serous uterine cancer among women with BRCA1+ pathogenic variants. The provider and the patient should discuss the risks and benefits of a concurrent hysterectomy at the time of RRSO for women with a BRCA1+ pathogenic variant /like pathogenic variant prior to surgery.     Salpingectomy alone is not the standard of care for risk reduction although clinical trials are ongoing.     Discuss option of risk-reducing mastectomy.    Consider risks and benefits of risk reducing agents, such as tamoxifen and raloxifene for breast and ovarian cancer.    Consider a full body skin and eye exam for melanoma screening for both BRCA1+ and BRCA2+.     NOTE: Women with BRCA mutation who are treated for breast cancer should have screening of the remaining breast tissue with annual mammography and breast MRI.

## 2023-10-17 RX ORDER — LORAZEPAM 1 MG/1
1 TABLET ORAL
Qty: 1 TABLET | Refills: 0 | Status: SHIPPED | OUTPATIENT
Start: 2023-10-17 | End: 2024-01-18

## 2023-10-19 ENCOUNTER — VIRTUAL VISIT (OUTPATIENT)
Dept: ENDOCRINOLOGY | Facility: CLINIC | Age: 23
End: 2023-10-19
Payer: COMMERCIAL

## 2023-10-19 VITALS — BODY MASS INDEX: 39.99 KG/M2 | WEIGHT: 270 LBS | HEIGHT: 69 IN

## 2023-10-19 DIAGNOSIS — E66.01 CLASS 3 SEVERE OBESITY WITH SERIOUS COMORBIDITY AND BODY MASS INDEX (BMI) OF 40.0 TO 44.9 IN ADULT, UNSPECIFIED OBESITY TYPE (H): Primary | ICD-10-CM

## 2023-10-19 DIAGNOSIS — R11.0 NAUSEA: ICD-10-CM

## 2023-10-19 DIAGNOSIS — K21.00 GASTROESOPHAGEAL REFLUX DISEASE WITH ESOPHAGITIS, UNSPECIFIED WHETHER HEMORRHAGE: ICD-10-CM

## 2023-10-19 DIAGNOSIS — E66.813 CLASS 3 SEVERE OBESITY WITH SERIOUS COMORBIDITY AND BODY MASS INDEX (BMI) OF 40.0 TO 44.9 IN ADULT, UNSPECIFIED OBESITY TYPE (H): Primary | ICD-10-CM

## 2023-10-19 DIAGNOSIS — Z98.84 S/P LAPAROSCOPIC SLEEVE GASTRECTOMY: ICD-10-CM

## 2023-10-19 DIAGNOSIS — E11.9 TYPE 2 DIABETES MELLITUS WITHOUT COMPLICATION, WITHOUT LONG-TERM CURRENT USE OF INSULIN (H): ICD-10-CM

## 2023-10-19 PROCEDURE — 99213 OFFICE O/P EST LOW 20 MIN: CPT | Mod: VID | Performed by: NURSE PRACTITIONER

## 2023-10-19 RX ORDER — FAMOTIDINE 40 MG/1
40 TABLET, FILM COATED ORAL DAILY
Qty: 30 TABLET | Refills: 3 | Status: SHIPPED | OUTPATIENT
Start: 2023-10-19 | End: 2024-02-01

## 2023-10-19 ASSESSMENT — PAIN SCALES - GENERAL: PAINLEVEL: NO PAIN (0)

## 2023-10-19 NOTE — LETTER
10/19/2023       RE: Sasha Hand  [address]  [facility] [address]     Dear Colleague,    Thank you for referring your patient, Sasha Hand, to the [facility] [place] at [facility]. Please see a copy of my visit note below.    Return Bariatric Surgery Note    RE: Sasha Hand  MR#: [id number]  : 2000  VISIT DATE: Oct 19, 2023      Dear Oscar Medley,    I had the pleasure of seeing your patient, Sasha Hand, in my post-bariatric surgery assessment clinic.    Assessment & Plan   Problem List Items Addressed This Visit          Digestive    Class 3 severe obesity with serious comorbidity and body mass index (BMI) of 40.0 to 44.9 in adult (H) - Primary     Increased appetite in last several weeks. Unsure if related to stress. At plateau for several weeks. Has some sour stomach in AM and if up moving around too long (maybe too long between meals). Will continue to monitor as we try increasing to 2.4mg.      Take blood sugars as needed- especially if getting nauseated   Keep pushing fluids   Try pepcid at night  Increase wegovy to 2.4 mg weekly   Consider switching to tirzepatide in the future   Follow up 3 months          Relevant Medications    blood glucose (NO BRAND SPECIFIED) test strip    Semaglutide-Weight Management (WEGOVY) 2.4 MG/0.75ML pen       Other    S/P laparoscopic sleeve gastrectomy    Relevant Medications    blood glucose (NO BRAND SPECIFIED) test strip    famotidine (PEPCID) 40 MG tablet     Other Visit Diagnoses       Type 2 diabetes mellitus without complication, without long-term current use of insulin (H)        Relevant Medications    blood glucose (NO BRAND SPECIFIED) test strip    Nausea        Relevant Medications    famotidine (PEPCID) 40 MG tablet    Gastroesophageal reflux disease with esophagitis, unspecified whether hemorrhage        Relevant Medications    famotidine (PEPCID) 40 MG  tablet               20 minutes spent by me on the date of the encounter doing chart review, history and exam, documentation and further activities per the note    CHIEF COMPLAINT: Post-bariatric surgery follow-up. 1.5 years s/p sleeve .    HISTORY OF PRESENT ILLNESS:      10/12/2023     1:21 PM   Questions Regarding Prior Weight Loss Surgery Reviewed With Patient   I had the following weight loss procedure Sleeve Gastrectomy   What year was your surgery? 2022   How has your weight changed since your last visit? I have stayed about the same   Do you currently have any of the following Heartburn, acid reflux, or GERD (acid reflux disease)?   Do you have any concerns today? Weighloss has plateaued. Never thirsty. Forcing myself to drink water. Water makes my heartburn worse.         Anti-obesity medications:     Current:   wegovy 1.7mg-   -increased appetite in the last 3-4 weeks   -sour stomach in the early AM- wakes her up - takes tums and resolves   -has needed compazine about twice a month - gets nausea after standing for too long or out and doing things- maybe blood sugars     Recent diet changes:   Dinner 7pm - nothing after     -Protein- uses protein shakes as needed   -Water- always working to improve     Recent exercise/activity changes: injury to medial meniscus - recently had cortisone shot which was helpful short term     Recent stressors: getting  next month     Recent sleep changes: no issues     Vitamins/Labs: repeat labs due     Weight History:      10/12/2023     1:21 PM   --   What is your highest lifetime weight? 365   What is your lowest weight since surgery? (In pounds) 267     Initial Weight (lbs): 365 lbs  Weight: 122.5 kg (270 lb)  Last Visits Weight: 125.6 kg (277 lb)  Cumulative weight loss (lbs): 95  Weight Loss Percentage: 26.03%        10/12/2023     1:21 PM   Questions Regarding Co-Morbidities and Health Concerns Reviewed With Patient   Pre-diabetes Never   Diabetes II Never   High  Blood Pressure Gone Away   High cholesterol Never   Heartburn/Reflux Worsened   Sleep apnea Never   PCOS Never   Back pain Never   Joint pain Stayed the same   Lower leg swelling Gone away           10/12/2023     1:21 PM   Eating Habits   How many meals do you eat per day? 3   Do you snack between meals? Sometimes   How much food are you eating at each meal? 1/2 cup to 1 cup   Are you able to separate your meals and liquids by at least 30 minutes? Yes   Are you able to avoid liquid calories? Sometimes           10/12/2023     1:21 PM   Exercise Questions Reviewed With Patient   How often do you exercise? Less than 1 time per week   What is the duration of your exercise (in minutes)? 10 Minutes   What types of exercise do you do? walking   What keeps you from being more active? I have just had surgery on one or more of my joints    I should be more active but I just have not gotten around to it    Lack of Time       Social History:      10/12/2023     1:21 PM   --   Are you smoking? No   Are you drinking alcohol? No       Medications:  Current Outpatient Medications   Medication    blood glucose (NO BRAND SPECIFIED) test strip    famotidine (PEPCID) 40 MG tablet    Semaglutide-Weight Management (WEGOVY) 2.4 MG/0.75ML pen    albuterol (PROAIR HFA/PROVENTIL HFA/VENTOLIN HFA) 108 (90 Base) MCG/ACT inhaler    betamethasone dipropionate (DIPROSONE) 0.05 % external cream    buPROPion (WELLBUTRIN XL) 150 MG 24 hr tablet    Cholecalciferol (VITAMIN D3) 25 MCG TABS    cyanocobalamin (CYANOCOBALAMIN) 1000 MCG/ML injection    etonogestrel (NEXPLANON) 68 MG IMPL    Fexofenadine HCl (ALLEGRA PO)    fluticasone-salmeterol (ADVAIR) 250-50 MCG/ACT inhaler    ipratropium - albuterol 0.5 mg/2.5 mg/3 mL (DUONEB) 0.5-2.5 (3) MG/3ML neb solution    LORazepam (ATIVAN) 1 MG tablet    nystatin (MYCOSTATIN) 983568 UNIT/GM external powder    prochlorperazine (COMPAZINE) 10 MG tablet    Semaglutide-Weight Management (WEGOVY) 1.7 MG/0.75ML pen  "   spironolactone (ALDACTONE) 100 MG tablet    Syringe/Needle, Disp, (BD ECLIPSE SYRINGE/NEEDLE) 25G X 5/8\" 3 ML MISC     Current Facility-Administered Medications   Medication    2 mL bupivacaine (MARCAINE) preservative free injection 0.5% (20 mL vial)    lidocaine 1 % injection 2 mL    triamcinolone (KENALOG-40) injection 40 mg         10/12/2023     1:21 PM   --   Do you avoid NSAIDs such as (Ibuprofen, Aleve, Naproxen, Advil)? No       ROS:  GI:       10/12/2023     1:21 PM   --   Vomiting No   Diarrhea No   Constipation Yes   Swallowing trouble No   Abdominal pain No   Heartburn Yes     Skin:       10/12/2023     1:21 PM   BAR RBS ROS - SKIN   Rash in skin folds Yes     Psych:       10/12/2023     1:21 PM   --   Depression No   Anxiety No     Female Only:       10/12/2023     1:21 PM   BAR RBS ROS -    Female only Irregular menstrual cycles   Stress urinary incontinence No       Office Visit on 07/20/2023   Component Date Value Ref Range Status    WBC Count 07/20/2023 6.3  4.0 - 11.0 10e3/uL Final    RBC Count 07/20/2023 5.07  3.80 - 5.20 10e6/uL Final    Hemoglobin 07/20/2023 14.9  11.7 - 15.7 g/dL Final    Hematocrit 07/20/2023 43.8  35.0 - 47.0 % Final    MCV 07/20/2023 86  78 - 100 fL Final    MCH 07/20/2023 29.4  26.5 - 33.0 pg Final    MCHC 07/20/2023 34.0  31.5 - 36.5 g/dL Final    RDW 07/20/2023 12.0  10.0 - 15.0 % Final    Platelet Count 07/20/2023 312  150 - 450 10e3/uL Final    Ferritin 07/20/2023 191 (H)  6 - 175 ng/mL Final    Iron 07/20/2023 113  37 - 145 ug/dL Final    Iron Binding Capacity 07/20/2023 328  240 - 430 ug/dL Final    Iron Sat Index 07/20/2023 34  15 - 46 % Final    Creatinine Urine mg/dL 07/20/2023 308.3  mg/dL Final    The reference ranges have not been established in urine creatinine. The results should be integrated into the clinical context for interpretation.    Albumin Urine mg/L 07/20/2023 25.9  mg/L Final    The reference ranges have not been established in urine albumin. " "The results should be integrated into the clinical context for interpretation.    Albumin Urine mg/g Cr 07/20/2023 8.40  0.00 - 25.00 mg/g Cr Final    Microalbuminuria is defined as an albumin:creatinine ratio of 17 to 299 for males and 25 to 299 for females. A ratio of albumin:creatinine of 300 or higher is indicative of overt proteinuria.  Due to biologic variability, positive results should be confirmed by a second, first-morning random or 24-hour timed urine specimen. If there is discrepancy, a third specimen is recommended. When 2 out of 3 results are in the microalbuminuria range, this is evidence for incipient nephropathy and warrants increased efforts at glucose control, blood pressure control, and institution of therapy with an angiotensin-converting-enzyme (ACE) inhibitor (if the patient can tolerate it).               3/1/2018    10:00 AM   ELVA Score (Last Two)   ELVA Raw Score 40   Activation Score 100   ELVA Level 4         PHYSICAL EXAM:  Objective    Ht 1.74 m (5' 8.5\")   Wt 122.5 kg (270 lb)   BMI 40.46 kg/m    Vitals - Patient Reported  Pain Score: No Pain (0)        Physical Exam   GENERAL: Healthy, alert and no distress  EYES: Eyes grossly normal to inspection.  No discharge or erythema, or obvious scleral/conjunctival abnormalities.  RESP: No audible wheeze, cough, or visible cyanosis.  No visible retractions or increased work of breathing.    SKIN: Visible skin clear. No significant rash, abnormal pigmentation or lesions.  NEURO: Cranial nerves grossly intact.  Mentation and speech appropriate for age.  PSYCH: Mentation appears normal, affect normal/bright, judgement and insight intact, normal speech and appearance well-groomed.        Sincerely,    Komal Miller NP      Virtual Visit Details    Type of service:  Video Visit   Video Start Time: 11:40 AM  Video End Time:11:58 AM    Originating Location (pt. Location): Home    Distant Location (provider location):  Off-site  Platform used for Video " Visit: Mitch

## 2023-10-19 NOTE — ASSESSMENT & PLAN NOTE
Increased appetite in last several weeks. Unsure if related to stress. At plateau for several weeks. Has some sour stomach in AM and if up moving around too long (maybe too long between meals). Will continue to monitor as we try increasing to 2.4mg.      Take blood sugars as needed- especially if getting nauseated   Keep pushing fluids   Try pepcid at night  Increase wegovy to 2.4 mg weekly   Consider switching to tirzepatide in the future   Follow up 3 months

## 2023-10-19 NOTE — NURSING NOTE
Is the patient currently in the state of [place]? YES    Visit mode:VIDEO    If the visit is dropped, the patient can be reconnected by: VIDEO VISIT: Text to cell phone:   Telephone Information:   Mobile [phone]    and VIDEO VISIT: Send to e-mail at: [email or web address]    Will anyone else be joining the visit? NO  (If patient encounters technical issues they should call [phone]    How would you like to obtain your AVS? MyChart    Are changes needed to the allergy or medication list? No    Reason for visit: HIRO HACKETT

## 2023-10-19 NOTE — PROGRESS NOTES
Return Bariatric Surgery Note    RE: Sasha Hand  MR#: [id number]  : 2000  VISIT DATE: Oct 19, 2023      Dear Oscar Medley,    I had the pleasure of seeing your patient, Sasha Hand, in my post-bariatric surgery assessment clinic.    Assessment & Plan   Problem List Items Addressed This Visit        Digestive    Class 3 severe obesity with serious comorbidity and body mass index (BMI) of 40.0 to 44.9 in adult (H) - Primary     Increased appetite in last several weeks. Unsure if related to stress. At plateau for several weeks. Has some sour stomach in AM and if up moving around too long (maybe too long between meals). Will continue to monitor as we try increasing to 2.4mg.      Take blood sugars as needed- especially if getting nauseated   Keep pushing fluids   Try pepcid at night  Increase wegovy to 2.4 mg weekly   Consider switching to tirzepatide in the future   Follow up 3 months          Relevant Medications    blood glucose (NO BRAND SPECIFIED) test strip    Semaglutide-Weight Management (WEGOVY) 2.4 MG/0.75ML pen       Other    S/P laparoscopic sleeve gastrectomy    Relevant Medications    blood glucose (NO BRAND SPECIFIED) test strip    famotidine (PEPCID) 40 MG tablet   Other Visit Diagnoses     Type 2 diabetes mellitus without complication, without long-term current use of insulin (H)        Relevant Medications    blood glucose (NO BRAND SPECIFIED) test strip    Nausea        Relevant Medications    famotidine (PEPCID) 40 MG tablet    Gastroesophageal reflux disease with esophagitis, unspecified whether hemorrhage        Relevant Medications    famotidine (PEPCID) 40 MG tablet             20 minutes spent by me on the date of the encounter doing chart review, history and exam, documentation and further activities per the note    CHIEF COMPLAINT: Post-bariatric surgery follow-up. 1.5 years s/p sleeve .    HISTORY OF PRESENT ILLNESS:      10/12/2023     1:21 PM   Questions Regarding Prior  Weight Loss Surgery Reviewed With Patient   I had the following weight loss procedure Sleeve Gastrectomy   What year was your surgery? 2022   How has your weight changed since your last visit? I have stayed about the same   Do you currently have any of the following Heartburn, acid reflux, or GERD (acid reflux disease)?   Do you have any concerns today? Weighloss has plateaued. Never thirsty. Forcing myself to drink water. Water makes my heartburn worse.         Anti-obesity medications:     Current:   wegovy 1.7mg-   -increased appetite in the last 3-4 weeks   -sour stomach in the early AM- wakes her up - takes tums and resolves   -has needed compazine about twice a month - gets nausea after standing for too long or out and doing things- maybe blood sugars     Recent diet changes:   Dinner 7pm - nothing after     -Protein- uses protein shakes as needed   -Water- always working to improve     Recent exercise/activity changes: injury to medial meniscus - recently had cortisone shot which was helpful short term     Recent stressors: getting  next month     Recent sleep changes: no issues     Vitamins/Labs: repeat labs due     Weight History:      10/12/2023     1:21 PM   --   What is your highest lifetime weight? 365   What is your lowest weight since surgery? (In pounds) 267     Initial Weight (lbs): 365 lbs  Weight: 122.5 kg (270 lb)  Last Visits Weight: 125.6 kg (277 lb)  Cumulative weight loss (lbs): 95  Weight Loss Percentage: 26.03%        10/12/2023     1:21 PM   Questions Regarding Co-Morbidities and Health Concerns Reviewed With Patient   Pre-diabetes Never   Diabetes II Never   High Blood Pressure Gone Away   High cholesterol Never   Heartburn/Reflux Worsened   Sleep apnea Never   PCOS Never   Back pain Never   Joint pain Stayed the same   Lower leg swelling Gone away           10/12/2023     1:21 PM   Eating Habits   How many meals do you eat per day? 3   Do you snack between meals? Sometimes   How  "much food are you eating at each meal? 1/2 cup to 1 cup   Are you able to separate your meals and liquids by at least 30 minutes? Yes   Are you able to avoid liquid calories? Sometimes           10/12/2023     1:21 PM   Exercise Questions Reviewed With Patient   How often do you exercise? Less than 1 time per week   What is the duration of your exercise (in minutes)? 10 Minutes   What types of exercise do you do? walking   What keeps you from being more active? I have just had surgery on one or more of my joints    I should be more active but I just have not gotten around to it    Lack of Time       Social History:      10/12/2023     1:21 PM   --   Are you smoking? No   Are you drinking alcohol? No       Medications:  Current Outpatient Medications   Medication     blood glucose (NO BRAND SPECIFIED) test strip     famotidine (PEPCID) 40 MG tablet     Semaglutide-Weight Management (WEGOVY) 2.4 MG/0.75ML pen     albuterol (PROAIR HFA/PROVENTIL HFA/VENTOLIN HFA) 108 (90 Base) MCG/ACT inhaler     betamethasone dipropionate (DIPROSONE) 0.05 % external cream     buPROPion (WELLBUTRIN XL) 150 MG 24 hr tablet     Cholecalciferol (VITAMIN D3) 25 MCG TABS     cyanocobalamin (CYANOCOBALAMIN) 1000 MCG/ML injection     etonogestrel (NEXPLANON) 68 MG IMPL     Fexofenadine HCl (ALLEGRA PO)     fluticasone-salmeterol (ADVAIR) 250-50 MCG/ACT inhaler     ipratropium - albuterol 0.5 mg/2.5 mg/3 mL (DUONEB) 0.5-2.5 (3) MG/3ML neb solution     LORazepam (ATIVAN) 1 MG tablet     nystatin (MYCOSTATIN) 595901 UNIT/GM external powder     prochlorperazine (COMPAZINE) 10 MG tablet     Semaglutide-Weight Management (WEGOVY) 1.7 MG/0.75ML pen     spironolactone (ALDACTONE) 100 MG tablet     Syringe/Needle, Disp, (BD ECLIPSE SYRINGE/NEEDLE) 25G X 5/8\" 3 ML MISC     Current Facility-Administered Medications   Medication     2 mL bupivacaine (MARCAINE) preservative free injection 0.5% (20 mL vial)     lidocaine 1 % injection 2 mL     " triamcinolone (KENALOG-40) injection 40 mg         10/12/2023     1:21 PM   --   Do you avoid NSAIDs such as (Ibuprofen, Aleve, Naproxen, Advil)? No       ROS:  GI:       10/12/2023     1:21 PM   --   Vomiting No   Diarrhea No   Constipation Yes   Swallowing trouble No   Abdominal pain No   Heartburn Yes     Skin:       10/12/2023     1:21 PM   BAR RBS ROS - SKIN   Rash in skin folds Yes     Psych:       10/12/2023     1:21 PM   --   Depression No   Anxiety No     Female Only:       10/12/2023     1:21 PM   BAR RBS ROS -    Female only Irregular menstrual cycles   Stress urinary incontinence No       Office Visit on 07/20/2023   Component Date Value Ref Range Status     WBC Count 07/20/2023 6.3  4.0 - 11.0 10e3/uL Final     RBC Count 07/20/2023 5.07  3.80 - 5.20 10e6/uL Final     Hemoglobin 07/20/2023 14.9  11.7 - 15.7 g/dL Final     Hematocrit 07/20/2023 43.8  35.0 - 47.0 % Final     MCV 07/20/2023 86  78 - 100 fL Final     MCH 07/20/2023 29.4  26.5 - 33.0 pg Final     MCHC 07/20/2023 34.0  31.5 - 36.5 g/dL Final     RDW 07/20/2023 12.0  10.0 - 15.0 % Final     Platelet Count 07/20/2023 312  150 - 450 10e3/uL Final     Ferritin 07/20/2023 191 (H)  6 - 175 ng/mL Final     Iron 07/20/2023 113  37 - 145 ug/dL Final     Iron Binding Capacity 07/20/2023 328  240 - 430 ug/dL Final     Iron Sat Index 07/20/2023 34  15 - 46 % Final     Creatinine Urine mg/dL 07/20/2023 308.3  mg/dL Final    The reference ranges have not been established in urine creatinine. The results should be integrated into the clinical context for interpretation.     Albumin Urine mg/L 07/20/2023 25.9  mg/L Final    The reference ranges have not been established in urine albumin. The results should be integrated into the clinical context for interpretation.     Albumin Urine mg/g Cr 07/20/2023 8.40  0.00 - 25.00 mg/g Cr Final    Microalbuminuria is defined as an albumin:creatinine ratio of 17 to 299 for males and 25 to 299 for females. A ratio of  "albumin:creatinine of 300 or higher is indicative of overt proteinuria.  Due to biologic variability, positive results should be confirmed by a second, first-morning random or 24-hour timed urine specimen. If there is discrepancy, a third specimen is recommended. When 2 out of 3 results are in the microalbuminuria range, this is evidence for incipient nephropathy and warrants increased efforts at glucose control, blood pressure control, and institution of therapy with an angiotensin-converting-enzyme (ACE) inhibitor (if the patient can tolerate it).               3/1/2018    10:00 AM   ELVA Score (Last Two)   ELVA Raw Score 40   Activation Score 100   ELVA Level 4         PHYSICAL EXAM:  Objective    Ht 1.74 m (5' 8.5\")   Wt 122.5 kg (270 lb)   BMI 40.46 kg/m    Vitals - Patient Reported  Pain Score: No Pain (0)        Physical Exam   GENERAL: Healthy, alert and no distress  EYES: Eyes grossly normal to inspection.  No discharge or erythema, or obvious scleral/conjunctival abnormalities.  RESP: No audible wheeze, cough, or visible cyanosis.  No visible retractions or increased work of breathing.    SKIN: Visible skin clear. No significant rash, abnormal pigmentation or lesions.  NEURO: Cranial nerves grossly intact.  Mentation and speech appropriate for age.  PSYCH: Mentation appears normal, affect normal/bright, judgement and insight intact, normal speech and appearance well-groomed.        Sincerely,    Komal Miller NP      Virtual Visit Details    Type of service:  Video Visit   Video Start Time: 11:40 AM  Video End Time:11:58 AM    Originating Location (pt. Location): Home    Distant Location (provider location):  Off-site  Platform used for Video Visit: Mitch  "

## 2023-10-19 NOTE — PATIENT INSTRUCTIONS
"Harjit Baez, it was nice to meet you today!  Thank you for allowing us the privilege of caring for you. We hope we provided you with the excellent service you deserve.   Please let us know if there is anything else we can do for you so that we can be sure you are completely satisfied with your care experience.    To ensure the quality of our services you may be receiving a patient satisfaction survey from an independent patient satisfaction monitoring company.    The greatest compliment you can give is a \"Likely to Recommend\"    Your visit was with Komal Miller NP today.    Instructions per today's visit:     Follow up  plan:  Take blood sugars as needed- especially if getting nauseated   Keep pushing fluids   Try pepcid at night  Increase wegovy to 2.4 mg weekly   Consider switching to tirzepatide in the future   Follow up 3 months   ___________________________________________________________________________  Important contact and scheduling information:  Please call our contact center at [phone] to schedule your next appointments.  For any nursing questions or concerns call Keke Feliciano LPN at [phone] or Amanda Rivas RN at [phone]  Please call during clinic hours Monday through Friday 8:00a - 4:00p if you have questions or you can contact us via [employer] interactive studios at anytime and we will reply during clinic hours.    Lab results will be communicated through My Chart or letter (if My Chart not used). Please call the clinic if you have not received communication after 1 week or if you have any questions.?  Clinic Fax: [phone]  __________________________________________________________________________    If labs were ordered today:    Please make an appointment to have them drawn at your convenience.     To schedule the Lab Appointment using [employer] interactive studios:  Select \"Schedule an Appointment\"  Select \"Lab Only\"  For \"A couple of questions\", select \"Other\"  For \"Which locations work for you?, select the location and set " up the appointment    To schedule by phone call [phone] to schedule a lab only appointment at any [facility] lab.  ___________________________________________________________________________  Work with A Health !  Virtual Sessions are Available through [facility] Weight Management Clinics    To learn more, call to schedule a free, Health  Q&A appointment: [phone]     What is Health Coaching?  Do you know what you are supposed to do, but you just aren't doing it?  Then, HEALTH COACHING may help you!   Get unstuck and move forward with the support of a professionally trained NBC-HWC (National Board-Certified Health and ) who uses evidence-based approaches to help you move forward with healthy lifestyle changes in the areas of weight loss, stress management and overall well-being.    Health Coaches help you identify goals that will work best for you. Health Coaches provide support and encouragement with overcoming barriers and help you to find inspiration and motivation to lead a healthy lifestyle.    Option one:  Health Coaching 3-Pack; Three, 30-minute Health Coaching Visits, for $99  Visits are done virtually (phone or video)  This is a self pay service; we do not accept insurance for yong coaching.    Option two:   The 24 week Plan; 11 Health Coaching Visits, and a 7 months subscription to [employer]-- on-demand fitness, nutrition and mindfulness classes, for $499 (employee discounts may be available). Participants will also meet regularly with a weight management Medical Provider and a Registered/Licensed Dietician.  This is a self-pay service; we do not accept insurance for health coaching.    To Schedule a free Health  Q&A appointment to learn more,  call [phone].  ____________________________________________________________________  [facility]  Healthy Lifestyle Group    Healthy Lifestyle Group  This is a 60  "minute virtual coaching group for those who want to lead a healthier lifestyle. Come together to set goals and overcome barriers in a supportive group environment. We will address the four pillars of health--nutrition, exercise, sleep and emotional well-being.  This group is highly recommended for those who are participating in the 24 week Healthy Lifestyle Plan and our Health Coaching sessions.    WHEN: This group meets the first Friday of the month, 12:30 PM - 1:30 PM online, via a zoom meeting.      FACILITATOR: Led by National Board Certified Health and , Tari Schultz, [facility].    TO REGISTER: Please call the Call Center at [phone] to register. You will get an appointment to attend in [email or web address]. Fifteen minutes prior to the meeting, complete the e-check in and you will get the link to join the meeting.  There is no charge to attend this group and space is limited.      2023 and 2024 Meeting Topics and Dates:    November 3: Introduction to Mindfulness (Learn simple and effective mindfulness practices and how it can benefit you)    December 8: Let's Talk (guided discussion on our wins and challenges)    January 5: New Years Vision: Manifest your Best 2024! (Guided imagery,  journaling and discussion)    February 2: Let's Talk    March 1: 10 Percent Happier by Madhu Arroyo (Book Bites; a guided discussion on the nuggets of wisdom from favorite wellness books; no need to read the book but highly encouraged)    April 5: Let's Talk    May 3: \"Essentialism; The Disciplined Pursuit of Less by Salvatore Singh (book bites discussion)    June 7: Let's Talk    July 5: NO MEETING, off for the 4th of July Holiday    August 2: The Blue Zones, Secrets for Living a Longer Life by Madhu Grimaldo (book bites discussion)      If you would like bariatric surgery specific support group info please let your care team know.         Thank you,   [facility] Comprehensive Weight Management Team                         "

## 2023-10-23 ENCOUNTER — MYC MEDICAL ADVICE (OUTPATIENT)
Dept: ONCOLOGY | Facility: CLINIC | Age: 23
End: 2023-10-23
Payer: COMMERCIAL

## 2023-10-23 DIAGNOSIS — R91.8 PULMONARY NODULES: ICD-10-CM

## 2023-10-23 DIAGNOSIS — E04.1 THYROID NODULE: Primary | ICD-10-CM

## 2023-10-24 DIAGNOSIS — R93.89 ABNORMAL FINDING ON IMAGING: Primary | ICD-10-CM

## 2023-10-25 ENCOUNTER — HOSPITAL ENCOUNTER (OUTPATIENT)
Dept: ULTRASOUND IMAGING | Facility: CLINIC | Age: 23
Discharge: HOME | End: 2023-10-25
Attending: CLINICAL NURSE SPECIALIST
Payer: COMMERCIAL

## 2023-10-25 ENCOUNTER — PRE VISIT (OUTPATIENT)
Dept: ONCOLOGY | Facility: CLINIC | Age: 23
End: 2023-10-25
Payer: COMMERCIAL

## 2023-10-25 ENCOUNTER — HOSPITAL ENCOUNTER (OUTPATIENT)
Dept: CT IMAGING | Facility: CLINIC | Age: 23
Discharge: HOME | End: 2023-10-25
Attending: CLINICAL NURSE SPECIALIST
Payer: COMMERCIAL

## 2023-10-25 ENCOUNTER — PATIENT OUTREACH (OUTPATIENT)
Dept: ONCOLOGY | Facility: CLINIC | Age: 23
End: 2023-10-25
Payer: COMMERCIAL

## 2023-10-25 DIAGNOSIS — R93.89 ABNORMAL FINDING ON IMAGING: ICD-10-CM

## 2023-10-25 DIAGNOSIS — E04.1 THYROID NODULE: ICD-10-CM

## 2023-10-25 PROCEDURE — 250N000009 HC RX 250: Performed by: CLINICAL NURSE SPECIALIST

## 2023-10-25 PROCEDURE — 71260 CT THORAX DX C+: CPT

## 2023-10-25 PROCEDURE — 250N000011 HC RX IP 250 OP 636: Performed by: CLINICAL NURSE SPECIALIST

## 2023-10-25 PROCEDURE — 76536 US EXAM OF HEAD AND NECK: CPT

## 2023-10-25 RX ORDER — IOPAMIDOL 755 MG/ML
500 INJECTION, SOLUTION INTRAVASCULAR ONCE
Status: COMPLETED | OUTPATIENT
Start: 2023-10-25 | End: 2023-10-25

## 2023-10-25 RX ADMIN — IOPAMIDOL 70 ML: 755 INJECTION, SOLUTION INTRAVENOUS at 08:01

## 2023-10-25 RX ADMIN — SODIUM CHLORIDE 60 ML: 9 INJECTION, SOLUTION INTRAVENOUS at 08:00

## 2023-10-25 NOTE — PROGRESS NOTES
New IP (Interventional Pulmonology) referral rec'd.  Chart reviewed.         New Patient: Interventional Pulmonary (Lung nodule) Nurse Navigator Note    Referring provider: Veronica Mckeon APRN [facility] Cancer Risk Mgmt    Referred to (specialty): Interventional Pulmonary (Lung nodule)    Requested provider (if applicable): n/a    Date Referral Received: 10/25/2023    Evaluation for :  Lung nodule    Clinical History (per Nurse review of records provided):    **BOOK MARKED**  Narrative & Impression   CT CHEST W CONTRAST 10/25/2023 8:07 AM     CLINICAL HISTORY: 7 x 8 mm rounded right epiphrenic lymph node not  definitely seen on prior CT dated 5/31/2018.; Abnormal finding on  imaging  TECHNIQUE: CT chest with IV contrast. Multiplanar reformats were  obtained. Dose reduction techniques were used.     CONTRAST: Isovue-370, 70 mL     COMPARISON: No prior chest CT. Correlation is made with breast MRI  10/11/2023.     FINDINGS:   LUNGS AND PLEURA: Central airways are patent. Lungs are clear. No  pleural effusion.  Right upper lobe, subpleural 2 mm pulmonary nodule (4:72).     MEDIASTINUM/AXILLAE:  A few anterior right supradiaphragmatic lymph nodes are seen, the  largest measures 8 x 6 mm and probably corresponds with the lymph  nodes seen on recent breast MRI when accounting for difference in  modality. This previously measured 6 x 4 mm on 5/31/2018, although was  very subtle, nearly entirely obscured by the adjacent diaphragm. No  axillary or hilar lymphadenopathy.   No thoracic aneurysm. No pericardial effusion.  Left thyroid low density nodule measures 1.0 cm (series 2: Image 8).     UPPER ABDOMEN: Postsurgical changes of the stomach.     MUSCULOSKELETAL: Unremarkable.                                                                      IMPRESSION:   1.  A right anterior supradiaphragmatic lymph node measures 6 mm in  short axis is nonspecific.  2.  Left thyroid 1 cm thyroid nodule, consider workup with  ultrasound.  3.  Incidental 2 mm nodule in the right upper lobe.       Records Location: Epic, [place] ([place])    RECORDS NEEDED: 10/19/2021 CXR-- imaging pushed to PACS from [facility]----thank you!!    Additional testing needed prior to consult: none

## 2023-10-25 NOTE — TELEPHONE ENCOUNTER
RECORDS STATUS - ALL OTHER DIAGNOSIS    Pulmonary Nodules   RECORDS RECEIVED FROM: [facility]    Appt Date: TBD NN WQ     Action    Action Taken 10/25/2023 10:30AM KESAL     I called [facility]'s IMG Dept [phone] - the xray will be pushed to  PACS      NOTES STATUS DETAILS   OFFICE NOTE from referring provider     OFFICE NOTE from medical oncologist  Complete EPIC   DISCHARGE SUMMARY from hospital     DISCHARGE REPORT from the ER     MEDICATION LIST Complete EPIC      CLINICAL TRIAL TREATMENTS TO DATE     LABS     PATHOLOGY REPORTS     ANYTHING RELATED TO DIAGNOSIS Complete Labs last updated on 10/23/2023    GENONOMIC TESTING     TYPE:     IMAGING (NEED IMAGES & REPORT)     CT SCANS     Xray Chest Requested- [facility]  10/19/2021    MAMMO     ULTRASOUND     PET

## 2023-10-26 ENCOUNTER — LAB (OUTPATIENT)
Dept: LAB | Facility: OTHER | Age: 23
End: 2023-10-26
Payer: COMMERCIAL

## 2023-10-26 DIAGNOSIS — E04.1 THYROID NODULE: Primary | ICD-10-CM

## 2023-10-26 DIAGNOSIS — Z98.84 S/P LAPAROSCOPIC SLEEVE GASTRECTOMY: ICD-10-CM

## 2023-10-26 DIAGNOSIS — E66.813 CLASS 3 SEVERE OBESITY WITH SERIOUS COMORBIDITY AND BODY MASS INDEX (BMI) OF 40.0 TO 44.9 IN ADULT (H): Primary | ICD-10-CM

## 2023-10-26 DIAGNOSIS — E66.01 CLASS 3 SEVERE OBESITY WITH SERIOUS COMORBIDITY AND BODY MASS INDEX (BMI) OF 40.0 TO 44.9 IN ADULT (H): Primary | ICD-10-CM

## 2023-10-26 LAB
ERYTHROCYTE [DISTWIDTH] IN BLOOD BY AUTOMATED COUNT: 11.8 % (ref 10–15)
FERRITIN SERPL-MCNC: 221 NG/ML (ref 6–175)
HCT VFR BLD AUTO: 42.1 % (ref 35–47)
HGB BLD-MCNC: 14.3 G/DL (ref 11.7–15.7)
IRON BINDING CAPACITY (ROCHE): 312 UG/DL (ref 240–430)
IRON SATN MFR SERPL: 37 % (ref 15–46)
IRON SERPL-MCNC: 115 UG/DL (ref 37–145)
MCH RBC QN AUTO: 29.6 PG (ref 26.5–33)
MCHC RBC AUTO-ENTMCNC: 34 G/DL (ref 31.5–36.5)
MCV RBC AUTO: 87 FL (ref 78–100)
PLATELET # BLD AUTO: 298 10E3/UL (ref 150–450)
RBC # BLD AUTO: 4.83 10E6/UL (ref 3.8–5.2)
WBC # BLD AUTO: 6.3 10E3/UL (ref 4–11)

## 2023-10-26 PROCEDURE — 83550 IRON BINDING TEST: CPT

## 2023-10-26 PROCEDURE — 36415 COLL VENOUS BLD VENIPUNCTURE: CPT

## 2023-10-26 PROCEDURE — 82728 ASSAY OF FERRITIN: CPT

## 2023-10-26 PROCEDURE — 85027 COMPLETE CBC AUTOMATED: CPT

## 2023-10-26 PROCEDURE — 83540 ASSAY OF IRON: CPT

## 2023-10-28 ENCOUNTER — HEALTH MAINTENANCE LETTER (OUTPATIENT)
Age: 23
End: 2023-10-28

## 2023-11-02 PROBLEM — S69.90XA: Status: RESOLVED | Noted: 2023-10-04 | Resolved: 2023-11-02

## 2023-11-02 PROBLEM — R19.7 DIARRHEA, UNSPECIFIED TYPE: Status: RESOLVED | Noted: 2023-02-05 | Resolved: 2023-11-02

## 2023-11-02 RX ORDER — BETAMETHASONE DIPROPIONATE 0.5 MG/ML
LOTION, AUGMENTED TOPICAL
COMMUNITY
Start: 2023-07-16 | End: 2024-09-27

## 2023-11-02 RX ORDER — BETAMETHASONE DIPROPIONATE 0.5 MG/G
OINTMENT, AUGMENTED TOPICAL
COMMUNITY
Start: 2023-05-18 | End: 2024-09-27

## 2023-11-02 RX ORDER — SYRINGE AND NEEDLE,INSULIN,1ML 25GX1"
SYRINGE, EMPTY DISPOSABLE MISCELLANEOUS
COMMUNITY
Start: 2023-08-12 | End: 2024-01-12

## 2023-11-09 ENCOUNTER — OFFICE VISIT (OUTPATIENT)
Dept: ORTHOPEDICS | Facility: CLINIC | Age: 23
End: 2023-11-09
Payer: COMMERCIAL

## 2023-11-09 VITALS
WEIGHT: 272.93 LBS | BODY MASS INDEX: 42.84 KG/M2 | DIASTOLIC BLOOD PRESSURE: 80 MMHG | SYSTOLIC BLOOD PRESSURE: 136 MMHG | HEIGHT: 67 IN

## 2023-11-09 DIAGNOSIS — M23.204 DEGENERATIVE TEAR OF LEFT MEDIAL MENISCUS: Primary | ICD-10-CM

## 2023-11-09 DIAGNOSIS — G89.29 CHRONIC PAIN OF LEFT KNEE: ICD-10-CM

## 2023-11-09 DIAGNOSIS — M25.562 CHRONIC PAIN OF LEFT KNEE: ICD-10-CM

## 2023-11-09 DIAGNOSIS — M25.362 KNEE INSTABILITY, LEFT: ICD-10-CM

## 2023-11-09 PROCEDURE — 99214 OFFICE O/P EST MOD 30 MIN: CPT | Mod: 25 | Performed by: STUDENT IN AN ORGANIZED HEALTH CARE EDUCATION/TRAINING PROGRAM

## 2023-11-09 PROCEDURE — 20611 DRAIN/INJ JOINT/BURSA W/US: CPT | Mod: LT | Performed by: STUDENT IN AN ORGANIZED HEALTH CARE EDUCATION/TRAINING PROGRAM

## 2023-11-09 RX ORDER — ROPIVACAINE HYDROCHLORIDE 5 MG/ML
1 INJECTION, SOLUTION EPIDURAL; INFILTRATION; PERINEURAL
Status: SHIPPED | OUTPATIENT
Start: 2023-11-09

## 2023-11-09 RX ORDER — LIDOCAINE HYDROCHLORIDE 10 MG/ML
3 INJECTION, SOLUTION INFILTRATION; PERINEURAL
Status: SHIPPED | OUTPATIENT
Start: 2023-11-09

## 2023-11-09 RX ORDER — TRIAMCINOLONE ACETONIDE 40 MG/ML
40 INJECTION, SUSPENSION INTRA-ARTICULAR; INTRAMUSCULAR
Status: SHIPPED | OUTPATIENT
Start: 2023-11-09

## 2023-11-09 RX ADMIN — TRIAMCINOLONE ACETONIDE 40 MG: 40 INJECTION, SUSPENSION INTRA-ARTICULAR; INTRAMUSCULAR at 14:12

## 2023-11-09 RX ADMIN — ROPIVACAINE HYDROCHLORIDE 1 ML: 5 INJECTION, SOLUTION EPIDURAL; INFILTRATION; PERINEURAL at 14:12

## 2023-11-09 RX ADMIN — LIDOCAINE HYDROCHLORIDE 3 ML: 10 INJECTION, SOLUTION INFILTRATION; PERINEURAL at 14:12

## 2023-11-09 NOTE — PROGRESS NOTES
ASSESSMENT & PLAN    Sasha was seen today for pain.    Diagnoses and all orders for this visit:    Degenerative tear of left medial meniscus  -     Physical Therapy Referral; Future  -     Knee Supplies Order Hinged Knee Brace; Left    Chronic pain of left knee    Knee instability, left  -     Knee Supplies Order Hinged Knee Brace; Left    Other orders  -     Large Joint Injection/Arthocentesis: L knee joint      23-year-old female presents with left medial knee pain approximately 1 year that has been getting worse over time with associated mechanical symptoms including giving out.  She does have a complex medial meniscus tear on the left with associated positive Lev's physical exam today and medial joint line tenderness. She got temporary relief of her pain with knee corticosteroid injection that was performed approximately 4 months ago, but mechanical symptoms seem to be worsening over time.    Plan:  -As patient is getting  next week, I agreed to perform a repeat corticosteroid injection into her left knee today.  We discussed at length that this was not a long-term solution for her pain, and I do not want to continue performing corticosteroid injections due to her age.  Discussed that we could consider alternative injections in the future such as hyaluronic acid or PRP  - Start physical therapy to work on generalized lower extremity strengthening, stability, conditioning  - Continue to work on weight loss, congratulated patient on her significant weight loss to date  -Hinged knee brace provided today in clinic to help offload medial joint line and help with stability  -Plan to follow-up in approximately 2.5 months after physical therapy.  We discussed that ultimately if her symptoms are not improving she will likely need a partial meniscectomy and I am happy to refer her to one of my orthopedic surgery colleagues in the future      Return in about 2 months (around 1/9/2024).      Dr. Amy Sharma  "DO Laurie  [facility]  Sports Medicine     -----  Chief Complaint   Patient presents with    Left Knee - Pain       SUBJECTIVE  Sasha Hand is a/an 23 year old female who is seen as a self referral for evaluation of left knee pain.  Onset was 1 year ago with no inciting injury or event.  Pain is located anterior medial knee. Symptoms are worsened by going up and down the stairs.  She has tried cortisone injection on 07/27/23 which provided 2 months of relief, at home exercises. Associated symptoms include feelings of instability, which seem to be getting worse recently. Has given out on her, and she fell this AM due to locking.  She has had a significant amount of weight loss, but reports that this has not improved her symptoms and they actually seem to be getting worse.    The patient is seen alone    Prior injury/Surgical history of affected joint: Meniscus tear surgery on her right knee 2015  Social History/Occupation:      REVIEW OF SYSTEMS:  Pertinent positives/negative: As stated above in HPI    OBJECTIVE:  /80   Ht 1.702 m (5' 7\")   Wt 123.8 kg (272 lb 14.9 oz)   BMI 42.75 kg/m     General: Alert and in no distress  Skin: no visable rashes  CV: Extremities appear well perfused   Resp: normal respiratory effort, no conversational dyspnea   Psych: normal mood, affect  MSK:  LEFT KNEE  Inspection:    Normal alignment; no edema, erythema, or ecchymosis present  Palpation:    Tender about the medial joint line. Remainder of bony and ligamentous landmarks are nontender.    No effusion is present    Patellofemoral crepitus is Absent  Range of Motion:     00 extension to 1350 flexion  Strength:    Extensor mechanism intact  Special Tests:    Positive: Lev     Negative: Patellar grind, Valgus stress (0 and 30), Varus stress (0 and 30), Lachman, and Posterior drawer       RADIOLOGY:  Final results and radiologist's interpretation available in the [facility] " record.  Images below were personally reviewed and discussed with the patient in the office today.  My personal interpretation of the performed imaging: MRI of the left knee from 7/6/2023 reveals a complex medial meniscus tear at the posterior horn and body with associated parameniscal cyst    Large Joint Injection/Arthocentesis: L knee joint    Date/Time: 11/9/2023 2:12 PM    Performed by: Amy Sullivan DO  Authorized by: Amy Sullivan DO    Indications:  Pain and osteoarthritis  Needle Size:  22 G  Guidance: ultrasound    Approach:  Anterolateral  Location:  Knee      Medications:  40 mg triamcinolone 40 MG/ML; 3 mL lidocaine 1 %; 1 mL ROPivacaine 5 MG/ML  Outcome:  Tolerated well, no immediate complications  Procedure discussed: discussed risks, benefits, and alternatives    Consent Given by:  Patient  Timeout: timeout called immediately prior to procedure    Prep: patient was prepped and draped in usual sterile fashion     Ultrasound was used to ensure safe and accurate needle placement and injection. Ultrasound images of the procedure were permanently stored.    1ml of 8.4% Sodium Bicarbonate solution was used to buffer the local numbing agent for today's injection        Review of prior external note(s) from - sports medicine

## 2023-11-09 NOTE — PATIENT INSTRUCTIONS
Thank you for choosing [facility]!    DR. ALEXIS'S CLINIC LOCATIONS:     [place]  TRIAGE LINE: [phone] [address] APPOINTMENTS: [phone]   [place] [address] RADIOLOGY: [phone]   (Mondays & Tuesdays) HAND THERAPY: [phone]    PHYSICAL THERAPY: [phone]   [place] BILLING QUESTIONS: [phone] [address] FAX: [phone]   [place] [address]    (Thursdays & Fridays)     Post-Injection Discharge Instructions    You may shower, however avoid swimming, tub baths or hot tubs for 24 hours following your procedure  You may have a mild to moderate increase in pain for a few days following the injection.  The lidocaine (local numbing medicine) will wear off in several hours. It usually takes 3-5 days for the steroid medication to start working although it may take up to 14 days for full effect.   You may use ice packs for 10-15 minutes, 3 to 4 times a day at the injection site for comfort if needed  You may use extra strength Tylenol for pain control if necessary   If you were fasting, you may resume your normal diet and medications after the procedure  If you have diabetes, your blood sugar may be higher than normal for 10-14 days following a steroid injection. Contact your doctor who manages your diabetes if your blood sugar is significantly higher than usual    If you experience any of the following, call Sports Medicine @ [phone] or [phone]  -Fever over 100 degrees F  -Swelling, bleeding, redness, drainage, warmth at the injection site  -New or significant worsening pain

## 2023-11-09 NOTE — LETTER
11/9/2023         RE: Sasha Hand  [address]  [place] [address]        Dear Colleague,    Thank you for referring your patient, Sasha Hand, to the [facility] [place]. Please see a copy of my visit note below.    ASSESSMENT & PLAN    Sasha was seen today for pain.    Diagnoses and all orders for this visit:    Degenerative tear of left medial meniscus  -     Physical Therapy Referral; Future  -     Knee Supplies Order Hinged Knee Brace; Left    Chronic pain of left knee    Knee instability, left  -     Knee Supplies Order Hinged Knee Brace; Left    Other orders  -     Large Joint Injection/Arthocentesis: L knee joint      23-year-old female presents with left medial knee pain approximately 1 year that has been getting worse over time with associated mechanical symptoms including giving out.  She does have a complex medial meniscus tear on the left with associated positive Lev's physical exam today and medial joint line tenderness. She got temporary relief of her pain with knee corticosteroid injection that was performed approximately 4 months ago, but mechanical symptoms seem to be worsening over time.    Plan:  -As patient is getting  next week, I agreed to perform a repeat corticosteroid injection into her left knee today.  We discussed at length that this was not a long-term solution for her pain, and I do not want to continue performing corticosteroid injections due to her age.  Discussed that we could consider alternative injections in the future such as hyaluronic acid or PRP  - Start physical therapy to work on generalized lower extremity strengthening, stability, conditioning  - Continue to work on weight loss, congratulated patient on her significant weight loss to date  -Hinged knee brace provided today in clinic to help offload medial joint line and help with stability  -Plan to follow-up in approximately 2.5 months after physical therapy.  We  "discussed that ultimately if her symptoms are not improving she will likely need a partial meniscectomy and I am happy to refer her to one of my orthopedic surgery colleagues in the future      Return in about 2 months (around 1/9/2024).      Dr. Amy Sullivan, DO  [facility]  Sports Medicine     -----  Chief Complaint   Patient presents with     Left Knee - Pain       SUBJECTIVE  Sasha Hand is a/an 23 year old female who is seen as a self referral for evaluation of left knee pain.  Onset was 1 year ago with no inciting injury or event.  Pain is located anterior medial knee. Symptoms are worsened by going up and down the stairs.  She has tried cortisone injection on 07/27/23 which provided 2 months of relief, at home exercises. Associated symptoms include feelings of instability, which seem to be getting worse recently. Has given out on her, and she fell this AM due to locking.  She has had a significant amount of weight loss, but reports that this has not improved her symptoms and they actually seem to be getting worse.    The patient is seen alone    Prior injury/Surgical history of affected joint: Meniscus tear surgery on her right knee 2015  Social History/Occupation:      REVIEW OF SYSTEMS:  Pertinent positives/negative: As stated above in HPI    OBJECTIVE:  /80   Ht 1.702 m (5' 7\")   Wt 123.8 kg (272 lb 14.9 oz)   BMI 42.75 kg/m     General: Alert and in no distress  Skin: no visable rashes  CV: Extremities appear well perfused   Resp: normal respiratory effort, no conversational dyspnea   Psych: normal mood, affect  MSK:  LEFT KNEE  Inspection:    Normal alignment; no edema, erythema, or ecchymosis present  Palpation:    Tender about the medial joint line. Remainder of bony and ligamentous landmarks are nontender.    No effusion is present    Patellofemoral crepitus is Absent  Range of Motion:     00 extension to 1350 flexion  Strength:    Extensor " mechanism intact  Special Tests:    Positive: Lev     Negative: Patellar grind, Valgus stress (0 and 30), Varus stress (0 and 30), Lachman, and Posterior drawer       RADIOLOGY:  Final results and radiologist's interpretation available in the [facility] health record.  Images below were personally reviewed and discussed with the patient in the office today.  My personal interpretation of the performed imaging: MRI of the right knee from 7/6/2023 reveals a complex medial meniscus tear at the posterior horn and body with associated parameniscal cyst    Large Joint Injection/Arthocentesis: L knee joint    Date/Time: 11/9/2023 2:12 PM    Performed by: Amy Sullivan DO  Authorized by: Amy Sullivan DO    Indications:  Pain and osteoarthritis  Needle Size:  22 G  Guidance: ultrasound    Approach:  Anterolateral  Location:  Knee      Medications:  40 mg triamcinolone 40 MG/ML; 3 mL lidocaine 1 %; 1 mL ROPivacaine 5 MG/ML  Outcome:  Tolerated well, no immediate complications  Procedure discussed: discussed risks, benefits, and alternatives    Consent Given by:  Patient  Timeout: timeout called immediately prior to procedure    Prep: patient was prepped and draped in usual sterile fashion     Ultrasound was used to ensure safe and accurate needle placement and injection. Ultrasound images of the procedure were permanently stored.    1ml of 8.4% Sodium Bicarbonate solution was used to buffer the local numbing agent for today's injection        Review of prior external note(s) from - sports medicine             Again, thank you for allowing me to participate in the care of your patient.        Sincerely,        Amy Sullivan DO

## 2023-11-27 ASSESSMENT — ASTHMA QUESTIONNAIRES: ACT_TOTALSCORE: 22

## 2023-12-03 ASSESSMENT — PATIENT HEALTH QUESTIONNAIRE - PHQ9
10. IF YOU CHECKED OFF ANY PROBLEMS, HOW DIFFICULT HAVE THESE PROBLEMS MADE IT FOR YOU TO DO YOUR WORK, TAKE CARE OF THINGS AT HOME, OR GET ALONG WITH OTHER PEOPLE: SOMEWHAT DIFFICULT
SUM OF ALL RESPONSES TO PHQ QUESTIONS 1-9: 3
SUM OF ALL RESPONSES TO PHQ QUESTIONS 1-9: 3

## 2023-12-04 ENCOUNTER — OFFICE VISIT (OUTPATIENT)
Dept: FAMILY MEDICINE | Facility: CLINIC | Age: 23
End: 2023-12-04
Payer: COMMERCIAL

## 2023-12-04 VITALS
TEMPERATURE: 97.8 F | RESPIRATION RATE: 18 BRPM | BODY MASS INDEX: 41.75 KG/M2 | OXYGEN SATURATION: 97 % | DIASTOLIC BLOOD PRESSURE: 56 MMHG | SYSTOLIC BLOOD PRESSURE: 106 MMHG | HEART RATE: 86 BPM | HEIGHT: 67 IN | WEIGHT: 266 LBS

## 2023-12-04 DIAGNOSIS — Z30.46 ENCOUNTER FOR NEXPLANON REMOVAL: Primary | ICD-10-CM

## 2023-12-04 PROBLEM — Z30.017 ENCOUNTER FOR INITIAL PRESCRIPTION OF IMPLANTABLE SUBDERMAL CONTRACEPTIVE: Status: RESOLVED | Noted: 2021-12-14 | Resolved: 2023-12-04

## 2023-12-04 PROBLEM — Z30.017 ENCOUNTER FOR INITIAL PRESCRIPTION OF IMPLANTABLE SUBDERMAL CONTRACEPTIVE: Status: ACTIVE | Noted: 2021-12-14

## 2023-12-04 PROCEDURE — 11982 REMOVE DRUG IMPLANT DEVICE: CPT | Performed by: FAMILY MEDICINE

## 2023-12-04 ASSESSMENT — PAIN SCALES - GENERAL: PAINLEVEL: NO PAIN (0)

## 2023-12-04 NOTE — PROGRESS NOTES
"  {PROVIDER CHARTING PREFERENCE:946331}    Subjective   Sasha is a 23 year old, presenting for the following health issues:  Contraception (Removal /Bad insomnia as well, can fall asleep-just not stay asleep  like 1.5 hours at a time )      12/4/2023     1:01 PM   Additional Questions   Roomed by Erum   Accompanied by Pranay       Contraception    History of Present Illness       Reason for visit:  Remove nexplanon    She eats 2-3 servings of fruits and vegetables daily.She consumes 1 sweetened beverage(s) daily.She exercises with enough effort to increase her heart rate 10 to 19 minutes per day.  She exercises with enough effort to increase her heart rate 3 or less days per week. She is missing 1 dose(s) of medications per week.  She is not taking prescribed medications regularly due to remembering to take.       {MA/LPN/RN Pre-Provider Visit Orders- hCG/UA/Strep (Optional):010150}  {SUPERLIST (Optional):917656}  {additonal problems for provider to add (Optional):604585}      Review of Systems   {ROS COMP (Optional):752636}      Objective    /56 (Cuff Size: Adult Regular)   Pulse 86   Temp 97.8  F (36.6  C) (Temporal)   Resp 18   Ht 1.702 m (5' 7\")   Wt 120.7 kg (266 lb)   SpO2 97%   BMI 41.66 kg/m    Body mass index is 41.66 kg/m .  Physical Exam   {Exam List (Optional):710067}    {Diagnostic Test Results (Optional):001718}    {AMBULATORY ATTESTATION (Optional):423527}              "

## 2023-12-04 NOTE — TELEPHONE ENCOUNTER
RECORDS STATUS - ALL OTHER DIAGNOSIS      [facility]   DATE RECEIVED: 1/8   NOTES STATUS DETAILS   OFFICE NOTE from referring provider Veronica Andrews: 10/11/23   DISCHARGE SUMMARY from hospital [facility] 7/26/22, 3/28/22   DISCHARGE REPORT from the ER [facility]/ [facility] 1/15/23: [facility]   OPERATIVE REPORT Epic 7/26/22: Gastrectomy  3/28/22: EGD   MEDICATION LIST [facility] 11/9/23   LABS     ANYTHING RELATED TO DIAGNOSIS Epic 10/26/23   GENONOMIC TESTING     TYPE: Epic 12/7/22: [facility]   IMAGING (NEED IMAGES & REPORT)     CT SCANS PACS 1/15/23: [facility]   XR PACS 10/19/21: [facility]

## 2023-12-04 NOTE — PROGRESS NOTES
Nexplanon Removal:     Is a pregnancy test required: No.  Was a consent obtained?  Yes    Sasha Guy is here for removal of etonogestrel implant Nexplanon/Implanon.  It was placed on 12/14/2021.    Indication: wanting it removed, she is  and they will use condoms or natural family planning until they want to have a child.       Preoperative Diagnosis: etonogestrel implant  Postoperative Diagnosis: etonogestrel implant removed    Technique: On the left arm  Skin prep hibiclens  Anesthesia 1% lidocaine, with epi  Procedure: Small incision (<5mm) was made at distal end of palpable implant, curved hemostat or mosquito forceps was used to isolate the implant and bring it to the incision, the fibrous capsule containing the implant  was incised and the Implant was removed intact.    EBL: minimal  Complications:  Yes  Tolerance:  Pt tolerated procedure well and was in stable condition.   Dressing:    A pressure bandage was placed for the next 12-24 hours.    Contraception was discussed and patient chose the following method none      Follow up: Pt was instructed to call if bleeding, severe pain or foul smell.     Electronically signed by:  Oscar Medley M.D.  12/4/2023    Answers submitted by the patient for this visit:  Patient Health Questionnaire (Submitted on 12/3/2023)  If you checked off any problems, how difficult have these problems made it for you to do your work, take care of things at home, or get along with other people?: Somewhat difficult  PHQ9 TOTAL SCORE: 3  General Questionnaire (Submitted on 11/27/2023)  Chief Complaint: Chronic problems general questions HPI Form  What is the reason for your visit today? : Remove nexplanon  How many servings of fruits and vegetables do you eat daily?: 2-3  On average, how many sweetened beverages do you drink each day (Examples: soda, juice, sweet tea, etc.  Do NOT count diet or artificially sweetened beverages)?: 1  How many minutes a day do you  exercise enough to make your heart beat faster?: 10 to 19  How many days a week do you exercise enough to make your heart beat faster?: 3 or less  How many days per week do you miss taking your medication?: 1  What makes it hard for you to take your medication every day?: remembering to take

## 2023-12-14 ENCOUNTER — E-VISIT (OUTPATIENT)
Dept: FAMILY MEDICINE | Facility: CLINIC | Age: 23
End: 2023-12-14
Payer: COMMERCIAL

## 2023-12-14 DIAGNOSIS — R05.8 PRODUCTIVE COUGH: Primary | ICD-10-CM

## 2023-12-14 PROCEDURE — 99207 PR NO CHARGE LOS: CPT | Performed by: FAMILY MEDICINE

## 2023-12-14 RX ORDER — BENZONATATE 200 MG/1
200 CAPSULE ORAL 3 TIMES DAILY PRN
Qty: 30 CAPSULE | Refills: 1 | Status: SHIPPED | OUTPATIENT
Start: 2023-12-14 | End: 2024-09-27

## 2023-12-14 RX ORDER — AZITHROMYCIN 250 MG/1
TABLET, FILM COATED ORAL
Qty: 6 TABLET | Refills: 0 | Status: SHIPPED | OUTPATIENT
Start: 2023-12-14 | End: 2023-12-19

## 2023-12-21 DIAGNOSIS — R59.1 LYMPHADENOPATHY: Primary | ICD-10-CM

## 2023-12-28 ENCOUNTER — E-VISIT (OUTPATIENT)
Dept: FAMILY MEDICINE | Facility: CLINIC | Age: 23
End: 2023-12-28
Payer: COMMERCIAL

## 2023-12-28 ENCOUNTER — ANCILLARY PROCEDURE (OUTPATIENT)
Dept: CT IMAGING | Facility: CLINIC | Age: 23
End: 2023-12-28
Attending: STUDENT IN AN ORGANIZED HEALTH CARE EDUCATION/TRAINING PROGRAM
Payer: COMMERCIAL

## 2023-12-28 DIAGNOSIS — R59.1 LYMPHADENOPATHY: ICD-10-CM

## 2023-12-28 DIAGNOSIS — G47.09 OTHER INSOMNIA: Primary | ICD-10-CM

## 2023-12-28 PROCEDURE — 99422 OL DIG E/M SVC 11-20 MIN: CPT | Performed by: FAMILY MEDICINE

## 2023-12-28 PROCEDURE — 71260 CT THORAX DX C+: CPT | Mod: GC | Performed by: RADIOLOGY

## 2023-12-28 RX ORDER — IOPAMIDOL 755 MG/ML
125 INJECTION, SOLUTION INTRAVASCULAR ONCE
Status: COMPLETED | OUTPATIENT
Start: 2023-12-28 | End: 2023-12-28

## 2023-12-28 RX ORDER — HYDROXYZINE HYDROCHLORIDE 25 MG/1
TABLET, FILM COATED ORAL
Qty: 180 TABLET | Refills: 1 | Status: SHIPPED | OUTPATIENT
Start: 2023-12-28

## 2023-12-28 RX ADMIN — IOPAMIDOL 125 ML: 755 INJECTION, SOLUTION INTRAVASCULAR at 13:13

## 2024-01-04 ENCOUNTER — TRANSFERRED RECORDS (OUTPATIENT)
Dept: HEALTH INFORMATION MANAGEMENT | Facility: CLINIC | Age: 24
End: 2024-01-04
Payer: COMMERCIAL

## 2024-01-12 DIAGNOSIS — E66.9 OBESITY: ICD-10-CM

## 2024-01-12 DIAGNOSIS — Z71.3 NUTRITIONAL COUNSELING: ICD-10-CM

## 2024-01-12 DIAGNOSIS — E11.9 TYPE 2 DIABETES MELLITUS WITHOUT COMPLICATION, WITHOUT LONG-TERM CURRENT USE OF INSULIN (H): ICD-10-CM

## 2024-01-12 DIAGNOSIS — Z98.84 S/P LAPAROSCOPIC SLEEVE GASTRECTOMY: ICD-10-CM

## 2024-01-16 RX ORDER — CYANOCOBALAMIN 1000 UG/ML
1 INJECTION, SOLUTION INTRAMUSCULAR; SUBCUTANEOUS
Qty: 1 ML | Refills: 1 | Status: SHIPPED | OUTPATIENT
Start: 2024-01-16 | End: 2024-07-18

## 2024-01-16 RX ORDER — SYRINGE AND NEEDLE,INSULIN,1ML 25GX1"
SYRINGE, EMPTY DISPOSABLE MISCELLANEOUS
Qty: 3 EACH | Refills: 1 | Status: SHIPPED | OUTPATIENT
Start: 2024-01-16 | End: 2024-07-18

## 2024-01-16 NOTE — TELEPHONE ENCOUNTER
"B-D INSULIN SYRINGE 25G X 5/8\" 1 ML MISC -- -- 8/12/2023     cyanocobalamin (CYANOCOBALAMIN) 1000 MCG/ML injection 1 mL 11 8/25/2022     Last Office Visit: 10/19/23  Future Office visit:   2/1/24    Refill protocol passed  Vanessa Vincent RN    "

## 2024-01-18 ENCOUNTER — PRE VISIT (OUTPATIENT)
Dept: PULMONOLOGY | Facility: CLINIC | Age: 24
End: 2024-01-18
Payer: COMMERCIAL

## 2024-01-18 ENCOUNTER — VIRTUAL VISIT (OUTPATIENT)
Dept: PULMONOLOGY | Facility: CLINIC | Age: 24
End: 2024-01-18
Attending: CLINICAL NURSE SPECIALIST
Payer: COMMERCIAL

## 2024-01-18 VITALS — HEIGHT: 67 IN | WEIGHT: 254 LBS | BODY MASS INDEX: 39.87 KG/M2

## 2024-01-18 DIAGNOSIS — R91.8 PULMONARY NODULES: ICD-10-CM

## 2024-01-18 PROCEDURE — 99204 OFFICE O/P NEW MOD 45 MIN: CPT | Mod: 95 | Performed by: STUDENT IN AN ORGANIZED HEALTH CARE EDUCATION/TRAINING PROGRAM

## 2024-01-18 ASSESSMENT — PAIN SCALES - GENERAL: PAINLEVEL: NO PAIN (0)

## 2024-01-18 NOTE — NURSING NOTE
Is the patient currently in the state of [place]? YES    Visit mode:VIDEO    If the visit is dropped, the patient can be reconnected by: VIDEO VISIT: Text to cell phone:   Telephone Information:   Mobile [phone]       Will anyone else be joining the visit? NO  (If patient encounters technical issues they should call [phone]    How would you like to obtain your AVS? MyChart    Are changes needed to the allergy or medication list?  Pt states her allergies and medications were up-to-date during her echeck-in.     Reason for visit: Consult    Esvin DEWITTF

## 2024-01-18 NOTE — PROGRESS NOTES
Virtual Visit Details    Type of service:  Video Visit     Originating Location (pt. Location): Home    Distant Location (provider location):  On-site  Platform used for Video Visit: Mitch    LUNG NODULE & INTERVENTIONAL PULMONARY CLINIC  [facility]    Sasha Guy MRN# [id number]   Age: 23 year old YOB: 2000     Reason for Consultation: Lung nodule    Requesting Physician: Veronica Mckeon, APRN CNS  [address] [place] [address]     Assessment and Plan:    Sasha Guy is a 23 year old female who presents for evaluation of a lung nodule.    I reviewed their CT scan from 12/28/23 which reveals no significant pulmonary nodules that I can see.     Regarding her thyroid nodule I will refer her to endocrinology for workup of this.     I do not appreciate any thoracic lymphadenopathy that is suspicious appearing.     Patient indicated understanding and agreed to the plan of care. All questions answered.     Will defer to pcp regarding supradiaphragmatic lymph node.     Clarence Patel DO   of Medicine  Interventional Pulmonology  Department of Pulmonary, Allergy, Critical Care and Sleep Medicine   [facility]     History:    Sasha Guy is a 23 year old female who presents for evaluation of an enlarged lymph node.  Pmhx of acne and weight loss surgery.   No cough or sob. Occasionally, she will have to pause and take a deep breath.   Quit smoking 3 years ago. Smoked for 10 years. Most she smoked was 1.5 ppd.   Grandmother had lung cancer, colon cancer, mother had breast cancer. Father had melanoma.   Was exposed to black mold in her childhood home.   No exposrue to asbestos, radon, uranium or TB.   Had weigth loss surgery and had expected weight loss. No night sweats.   No fevers or chills.   No changes in her voice    Medications:  Current Outpatient Medications   Medication Sig    albuterol (PROAIR  "HFA/PROVENTIL HFA/VENTOLIN HFA) 108 (90 Base) MCG/ACT inhaler Inhale 1-2 puffs into the lungs every 4 hours as needed for shortness of breath / dyspnea or wheezing    augmented betamethasone dipropionate (DIPROLENE) 0.05 % external lotion Apply topically to scalp nightly for 2 weeks then as needed.*    augmented betamethasone dipropionate (DIPROLENE-AF) 0.05 % external ointment Apply topically to feet as needed*    B-D INSULIN SYRINGE 25G X 5/8\" 1 ML MISC USE MONTHLY FOR B-12 INJECTIONS*    benzonatate (TESSALON) 200 MG capsule Take 1 capsule (200 mg) by mouth 3 times daily as needed for cough    betamethasone dipropionate (DIPROSONE) 0.05 % external cream     blood glucose (NO BRAND SPECIFIED) test strip Use to test blood sugar 1 times daily or as directed.    buPROPion (WELLBUTRIN XL) 150 MG 24 hr tablet Take 1 tablet (150 mg) by mouth every morning    Cholecalciferol (VITAMIN D3) 25 MCG TABS     cyanocobalamin (CYANOCOBALAMIN) 1000 MCG/ML injection Inject 1 mL (1,000 mcg) Subcutaneous every 30 days    etonogestrel (NEXPLANON) 68 MG IMPL 1 each (68 mg) by Subdermal route once (Patient not taking: Reported on 12/4/2023)    famotidine (PEPCID) 40 MG tablet Take 1 tablet (40 mg) by mouth daily    Fexofenadine HCl (ALLEGRA PO) Take by mouth daily as needed for allergies    fluticasone-salmeterol (ADVAIR) 250-50 MCG/ACT inhaler Inhale 1 puff into the lungs every 12 hours    hydrOXYzine HCl (ATARAX) 25 MG tablet Take 25 mg (1 tablet) 30 minutes before bedtime.  May increase by 1 tablet to a maximum dose of 100 mg (4 tablets) to improve sleep habits.    ipratropium - albuterol 0.5 mg/2.5 mg/3 mL (DUONEB) 0.5-2.5 (3) MG/3ML neb solution Take 1 vial (3 mLs) by nebulization every 6 hours as needed for shortness of breath / dyspnea or wheezing    LORazepam (ATIVAN) 1 MG tablet Take 1 tablet (1 mg) by mouth once as needed for anxiety (prior to breast MRI) Take with you to appointment and check with the nurse as to the " "appropriate time to take the medication. Do not operate a vehicle after taking this medication    metroNIDAZOLE (METROCREAM) 0.75 % external cream Apply toPICALLY TO face every morning.*    nystatin (MYCOSTATIN) 497478 UNIT/GM external powder Apply topically 3 times daily as needed (rash in skin folds)    prochlorperazine (COMPAZINE) 10 MG tablet Take 1 tablet (10 mg) by mouth every 8 hours as needed for nausea or vomiting    Semaglutide-Weight Management (WEGOVY) 1.7 MG/0.75ML pen Inject 1.7 mg Subcutaneous every 7 days (Patient not taking: Reported on 12/4/2023)    Semaglutide-Weight Management (WEGOVY) 2.4 MG/0.75ML pen Inject 2.4 mg Subcutaneous once a week    spironolactone (ALDACTONE) 100 MG tablet Take 1 tablet (100 mg) by mouth daily    Syringe/Needle, Disp, (BD ECLIPSE SYRINGE/NEEDLE) 25G X 5/8\" 3 ML MISC 1 Syringe every 30 days     Current Facility-Administered Medications   Medication    1 mL ropivacaine (NAROPIN) injection 5 mg/mL    2 mL bupivacaine (MARCAINE) preservative free injection 0.5% (20 mL vial)    lidocaine 1 % injection 2 mL    lidocaine 1 % injection 3 mL    triamcinolone (KENALOG-40) injection 40 mg    triamcinolone (KENALOG-40) injection 40 mg         Physical exam:  Ht 1.702 m (5' 7\")   Wt 115.2 kg (254 lb)   BMI 39.78 kg/m    Wt Readings from Last 4 Encounters:   01/18/24 115.2 kg (254 lb)   12/04/23 120.7 kg (266 lb)   11/09/23 123.8 kg (272 lb 14.9 oz)   10/19/23 122.5 kg (270 lb)     General: Well appearing, nonlabored breathing  Neuro: Answering questions appropriately  Psych: Normal affect   ]    "

## 2024-01-18 NOTE — LETTER
1/18/2024       RE: Sasha Guy  [address]  [facility] [address]     Dear Colleague,    Thank you for referring your patient, Sasha Guy, to the [facility] at [facility]. Please see a copy of my visit note below.    Virtual Visit Details    Type of service:  Video Visit     Originating Location (pt. Location): Home    Distant Location (provider location):  On-site  Platform used for Video Visit: [facility]    LUNG NODULE & INTERVENTIONAL PULMONARY CLINIC  [facility]    Sasha Guy MRN# [id number]   Age: 23 year old YOB: 2000     Reason for Consultation: Lung nodule    Requesting Physician: Veronica Mckeon, APRN CNS  [address] [place] [address]     Assessment and Plan:    Sasha Guy is a 23 year old female who presents for evaluation of a lung nodule.    I reviewed their CT scan from 12/28/23 which reveals no significant pulmonary nodules that I can see.     Regarding her thyroid nodule I will refer her to endocrinology for workup of this.     I do not appreciate any thoracic lymphadenopathy that is suspicious appearing.     Patient indicated understanding and agreed to the plan of care. All questions answered.     Will defer to pcp regarding supradiaphragmatic lymph node.     Clarence Patel DO   of Medicine  Interventional Pulmonology  Department of Pulmonary, Allergy, Critical Care and Sleep Medicine   [facility]     History:    Sasha Guy is a 23 year old female who presents for evaluation of an enlarged lymph node.  Pmhx of acne and weight loss surgery.   No cough or sob. Occasionally, she will have to pause and take a deep breath.   Quit smoking 3 years ago. Smoked for 10 years. Most she smoked was 1.5 ppd.   Grandmother had lung cancer, colon cancer, mother had breast cancer. Father had melanoma.   Was  "exposed to black mold in her childhood home.   No exposrue to asbestos, radon, uranium or TB.   Had weigth loss surgery and had expected weight loss. No night sweats.   No fevers or chills.   No changes in her voice    Medications:  Current Outpatient Medications   Medication Sig    albuterol (PROAIR HFA/PROVENTIL HFA/VENTOLIN HFA) 108 (90 Base) MCG/ACT inhaler Inhale 1-2 puffs into the lungs every 4 hours as needed for shortness of breath / dyspnea or wheezing    augmented betamethasone dipropionate (DIPROLENE) 0.05 % external lotion Apply topically to scalp nightly for 2 weeks then as needed.*    augmented betamethasone dipropionate (DIPROLENE-AF) 0.05 % external ointment Apply topically to feet as needed*    B-D INSULIN SYRINGE 25G X 5/8\" 1 ML MISC USE MONTHLY FOR B-12 INJECTIONS*    benzonatate (TESSALON) 200 MG capsule Take 1 capsule (200 mg) by mouth 3 times daily as needed for cough    betamethasone dipropionate (DIPROSONE) 0.05 % external cream     blood glucose (NO BRAND SPECIFIED) test strip Use to test blood sugar 1 times daily or as directed.    buPROPion (WELLBUTRIN XL) 150 MG 24 hr tablet Take 1 tablet (150 mg) by mouth every morning    Cholecalciferol (VITAMIN D3) 25 MCG TABS     cyanocobalamin (CYANOCOBALAMIN) 1000 MCG/ML injection Inject 1 mL (1,000 mcg) Subcutaneous every 30 days    etonogestrel (NEXPLANON) 68 MG IMPL 1 each (68 mg) by Subdermal route once (Patient not taking: Reported on 12/4/2023)    famotidine (PEPCID) 40 MG tablet Take 1 tablet (40 mg) by mouth daily    Fexofenadine HCl (ALLEGRA PO) Take by mouth daily as needed for allergies    fluticasone-salmeterol (ADVAIR) 250-50 MCG/ACT inhaler Inhale 1 puff into the lungs every 12 hours    hydrOXYzine HCl (ATARAX) 25 MG tablet Take 25 mg (1 tablet) 30 minutes before bedtime.  May increase by 1 tablet to a maximum dose of 100 mg (4 tablets) to improve sleep habits.    ipratropium - albuterol 0.5 mg/2.5 mg/3 mL (DUONEB) 0.5-2.5 (3) MG/3ML " "neb solution Take 1 vial (3 mLs) by nebulization every 6 hours as needed for shortness of breath / dyspnea or wheezing    LORazepam (ATIVAN) 1 MG tablet Take 1 tablet (1 mg) by mouth once as needed for anxiety (prior to breast MRI) Take with you to appointment and check with the nurse as to the appropriate time to take the medication. Do not operate a vehicle after taking this medication    metroNIDAZOLE (METROCREAM) 0.75 % external cream Apply toPICALLY TO face every morning.*    nystatin (MYCOSTATIN) 871181 UNIT/GM external powder Apply topically 3 times daily as needed (rash in skin folds)    prochlorperazine (COMPAZINE) 10 MG tablet Take 1 tablet (10 mg) by mouth every 8 hours as needed for nausea or vomiting    Semaglutide-Weight Management (WEGOVY) 1.7 MG/0.75ML pen Inject 1.7 mg Subcutaneous every 7 days (Patient not taking: Reported on 12/4/2023)    Semaglutide-Weight Management (WEGOVY) 2.4 MG/0.75ML pen Inject 2.4 mg Subcutaneous once a week    spironolactone (ALDACTONE) 100 MG tablet Take 1 tablet (100 mg) by mouth daily    Syringe/Needle, Disp, (BD ECLIPSE SYRINGE/NEEDLE) 25G X 5/8\" 3 ML MISC 1 Syringe every 30 days     Current Facility-Administered Medications   Medication    1 mL ropivacaine (NAROPIN) injection 5 mg/mL    2 mL bupivacaine (MARCAINE) preservative free injection 0.5% (20 mL vial)    lidocaine 1 % injection 2 mL    lidocaine 1 % injection 3 mL    triamcinolone (KENALOG-40) injection 40 mg    triamcinolone (KENALOG-40) injection 40 mg         Physical exam:  Ht 1.702 m (5' 7\")   Wt 115.2 kg (254 lb)   BMI 39.78 kg/m    Wt Readings from Last 4 Encounters:   01/18/24 115.2 kg (254 lb)   12/04/23 120.7 kg (266 lb)   11/09/23 123.8 kg (272 lb 14.9 oz)   10/19/23 122.5 kg (270 lb)     General: Well appearing, nonlabored breathing  Neuro: Answering questions appropriately  Psych: Normal affect   ]  "

## 2024-01-23 ENCOUNTER — MYC MEDICAL ADVICE (OUTPATIENT)
Dept: ENDOCRINOLOGY | Facility: CLINIC | Age: 24
End: 2024-01-23
Payer: COMMERCIAL

## 2024-01-23 ENCOUNTER — TELEPHONE (OUTPATIENT)
Dept: ENDOCRINOLOGY | Facility: CLINIC | Age: 24
End: 2024-01-23

## 2024-01-23 DIAGNOSIS — E66.01 CLASS 3 SEVERE OBESITY WITH SERIOUS COMORBIDITY AND BODY MASS INDEX (BMI) OF 40.0 TO 44.9 IN ADULT, UNSPECIFIED OBESITY TYPE (H): ICD-10-CM

## 2024-01-23 DIAGNOSIS — E66.813 CLASS 3 SEVERE OBESITY WITH SERIOUS COMORBIDITY AND BODY MASS INDEX (BMI) OF 40.0 TO 44.9 IN ADULT, UNSPECIFIED OBESITY TYPE (H): ICD-10-CM

## 2024-01-23 NOTE — TELEPHONE ENCOUNTER
PA Initiation    Medication: WEGOVY 2.4 MG/0.75ML SC SOAJ  Insurance Company: [employer] ([facility]) - Phone [phone] Fax [phone]  Pharmacy Filling the Rx:    Filling Pharmacy Phone:    Filling Pharmacy Fax:    Start Date: 1/23/2024    Key: [id number]

## 2024-01-25 NOTE — TELEPHONE ENCOUNTER
Wegovy denied, please refer to denial letter below. Please advise on how you would like to proceed. If appealed please provide medical rational.

## 2024-01-25 NOTE — TELEPHONE ENCOUNTER
PRIOR AUTHORIZATION DENIED    Medication: WEGOVY 2.4 MG/0.75ML SC SOAJ  Insurance Company: [employer] ([facility]) - Phone [phone] Fax [phone]  Denial Date: 1/23/2024  Denial Reason(s):   Appeal Information:

## 2024-02-01 ENCOUNTER — VIRTUAL VISIT (OUTPATIENT)
Dept: ENDOCRINOLOGY | Facility: CLINIC | Age: 24
End: 2024-02-01
Payer: COMMERCIAL

## 2024-02-01 VITALS — HEIGHT: 67 IN | WEIGHT: 253 LBS | BODY MASS INDEX: 39.71 KG/M2

## 2024-02-01 DIAGNOSIS — E66.01 CLASS 3 SEVERE OBESITY WITH SERIOUS COMORBIDITY AND BODY MASS INDEX (BMI) OF 40.0 TO 44.9 IN ADULT, UNSPECIFIED OBESITY TYPE (H): Primary | ICD-10-CM

## 2024-02-01 DIAGNOSIS — K21.00 GASTROESOPHAGEAL REFLUX DISEASE WITH ESOPHAGITIS, UNSPECIFIED WHETHER HEMORRHAGE: ICD-10-CM

## 2024-02-01 DIAGNOSIS — Z98.84 S/P LAPAROSCOPIC SLEEVE GASTRECTOMY: ICD-10-CM

## 2024-02-01 DIAGNOSIS — R11.0 NAUSEA: ICD-10-CM

## 2024-02-01 DIAGNOSIS — E66.813 CLASS 3 SEVERE OBESITY WITH SERIOUS COMORBIDITY AND BODY MASS INDEX (BMI) OF 40.0 TO 44.9 IN ADULT, UNSPECIFIED OBESITY TYPE (H): Primary | ICD-10-CM

## 2024-02-01 PROCEDURE — 99213 OFFICE O/P EST LOW 20 MIN: CPT | Mod: 95 | Performed by: NURSE PRACTITIONER

## 2024-02-01 RX ORDER — FAMOTIDINE 40 MG/1
40 TABLET, FILM COATED ORAL DAILY
Qty: 30 TABLET | Refills: 3 | Status: SHIPPED | OUTPATIENT
Start: 2024-02-01 | End: 2024-07-18

## 2024-02-01 RX ORDER — NALTREXONE HYDROCHLORIDE 50 MG/1
TABLET, FILM COATED ORAL
Qty: 30 TABLET | Refills: 2 | Status: SHIPPED | OUTPATIENT
Start: 2024-02-01 | End: 2024-07-18

## 2024-02-01 ASSESSMENT — PAIN SCALES - GENERAL: PAINLEVEL: MODERATE PAIN (4)

## 2024-02-01 NOTE — ASSESSMENT & PLAN NOTE
New insurance after getting . Will no longer have coverage for wegovy of other GLP1. Has been off now for 2 weeks. Continue bupropion. Discussed strategy to help manage hunger/ thoughts about food moving forward.     Finding whole pill of wellbutrin in toilet. No diarrhea. Going to eventually switch to faster acting wellbutrin.     Start naltrexone- mid afternoon   Once tolerating, start phentermine   Consider switching to faster acting wellbutrin after tolerating both of them - send message if wanting to switch   Great work with hydration and protein   Follow up 3 months

## 2024-02-01 NOTE — PROGRESS NOTES
Return Bariatric Surgery Note    RE: Sasha Guy  MR#: [id number]  : 2000  VISIT DATE: 2024      Dear Oscar Medley,    I had the pleasure of seeing your patient, Sasha Guy, in my post-bariatric surgery assessment clinic.    Assessment & Plan   Problem List Items Addressed This Visit        Digestive    Class 3 severe obesity with serious comorbidity and body mass index (BMI) of 40.0 to 44.9 in adult (H) - Primary     New insurance after getting . Will no longer have coverage for wegovy of other GLP1. Has been off now for 2 weeks. Continue bupropion. Discussed strategy to help manage hunger/ thoughts about food moving forward.     Finding whole pill of wellbutrin in toilet. No diarrhea. Going to eventually switch to faster acting wellbutrin.     Start naltrexone- mid afternoon   Once tolerating, start phentermine   Consider switching to faster acting wellbutrin after tolerating both of them - send message if wanting to switch   Great work with hydration and protein   Follow up 3 months          Relevant Medications    naltrexone (DEPADE/REVIA) 50 MG tablet       Other    S/P laparoscopic sleeve gastrectomy    Relevant Medications    famotidine (PEPCID) 40 MG tablet   Other Visit Diagnoses     Nausea        Relevant Medications    famotidine (PEPCID) 40 MG tablet    Gastroesophageal reflux disease with esophagitis, unspecified whether hemorrhage        Relevant Medications    famotidine (PEPCID) 40 MG tablet             25 minutes spent by me on the date of the encounter doing chart review, history and exam, documentation and further activities per the note    CHIEF COMPLAINT: Post-bariatric surgery follow-up. 1.5 years s/p sleeve.    HISTORY OF PRESENT ILLNESS:      2024    12:47 PM   Questions Regarding Prior Weight Loss Surgery Reviewed With Patient   I had the following weight loss procedure Sleeve Gastrectomy   What year was your surgery?    How has your weight  changed since your last visit? I have stayed about the same   Do you currently have any of the following Heartburn, acid reflux, or GERD (acid reflux disease)?   Do you have any concerns today? Weight loss meds may not be helping. Wellbutrin just goes through me into the toilet.     Last seen 10/2023 - increased wegovy to 2.4mg. discussed pushing fluids and monitoring blood suars if nausea. Ws at plateau- increased hunger     Not trying to get pregnant. Using spermacid, condoms, currently. Will start trying to get pregnant this summer   wegovy no longer covered by insurance     Anti-obesity medications:     Current:   wegovy 2.4- beneficial after increase for 4-5 weeks- then increased hunger/ snacking   Loose stools- not digesting things - soft and formed stools 1-2 times daily. Noting undigested wellbutrin in stool   Really hungry after lunch- can't stop thinking about food - not stomach growling but not satisfied   Bupropion:     Recent diet changes: getting back on track after wedding  Protein with each eating episode     -Protein - doing protein shake once daily   -Water- improved significantly- started using cirkul water     Recent exercise/activity changes:   Having knee surgery in March   New gym membership     Recent stressors:    in November, switched to his insurance     Recent sleep changes: some insomnia, started on hydroxyzine     Vitamins/Labs: 7/2023    GERD symptoms?: better since adding pepcid- taking every 3 days     Weight History:      1/25/2024    12:47 PM   --   What is your highest lifetime weight? 365   What is your lowest weight since surgery? (In pounds) 251     Initial Weight (lbs): 365 lbs  Weight: 114.8 kg (253 lb)  Last Visits Weight: 115.2 kg (254 lb)  Cumulative weight loss (lbs): 112  Weight Loss Percentage: 30.68%        1/25/2024    12:47 PM   Questions Regarding Co-Morbidities and Health Concerns Reviewed With Patient   Pre-diabetes Never   Diabetes II Never   High Blood  "Pressure Gone Away   High cholesterol Never   Heartburn/Reflux Stayed the same   Sleep apnea Never   PCOS Never   Back pain Stayed the same   Joint pain Worsened   Lower leg swelling Gone away           1/25/2024    12:47 PM   Eating Habits   How many meals do you eat per day? 3   Do you snack between meals? Sometimes   How much food are you eating at each meal? 1/2 cup to 1 cup   Are you able to separate your meals and liquids by at least 30 minutes? Sometimes   Are you able to avoid liquid calories? Sometimes           1/25/2024    12:47 PM   Exercise Questions Reviewed With Patient   How often do you exercise? Less than 1 time per week   What is the duration of your exercise (in minutes)? 15 Minutes   What types of exercise do you do? walking   What keeps you from being more active? Pain       Social History:      1/25/2024    12:47 PM   --   Are you smoking? No   Are you drinking alcohol? Yes   How much alcohol? 1x a month       Medications:  Current Outpatient Medications   Medication     famotidine (PEPCID) 40 MG tablet     naltrexone (DEPADE/REVIA) 50 MG tablet     albuterol (PROAIR HFA/PROVENTIL HFA/VENTOLIN HFA) 108 (90 Base) MCG/ACT inhaler     augmented betamethasone dipropionate (DIPROLENE) 0.05 % external lotion     augmented betamethasone dipropionate (DIPROLENE-AF) 0.05 % external ointment     B-D INSULIN SYRINGE 25G X 5/8\" 1 ML MISC     benzonatate (TESSALON) 200 MG capsule     blood glucose (NO BRAND SPECIFIED) test strip     buPROPion (WELLBUTRIN XL) 150 MG 24 hr tablet     Cholecalciferol (VITAMIN D3) 25 MCG TABS     cyanocobalamin (CYANOCOBALAMIN) 1000 MCG/ML injection     Fexofenadine HCl (ALLEGRA PO)     fluticasone-salmeterol (ADVAIR) 250-50 MCG/ACT inhaler     hydrOXYzine HCl (ATARAX) 25 MG tablet     ipratropium - albuterol 0.5 mg/2.5 mg/3 mL (DUONEB) 0.5-2.5 (3) MG/3ML neb solution     metroNIDAZOLE (METROCREAM) 0.75 % external cream     nystatin (MYCOSTATIN) 340921 UNIT/GM external powder " "    spironolactone (ALDACTONE) 100 MG tablet     Syringe/Needle, Disp, (BD ECLIPSE SYRINGE/NEEDLE) 25G X 5/8\" 3 ML MISC     Current Facility-Administered Medications   Medication     1 mL ropivacaine (NAROPIN) injection 5 mg/mL     2 mL bupivacaine (MARCAINE) preservative free injection 0.5% (20 mL vial)     lidocaine 1 % injection 2 mL     lidocaine 1 % injection 3 mL     triamcinolone (KENALOG-40) injection 40 mg     triamcinolone (KENALOG-40) injection 40 mg         1/25/2024    12:47 PM   --   Do you avoid NSAIDs such as (Ibuprofen, Aleve, Naproxen, Advil)? Yes       ROS:  GI:       1/25/2024    12:47 PM   --   Vomiting No   Diarrhea Yes   Constipation Yes   Swallowing trouble No   Abdominal pain No   Heartburn Yes     Skin:       1/25/2024    12:47 PM   BAR RBS ROS - SKIN   Rash in skin folds Yes     Psych:       1/25/2024    12:47 PM   --   Depression No   Anxiety No     Female Only:       1/25/2024    12:47 PM   BAR RBS ROS -    Female only Irregular menstrual cycles    Regular menstrual cycles   Stress urinary incontinence No       Lab on 10/26/2023   Component Date Value Ref Range Status     WBC Count 10/26/2023 6.3  4.0 - 11.0 10e3/uL Final     RBC Count 10/26/2023 4.83  3.80 - 5.20 10e6/uL Final     Hemoglobin 10/26/2023 14.3  11.7 - 15.7 g/dL Final     Hematocrit 10/26/2023 42.1  35.0 - 47.0 % Final     MCV 10/26/2023 87  78 - 100 fL Final     MCH 10/26/2023 29.6  26.5 - 33.0 pg Final     MCHC 10/26/2023 34.0  31.5 - 36.5 g/dL Final     RDW 10/26/2023 11.8  10.0 - 15.0 % Final     Platelet Count 10/26/2023 298  150 - 450 10e3/uL Final     Ferritin 10/26/2023 221 (H)  6 - 175 ng/mL Final     Iron 10/26/2023 115  37 - 145 ug/dL Final     Iron Binding Capacity 10/26/2023 312  240 - 430 ug/dL Final     Iron Sat Index 10/26/2023 37  15 - 46 % Final             3/1/2018    10:00 AM   ELVA Score (Last Two)   ELVA Raw Score 40   Activation Score 100   ELVA Level 4         PHYSICAL EXAM:  Objective    Ht 1.702 m " "(5' 7.01\")   Wt 114.8 kg (253 lb)   BMI 39.62 kg/m    Vitals - Patient Reported  Pain Score: Moderate Pain (4)  Pain Loc: Knee        Physical Exam   GENERAL: alert and no distress  EYES: Eyes grossly normal to inspection.  No discharge or erythema, or obvious scleral/conjunctival abnormalities.  RESP: No audible wheeze, cough, or visible cyanosis.    SKIN: Visible skin clear. No significant rash, abnormal pigmentation or lesions.  NEURO: Cranial nerves grossly intact.  Mentation and speech appropriate for age.  PSYCH: Appropriate affect, tone, and pace of words        Sincerely,    Komal Miller NP      Virtual Visit Details    Type of service:  Video Visit     Originating Location (pt. Location): Home    Distant Location (provider location):  Off-site  Platform used for Video Visit: Mitch"

## 2024-02-01 NOTE — NURSING NOTE
Is the patient currently in the state of [place]? YES    Visit mode:VIDEO    If the visit is dropped, the patient can be reconnected by: VIDEO VISIT: Text to cell phone:   Telephone Information:   Mobile [phone]       Will anyone else be joining the visit? NO  (If patient encounters technical issues they should call [phone]    How would you like to obtain your AVS? MyChart    Are changes needed to the allergy or medication list? Pt stated no changes to allergies and Pt stated no med changes    Reason for visit: Follow Up    Delmy HACKETT

## 2024-02-01 NOTE — LETTER
2024       RE: Sasha Guy  [address]  [facility] [address]     Dear Colleague,    Thank you for referring your patient, Sasha Guy, to the [facility] [place] at [facility]. Please see a copy of my visit note below.    Return Bariatric Surgery Note    RE: Sasha Guy  MR#: [id number]  : 2000  VISIT DATE: 2024      Dear Oscar Medley,    I had the pleasure of seeing your patient, Sasha Guy, in my post-bariatric surgery assessment clinic.    Assessment & Plan   Problem List Items Addressed This Visit          Digestive    Class 3 severe obesity with serious comorbidity and body mass index (BMI) of 40.0 to 44.9 in adult (H) - Primary     New insurance after getting . Will no longer have coverage for wegovy of other GLP1. Has been off now for 2 weeks. Continue bupropion. Discussed strategy to help manage hunger/ thoughts about food moving forward.     Finding whole pill of wellbutrin in toilet. No diarrhea. Going to eventually switch to faster acting wellbutrin.     Start naltrexone- mid afternoon   Once tolerating, start phentermine   Consider switching to faster acting wellbutrin after tolerating both of them - send message if wanting to switch   Great work with hydration and protein   Follow up 3 months          Relevant Medications    naltrexone (DEPADE/REVIA) 50 MG tablet       Other    S/P laparoscopic sleeve gastrectomy    Relevant Medications    famotidine (PEPCID) 40 MG tablet     Other Visit Diagnoses       Nausea        Relevant Medications    famotidine (PEPCID) 40 MG tablet    Gastroesophageal reflux disease with esophagitis, unspecified whether hemorrhage        Relevant Medications    famotidine (PEPCID) 40 MG tablet               25 minutes spent by me on the date of the encounter doing chart review, history and exam, documentation and further activities per the  note    CHIEF COMPLAINT: Post-bariatric surgery follow-up. 1.5 years s/p sleeve.    HISTORY OF PRESENT ILLNESS:      1/25/2024    12:47 PM   Questions Regarding Prior Weight Loss Surgery Reviewed With Patient   I had the following weight loss procedure Sleeve Gastrectomy   What year was your surgery? 2022   How has your weight changed since your last visit? I have stayed about the same   Do you currently have any of the following Heartburn, acid reflux, or GERD (acid reflux disease)?   Do you have any concerns today? Weight loss meds may not be helping. Wellbutrin just goes through me into the toilet.     Last seen 10/2023 - increased wegovy to 2.4mg. discussed pushing fluids and monitoring blood suars if nausea. Ws at plateau- increased hunger     Not trying to get pregnant. Using spermacid, condoms, currently. Will start trying to get pregnant this summer   wegovy no longer covered by insurance     Anti-obesity medications:     Current:   wegovy 2.4- beneficial after increase for 4-5 weeks- then increased hunger/ snacking   Loose stools- not digesting things - soft and formed stools 1-2 times daily. Noting undigested wellbutrin in stool   Really hungry after lunch- can't stop thinking about food - not stomach growling but not satisfied   Bupropion:     Recent diet changes: getting back on track after wedding  Protein with each eating episode     -Protein - doing protein shake once daily   -Water- improved significantly- started using cirkul water     Recent exercise/activity changes:   Having knee surgery in March   New gym membership     Recent stressors:    in November, switched to his insurance     Recent sleep changes: some insomnia, started on hydroxyzine     Vitamins/Labs: 7/2023    GERD symptoms?: better since adding pepcid- taking every 3 days     Weight History:      1/25/2024    12:47 PM   --   What is your highest lifetime weight? 365   What is your lowest weight since surgery? (In pounds) 251  "    Initial Weight (lbs): 365 lbs  Weight: 114.8 kg (253 lb)  Last Visits Weight: 115.2 kg (254 lb)  Cumulative weight loss (lbs): 112  Weight Loss Percentage: 30.68%        1/25/2024    12:47 PM   Questions Regarding Co-Morbidities and Health Concerns Reviewed With Patient   Pre-diabetes Never   Diabetes II Never   High Blood Pressure Gone Away   High cholesterol Never   Heartburn/Reflux Stayed the same   Sleep apnea Never   PCOS Never   Back pain Stayed the same   Joint pain Worsened   Lower leg swelling Gone away           1/25/2024    12:47 PM   Eating Habits   How many meals do you eat per day? 3   Do you snack between meals? Sometimes   How much food are you eating at each meal? 1/2 cup to 1 cup   Are you able to separate your meals and liquids by at least 30 minutes? Sometimes   Are you able to avoid liquid calories? Sometimes           1/25/2024    12:47 PM   Exercise Questions Reviewed With Patient   How often do you exercise? Less than 1 time per week   What is the duration of your exercise (in minutes)? 15 Minutes   What types of exercise do you do? walking   What keeps you from being more active? Pain       Social History:      1/25/2024    12:47 PM   --   Are you smoking? No   Are you drinking alcohol? Yes   How much alcohol? 1x a month       Medications:  Current Outpatient Medications   Medication    famotidine (PEPCID) 40 MG tablet    naltrexone (DEPADE/REVIA) 50 MG tablet    albuterol (PROAIR HFA/PROVENTIL HFA/VENTOLIN HFA) 108 (90 Base) MCG/ACT inhaler    augmented betamethasone dipropionate (DIPROLENE) 0.05 % external lotion    augmented betamethasone dipropionate (DIPROLENE-AF) 0.05 % external ointment    B-D INSULIN SYRINGE 25G X 5/8\" 1 ML MISC    benzonatate (TESSALON) 200 MG capsule    blood glucose (NO BRAND SPECIFIED) test strip    buPROPion (WELLBUTRIN XL) 150 MG 24 hr tablet    Cholecalciferol (VITAMIN D3) 25 MCG TABS    cyanocobalamin (CYANOCOBALAMIN) 1000 MCG/ML injection    Fexofenadine " "HCl (ALLEGRA PO)    fluticasone-salmeterol (ADVAIR) 250-50 MCG/ACT inhaler    hydrOXYzine HCl (ATARAX) 25 MG tablet    ipratropium - albuterol 0.5 mg/2.5 mg/3 mL (DUONEB) 0.5-2.5 (3) MG/3ML neb solution    metroNIDAZOLE (METROCREAM) 0.75 % external cream    nystatin (MYCOSTATIN) 667720 UNIT/GM external powder    spironolactone (ALDACTONE) 100 MG tablet    Syringe/Needle, Disp, (BD ECLIPSE SYRINGE/NEEDLE) 25G X 5/8\" 3 ML MISC     Current Facility-Administered Medications   Medication    1 mL ropivacaine (NAROPIN) injection 5 mg/mL    2 mL bupivacaine (MARCAINE) preservative free injection 0.5% (20 mL vial)    lidocaine 1 % injection 2 mL    lidocaine 1 % injection 3 mL    triamcinolone (KENALOG-40) injection 40 mg    triamcinolone (KENALOG-40) injection 40 mg         1/25/2024    12:47 PM   --   Do you avoid NSAIDs such as (Ibuprofen, Aleve, Naproxen, Advil)? Yes       ROS:  GI:       1/25/2024    12:47 PM   --   Vomiting No   Diarrhea Yes   Constipation Yes   Swallowing trouble No   Abdominal pain No   Heartburn Yes     Skin:       1/25/2024    12:47 PM   BAR RBS ROS - SKIN   Rash in skin folds Yes     Psych:       1/25/2024    12:47 PM   --   Depression No   Anxiety No     Female Only:       1/25/2024    12:47 PM   BAR RBS ROS -    Female only Irregular menstrual cycles    Regular menstrual cycles   Stress urinary incontinence No       Lab on 10/26/2023   Component Date Value Ref Range Status    WBC Count 10/26/2023 6.3  4.0 - 11.0 10e3/uL Final    RBC Count 10/26/2023 4.83  3.80 - 5.20 10e6/uL Final    Hemoglobin 10/26/2023 14.3  11.7 - 15.7 g/dL Final    Hematocrit 10/26/2023 42.1  35.0 - 47.0 % Final    MCV 10/26/2023 87  78 - 100 fL Final    MCH 10/26/2023 29.6  26.5 - 33.0 pg Final    MCHC 10/26/2023 34.0  31.5 - 36.5 g/dL Final    RDW 10/26/2023 11.8  10.0 - 15.0 % Final    Platelet Count 10/26/2023 298  150 - 450 10e3/uL Final    Ferritin 10/26/2023 221 (H)  6 - 175 ng/mL Final    Iron 10/26/2023 115  37 - " "145 ug/dL Final    Iron Binding Capacity 10/26/2023 312  240 - 430 ug/dL Final    Iron Sat Index 10/26/2023 37  15 - 46 % Final             3/1/2018    10:00 AM   ELVA Score (Last Two)   ELVA Raw Score 40   Activation Score 100   ELVA Level 4         PHYSICAL EXAM:  Objective    Ht 1.702 m (5' 7.01\")   Wt 114.8 kg (253 lb)   BMI 39.62 kg/m    Vitals - Patient Reported  Pain Score: Moderate Pain (4)  Pain Loc: Knee        Physical Exam   GENERAL: alert and no distress  EYES: Eyes grossly normal to inspection.  No discharge or erythema, or obvious scleral/conjunctival abnormalities.  RESP: No audible wheeze, cough, or visible cyanosis.    SKIN: Visible skin clear. No significant rash, abnormal pigmentation or lesions.  NEURO: Cranial nerves grossly intact.  Mentation and speech appropriate for age.  PSYCH: Appropriate affect, tone, and pace of words        Sincerely,    Komal Miller NP      Virtual Visit Details    Type of service:  Video Visit     Originating Location (pt. Location): Home    Distant Location (provider location):  Off-site  Platform used for Video Visit: Mitch  "

## 2024-02-01 NOTE — PATIENT INSTRUCTIONS
"Thank you for allowing us the privilege of caring for you. We hope we provided you with the excellent service you deserve.   Please let us know if there is anything else we can do for you so that we can be sure you are completely satisfied with your care experience.    To ensure the quality of our services you may be receiving a patient satisfaction survey from an independent patient satisfaction monitoring company.    The greatest compliment you can give is a \"Likely to Recommend\"    Your visit was with Komal Miller NP today.    Instructions per today's visit:     Harjit Guy, it was great to visit with you today.  Here is a review of our visit.  If our clinic scheduler is not able to reach you please call [phone] to schedule your next appointments.    Start naltrexone- mid afternoon   Once tolerating, start phentermine   Consider switching to faster acting wellbutrin after tolerating both of them - send message if wanting to switch   Great work with hydration and protein   Follow up 3 months       Information about Video Visits with [facility]: video visit information  _________________________________________________________________________________________________________________________________________________________  If you are asked by your clinic team to have your blood pressure checked:  [facility] do offer several locations for blood pressure checks. Please follow the below link to schedule an appointment. Scheduling an appointment at the pharmacy for a blood pressure check is now preferred.    Appointment Plus ([email or web address])  _________________________________________________________________________________________________________________________________________________________  Important contact and scheduling information:  Please call our contact center at [phone] to schedule your next appointments.  To find a lab location near you, please call [phone].  For " any nursing questions or concerns call Keke Feliciano LPN at [phone] or Amanda Rivas RN at [phone]  Please call during clinic hours Monday through Friday 8:00a - 4:00p if you have questions or you can contact us via [employer] at anytime and we will reply during clinic hours.    Lab results will be communicated through My Chart or letter (if My Chart not used). Please call the clinic if you have not received communication after 1 week or if you have any questions.?  Clinic Fax: [phone]    _________________________________________________________________________________________________________________________________________________________  Meal Replacement Products:    Here is the link to our new e-store where you can purchase our meal replacement products    [facility] E-Store  [email or web address]    The one week starter kit is a great way to sample a variety of products and see what works for you.    If you want more information about the product go to: [employer]    If you are an employee or [facility] or [facility] please contact your care team for a 10% estore discount    Free Shipping for orders over $75     Benefits of meal replacements products:    Portion and calorie control  Improved nutrition  Structured eating  Simplified food choices  Avoid contact with trigger foods  _________________________________________________________________________________________________________________________________________________________  Interested in working with a health ?  Health coaches work with you to improve your overall health and wellbeing.  They look at the whole person, and may involve discussion of different areas of life, including, but not limited to the four pillars of health (sleep, exercise, nutrition, and stress management). Discuss with your care team if you would like to start working a health .  Health  Coaching-3 Pack: Schedule by calling [phone]    $99 for three health coaching visits    Visits may be done in person or via phone    Coaching is a partnership between the  and the client; Coaches do not prescribe or diagnose    Coaching helps inspire the client to reach his/her personal goals   _________________________________________________________________________________________________________________________________________________________  24 Week Healthy Lifestyle Plan:    Our mission in the 24-week Healthy Lifestyle Plan is to provide you with individualized care by giving you the tools, education and support you need to lose weight and maintain a healthy lifestyle. In your 24-week journey, you ll be supported by a dedicated weight loss team that includes registered dietitians, medical weight management providers, health coaches, and nurses -- all with special expertise in weight loss -- to help you every step of the way.     Monthly meetings with your registered dietician or medical weight management provider help to review your progress, update your care plan, and make any adjustments needed to ensure success. Between these visits, weekly and bi-weekly health  visits will help you focus on the four pillars of weight loss -- stress, sleep, nutrition, and exercise -- and how you can best adapt each to achieve sustainable weight loss results.    In addition, you will be given exclusive access to online wellbeing classes through [employer].  Your initial visit will be with a medical weight management provider who will help to understand your weight loss goals and ensure this program is the right fit for you. Please let our team know if you are interested in the 24 week plan by sending a message to your care team or calling [phone] to  schedule.  _________________________________________________________________________________________________________________________________________________________  __________  [facility] Get Moving Program  Our team of physical therapists is trained to help you understand and take control of your condition. They will perform a thorough evaluation to determine your ability for activity and develop a customized plan to fit your goals and physical ability.  Scheduling: Unsure if the Get Moving program is right for you? Discuss the program with your medical provider or diabetes educator. You can also call us at [phone] to ask questions or schedule an appointment.   [facility] Get Moving Program  ____________________________________________________________________________________________________________________________________________________________________________   [facility] Diabetes Prevention Program (DPP)  If you have prediabetes and Medicare please contact us via [employer] to learn more about the Diabetes Prevention Program (DPP)  Program Details:   [facility] offers the year-long Diabetes Prevention Program (DPP). The program helps you to make lifestyle changes that prevent or delay type 2 diabetes by supporting healthy eating, increased physical activity, stress reduction and use of coping skills.   On average, previous [facility] DPP cohorts have lost and maintained at least 5% of their starting weight throughout the program and averaged more than 150 minutes of physical activity per week.  Participants meet weekly for one-hour group sessions over sixteen weeks, every other week for the next 8 weeks, and monthly for the last six months.   A year-long maintenance program is also available for participants who complete the first year.   Location & Cost:   During the COVID-19 Public Health Emergency, the program is offered virtually. When in-person classes can resume, they  will be held at [facility].  For people with Medicare, the program is covered in full. A self-pay option will also be available for those with non-Medicare insurance plans.   ______________________________________________________________________________________________________________________________________________________________________________________________________________________________    To work with a Behavioral Health Psychologist:    Call to schedule:    Aron Nice - [phone]  Hannah Casas - [phone]  Samira Prescott - [phone]  Michelle Breaux - [phone]   Stephanie Leal PhD (cannot accept Medicare) [phone]        Thank you,   [facility] Comprehensive Weight Management Team                            MEDICATION STARTED AT THIS APPOINTMENT  We are starting Naltrexone. Start with 1/2 tab 1-2 hours prior to the time you have the most trouble with cravings or extra hunger. If you are doing well you may switch to a whole tablet taken at the same time period.     WARNING: This medication blocks the action of opioid type pain medications. If you routinely take any medication like Codeine, Oxycontin, Percocet, Morphine, Dilaudid or Methodone, do not take this until you have talked with weight management staff. If you are planning surgery you should stop Naltrexone 4 days prior to the surgery. If you have an injury that requires pain medication, make sure the health care staff knows you take Naltrexone.     Contact the nurse via [employer] or call [phone] if you have any questions or concerns. (Do not stop taking it if you don't think it's working. For some people it works without them knowing it.)    Naltrexone is a medication that is used most often to help people who are troubled by dependence on prescription pain killers or alcohol. It has also been found to help with weight loss. Although it's not currently FDA approved for weight loss, it  "has been used safely for a number of years to help people who are carrying extra weight.     Just how Naltrexone helps with weight loss has not been exactly determined.  It seems to work by quieting down brain signals related to strong food cravings. Many of our patients use the word \"addiction\" to describe their feelings and constant thoughts about food. It makes sense then to treat the feeling of dependence on food, outside of real hunger, with a medication designed to help with other sorts of dependence.     Our patients on Naltrexone find that they:    >feel less interest in food   >think less about food and eating and have more time to think of other things   >find it easier to push the plate away   >have an easier time eating less    For some of our patients, these feelings are very immediate. Other patients, don't feel much of a change but find they've lost weight. Like all weight loss medications, Naltrexone works best when you help it work. This means:  1. Having less tempting high calorie (fattening) food around the house or office. (For people with strong cravings this is very important.)   2. Staying away from situations or people that may trigger your cravings .   3. Eating out only one time or less each week.  4. Eating your meals at a table with the TV or computer off.    Side-effects. Naltrexone is generally well tolerated. The main side-effect we see is  nausea or a woozy feeling. A small number of people feel quite ill. Most people have a mild reaction and some people have no reaction at all.  The good news is that this feeling does go away.     In order to avoid nausea, please start the medication with half a pill for the first few days. Go on to a full pill if you are feeling well.      If you  are nauseated on 1/2 a pill it is okay to cut back to 1/4 pill ( a very small amount). Take this for a couple of days and work your way back up to a 1/2 pill and then a whole pill. Taking the medication at " night or with food  to start also may help prevent the feeling of nausea.         Please refer to the pharmacy insert for more information on side-effects. Since many pharmacists are not familiar with the use of naltrexone in weight loss, calling the nurse at [phone] will get you the most accurate information.  In order to get refills of this or any medication we prescribe you must be seen in the medical weight mgmt clinic every 2-3 months.

## 2024-02-12 ENCOUNTER — TRANSFERRED RECORDS (OUTPATIENT)
Dept: HEALTH INFORMATION MANAGEMENT | Facility: CLINIC | Age: 24
End: 2024-02-12
Payer: COMMERCIAL

## 2024-02-27 ENCOUNTER — E-VISIT (OUTPATIENT)
Dept: OBGYN | Facility: CLINIC | Age: 24
End: 2024-02-27
Payer: COMMERCIAL

## 2024-02-27 DIAGNOSIS — L08.9 TOE INFECTION: Primary | ICD-10-CM

## 2024-02-27 PROCEDURE — 99422 OL DIG E/M SVC 11-20 MIN: CPT | Performed by: FAMILY MEDICINE

## 2024-02-28 RX ORDER — CEPHALEXIN 500 MG/1
500 CAPSULE ORAL 2 TIMES DAILY
Qty: 20 CAPSULE | Refills: 0 | Status: SHIPPED | OUTPATIENT
Start: 2024-02-28 | End: 2024-03-09

## 2024-03-05 ENCOUNTER — OFFICE VISIT (OUTPATIENT)
Dept: FAMILY MEDICINE | Facility: CLINIC | Age: 24
End: 2024-03-05
Payer: COMMERCIAL

## 2024-03-05 VITALS
HEART RATE: 86 BPM | TEMPERATURE: 97.1 F | DIASTOLIC BLOOD PRESSURE: 62 MMHG | HEIGHT: 67 IN | RESPIRATION RATE: 16 BRPM | OXYGEN SATURATION: 97 % | BODY MASS INDEX: 43 KG/M2 | SYSTOLIC BLOOD PRESSURE: 120 MMHG | WEIGHT: 274 LBS

## 2024-03-05 DIAGNOSIS — F32.1 MAJOR DEPRESSIVE DISORDER, SINGLE EPISODE, MODERATE (H): ICD-10-CM

## 2024-03-05 DIAGNOSIS — S83.281D TEAR OF LATERAL CARTILAGE OR MENISCUS OF KNEE, CURRENT, RIGHT, SUBSEQUENT ENCOUNTER: ICD-10-CM

## 2024-03-05 DIAGNOSIS — Z01.818 PREOP GENERAL PHYSICAL EXAM: Primary | ICD-10-CM

## 2024-03-05 PROCEDURE — 99214 OFFICE O/P EST MOD 30 MIN: CPT | Performed by: FAMILY MEDICINE

## 2024-03-05 ASSESSMENT — PAIN SCALES - GENERAL: PAINLEVEL: NO PAIN (0)

## 2024-03-05 NOTE — PROGRESS NOTES
Preoperative Evaluation  [address] [address]  Phone: [phone]  Fax: [phone]  Primary Provider: Carl Palacios  Pre-op Performing Provider: CARL PALACIOS  Mar 5, 2024       Sasha is a 23 year old, presenting for the following:  Pre-Op Exam        12/4/2023     1:01 PM   Additional Questions   Roomed by Erum   Accompanied by Pranay     Surgical Information  Surgery/Procedure: meniscus tear  Surgery Location: [facility]  Surgeon: Dr. Nails  Surgery Date: 3/26/24  Time of Surgery: ?  Where patient plans to recover: At home with family  Fax number for surgical facility: [facility]- [phone]    Assessment & Plan     The proposed surgical procedure is considered INTERMEDIATE risk.    (Z01.818) Preop general physical exam  (primary encounter diagnosis)  (S83.281D) Tear of lateral cartilage or meniscus of knee, current, right, subsequent encounter  Comment: scheduled for repair vs excision of the tear.    Plan: patient will hold all oral meds the morning of the procedure.  She has no respiratory or cardiac risk factors.         - No identified additional risk factors other than previously addressed    Antiplatelet or Anticoagulation Medication Instructions   - Patient is on no antiplatelet or anticoagulation medications.    Additional Medication Instructions  Patient will hold all chronic meds the morning of the procedure.    Recommendation  APPROVAL GIVEN to proceed with proposed procedure, without further diagnostic evaluation.    Review of prior external note(s) from - [facility] information from [facility] Dr. Nails consultation reviewed  25 minutes spent by me on the date of the encounter doing chart review, history and exam, documentation and further activities per the note    Subjective       HPI related to upcoming procedure: lateral meniscal tear from maneuvering to get out of her car, no traumatic injury.  She heard a pop and  had instant pain that has not resolved.  MRI showed a lateral meniscal tear.  Dr. Nails will either do a repair or excision of the torn fragment. He will make that decision once the arthroscope has been placed and he can better evaluate the injury.          2/27/2024     8:11 PM   Preop Questions   1. Have you ever had a heart attack or stroke? No   2. Have you ever had surgery on your heart or blood vessels, such as a stent placement, a coronary artery bypass, or surgery on an artery in your head, neck, heart, or legs? No   3. Do you have chest pain with activity? No   4. Do you have a history of  heart failure? No   5. Do you currently have a cold, bronchitis or symptoms of other infection? No   6. Do you have a cough, shortness of breath, or wheezing? No   7. Do you or anyone in your family have previous history of blood clots? UNKNOWN -    8. Do you or does anyone in your family have a serious bleeding problem such as prolonged bleeding following surgeries or cuts? No   9. Have you ever had problems with anemia or been told to take iron pills? No   10. Have you had any abnormal blood loss such as black, tarry or bloody stools, or abnormal vaginal bleeding? No   11. Have you ever had a blood transfusion? No   12. Are you willing to have a blood transfusion if it is medically needed before, during, or after your surgery? Yes   13. Have you or any of your relatives ever had problems with anesthesia? YES - drowsiness   14. Do you have sleep apnea, excessive snoring or daytime drowsiness? No   15. Do you have any artifical heart valves or other implanted medical devices like a pacemaker, defibrillator, or continuous glucose monitor? No   16. Do you have artificial joints? No   17. Are you allergic to latex? No   18. Is there any chance that you may be pregnant? No       Health Care Directive  Patient does not have a Health Care Directive or Living Will: not on file.     Preoperative Review of    reviewed - no  record of controlled substances prescribed.        Patient Active Problem List    Diagnosis Date Noted    Irritation of external ear canal, bilateral 07/05/2023     Priority: Medium    Mild intermittent asthma 04/25/2023     Priority: Medium    S/P laparoscopic sleeve gastrectomy 02/05/2023     Priority: Medium    BRCA2 gene mutation positive 02/01/2023     Priority: Medium    Hordeolum externum, unspecified laterality 11/03/2022     Priority: Medium    Class 3 severe obesity with serious comorbidity and body mass index (BMI) of 40.0 to 44.9 in adult (H) 03/25/2022     Priority: Medium    Essential hypertension 03/25/2022     Priority: Medium    Depression 03/11/2018     Priority: Medium    DELIA (generalized anxiety disorder) 02/09/2018     Priority: Medium    Major depressive disorder, single episode, moderate (H) 02/09/2018     Priority: Medium    Genital herpes simplex, unspecified site 01/18/2017     Priority: Medium    Mild persistent asthma without complication 01/18/2017     Priority: Medium    Wheezing 05/04/2016     Priority: Medium    Menorrhagia 01/20/2014     Priority: Medium    Dysmenorrhea 01/20/2014     Priority: Medium    Tear of lateral cartilage or meniscus of knee, current 09/09/2013     Priority: Medium    Morbid obesity (H) 09/14/2010     Priority: Medium     Initial weight 365lb (BMI 55) - bariatric surgery consult        Past Medical History:   Diagnosis Date    BRCA2 gene mutation positive 02/01/2023    Closed head injury, subsequent encounter 04/05/2018    Depressive disorder     History of PCR DNA positive for HSV1     Hypertension     Moderate major depression (H) 01/20/2014    Nexplanon placed 11/14/17 11/14/2017    Nexplanon placed 11/14/17 (removed 1/16/2020) 11/14/2017    Nexplanon placed 12/14/2021 12/14/2021    Nexplanon removed 12/04/2023 12/14/2021    Obesity 09/14/2010    Pyelonephritis 02/19/2018    Skin tag (right collar line and left labial region 11/21/2019    Tobacco use  "disorder 05/04/2016    Uncomplicated asthma      Past Surgical History:   Procedure Laterality Date    ARTHROSCOPY KNEE WITH MENISCAL REPAIR Right 05/10/2017    Procedure: ARTHROSCOPY KNEE WITH MENISCAL REPAIR;  arthroscopy right knee with lateral meniscus repair;  Surgeon: Nathaniel Hicks MD;  Location: [facility] OR    BIOPSY      Whole punch taken in calf to check for scleroderma.    DAVINCI GASTRIC SLEEVE N/A 07/26/2022    Procedure: GASTRECTOMY, SLEEVE, ROBOT-ASSISTED, LAPAROSCOPIC;  Surgeon: Joseph Mata MD;  Location: [facility] OR    ESOPHAGOSCOPY, GASTROSCOPY, DUODENOSCOPY (EGD), COMBINED N/A 03/28/2022    Procedure: ESOPHAGOGASTRODUODENOSCOPY (EGD);  Surgeon: Joseph Mata MD;  Location: [facility] OR     Current Outpatient Medications   Medication Sig Dispense Refill    albuterol (PROAIR HFA/PROVENTIL HFA/VENTOLIN HFA) 108 (90 Base) MCG/ACT inhaler Inhale 1-2 puffs into the lungs every 4 hours as needed for shortness of breath / dyspnea or wheezing 8.5 g 5    augmented betamethasone dipropionate (DIPROLENE) 0.05 % external lotion Apply topically to scalp nightly for 2 weeks then as needed.*      augmented betamethasone dipropionate (DIPROLENE-AF) 0.05 % external ointment Apply topically to feet as needed*      B-D INSULIN SYRINGE 25G X 5/8\" 1 ML MISC USE MONTHLY FOR B-12 INJECTIONS* 3 each 1    benzonatate (TESSALON) 200 MG capsule Take 1 capsule (200 mg) by mouth 3 times daily as needed for cough 30 capsule 1    blood glucose (NO BRAND SPECIFIED) test strip Use to test blood sugar 1 times daily or as directed. 100 strip 1    buPROPion (WELLBUTRIN SR) 100 MG 12 hr tablet Take 1 tablet (100 mg) by mouth 2 times daily 60 tablet 3    cephALEXin (KEFLEX) 500 MG capsule Take 1 capsule (500 mg) by mouth 2 times daily for 10 days 20 capsule 0    Cholecalciferol (VITAMIN D3) 25 MCG TABS       cyanocobalamin (CYANOCOBALAMIN) 1000 MCG/ML injection Inject 1 mL (1,000 mcg) Subcutaneous every 30 days 1 mL 1    " "famotidine (PEPCID) 40 MG tablet Take 1 tablet (40 mg) by mouth daily 30 tablet 3    Fexofenadine HCl (ALLEGRA PO) Take by mouth daily as needed for allergies      fluticasone-salmeterol (ADVAIR) 250-50 MCG/ACT inhaler Inhale 1 puff into the lungs every 12 hours 60 each 11    hydrOXYzine HCl (ATARAX) 25 MG tablet Take 25 mg (1 tablet) 30 minutes before bedtime.  May increase by 1 tablet to a maximum dose of 100 mg (4 tablets) to improve sleep habits. 180 tablet 1    ipratropium - albuterol 0.5 mg/2.5 mg/3 mL (DUONEB) 0.5-2.5 (3) MG/3ML neb solution Take 1 vial (3 mLs) by nebulization every 6 hours as needed for shortness of breath / dyspnea or wheezing 30 vial 1    metroNIDAZOLE (METROCREAM) 0.75 % external cream Apply toPICALLY TO face every morning.*      naltrexone (DEPADE/REVIA) 50 MG tablet Take 1/2 tablet once daily mid afternoon for 1 week, then increase to 1 tablet daily as directed if tolerating 30 tablet 2    nystatin (MYCOSTATIN) 022471 UNIT/GM external powder Apply topically 3 times daily as needed (rash in skin folds) 60 g 3    spironolactone (ALDACTONE) 100 MG tablet Take 1 tablet (100 mg) by mouth daily 90 tablet 3    Syringe/Needle, Disp, (BD ECLIPSE SYRINGE/NEEDLE) 25G X 5/8\" 3 ML MISC 1 Syringe every 30 days 1 each 11       Allergies   Allergen Reactions    Steri Strips      Got boils on incision after knee surgery from steri strips    Ondansetron Headache        Social History     Tobacco Use    Smoking status: Former     Packs/day: 1.00     Years: 5.00     Additional pack years: 0.00     Total pack years: 5.00     Types: Cigarettes     Quit date: 1/16/2021     Years since quitting: 3.1    Smokeless tobacco: Never   Substance Use Topics    Alcohol use: Not Currently     Family History   Problem Relation Age of Onset    Asthma Mother     Hypertension Mother     Breast Cancer Mother 54        lumpectomy and BSO    Hereditary Breast and Ovarian Cancer Syndrome Mother         BRCA2+    Obesity Mother  " "   Asthma Father     Diabetes Father     Melanoma Father 59        metastatic to brain    Hypertension Father     Hyperlipidemia Father     Prostate Cancer Father     Depression Father     Anxiety Disorder Father     Anesthesia Reaction Father         Asthma, hard wake up.    Obesity Father     Cervical Cancer Sister 19    Parathyroid Disorders Sister     Vascular Disease Sister         back of head    Colon Cancer Maternal Grandmother     Other Cancer Maternal Grandmother         Lung cancer    Lung Cancer Paternal Grandmother         smoker,  in late 80s    Colon Cancer Paternal Grandmother 60    Heart Disease Paternal Grandfather          in 70s    Melanoma Paternal Aunt     Prostate Cancer Paternal Uncle 40    Colon Cancer Paternal Uncle     Melanoma Paternal Uncle     Cancer Paternal Uncle         hematological cancer; s/p SCT    Melanoma Paternal Uncle     Melanoma Paternal Uncle     Cancer Paternal Uncle 61        GI tract; cause of death at 62; father's twin    Melanoma Paternal Uncle     Breast Cancer Other 50        contralateral occurrence at 57; ; maternal grandfather's sister    Breast Cancer Other 50        paternal grandfather's sister    Bleeding Disorder No family hx of     Clotting Disorder No family hx of      History   Drug Use Unknown         Review of Systems    Review of Systems  Constitutional, neuro, ENT, endocrine, pulmonary, cardiac, gastrointestinal, genitourinary, musculoskeletal, integument and psychiatric systems are negative, except as otherwise noted.    Objective    /62 (Cuff Size: Adult Large)   Pulse 86   Temp 97.1  F (36.2  C) (Temporal)   Resp 16   Ht 1.702 m (5' 7\")   Wt 124.3 kg (274 lb)   LMP 2024   SpO2 97%   BMI 42.91 kg/m     Estimated body mass index is 42.91 kg/m  as calculated from the following:    Height as of this encounter: 1.702 m (5' 7\").    Weight as of this encounter: 124.3 kg (274 lb).  Physical Exam  GENERAL: alert and no " distress  EYES: Eyes grossly normal to inspection, PERRL and conjunctivae and sclerae normal  HENT: ear canals and TM's normal, nose and mouth without ulcers or lesions  RESP: lungs clear to auscultation - no rales, rhonchi or wheezes  CV: regular rates and rhythm, normal S1 S2, no S3 or S4, and no murmur, click or rub  ABDOMEN: soft, nontender, no hepatosplenomegaly, no masses and bowel sounds normal  MS: knee was in a brace so did not have her remove it to do an exam.      Recent Labs   Lab Test 10/26/23  0908 07/20/23  1058 07/13/23  0807 11/03/22  0912   HGB 14.3 14.9 14.6 14.3    312 279 286   NA  --   --  140 140   POTASSIUM  --   --  4.2 4.2   CR  --   --  0.72 0.80   A1C  --   --  4.8 5.1        Diagnostics  No labs were ordered during this visit.   No EKG required, no history of coronary heart disease, significant arrhythmia, peripheral arterial disease or other structural heart disease.    Revised Cardiac Risk Index (RCRI)  The patient has the following serious cardiovascular risks for perioperative complications:   - No serious cardiac risks = 0 points     RCRI Interpretation: 0 points: Class I (very low risk - 0.4% complication rate)         Electronically signed by:  Oscar Medley M.D.  3/5/2024

## 2024-03-14 ENCOUNTER — OFFICE VISIT (OUTPATIENT)
Dept: FAMILY MEDICINE | Facility: CLINIC | Age: 24
End: 2024-03-14
Payer: COMMERCIAL

## 2024-03-14 VITALS
OXYGEN SATURATION: 99 % | HEART RATE: 77 BPM | SYSTOLIC BLOOD PRESSURE: 120 MMHG | RESPIRATION RATE: 18 BRPM | BODY MASS INDEX: 43.51 KG/M2 | WEIGHT: 277.2 LBS | DIASTOLIC BLOOD PRESSURE: 78 MMHG | HEIGHT: 67 IN

## 2024-03-14 DIAGNOSIS — H60.391 INFECTIVE OTITIS EXTERNA, RIGHT: ICD-10-CM

## 2024-03-14 DIAGNOSIS — H69.93 DYSFUNCTION OF BOTH EUSTACHIAN TUBES: ICD-10-CM

## 2024-03-14 DIAGNOSIS — L29.9 EAR ITCH: ICD-10-CM

## 2024-03-14 DIAGNOSIS — H66.001 NON-RECURRENT ACUTE SUPPURATIVE OTITIS MEDIA OF RIGHT EAR WITHOUT SPONTANEOUS RUPTURE OF TYMPANIC MEMBRANE: Primary | ICD-10-CM

## 2024-03-14 PROCEDURE — 99213 OFFICE O/P EST LOW 20 MIN: CPT

## 2024-03-14 RX ORDER — CIPROFLOXACIN AND DEXAMETHASONE 3; 1 MG/ML; MG/ML
4 SUSPENSION/ DROPS AURICULAR (OTIC) 2 TIMES DAILY
Qty: 7.5 ML | Refills: 0 | Status: SHIPPED | OUTPATIENT
Start: 2024-03-14 | End: 2024-03-24

## 2024-03-14 RX ORDER — CEFDINIR 300 MG/1
300 CAPSULE ORAL 2 TIMES DAILY
Qty: 14 CAPSULE | Refills: 0 | Status: SHIPPED | OUTPATIENT
Start: 2024-03-14 | End: 2024-03-21

## 2024-03-14 ASSESSMENT — PAIN SCALES - GENERAL: PAINLEVEL: NO PAIN (0)

## 2024-03-14 NOTE — PROGRESS NOTES
"  Assessment & Plan     Non-recurrent acute suppurative otitis media of right ear without spontaneous rupture of tympanic membrane  - cefdinir (OMNICEF) 300 MG capsule; Take 1 capsule (300 mg) by mouth 2 times daily for 7 days  - ciprofloxacin-dexAMETHasone (CIPRODEX) 0.3-0.1 % otic suspension; Place 4 drops into the right ear 2 times daily for 10 days  - Adult ENT  Referral; Future    Infective otitis externa, right  - cefdinir (OMNICEF) 300 MG capsule; Take 1 capsule (300 mg) by mouth 2 times daily for 7 days  - ciprofloxacin-dexAMETHasone (CIPRODEX) 0.3-0.1 % otic suspension; Place 4 drops into the right ear 2 times daily for 10 days  - Adult ENT  Referral; Future    Dysfunction of both eustachian tubes  - cefdinir (OMNICEF) 300 MG capsule; Take 1 capsule (300 mg) by mouth 2 times daily for 7 days  - ciprofloxacin-dexAMETHasone (CIPRODEX) 0.3-0.1 % otic suspension; Place 4 drops into the right ear 2 times daily for 10 days  - Adult ENT  Referral; Future    Ear itch  - cefdinir (OMNICEF) 300 MG capsule; Take 1 capsule (300 mg) by mouth 2 times daily for 7 days  - ciprofloxacin-dexAMETHasone (CIPRODEX) 0.3-0.1 % otic suspension; Place 4 drops into the right ear 2 times daily for 10 days  - Adult ENT  Referral; Future    Prescription drug management        BMI  Estimated body mass index is 43.42 kg/m  as calculated from the following:    Height as of this encounter: 1.702 m (5' 7\").    Weight as of this encounter: 125.7 kg (277 lb 3.2 oz).         See Patient Instructions  Patient Instructions   Cefdinir 300 mg twice  a day for 10 days     Ciprodex on the right ear for 7 days    Flonase nasal spray twice  a day     ENT consult      Patient understood and verbally consented to the treatment plan. Discussed symptoms that would warrant an urgent or emergent visit. All of the patients' questions were answered. Patient was instructed to contact the clinic if questions or concerns " arise. Recommend follow up appointments if symptoms worsen or fail to improve. Recommend follow up as needed. Recommend ER in the case of an emergency.    Karli Paredes PA-C    Please note: Voice recognition software may have been used in preparing this note, unintended word substitutions may be present.      Subjective   Sasha is a 23 year old, presenting for the following health issues:  Ear Problem      3/14/2024     2:06 PM   Additional Questions   Roomed by Amber HUANG     Ear Problem    History of Present Illness       Reason for visit:  Possible Ear infection  Symptom onset:  3-7 days ago  Symptoms include:  Leaking ear. Pus like substance on cotton swab. Pain inside ear.  Symptom intensity:  Moderate  Symptom progression:  Worsening  Had these symptoms before:  No  What makes it worse:  Na  What makes it better:  Na    She eats 2-3 servings of fruits and vegetables daily.She consumes 1 sweetened beverage(s) daily.She exercises with enough effort to increase her heart rate 10 to 19 minutes per day.  She exercises with enough effort to increase her heart rate 3 or less days per week. She is missing 2 dose(s) of medications per week.  She is not taking prescribed medications regularly due to remembering to take.         Acute Illness  Acute illness concerns: ear pain  Onset/Duration: 3/10/24  Symptoms:  Fever: No  Chills/Sweats: No  Headache (location?): No  Sinus Pressure: No  Conjunctivitis:  No  Ear Pain: YES: right  Rhinorrhea: No  Congestion: No  Sore Throat: No  Cough: no  Wheeze: No  Decreased Appetite: No  Nausea: No  Vomiting: No  Diarrhea: No  Dysuria/Freq.: No  Dysuria or Hematuria: No  Fatigue/Achiness: No  Sick/Strep Exposure: No  Therapies tried and outcome: none.     Patient reports 2 days ago she had fluid coming out of her right ear, describes brown, crusty white to green discharge.  She has had bilateral itching in her ears and has tried steroids in the ER without relief.  She has constant  "itching in her ears.  Reports that she was baptized on Sunday and symptoms began later that night.  She has been having itchy ears for years, she does put Q-tips in her ear to itch her ears.  She reports slightly muffled hearing.  He is currently not have any ear discharge.  She reports some pain and pressures in the ears.                Review of Systems  Constitutional, HEENT, cardiovascular, pulmonary, GI, , musculoskeletal, neuro, skin, endocrine and psych systems are negative, except as otherwise noted.      Objective    /78   Pulse 77   Resp 18   Ht 1.702 m (5' 7\")   Wt 125.7 kg (277 lb 3.2 oz)   LMP 03/06/2024 (Exact Date)   SpO2 99%   BMI 43.42 kg/m    Body mass index is 43.42 kg/m .  Physical Exam   GENERAL: alert and no distress  EYES: Eyes grossly normal to inspection, PERRL and conjunctivae and sclerae normal  HENT: Right ear, TM bulging with purulence and erythema, edema noted in the right ear canal.  Left ear: Canal is normal, no discharge, TM is retracted with notable fluid effusion.  No TM perforations bilaterally.  SKIN: no suspicious lesions or rashes  NEURO: Normal strength and tone, mentation intact and speech normal  PSYCH: mentation appears normal, affect normal/bright    Lab on 10/26/2023   Component Date Value Ref Range Status    WBC Count 10/26/2023 6.3  4.0 - 11.0 10e3/uL Final    RBC Count 10/26/2023 4.83  3.80 - 5.20 10e6/uL Final    Hemoglobin 10/26/2023 14.3  11.7 - 15.7 g/dL Final    Hematocrit 10/26/2023 42.1  35.0 - 47.0 % Final    MCV 10/26/2023 87  78 - 100 fL Final    MCH 10/26/2023 29.6  26.5 - 33.0 pg Final    MCHC 10/26/2023 34.0  31.5 - 36.5 g/dL Final    RDW 10/26/2023 11.8  10.0 - 15.0 % Final    Platelet Count 10/26/2023 298  150 - 450 10e3/uL Final    Ferritin 10/26/2023 221 (H)  6 - 175 ng/mL Final    Iron 10/26/2023 115  37 - 145 ug/dL Final    Iron Binding Capacity 10/26/2023 312  240 - 430 ug/dL Final    Iron Sat Index 10/26/2023 37  15 - 46 % Final "     No results found for any visits on 03/14/24.  No results found.        Signed Electronically by: Karli Paredes PA-C

## 2024-03-14 NOTE — PATIENT INSTRUCTIONS
Cefdinir 300 mg twice  a day for 10 days     Ciprodex on the right ear for 7 days    Flonase nasal spray twice  a day     ENT consult      Patient understood and verbally consented to the treatment plan. Discussed symptoms that would warrant an urgent or emergent visit. All of the patients' questions were answered. Patient was instructed to contact the clinic if questions or concerns arise. Recommend follow up appointments if symptoms worsen or fail to improve. Recommend follow up as needed. Recommend ER in the case of an emergency.    Karli Paredes PA-C    Please note: Voice recognition software may have been used in preparing this note, unintended word substitutions may be present.

## 2024-03-16 ENCOUNTER — HEALTH MAINTENANCE LETTER (OUTPATIENT)
Age: 24
End: 2024-03-16

## 2024-04-08 ENCOUNTER — TRANSFERRED RECORDS (OUTPATIENT)
Dept: HEALTH INFORMATION MANAGEMENT | Facility: CLINIC | Age: 24
End: 2024-04-08
Payer: COMMERCIAL

## 2024-06-20 ENCOUNTER — PATIENT OUTREACH (OUTPATIENT)
Dept: CARE COORDINATION | Facility: CLINIC | Age: 24
End: 2024-06-20
Payer: COMMERCIAL

## 2024-06-21 DIAGNOSIS — Z98.84 S/P LAPAROSCOPIC SLEEVE GASTRECTOMY: ICD-10-CM

## 2024-06-23 RX ORDER — SYRINGE AND NEEDLE,INSULIN,1ML 25GX1"
SYRINGE, EMPTY DISPOSABLE MISCELLANEOUS
Qty: 3 EACH | Refills: 1 | Status: CANCELLED | OUTPATIENT
Start: 2024-06-23

## 2024-06-27 ENCOUNTER — MYC MEDICAL ADVICE (OUTPATIENT)
Dept: FAMILY MEDICINE | Facility: CLINIC | Age: 24
End: 2024-06-27

## 2024-06-27 ENCOUNTER — VIRTUAL VISIT (OUTPATIENT)
Dept: FAMILY MEDICINE | Facility: CLINIC | Age: 24
End: 2024-06-27
Payer: COMMERCIAL

## 2024-06-27 DIAGNOSIS — O09.90 SUPERVISION OF HIGH RISK PREGNANCY, ANTEPARTUM: ICD-10-CM

## 2024-06-27 DIAGNOSIS — O09.90 SUPERVISION OF HIGH-RISK PREGNANCY: Primary | ICD-10-CM

## 2024-06-27 PROCEDURE — 99207 PR NO CHARGE NURSE ONLY: CPT | Mod: 93

## 2024-06-27 RX ORDER — MULTIVITAMIN WITH IRON
1 TABLET ORAL DAILY
COMMUNITY
End: 2024-09-27

## 2024-06-27 ASSESSMENT — ASTHMA QUESTIONNAIRES
ACT_TOTALSCORE: 25
QUESTION_4 LAST FOUR WEEKS HOW OFTEN HAVE YOU USED YOUR RESCUE INHALER OR NEBULIZER MEDICATION (SUCH AS ALBUTEROL): NOT AT ALL
QUESTION_1 LAST FOUR WEEKS HOW MUCH OF THE TIME DID YOUR ASTHMA KEEP YOU FROM GETTING AS MUCH DONE AT WORK, SCHOOL OR AT HOME: NONE OF THE TIME
QUESTION_5 LAST FOUR WEEKS HOW WOULD YOU RATE YOUR ASTHMA CONTROL: COMPLETELY CONTROLLED
QUESTION_2 LAST FOUR WEEKS HOW OFTEN HAVE YOU HAD SHORTNESS OF BREATH: NOT AT ALL
ACT_TOTALSCORE: 25
QUESTION_3 LAST FOUR WEEKS HOW OFTEN DID YOUR ASTHMA SYMPTOMS (WHEEZING, COUGHING, SHORTNESS OF BREATH, CHEST TIGHTNESS OR PAIN) WAKE YOU UP AT NIGHT OR EARLIER THAN USUAL IN THE MORNING: NOT AT ALL

## 2024-06-27 ASSESSMENT — PATIENT HEALTH QUESTIONNAIRE - PHQ9
10. IF YOU CHECKED OFF ANY PROBLEMS, HOW DIFFICULT HAVE THESE PROBLEMS MADE IT FOR YOU TO DO YOUR WORK, TAKE CARE OF THINGS AT HOME, OR GET ALONG WITH OTHER PEOPLE: NOT DIFFICULT AT ALL
SUM OF ALL RESPONSES TO PHQ QUESTIONS 1-9: 3
SUM OF ALL RESPONSES TO PHQ QUESTIONS 1-9: 3

## 2024-06-27 NOTE — PATIENT INSTRUCTIONS
Learning About Pregnancy  Your Care Instructions     Your health in the early weeks of your pregnancy is particularly important for your baby's health. Take good care of yourself. Anything you do that harms your body can also harm your baby.  Make sure to go to all of your doctor appointments. Regular checkups will help keep you and your baby healthy.  How can you care for yourself at home?  Diet    Choose healthy foods like fruits, vegetables, whole grains, lean proteins, and healthy fats.     Choose foods that are good sources of calcium, iron, and folate. You can try dairy products, dark leafy greens, fortified orange juice and cereals, almonds, broccoli, dried fruit, and beans.     Do not skip meals or go for many hours without eating. If you are nauseated, try to eat a small, healthy snack every 2 to 3 hours.     Avoid fish that are high in mercury. These include shark, swordfish, smooth mackerel, marlin, orange roughy, and bigeye tuna, as well as tilefish from the [facility].     It's okay to eat up to 8 to 12 ounces a week of fish that are low in mercury or up to 4 ounces a week of fish that have medium levels of mercury. Some fish that are low in mercury are salmon, shrimp, canned light tuna, cod, and tilapia. Some fish that have medium levels of mercury are halibut and white albacore tuna.     Drink plenty of fluids. If you have kidney, heart, or liver disease and have to limit fluids, talk with your doctor before you increase the amount of fluids you drink.     Limit caffeine to about 200 to 300 mg per day. On average, a cup of brewed coffee has around 80 to 100 mg of caffeine.     Do not drink alcohol, such as beer, wine, or hard liquor.     Take a multivitamin that contains at least 400 micrograms (mcg) of folic acid to help prevent birth defects. Fortified cereal and whole wheat bread are good additional sources of folic acid.     Increase the calcium in your diet. Try to drink a quart of skim milk  each day. You may also take calcium supplements and choose foods such as cheese and yogurt.   Lifestyle    Make sure you go to your follow-up appointments.     Get plenty of rest. You may be unusually tired while you are pregnant.     Get at least 30 minutes of exercise on most days of the week. Walking is a good choice. If you have not exercised in the past, start out slowly. Take several short walks each day.     Do not smoke. If you need help quitting, talk to your doctor about stop-smoking programs. These can increase your chances of quitting for good.     Do not touch cat feces or litter boxes. Also, wash your hands after you handle raw meat, and fully cook all meat before you eat it. Wear gloves when you work in the yard or garden, and wash your hands well when you are done. Cat feces, raw or undercooked meat, and contaminated dirt can cause an infection that may harm your baby or lead to a miscarriage.     Avoid things that can make your body too hot and may be harmful to your baby, such as a hot tub or sauna. Or talk with your doctor before doing anything that raises your body temperature. Your doctor can tell you if it's safe.     Avoid chemical fumes, paint fumes, or poisons.     Do not use illegal drugs, marijuana, or alcohol.   Medicines    Review all of your medicines with your doctor. Some of your routine medicines may need to be changed to protect your baby.     Use acetaminophen (Tylenol) to relieve minor problems, such as a mild headache or backache or a mild fever with cold symptoms. Do not use nonsteroidal anti-inflammatory drugs (NSAIDs), such as ibuprofen (Advil, Motrin) or naproxen (Aleve), unless your doctor says it is okay.     Do not take two or more pain medicines at the same time unless the doctor told you to. Many pain medicines have acetaminophen, which is Tylenol. Too much acetaminophen (Tylenol) can be harmful.     Take your medicines exactly as prescribed. Call your doctor if you  "think you are having a problem with your medicine.   To manage morning sickness    Keep food in your stomach, but not too much at once. Try eating five or six small meals a day instead of three large meals.     For nausea when you wake up, eat a small snack, such as a couple of crackers or pretzels, before rising. Allow a few minutes for your stomach to settle before you slowly get up.     Try to avoid smells and foods that make you feel nauseated. High-fat or greasy foods, milk, and coffee may make nausea worse. Some foods that may be easier to tolerate include cold, spicy, sour, and salty foods.     Drink enough fluids. Water and other caffeine-free drinks are good choices.     Take your prenatal vitamins at night on a full stomach.     Try foods and drinks made with tamra. Tamra may help with nausea.     Get lots of rest. Morning sickness may be worse when you are tired.     Talk to your doctor about over-the-counter products, such as vitamin B6 or doxylamine, to help relieve symptoms.     Try a P6 acupressure wrist band. These anti-nausea wristbands help some people.   Follow-up care is a key part of your treatment and safety. Be sure to make and go to all appointments, and call your doctor if you are having problems. It's also a good idea to know your test results and keep a list of the medicines you take.  Where can you learn more?  Go to https://www.[email or web address].net/patiented  Enter E868 in the search box to learn more about \"Learning About Pregnancy.\"  Current as of: July 10, 2023               Content Version: 14.0    9616-5395 [employer].   Care instructions adapted under license by your healthcare professional. If you have questions about a medical condition or this instruction, always ask your healthcare professional. [employer] disclaims any warranty or liability for your use of this information.      Weeks 6 to 10 of Your Pregnancy: Care Instructions  During these weeks of " "pregnancy, your body goes through many changes. You may start to feel different, both in your body and your emotions. Each pregnancy is different, so there's no \"right\" way to feel. These early weeks are a time to make healthy choices for you and your pregnancy.    Take a daily prenatal vitamin. Choose one with folic acid in it.    Avoid alcohol, tobacco, and drugs (including marijuana). If you need help quitting, talk to your doctor.    Drink plenty of liquids.  Be sure to drink enough water. And limit sodas, other sweetened drinks, and caffeine.     Choose foods that are good sources of calcium, iron, and folate.  You can try dairy products, dark leafy greens, fortified orange juice and cereals, almonds, broccoli, dried fruit, and beans.     Avoid foods that may be harmful.  Don't eat raw meat, deli meat, raw seafood, or raw eggs. Avoid soft cheese and unpasteurized dairy, like Brie and blue cheese. And don't eat fish that contains a lot of mercury, like shark and swordfish.     Don't touch anna marie litter or cat poop.  They can cause an infection that could be harmful during pregnancy.     Avoid things that can make your body too hot.  For example, avoid hot tubs and saunas.     Soothe morning sickness.  Try eating 5 or 6 small meals a day, getting some fresh air, or using tian to control symptoms.     Ask your doctor about flu and COVID-19 shots.  Getting them can help protect against infection.   Follow-up care is a key part of your treatment and safety. Be sure to make and go to all appointments, and call your doctor if you are having problems. It's also a good idea to know your test results and keep a list of the medicines you take.  Where can you learn more?  Go to https://www.Orugga.net/patiented  Enter G112 in the search box to learn more about \"Weeks 6 to 10 of Your Pregnancy: Care Instructions.\"  Current as of: July 10, 2023               Content Version: 14.0    0231-2698 [employer]. "   Care instructions adapted under license by your healthcare professional. If you have questions about a medical condition or this instruction, always ask your healthcare professional. [employer] disclaims any warranty or liability for your use of this information.         Managing Morning Sickness (01:55)  Your health professional recommends that you watch this short online health video.  Learn tips for dealing with morning sickness, no matter what time of day you have it.  Purpose:  Gives tips for managing morning sickness, including eating small low-fat meals and avoiding caffeine and spicy food.  Goal:  The user will learn tips for dealing with morning sickness during pregnancy.     How to watch the video    Scan the QR code   OR Visit the website    [email or web address]   Current as of: July 10, 2023               Content Version: 14.0    7827-2737 [employer].   Care instructions adapted under license by your healthcare professional. If you have questions about a medical condition or this instruction, always ask your healthcare professional. [employer] disclaims any warranty or liability for your use of this information.      Pregnancy and Heartburn: Care Instructions  Overview     Heartburn is a common problem during pregnancy.  Heartburn happens when stomach acid backs up into the tube that carries food to the stomach. This tube is called the esophagus. Early in pregnancy, heartburn is caused by hormone changes that slow down digestion. Later on, it's also caused by the large uterus pushing up on the stomach.  Even though you can't fix the cause, there are things you can do to get relief. Treating heartburn during pregnancy focuses first on making lifestyle changes, like changing what and how you eat, and on taking medicines.  Heartburn usually improves or goes away after childbirth.  Follow-up care is a key part of your treatment and safety. Be sure to make  "and go to all appointments, and call your doctor if you are having problems. It's also a good idea to know your test results and keep a list of the medicines you take.  How can you care for yourself at home?  Eat small, frequent meals.  Avoid foods that make your symptoms worse, such as chocolate, peppermint, and spicy foods. Avoid drinks with caffeine, such as coffee, tea, and sodas.  Avoid bending over or lying down after meals.  Take a short walk after you eat.  If heartburn is a problem at night, do not eat for 2 hours before bedtime.  Take antacids like Mylanta, Maalox, Rolaids, or Tums. Do not take antacids that have sodium bicarbonate, magnesium trisilicate, or aspirin. Be careful when you take over-the-counter antacid medicines. Many of these medicines have aspirin in them. While you are pregnant, do not take aspirin or medicines that contain aspirin unless your doctor says it is okay.  If you're not getting relief, talk to your doctor. You may be able to take a stronger acid-reducing medicine.  When should you call for help?   Call your doctor now or seek immediate medical care if:    You have new or worse belly pain.     You are vomiting.   Watch closely for changes in your health, and be sure to contact your doctor if:    You have new or worse symptoms of reflux.     You are losing weight.     You have trouble or pain swallowing.     You do not get better as expected.   Where can you learn more?  Go to https://www.Gewara.net/patiented  Enter U946 in the search box to learn more about \"Pregnancy and Heartburn: Care Instructions.\"  Current as of: July 10, 2023               Content Version: 14.0    7277-5841 [email or web address].   Care instructions adapted under license by your healthcare professional. If you have questions about a medical condition or this instruction, always ask your healthcare professional. [email or web address] disclaims any warranty or liability for your use of this " information.      Constipation: Care Instructions  Overview     Constipation means that you have a hard time passing stools (bowel movements). People pass stools from 3 times a day to once every 3 days. What is normal for you may be different. Constipation may occur with pain in the rectum and cramping. The pain may get worse when you try to pass stools. Sometimes there are small amounts of bright red blood on toilet paper or the surface of stools. This is because of enlarged veins near the rectum (hemorrhoids).  A few changes in your diet and lifestyle may help you avoid ongoing constipation. Your doctor may also prescribe medicine to help loosen your stool.  Some medicines can cause constipation. These include pain medicines and antidepressants. Tell your doctor about all the medicines you take. Your doctor may want to make a medicine change to ease your symptoms.  Follow-up care is a key part of your treatment and safety. Be sure to make and go to all appointments, and call your doctor if you are having problems. It's also a good idea to know your test results and keep a list of the medicines you take.  How can you care for yourself at home?  Drink plenty of fluids. If you have kidney, heart, or liver disease and have to limit fluids, talk with your doctor before you increase the amount of fluids you drink.  Include high-fiber foods in your diet each day. These include fruits, vegetables, beans, and whole grains.  Get at least 30 minutes of exercise on most days of the week. Walking is a good choice. You also may want to do other activities, such as running, swimming, cycling, or playing tennis or team sports.  Take a fiber supplement, such as Citrucel or Metamucil, every day. Read and follow all instructions on the label.  Schedule time each day for a bowel movement. A daily routine may help. Take your time having a bowel movement, but don't sit for more than 10 minutes at a time. And don't strain too  "much.  Support your feet with a small step stool when you sit on the toilet. This helps flex your hips and places your pelvis in a squatting position.  Your doctor may recommend an over-the-counter laxative to relieve your constipation. Examples are Milk of Magnesia and MiraLax. Read and follow all instructions on the label. Do not use laxatives on a long-term basis.  When should you call for help?   Call your doctor now or seek immediate medical care if:    You have new or worse belly pain.     You have new or worse nausea or vomiting.     You have blood in your stools.   Watch closely for changes in your health, and be sure to contact your doctor if:    Your constipation is getting worse.     You do not get better as expected.   Where can you learn more?  Go to https://www.Smart [email or web address].net/patiented  Enter P343 in the search box to learn more about \"Constipation: Care Instructions.\"  Current as of: October 19, 2023               Content Version: 14.0    7883-8096 [employer].   Care instructions adapted under license by your healthcare professional. If you have questions about a medical condition or this instruction, always ask your healthcare professional. [employer] disclaims any warranty or liability for your use of this information.      Learning About High-Iron Foods  What foods are high in iron?     The foods you eat contain nutrients, such as vitamins and minerals. Iron is a nutrient. Your body needs the right amount to stay healthy and work as it should. You can use the list below to help you make choices about which foods to eat.  Here are some foods that contain iron. They have 1 to 2 milligrams of iron per serving.  Fruits  Figs (dried), 5 figs  Vegetables  Asparagus (canned), 6 car  Hermann, beet, Swiss chard, or turnip greens, 1 cup  Dried peas, cooked,   cup  Seaweed, spirulina (dried),   cup  Spinach, (cooked)   cup or (raw) 1 cup  Grains  Cereals, fortified with iron, 1 " "cup  Grits (instant, cooked), fortified with iron,   cup  Meats and other protein foods  Beans (kidney, lima, navy, white), canned or cooked,   cup  Beef or lamb, 3 oz  Chicken giblets, 3 oz  Chickpeas (garbanzo beans),   cup  Liver of beef, lamb, or pork, 3 oz  Oysters (cooked), 3 oz  Sardines (canned), 3 oz  Soybeans (boiled),   cup  Tofu (firm),   cup  Work with your doctor to find out how much of this nutrient you need. Depending on your health, you may need more or less of it in your diet.  Where can you learn more?  Go to https://www.[employer]20 Media.net/patiented  Enter R005 in the search box to learn more about \"Learning About High-Iron Foods.\"  Current as of: September 20, 2023               Content Version: 14.0    9493-2889 [employer].   Care instructions adapted under license by your healthcare professional. If you have questions about a medical condition or this instruction, always ask your healthcare professional. [employer] disclaims any warranty or liability for your use of this information.      Rh Antibodies Screening During Pregnancy: About This Test  What is it?     The Rh antibodies screening test is a blood test. It checks your blood for Rh antibodies. If you have Rh-negative blood and have been exposed to Rh-positive blood, your immune system may make antibodies to attack the Rh-positive blood. When a pregnant woman has these antibodies, it is called Rh sensitization.  Why is this test done?  The Rh antibodies screening test is done during pregnancy to find out if your baby is at risk for Rh disease. This can happen if you have Rh-negative blood and your baby has Rh-positive blood. If your Rh-negative blood mixes with Rh-positive blood, your immune system will make antibodies to attack the Rh-positive blood.  During pregnancy, these antibodies could attach to the baby's red blood cells. This can cause your baby to have serious health problems. The results of this test " "will help your doctor know how to best care for you and your baby during your pregnancy.  How do you prepare for the test?  In general, there's nothing you have to do before this test, unless your doctor tells you to.  How is the test done?  A health professional uses a needle to take a blood sample, usually from the arm.  What happens after the test?  You will probably be able to go home right away. It depends on the reason for the test.  You can go back to your usual activities right away.  Follow-up care is a key part of your treatment and safety. Be sure to make and go to all appointments, and call your doctor if you are having problems. It's also a good idea to keep a list of the medicines you take. Ask your doctor when you can expect to have your test results.  Where can you learn more?  Go to https://www.C[employer]re [email or web address] Dynamics/patiented  Enter P722 in the search box to learn more about \"Rh Antibodies Screening During Pregnancy: About This Test.\"  Current as of: July 10, 2023               Content Version: 14.0    7760-1906 [employer].   Care instructions adapted under license by your healthcare professional. If you have questions about a medical condition or this instruction, always ask your healthcare professional. [employer] disclaims any warranty or liability for your use of this information.      Learning About Preventing Rh Disease  What is Rh disease?     Rh disease can be a serious problem in pregnancy. It happens when substances called antibodies in the mother's blood cause red blood cells in her baby's blood to be destroyed. This can occur when the blood types of a mother and her baby do not match.  All blood has an Rh factor. This is what makes a blood type positive or negative. When you are Rh-negative, your baby may be Rh-negative or Rh-positive. If your baby has Rh-positive blood and it mixes with yours, your body will make antibodies. This is called Rh sensitization.  Most of " "the time, this is not a problem in a first pregnancy. But in future pregnancies, it could cause Rh disease.  A  with Rh disease has mild anemia and may have jaundice. In severe cases, anemia, jaundice, and swelling can be very dangerous or fatal. Some babies need to be delivered early. Some need special care in the NICU. A very sick baby will need a blood transfusion before or after birth.  Fortunately, Rh sensitization is usually easy to prevent.  That's why it's important to get your Rh status checked in your first trimester. It doesn't cause any warning signs. A blood test is the only way to know if you are Rh-sensitive or are at risk for it.  How can you prevent Rh disease?  If you are Rh-negative, your doctor gives you an Rh immune globulin shot (such as RhoGAM). It helps prevent your body from making the antibodies that attack your baby's red blood cells.  Timing is important. You need the shot at certain times during your pregnancy. And you need one anytime there is a chance that your baby's blood might mix with yours. That can happen with certain prenatal tests or when you have pregnancy bleeding, such as:  Right after any pregnancy loss, amniocentesis, or CVS testing.  After turning of a breech baby.  Before and maybe after childbirth. Your doctor gives you a shot around week 28. If your  is Rh-positive, you will have another shot.  Follow-up care is a key part of your treatment and safety. Be sure to make and go to all appointments, and call your doctor if you are having problems. It's also a good idea to know your test results and keep a list of the medicines you take.  Where can you learn more?  Go to https://www.Televerde.net/patiented  Enter W177 in the search box to learn more about \"Learning About Preventing Rh Disease.\"  Current as of: July 10, 2023               Content Version: 14.0    8781-8947 Healthwise, Incorporated.   Care instructions adapted under license by [facility] healthcare " professional. If you have questions about a medical condition or this instruction, always ask your healthcare professional. Healthwise, [facility] disclaims any warranty or liability for your use of this information.      Learning About Rh Immunoglobulin Shots  Introduction     An Rh immunoglobulin shot is given to pregnant women who have Rh-negative blood.  You may have Rh-negative blood, and your baby may have Rh-positive blood. If the two types of blood mix, your body will make antibodies. This is called Rh sensitization. Most of the time, this is not a problem the first time you're pregnant. But it could cause problems in future pregnancies.  This shot keeps your body from making the antibodies. You get the shot around 28 weeks of pregnancy. After the birth, your baby's blood is tested. If the blood is Rh positive, you will get another shot. You may also get the shot if you have vaginal bleeding while you are pregnant or if you have a miscarriage. These shots protect future pregnancies.  Women with Rh negative blood will need this shot each time they get pregnant.  Example  Rh immunoglobulin (HypRho-D, MICRhoGAM, and RhoGAM)  Possible side effects  Rare side effects may include:  Some mild pain where you got the shot.  A slight fever.  An allergic reaction.  You may have other side effects not listed here. Check the information that comes with your medicine.  What to know about taking this medicine  You may need more than one shot. You may need the shot again:  After amniocentesis, fetal blood sampling, or chorionic villus sampling tests.  If you have bleeding in your second or third trimester.  After turning of a breech baby.  After an injury to the belly while you are pregnant.  After a miscarriage or an .  Before or right after treatment for an ectopic or a partial molar pregnancy.  Tell your doctor if you have any allergies or have had a bad response to medicines in the past.  If you get this shot  "within 3 months of getting a live-virus vaccine, the vaccine may not work. Your doctor will tell you if you need more vaccine.  Check with your doctor or pharmacist before you use any other medicines. This includes over-the-counter medicines. Make sure your doctor knows all of the medicines, vitamins, herbs, and supplements you take. Taking some medicines at the same time can cause problems.  Where can you learn more?  Go to https://www.IdealSeat.Pocket/patiented  Enter V615 in the search box to learn more about \"Learning About Rh Immunoglobulin Shots.\"  Current as of: July 10, 2023               Content Version: 14.0    9856-3283 [employer].   Care instructions adapted under license by your healthcare professional. If you have questions about a medical condition or this instruction, always ask your healthcare professional. [employer] disclaims any warranty or liability for your use of this information.      Rubella (Trinidadian Measles): Care Instructions  Overview  Rubella, also called Trinid[place] measles or 3-day measles, is a disease caused by a virus. It spreads by coughs, sneezes, and close contact. Rubella usually is mild and does not cause long-term problems. But if you are pregnant and get it, you can give the disease to your unborn baby. This can cause serious birth defects.  While you have rubella, you may get a rash and a mild fever, and the lymph glands in your neck may swell. Older children often have a fever, eye pain, a sore throat, and body aches. You can relieve most symptoms with care at home. Avoid being around others, especially pregnant people, until your rash has been gone for at least 4 days. People who have not had this disease before or have not had the vaccine have the greatest chance of getting the virus.  Follow-up care is a key part of your treatment and safety. Be sure to make and go to all appointments, and call your doctor if you are having problems. It's also a good " "idea to know your test results and keep a list of the medicines you take.  How can you care for yourself at home?  Drink plenty of fluids. If you have kidney, heart, or liver disease and have to limit fluids, talk with your doctor before you increase the amount of fluids you drink.  Get plenty of rest to help your body heal.  Take an over-the-counter pain medicine, such as acetaminophen (Tylenol), ibuprofen (Advil, Motrin), or naproxen (Aleve), to reduce fever and discomfort. Read and follow all instructions on the label. Do not give aspirin to anyone younger than 20. It has been linked to Reye syndrome, a serious illness.  Do not take two or more pain medicines at the same time unless the doctor told you to. Many pain medicines have acetaminophen, which is Tylenol. Too much acetaminophen (Tylenol) can be harmful.  Try not to scratch the rash. Put cold, wet cloths on the rash to reduce itching.  Do not smoke. Smoking can make your symptoms worse. If you need help quitting, talk to your doctor about stop-smoking programs and medicines. These can increase your chances of quitting for good.  Avoid contact with people who have never had rubella and who have not been immunized.  When should you call for help?   Call your doctor now or seek immediate medical care if:    You have a fever with a stiff neck or a severe headache.     You are sensitive to light or feel very sleepy or confused.   Watch closely for changes in your health, and be sure to contact your doctor if:    You do not get better as expected.   Where can you learn more?  Go to https://www.[place].net/patiented  Enter B812 in the search box to learn more about \"Rubella ([place] Measles): Care Instructions.\"  Current as of: June 12, 2023               Content Version: 14.0    1214-2251 [employer].   Care instructions adapted under license by your healthcare professional. If you have questions about a medical condition or this instruction, " always ask your healthcare professional. Healthwise, [facility] disclaims any warranty or liability for your use of this information.      Gonorrhea and Chlamydia: About These Tests  What is it?  These tests use a sample of urine or other body fluid to look for the bacteria that cause these sexually transmitted infections (STIs). The fluid sample can come from the cervix, vagina, rectum, throat, or eyes.  Why is this test done?  These tests may be done to:  Find out if symptoms are caused by gonorrhea or chlamydia.  Check people who are at high risk of being infected with gonorrhea or chlamydia.  Retest people several months after they have been treated for gonorrhea or chlamydia.  Check for infection in your  if you had a gonorrhea or chlamydia infection at the time of delivery.  How can you prepare for the test?  If you are going to have a urine test, do not urinate for at least 1 hour before the test.  If you think you may have chlamydia or gonorrhea, don't have sexual intercourse until you get your test results. And you may want to have tests for other STIs, such as HIV.  How is the test done?  For a direct sample, a swab is used to collect body fluid from the cervix, vagina, rectum, throat, or eyes. Your doctor may collect the sample. Or you may be given instructions on how to collect your own sample.  For a urine sample, you will collect the urine that comes out when you first start to urinate. Don't wipe the genital area clean before you urinate.  How long does the test take?  The test will take a few minutes.  What happens after the test?  You will be able to go home right away.  You can go back to your usual activities right away.  If you do have an infection, don't have sexual intercourse for 7 days after you start treatment. And your sex partner(s) should also be treated.  Follow-up care is a key part of your treatment and safety. Be sure to make and go to all appointments, and call your doctor  "if you are having problems. It's also a good idea to keep a list of the medicines you take. Ask your doctor when you can expect to have your test results.  Where can you learn more?  Go to https://www.[employer].net/patiented  Enter K976 in the search box to learn more about \"Gonorrhea and Chlamydia: About These Tests.\"  Current as of: November 27, 2023               Content Version: 14.0    3157-3939 [employer].   Care instructions adapted under license by your healthcare professional. If you have questions about a medical condition or this instruction, always ask your healthcare professional. [employer] disclaims any warranty or liability for your use of this information.      Trichomoniasis: About This Test  What is it?     This test uses a sample of urine or other body fluid to look for the tiny parasite that causes trichomoniasis (also called trich). The fluid sample can come from the vagina, cervix, or urethra. Your doctor may choose to use one or more of many available tests.  Why is it done?  A trich test may be done to:  Find out if symptoms are caused by trich.  Check people who are at high risk for being infected with trich.  Check after treatment to make sure that the infection is gone.  How do you prepare for the test?  If you are going to have a urine test, do not urinate for at least 1 hour before the test.  How is the test done?  For a direct sample, a swab is used to collect body fluid from the cervix, vagina, or urethra. Your doctor may collect the sample. Or you may be given instructions on how to collect your own sample.  For a urine sample, you will collect the urine that comes out when you first start to urinate. Don't wipe the area clean before you urinate.  How long does the test take?  It will take a few minutes to collect a sample.  What happens after the test?  You can go home right away.  You can go back to your usual activities right away.  You may get the " test results the same day or several days later. It depends on the test used.  If you do have an infection, don't have sexual intercourse for 7 days after you start treatment. Your sex partner or partners should also be treated.  Follow-up care is a key part of your treatment and safety. Be sure to make and go to all appointments, and call your doctor if you are having problems. Ask your doctor when you can expect to have your test results.  Current as of: November 27, 2023               Content Version: 14.0    9661-0502 [employer].   Care instructions adapted under license by your healthcare professional. If you have questions about a medical condition or this instruction, always ask your healthcare professional. [employer] disclaims any warranty or liability for your use of this information.      HIV Testing: Care Instructions  Overview  You can get tested for the human immunodeficiency virus (HIV). Most doctors use a blood test to check for HIV antibodies and antigens in your blood. It may also check for the genetic material (RNA) of HIV. Some tests use saliva to check for HIV antibodies. But these aren't as accurate. For example, they may give a false result if you've just been infected.  What do the results mean?    Normal (negative)    No HIV antibodies, antigens, or RNA were found.  You may need more testing. It can make sure your test results are correct.    Uncertain (indeterminate)    Test results didn't clearly show if you have an HIV infection.  HIV antibodies or antigens may not have formed yet.  Some other type of antibody or antigen may have affected the results.  You will need another test to be sure.    Abnormal (positive)    HIV antibodies, antigens, or RNA were found.  If you haven't had an RNA test yet, one will be done. If it's positive, you have HIV.  If your test result is positive, your doctor will talk to you. You will discuss starting treatment.  Follow-up care  "is a key part of your treatment and safety. Be sure to make and go to all appointments, and call your doctor if you are having problems. It's also a good idea to know your test results and keep a list of the medicines you take.  Where can you learn more?  Go to https://www.[employer].net/patiented  Enter T792 in the search box to learn more about \"HIV Testing: Care Instructions.\"  Current as of: June 12, 2023               Content Version: 14.0    7272-7526 [employer].   Care instructions adapted under license by your healthcare professional. If you have questions about a medical condition or this instruction, always ask your healthcare professional. [employer] disclaims any warranty or liability for your use of this information.      Hepatitis C Virus Tests: About These Tests  What are they?     Hepatitis C virus tests are blood tests that check for substances in the blood that show whether you have hepatitis C now or had it in the past. The tests can also tell you what type of hepatitis C you have and how severe the disease is. This can help your doctor with treatment.  If the tests show that you have long-term hepatitis C, you need to take steps to prevent spreading the disease.  Why are these tests done?  You may need these tests if:  You have symptoms of hepatitis.  You may have been exposed to the virus. You are more likely to have been exposed to the virus if you inject drugs or are exposed to body fluids (such as if you are a health care worker).  You've had other tests that show you have liver problems.  You are 18 to 79 years old.  You have an HIV infection.  The tests also are done to help your doctor decide about your treatment and see how well it works.  How do you prepare for the test?  In general, there's nothing you have to do before this test, unless your doctor tells you to.  How is the test done?  A health professional uses a needle to take a blood sample, usually from " "the arm.  What happens after these tests?  You will probably be able to go home right away.  You can go back to your usual activities right away.  Follow-up care is a key part of your treatment and safety. Be sure to make and go to all appointments, and call your doctor if you are having problems. It's also a good idea to keep a list of the medicines you take. Ask your doctor when you can expect to have your test results.  Where can you learn more?  Go to https://www.[employer].net/patiented  Enter W551 in the search box to learn more about \"Hepatitis C Virus Tests: About These Tests.\"  Current as of: June 12, 2023               Content Version: 14.0    4014-7865 [employer].   Care instructions adapted under license by your healthcare professional. If you have questions about a medical condition or this instruction, always ask your healthcare professional. [employer] disclaims any warranty or liability for your use of this information.      Learning About Fetal Ultrasound Results  What is a fetal ultrasound?     Fetal ultrasound is a test that lets your doctor see an image of your baby. Your doctor learns information about your baby from this picture. You may find out, for example, if you are having a boy or a girl. But the main reason you have this test is to get information about your baby's health.  (You may hear your baby called a fetus. This is a common medical term for a baby that's growing in the mother's uterus.)  What kind of information can you learn from this test?  The findings of an ultrasound fall into two categories, normal and abnormal.  Normal  The fetus is the right size for its age.  The placenta is the expected size and does not cover the cervix.  There is enough amniotic fluid in the uterus.  No birth defects can be seen.  Abnormal  The fetus is small or large for its age.  The placenta covers the cervix.  There is too much or too little amniotic fluid in the " uterus.  The fetus may have a birth defect.  What does an abnormal result mean?  Abnormal seems to imply that something is wrong with your baby. But what it means is that the test has shown something the doctor wants to take a closer look at.  And that's what happens next. Your doctor will talk to you about what further test or tests you may need.  What do the results mean?  Some of the things your doctor may see on an abnormal ultrasound include:  Echogenic bowel.  The bowel looks very bright on the screen. This could mean that there's blood in the bowel. Or it could mean that something is blocking the small bowel.  Increased nuchal translucency.  The ultrasound measures the thickness at the back of the baby's neck. An increase in thickness is sometimes an early sign of Down syndrome.  Increased or decreased amniotic fluid.  The doctor will look for a reason for the level of amniotic fluid and will watch the pregnancy closely as it progresses.  Large ventricles.  Ventricles in the brain look larger than they should. Your doctor may take a closer look at the brain.  Renal pyelectasis/hydronephrosis.  The ultrasound measures the fluid around the kidney. If there is more fluid than expected, there is a chance of urinary tract or kidney problems.  Short long bones.  The ultrasound measures certain arm and leg bones. A long bone (humerus or femur) that is shorter than average could be a sign of Down syndrome.  Subchorionic hemorrhage.  An ultrasound can show bleeding under one of the membranes that surrounds the fetus. Some women don't have symptoms of bleeding. The ultrasound can find this problem when women are not bleeding from their vagina. Women who have this condition have a slightly higher chance of miscarriage.  What do you do now?  Take a deep breath, and let it out. Keep in mind that an abnormal finding on an ultrasound, after it's coupled with more information, may:  Turn out to be nothing.  Turn out to be  "something mild that won't affect the baby.  Turn out to be something more serious. But if this happens, early diagnosis helps you and your doctor plan treatment options sooner rather than later.  Your medical team is there for you. So are your family and friends. Ask questions, and get the help and support you need.  Follow-up care is a key part of your treatment and safety. Be sure to make and go to all appointments, and call your doctor if you are having problems. It's also a good idea to know your test results and keep a list of the medicines you take.  Where can you learn more?  Go to https://www.[employer].com.net/patiented  Enter K451 in the search box to learn more about \"Learning About Fetal Ultrasound Results.\"  Current as of: July 10, 2023               Content Version: 14.0    5844-7803 [employer].   Care instructions adapted under license by your healthcare professional. If you have questions about a medical condition or this instruction, always ask your healthcare professional. [employer] disclaims any warranty or liability for your use of this information.      Learning About Prenatal Visits  Overview     Regular prenatal visits are very important during any pregnancy. These quick office visits may seem simple and routine. But they can help you have a safe and healthy pregnancy. Your doctor is watching for problems that can only be found through regular checkups. The visits also give you and your doctor time to build a good relationship.  After your first visit, you will most likely start on a schedule of monthly visits. In your third trimester, the visits will get more frequent. Based on your health, your age, and if you've had a normal, full-term pregnancy before, your doctor may want to see you more or less often.  At different times in your pregnancy, you will have exams and tests. Some are routine. Others are done only when there is a chance of a problem. Everything healthy " you do for your body helps you have a healthy pregnancy. Rest when you need it. Eat well, drink plenty of water, and exercise regularly.  What happens during a prenatal visit?  You will have blood pressure checks, along with urine tests. You also may have blood tests. If you need to go to the bathroom while waiting for the doctor, tell the nurse. You will be given a sample cup so your urine can be tested.  You will be weighed and have your belly measured.  Your doctor may listen to the fetal heartbeat with a special device.  At about 24 weeks, and possibly earlier in your pregnancy, your doctor will check your blood sugar (glucose tolerance test) for diabetes that can occur during pregnancy. This is gestational diabetes, which can be harmful.  You will have tests to check for infections that could harm your . These include group B streptococcus and hepatitis B.  Your doctor may do ultrasounds to check for problems. This also checks the position of the fetus. An ultrasound uses sound waves to produce a picture of the fetus.  You may get your vaccines updated.  Your doctor may ask you questions to check for signs of anxiety or depression. Tell your doctor if you feel sad, anxious, or hopeless for more than a few days.  You may have other tests at any time during your pregnancy.  Use your visits to discuss with your doctor any concerns you have.  How can you care for yourself at home?  Get plenty of rest.  Try to exercise every day, if your doctor says it is okay. If you have not exercised in the past, start out slowly. For example, you can take short walks each day.  Choose healthy foods, such as fruits, vegetables, whole grains, lean proteins, low-fat dairy, and healthy fats.  Drink plenty of fluids. Cut down on drinks with caffeine, such as coffee, tea, and cola. If you have kidney, heart, or liver disease and have to limit fluids, talk with your doctor before you increase the amount of fluids you drink.  Try  "to avoid chemical fumes, paint fumes, and poisons.  If you smoke, vape, or use alcohol, marijuana, or other drugs, quit or cut back as much as you can. Talk to your doctor if you need help quitting.  Review all of your medicines, including over-the-counter medicines and supplements, with your doctor. Some of your routine medicines may need to be changed. Do not stop or start taking any medicines without talking to your doctor first.  Follow-up care is a key part of your treatment and safety. Be sure to make and go to all appointments, and call your doctor if you are having problems. It's also a good idea to know your test results and keep a list of the medicines you take.  Where can you learn more?  Go to https://www.[employer].net/patiented  Enter J502 in the search box to learn more about \"Learning About Prenatal Visits.\"  Current as of: July 10, 2023               Content Version: 14.0    5046-5681 [employer]   Care instructions adapted under license by your healthcare professional. If you have questions about a medical condition or this instruction, always ask your healthcare professional. [employer] disclaims any warranty or liability for your use of this information.      Intimate Partner Violence: Care Instructions  Overview     If you want to save this information but don't think it is safe to take it home, see if a trusted friend can keep it for you. Plan ahead. Know who you can call for help, and memorize the phone number.   Be careful online too. Your online activity may be seen by others. Do not use your personal computer or device to read about this topic. Use a safe computer, such as one at work, a friend's home, or a library.    Intimate partner violence--a type of domestic abuse--is different from an argument now and then. It is a pattern of abuse that one person may use to control another person's behavior. It may start with threats and name-calling. Then, it may lead to " more serious acts, like pushing and slapping. The abuse also may occur in other areas. For example, the abuser may withhold money or spend a partner's money without their knowledge.  Abuse can cause serious harm. You are more likely to have a long-term health problem from the injuries and stress of living in a violent relationship. People who are sexually abused by their partners have more sexually transmitted infections and unplanned pregnancies. Anyone who is abused also faces emotional pain. Anyone can be abused in relationships. In some relationships, both people use abusive behavior.  If you are pregnant, abuse can cause problems such as poor weight gain, infections, and bleeding. Abuse during this time may increase your baby's risk of low birth weight, premature birth, and death.  Follow-up care is a key part of your treatment and safety. Be sure to make and go to all appointments, and call your doctor if you are having problems. It's also a good idea to know your test results and keep a list of the medicines you take.  How can you care for yourself at home?  If you do not have a safe place to stay, discuss this with your doctor before you leave.  Have a plan for where to go, how to leave your home, and where to stay in case of an emergency. Do not tell your partner about your plan. Contact:  The National Domestic Violence Hotline toll-free at 1-837.521.6485. They can help you find resources in your area.  Your local police department, hospital, or clinic for information about shelters and safe homes near you.  Talk to a trusted friend or neighbor, a counselor, or a mile leader. Do not feel that you have to hide what happened.  Teach your children how to call for help in an emergency.  Be alert to warning signs, such as threats, heavy alcohol use, or drug use. This can help you avoid danger.  If you can, make sure that there are no guns or other weapons in your home.  When should you call for help?   Call 937  "anytime you think you may need emergency care. For example, call if:    You or someone else has just been abused.     You think you or someone else is in danger of being abused.   Watch closely for changes in your health, and be sure to contact your doctor if you have any problems.  Where can you learn more?  Go to https://www.[employer].net/patiented  Enter G282 in the search box to learn more about \"Intimate Partner Violence: Care Instructions.\"  Current as of: June 24, 2023               Content Version: 14.0    0376-4271 [employer].   Care instructions adapted under license by your healthcare professional. If you have questions about a medical condition or this instruction, always ask your healthcare professional. [employer] disclaims any warranty or liability for your use of this information.      Intimate Partner Violence Safety Instructions: Care Instructions  Overview     If you want to save this information but don't think it is safe to take it home, see if a trusted friend can keep it for you. Plan ahead. Know who you can call for help, and memorize the phone number.   Be careful online too. Your online activity may be seen by others. Do not use your personal computer or device to read about this topic. Use a safe computer, such as one at work, a friend's home, or a library.    When you are abused by a spouse or partner, you can take actions to protect yourself and your children.  You can increase your safety whether you decide to stay with your spouse or partner or you decide to leave. You may want to make a safety plan and pack a bag ahead of time. This will help you leave quickly when you decide to. Remember, you cannot change your partner's actions, but you can find help for you and your children. No one deserves to be abused.  Follow-up care is a key part of your treatment and safety. Be sure to make and go to all appointments, and call your doctor if you are having " problems. It's also a good idea to know your test results and keep a list of the medicines you take.  How can you care for yourself at home?  Make a plan for your safety   If you decide to stay with your abusive spouse or partner, you can do the following to increase your safety:  Decide what works best to keep you safe in an emergency.  Know who you can call to help you in an emergency.  Decide if you will call the police if you get hurt again. If you can, agree on a signal with your children or neighbor to call the police for you if you need help. You can flash lights or hang something out of a window.  Choose a safe place to go for a short time if you need to leave home. Memorize the address and phone number.  Learn escape routes out of your home in case you need to leave in a hurry. Teach your children different ways to get out of your home quickly if they need to.  If you can, hide or lock up things that can be used as weapons (guns, knives, hammers).  Learn the number of a domestic violence shelter. Talk to the people there about how they can help.  Find out about other community resources that can help you.  Take pictures of bruises or other injuries if you can. You can also take pictures of things your abuser has broken.  Teach your children that violence is never okay. Tell them that they do not deserve to be hurt.  Pack a bag   Prepare a bag with things you will need if you leave suddenly. Leave it with a friend, a relative, or someone else you trust. You could include the following items in the bag:  A set of keys to your home and car.  Emergency phone numbers and addresses.  Money such as cash or checks. You can also ask a friend, a relative, or someone else you trust to hold money for you.  Copies of legal documents such as house and car titles or rent receipts, birth certificates, Social Security card, voter registration, marriage and 's licenses, and your children's health records.  Personal  "items you would need for a few days, such as clothes, a toothbrush, toothpaste, and any medicines you or your children need.  A favorite toy or book for your child or children.  Diapers and bottles, if you have very young children.  Pictures that show signs of abuse and violence. You may also add pictures of your abuser.  If you leave   If you decide to leave, you can take the following steps:  Go to the emergency room at a hospital if you have been hurt.  Think about asking the police to be with you as you leave. They can protect you as you leave your home.  If you decide to leave secretly, remember that activities can be tracked. Your abuser may still have access to your cell phone, email, and credit cards. It may be possible for these to be traced. Always be aware of your surroundings.  If this is an emergency, do not worry about gathering up anything. Just leave--your safety is most important.  If your abuser moves out, change the locks on the doors. If you have a security system, change the access code.  When should you call for help?   Call 911 anytime you think you may need emergency care. For example, call if:    You or someone else has just been abused.     You think you or someone else is in danger of being abused.   Watch closely for changes in your health, and be sure to contact your doctor if you have any problems.  Where can you learn more?  Go to https://www.Audiosocket.net/patiented  Enter A752 in the search box to learn more about \"Intimate Partner Violence Safety Instructions: Care Instructions.\"  Current as of: June 24, 2023               Content Version: 14.0    2850-9809 [employer].   Care instructions adapted under license by your healthcare professional. If you have questions about a medical condition or this instruction, always ask your healthcare professional. [employer] disclaims any warranty or liability for your use of this information.      Learning About " Intimate Partner Violence  What is intimate partner violence?  Intimate partner violence is a type of domestic abuse. It's threatening, emotionally harmful, or violent behavior in a personal relationship. It can happen between past or current partners or spouses. In some relationships both people abuse each other. One partner may be more abusive. Or the abuse may be equal.  Abuse can affect people of any ethnic group, race, or Taoism. It can affect teens, adults, or the elderly. And it can happen to people of any sexual orientation, gender, or social status.  Abusers use fear, bullying, and threats to control their partners. They may control what their partners do. They may control where their partners go or who they see. They may act jealous, controlling, or possessive. These early signs of abuse may happen soon after the start of the relationship. Sometimes it can be hard to notice abuse at first. But after the relationship becomes more serious, the abuse may get worse.  If you are being abused in your relationship, it's important to get help. The abuse is not your fault. You don't have to face it alone.  Be careful  It may not be safe to take home domestic abuse information like this handout. Some people ask a trusted friend to keep it for them. It's also important to plan ahead and to memorize the phone number of places you can go for help. If you are concerned about your safety, do not use your computer, smartphone, or tablet to read about domestic abuse.   What are the types of intimate partner violence?  Abuse can happen in different ways. Each type can happen on its own or in combination with others.  Emotional abuse  Emotional abuse is a pattern of threats, insults, or controlling behavior. It includes verbal abuse. It goes beyond healthy disagreements in a relationship. It's a sign of an unhealthy relationship.  Do you feel threatened, intimidated, or controlled?  Does your partner:  Threaten your  children, other family members, or pets?  Use jokes meant to embarrass or shame you?  Call you names?  Tell you that you are a bad parent?  Threaten to take away your children?  Threaten to have you or your family members deported?  Control your access to money or other basic needs?  Control what you do, who you see or talk to, or where you go?  Another form of emotional abuse is denying that it is happening. Or the abuser may act like the abuse is no big deal or is your fault.  Sexual abuse  With sexual abuse, abusers may try to convince or force you to have sex. They may force you into sex acts you're not comfortable with. Or they may sexually assault you. Sexual abuse can happen even if you are in a committed relationship.  Physical abuse  Physical abuse means that a partner hits, kicks, or does something else to physically hurt you. Physical abuse that starts with a slap might lead to kicking, shoving, and choking over time. The abuser may also threaten to hurt or kill you.  Stalking  Stalking means that an abuser gives you attention that you do not want and that causes you fear. Examples of stalking include:  Following you.  Showing up at places where the abuser isn't invited, such as at your work or school.  Constantly calling or texting you.  What problems can  to?  Intimate partner violence can be very dangerous. It can cause serious, repeated injury. It can even lead to death.  All forms of abuse can cause long-term health problems from the stress of a violent relationship. Verbal abuse can lead to sexual and physical abuse.  Abuse causes:  Emotional pain.  Depression.  Anxiety.  Post-traumatic stress.  Sexual abuse can lead to sexually transmitted infections (such as HIV/AIDS) and unplanned pregnancy.  Pregnancy can be a very dangerous time for people in abusive relationships. Abuse can cause or increase the risk of problems during pregnancy. These include low weight gain, anemia, infections, and  "bleeding. Abuse may also increase your baby's risk of low birth weight, premature birth, and death.  It can be hard for some victims of abuse to ask for help or to leave their relationship. You may feel scared, stuck, or not sure what steps to take. But it's important not to ignore abuse. Talking to someone you trust could be the first step to ending the abuse and taking care of your own health and happiness again. There are resources available that can help keep you safe.  Where can you get help?  Talk to a trusted friend. Find a local advocacy group, or talk to your doctor about the abuse.  Contact the National Domestic Violence Hotline at 3-862-615-CYWT (1-492.499.1662) for more safety tips. They can guide you to groups in your area that can help. Or go to the National Coalition Against Domestic Violence website at www.thehotlHalo Neuroscience.org to learn more.  Domestic violence groups or a counselor in your area can help you make a safety plan for yourself and your children.  When to call for help  Call 911 anytime you think you may need emergency care. For example, call if:  You think that you or someone you know is in danger of being abused.  You have been hurt and can't have someone safely take you to emergency care.  You have just been abused.  A family member has just been abused.  Where can you learn more?  Go to https://www.Previstar.net/patiented  Enter S665 in the search box to learn more about \"Learning About Intimate Partner Violence.\"  Current as of: June 24, 2023               Content Version: 14.0    9697-3276 [employer].   Care instructions adapted under license by your healthcare professional. If you have questions about a medical condition or this instruction, always ask your healthcare professional. [employer] disclaims any warranty or liability for your use of this information.      Vaginal Bleeding During Pregnancy: Care Instructions  Overview     It's common to have some " vaginal spotting when you are pregnant. In some cases, the bleeding isn't serious. And there aren't any more problems with the pregnancy.  But sometimes bleeding is a sign of a more serious problem. This is more common if the bleeding is heavy or painful. Examples of more serious problems include miscarriage, an ectopic pregnancy, and a problem with the placenta.  You may have to see your doctor again to be sure everything is okay. You may also need more tests to find the cause of the bleeding.  Home treatment may be all you need. But it depends on what is causing the bleeding. Be sure to tell your doctor if you have any new symptoms or if your symptoms get worse.  The doctor has checked you carefully, but problems can develop later. If you notice any problems or new symptoms, get medical treatment right away.  Follow-up care is a key part of your treatment and safety. Be sure to make and go to all appointments, and call your doctor if you are having problems. It's also a good idea to know your test results and keep a list of the medicines you take.  How can you care for yourself at home?  If your doctor prescribed medicines, take them exactly as directed. Call your doctor if you think you are having a problem with your medicine.  Do not have vaginal sex until your doctor says it's okay.  Do not put anything in your vagina until your doctor says it's okay.  Ask your doctor about other activities you can or can't do.  Get a lot of rest. Being pregnant can make you tired.  Do not use nonsteroidal anti-inflammatory drugs (NSAIDs), such as ibuprofen (Advil, Motrin), naproxen (Aleve), or aspirin, unless your doctor says it is okay.  When should you call for help?   Call 911 anytime you think you may need emergency care. For example, call if:    You passed out (lost consciousness).     You have severe vaginal bleeding. This means you are soaking through a pad each hour for 2 or more hours.     You have sudden, severe pain  "in your belly or pelvis.   Call your doctor now or seek immediate medical care if:    You have new or worse vaginal bleeding.     You are dizzy or lightheaded, or you feel like you may faint.     You have pain in your belly, pelvis, or lower back.     You think that you are in labor.     You have a sudden release of fluid from your vagina.     You've been having regular contractions for an hour. This means that you've had at least 8 contractions within 1 hour or at least 4 contractions within 20 minutes, even after you change your position and drink fluids.     You notice that your baby has stopped moving or is moving much less than normal.   Watch closely for changes in your health, and be sure to contact your doctor if you have any problems.  Where can you learn more?  Go to https://www.[employer] Vault.net/patiented  Enter N829 in the search box to learn more about \"Vaginal Bleeding During Pregnancy: Care Instructions.\"  Current as of: July 10, 2023               Content Version: 14.0    1804-5346 [employer].   Care instructions adapted under license by your healthcare professional. If you have questions about a medical condition or this instruction, always ask your healthcare professional. [employer] disclaims any warranty or liability for your use of this information.      "

## 2024-06-27 NOTE — PROGRESS NOTES
Jeferson message sent to Dr. Smith for medication review and request to call patient with recommended changes during pregnancy

## 2024-06-28 NOTE — TELEPHONE ENCOUNTER
"B-D INSULIN SYRINGE 25G X 5/8\" 1 ML MISC   Disp-1 each, R-11,   08/25/2022 2/1/2024  [facility]    Komal Miller NP  Surgery    Nv 7/18/24    Routed because:: gap in med rf. #qty rf?    "

## 2024-07-01 NOTE — TELEPHONE ENCOUNTER
Patient has appointment with Komal Miller CNP this month. Can discuss at that time. More syringes were dispensed than vitamin B12 when last filled. Labs WNL 11 months ago. Will need to speak with provider.

## 2024-07-18 ENCOUNTER — VIRTUAL VISIT (OUTPATIENT)
Dept: ENDOCRINOLOGY | Facility: CLINIC | Age: 24
End: 2024-07-18
Payer: COMMERCIAL

## 2024-07-18 VITALS — BODY MASS INDEX: 45.99 KG/M2 | HEIGHT: 67 IN | WEIGHT: 293 LBS

## 2024-07-18 DIAGNOSIS — R11.0 NAUSEA: ICD-10-CM

## 2024-07-18 DIAGNOSIS — K21.00 GASTROESOPHAGEAL REFLUX DISEASE WITH ESOPHAGITIS, UNSPECIFIED WHETHER HEMORRHAGE: ICD-10-CM

## 2024-07-18 DIAGNOSIS — E66.01 CLASS 3 SEVERE OBESITY WITH SERIOUS COMORBIDITY AND BODY MASS INDEX (BMI) OF 40.0 TO 44.9 IN ADULT, UNSPECIFIED OBESITY TYPE (H): Primary | ICD-10-CM

## 2024-07-18 DIAGNOSIS — Z98.84 S/P LAPAROSCOPIC SLEEVE GASTRECTOMY: ICD-10-CM

## 2024-07-18 DIAGNOSIS — E66.813 CLASS 3 SEVERE OBESITY WITH SERIOUS COMORBIDITY AND BODY MASS INDEX (BMI) OF 40.0 TO 44.9 IN ADULT, UNSPECIFIED OBESITY TYPE (H): Primary | ICD-10-CM

## 2024-07-18 PROCEDURE — 99213 OFFICE O/P EST LOW 20 MIN: CPT | Mod: 95 | Performed by: NURSE PRACTITIONER

## 2024-07-18 RX ORDER — FAMOTIDINE 40 MG/1
40 TABLET, FILM COATED ORAL DAILY
Qty: 90 TABLET | Refills: 3 | Status: SHIPPED | OUTPATIENT
Start: 2024-07-18

## 2024-07-18 ASSESSMENT — PAIN SCALES - GENERAL: PAINLEVEL: NO PAIN (0)

## 2024-07-18 NOTE — NURSING NOTE
Current patient location:  [facility] parking lot    Is the patient currently in the state of [place]? YES    Visit mode:VIDEO    If the visit is dropped, the patient can be reconnected by: VIDEO VISIT: Text to cell phone:   Telephone Information:   Mobile [phone]       Will anyone else be joining the visit? NO  (If patient encounters technical issues they should call [phone]    How would you like to obtain your AVS? MyChart    Are changes needed to the allergy or medication list? No, Pt stated no changes to allergies, and Pt stated no med changes    Are refills needed on medications prescribed by this physician? NO    Reason for visit: RECHECK (Mikey)    Lilia HACKETT

## 2024-07-18 NOTE — PROGRESS NOTES
Return Bariatric Surgery Note    RE: Sasha Guy  MR#: [id number]  : 2000  VISIT DATE: 2024      Dear Oscar Medley,    I had the pleasure of seeing your patient, Sasha Guy, in my post-bariatric surgery assessment clinic.    Assessment & Plan   Problem List Items Addressed This Visit          Digestive    Class 3 severe obesity with serious comorbidity and body mass index (BMI) of 40.0 to 44.9 in adult (H) - Primary     Was purchasing online compounded GLp1 leading up to pregnancy. Stopped all  antiobesity medications when she learned about pregnancy. Discussed the risks with compounded drug and that I do not recommend this.     Discussed management of weight during pregnancy- following post bariatric surgery guidelines- adequate protein, complex carbs, adequate hydration, mindfulness around snacking outside of meals if not hungry, walking after meals     Continue pepcid     Continue pepcid  Labs with OB labs   Continue prenatal vitamin, vitamin D and C   I do not recommend online compounded GLP1 anytime   In the future,  [facility] has started compounding GLp1 which can be an option when you are done breastfeeding (we know source and safety of this better).   Follow up when done breastfeeding          Relevant Orders    Vitamin B12    Vitamin D Deficiency    Parathyroid Hormone Intact    Ferritin    Vitamin A    Iron and iron binding capacity       Other    S/P laparoscopic sleeve gastrectomy    Relevant Medications    famotidine (PEPCID) 40 MG tablet    Other Relevant Orders    Vitamin B12    Vitamin D Deficiency    Parathyroid Hormone Intact    Ferritin    Vitamin A    Iron and iron binding capacity     Other Visit Diagnoses       Nausea        Relevant Medications    famotidine (PEPCID) 40 MG tablet    Gastroesophageal reflux disease with esophagitis, unspecified whether hemorrhage        Relevant Medications    famotidine (PEPCID) 40 MG tablet               20 minutes spent by me  on the date of the encounter doing chart review, history and exam, documentation and further activities per the note    CHIEF COMPLAINT: Post-bariatric surgery follow-up. S/p sleeve 2022. Ongoing MWM. Last seen 2/2024. Insurance change was going to no longer cover wegovy, had been finding beneficial. Adding naltrexone and phentermine, changing bupropion as it wasn't digesting (whole pill in toilet).     HISTORY OF PRESENT ILLNESS:      7/15/2024    10:28 PM   Questions Regarding Prior Weight Loss Surgery Reviewed With Patient   I had the following weight loss procedure Sleeve Gastrectomy   What year was your surgery? 2022   How has your weight changed since your last visit? I have stayed about the same   Do you currently have any of the following Heartburn, acid reflux, or GERD (acid reflux disease)?   Do you have any concerns today? I feel like i 100% failed this whole thing.     Nausea before pregnancy. No nausea since pregnancy     Anti-obesity medications:     Current:   none    Failed/contraindicated:   GLp1- helpful but not covered by insurance   Naltrexone- little benefit   Bupropion- not sure if helpful, stopped when found out she was pregnant   Phentermine- less helpful than GLP1  Was purchasing compounded GLp1 online when she got pregnant - unknown to me until now     Recent diet changes:   Appetite not worse since pregnant - not horrible     Protein- improving - craving this recently   -Water- got Cirkul bottle which has been really helpful - 120oz daily     Recent exercise/activity changes: walking in the evenings     Recent stressors: manageable   Insurance through her job now     Recent sleep changes: hydroxyzine has been helpful sometimes     Vitamins/Labs: 10/2023  Taking vitamin D, Vitamin C and prenatal vitamin     GERD symptoms: no reflux since getting pregnant. Continues pepcid once daily     Weight History:      7/15/2024    10:28 PM   --   What is your highest lifetime weight? 365   What is  your lowest weight since surgery? (In pounds) 253     Initial Weight (lbs): 365 lbs  Weight: 133.8 kg (295 lb)  Last Visits Weight: 114.8 kg (253 lb)  Cumulative weight loss (lbs): 70  Weight Loss Percentage: 19.18%        7/15/2024    10:28 PM   Questions Regarding Co-Morbidities and Health Concerns Reviewed With Patient   Pre-diabetes Never   Diabetes II Never   High Blood Pressure Gone Away   High cholesterol Never   Heartburn/Reflux Stayed the same   Sleep apnea Never   PCOS Never   Back pain Never   Joint pain Stayed the same   Lower leg swelling Gone away           7/15/2024    10:28 PM   Eating Habits   How many meals do you eat per day? 3   Do you snack between meals? Yes   How much food are you eating at each meal? 1/2 cup to 1 cup   Are you able to separate your meals and liquids by at least 30 minutes? Yes   Are you able to avoid liquid calories? Sometimes           7/15/2024    10:28 PM   Exercise Questions Reviewed With Patient   How often do you exercise? Less than 1 time per week   What is the duration of your exercise (in minutes)? 20 Minutes   What types of exercise do you do? walking   What keeps you from being more active? I should be more active but I just have not gotten around to it       Social History:      7/15/2024    10:28 PM   --   Are you smoking? No   Are you drinking alcohol? No       Medications:  Current Outpatient Medications   Medication Sig Dispense Refill    albuterol (PROAIR HFA/PROVENTIL HFA/VENTOLIN HFA) 108 (90 Base) MCG/ACT inhaler Inhale 1-2 puffs into the lungs every 4 hours as needed for shortness of breath / dyspnea or wheezing 8.5 g 5    augmented betamethasone dipropionate (DIPROLENE) 0.05 % external lotion Apply topically to scalp nightly for 2 weeks then as needed.*      augmented betamethasone dipropionate (DIPROLENE-AF) 0.05 % external ointment Apply topically to feet as needed*      benzonatate (TESSALON) 200 MG capsule Take 1 capsule (200 mg) by mouth 3 times  daily as needed for cough 30 capsule 1    blood glucose (NO BRAND SPECIFIED) test strip Use to test blood sugar 1 times daily or as directed. 100 strip 1    Cholecalciferol (VITAMIN D3) 25 MCG TABS       famotidine (PEPCID) 40 MG tablet Take 1 tablet (40 mg) by mouth daily 90 tablet 3    Fexofenadine HCl (ALLEGRA PO) Take by mouth daily as needed for allergies      hydrOXYzine HCl (ATARAX) 25 MG tablet Take 25 mg (1 tablet) 30 minutes before bedtime.  May increase by 1 tablet to a maximum dose of 100 mg (4 tablets) to improve sleep habits. 180 tablet 1    magnesium 250 MG tablet Take 1 tablet by mouth daily Takes every other day      metroNIDAZOLE (METROCREAM) 0.75 % external cream Apply toPICALLY TO face every morning.*      nystatin (MYCOSTATIN) 839490 UNIT/GM external powder Apply topically 3 times daily as needed (rash in skin folds) 60 g 3    Prenatal Vit-Fe Fumarate-FA (PRENATAL MULTIVITAMIN  PLUS IRON) 27-1 MG TABS Take by mouth daily      fluticasone-salmeterol (ADVAIR) 250-50 MCG/ACT inhaler Inhale 1 puff into the lungs every 12 hours (Patient not taking: Reported on 6/27/2024) 60 each 11    ipratropium - albuterol 0.5 mg/2.5 mg/3 mL (DUONEB) 0.5-2.5 (3) MG/3ML neb solution Take 1 vial (3 mLs) by nebulization every 6 hours as needed for shortness of breath / dyspnea or wheezing (Patient not taking: Reported on 6/27/2024) 30 vial 1     Current Facility-Administered Medications   Medication Dose Route Frequency Provider Last Rate Last Admin    1 mL ropivacaine (NAROPIN) injection 5 mg/mL  1 mL   Amy Sullivan, DO   1 mL at 11/09/23 1412    2 mL bupivacaine (MARCAINE) preservative free injection 0.5% (20 mL vial)  2 mL   Gareth Smith, DO   2 mL at 07/27/23 1242    lidocaine 1 % injection 2 mL  2 mL   Gareth Smith, DO   2 mL at 07/27/23 1242    lidocaine 1 % injection 3 mL  3 mL   Amy Sullivan, DO   3 mL at 11/09/23 1412    triamcinolone (KENALOG-40) injection 40 mg  40 mg   Laurie  "Amy Sharma, DO   40 mg at 11/09/23 1412    triamcinolone (KENALOG-40) injection 40 mg  40 mg   Gareth Smith, DO   40 mg at 07/27/23 1242         7/15/2024    10:28 PM   --   Do you avoid NSAIDs such as (Ibuprofen, Aleve, Naproxen, Advil)? No       ROS:  GI:       7/15/2024    10:28 PM   --   Vomiting No   Diarrhea No   Constipation Yes   Swallowing trouble No   Abdominal pain No   Heartburn No     Skin:       7/15/2024    10:28 PM   BAR RBS ROS - SKIN   Rash in skin folds Yes     Psych:       7/15/2024    10:28 PM   --   Depression No   Anxiety No     Female Only:       7/15/2024    10:28 PM   BAR RBS ROS -    Female only None of the above   Stress urinary incontinence No       Lab on 10/26/2023   Component Date Value Ref Range Status    WBC Count 10/26/2023 6.3  4.0 - 11.0 10e3/uL Final    RBC Count 10/26/2023 4.83  3.80 - 5.20 10e6/uL Final    Hemoglobin 10/26/2023 14.3  11.7 - 15.7 g/dL Final    Hematocrit 10/26/2023 42.1  35.0 - 47.0 % Final    MCV 10/26/2023 87  78 - 100 fL Final    MCH 10/26/2023 29.6  26.5 - 33.0 pg Final    MCHC 10/26/2023 34.0  31.5 - 36.5 g/dL Final    RDW 10/26/2023 11.8  10.0 - 15.0 % Final    Platelet Count 10/26/2023 298  150 - 450 10e3/uL Final    Ferritin 10/26/2023 221 (H)  6 - 175 ng/mL Final    Iron 10/26/2023 115  37 - 145 ug/dL Final    Iron Binding Capacity 10/26/2023 312  240 - 430 ug/dL Final    Iron Sat Index 10/26/2023 37  15 - 46 % Final             3/1/2018    10:00 AM   ELVA Score (Last Two)   ELVA Raw Score 40   Activation Score 100   ELVA Level 4         PHYSICAL EXAM:  Objective    Ht 1.702 m (5' 7.01\")   Wt 133.8 kg (295 lb)   LMP 05/30/2024   BMI 46.19 kg/m    Vitals - Patient Reported  Pain Score: No Pain (0)        Physical Exam   GENERAL: alert and no distress  EYES: Eyes grossly normal to inspection.  No discharge or erythema, or obvious scleral/conjunctival abnormalities.  RESP: No audible wheeze, cough, or visible cyanosis.    SKIN: Visible skin clear. " No significant rash, abnormal pigmentation or lesions.  NEURO: Cranial nerves grossly intact.  Mentation and speech appropriate for age.  PSYCH: Appropriate affect, tone, and pace of words        Sincerely,    Komal Miller NP      Virtual Visit Details    Type of service:  Video Visit     Originating Location (pt. Location): Home    Distant Location (provider location):  Off-site  Platform used for Video Visit: [employer]

## 2024-07-18 NOTE — PATIENT INSTRUCTIONS
Continue pepcid  Labs with OB labs   Continue prenatal vitamin, vitamin D and C   I do not recommend online compounded GLP1 anytime   In the future,  [facility] has started compounding GLp1 which can be an option when you are done breastfeeding (we know source and safety of this better).   Follow up when done breastfeeding

## 2024-07-18 NOTE — LETTER
2024       RE: Sasha Guy  [address]  [facility] [address]     Dear Colleague,    Thank you for referring your patient, Sasha Guy, to the [facility] [place] at [facility]. Please see a copy of my visit note below.    Return Bariatric Surgery Note    RE: Sasha Guy  MR#: [id number]  : 2000  VISIT DATE: 2024      Dear Oscar Medley,    I had the pleasure of seeing your patient, Sasha Guy, in my post-bariatric surgery assessment clinic.    Assessment & Plan  Problem List Items Addressed This Visit          Digestive    Class 3 severe obesity with serious comorbidity and body mass index (BMI) of 40.0 to 44.9 in adult (H) - Primary     Was purchasing online compounded GLp1 leading up to pregnancy. Stopped all  antiobesity medications when she learned about pregnancy. Discussed the risks with compounded drug and that I do not recommend this.     Discussed management of weight during pregnancy- following post bariatric surgery guidelines- adequate protein, complex carbs, adequate hydration, mindfulness around snacking outside of meals if not hungry, walking after meals     Continue pepcid     Continue pepcid  Labs with OB labs   Continue prenatal vitamin, vitamin D and C   I do not recommend online compounded GLP1 anytime   In the future,  [facility] has started compounding GLp1 which can be an option when you are done breastfeeding (we know source and safety of this better).   Follow up when done breastfeeding          Relevant Orders    Vitamin B12    Vitamin D Deficiency    Parathyroid Hormone Intact    Ferritin    Vitamin A    Iron and iron binding capacity       Other    S/P laparoscopic sleeve gastrectomy    Relevant Medications    famotidine (PEPCID) 40 MG tablet    Other Relevant Orders    Vitamin B12    Vitamin D Deficiency    Parathyroid Hormone Intact    Ferritin    Vitamin A     Iron and iron binding capacity     Other Visit Diagnoses       Nausea        Relevant Medications    famotidine (PEPCID) 40 MG tablet    Gastroesophageal reflux disease with esophagitis, unspecified whether hemorrhage        Relevant Medications    famotidine (PEPCID) 40 MG tablet               20 minutes spent by me on the date of the encounter doing chart review, history and exam, documentation and further activities per the note    CHIEF COMPLAINT: Post-bariatric surgery follow-up. S/p sleeve 2022. Ongoing MWM. Last seen 2/2024. Insurance change was going to no longer cover wegovy, had been finding beneficial. Adding naltrexone and phentermine, changing bupropion as it wasn't digesting (whole pill in toilet).     HISTORY OF PRESENT ILLNESS:      7/15/2024    10:28 PM   Questions Regarding Prior Weight Loss Surgery Reviewed With Patient   I had the following weight loss procedure Sleeve Gastrectomy   What year was your surgery? 2022   How has your weight changed since your last visit? I have stayed about the same   Do you currently have any of the following Heartburn, acid reflux, or GERD (acid reflux disease)?   Do you have any concerns today? I feel like i 100% failed this whole thing.     Nausea before pregnancy. No nausea since pregnancy     Anti-obesity medications:     Current:   none    Failed/contraindicated:   GLp1- helpful but not covered by insurance   Naltrexone- little benefit   Bupropion- not sure if helpful, stopped when found out she was pregnant   Phentermine- less helpful than GLP1  Was purchasing compounded GLp1 online when she got pregnant - unknown to me until now     Recent diet changes:   Appetite not worse since pregnant - not horrible     Protein- improving - craving this recently   -Water- got Cirkul bottle which has been really helpful - 120oz daily     Recent exercise/activity changes: walking in the evenings     Recent stressors: manageable   Insurance through her job now     Recent  sleep changes: hydroxyzine has been helpful sometimes     Vitamins/Labs: 10/2023  Taking vitamin D, Vitamin C and prenatal vitamin     GERD symptoms: no reflux since getting pregnant. Continues pepcid once daily     Weight History:      7/15/2024    10:28 PM   --   What is your highest lifetime weight? 365   What is your lowest weight since surgery? (In pounds) 253     Initial Weight (lbs): 365 lbs  Weight: 133.8 kg (295 lb)  Last Visits Weight: 114.8 kg (253 lb)  Cumulative weight loss (lbs): 70  Weight Loss Percentage: 19.18%        7/15/2024    10:28 PM   Questions Regarding Co-Morbidities and Health Concerns Reviewed With Patient   Pre-diabetes Never   Diabetes II Never   High Blood Pressure Gone Away   High cholesterol Never   Heartburn/Reflux Stayed the same   Sleep apnea Never   PCOS Never   Back pain Never   Joint pain Stayed the same   Lower leg swelling Gone away           7/15/2024    10:28 PM   Eating Habits   How many meals do you eat per day? 3   Do you snack between meals? Yes   How much food are you eating at each meal? 1/2 cup to 1 cup   Are you able to separate your meals and liquids by at least 30 minutes? Yes   Are you able to avoid liquid calories? Sometimes           7/15/2024    10:28 PM   Exercise Questions Reviewed With Patient   How often do you exercise? Less than 1 time per week   What is the duration of your exercise (in minutes)? 20 Minutes   What types of exercise do you do? walking   What keeps you from being more active? I should be more active but I just have not gotten around to it       Social History:      7/15/2024    10:28 PM   --   Are you smoking? No   Are you drinking alcohol? No       Medications:  Current Outpatient Medications   Medication Sig Dispense Refill    albuterol (PROAIR HFA/PROVENTIL HFA/VENTOLIN HFA) 108 (90 Base) MCG/ACT inhaler Inhale 1-2 puffs into the lungs every 4 hours as needed for shortness of breath / dyspnea or wheezing 8.5 g 5    augmented  betamethasone dipropionate (DIPROLENE) 0.05 % external lotion Apply topically to scalp nightly for 2 weeks then as needed.*      augmented betamethasone dipropionate (DIPROLENE-AF) 0.05 % external ointment Apply topically to feet as needed*      benzonatate (TESSALON) 200 MG capsule Take 1 capsule (200 mg) by mouth 3 times daily as needed for cough 30 capsule 1    blood glucose (NO BRAND SPECIFIED) test strip Use to test blood sugar 1 times daily or as directed. 100 strip 1    Cholecalciferol (VITAMIN D3) 25 MCG TABS       famotidine (PEPCID) 40 MG tablet Take 1 tablet (40 mg) by mouth daily 90 tablet 3    Fexofenadine HCl (ALLEGRA PO) Take by mouth daily as needed for allergies      hydrOXYzine HCl (ATARAX) 25 MG tablet Take 25 mg (1 tablet) 30 minutes before bedtime.  May increase by 1 tablet to a maximum dose of 100 mg (4 tablets) to improve sleep habits. 180 tablet 1    magnesium 250 MG tablet Take 1 tablet by mouth daily Takes every other day      metroNIDAZOLE (METROCREAM) 0.75 % external cream Apply toPICALLY TO face every morning.*      nystatin (MYCOSTATIN) 472609 UNIT/GM external powder Apply topically 3 times daily as needed (rash in skin folds) 60 g 3    Prenatal Vit-Fe Fumarate-FA (PRENATAL MULTIVITAMIN  PLUS IRON) 27-1 MG TABS Take by mouth daily      fluticasone-salmeterol (ADVAIR) 250-50 MCG/ACT inhaler Inhale 1 puff into the lungs every 12 hours (Patient not taking: Reported on 6/27/2024) 60 each 11    ipratropium - albuterol 0.5 mg/2.5 mg/3 mL (DUONEB) 0.5-2.5 (3) MG/3ML neb solution Take 1 vial (3 mLs) by nebulization every 6 hours as needed for shortness of breath / dyspnea or wheezing (Patient not taking: Reported on 6/27/2024) 30 vial 1     Current Facility-Administered Medications   Medication Dose Route Frequency Provider Last Rate Last Admin    1 mL ropivacaine (NAROPIN) injection 5 mg/mL  1 mL   Amy Sullivan, DO   1 mL at 11/09/23 1412    2 mL bupivacaine (MARCAINE) preservative  "free injection 0.5% (20 mL vial)  2 mL   Gareth Smith, DO   2 mL at 07/27/23 1242    lidocaine 1 % injection 2 mL  2 mL   Gareth Smith, DO   2 mL at 07/27/23 1242    lidocaine 1 % injection 3 mL  3 mL   Amy Sullivan, DO   3 mL at 11/09/23 1412    triamcinolone (KENALOG-40) injection 40 mg  40 mg   Amy Sullivan, DO   40 mg at 11/09/23 1412    triamcinolone (KENALOG-40) injection 40 mg  40 mg   Gareth Smith, DO   40 mg at 07/27/23 1242         7/15/2024    10:28 PM   --   Do you avoid NSAIDs such as (Ibuprofen, Aleve, Naproxen, Advil)? No       ROS:  GI:       7/15/2024    10:28 PM   --   Vomiting No   Diarrhea No   Constipation Yes   Swallowing trouble No   Abdominal pain No   Heartburn No     Skin:       7/15/2024    10:28 PM   BAR RBS ROS - SKIN   Rash in skin folds Yes     Psych:       7/15/2024    10:28 PM   --   Depression No   Anxiety No     Female Only:       7/15/2024    10:28 PM   BAR RBS ROS -    Female only None of the above   Stress urinary incontinence No       Lab on 10/26/2023   Component Date Value Ref Range Status    WBC Count 10/26/2023 6.3  4.0 - 11.0 10e3/uL Final    RBC Count 10/26/2023 4.83  3.80 - 5.20 10e6/uL Final    Hemoglobin 10/26/2023 14.3  11.7 - 15.7 g/dL Final    Hematocrit 10/26/2023 42.1  35.0 - 47.0 % Final    MCV 10/26/2023 87  78 - 100 fL Final    MCH 10/26/2023 29.6  26.5 - 33.0 pg Final    MCHC 10/26/2023 34.0  31.5 - 36.5 g/dL Final    RDW 10/26/2023 11.8  10.0 - 15.0 % Final    Platelet Count 10/26/2023 298  150 - 450 10e3/uL Final    Ferritin 10/26/2023 221 (H)  6 - 175 ng/mL Final    Iron 10/26/2023 115  37 - 145 ug/dL Final    Iron Binding Capacity 10/26/2023 312  240 - 430 ug/dL Final    Iron Sat Index 10/26/2023 37  15 - 46 % Final             3/1/2018    10:00 AM   ELVA Score (Last Two)   ELVA Raw Score 40   Activation Score 100   ELVA Level 4         PHYSICAL EXAM:  Objective   Ht 1.702 m (5' 7.01\")   Wt 133.8 kg (295 lb)   LMP 05/30/2024   BMI " 46.19 kg/m    Vitals - Patient Reported  Pain Score: No Pain (0)        Physical Exam   GENERAL: alert and no distress  EYES: Eyes grossly normal to inspection.  No discharge or erythema, or obvious scleral/conjunctival abnormalities.  RESP: No audible wheeze, cough, or visible cyanosis.    SKIN: Visible skin clear. No significant rash, abnormal pigmentation or lesions.  NEURO: Cranial nerves grossly intact.  Mentation and speech appropriate for age.  PSYCH: Appropriate affect, tone, and pace of words        Sincerely,    Komal Miller NP      Virtual Visit Details    Type of service:  Video Visit     Originating Location (pt. Location): Home    Distant Location (provider location):  Off-site  Platform used for Video Visit: [employer]

## 2024-07-18 NOTE — ASSESSMENT & PLAN NOTE
Was purchasing online compounded GLp1 leading up to pregnancy. Stopped all  antiobesity medications when she learned about pregnancy. Discussed the risks with compounded drug and that I do not recommend this.     Discussed management of weight during pregnancy- following post bariatric surgery guidelines- adequate protein, complex carbs, adequate hydration, mindfulness around snacking outside of meals if not hungry, walking after meals     Continue pepcid     Continue pepcid  Labs with OB labs   Continue prenatal vitamin, vitamin D and C   I do not recommend online compounded GLP1 anytime   In the future,  [facility] has started compounding GLp1 which can be an option when you are done breastfeeding (we know source and safety of this better).   Follow up when done breastfeeding

## 2024-07-24 LAB
ABO/RH(D): NORMAL
ANTIBODY SCREEN: NEGATIVE
SPECIMEN EXPIRATION DATE: NORMAL

## 2024-07-25 ENCOUNTER — LAB (OUTPATIENT)
Dept: LAB | Facility: CLINIC | Age: 24
End: 2024-07-25
Payer: COMMERCIAL

## 2024-07-25 ENCOUNTER — HOSPITAL ENCOUNTER (OUTPATIENT)
Dept: ULTRASOUND IMAGING | Facility: CLINIC | Age: 24
Discharge: HOME | End: 2024-07-25
Attending: FAMILY MEDICINE | Admitting: FAMILY MEDICINE
Payer: COMMERCIAL

## 2024-07-25 DIAGNOSIS — E66.813 CLASS 3 SEVERE OBESITY WITH SERIOUS COMORBIDITY AND BODY MASS INDEX (BMI) OF 40.0 TO 44.9 IN ADULT, UNSPECIFIED OBESITY TYPE (H): ICD-10-CM

## 2024-07-25 DIAGNOSIS — Z98.84 S/P LAPAROSCOPIC SLEEVE GASTRECTOMY: ICD-10-CM

## 2024-07-25 DIAGNOSIS — O09.90 SUPERVISION OF HIGH-RISK PREGNANCY: ICD-10-CM

## 2024-07-25 DIAGNOSIS — E66.01 CLASS 3 SEVERE OBESITY WITH SERIOUS COMORBIDITY AND BODY MASS INDEX (BMI) OF 40.0 TO 44.9 IN ADULT, UNSPECIFIED OBESITY TYPE (H): ICD-10-CM

## 2024-07-25 LAB
ALBUMIN SERPL BCG-MCNC: 4.1 G/DL (ref 3.5–5.2)
ALBUMIN UR-MCNC: NEGATIVE MG/DL
ALP SERPL-CCNC: 54 U/L (ref 40–150)
ALT SERPL W P-5'-P-CCNC: 21 U/L (ref 0–50)
ANION GAP SERPL CALCULATED.3IONS-SCNC: 13 MMOL/L (ref 7–15)
APPEARANCE UR: CLEAR
AST SERPL W P-5'-P-CCNC: 16 U/L (ref 0–45)
BILIRUB SERPL-MCNC: 0.2 MG/DL
BILIRUB UR QL STRIP: NEGATIVE
BUN SERPL-MCNC: 8.2 MG/DL (ref 6–20)
CALCIUM SERPL-MCNC: 9.1 MG/DL (ref 8.8–10.4)
CHLORIDE SERPL-SCNC: 103 MMOL/L (ref 98–107)
COLOR UR AUTO: YELLOW
CREAT SERPL-MCNC: 0.54 MG/DL (ref 0.51–0.95)
EGFRCR SERPLBLD CKD-EPI 2021: >90 ML/MIN/1.73M2
ERYTHROCYTE [DISTWIDTH] IN BLOOD BY AUTOMATED COUNT: 11.9 % (ref 10–15)
FERRITIN SERPL-MCNC: 132 NG/ML (ref 6–175)
GLUCOSE SERPL-MCNC: 85 MG/DL (ref 70–99)
GLUCOSE UR STRIP-MCNC: NEGATIVE MG/DL
HBA1C MFR BLD: 4.7 %
HCO3 SERPL-SCNC: 23 MMOL/L (ref 22–29)
HCT VFR BLD AUTO: 36.8 % (ref 35–47)
HGB BLD-MCNC: 12.9 G/DL (ref 11.7–15.7)
HGB UR QL STRIP: NEGATIVE
IRON BINDING CAPACITY (ROCHE): 321 UG/DL (ref 240–430)
IRON SATN MFR SERPL: 16 % (ref 15–46)
IRON SERPL-MCNC: 50 UG/DL (ref 37–145)
KETONES UR STRIP-MCNC: NEGATIVE MG/DL
LEUKOCYTE ESTERASE UR QL STRIP: NEGATIVE
MCH RBC QN AUTO: 29.4 PG (ref 26.5–33)
MCHC RBC AUTO-ENTMCNC: 35.1 G/DL (ref 31.5–36.5)
MCV RBC AUTO: 84 FL (ref 78–100)
NITRATE UR QL: NEGATIVE
PH UR STRIP: 7 [PH] (ref 5–7)
PLATELET # BLD AUTO: 259 10E3/UL (ref 150–450)
POTASSIUM SERPL-SCNC: 3.7 MMOL/L (ref 3.4–5.3)
PROT SERPL-MCNC: 6.4 G/DL (ref 6.4–8.3)
RBC # BLD AUTO: 4.39 10E6/UL (ref 3.8–5.2)
SODIUM SERPL-SCNC: 139 MMOL/L (ref 135–145)
SP GR UR STRIP: 1 (ref 1–1.03)
UROBILINOGEN UR STRIP-MCNC: NORMAL MG/DL
WBC # BLD AUTO: 10.2 10E3/UL (ref 4–11)

## 2024-07-25 PROCEDURE — 36415 COLL VENOUS BLD VENIPUNCTURE: CPT

## 2024-07-25 PROCEDURE — 86900 BLOOD TYPING SEROLOGIC ABO: CPT

## 2024-07-25 PROCEDURE — 80053 COMPREHEN METABOLIC PANEL: CPT

## 2024-07-25 PROCEDURE — 86850 RBC ANTIBODY SCREEN: CPT

## 2024-07-25 PROCEDURE — 86762 RUBELLA ANTIBODY: CPT

## 2024-07-25 PROCEDURE — 83970 ASSAY OF PARATHORMONE: CPT

## 2024-07-25 PROCEDURE — 99000 SPECIMEN HANDLING OFFICE-LAB: CPT

## 2024-07-25 PROCEDURE — 86901 BLOOD TYPING SEROLOGIC RH(D): CPT

## 2024-07-25 PROCEDURE — 87340 HEPATITIS B SURFACE AG IA: CPT

## 2024-07-25 PROCEDURE — 83036 HEMOGLOBIN GLYCOSYLATED A1C: CPT

## 2024-07-25 PROCEDURE — 86803 HEPATITIS C AB TEST: CPT

## 2024-07-25 PROCEDURE — 83540 ASSAY OF IRON: CPT

## 2024-07-25 PROCEDURE — 76801 OB US < 14 WKS SINGLE FETUS: CPT

## 2024-07-25 PROCEDURE — 81003 URINALYSIS AUTO W/O SCOPE: CPT

## 2024-07-25 PROCEDURE — 87389 HIV-1 AG W/HIV-1&-2 AB AG IA: CPT

## 2024-07-25 PROCEDURE — 84590 ASSAY OF VITAMIN A: CPT | Mod: 90

## 2024-07-25 PROCEDURE — 82306 VITAMIN D 25 HYDROXY: CPT

## 2024-07-25 PROCEDURE — 87086 URINE CULTURE/COLONY COUNT: CPT

## 2024-07-25 PROCEDURE — 86780 TREPONEMA PALLIDUM: CPT

## 2024-07-25 PROCEDURE — 82728 ASSAY OF FERRITIN: CPT

## 2024-07-25 PROCEDURE — 82607 VITAMIN B-12: CPT

## 2024-07-25 PROCEDURE — 83550 IRON BINDING TEST: CPT

## 2024-07-25 PROCEDURE — 85027 COMPLETE CBC AUTOMATED: CPT

## 2024-07-26 ENCOUNTER — MYC MEDICAL ADVICE (OUTPATIENT)
Dept: FAMILY MEDICINE | Facility: CLINIC | Age: 24
End: 2024-07-26
Payer: COMMERCIAL

## 2024-07-26 LAB
HBV SURFACE AG SERPL QL IA: NONREACTIVE
HCV AB SERPL QL IA: NONREACTIVE
HIV 1+2 AB+HIV1 P24 AG SERPL QL IA: NONREACTIVE
PTH-INTACT SERPL-MCNC: 28 PG/ML (ref 15–65)
RUBV IGG SERPL QL IA: 1.24 INDEX
RUBV IGG SERPL QL IA: POSITIVE
T PALLIDUM AB SER QL: NONREACTIVE
VIT B12 SERPL-MCNC: 421 PG/ML (ref 232–1245)
VIT D+METAB SERPL-MCNC: 29 NG/ML (ref 20–50)

## 2024-07-27 LAB — BACTERIA UR CULT: NO GROWTH

## 2024-07-29 ENCOUNTER — E-VISIT (OUTPATIENT)
Dept: URGENT CARE | Facility: CLINIC | Age: 24
End: 2024-07-29
Payer: COMMERCIAL

## 2024-07-29 DIAGNOSIS — R05.1 ACUTE COUGH: Primary | ICD-10-CM

## 2024-07-29 PROCEDURE — 99207 PR NON-BILLABLE SERV PER CHARTING: CPT | Performed by: FAMILY MEDICINE

## 2024-07-30 LAB
ANNOTATION COMMENT IMP: NORMAL
RETINYL PALMITATE SERPL-MCNC: <0.02 MG/L
VIT A SERPL-MCNC: 0.46 MG/L

## 2024-08-02 ENCOUNTER — MYC MEDICAL ADVICE (OUTPATIENT)
Dept: FAMILY MEDICINE | Facility: CLINIC | Age: 24
End: 2024-08-02
Payer: COMMERCIAL

## 2024-08-03 NOTE — TELEPHONE ENCOUNTER
Called and spoke to pt. Will follow up on Monday with  a phone call. Options given included watchful waiting, misoprostol, or D&C. Pt to consider options. She was given strict ER precautions including but not limited to: bleeding out of proportion, extreme pain, signs or symptoms of infection. She expressed understanding. Grieving appropriately.   Reinforced to patient, SAB is common and unpreventable. It is also not her fault in any way. This was a highly desired pregnancy. Will continue to follow. Has follow up with me 8/15

## 2024-08-07 ENCOUNTER — TELEPHONE (OUTPATIENT)
Dept: FAMILY MEDICINE | Facility: CLINIC | Age: 24
End: 2024-08-07
Payer: COMMERCIAL

## 2024-08-07 NOTE — TELEPHONE ENCOUNTER
Called and spoke to patient again.  No signs or symptoms of miscarriage at this point.  She has not expelled any tissue or clots.  She has had no pain and no fevers.  We discussed at length her options again.  Her last ultrasound if accurate would be diagnostic of a miscarriage but she still having trouble coping and feeling like she accepts this inevitability.  And especially because she has had no signs or symptoms of SAB it has been pretty difficult for her to except.  We discussed possibilities and patient is amenable to follow-up as she was previously scheduled for next week.  We discussed that if anytime before she has any signs or symptoms of passage of POC we can reassess her she can go to the ER.  Again she is not sure that she is ready to accept any methods that might induce contractions and we did discuss that it still okay to have watchful waiting in this moment and patient was reassured.  Will continue to monitor ER precautions discussed

## 2024-08-14 ASSESSMENT — ANXIETY QUESTIONNAIRES
3. WORRYING TOO MUCH ABOUT DIFFERENT THINGS: NOT AT ALL
GAD7 TOTAL SCORE: 2
7. FEELING AFRAID AS IF SOMETHING AWFUL MIGHT HAPPEN: NOT AT ALL
GAD7 TOTAL SCORE: 2
8. IF YOU CHECKED OFF ANY PROBLEMS, HOW DIFFICULT HAVE THESE MADE IT FOR YOU TO DO YOUR WORK, TAKE CARE OF THINGS AT HOME, OR GET ALONG WITH OTHER PEOPLE?: SOMEWHAT DIFFICULT
5. BEING SO RESTLESS THAT IT IS HARD TO SIT STILL: NOT AT ALL
GAD7 TOTAL SCORE: 2
4. TROUBLE RELAXING: NOT AT ALL
7. FEELING AFRAID AS IF SOMETHING AWFUL MIGHT HAPPEN: NOT AT ALL
1. FEELING NERVOUS, ANXIOUS, OR ON EDGE: NOT AT ALL
IF YOU CHECKED OFF ANY PROBLEMS ON THIS QUESTIONNAIRE, HOW DIFFICULT HAVE THESE PROBLEMS MADE IT FOR YOU TO DO YOUR WORK, TAKE CARE OF THINGS AT HOME, OR GET ALONG WITH OTHER PEOPLE: SOMEWHAT DIFFICULT
6. BECOMING EASILY ANNOYED OR IRRITABLE: MORE THAN HALF THE DAYS
2. NOT BEING ABLE TO STOP OR CONTROL WORRYING: NOT AT ALL

## 2024-08-14 ASSESSMENT — PATIENT HEALTH QUESTIONNAIRE - PHQ9
SUM OF ALL RESPONSES TO PHQ QUESTIONS 1-9: 10
10. IF YOU CHECKED OFF ANY PROBLEMS, HOW DIFFICULT HAVE THESE PROBLEMS MADE IT FOR YOU TO DO YOUR WORK, TAKE CARE OF THINGS AT HOME, OR GET ALONG WITH OTHER PEOPLE: SOMEWHAT DIFFICULT
SUM OF ALL RESPONSES TO PHQ QUESTIONS 1-9: 10

## 2024-08-15 ENCOUNTER — PRENATAL OFFICE VISIT (OUTPATIENT)
Dept: FAMILY MEDICINE | Facility: CLINIC | Age: 24
End: 2024-08-15
Payer: COMMERCIAL

## 2024-08-15 VITALS
HEIGHT: 67 IN | HEART RATE: 75 BPM | OXYGEN SATURATION: 98 % | SYSTOLIC BLOOD PRESSURE: 124 MMHG | RESPIRATION RATE: 18 BRPM | TEMPERATURE: 98.1 F | WEIGHT: 293 LBS | DIASTOLIC BLOOD PRESSURE: 62 MMHG | BODY MASS INDEX: 45.99 KG/M2

## 2024-08-15 DIAGNOSIS — O02.1 MISSED ABORTION WITH FETAL DEMISE BEFORE 20 COMPLETED WEEKS OF GESTATION: Primary | ICD-10-CM

## 2024-08-15 PROCEDURE — 99213 OFFICE O/P EST LOW 20 MIN: CPT | Performed by: FAMILY MEDICINE

## 2024-08-15 RX ORDER — PROCHLORPERAZINE MALEATE 10 MG
10 TABLET ORAL EVERY 6 HOURS PRN
Qty: 60 TABLET | Refills: 2 | Status: SHIPPED | OUTPATIENT
Start: 2024-08-15 | End: 2024-09-27

## 2024-08-15 RX ORDER — OXYCODONE HYDROCHLORIDE 5 MG/1
5 TABLET ORAL EVERY 6 HOURS PRN
Qty: 12 TABLET | Refills: 0 | Status: SHIPPED | OUTPATIENT
Start: 2024-08-15 | End: 2024-08-18

## 2024-08-15 RX ORDER — MISOPROSTOL 200 UG/1
800 TABLET ORAL 3 TIMES DAILY PRN
Qty: 12 TABLET | Refills: 0 | Status: SHIPPED | OUTPATIENT
Start: 2024-08-15 | End: 2024-09-27

## 2024-08-15 ASSESSMENT — PAIN SCALES - GENERAL: PAINLEVEL: NO PAIN (0)

## 2024-08-15 NOTE — LETTER
August 15, 2024      Sasha Guy  [address]  [facility] [address]        To Whom It May Concern:    Sasha Guy  was seen on 8/15/24.  Please excuse her  until 08/19/2024 due to illness. If you require any further documentation please do not hesitate to reach out.         Sincerely,        Mey Smith MD

## 2024-08-15 NOTE — PROGRESS NOTES
"  Assessment & Plan     (O02.1) Missed  with fetal demise before 20 completed weeks of gestation  (primary encounter diagnosis)  Comment: Patient is a 24-year-old G1 who is here for initial OB eval.  She had an initial ultrasound on 2024 which confirmed dating P.  She then presented after having some cramping on 2024 to [facility] and found to have no heartbeat meeting diagnostic criteria for missed AB.  We have discussed options at length at this point.  Patient is amenable to med administration for help with removal of POC but at this time does not desire any form of D&C.  We discussed administration of Cytotec and patient was amenable to that.  She was offered but declined mifepristone to aid in efficacy of missed AB treatment.  Strict ER precautions were discussed at length for excessive bleeding, signs or symptoms of systemic infection.  Patient expressed understanding.  Bedside ultrasound today confirms there is only debris and a gestational sac left in the intrauterine cavity at this juncture.  No heart tones were noted and no definitive fetal pole was obvious on exam  Plan: misoprostol (CYTOTEC) 200 MCG tablet, oxyCODONE        (ROXICODONE) 5 MG tablet, prochlorperazine         (COMPAZINE) 10 MG tablet            BMI  Estimated body mass index is 48.08 kg/m  as calculated from the following:    Height as of this encounter: 1.702 m (5' 7\").    Weight as of this encounter: 139.3 kg (307 lb).       Subjective   Sasha is a 24 year old, presenting for the following health issues:  Prenatal Care (No heart beat )      8/15/2024     3:42 PM   Additional Questions   Roomed by Erum   Accompanied by  jane     MARU   As above.  24-year-old G1 now Ab1 with missed AB meeting diagnostic criteria for fetal demise after ultrasound done on 2024.  Patient elected initially for watchful waiting but has had no passage of tissue in the last 2 weeks.  We discussed options and patient is " "ultimately accepting of med administration for aid in clearance of POC.        Objective    /62   Pulse 75   Temp 98.1  F (36.7  C) (Temporal)   Resp 18   Ht 1.702 m (5' 7\")   Wt 139.3 kg (307 lb)   LMP 05/30/2024   SpO2 98%   BMI 48.08 kg/m    Body mass index is 48.08 kg/m .  Physical Exam  Constitutional:       General: She is not in acute distress.     Appearance: She is not ill-appearing or toxic-appearing.   Cardiovascular:      Pulses: Normal pulses.   Pulmonary:      Effort: Pulmonary effort is normal. No respiratory distress.   Abdominal:      General: Abdomen is flat.      Palpations: Abdomen is soft.      Tenderness: There is no abdominal tenderness. There is no guarding or rebound.   Skin:     General: Skin is warm and dry.      Capillary Refill: Capillary refill takes less than 2 seconds.   Neurological:      Mental Status: She is alert. Mental status is at baseline.   Psychiatric:      Comments: Grieving appropriately        Bedside ultrasound: Gestational sac still evident but no discrete fetal pole is noted.  There does appear to be debris but there is no signs of movement or any cardiac activity in that debris      Ultrasound imaging was also reviewed from her visit to ER on 8/2/2024 as well as compared to images on 7/25/2024.        Signed Electronically by: Mey Smith MD    Answers submitted by the patient for this visit:  Patient Health Questionnaire (Submitted on 8/14/2024)  If you checked off any problems, how difficult have these problems made it for you to do your work, take care of things at home, or get along with other people?: Somewhat difficult  PHQ9 TOTAL SCORE: 10  DELIA-7 (Submitted on 8/14/2024)  DELIA 7 TOTAL SCORE: 2    "

## 2024-08-22 ENCOUNTER — OFFICE VISIT (OUTPATIENT)
Dept: FAMILY MEDICINE | Facility: CLINIC | Age: 24
End: 2024-08-22
Payer: COMMERCIAL

## 2024-08-22 VITALS
DIASTOLIC BLOOD PRESSURE: 70 MMHG | TEMPERATURE: 98 F | BODY MASS INDEX: 45.99 KG/M2 | HEART RATE: 85 BPM | OXYGEN SATURATION: 100 % | WEIGHT: 293 LBS | HEIGHT: 67 IN | SYSTOLIC BLOOD PRESSURE: 126 MMHG | RESPIRATION RATE: 22 BRPM

## 2024-08-22 DIAGNOSIS — O02.1 MISSED ABORTION WITH FETAL DEMISE BEFORE 20 COMPLETED WEEKS OF GESTATION: Primary | ICD-10-CM

## 2024-08-22 PROCEDURE — 99213 OFFICE O/P EST LOW 20 MIN: CPT | Performed by: FAMILY MEDICINE

## 2024-08-22 ASSESSMENT — PAIN SCALES - GENERAL: PAINLEVEL: NO PAIN (0)

## 2024-08-22 NOTE — PROGRESS NOTES
"  Assessment & Plan     (O02.1) Missed  with fetal demise before 20 completed weeks of gestation  (primary encounter diagnosis)  Comment: Discussed plan of care.  Status post Cytotec and endometrial stripe does appear to be significantly more discrete at this juncture.  No acute issues or concerns.  No signs or symptoms of infection.  Patient feeling well overall.  They do desire to try again for fertility in the next few months but at this juncture we will practice safe sex habits and barrier method for now.  Declined any further workup at this juncture.          BMI  Estimated body mass index is 47.74 kg/m  as calculated from the following:    Height as of this encounter: 1.702 m (5' 7\").    Weight as of this encounter: 138.3 kg (304 lb 12.8 oz).   Weight management plan: Patient referred to endocrine and/or weight management specialty        Marco Antonio Baez is a 24 year old, presenting for the following health issues:  Follow Up (Miscarriage)        2024     7:37 AM   Additional Questions   Roomed by Radha BYREN     History of Present Illness       Reason for visit:  Miscarriage follow up.    She eats 0-1 servings of fruits and vegetables daily.She consumes 2 sweetened beverage(s) daily.She exercises with enough effort to increase her heart rate 10 to 19 minutes per day.  She exercises with enough effort to increase her heart rate 3 or less days per week.   She is taking medications regularly.       Patient now status post Cytotec and doing well.  She required 2 rounds of dosing but then had extensive vaginal bleeding with discharge and passage of clots as well as likely tissue.  Her bleeding is now significantly reduced from prior but she does report of sensation of increased vaginal cramping occasionally and feels like she has to push something out.  When she sits at her desk for prolonged periods of time sometimes she does have passage of large clots from the vagina      Objective    /70  " " Pulse 85   Temp 98  F (36.7  C) (Temporal)   Resp 22   Ht 1.702 m (5' 7\")   Wt 138.3 kg (304 lb 12.8 oz)   LMP 05/30/2024   SpO2 100%   Breastfeeding Unknown   BMI 47.74 kg/m    Body mass index is 47.74 kg/m .  Physical Exam  Chaperone present: Declined chaperone.   Constitutional:       General: She is not in acute distress.     Appearance: She is obese. She is not ill-appearing, toxic-appearing or diaphoretic.   Cardiovascular:      Pulses: Normal pulses.   Pulmonary:      Effort: Pulmonary effort is normal. No respiratory distress.   Genitourinary:     Vagina: Normal.      Cervix: Normal.      Uterus: Normal.       Comments: Minimal bleeding noted from the os.  No tissue present  Skin:     General: Skin is warm and dry.      Capillary Refill: Capillary refill takes less than 2 seconds.   Neurological:      Mental Status: She is alert. Mental status is at baseline.            Bedside ultrasound shows discrete endometrial stripe with some pooled vaginal blood        Signed Electronically by: Mey Smith MD    "

## 2024-08-30 ENCOUNTER — MYC MEDICAL ADVICE (OUTPATIENT)
Dept: FAMILY MEDICINE | Facility: CLINIC | Age: 24
End: 2024-08-30
Payer: COMMERCIAL

## 2024-08-30 DIAGNOSIS — F32.1 MAJOR DEPRESSIVE DISORDER, SINGLE EPISODE, MODERATE (H): Primary | ICD-10-CM

## 2024-08-30 DIAGNOSIS — E66.01 MORBID OBESITY (H): ICD-10-CM

## 2024-09-03 RX ORDER — BUPROPION HYDROCHLORIDE 150 MG/1
150 TABLET ORAL EVERY MORNING
Qty: 30 TABLET | Refills: 3 | Status: SHIPPED | OUTPATIENT
Start: 2024-09-03

## 2024-09-03 RX ORDER — METFORMIN HCL 500 MG
500 TABLET, EXTENDED RELEASE 24 HR ORAL
Qty: 30 TABLET | Refills: 3 | Status: SHIPPED | OUTPATIENT
Start: 2024-09-03

## 2024-09-23 ENCOUNTER — NURSE TRIAGE (OUTPATIENT)
Dept: FAMILY MEDICINE | Facility: CLINIC | Age: 24
End: 2024-09-23
Payer: COMMERCIAL

## 2024-09-23 NOTE — TELEPHONE ENCOUNTER
Nurse Triage SBAR    Is this a 2nd Level Triage? NO    Situation: Patient calling with excessive vaginal bleeding. First cycle after miscarriage.    Background: Patient had gone to [facility] ED for abdominal pain on 8/2/24 and ultrasound showed miscarriage.   She had her gallbladder removed 1 week ago. States she was given heparin during procedure.    Assessment: Patient states today is day #4 of her cycle.  Bleeding was heavier to begin with but today she has bled through 3 super tampons and a maxi pad within 1 hour.   She is currently sitting on the toilet with a steady stream of blood coming from her vagina.   Clots are present, pea-size.   Denies cramping or dizziness at this time.     Protocol Recommended Disposition:   Go To ED/UCC Now (Or To Office With PCP Approval)    Recommendation: RN recommended ED evaluation given the amount of blood she continues to pass. Patient agreed to this evaluation. She is calling her  to bring her to [place] ED now. She does have a friend (who is a RN) come and sit with her and monitor until  can bring her.      Does the patient meet one of the following criteria for ADS visit consideration? 16+ years old, with an [facility] PCP     Samira NUÑEZN, RN     Reason for Disposition   SEVERE vaginal bleeding (e.g., soaking 2 pads or tampons per hour and present 2 or more hours; 1 menstrual cup every 2 hours)    Additional Information   Negative: SEVERE vaginal bleeding (e.g., continuous red blood from vagina, or large blood clots) and very weak (can't stand)   Negative: Passed out (i.e., fainted, collapsed and was not responding)   Negative: Difficult to awaken or acting confused (e.g., disoriented, slurred speech)   Negative: Shock suspected (e.g., cold/pale/clammy skin, too weak to stand, low BP, rapid pulse)   Negative: Sounds like a life-threatening emergency to the triager   Negative: Pregnant 20 or more weeks (5 months or more)   Negative: Pregnant < 20  weeks (less than 5 months)   Negative: Postpartum (from 0 to 6 weeks after delivery)   Negative: Vaginal discharge is main symptom and bleeding is slight   Negative: SEVERE abdominal pain (e.g., excruciating)   Negative: SEVERE dizziness (e.g., unable to stand, requires support to walk, feels like passing out now)    Protocols used: Vaginal Bleeding - [id number]

## 2024-09-24 ENCOUNTER — MYC MEDICAL ADVICE (OUTPATIENT)
Dept: FAMILY MEDICINE | Facility: CLINIC | Age: 24
End: 2024-09-24
Payer: COMMERCIAL

## 2024-09-24 ENCOUNTER — TELEPHONE (OUTPATIENT)
Dept: OBGYN | Facility: CLINIC | Age: 24
End: 2024-09-24
Payer: COMMERCIAL

## 2024-09-24 NOTE — TELEPHONE ENCOUNTER
----- Message from Kyree Orozco sent at 9/24/2024  6:11 AM CDT -----  Regarding: Surgery follow up  Patient was seen in the ED for bleeding with retained products of conception following miscarriage. She underwent an uncomplicated suction D&C late in the evening on 9/23/24. Please follow up to ensure bleeding is subsided and to schedule follow up with me in 2-4 weeks.   Dr Orozco    RN called pt, pt states she is doing well. Pt states bleeding is light, denies needing a pad. Pt denies fever concerns.    Pt has some discomfort but states it is primarily from gallbladder removal on 9/17.    RN scheduled follow up appt for 10/10 with Dr. Orozco- pt aware to reach out for any new or worsening symptoms.    Blanca Oneill RN on 9/24/2024 at 8:45 AM

## 2024-09-26 ENCOUNTER — TELEPHONE (OUTPATIENT)
Dept: ENDOCRINOLOGY | Facility: CLINIC | Age: 24
End: 2024-09-26
Payer: COMMERCIAL

## 2024-09-26 NOTE — TELEPHONE ENCOUNTER
General Call      Reason for Call:  [employer] insur needs to know if pt was treated for gall bladder between certain dates    [phone], claim #: [id number]        Could we send this information to you in [employer] or would you prefer to receive a phone call?:   Patient would prefer a phone call   Okay to leave a detailed message?: Yes at Cell number on file:    Telephone Information:   Mobile [phone]

## 2024-09-26 NOTE — TELEPHONE ENCOUNTER
Contacted insurance company and notified they contacted the wrong providers office.  Patient notified to contact insurance company.

## 2024-09-27 ENCOUNTER — OFFICE VISIT (OUTPATIENT)
Dept: FAMILY MEDICINE | Facility: CLINIC | Age: 24
End: 2024-09-27
Payer: COMMERCIAL

## 2024-09-27 ENCOUNTER — TELEPHONE (OUTPATIENT)
Dept: FAMILY MEDICINE | Facility: CLINIC | Age: 24
End: 2024-09-27

## 2024-09-27 VITALS
BODY MASS INDEX: 45.99 KG/M2 | HEIGHT: 67 IN | TEMPERATURE: 98.4 F | OXYGEN SATURATION: 97 % | DIASTOLIC BLOOD PRESSURE: 76 MMHG | SYSTOLIC BLOOD PRESSURE: 136 MMHG | RESPIRATION RATE: 20 BRPM | WEIGHT: 293 LBS | HEART RATE: 75 BPM

## 2024-09-27 DIAGNOSIS — E11.9 TYPE 2 DIABETES MELLITUS WITHOUT COMPLICATION, WITHOUT LONG-TERM CURRENT USE OF INSULIN (H): ICD-10-CM

## 2024-09-27 DIAGNOSIS — Z30.41 SURVEILLANCE FOR BIRTH CONTROL, ORAL CONTRACEPTIVES: ICD-10-CM

## 2024-09-27 DIAGNOSIS — Z90.49 CHRONIC PAIN AFTER CHOLECYSTECTOMY: ICD-10-CM

## 2024-09-27 DIAGNOSIS — G89.28 CHRONIC PAIN AFTER CHOLECYSTECTOMY: ICD-10-CM

## 2024-09-27 DIAGNOSIS — O03.4 RETAINED PRODUCTS OF CONCEPTION AFTER MISCARRIAGE: Primary | ICD-10-CM

## 2024-09-27 PROCEDURE — 99214 OFFICE O/P EST MOD 30 MIN: CPT | Performed by: FAMILY MEDICINE

## 2024-09-27 PROCEDURE — G2211 COMPLEX E/M VISIT ADD ON: HCPCS | Performed by: FAMILY MEDICINE

## 2024-09-27 RX ORDER — NORGESTIMATE AND ETHINYL ESTRADIOL 7DAYSX3 LO
1 KIT ORAL DAILY
Qty: 84 TABLET | Refills: 3 | Status: SHIPPED | OUTPATIENT
Start: 2024-09-27

## 2024-09-27 RX ORDER — OXYCODONE HYDROCHLORIDE 5 MG/1
5 TABLET ORAL EVERY 6 HOURS PRN
Qty: 20 TABLET | Refills: 0 | Status: SHIPPED | OUTPATIENT
Start: 2024-09-27

## 2024-09-27 ASSESSMENT — PATIENT HEALTH QUESTIONNAIRE - PHQ9
SUM OF ALL RESPONSES TO PHQ QUESTIONS 1-9: 10
SUM OF ALL RESPONSES TO PHQ QUESTIONS 1-9: 10
10. IF YOU CHECKED OFF ANY PROBLEMS, HOW DIFFICULT HAVE THESE PROBLEMS MADE IT FOR YOU TO DO YOUR WORK, TAKE CARE OF THINGS AT HOME, OR GET ALONG WITH OTHER PEOPLE: SOMEWHAT DIFFICULT

## 2024-09-27 ASSESSMENT — PAIN SCALES - GENERAL: PAINLEVEL: MODERATE PAIN (4)

## 2024-09-27 NOTE — PROGRESS NOTES
Assessment & Plan     (O03.4) Retained products of conception after miscarriage  (primary encounter diagnosis)  Comment: Patient is a 24-year-old female here for hospital follow-up.  She was seen for 2 hospital visits 1 on 9/16/2024 where she was found to have biliary colic and Silvia cystitis resulting in a cholecystectomy on 9/17/2024.  Patient was discharged home after that and then about a week later had copious amounts of vaginal bleeding.  Earlier in August patient had been diagnosed with a missed AB and underwent Cytotec dosing x 2 with copious amounts of tissue and blood loss at that time.  Endometrial stripe was much more discrete at follow-up and we have given precautions.  After having the copious vaginal bleeding on 9/23/2024 it was advised that she go to the ER.  In the ER she was found to have what look like retained products of conception.  However her hCG levels were completely negative/normal both in the serum in the urine.  She underwent an uncomplicated D&C.  Pathology indicates immature decidua.  Plan: oxyCODONE (ROXICODONE) 5 MG tablet    (E11.9) Type 2 diabetes mellitus without complication, without long-term current use of insulin (H)  Comment: Patient well overall will follow-up with labs as needed  Plan: oxyCODONE (ROXICODONE) 5 MG tablet    (G89.28,  Z90.49) Chronic pain after cholecystectomy  Comment: Patient is doing well overall but does report intermittent abdominal pain worse at night.  She reports the pain was initially getting better and then she had her D&C and it got significantly worse again.  We did discuss trial of a low-dose narcotic and patient was amenable to that and we discussed as needed dosing.  She is can to be doing round-the-clock Tylenol with extended release Tylenol for coverage in between  Plan: oxyCODONE (ROXICODONE) 5 MG tablet    (Z30.41) Surveillance for birth control, oral contraceptives  Comment: Patient had been advised by the OB/GYN while inpatient to not  "try for any pregnancy for the next 3 months.  She is amenable to having OCPs on board for this period of time.  Plan: norgestim-eth estrad triphasic (ORTHO         TRI-CYCLEN LO) 0.18/0.215/0.25 MG-25 MCG tablet          MED REC REQUIRED  Post Medication Reconciliation Status:       Follow-up in 3 months.  Sooner if issues    Subjective   Sasha is a 24 year old, presenting for the following health issues:  ER F/U        9/27/2024    12:47 PM   Additional Questions   Roomed by Anjana REYNA     [facility]   Hospital course as above 24-year-old female with a history of mild intermittent asthma and morbid obesity initially had missed AB in mid August status post Cytotec x 2.  After that she had resolution of her bleeding and no signs or symptoms of infection.  She presented to the ER on 9/16/2024 and underwent uncomplicated cholecystectomy for acute cholecystitis.  While in recovery from this she had copious vaginal bleeding and was found to have possible retained products of conception on 9/23/24 and underwent uncomplicated D&C.  She is currently feeling well in her recovery.  She has occasional pains especially at the cholecystectomy sites this was worse after her D&C    ED/UC Followup:    Facility:  [facility]  Date of visit: 9/23/24  Reason for visit: vaginal bleeding  Current Status: had D&C completed 9/23/24 and gallbladder surgery week before 9/27/24.         Objective    /76   Pulse 75   Temp 98.4  F (36.9  C) (Temporal)   Resp 20   Ht 1.702 m (5' 7\")   Wt 139.7 kg (308 lb)   LMP 05/30/2024   SpO2 97%   BMI 48.24 kg/m    Body mass index is 48.24 kg/m .  Physical Exam  Constitutional:       General: She is not in acute distress.     Appearance: She is obese. She is not ill-appearing, toxic-appearing or diaphoretic.   HENT:      Mouth/Throat:      Mouth: Mucous membranes are moist.   Eyes:      Pupils: Pupils are equal, round, and reactive to light.   Cardiovascular:      Rate and Rhythm: Normal rate and " regular rhythm.      Pulses: Normal pulses.   Pulmonary:      Effort: Pulmonary effort is normal. No respiratory distress.      Breath sounds: Normal breath sounds. No stridor. No wheezing, rhonchi or rales.   Abdominal:      General: There is no distension.      Palpations: Abdomen is soft. There is no mass.      Tenderness: There is no abdominal tenderness. There is no guarding or rebound.      Hernia: No hernia is present.          Comments: Cholecystectomy sites appear clean dry and intact and well healed   Skin:     General: Skin is warm and dry.      Capillary Refill: Capillary refill takes less than 2 seconds.   Neurological:      Mental Status: She is alert. Mental status is at baseline.   Psychiatric:         Mood and Affect: Mood normal.         Behavior: Behavior normal.         Thought Content: Thought content normal.         Judgment: Judgment normal.                Signed Electronically by: Mey Smith MD    Answers submitted by the patient for this visit:  Patient Health Questionnaire (Submitted on 9/27/2024)  If you checked off any problems, how difficult have these problems made it for you to do your work, take care of things at home, or get along with other people?: Somewhat difficult  PHQ9 TOTAL SCORE: 10

## 2024-09-27 NOTE — TELEPHONE ENCOUNTER
Prior Authorization Retail Medication Request    Medication/Dose: oxyCODONE (ROXICODONE) 5 MG tablet  Diagnosis and ICD code (if different than what is on RX):    Retained products of conception after miscarriage [O03.4]  - Primary      Type 2 diabetes mellitus without complication, without long-term current use of insulin (H) [E11.9]      Chronic pain after cholecystectomy [G89.28, Z90.49]        New/renewal/insurance change PA/secondary ins. PA:  Previously Tried and Failed:    Rationale:      Insurance   Primary:     [employer]     Insurance ID:  [id number]     Secondary (if applicable):  Insurance ID:      Pharmacy Information (if different than what is on RX)  Name:  [facility]  Phone:  [phone]   Fax:    [phone]       Clinic Information  Preferred routing pool for dept communication:

## 2024-09-28 ENCOUNTER — MYC MEDICAL ADVICE (OUTPATIENT)
Dept: FAMILY MEDICINE | Facility: CLINIC | Age: 24
End: 2024-09-28
Payer: COMMERCIAL

## 2024-09-28 DIAGNOSIS — E66.01 MORBID OBESITY (H): Primary | ICD-10-CM

## 2024-09-28 DIAGNOSIS — E11.9 TYPE 2 DIABETES MELLITUS WITHOUT COMPLICATION, WITHOUT LONG-TERM CURRENT USE OF INSULIN (H): ICD-10-CM

## 2024-10-01 ENCOUNTER — MYC MEDICAL ADVICE (OUTPATIENT)
Dept: FAMILY MEDICINE | Facility: CLINIC | Age: 24
End: 2024-10-01
Payer: COMMERCIAL

## 2024-10-01 DIAGNOSIS — E66.01 MORBID OBESITY (H): Primary | ICD-10-CM

## 2024-10-01 DIAGNOSIS — I10 ESSENTIAL HYPERTENSION: ICD-10-CM

## 2024-10-01 DIAGNOSIS — E11.9 TYPE 2 DIABETES MELLITUS WITHOUT COMPLICATION, WITHOUT LONG-TERM CURRENT USE OF INSULIN (H): ICD-10-CM

## 2024-10-02 ENCOUNTER — TELEPHONE (OUTPATIENT)
Dept: FAMILY MEDICINE | Facility: CLINIC | Age: 24
End: 2024-10-02
Payer: COMMERCIAL

## 2024-10-02 DIAGNOSIS — E66.01 MORBID OBESITY (H): ICD-10-CM

## 2024-10-02 DIAGNOSIS — E11.9 TYPE 2 DIABETES MELLITUS WITHOUT COMPLICATION, WITHOUT LONG-TERM CURRENT USE OF INSULIN (H): Primary | ICD-10-CM

## 2024-10-02 NOTE — TELEPHONE ENCOUNTER
Retail Pharmacy Prior Authorization Team   Phone: [phone]    PRIOR AUTHORIZATION DENIED    Medication: WEGOVY 0.25 MG/0.5ML SC SOAJ  Insurance Company: [employer] ([facility]) - Phone [phone] Fax [phone]  Denial Date: 10/2/2024  Denial Reason(s): DRUGS USED FOR WEIGHT LOSS ARE EXCLUDED FROM COVERAGE      Appeal Information: IF THE PROVIDER WOULD LIKE TO APPEAL THIS DECISION PLEASE PROVIDE THE PA TEAM WITH A LETTER OF MEDICAL NECESSITY      Patient Notified: NO

## 2024-10-02 NOTE — TELEPHONE ENCOUNTER
PA Initiation    Medication: OXYCODONE HCL 5 MG PO TABS  Insurance Company: [employer] ([facility]) - Phone [phone] Fax [phone]  Pharmacy Filling the Rx: [facility] [address] [facility] [address] [place] [address]  Filling Pharmacy Phone: [phone]  Filling Pharmacy Fax: [phone]  Start Date: 10/2/2024

## 2024-10-02 NOTE — TELEPHONE ENCOUNTER
Prior Authorization Approval    Medication: OXYCODONE HCL 5 MG PO TABS  Authorization Effective Date: 10/2/2024  Authorization Expiration Date: 4/2/2025  Approved Dose/Quantity:   Reference #:     Insurance Company: [employer] ([facility]) - Phone [phone] Fax [phone]  Expected CoPay: $    CoPay Card Available:      Financial Assistance Needed:   Which Pharmacy is filling the prescription: [facility] [address] [place] [address] [id number] [place] [address]  Pharmacy Notified: YES  Patient Notified: **Instructed pharmacy to notify patient when script is ready to /ship.**

## 2024-10-09 NOTE — PATIENT INSTRUCTIONS
If you have labs or imaging done, the results will automatically release in [employer] without an interpretation.  Your health care professional will review those results and send an interpretation with recommendations as soon as possible, but this may be 1-3 business days.    If you have any questions regarding your visit, please contact your care team.     [employer] [facility]: [phone]  [facility] CLINIC HOURS TELEPHONE NUMBER   Kyree BOLAÑOS Ernesto .      Blanca-ELLY Salomon-ELLY Osborne-Surgery Scheduler  Radha-         Monday-[place]  8:00 am-4:00 pm  [facility]00 [facility]  Wednesday-[place] 8:00 am-4:00 pm  Thursday-[place] 8:00 am-4:00 pm    Typical Surgery Day Friday [facility]  [address] [place] [address]  PH: [phone] Fax    Imaging Scheduling all locations  PH: [phone]     [facility]  [address]  [place], [place] [address] [phone]    [facility]  [address] [place] [address]  PH: [phone]     **Surgeries** Our Surgery Schedulers will contact you to schedule. If you do not receive a call within 3 business days, please call [phone].  Urgent Care locations:  [facility]       Monday-Friday   10 am - 8 pm  Saturday and Sunday   9 am - 5 pm   [phone]   If you need a medication refill, please contact your pharmacy. Please allow 3 business days for your refill to be completed.  As always, Thank you for trusting us with your healthcare needs!

## 2024-10-10 ENCOUNTER — OFFICE VISIT (OUTPATIENT)
Dept: OBGYN | Facility: CLINIC | Age: 24
End: 2024-10-10
Payer: COMMERCIAL

## 2024-10-10 VITALS
BODY MASS INDEX: 48.4 KG/M2 | WEIGHT: 293 LBS | HEART RATE: 97 BPM | SYSTOLIC BLOOD PRESSURE: 134 MMHG | DIASTOLIC BLOOD PRESSURE: 79 MMHG

## 2024-10-10 DIAGNOSIS — N96 RECURRENT PREGNANCY LOSS: Primary | ICD-10-CM

## 2024-10-10 PROCEDURE — 99024 POSTOP FOLLOW-UP VISIT: CPT | Performed by: GENERAL PRACTICE

## 2024-10-10 NOTE — PROGRESS NOTES
HPI:   Sasha is a 24 year old F presenting for routine post operative visit 2+ weeks status post uncomplicated suction D&C for retained products of conception following missed .  Patient reports unremarkable recovery.  Patient reports tolerating regular diet, normal bowel and bladder functions, pain is well-controlled, denies vaginal bleeding.  She denies chest pains, shortness of breath, fever, chills, nausea, diarrhea, leg pain.    Past Medical History:   Diagnosis Date    BRCA2 gene mutation positive 2023    Closed head injury, subsequent encounter 2018    Depressive disorder     History of PCR DNA positive for HSV1     Hypertension     Moderate major depression (H) 2014    Nexplanon placed 15    Nexplanon placed 17 (removed 2020) 2017    Nexplanon placed 2021    Nexplanon removed 2023    Obesity 2010    Pyelonephritis 2018    Skin tag (right collar line and left labial region 2019    Tobacco use disorder 2016    Uncomplicated asthma      Past Surgical History:   Procedure Laterality Date    ARTHROSCOPY KNEE WITH MENISCAL REPAIR Right 05/10/2017    Procedure: ARTHROSCOPY KNEE WITH MENISCAL REPAIR;  arthroscopy right knee with lateral meniscus repair;  Surgeon: Nathaniel Hicks MD;  Location: [facility]    ARTHROSCOPY KNEE WITH MENISCAL REPAIR Left     BIOPSY      Whole punch taken in calf to check for scleroderma.    EFRAIN GASTRIC SLEEVE N/A 2022    Procedure: GASTRECTOMY, SLEEVE, ROBOT-ASSISTED, LAPAROSCOPIC;  Surgeon: Joseph Mata MD;  Location: [facility]    ESOPHAGOSCOPY, GASTROSCOPY, DUODENOSCOPY (EGD), COMBINED N/A 2022    Procedure: ESOPHAGOGASTRODUODENOSCOPY (EGD);  Surgeon: Joseph Mata MD;  Location: [facility]     Current Outpatient Medications   Medication Sig Dispense Refill    albuterol (PROAIR HFA/PROVENTIL HFA/VENTOLIN HFA) 108 (90 Base) MCG/ACT inhaler Inhale  1-2 puffs into the lungs every 4 hours as needed for shortness of breath / dyspnea or wheezing 8.5 g 5    buPROPion (WELLBUTRIN XL) 150 MG 24 hr tablet Take 1 tablet (150 mg) by mouth every morning. (Patient not taking: Reported on 9/27/2024) 30 tablet 3    Cholecalciferol (VITAMIN D3) 25 MCG TABS       dulaglutide (TRULICITY) 0.75 MG/0.5ML pen Inject 0.75 mg subcutaneously every 7 days. 2 mL 1    famotidine (PEPCID) 40 MG tablet Take 1 tablet (40 mg) by mouth daily 90 tablet 3    Fexofenadine HCl (ALLEGRA PO) Take by mouth daily as needed for allergies      hydrOXYzine HCl (ATARAX) 25 MG tablet Take 25 mg (1 tablet) 30 minutes before bedtime.  May increase by 1 tablet to a maximum dose of 100 mg (4 tablets) to improve sleep habits. 180 tablet 1    ipratropium - albuterol 0.5 mg/2.5 mg/3 mL (DUONEB) 0.5-2.5 (3) MG/3ML neb solution Take 1 vial (3 mLs) by nebulization every 6 hours as needed for shortness of breath / dyspnea or wheezing 30 vial 1    metFORMIN (GLUCOPHAGE XR) 500 MG 24 hr tablet Take 1 tablet (500 mg) by mouth daily (with dinner). (Patient not taking: Reported on 9/27/2024) 30 tablet 3    norgestim-eth estrad triphasic (ORTHO TRI-CYCLEN LO) 0.18/0.215/0.25 MG-25 MCG tablet Take 1 tablet by mouth daily. 84 tablet 3    oxyCODONE (ROXICODONE) 5 MG tablet Take 1 tablet (5 mg) by mouth every 6 hours as needed for severe pain. 20 tablet 0    Prenatal Vit-Fe Fumarate-FA (PRENATAL MULTIVITAMIN  PLUS IRON) 27-1 MG TABS Take by mouth daily      semaglutide (OZEMPIC) 2 MG/3ML pen Inject 0.5 mg subcutaneously every 7 days. 3 mL 1    Semaglutide-Weight Management (WEGOVY) 0.25 MG/0.5ML pen Inject 0.25 mg subcutaneously once a week. 2 mL 0     Current Facility-Administered Medications   Medication Dose Route Frequency Provider Last Rate Last Admin    1 mL ropivacaine (NAROPIN) injection 5 mg/mL  1 mL   Amy Sullivan,    1 mL at 11/09/23 1412    2 mL bupivacaine (MARCAINE) preservative free injection 0.5%  (20 mL vial)  2 mL   Gareth Smith, DO   2 mL at 23 1242    lidocaine 1 % injection 2 mL  2 mL   Gareth Smith, DO   2 mL at 23 1242    lidocaine 1 % injection 3 mL  3 mL   Amy Sullivan, DO   3 mL at 23 1412    triamcinolone (KENALOG-40) injection 40 mg  40 mg   Amy Sullivan, DO   40 mg at 23 1412    triamcinolone (KENALOG-40) injection 40 mg  40 mg   Gareth Smith, DO   40 mg at 23 1242     Family History   Problem Relation Age of Onset    Asthma Mother     Hypertension Mother     Breast Cancer Mother 54        lumpectomy and BSO    Hereditary Breast and Ovarian Cancer Syndrome Mother         BRCA2+    Obesity Mother     Asthma Father     Diabetes Father     Melanoma Father 59        metastatic to brain    Hypertension Father     Hyperlipidemia Father     Prostate Cancer Father     Depression Father     Anxiety Disorder Father     Anesthesia Reaction Father         Asthma, hard wake up.    Obesity Father     Cervical Cancer Sister 19    Parathyroid Disorders Sister     Vascular Disease Sister         back of head    Colon Cancer Maternal Grandmother     Other Cancer Maternal Grandmother         Lung cancer    Lung Cancer Paternal Grandmother         smoker,  in late 80s    Colon Cancer Paternal Grandmother 60    Heart Disease Paternal Grandfather          in 70s    Melanoma Paternal Aunt     Prostate Cancer Paternal Uncle 40    Colon Cancer Paternal Uncle     Melanoma Paternal Uncle     Cancer Paternal Uncle         hematological cancer; s/p SCT    Melanoma Paternal Uncle     Melanoma Paternal Uncle     Cancer Paternal Uncle 61        GI tract; cause of death at 62; father's twin    Melanoma Paternal Uncle     Breast Cancer Other 50        contralateral occurrence at 57; ; maternal grandfather's sister    Breast Cancer Other 50        paternal grandfather's sister    Bleeding Disorder No family hx of     Clotting Disorder No family hx of          Allergies   Allergen Reactions    Steri Strips      Got boils on incision after knee surgery from steri strips    Ondansetron Headache                 PE  LMP 05/30/2024   Gen: WDWN, NAD  CV: Well perfused  Resp: Non labored breathing  Abdomen: soft, nttp. Non distended          Operative findings:  moderate amount of Products of conception, Normal uterus and cervix. Endometrial stripe was thin and homogenous at conclusion of procedure    Pathology:  Final Diagnosis   Uterine contents - Immature chorionic villi with degenerative changes       A/P: Recovering well status post uncomplicated suction D&C for MAB / retained products of conception  -Reviewed pathology findings  -Discussed precautions and continued restrictions  -Briefly discussed possible workup for recurrent pregnancy loss. Patient declines work up at this time  -Recommend waiting at least a few menstrual cycles prior to her trying to conceive again.  -All questions answered  -Follow up prn      Kyree Orozco, DO

## 2024-10-12 ENCOUNTER — HEALTH MAINTENANCE LETTER (OUTPATIENT)
Age: 24
End: 2024-10-12

## 2024-10-29 ENCOUNTER — TELEPHONE (OUTPATIENT)
Dept: ONCOLOGY | Facility: CLINIC | Age: 24
End: 2024-10-29
Payer: COMMERCIAL

## 2024-10-29 ENCOUNTER — MYC MEDICAL ADVICE (OUTPATIENT)
Dept: FAMILY MEDICINE | Facility: CLINIC | Age: 24
End: 2024-10-29
Payer: COMMERCIAL

## 2024-10-29 DIAGNOSIS — I10 ESSENTIAL HYPERTENSION: ICD-10-CM

## 2024-10-29 DIAGNOSIS — E66.01 MORBID OBESITY (H): Primary | ICD-10-CM

## 2024-10-29 DIAGNOSIS — F41.9 ANXIETY: ICD-10-CM

## 2024-10-29 DIAGNOSIS — E11.9 TYPE 2 DIABETES MELLITUS WITHOUT COMPLICATION, WITHOUT LONG-TERM CURRENT USE OF INSULIN (H): ICD-10-CM

## 2024-10-29 RX ORDER — LORAZEPAM 1 MG/1
1 TABLET ORAL
Qty: 1 TABLET | Refills: 0 | Status: SHIPPED | OUTPATIENT
Start: 2024-10-29

## 2024-10-29 RX ORDER — BUPROPION HYDROCHLORIDE 75 MG/1
75 TABLET ORAL 2 TIMES DAILY
Qty: 180 TABLET | Refills: 1 | Status: SHIPPED | OUTPATIENT
Start: 2024-10-29

## 2024-10-29 NOTE — TELEPHONE ENCOUNTER
Routed message to provider to continue with conversation.     Routed to provider for review and approval/denial Trulicity increase.     Nena Rose RN on 10/29/2024 at 12:30 PM

## 2024-11-18 NOTE — PROGRESS NOTES
Oncology Risk Management Consultation:  Date on this visit: 2024    Sasha Guy returns to the Cancer Risk Management Program today at the [facility] for follow up.  She requires high risk screening and surveillance to reduce  her risk of cancer secondary to a deleterious BRCA2 mutation. She is considered to be at high risk for hereditary breast and ovarian cancer. She is here today with her , Tung.      Primary Physician: Mey Smith MD     History Of Present Illness:  Ms. Guy is a very pleasant, healthy 24 year old female who presents with family history of breast and ovarian cancer and a personal diagnosis of a BRCA2 mutation.      Genetic Testin2022 - POSITIVE for a BRCA2 mutation. Specifically [id number] ([id number]),   identified using a Multi-Cancer panel through [facility].       Of note, Sasha is negative for mutations in the following genes: AIP, ALK, APC, SHEA, AXIN2, BAP1, BARD1, BLM, BMPR1A, BRCA1, BRCA2, BRIP1, CASR, CDC73, CDH1, CDK4, CDKN1B, CDKN1C, CDKN2A, CEBPA, CHEK2, CTNNA1, DICER1, DIS3L2, EGFR, EPCAM, FH, FLCN, GATA2, GPC3, GREM1, HOXB13, HRAS, KIT, MAX, MEN1, MET, MITF, MLH1, MSH2, MSH3, MSH6, MUTYH, NBN, NF1, NF2, NTHL1, PALB2, PDGFRA, PHOX2B, PMS2, POLD1, POLE, POT1, SMIKZ2M, PTCH1, PTEN, RAD50, RAD51C, RAD51D, RB1, RECQL4, RET, RUNX1, SDHA, SDHAF2, SDHB, SDHC, SDHD, SMAD4, SMARCA4, SMARCB1, SMARCE1, STK11, SUFU, TERC, TERT, CMFQ780, TP53, TSC1, TSC2, VHL, WRN, WT1, AP2S1, GNA11, and MC1R genes.         Pertinent history:  2022- s/p partial gastrectomy (bariatric surgery).   Pathology:  STOMACH, PARTIAL GASTRECTOMY:  -Gastric mucosa with no significant histologic abnormality  -Negative for intestinal metaplasia and dysplasia  -No H. pylori-like organisms identified on routine stain      Menarche at age 9  Nulliparous.  Premenopausal.  Ovaries, fallopian tubes and uterus in place.    History of oral contraceptive use for  about 3 years   Hx of Nexplanon use from approximately [id number]  No hx of hormone replacement therapy.    No hx of breast biopsy  No hx of hyperplasia, atypia or malignancy     Pertinent screening history:  6/1/2020- Dermatology screening, Marvel Oliver MD - Skin, left calf: -Granuloma annulare    Breast:   10/11/2023: Breast MRI, BiRads1, new epiphrenic lymph node  10/25/2023: Chest CT: A right anterior supradiaphragmatic lymph node measures 6 mm in short axis is nonspecific. Left thyroid 1 cm thyroid nodule, consider workup with ultrasound. Incidental 2 mm nodule in the right upper lobe.  10/25/2023:  Left thyroid nodule does not meet TI-RADS criteria for fine-needle aspiration.       At this visit, she denies new fatigue, breast pain, asymmetry, lumps, masses, thickening, nipple discharge and skin changes in her breasts.    Past Medical/Surgical History:  Past Medical History:   Diagnosis Date    BRCA2 gene mutation positive 02/01/2023    Closed head injury, subsequent encounter 04/05/2018    Depressive disorder     History of PCR DNA positive for HSV1     Hypertension     Moderate major depression (H) 01/20/2014    Nexplanon placed 11/14/17 11/14/2017    Nexplanon placed 11/14/17 (removed 1/16/2020) 11/14/2017    Nexplanon placed 12/14/2021 12/14/2021    Nexplanon removed 12/04/2023 12/14/2021    Obesity 09/14/2010    Pyelonephritis 02/19/2018    Skin tag (right collar line and left labial region 11/21/2019    Tobacco use disorder 05/04/2016    Uncomplicated asthma      Past Surgical History:   Procedure Laterality Date    ARTHROSCOPY KNEE WITH MENISCAL REPAIR Right 05/10/2017    Procedure: ARTHROSCOPY KNEE WITH MENISCAL REPAIR;  arthroscopy right knee with lateral meniscus repair;  Surgeon: Nathaniel Hicks MD;  Location: [facility]    ARTHROSCOPY KNEE WITH MENISCAL REPAIR Left     BIOPSY      Whole punch taken in calf to check for scleroderma.    DAVINCI GASTRIC SLEEVE N/A 07/26/2022    Procedure:  GASTRECTOMY, SLEEVE, ROBOT-ASSISTED, LAPAROSCOPIC;  Surgeon: Joseph Mata MD;  Location: [facility]    ESOPHAGOSCOPY, GASTROSCOPY, DUODENOSCOPY (EGD), COMBINED N/A 03/28/2022    Procedure: ESOPHAGOGASTRODUODENOSCOPY (EGD);  Surgeon: Joseph Mata MD;  Location: [facility]       Allergies:  Allergies as of 11/25/2024 - Reviewed 11/25/2024   Allergen Reaction Noted    Steri strips  03/24/2022    Ondansetron Headache 07/15/2022       Current Medications:  Current Outpatient Medications   Medication Sig Dispense Refill    albuterol (PROAIR HFA/PROVENTIL HFA/VENTOLIN HFA) 108 (90 Base) MCG/ACT inhaler Inhale 1-2 puffs into the lungs every 4 hours as needed for shortness of breath / dyspnea or wheezing 8.5 g 5    buPROPion (WELLBUTRIN XL) 150 MG 24 hr tablet Take 1 tablet (150 mg) by mouth every morning. 30 tablet 3    buPROPion (WELLBUTRIN) 75 MG tablet Take 1 tablet (75 mg) by mouth 2 times daily. 180 tablet 1    Cholecalciferol (VITAMIN D3) 25 MCG TABS       dulaglutide (TRULICITY) 1.5 MG/0.5ML pen Inject 1.5 mg subcutaneously every 7 days. 6 mL 3    famotidine (PEPCID) 40 MG tablet Take 1 tablet (40 mg) by mouth daily 90 tablet 3    Fexofenadine HCl (ALLEGRA PO) Take by mouth daily as needed for allergies      hydrOXYzine HCl (ATARAX) 25 MG tablet Take 25 mg (1 tablet) 30 minutes before bedtime.  May increase by 1 tablet to a maximum dose of 100 mg (4 tablets) to improve sleep habits. 180 tablet 1    ipratropium - albuterol 0.5 mg/2.5 mg/3 mL (DUONEB) 0.5-2.5 (3) MG/3ML neb solution Take 1 vial (3 mLs) by nebulization every 6 hours as needed for shortness of breath / dyspnea or wheezing 30 vial 1    LORazepam (ATIVAN) 1 MG tablet Take 1 tablet (1 mg) by mouth once as needed for anxiety (prior to breast MRI). Take with you to appointment and check with the nurse as to the appropriate time to take the medication. Do not operate a vehicle after taking this medication 1 tablet 0    metFORMIN (GLUCOPHAGE XR) 500 MG  24 hr tablet Take 1 tablet (500 mg) by mouth daily (with dinner). 30 tablet 3    NONFORMULARY Cranberry, two gel capsules by mouth daily      norgestim-eth estrad triphasic (ORTHO TRI-CYCLEN LO) 0.18/0.215/0.25 MG-25 MCG tablet Take 1 tablet by mouth daily. 84 tablet 3    Prenatal Vit-Fe Fumarate-FA (PRENATAL MULTIVITAMIN  PLUS IRON) 27-1 MG TABS Take by mouth daily          Family History:  Family History   Problem Relation Age of Onset    Asthma Mother     Hypertension Mother     Breast Cancer Mother 54        lumpectomy and BSO    Hereditary Breast and Ovarian Cancer Syndrome Mother         BRCA2+    Obesity Mother     Asthma Father     Diabetes Father     Melanoma Father 59        metastatic to brain    Hypertension Father     Hyperlipidemia Father     Prostate Cancer Father     Depression Father     Anxiety Disorder Father     Anesthesia Reaction Father         Asthma, hard wake up.    Obesity Father     Cervical Cancer Sister 19    Parathyroid Disorders Sister     Vascular Disease Sister         back of head    Colon Cancer Maternal Grandmother     Other Cancer Maternal Grandmother         Lung cancer    Lung Cancer Paternal Grandmother         smoker,  in late 80s    Colon Cancer Paternal Grandmother 60    Heart Disease Paternal Grandfather          in 70s    Melanoma Paternal Aunt     Prostate Cancer Paternal Uncle 40    Colon Cancer Paternal Uncle     Melanoma Paternal Uncle     Cancer Paternal Uncle         hematological cancer; s/p SCT    Melanoma Paternal Uncle     Melanoma Paternal Uncle     Cancer Paternal Uncle 61        GI tract; cause of death at 62; father's twin    Melanoma Paternal Uncle     Breast Cancer Other 50        contralateral occurrence at 57; ; maternal grandfather's sister    Breast Cancer Other 50        paternal grandfather's sister    Bleeding Disorder No family hx of     Clotting Disorder No family hx of        Social History:  Social History     Socioeconomic History     Marital status:      Spouse name: Not on file    Number of children: 0    Years of education: Not on file    Highest education level: Not on file   Occupational History    Occupation: Reception     Comment: [employer]   Tobacco Use    Smoking status: Former     Current packs/day: 0.00     Average packs/day: 1 pack/day for 5.0 years (5.0 ttl pk-yrs)     Types: Cigarettes     Start date: 1/16/2016     Quit date: 1/16/2021     Years since quitting: 3.8    Smokeless tobacco: Never   Vaping Use    Vaping status: Never Used   Substance and Sexual Activity    Alcohol use: Not Currently    Drug use: Never    Sexual activity: Not Currently     Partners: Male     Birth control/protection: Pill   Other Topics Concern    Parent/sibling w/ CABG, MI or angioplasty before 65F 55M? No   Social History Narrative    6/2024  Lives in [place] with , Tung.  They have cats.  Aware of toxoplasmosis precautions. No smokers in the home.  No concerns about domestic violence.  Sasha is a  at a vet clinic.       Social Drivers of Health     Financial Resource Strain: Low Risk  (11/27/2023)    Financial Resource Strain     Within the past 12 months, have you or your family members you live with been unable to get utilities (heat, electricity) when it was really needed?: No   Food Insecurity: Low Risk  (11/27/2023)    Food Insecurity     Within the past 12 months, did you worry that your food would run out before you got money to buy more?: No     Within the past 12 months, did the food you bought just not last and you didn t have money to get more?: No   Transportation Needs: Low Risk  (11/27/2023)    Transportation Needs     Within the past 12 months, has lack of transportation kept you from medical appointments, getting your medicines, non-medical meetings or appointments, work, or from getting things that you need?: No   Physical Activity: Not on file   Stress: Not on file (11/9/2024)   Social  "Connections: Not on file   Interpersonal Safety: Low Risk  (9/27/2024)    Interpersonal Safety     Do you feel physically and emotionally safe where you currently live?: Yes     Within the past 12 months, have you been hit, slapped, kicked or otherwise physically hurt by someone?: No     Within the past 12 months, have you been humiliated or emotionally abused in other ways by your partner or ex-partner?: No   Housing Stability: Low Risk  (11/27/2023)    Housing Stability     Do you have housing? : Yes     Are you worried about losing your housing?: No       Physical Exam:  /83 (BP Location: Right arm)   Pulse 85   Ht 1.702 m (5' 7\")   Wt 141.1 kg (311 lb)   LMP 11/19/2024   SpO2 99%   BMI 48.71 kg/m    GENERAL APPEARANCE: healthy, alert and no apparent distress  BREAST: A multipositional, bilateral breast exam was performed.  Fairly symmetrical, though Rsl>L. Nipples everted bilaterally. Right breast: no palpable dominant masses, no nipple discharge, no skin changes. Dense tissue. Right axilla: no palpable adenopathy. Left breast: no palpable dominant masses, no nipple discharge, no skin changes. Left axilla: no palpable adenopathy. Dense tissue.    LYMPHATICS: No cervical, supraclavicular, or axillary lymphadenopathy  SKIN: no suspicious lesions or rashes on examined skin      Laboratory/Imaging Studies  No results found for any visits on 11/25/24.    ASSESSMENT    Sasha reports that she is doing well at this visit. She shared that her paternal uncle passed away of a GI cancer earlier this year. She otherwise has no updates to her family's medical history. She has no concerns with her breast tissue.     We discussed the plan below. We will continue annual breast MRIs until she is 28, when we will add additional breast and ovarian cancer screening. She is aware of the option of a mastectomy, which she may consider at a later date. She is currently trying to conceive and would like to breastfeed. We " discussed that we do not screen during pregnancy, so should she be pregnant next year during the time she is due for a breast MRI, we would defer screening until after delivery. We also reviewed the recommendation for a salpingo-oophorectomy between the ages of 40-45.      We reviewed signs and symptoms to monitor for between visits, including any breast lumps, bumps, nipple discharge, nipple inversion, changes to the texture of the skin on the breasts that would look crinkled, puckered, or like the skin of an orange peel, or any other changes to the breast tissue. We reviewed the recommendation for breast self awareness.       INDIVIDUALIZED SCREENING PLAN:      Individualized Surveillance Plan for women  Hereditary Breast and/or Ovarian Cancer Syndrome   Per NCCN Guidelines Version 1.2025   Recommended screening Test or procedure Last done Next Scheduled    Breast self awareness starting at age 18.    Breast cancer risk:  BRCA1+: 60-72%  BRCA2+: 55-69% Women should be familiar with their breasts and promptly report changes to their care provider. Periodic, consistent self exam may facilitate breast self awareness. Premenopausal women may find self exams to be most informative when performed at the end of menses.   November 2024 November 2025   Breast screening, starting at age 25 Clinical breast exams every 6 -12 months November 2024 November 2025   Breast screening   Age 25-29 Annual breast MRI screening with contrast (or mammogram if MRI is unavailable) or individualized based on family history if a breast cancer diagnosis before age 30 is present.       Breast MRI is performed preferably on day 7-15 of menstrual cycle for premenopausal women.   10/11/2023: Breast MRI, BiRads1   Breast MRI today    Repeat breast MRI in November 2025   Breast screening   Age >30-75 years     Annual mammogram (consider tomosynthesis mammogram) and annual screening MRI.     Breast MRI is performed preferably on day 7-15 of  menstrual cycle for premenopausal women.    Age>75 years, management should be considered on an individual basis.   See above   See above   Ovarian cancer screening, starting at age 30-35    Absolute risk for epithelial ovarian cancer:  BRCA1+: 39-58%   BRCA2+: 13-29%    Consider transvaginal ultrasound and  tests.   No screening to date.    Begin screening at age 30.   Pancreatic Cancer:    BRCA1+: <5%  BRCA2+: 5-10%      Consider Annual MRI/MRCP and/or Endoscopic Ultrasound    BRCA1+: Screening beginning at age 50 or 10 years prior to the earliest pancreatic cancer on the BRCA-side of the family,  in families with exocrine pancreatic cancer in a first or second degree relative.    BRCA2+: Screening beginning at age 50 or 10 years prior to the earliest pancreatic cancer on the BRCA-side of the family.       No family history of pancreatic cancer.   Consider screening at age 50.   Recommendation: risk-reducing salpingo-oophorectomy(RRSO), typically between 35 and 40 years old recognizing childbearing is a consideration.    Because ovarian cancer onset in patients with BRCA2 mutations is on average of 8-10 years later than in patients with BRCA1 mutations, it is reasonable to delay RRSO until 40-45 years in patients with BRCA2 mutations  unless age at diagnosis in the family warrants earlier age for consideration for prophylactic surgery.    Limited data suggest that there may be a slightly increased risk of serous uterine cancer among individuals with a BRCA1/2 P/LP variant. The clinical significance of these findings is unclear. Further evaluation of the risk of serous uterine cancer in the BRCA population is ongoing.    Salpingectomy alone is not the standard of care for risk reduction although clinical trials are ongoing.     Discuss option of risk-reducing mastectomy.    Consider risks and benefits of risk reducing agents, such as tamoxifen and raloxifene for breast and ovarian cancer.    Consider a full  body skin and eye exam for melanoma screening for both BRCA1+ and BRCA2+.     NOTE: Women with BRCA mutation who are treated for breast cancer should have screening of the remaining breast tissue with annual mammography and breast MRI.      The risk for breast cancer appears to be lower for the BRCA1 [id number] variant (24% by age 70 y). Screening should be individualized based on personal and family history         I spent a total of 32 minutes on the day of the visit. Please see the note for further information on patient assessment and treatment.     Rupali Saucedo, JUSTIN, APRN, AGCNS-BC  Clinical Nurse Specialist  Cancer Risk Management Program  [facility]    Cc:  Mey Smith MD

## 2024-11-25 ENCOUNTER — ONCOLOGY VISIT (OUTPATIENT)
Dept: ONCOLOGY | Facility: CLINIC | Age: 24
End: 2024-11-25
Payer: COMMERCIAL

## 2024-11-25 ENCOUNTER — ANCILLARY PROCEDURE (OUTPATIENT)
Dept: MRI IMAGING | Facility: CLINIC | Age: 24
End: 2024-11-25
Attending: CLINICAL NURSE SPECIALIST
Payer: COMMERCIAL

## 2024-11-25 VITALS
SYSTOLIC BLOOD PRESSURE: 123 MMHG | DIASTOLIC BLOOD PRESSURE: 83 MMHG | BODY MASS INDEX: 45.99 KG/M2 | OXYGEN SATURATION: 99 % | HEIGHT: 67 IN | WEIGHT: 293 LBS | HEART RATE: 85 BPM

## 2024-11-25 DIAGNOSIS — Z15.09 BRCA2 GENE MUTATION POSITIVE: Primary | ICD-10-CM

## 2024-11-25 DIAGNOSIS — Z15.01 BRCA2 GENE MUTATION POSITIVE: Primary | ICD-10-CM

## 2024-11-25 DIAGNOSIS — Z15.01 BRCA2 GENE MUTATION POSITIVE: ICD-10-CM

## 2024-11-25 DIAGNOSIS — Z15.09 BRCA2 GENE MUTATION POSITIVE: ICD-10-CM

## 2024-11-25 DIAGNOSIS — Z12.39 BREAST CANCER SCREENING, HIGH RISK PATIENT: ICD-10-CM

## 2024-11-25 DIAGNOSIS — Z80.3 FAMILY HISTORY OF MALIGNANT NEOPLASM OF BREAST: ICD-10-CM

## 2024-11-25 PROCEDURE — A9585 GADOBUTROL INJECTION: HCPCS | Performed by: STUDENT IN AN ORGANIZED HEALTH CARE EDUCATION/TRAINING PROGRAM

## 2024-11-25 PROCEDURE — 77049 MRI BREAST C-+ W/CAD BI: CPT | Performed by: STUDENT IN AN ORGANIZED HEALTH CARE EDUCATION/TRAINING PROGRAM

## 2024-11-25 PROCEDURE — 99213 OFFICE O/P EST LOW 20 MIN: CPT

## 2024-11-25 PROCEDURE — 99214 OFFICE O/P EST MOD 30 MIN: CPT

## 2024-11-25 RX ORDER — GADOBUTROL 604.72 MG/ML
14 INJECTION INTRAVENOUS ONCE
Status: COMPLETED | OUTPATIENT
Start: 2024-11-25 | End: 2024-11-25

## 2024-11-25 RX ADMIN — GADOBUTROL 14 ML: 604.72 INJECTION INTRAVENOUS at 12:14

## 2024-11-25 ASSESSMENT — PAIN SCALES - GENERAL: PAINLEVEL_OUTOF10: NO PAIN (0)

## 2024-11-25 NOTE — PATIENT INSTRUCTIONS
Individualized Surveillance Plan for women  Hereditary Breast and/or Ovarian Cancer Syndrome   Per NCCN Guidelines Version 1.2025   Recommended screening Test or procedure Last done Next Scheduled    Breast self awareness starting at age 18.    Breast cancer risk:  BRCA1+: 60-72%  BRCA2+: 55-69% Women should be familiar with their breasts and promptly report changes to their care provider. Periodic, consistent self exam may facilitate breast self awareness. Premenopausal women may find self exams to be most informative when performed at the end of menses.   November 2024 November 2025   Breast screening, starting at age 25 Clinical breast exams every 6 -12 months November 2024 November 2025   Breast screening   Age 25-29 Annual breast MRI screening with contrast (or mammogram if MRI is unavailable) or individualized based on family history if a breast cancer diagnosis before age 30 is present.       Breast MRI is performed preferably on day 7-15 of menstrual cycle for premenopausal women.   10/11/2023: Breast MRI, BiRads1   Breast MRI today    Repeat breast MRI in November 2025   Breast screening   Age >30-75 years     Annual mammogram (consider tomosynthesis mammogram) and annual screening MRI.     Breast MRI is performed preferably on day 7-15 of menstrual cycle for premenopausal women.    Age>75 years, management should be considered on an individual basis.   See above   See above   Ovarian cancer screening, starting at age 30-35    Absolute risk for epithelial ovarian cancer:  BRCA1+: 39-58%   BRCA2+: 13-29%    Consider transvaginal ultrasound and  tests.   No screening to date.    Begin screening at age 30.   Pancreatic Cancer:    BRCA1+: <5%  BRCA2+: 5-10%      Consider Annual MRI/MRCP and/or Endoscopic Ultrasound    BRCA1+: Screening beginning at age 50 or 10 years prior to the earliest pancreatic cancer on the BRCA-side of the family,  in families with exocrine pancreatic cancer in a first or second  degree relative.    BRCA2+: Screening beginning at age 50 or 10 years prior to the earliest pancreatic cancer on the BRCA-side of the family.       No family history of pancreatic cancer.   Consider screening at age 50.   Recommendation: risk-reducing salpingo-oophorectomy(RRSO), typically between 35 and 40 years old recognizing childbearing is a consideration.    Because ovarian cancer onset in patients with BRCA2 mutations is on average of 8-10 years later than in patients with BRCA1 mutations, it is reasonable to delay RRSO until 40-45 years in patients with BRCA2 mutations  unless age at diagnosis in the family warrants earlier age for consideration for prophylactic surgery.    Limited data suggest that there may be a slightly increased risk of serous uterine cancer among individuals with a BRCA1/2 P/LP variant. The clinical significance of these findings is unclear. Further evaluation of the risk of serous uterine cancer in the BRCA population is ongoing.    Salpingectomy alone is not the standard of care for risk reduction although clinical trials are ongoing.     Discuss option of risk-reducing mastectomy.    Consider risks and benefits of risk reducing agents, such as tamoxifen and raloxifene for breast and ovarian cancer.    Consider a full body skin and eye exam for melanoma screening for both BRCA1+ and BRCA2+.     NOTE: Women with BRCA mutation who are treated for breast cancer should have screening of the remaining breast tissue with annual mammography and breast MRI.      The risk for breast cancer appears to be lower for the BRCA1 K5315S variant (24% by age 70 y). Screening should be individualized based on personal and family history

## 2024-11-25 NOTE — NURSING NOTE
"Oncology Rooming Note    November 25, 2024 10:16 AM   Sasha Guy is a 24 year old female who presents for:    Chief Complaint   Patient presents with    Oncology Clinic Visit     Initial Vitals: /83 (BP Location: Right arm)   Pulse 85   Ht 1.702 m (5' 7\")   Wt 141.1 kg (311 lb)   LMP 11/19/2024   SpO2 99%   BMI 48.71 kg/m   Estimated body mass index is 48.71 kg/m  as calculated from the following:    Height as of this encounter: 1.702 m (5' 7\").    Weight as of this encounter: 141.1 kg (311 lb). Body surface area is 2.58 meters squared.  No Pain (0) Comment: Data Unavailable   Patient's last menstrual period was 11/19/2024.  Allergies reviewed: Yes  Medications reviewed: Yes    Medications: Medication refills not needed today.  Pharmacy name entered into EPIC:    [facility] - [facility]  [facility] [address] [place], [place] - [address] [facility] - [place], [place] - [address] [facility] - [place], [place] - [address]    Frailty Screening:   Is the patient here for a new oncology consult visit in cancer care? 2. No      Clinical concerns: Patient will discuss concerns with provider.       Viral Fox MA            "

## 2024-11-25 NOTE — LETTER
2024      Sasha Guy  [address] [place] [address]      Dear Colleague,    Thank you for referring your patient, Sasha Guy, to the [facility]. Please see a copy of my visit note below.    Oncology Risk Management Consultation:  Date on this visit: 2024    Sasha Guy returns to the Cancer Risk Management Program today at the [facility] for follow up.  She requires high risk screening and surveillance to reduce  her risk of cancer secondary to a deleterious BRCA2 mutation. She is considered to be at high risk for hereditary breast and ovarian cancer. She is here today with her , Tung.      Primary Physician: Mey Smith MD     History Of Present Illness:  Ms. Guy is a very pleasant, healthy 24 year old female who presents with family history of breast and ovarian cancer and a personal diagnosis of a BRCA2 mutation.      Genetic Testin2022 - POSITIVE for a BRCA2 mutation. Specifically [id number] ([id number]),   identified using a Multi-Cancer panel through [employer].       Of note, Sasha is negative for mutations in the following genes: AIP, ALK, APC, SHEA, AXIN2, BAP1, BARD1, BLM, BMPR1A, BRCA1, BRCA2, BRIP1, CASR, CDC73, CDH1, CDK4, CDKN1B, CDKN1C, CDKN2A, CEBPA, CHEK2, CTNNA1, DICER1, DIS3L2, EGFR, EPCAM, FH, FLCN, GATA2, GPC3, GREM1, HOXB13, HRAS, KIT, MAX, MEN1, MET, MITF, MLH1, MSH2, MSH3, MSH6, MUTYH, NBN, NF1, NF2, NTHL1, PALB2, PDGFRA, PHOX2B, PMS2, POLD1, POLE, POT1, EZJYQ3P, PTCH1, PTEN, RAD50, RAD51C, RAD51D, RB1, RECQL4, RET, RUNX1, SDHA, SDHAF2, SDHB, SDHC, SDHD, SMAD4, SMARCA4, SMARCB1, SMARCE1, STK11, SUFU, TERC, TERT, [id number], TP53, TSC1, TSC2, VHL, WRN, WT1, AP2S1, GNA11, and MC1R genes.         Pertinent history:  2022- s/p partial gastrectomy (bariatric surgery).   Pathology:  STOMACH, PARTIAL GASTRECTOMY:  -Gastric mucosa with no significant histologic  abnormality  -Negative for intestinal metaplasia and dysplasia  -No H. pylori-like organisms identified on routine stain      Menarche at age 9  Nulliparous.  Premenopausal.  Ovaries, fallopian tubes and uterus in place.    History of oral contraceptive use for about 3 years   Hx of Nexplanon use from approximately 3948-3888  No hx of hormone replacement therapy.    No hx of breast biopsy  No hx of hyperplasia, atypia or malignancy     Pertinent screening history:  6/1/2020- Dermatology screening, Marvel Oliver MD - Skin, left calf: -Granuloma annulare    Breast:   10/11/2023: Breast MRI, BiRads1, new epiphrenic lymph node  10/25/2023: Chest CT: A right anterior supradiaphragmatic lymph node measures 6 mm in short axis is nonspecific. Left thyroid 1 cm thyroid nodule, consider workup with ultrasound. Incidental 2 mm nodule in the right upper lobe.  10/25/2023:  Left thyroid nodule does not meet TI-RADS criteria for fine-needle aspiration.       At this visit, she denies new fatigue, breast pain, asymmetry, lumps, masses, thickening, nipple discharge and skin changes in her breasts.    Past Medical/Surgical History:  Past Medical History:   Diagnosis Date     BRCA2 gene mutation positive 02/01/2023     Closed head injury, subsequent encounter 04/05/2018     Depressive disorder      History of PCR DNA positive for HSV1      Hypertension      Moderate major depression (H) 01/20/2014     Nexplanon placed 11/14/17 11/14/2017     Nexplanon placed 11/14/17 (removed 1/16/2020) 11/14/2017     Nexplanon placed 12/14/2021 12/14/2021     Nexplanon removed 12/04/2023 12/14/2021     Obesity 09/14/2010     Pyelonephritis 02/19/2018     Skin tag (right collar line and left labial region 11/21/2019     Tobacco use disorder 05/04/2016     Uncomplicated asthma      Past Surgical History:   Procedure Laterality Date     ARTHROSCOPY KNEE WITH MENISCAL REPAIR Right 05/10/2017    Procedure: ARTHROSCOPY KNEE WITH MENISCAL REPAIR;   arthroscopy right knee with lateral meniscus repair;  Surgeon: Nathaniel Hicks MD;  Location: [facility]     ARTHROSCOPY KNEE WITH MENISCAL REPAIR Left      BIOPSY      Whole punch taken in calf to check for scleroderma.     DAVINCI GASTRIC SLEEVE N/A 07/26/2022    Procedure: GASTRECTOMY, SLEEVE, ROBOT-ASSISTED, LAPAROSCOPIC;  Surgeon: Joseph Mata MD;  Location: [facility] OR     ESOPHAGOSCOPY, GASTROSCOPY, DUODENOSCOPY (EGD), COMBINED N/A 03/28/2022    Procedure: ESOPHAGOGASTRODUODENOSCOPY (EGD);  Surgeon: Joseph Mata MD;  Location: [facility] OR       Allergies:  Allergies as of 11/25/2024 - Reviewed 11/25/2024   Allergen Reaction Noted     Steri strips  03/24/2022     Ondansetron Headache 07/15/2022       Current Medications:  Current Outpatient Medications   Medication Sig Dispense Refill     albuterol (PROAIR HFA/PROVENTIL HFA/VENTOLIN HFA) 108 (90 Base) MCG/ACT inhaler Inhale 1-2 puffs into the lungs every 4 hours as needed for shortness of breath / dyspnea or wheezing 8.5 g 5     buPROPion (WELLBUTRIN XL) 150 MG 24 hr tablet Take 1 tablet (150 mg) by mouth every morning. 30 tablet 3     buPROPion (WELLBUTRIN) 75 MG tablet Take 1 tablet (75 mg) by mouth 2 times daily. 180 tablet 1     Cholecalciferol (VITAMIN D3) 25 MCG TABS        dulaglutide (TRULICITY) 1.5 MG/0.5ML pen Inject 1.5 mg subcutaneously every 7 days. 6 mL 3     famotidine (PEPCID) 40 MG tablet Take 1 tablet (40 mg) by mouth daily 90 tablet 3     Fexofenadine HCl (ALLEGRA PO) Take by mouth daily as needed for allergies       hydrOXYzine HCl (ATARAX) 25 MG tablet Take 25 mg (1 tablet) 30 minutes before bedtime.  May increase by 1 tablet to a maximum dose of 100 mg (4 tablets) to improve sleep habits. 180 tablet 1     ipratropium - albuterol 0.5 mg/2.5 mg/3 mL (DUONEB) 0.5-2.5 (3) MG/3ML neb solution Take 1 vial (3 mLs) by nebulization every 6 hours as needed for shortness of breath / dyspnea or wheezing 30 vial 1     LORazepam (ATIVAN) 1 MG  tablet Take 1 tablet (1 mg) by mouth once as needed for anxiety (prior to breast MRI). Take with you to appointment and check with the nurse as to the appropriate time to take the medication. Do not operate a vehicle after taking this medication 1 tablet 0     metFORMIN (GLUCOPHAGE XR) 500 MG 24 hr tablet Take 1 tablet (500 mg) by mouth daily (with dinner). 30 tablet 3     NONFORMULARY Cranberry, two gel capsules by mouth daily       norgestim-eth estrad triphasic (ORTHO TRI-CYCLEN LO) 0.18/0.215/0.25 MG-25 MCG tablet Take 1 tablet by mouth daily. 84 tablet 3     Prenatal Vit-Fe Fumarate-FA (PRENATAL MULTIVITAMIN  PLUS IRON) 27-1 MG TABS Take by mouth daily          Family History:  Family History   Problem Relation Age of Onset     Asthma Mother      Hypertension Mother      Breast Cancer Mother 54        lumpectomy and BSO     Hereditary Breast and Ovarian Cancer Syndrome Mother         BRCA2+     Obesity Mother      Asthma Father      Diabetes Father      Melanoma Father 59        metastatic to brain     Hypertension Father      Hyperlipidemia Father      Prostate Cancer Father      Depression Father      Anxiety Disorder Father      Anesthesia Reaction Father         Asthma, hard wake up.     Obesity Father      Cervical Cancer Sister 19     Parathyroid Disorders Sister      Vascular Disease Sister         back of head     Colon Cancer Maternal Grandmother      Other Cancer Maternal Grandmother         Lung cancer     Lung Cancer Paternal Grandmother         smoker,  in late 80s     Colon Cancer Paternal Grandmother 60     Heart Disease Paternal Grandfather          in 70s     Melanoma Paternal Aunt      Prostate Cancer Paternal Uncle 40     Colon Cancer Paternal Uncle      Melanoma Paternal Uncle      Cancer Paternal Uncle         hematological cancer; s/p SCT     Melanoma Paternal Uncle      Melanoma Paternal Uncle      Cancer Paternal Uncle 61        GI tract; cause of death at 62; father's twin      Melanoma Paternal Uncle      Breast Cancer Other 50        contralateral occurrence at 57; ; maternal grandfather's sister     Breast Cancer Other 50        paternal grandfather's sister     Bleeding Disorder No family hx of      Clotting Disorder No family hx of        Social History:  Social History     Socioeconomic History     Marital status:      Spouse name: Not on file     Number of children: 0     Years of education: Not on file     Highest education level: Not on file   Occupational History     Occupation: Reception     Comment: [employer]   Tobacco Use     Smoking status: Former     Current packs/day: 0.00     Average packs/day: 1 pack/day for 5.0 years (5.0 ttl pk-yrs)     Types: Cigarettes     Start date: 2016     Quit date: 2021     Years since quitting: 3.8     Smokeless tobacco: Never   Vaping Use     Vaping status: Never Used   Substance and Sexual Activity     Alcohol use: Not Currently     Drug use: Never     Sexual activity: Not Currently     Partners: Male     Birth control/protection: Pill   Other Topics Concern     Parent/sibling w/ CABG, MI or angioplasty before 65F 55M? No   Social History Narrative    2024  Lives in [place] with , Tung.  They have cats.  Aware of toxoplasmosis precautions. No smokers in the home.  No concerns about domestic violence.  Sasha is a  at a vet clinic.       Social Drivers of Health     Financial Resource Strain: Low Risk  (2023)    Financial Resource Strain      Within the past 12 months, have you or your family members you live with been unable to get utilities (heat, electricity) when it was really needed?: No   Food Insecurity: Low Risk  (2023)    Food Insecurity      Within the past 12 months, did you worry that your food would run out before you got money to buy more?: No      Within the past 12 months, did the food you bought just not last and you didn t have money to get more?: No  "  Transportation Needs: Low Risk  (11/27/2023)    Transportation Needs      Within the past 12 months, has lack of transportation kept you from medical appointments, getting your medicines, non-medical meetings or appointments, work, or from getting things that you need?: No   Physical Activity: Not on file   Stress: Not on file (11/9/2024)   Social Connections: Not on file   Interpersonal Safety: Low Risk  (9/27/2024)    Interpersonal Safety      Do you feel physically and emotionally safe where you currently live?: Yes      Within the past 12 months, have you been hit, slapped, kicked or otherwise physically hurt by someone?: No      Within the past 12 months, have you been humiliated or emotionally abused in other ways by your partner or ex-partner?: No   Housing Stability: Low Risk  (11/27/2023)    Housing Stability      Do you have housing? : Yes      Are you worried about losing your housing?: No       Physical Exam:  /83 (BP Location: Right arm)   Pulse 85   Ht 1.702 m (5' 7\")   Wt 141.1 kg (311 lb)   LMP 11/19/2024   SpO2 99%   BMI 48.71 kg/m    GENERAL APPEARANCE: healthy, alert and no apparent distress  BREAST: A multipositional, bilateral breast exam was performed.  Fairly symmetrical, though Rsl>L. Nipples everted bilaterally. Right breast: no palpable dominant masses, no nipple discharge, no skin changes. Dense tissue. Right axilla: no palpable adenopathy. Left breast: no palpable dominant masses, no nipple discharge, no skin changes. Left axilla: no palpable adenopathy. Dense tissue.    LYMPHATICS: No cervical, supraclavicular, or axillary lymphadenopathy  SKIN: no suspicious lesions or rashes on examined skin      Laboratory/Imaging Studies  No results found for any visits on 11/25/24.    ASSESSMENT    Sasha reports that she is doing well at this visit. She shared that her paternal uncle passed away of a GI cancer earlier this year. She otherwise has no updates to her family's medical " history. She has no concerns with her breast tissue.     We discussed the plan below. We will continue annual breast MRIs until she is 28, when we will add additional breast and ovarian cancer screening. She is aware of the option of a mastectomy, which she may consider at a later date. She is currently trying to conceive and would like to breastfeed. We discussed that we do not screen during pregnancy, so should she be pregnant next year during the time she is due for a breast MRI, we would defer screening until after delivery. We also reviewed the recommendation for a salpingo-oophorectomy between the ages of 40-45.      We reviewed signs and symptoms to monitor for between visits, including any breast lumps, bumps, nipple discharge, nipple inversion, changes to the texture of the skin on the breasts that would look crinkled, puckered, or like the skin of an orange peel, or any other changes to the breast tissue. We reviewed the recommendation for breast self awareness.       INDIVIDUALIZED SCREENING PLAN:      Individualized Surveillance Plan for women  Hereditary Breast and/or Ovarian Cancer Syndrome   Per NCCN Guidelines Version 1.2025   Recommended screening Test or procedure Last done Next Scheduled    Breast self awareness starting at age 18.    Breast cancer risk:  BRCA1+: 60-72%  BRCA2+: 55-69% Women should be familiar with their breasts and promptly report changes to their care provider. Periodic, consistent self exam may facilitate breast self awareness. Premenopausal women may find self exams to be most informative when performed at the end of menses.   November 2024 November 2025   Breast screening, starting at age 25 Clinical breast exams every 6 -12 months November 2024 November 2025   Breast screening   Age 25-29 Annual breast MRI screening with contrast (or mammogram if MRI is unavailable) or individualized based on family history if a breast cancer diagnosis before age 30 is present.        Breast MRI is performed preferably on day 7-15 of menstrual cycle for premenopausal women.   10/11/2023: Breast MRI, BiRads1   Breast MRI today    Repeat breast MRI in November 2025   Breast screening   Age >30-75 years     Annual mammogram (consider tomosynthesis mammogram) and annual screening MRI.     Breast MRI is performed preferably on day 7-15 of menstrual cycle for premenopausal women.    Age>75 years, management should be considered on an individual basis.   See above   See above   Ovarian cancer screening, starting at age 30-35    Absolute risk for epithelial ovarian cancer:  BRCA1+: 39-58%   BRCA2+: 13-29%    Consider transvaginal ultrasound and  tests.   No screening to date.    Begin screening at age 30.   Pancreatic Cancer:    BRCA1+: <5%  BRCA2+: 5-10%      Consider Annual MRI/MRCP and/or Endoscopic Ultrasound    BRCA1+: Screening beginning at age 50 or 10 years prior to the earliest pancreatic cancer on the BRCA-side of the family,  in families with exocrine pancreatic cancer in a first or second degree relative.    BRCA2+: Screening beginning at age 50 or 10 years prior to the earliest pancreatic cancer on the BRCA-side of the family.       No family history of pancreatic cancer.   Consider screening at age 50.   Recommendation: risk-reducing salpingo-oophorectomy(RRSO), typically between 35 and 40 years old recognizing childbearing is a consideration.    Because ovarian cancer onset in patients with BRCA2 mutations is on average of 8-10 years later than in patients with BRCA1 mutations, it is reasonable to delay RRSO until 40-45 years in patients with BRCA2 mutations  unless age at diagnosis in the family warrants earlier age for consideration for prophylactic surgery.    Limited data suggest that there may be a slightly increased risk of serous uterine cancer among individuals with a BRCA1/2 P/LP variant. The clinical significance of these findings is unclear. Further evaluation of the  risk of serous uterine cancer in the BRCA population is ongoing.    Salpingectomy alone is not the standard of care for risk reduction although clinical trials are ongoing.     Discuss option of risk-reducing mastectomy.    Consider risks and benefits of risk reducing agents, such as tamoxifen and raloxifene for breast and ovarian cancer.    Consider a full body skin and eye exam for melanoma screening for both BRCA1+ and BRCA2+.     NOTE: Women with BRCA mutation who are treated for breast cancer should have screening of the remaining breast tissue with annual mammography and breast MRI.      The risk for breast cancer appears to be lower for the BRCA1 R5439F variant (24% by age 70 y). Screening should be individualized based on personal and family history         I spent a total of 32 minutes on the day of the visit. Please see the note for further information on patient assessment and treatment.     Rupali Saucedo DNP, SAVI, CED-BC  Clinical Nurse Specialist  Cancer Risk Management Program  [facility]    Cc:  Mey Smith MD                  Again, thank you for allowing me to participate in the care of your patient.        Sincerely,        SAVI Snow CNP

## 2024-12-16 ENCOUNTER — NURSE TRIAGE (OUTPATIENT)
Dept: FAMILY MEDICINE | Facility: CLINIC | Age: 24
End: 2024-12-16

## 2024-12-16 ENCOUNTER — VIRTUAL VISIT (OUTPATIENT)
Dept: FAMILY MEDICINE | Facility: CLINIC | Age: 24
End: 2024-12-16
Payer: COMMERCIAL

## 2024-12-16 DIAGNOSIS — O09.90 SUPERVISION OF HIGH RISK PREGNANCY, ANTEPARTUM: ICD-10-CM

## 2024-12-16 DIAGNOSIS — Z34.90 SUPERVISION OF NORMAL PREGNANCY: Primary | ICD-10-CM

## 2024-12-16 DIAGNOSIS — O09.90 SUPERVISION OF HIGH RISK PREGNANCY, ANTEPARTUM: Primary | ICD-10-CM

## 2024-12-16 PROCEDURE — 99207 PR NO CHARGE NURSE ONLY: CPT | Mod: 93

## 2024-12-16 NOTE — PROGRESS NOTES
Instructed to contact Dr. Smith to discuss recommendations about continuing metformin and Atarax during pregnancy.  Patient states she'll send her a [employer] message.   Problem: Falls - Risk of:  Goal: Will remain free from falls  Description: Will remain free from falls  Outcome: Ongoing  Goal: Absence of physical injury  Description: Absence of physical injury  Outcome: Ongoing     Problem: Pain:  Goal: Pain level will decrease  Description: Pain level will decrease  Outcome: Ongoing  Goal: Control of acute pain  Description: Control of acute pain  Outcome: Ongoing  Goal: Control of chronic pain  Description: Control of chronic pain  Outcome: Ongoing     Problem: Gas Exchange - Impaired:  Goal: Levels of oxygenation will improve  Description: Levels of oxygenation will improve  Outcome: Ongoing

## 2024-12-16 NOTE — LETTER
2024    Sasha EVIE Guy   2000        To Whom it May Concern;    Please excuse Sasha EVIE Guy from work/school for illness from Dec 15 to Dec 16, 2024.    Sincerely,        Mey Smith MD

## 2024-12-17 RX ORDER — ONDANSETRON 4 MG/1
4 TABLET, ORALLY DISINTEGRATING ORAL EVERY 8 HOURS PRN
Qty: 60 TABLET | Refills: 2 | Status: SHIPPED | OUTPATIENT
Start: 2024-12-17

## 2024-12-17 NOTE — TELEPHONE ENCOUNTER
Nurse Triage SBAR    Is this a 2nd Level Triage? NO    Situation: Patient reports liquid diarrhea and vomiting that started at 4:30AM    Background: 4 weeks pregnant    Assessment: Patient reports she has been having loose stools and vomiting about every 10 minutes. Taking small sips of water. She is still urinating, denies dizziness. Discussed that she can try Imodium. Discussed red flag symptoms and when to present to the ED. She verbalized understanding.    Protocol Recommended Disposition:   Home Care, See More Appropriate Protocol    Recommendation: Updated PCP on patient's symptoms. She will review chart.      Routed to provider    Does the patient meet one of the following criteria for ADS visit consideration? No    Farhad Hernandez RN on 12/17/2024 at 9:40 AM        Reason for Disposition   Vomiting also present and worse than the diarrhea   SEVERE vomiting (e.g., 6 or more times/day, vomits everything) BUT hydrated    Additional Information   Negative: Shock suspected (e.g., cold/pale/clammy skin, too weak to stand, low BP, rapid pulse)   Negative: Difficult to awaken or acting confused (e.g., disoriented, slurred speech)   Negative: Sounds like a life-threatening emergency to the triager   Negative: Shock suspected (e.g., cold/pale/clammy skin, too weak to stand, low BP, rapid pulse)   Negative: Difficult to awaken or acting confused (e.g., disoriented, slurred speech)   Negative: Sounds like a life-threatening emergency to the triager   Negative: Vomiting occurs only while coughing   Negative: Pregnant < 20 Weeks and nausea/vomiting began in early pregnancy (i.e., 4-8 weeks pregnant)   Negative: Chest pain   Negative: Headache is main symptom   Negative: Vomiting red blood or black (coffee ground) material   Negative: Vomiting and hernia is more painful or swollen than usual   Negative: Recent head injury (within 3 days)   Negative: Recent abdominal injury (within 7 days)   Negative: Insulin-dependent  diabetes and glucose > 240 mg/dL (13 mmol/L)   Negative: Severe pain in one eye   Negative: SEVERE vomiting (e.g., 6 or more times/day)  (Exception: Patient sounds well, is drinking liquids, does not sound dehydrated, and vomiting has lasted less than 24 hours.)   Negative: MODERATE vomiting (e.g., 3 - 5 times/day) and age > 60 years   Negative: Constant abdominal pain lasting > 2 hours   Negative: High-risk adult (e.g., brain tumor, V-P shunt, hernia)   Negative: Drinking very little and has signs of dehydration (e.g., no urine > 12 hours, very dry mouth, very lightheaded)   Negative: Patient sounds very sick or weak to the triager   Negative: Vomiting and abdomen looks much more swollen than usual   Negative: Vomiting contains bile (green color)   Negative: Fever > 103 F (39.4 C)   Negative: Fever > 101 F (38.3 C) and over 60 years of age   Negative: Fever > 100 F (37.8 C) and has a weak immune system (e.g., HIV positive, cancer chemo, organ transplant, splenectomy, chronic steroids)   Negative: Fever > 100 F (37.8 C) and bedridden (e.g., CVA, chronic illness, recovering from surgery)   Negative: Taking any of the following medications: digoxin (Lanoxin), lithium, theophylline, phenytoin (Dilantin)   Negative: MILD to MODERATE vomiting (e.g., 1-5 times/day) and lasts > 48 hours (2 days)   Negative: Fever present > 3 days (72 hours)   Negative: Patient wants to be seen   Negative: Vomiting a prescription medication   Negative: Substance use (drug use) or unhealthy alcohol use, known or suspected   Negative: Vomiting is a chronic symptom (recurrent or ongoing AND lasting > 4 weeks)    Protocols used: Diarrhea-A-OH, Vomiting-A-OH

## 2024-12-18 ENCOUNTER — NURSE TRIAGE (OUTPATIENT)
Dept: NURSING | Facility: CLINIC | Age: 24
End: 2024-12-18
Payer: COMMERCIAL

## 2024-12-18 NOTE — TELEPHONE ENCOUNTER
"    OB Triage Call      Is patient's OB/Midwife with the [facility] or [facility]? [facility]- Proceed with triage     Reason for call: dark urine    Assessment: Call from patient  with concerns that she has been having increased diarrhea and nausea. She starts that she is having a decrease in urine output. She states that her urine is dark in color and she is wondering if she can get a note for missing work she states that she is also having fevers of  with use of tylenol. Last took tylenol at 1200    Plan: see provider within 2 weeks.    Patient plans to deliver at       Patient's primary OB Provider is unknown.      Per protocol recommendations Patient to schedule follow up appointment within 2 weeks.      Is patient's delivering hospital on divert? Does not apply due to Patient to schedule appointment       4w6d    Estimated Date of Delivery: Aug 21, 2025        OB History    Para Term  AB Living   2 0 0 0 1 0   SAB IAB Ectopic Multiple Live Births   1 0 0 0 0      # Outcome Date GA Lbr Spencer/2nd Weight Sex Type Anes PTL Lv   2 Current            1 SAB 2024 8w0d    SAB         Obstetric Comments   EDC 2025 based on LMP.   to [facility].       No results found for: \"GBS\"       Emily Taylor RN   Reason for Disposition   All other urine symptoms    Additional Information   Negative: Shock suspected (e.g., cold/pale/clammy skin, too weak to stand, low BP, rapid pulse)   Negative: Sounds like a life-threatening emergency to the triager   Negative: Followed a female genital area injury (e.g., labia, vagina, vulva)   Negative: Followed a male genital area injury (penis, scrotum)   Negative: Vaginal discharge   Negative: Pus (white, yellow) or bloody discharge from end of penis   Negative: Pain or burning with passing urine (urination) and pregnant   Negative: Pain or burning with passing urine (urination) and female   Negative: Pain or burning with passing urine (urination) and male   " Negative: Pain or itching in the vulvar area   Negative: Pain in scrotum is main symptom   Negative: Blood in the urine is main symptom   Negative: Symptoms arising from use of a urinary catheter (e.g., coude, Silver)   Negative: Unable to urinate (or only a few drops) > 4 hours and bladder feels very full (e.g., palpable bladder or strong urge to urinate)   Negative: Decreased urination and drinking very little and dehydration suspected (e.g., dark urine, no urine > 12 hours, very dry mouth, very lightheaded)   Negative: Patient sounds very sick or weak to the triager   Negative: Fever > 100.4 F  (38.0 C)   Negative: Side (flank) or lower back pain present   Negative: Bad or foul-smelling urine   Negative: Urinating more frequently than usual (i.e., frequency) OR new-onset of the feeling of an urgent need to urinate (i.e., urgency)   Negative: Can't control passage of urine (i.e., urinary incontinence) and new-onset (< 2 weeks) or getting worse   Negative: Patient wants to be seen   Negative: Taking medicine for urinary symptoms (e.g., incontinence, overactive bladder) and feels is having side effects (e.g., dry mouth, GI symptoms, difficulty urinating)   Negative: Can't control passage of urine (i.e., urinary incontinence, wetting self) and present > 2 weeks   Negative: Urination is difficult to start (i.e., hesitancy) or straining   Negative: Dribbling (losing urine) just after finishing urination (i.e., post-void dribbling)   Negative: Has to get out of bed to urinate > 2 times a night (i.e., nocturia)    Protocols used: Urinary Symptoms-A-OH

## 2024-12-20 ENCOUNTER — LAB (OUTPATIENT)
Dept: LAB | Facility: OTHER | Age: 24
End: 2024-12-20
Payer: COMMERCIAL

## 2024-12-20 DIAGNOSIS — E11.9 TYPE 2 DIABETES MELLITUS WITHOUT COMPLICATION, WITHOUT LONG-TERM CURRENT USE OF INSULIN (H): Primary | ICD-10-CM

## 2024-12-21 ENCOUNTER — HEALTH MAINTENANCE LETTER (OUTPATIENT)
Age: 24
End: 2024-12-21

## 2024-12-23 ENCOUNTER — LAB (OUTPATIENT)
Dept: LAB | Facility: OTHER | Age: 24
End: 2024-12-23
Payer: COMMERCIAL

## 2024-12-23 DIAGNOSIS — O09.90 SUPERVISION OF HIGH RISK PREGNANCY, ANTEPARTUM: ICD-10-CM

## 2024-12-23 LAB — HCG INTACT+B SERPL-ACNC: 811 MIU/ML

## 2024-12-23 PROCEDURE — 36415 COLL VENOUS BLD VENIPUNCTURE: CPT

## 2024-12-23 PROCEDURE — 84702 CHORIONIC GONADOTROPIN TEST: CPT

## 2024-12-31 ENCOUNTER — MYC MEDICAL ADVICE (OUTPATIENT)
Dept: FAMILY MEDICINE | Facility: CLINIC | Age: 24
End: 2024-12-31
Payer: COMMERCIAL

## 2024-12-31 DIAGNOSIS — O09.90 SUPERVISION OF HIGH RISK PREGNANCY, ANTEPARTUM: Primary | ICD-10-CM

## 2025-01-08 LAB
ABO + RH BLD: NORMAL
BLD GP AB SCN SERPL QL: NEGATIVE
SPECIMEN EXP DATE BLD: NORMAL

## 2025-01-09 ENCOUNTER — LAB (OUTPATIENT)
Dept: LAB | Facility: OTHER | Age: 25
End: 2025-01-09
Payer: COMMERCIAL

## 2025-01-09 DIAGNOSIS — E11.9 TYPE 2 DIABETES MELLITUS WITHOUT COMPLICATION, WITHOUT LONG-TERM CURRENT USE OF INSULIN (H): ICD-10-CM

## 2025-01-09 DIAGNOSIS — O09.90 SUPERVISION OF HIGH RISK PREGNANCY, ANTEPARTUM: ICD-10-CM

## 2025-01-09 LAB
ALBUMIN UR-MCNC: NEGATIVE MG/DL
APPEARANCE UR: CLEAR
BILIRUB UR QL STRIP: NEGATIVE
CHOLEST SERPL-MCNC: 134 MG/DL
COLOR UR AUTO: YELLOW
CREAT UR-MCNC: 97.5 MG/DL
ERYTHROCYTE [DISTWIDTH] IN BLOOD BY AUTOMATED COUNT: 13.4 % (ref 10–15)
EST. AVERAGE GLUCOSE BLD GHB EST-MCNC: 103 MG/DL
FASTING STATUS PATIENT QL REPORTED: NO
FERRITIN SERPL-MCNC: 39 NG/ML (ref 6–175)
GLUCOSE UR STRIP-MCNC: NEGATIVE MG/DL
HBA1C MFR BLD: 5.2 % (ref 0–5.6)
HCG INTACT+B SERPL-ACNC: ABNORMAL MIU/ML
HCT VFR BLD AUTO: 38.3 % (ref 35–47)
HDLC SERPL-MCNC: 62 MG/DL
HGB BLD-MCNC: 13.1 G/DL (ref 11.7–15.7)
HGB UR QL STRIP: NEGATIVE
KETONES UR STRIP-MCNC: NEGATIVE MG/DL
LDLC SERPL CALC-MCNC: 61 MG/DL
LEUKOCYTE ESTERASE UR QL STRIP: NEGATIVE
MCH RBC QN AUTO: 27.8 PG (ref 26.5–33)
MCHC RBC AUTO-ENTMCNC: 34.2 G/DL (ref 31.5–36.5)
MCV RBC AUTO: 81 FL (ref 78–100)
MICROALBUMIN UR-MCNC: <12 MG/L
MICROALBUMIN/CREAT UR: NORMAL MG/G{CREAT}
NITRATE UR QL: NEGATIVE
NONHDLC SERPL-MCNC: 72 MG/DL
PH UR STRIP: 7 [PH] (ref 5–7)
PLATELET # BLD AUTO: 311 10E3/UL (ref 150–450)
RBC # BLD AUTO: 4.72 10E6/UL (ref 3.8–5.2)
SP GR UR STRIP: 1.01 (ref 1–1.03)
TRIGL SERPL-MCNC: 53 MG/DL
UROBILINOGEN UR STRIP-ACNC: 0.2 E.U./DL
WBC # BLD AUTO: 8.9 10E3/UL (ref 4–11)

## 2025-01-10 LAB — HIV 1+2 AB+HIV1 P24 AG SERPL QL IA: NONREACTIVE

## 2025-01-11 LAB — BACTERIA UR CULT: NORMAL

## 2025-01-18 ASSESSMENT — ASTHMA QUESTIONNAIRES
QUESTION_4 LAST FOUR WEEKS HOW OFTEN HAVE YOU USED YOUR RESCUE INHALER OR NEBULIZER MEDICATION (SUCH AS ALBUTEROL): TWO OR THREE TIMES PER WEEK
ACT_TOTALSCORE: 18
QUESTION_3 LAST FOUR WEEKS HOW OFTEN DID YOUR ASTHMA SYMPTOMS (WHEEZING, COUGHING, SHORTNESS OF BREATH, CHEST TIGHTNESS OR PAIN) WAKE YOU UP AT NIGHT OR EARLIER THAN USUAL IN THE MORNING: NOT AT ALL
QUESTION_1 LAST FOUR WEEKS HOW MUCH OF THE TIME DID YOUR ASTHMA KEEP YOU FROM GETTING AS MUCH DONE AT WORK, SCHOOL OR AT HOME: NONE OF THE TIME
QUESTION_5 LAST FOUR WEEKS HOW WOULD YOU RATE YOUR ASTHMA CONTROL: WELL CONTROLLED
QUESTION_2 LAST FOUR WEEKS HOW OFTEN HAVE YOU HAD SHORTNESS OF BREATH: MORE THAN ONCE A DAY
ACT_TOTALSCORE: 18

## 2025-01-22 ENCOUNTER — MYC MEDICAL ADVICE (OUTPATIENT)
Dept: FAMILY MEDICINE | Facility: CLINIC | Age: 25
End: 2025-01-22
Payer: COMMERCIAL

## 2025-01-22 ASSESSMENT — PATIENT HEALTH QUESTIONNAIRE - PHQ9
SUM OF ALL RESPONSES TO PHQ QUESTIONS 1-9: 7
SUM OF ALL RESPONSES TO PHQ QUESTIONS 1-9: 7
10. IF YOU CHECKED OFF ANY PROBLEMS, HOW DIFFICULT HAVE THESE PROBLEMS MADE IT FOR YOU TO DO YOUR WORK, TAKE CARE OF THINGS AT HOME, OR GET ALONG WITH OTHER PEOPLE: SOMEWHAT DIFFICULT

## 2025-01-23 ENCOUNTER — TRANSCRIBE ORDERS (OUTPATIENT)
Dept: MATERNAL FETAL MEDICINE | Facility: CLINIC | Age: 25
End: 2025-01-23

## 2025-01-23 ENCOUNTER — PRENATAL OFFICE VISIT (OUTPATIENT)
Dept: FAMILY MEDICINE | Facility: CLINIC | Age: 25
End: 2025-01-23
Payer: COMMERCIAL

## 2025-01-23 VITALS
DIASTOLIC BLOOD PRESSURE: 80 MMHG | TEMPERATURE: 97.6 F | WEIGHT: 293 LBS | OXYGEN SATURATION: 98 % | BODY MASS INDEX: 49.65 KG/M2 | SYSTOLIC BLOOD PRESSURE: 120 MMHG | RESPIRATION RATE: 12 BRPM | HEART RATE: 90 BPM

## 2025-01-23 DIAGNOSIS — F32.1 MAJOR DEPRESSIVE DISORDER, SINGLE EPISODE, MODERATE (H): ICD-10-CM

## 2025-01-23 DIAGNOSIS — J45.909 ASTHMA DURING PREGNANCY: ICD-10-CM

## 2025-01-23 DIAGNOSIS — O99.519 ASTHMA DURING PREGNANCY: ICD-10-CM

## 2025-01-23 DIAGNOSIS — E11.9 TYPE 2 DIABETES MELLITUS WITHOUT COMPLICATION, WITHOUT LONG-TERM CURRENT USE OF INSULIN (H): ICD-10-CM

## 2025-01-23 DIAGNOSIS — O24.111 PRE-EXISTING TYPE 2 DIABETES MELLITUS DURING PREGNANCY IN FIRST TRIMESTER: ICD-10-CM

## 2025-01-23 DIAGNOSIS — O26.90 PREGNANCY RELATED CONDITION, ANTEPARTUM: Primary | ICD-10-CM

## 2025-01-23 DIAGNOSIS — O09.90 SUPERVISION OF HIGH RISK PREGNANCY, ANTEPARTUM: Primary | ICD-10-CM

## 2025-01-23 PROBLEM — E66.01 CLASS 3 SEVERE OBESITY WITH SERIOUS COMORBIDITY AND BODY MASS INDEX (BMI) OF 40.0 TO 44.9 IN ADULT (H): Status: RESOLVED | Noted: 2022-03-25 | Resolved: 2025-01-23

## 2025-01-23 PROBLEM — E66.813 CLASS 3 SEVERE OBESITY WITH SERIOUS COMORBIDITY AND BODY MASS INDEX (BMI) OF 40.0 TO 44.9 IN ADULT (H): Status: RESOLVED | Noted: 2022-03-25 | Resolved: 2025-01-23

## 2025-01-23 RX ORDER — IPRATROPIUM BROMIDE AND ALBUTEROL SULFATE 2.5; .5 MG/3ML; MG/3ML
1 SOLUTION RESPIRATORY (INHALATION) EVERY 6 HOURS PRN
Qty: 90 ML | Refills: 1 | Status: SHIPPED | OUTPATIENT
Start: 2025-01-23

## 2025-01-23 NOTE — PROGRESS NOTES
Concerns: ***  {[id number]}  Will schedule anatomy ultrasound.  RTC 4 weeks.      Mey Smith MD    Answers submitted by the patient for this visit:  Patient Health Questionnaire (Submitted on 1/22/2025)  If you checked off any problems, how difficult have these problems made it for you to do your work, take care of things at home, or get along with other people?: Somewhat difficult  PHQ9 TOTAL SCORE: 7

## 2025-01-23 NOTE — PROGRESS NOTES
Pregnany related issues    Morbid obesity: ref to MFM  Pre-gestational BMI 48. Will continue to follow recommend limited weight gain in pregnancy.   ASA at 12 weeks   Dx in chart of pre gestational type 2 DM  A1c in first trimester 5.2  GTT first trimester: [ PENDING ]  Hx of SAB in  with retained POC: required D&C  Hx of gastric bypass  Asthma: mild intermittent prior   NIPT: ordered 25  PPBCM: unsure  BF+   Tdap at 28 weeks  GBS at 36 weeks  Likely IOL at 39 weeks based on weight.       SUBJECTIVE:     HPI:    This is a 24 year old female patient,  who presents for her first obstetrical visit.    ABDIEL: 2025, by Last Menstrual Period.  She is 10w0d weeks.  Her cycles are irregular.  Her last menstrual period was normal.   Since her LMP, she has had no complaints).   She denies headache, vaginal discharge, lightheadedness, urinary frequency, and vaginal bleeding.    Additional History: G1 was Early SAB required D&C     Have you travelled during the pregnancy?No  Have your sexual partner(s) travelled during the pregnancy?No      HISTORY:   Planned Pregnancy: No  Marital Status:     Living in Household: Significant Other    Past History:  Her past medical history   Past Medical History:   Diagnosis Date    BRCA2 gene mutation positive 2023    Closed head injury, subsequent encounter 2018    Depressive disorder     History of PCR DNA positive for HSV1     Hypertension     Moderate major depression (H) 2014    Nexplanon placed 17    Nexplanon placed 17 (removed 2020) 2017    Nexplanon placed 2021    Nexplanon removed 2023    Obesity 2010    Pyelonephritis 2018    Skin tag (right collar line and left labial region 2019    Tobacco use disorder 2016    Uncomplicated asthma    .      She has a history of   this is G2; G1 was an SAB    Since her last LMP she denies use of alcohol, tobacco  and street drugs.    Past medical, surgical, social and family history were reviewed and updated in EPIC.        Current Outpatient Medications   Medication Sig Dispense Refill    albuterol (PROAIR HFA/PROVENTIL HFA/VENTOLIN HFA) 108 (90 Base) MCG/ACT inhaler Inhale 1-2 puffs into the lungs every 4 hours as needed for shortness of breath / dyspnea or wheezing 8.5 g 5    Cholecalciferol (VITAMIN D3) 25 MCG TABS       famotidine (PEPCID) 40 MG tablet Take 1 tablet (40 mg) by mouth daily 90 tablet 3    hydrOXYzine HCl (ATARAX) 25 MG tablet Take 25 mg (1 tablet) 30 minutes before bedtime.  May increase by 1 tablet to a maximum dose of 100 mg (4 tablets) to improve sleep habits. 180 tablet 0    NONFORMULARY Cranberry, two gel capsules by mouth daily      ondansetron (ZOFRAN ODT) 4 MG ODT tab Take 1 tablet (4 mg) by mouth every 8 hours as needed for nausea. 60 tablet 2    Prenatal Vit-Fe Fumarate-FA (PRENATAL MULTIVITAMIN  PLUS IRON) 27-1 MG TABS Take by mouth daily      Fexofenadine HCl (ALLEGRA PO) Take by mouth daily as needed for allergies (Patient not taking: Reported on 1/23/2025)      ipratropium - albuterol 0.5 mg/2.5 mg/3 mL (DUONEB) 0.5-2.5 (3) MG/3ML neb solution Take 1 vial (3 mLs) by nebulization every 6 hours as needed for shortness of breath / dyspnea or wheezing (Patient not taking: Reported on 1/23/2025) 30 vial 1     Current Facility-Administered Medications   Medication Dose Route Frequency Provider Last Rate Last Admin    1 mL ropivacaine (NAROPIN) injection 5 mg/mL  1 mL   Amy Sullivan, DO   1 mL at 11/09/23 1412    2 mL bupivacaine (MARCAINE) preservative free injection 0.5% (20 mL vial)  2 mL   Gareth Smith, DO   2 mL at 07/27/23 1242    lidocaine 1 % injection 2 mL  2 mL   Gareth Smith, DO   2 mL at 07/27/23 1242    lidocaine 1 % injection 3 mL  3 mL   Amy Sullivan, DO   3 mL at 11/09/23 1412    triamcinolone (KENALOG-40) injection 40 mg  40 mg   Amy Sullivan, DO   40  mg at 23 1412    triamcinolone (KENALOG-40) injection 40 mg  40 mg   Gareth Smith DO   40 mg at 23 1242       ROS:   12 point review of systems negative other than symptoms noted below or in the HPI.      OBJECTIVE:     EXAM:  /80   Pulse 90   Temp 97.6  F (36.4  C) (Temporal)   Resp 12   Wt (!) 143.8 kg (317 lb)   LMP 2024   SpO2 98%   BMI 49.65 kg/m   Body mass index is 49.65 kg/m .    GENERAL: alert and no distress  EYES: Eyes grossly normal to inspection, PERRL and conjunctivae and sclerae normal  HENT: wnl  NECK: no adenopathy, no asymmetry, masses, or scars  RESP: normal WOB  CV: regular rate; 2+ pulses  ABDOMEN: soft, nontender, no hepatosplenomegaly, no masses and bowel sounds normal  MS: no gross musculoskeletal defects noted, no edema  SKIN: no suspicious lesions or rashes  NEURO: Normal strength and tone, mentation intact and speech normal  PSYCH: mentation appears normal, affect normal/bright    ASSESSMENT/PLAN:       ICD-10-CM    1. Type 2 diabetes mellitus without complication, without long-term current use of insulin (H)  E11.9       2. Major depressive disorder, single episode, moderate (H)  F32.1           24 year old , 10w0d weeks of pregnancy with ABDIEL of 2025, by Last Menstrual Period complicated by morbid obesity, pregestational DM, asthma and hx of sab doing well overall, NIPT ordered. Labs benign thus far.         Counseling given:   - Follow up in 4-6 weeks for return OB visit.  - Recommended weight gain for pregnancy: < 15 lbs.       Mey Smith MD  Answers submitted by the patient for this visit:  Patient Health Questionnaire (Submitted on 2025)  If you checked off any problems, how difficult have these problems made it for you to do your work, take care of things at home, or get along with other people?: Somewhat difficult  PHQ9 TOTAL SCORE: 7

## 2025-01-27 LAB
HPV HR 12 DNA CVX QL NAA+PROBE: NEGATIVE
HPV16 DNA CVX QL NAA+PROBE: NEGATIVE
HPV18 DNA CVX QL NAA+PROBE: NEGATIVE
HUMAN PAPILLOMA VIRUS FINAL DIAGNOSIS: NORMAL

## 2025-01-28 LAB
BKR AP ASSOCIATED HPV REPORT: NORMAL
BKR LAB AP GYN ADEQUACY: NORMAL
BKR LAB AP GYN INTERPRETATION: NORMAL
BKR LAB AP PREVIOUS ABNORMAL: NORMAL
PATH REPORT.COMMENTS IMP SPEC: NORMAL
PATH REPORT.COMMENTS IMP SPEC: NORMAL
PATH REPORT.RELEVANT HX SPEC: NORMAL

## 2025-02-05 ENCOUNTER — LAB (OUTPATIENT)
Dept: LAB | Facility: OTHER | Age: 25
End: 2025-02-05
Payer: COMMERCIAL

## 2025-02-05 ENCOUNTER — MYC MEDICAL ADVICE (OUTPATIENT)
Dept: FAMILY MEDICINE | Facility: CLINIC | Age: 25
End: 2025-02-05

## 2025-02-05 DIAGNOSIS — O24.111 PRE-EXISTING TYPE 2 DIABETES MELLITUS DURING PREGNANCY IN FIRST TRIMESTER: ICD-10-CM

## 2025-02-05 DIAGNOSIS — O09.90 SUPERVISION OF HIGH RISK PREGNANCY, ANTEPARTUM: ICD-10-CM

## 2025-02-05 LAB
ALBUMIN SERPL BCG-MCNC: 4 G/DL (ref 3.5–5.2)
ALP SERPL-CCNC: 54 U/L (ref 40–150)
ALT SERPL W P-5'-P-CCNC: 18 U/L (ref 0–50)
ANION GAP SERPL CALCULATED.3IONS-SCNC: 13 MMOL/L (ref 7–15)
AST SERPL W P-5'-P-CCNC: 19 U/L (ref 0–45)
BILIRUB SERPL-MCNC: 0.3 MG/DL
BUN SERPL-MCNC: 5.3 MG/DL (ref 6–20)
CALCIUM SERPL-MCNC: 9.1 MG/DL (ref 8.8–10.4)
CHLORIDE SERPL-SCNC: 105 MMOL/L (ref 98–107)
CREAT SERPL-MCNC: 0.5 MG/DL (ref 0.51–0.95)
EGFRCR SERPLBLD CKD-EPI 2021: >90 ML/MIN/1.73M2
GLUCOSE 1H P 50 G GLC PO SERPL-MCNC: 120 MG/DL (ref 70–129)
GLUCOSE SERPL-MCNC: 112 MG/DL (ref 70–99)
HCG INTACT+B SERPL-ACNC: ABNORMAL MIU/ML
HCO3 SERPL-SCNC: 20 MMOL/L (ref 22–29)
POTASSIUM SERPL-SCNC: 4.1 MMOL/L (ref 3.4–5.3)
PROT SERPL-MCNC: 6.4 G/DL (ref 6.4–8.3)
SODIUM SERPL-SCNC: 138 MMOL/L (ref 135–145)

## 2025-02-05 PROCEDURE — 84702 CHORIONIC GONADOTROPIN TEST: CPT

## 2025-02-05 PROCEDURE — 36415 COLL VENOUS BLD VENIPUNCTURE: CPT

## 2025-02-05 PROCEDURE — 80053 COMPREHEN METABOLIC PANEL: CPT

## 2025-02-05 PROCEDURE — 82950 GLUCOSE TEST: CPT

## 2025-02-12 ENCOUNTER — NURSE TRIAGE (OUTPATIENT)
Dept: FAMILY MEDICINE | Facility: CLINIC | Age: 25
End: 2025-02-12
Payer: COMMERCIAL

## 2025-02-13 NOTE — TELEPHONE ENCOUNTER
Updated patient on provider's response.   Patient in agreement with plan.    Farhad Hernandez RN on 2/13/2025 at 1:14 PM

## 2025-02-13 NOTE — TELEPHONE ENCOUNTER
Nurse Triage SBAR    Is this a 2nd Level Triage? NO    Situation: Patient is having increased R knee pain    Background: History of both knees having torn meniscus, currently pregnant    Assessment: Patient reports her R knee has been feeling more pressure in it. She is still able to walk but when she goes to get up and walk  it is very painful. No redness or swelling. No fevers. She has tried Tylenol with minimal relief.   Discussed red flag symptoms and when to call back.   Protocol Recommended Disposition:   See in Office Within 3 Days    Recommendation: Patient has an upcoming appt on 2/26/25, patient feels that due to this being chronic, it is not emergent. Please advise if she should be seen sooner.    Farhad Hernandez RN on 2/13/2025 at 10:17 AM      Routed to provider    Does the patient meet one of the following criteria for ADS visit consideration? No      Reason for Disposition   MILD knee pain persists > 7 days    Additional Information   Negative: Sounds like a life-threatening emergency to the triager   Negative: Followed a knee injury   Negative: Swollen knee joint and fever   Negative: Patient sounds very sick or weak to the triager   Negative: Can't move swollen joint at all   Negative: Thigh or calf pain and only 1 side and present > 1 hour   Negative: Thigh or calf swelling and only 1 side   Negative: SEVERE pain (e.g., excruciating, unable to walk)   Negative: Very swollen knee joint   Negative: Painful rash with multiple small blisters grouped together (i.e., dermatomal distribution or 'band' or 'stripe')   Negative: Looks like a boil, infected sore, deep ulcer, or other infected rash (spreading redness, pus)   Negative: Knee locking is a chronic symptom (recurrent or ongoing AND lasting > 4 weeks)   Negative: MODERATE pain (e.g., symptoms interfere with work or school, limping) and present > 3 days   Negative: Swollen knee joint (no fever or redness)   Negative: Fluid-filled sack just below  kneecap (no fever or redness)    Protocols used: Knee Pain-A-OH

## 2025-02-17 ENCOUNTER — E-VISIT (OUTPATIENT)
Dept: FAMILY MEDICINE | Facility: CLINIC | Age: 25
End: 2025-02-17
Payer: COMMERCIAL

## 2025-02-17 DIAGNOSIS — O26.899 HEADACHE IN PREGNANCY, ANTEPARTUM: Primary | ICD-10-CM

## 2025-02-17 DIAGNOSIS — R51.9 HEADACHE IN PREGNANCY, ANTEPARTUM: Primary | ICD-10-CM

## 2025-02-17 DIAGNOSIS — E11.9 TYPE 2 DIABETES MELLITUS WITHOUT COMPLICATION, WITHOUT LONG-TERM CURRENT USE OF INSULIN (H): ICD-10-CM

## 2025-02-17 PROCEDURE — 99207 PR NON-BILLABLE SERV PER CHARTING: CPT | Performed by: FAMILY MEDICINE

## 2025-02-17 NOTE — LETTER
February 18, 2025      Sasha Guy  [address]  [facility] [address]        To Whom It May Concern:    Ssaha Guy  was seen on 02/17/2025.  Please excuse her  until 02/20/2025 due to illness. 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   On her full return we are requesting that she be able to take more frequent breaks approximately every 2 hours as needed for resting of her eyes to avoid exacerbating headache syndrome.   Please don't hesitate to reach out with any further questions or documentation.         Sincerely,        Mey Smith MD    Electronically signed

## 2025-02-17 NOTE — LETTER
February 17, 2025      Sasha Guy  [address]  [facility] [address]        To Whom It May Concern:    Sasha Guy was seen in our clinic. She may return to work without restrictions on 2/17/25.      Sincerely,        Arlyn Diamond NP    Electronically signed

## 2025-02-18 ENCOUNTER — E-VISIT (OUTPATIENT)
Dept: URGENT CARE | Facility: CLINIC | Age: 25
End: 2025-02-18
Payer: COMMERCIAL

## 2025-02-18 DIAGNOSIS — H01.004 BLEPHARITIS OF LEFT UPPER EYELID, UNSPECIFIED TYPE: Primary | ICD-10-CM

## 2025-02-18 DIAGNOSIS — H10.32 ACUTE BACTERIAL CONJUNCTIVITIS OF LEFT EYE: ICD-10-CM

## 2025-02-18 LAB — SCANNED LAB RESULT: NORMAL

## 2025-02-18 PROCEDURE — 99207 PR NON-BILLABLE SERV PER CHARTING: CPT | Performed by: EMERGENCY MEDICINE

## 2025-02-18 RX ORDER — POLYMYXIN B SULFATE AND TRIMETHOPRIM 1; 10000 MG/ML; [USP'U]/ML
SOLUTION OPHTHALMIC
Qty: 10 ML | Refills: 0 | Status: SHIPPED | OUTPATIENT
Start: 2025-02-18 | End: 2025-02-25

## 2025-02-18 NOTE — PATIENT INSTRUCTIONS
Thank you for choosing us for your care. I have placed an order for a prescription so that you can start treatment. View your full visit summary for details by clicking on the link below. Your pharmacist will able to address any questions you may have about the medication.     If you re not feeling better within 2-3 days, please schedule an appointment.  You can schedule an appointment right here in [facility], or call [phone]  If the visit is for the same symptoms as your eVisit, we ll refund the cost of your eVisit if seen within seven days.    Pinkeye: Care Instructions  Overview     Pinkeye is redness and swelling of the eye surface and the conjunctiva (the lining of the eyelid and the covering of the white part of the eye). Pinkeye is also called conjunctivitis. Pinkeye is often caused by infection with bacteria or a virus. Dry air, allergies, smoke, and chemicals are other common causes.  Pinkeye often gets better on its own in 7 to 10 days. Antibiotics only help if the pinkeye is caused by bacteria. Pinkeye caused by infection spreads easily. If an allergy or chemical is causing pinkeye, it will not go away unless you can avoid whatever is causing it.  Follow-up care is a key part of your treatment and safety. Be sure to make and go to all appointments, and call your doctor if you are having problems. It's also a good idea to know your test results and keep a list of the medicines you take.  How can you care for yourself at home?  Wash your hands often. Always wash them before and after you treat pinkeye or touch your eyes or face.  Use moist cotton or a clean, wet cloth to remove crust. Wipe from the inside corner of the eye to the outside. Use a clean part of the cloth for each wipe.  Put cold or warm wet cloths on your eye a few times a day if the eye hurts.  Do not wear contact lenses or eye makeup until the pinkeye is gone. Throw away any eye makeup you were using when you got pinkeye. Clean your  "contacts and storage case. If you wear disposable contacts, use a new pair when your eye has cleared and it is safe to wear contacts again.  If the doctor gave you antibiotic ointment or eyedrops, use them as directed. Use the medicine for as long as instructed, even if your eye starts looking better soon. Keep the bottle tip clean, and do not let it touch the eye area.  To put in eyedrops or ointment:  Tilt your head back, and pull your lower eyelid down with one finger.  Drop or squirt the medicine inside the lower lid.  Close your eye for 30 to 60 seconds to let the drops or ointment move around.  Do not touch the ointment or dropper tip to your eyelashes or any other surface.  Do not share towels, pillows, or washcloths while you have pinkeye.  When should you call for help?   Call your doctor now or seek immediate medical care if:    You have pain in your eye, not just irritation on the surface.     You have a change in vision or loss of vision.     You have an increase in discharge from the eye.     Your eye has not started to improve or begins to get worse within 48 hours after you start using antibiotics.     Pinkeye lasts longer than 7 days.   Watch closely for changes in your health, and be sure to contact your doctor if you have any problems.  Where can you learn more?  Go to https://[email or web address].net/patiented  Enter Y392 in the search box to learn more about \"Pinkeye: Care Instructions.\"  Current as of: July 31, 2024  Content Version: 14.3    2024 [email or web address].   Care instructions adapted under license by your healthcare professional. If you have questions about a medical condition or this instruction, always ask your healthcare professional. [employer] disclaims any warranty or liability for your use of this information.    "

## 2025-02-26 ENCOUNTER — PRENATAL OFFICE VISIT (OUTPATIENT)
Dept: FAMILY MEDICINE | Facility: CLINIC | Age: 25
End: 2025-02-26
Payer: COMMERCIAL

## 2025-02-26 VITALS
WEIGHT: 293 LBS | BODY MASS INDEX: 44.41 KG/M2 | DIASTOLIC BLOOD PRESSURE: 70 MMHG | HEART RATE: 79 BPM | TEMPERATURE: 97.8 F | HEIGHT: 68 IN | SYSTOLIC BLOOD PRESSURE: 136 MMHG | OXYGEN SATURATION: 98 % | RESPIRATION RATE: 16 BRPM

## 2025-02-26 DIAGNOSIS — M54.9 BACK PAIN AFFECTING PREGNANCY, ANTEPARTUM: ICD-10-CM

## 2025-02-26 DIAGNOSIS — E66.01 MORBID OBESITY (H): ICD-10-CM

## 2025-02-26 DIAGNOSIS — O99.891 BACK PAIN AFFECTING PREGNANCY, ANTEPARTUM: ICD-10-CM

## 2025-02-26 DIAGNOSIS — O24.111 PRE-EXISTING TYPE 2 DIABETES MELLITUS DURING PREGNANCY IN FIRST TRIMESTER: ICD-10-CM

## 2025-02-26 DIAGNOSIS — O09.90 SUPERVISION OF HIGH RISK PREGNANCY, ANTEPARTUM: Primary | ICD-10-CM

## 2025-02-26 PROCEDURE — 99207 PR PRENATAL VISIT: CPT | Performed by: FAMILY MEDICINE

## 2025-02-26 RX ORDER — CYCLOBENZAPRINE HCL 10 MG
10 TABLET ORAL 3 TIMES DAILY PRN
Qty: 60 TABLET | Refills: 1 | Status: SHIPPED | OUTPATIENT
Start: 2025-02-26

## 2025-02-26 ASSESSMENT — ASTHMA QUESTIONNAIRES
QUESTION_2 LAST FOUR WEEKS HOW OFTEN HAVE YOU HAD SHORTNESS OF BREATH: ONCE OR TWICE A WEEK
QUESTION_5 LAST FOUR WEEKS HOW WOULD YOU RATE YOUR ASTHMA CONTROL: COMPLETELY CONTROLLED
ACT_TOTALSCORE: 23
QUESTION_1 LAST FOUR WEEKS HOW MUCH OF THE TIME DID YOUR ASTHMA KEEP YOU FROM GETTING AS MUCH DONE AT WORK, SCHOOL OR AT HOME: NONE OF THE TIME
QUESTION_3 LAST FOUR WEEKS HOW OFTEN DID YOUR ASTHMA SYMPTOMS (WHEEZING, COUGHING, SHORTNESS OF BREATH, CHEST TIGHTNESS OR PAIN) WAKE YOU UP AT NIGHT OR EARLIER THAN USUAL IN THE MORNING: NOT AT ALL
QUESTION_4 LAST FOUR WEEKS HOW OFTEN HAVE YOU USED YOUR RESCUE INHALER OR NEBULIZER MEDICATION (SUCH AS ALBUTEROL): ONCE A WEEK OR LESS
ACT_TOTALSCORE: 23

## 2025-02-26 ASSESSMENT — PAIN SCALES - GENERAL: PAINLEVEL_OUTOF10: MODERATE PAIN (5)

## 2025-02-26 ASSESSMENT — PATIENT HEALTH QUESTIONNAIRE - PHQ9: SUM OF ALL RESPONSES TO PHQ QUESTIONS 1-9: 6

## 2025-02-26 NOTE — PROGRESS NOTES
Pregnany related issues     Morbid obesity: ref to MFM  Pre-gestational BMI 48. Will continue to follow recommend limited weight gain in pregnancy.   ASA at 12 weeks contraindicated due to gastric bypass    Dx in chart of pre gestational type 2 DM  A1c in first trimester 5.2  GTT first trimester: 1 hr done and wnl.     Hx of SAB in 2024 with retained POC: required D&C  Hx of gastric bypass  Asthma: mild intermittent prior   NIPT: negative/normal   PPBCM: unsure  BF+   Tdap at 28 weeks  GBS at 36 weeks  Likely IOL at 39 weeks 2/2 weight.     Concerns: hip and back pain worst on the right. Amenable to PT ref. Discussed prenatal yoga as well.   Doing well.  No concerns today.  Discussed anenatal screening.  She normal testing 46 XX testing at this time.  Reportable signs and symptoms discussed.  Will schedule anatomy ultrasound.  RTC 4 weeks.      Mey Smith MD

## 2025-03-03 LAB — SCANNED LAB RESULT: NORMAL

## 2025-03-17 ENCOUNTER — MYC MEDICAL ADVICE (OUTPATIENT)
Dept: FAMILY MEDICINE | Facility: CLINIC | Age: 25
End: 2025-03-17
Payer: COMMERCIAL

## 2025-03-20 ENCOUNTER — PRENATAL OFFICE VISIT (OUTPATIENT)
Dept: FAMILY MEDICINE | Facility: CLINIC | Age: 25
End: 2025-03-20
Payer: COMMERCIAL

## 2025-03-20 VITALS
BODY MASS INDEX: 48.05 KG/M2 | OXYGEN SATURATION: 98 % | WEIGHT: 293 LBS | HEART RATE: 87 BPM | DIASTOLIC BLOOD PRESSURE: 68 MMHG | TEMPERATURE: 98.3 F | SYSTOLIC BLOOD PRESSURE: 128 MMHG | RESPIRATION RATE: 16 BRPM

## 2025-03-20 DIAGNOSIS — M54.9 BACK PAIN AFFECTING PREGNANCY, ANTEPARTUM: ICD-10-CM

## 2025-03-20 DIAGNOSIS — O09.90 SUPERVISION OF HIGH RISK PREGNANCY, ANTEPARTUM: Primary | ICD-10-CM

## 2025-03-20 DIAGNOSIS — O24.119 PRE-EXISTING TYPE 2 DIABETES MELLITUS DURING PREGNANCY, ANTEPARTUM: ICD-10-CM

## 2025-03-20 DIAGNOSIS — O99.891 BACK PAIN AFFECTING PREGNANCY, ANTEPARTUM: ICD-10-CM

## 2025-03-20 DIAGNOSIS — E66.01 MORBID OBESITY (H): ICD-10-CM

## 2025-03-20 ASSESSMENT — PAIN SCALES - GENERAL: PAINLEVEL_OUTOF10: SEVERE PAIN (7)

## 2025-03-20 NOTE — PROGRESS NOTES
Pregnany related issues     Morbid obesity: ref to MFM  Pre-gestational BMI 48. Will continue to follow recommend limited weight gain in pregnancy.   ASA at 12 weeks contraindicated due to gastric bypass     Dx in chart of pre gestational type 2 DM  A1c in first trimester 5.2  GTT first trimester: 1 hr done and wnl.      Hx of SAB in 2024 with retained POC: required D&C  Hx of gastric bypass  Asthma: mild intermittent prior   NIPT: negative/normal 46 XX carrier screen SMA/CF neg.   PPBCM: unsure  BF+   Tdap at 28 weeks  GBS at 36 weeks  Likely IOL at 39 weeks           Concerns: Fourth and fifth digit numbness/tingling with burning Sensation right hand. Worse at work and with sitting at desk/computer. Discussed ulnar neuropathy and exercises. Pt amen scott to bracing.   Otherwise well.   Doing well.  No concerns today.  Discussed anenatal screening.  She has negative/normal testing at this time.  Reportable signs and symptoms discussed.  Will schedule anatomy ultrasound.  RTC 4 weeks.      Mey Smith MD

## 2025-04-03 ENCOUNTER — THERAPY VISIT (OUTPATIENT)
Dept: PHYSICAL THERAPY | Facility: CLINIC | Age: 25
End: 2025-04-03
Attending: FAMILY MEDICINE
Payer: COMMERCIAL

## 2025-04-03 DIAGNOSIS — E66.01 MORBID OBESITY (H): ICD-10-CM

## 2025-04-03 DIAGNOSIS — M25.562 ACUTE BILATERAL KNEE PAIN: ICD-10-CM

## 2025-04-03 DIAGNOSIS — M25.561 ACUTE BILATERAL KNEE PAIN: ICD-10-CM

## 2025-04-03 DIAGNOSIS — O09.90 SUPERVISION OF HIGH RISK PREGNANCY, ANTEPARTUM: ICD-10-CM

## 2025-04-03 DIAGNOSIS — M54.9 BACK PAIN AFFECTING PREGNANCY, ANTEPARTUM: ICD-10-CM

## 2025-04-03 DIAGNOSIS — M25.551 HIP PAIN, RIGHT: Primary | ICD-10-CM

## 2025-04-03 DIAGNOSIS — O99.891 BACK PAIN AFFECTING PREGNANCY, ANTEPARTUM: ICD-10-CM

## 2025-04-03 NOTE — PROGRESS NOTES
PHYSICAL THERAPY EVALUATION  Type of Visit: Evaluation       Fall Risk Screen:  Fall screen completed by: PT  Have you fallen 2 or more times in the past year?: Yes (twisted on knee when getting out of bed and knee buckled)  Have you fallen and had an injury in the past year?: No  Is patient a fall risk?: No    Subjective         Presenting condition or subjective complaint: R Hip pain and B knee pain (R>L), progressing during pregnancy (currently 18 weeks pregnant, 3rd pregnancy, 2 miscarriages). Also complains of pelvic floor weakness with mixed incontinence. Will assess this further in future.  Date of onset: 02/27/25 (date of referral)    Relevant medical history: Asthma; Currently pregnant or breastfeeding; Depression; Migraines or headaches; Overweight   Dates & types of surgery: Meniscus repair. Both knees.    Prior diagnostic imaging/testing results: Other No screening due to pregnancy   Prior therapy history for the same diagnosis, illness or injury: No      Prior Level of Function  Transfers:   Ambulation:   ADL:   IADL:     Living Environment  Social support: With a significant other or spouse   Type of home: [facility]   Stairs to enter the home: No       Ramp: No   Stairs inside the home: Yes 12 Is there a railing: Yes     Help at home: None  Equipment owned:       Employment: Yes Reception  Hobbies/Interests: Sewing, scrap booking    Patient goals for therapy: Stand up from a sitting position without pain.    Pain assessment: Pain present  See objective evaluation for additional pain details     Objective   HIP EVALUATION  PAIN: Pain Level at Rest: 0/10  Pain Level with Use: 7/10  Pain Location: R anterior thigh, across B low back, into R hip, occasionally into L knee  Pain Quality: Aching and cramping  Pain Frequency: intermittent  Pain is Worst: first thing in AM (start up pain), can progress throughout day depending on activity level  Pain is Exacerbated By: stairs (ascending >descending), when first  getting up in morning, avoiding kneeling and squatting  Pain is Relieved By: otc medications  Pain Progression: Worsened  INTEGUMENTARY (edema, incisions):   POSTURE: Standing Posture: Sway back  GAIT:   Weightbearing Status: WBAT  Assistive Device(s): None  Gait Deviations: Base of support increased  Stride length decreased  Terra decreased  Toe in R  Decreased dynamic control L, Decreased dynamic control R    ROM:   (Degrees) Left AROM Left PROM  Right AROM Right PROM   Hip Flexion 120  115 +pain L side of low back    Hip Extension       Hip Abduction       Hip Adduction       Hip Internal Rotation WNL  WNL    Hip External Rotation WNL  WNL    Knee Flexion       Knee Extension       Lumbar Side glide WNL WNL   Lumbar Flexion To toes ++pull B HS   Lumbar Extension WNL   Pain:   End feel:   PELVIC/SI SCREEN:  (-) ASLR, (-) CARLYN, medial malleoli level  STRENGTH:  Hip flex 5/5 B, Hip Abd 4/5 B (5/5 B if allowed to compensate into flex)  LE FLEXIBILITY: Decreased piriformis L, Decreased piriformis R  SPECIAL TESTS:   FUNCTIONAL TESTS:  Pain w/ Sit<> knees (R>L), minimal pain if transferring w/ wide stance and increased hip hinge  PALPATION:  Tenderness B glute med, piriformis  JOINT MOBILITY: not assessed    Assessment & Plan   CLINICAL IMPRESSIONS  Medical Diagnosis: Back pain affecting pregnancy, antepartum    Treatment Diagnosis: Back pain affecting pregnancy, antepartum, hip pain, knee pain   Impression/Assessment: Patient is a 24 year old female with low back, hip, and knee complaints.  The following significant findings have been identified: Pain, Decreased ROM/flexibility, Decreased strength, Impaired gait, Impaired muscle performance, Decreased activity tolerance, and Impaired posture. These impairments interfere with their ability to perform self care tasks, recreational activities, household chores, household mobility, and community mobility as compared to previous level of function.     Clinical  Decision Making (Complexity):  Clinical Presentation: Stable/Uncomplicated  Clinical Presentation Rationale: based on medical and personal factors listed in PT evaluation  Clinical Decision Making (Complexity): Low complexity    PLAN OF CARE  Treatment Interventions:  Modalities: Cupping  Interventions: Gait Training, Manual Therapy, Neuromuscular Re-education, Therapeutic Activity, Therapeutic Exercise, Self-Care/Home Management    Long Term Goals     PT Goal 1  Goal Identifier: Transfers  Goal Description: Pt will be able to transfer out of bed and into/out of sitting w/o pain  Rationale: to maximize safety and independence with performance of ADLs and functional tasks;to maximize safety and independence within the home;to maximize safety and independence within the community;to maximize safety and independence with self cares  Target Date: 06/26/25      Frequency of Treatment: 1x/week  Duration of Treatment: x 4 weeks, tapering to 2x/month x 2 months    Recommended Referrals to Other Professionals:  none (possible future referral for pelvic floor PT)  Education Assessment:   Learner/Method: Patient;Listening;Reading;Demonstration;Pictures/Video;No Barriers to Learning    Risks and benefits of evaluation/treatment have been explained.   Patient/Family/caregiver agrees with Plan of Care.     Evaluation Time:     PT Eval, Low Complexity Minutes ([id number]): 20       Signing Clinician: Amanda Hilligoss, PT

## 2025-04-06 ENCOUNTER — TELEPHONE (OUTPATIENT)
Dept: FAMILY MEDICINE | Facility: CLINIC | Age: 25
End: 2025-04-06
Payer: COMMERCIAL

## 2025-04-06 DIAGNOSIS — O99.891 BACK PAIN AFFECTING PREGNANCY, ANTEPARTUM: ICD-10-CM

## 2025-04-06 DIAGNOSIS — O09.90 SUPERVISION OF HIGH RISK PREGNANCY, ANTEPARTUM: Primary | ICD-10-CM

## 2025-04-06 DIAGNOSIS — M62.89 PELVIC FLOOR DYSFUNCTION: ICD-10-CM

## 2025-04-06 DIAGNOSIS — M54.9 BACK PAIN AFFECTING PREGNANCY, ANTEPARTUM: ICD-10-CM

## 2025-04-06 NOTE — TELEPHONE ENCOUNTER
"----- Message from Amanda Hilligoss sent at 4/3/2025  3:43 PM CDT -----  Regarding: Patient with hip pain  Hello Dr. Smith,  I saw your patient, Sasha, today. She mentioned that you had discussed working on her pelvic floor as well. As she is already having some incontinence, I think that would be a great idea for her in the future. If that is what you were thinking, would you be able to put a referral into [facility] for PT for \"Pelvic Floor Dysfunction\" or \"urinary incontinence\" or even \"pelvic floor weakness\" (whichever diagnoses you feel are the most appropriate).    Thank you for your referral,  Amanda Hilligoss, DPT, PRPC  "

## 2025-04-07 ENCOUNTER — LAB (OUTPATIENT)
Dept: LAB | Facility: CLINIC | Age: 25
End: 2025-04-07
Payer: COMMERCIAL

## 2025-04-07 DIAGNOSIS — E11.9 TYPE 2 DIABETES MELLITUS WITHOUT COMPLICATION, WITHOUT LONG-TERM CURRENT USE OF INSULIN (H): Primary | ICD-10-CM

## 2025-04-07 DIAGNOSIS — R30.0 DYSURIA: ICD-10-CM

## 2025-04-07 LAB
ALBUMIN UR-MCNC: NEGATIVE MG/DL
APPEARANCE UR: CLEAR
BILIRUB UR QL STRIP: NEGATIVE
COLOR UR AUTO: COLORLESS
EST. AVERAGE GLUCOSE BLD GHB EST-MCNC: 91 MG/DL
GLUCOSE UR STRIP-MCNC: NEGATIVE MG/DL
HBA1C MFR BLD: 4.8 % (ref 0–5.6)
HGB UR QL STRIP: NEGATIVE
KETONES UR STRIP-MCNC: NEGATIVE MG/DL
LEUKOCYTE ESTERASE UR QL STRIP: ABNORMAL
NITRATE UR QL: NEGATIVE
PH UR STRIP: 7 [PH] (ref 5–7)
RBC #/AREA URNS AUTO: NORMAL /HPF
SKIP: ABNORMAL
SP GR UR STRIP: 1 (ref 1–1.03)
UROBILINOGEN UR STRIP-MCNC: NORMAL MG/DL
WBC #/AREA URNS AUTO: NORMAL /HPF

## 2025-04-07 PROCEDURE — 36415 COLL VENOUS BLD VENIPUNCTURE: CPT

## 2025-04-07 PROCEDURE — 81001 URINALYSIS AUTO W/SCOPE: CPT

## 2025-04-07 PROCEDURE — 83036 HEMOGLOBIN GLYCOSYLATED A1C: CPT

## 2025-04-08 ENCOUNTER — PRE VISIT (OUTPATIENT)
Dept: MATERNAL FETAL MEDICINE | Facility: HOSPITAL | Age: 25
End: 2025-04-08
Payer: COMMERCIAL

## 2025-04-08 LAB — BACTERIA UR CULT: NORMAL

## 2025-04-10 ENCOUNTER — ANCILLARY PROCEDURE (OUTPATIENT)
Dept: ULTRASOUND IMAGING | Facility: HOSPITAL | Age: 25
End: 2025-04-10
Attending: OBSTETRICS & GYNECOLOGY
Payer: COMMERCIAL

## 2025-04-10 ENCOUNTER — OFFICE VISIT (OUTPATIENT)
Dept: MATERNAL FETAL MEDICINE | Facility: HOSPITAL | Age: 25
End: 2025-04-10
Attending: OBSTETRICS & GYNECOLOGY
Payer: COMMERCIAL

## 2025-04-10 DIAGNOSIS — O99.842 BARIATRIC SURGERY STATUS COMPLICATING PREGNANCY, SECOND TRIMESTER: ICD-10-CM

## 2025-04-10 DIAGNOSIS — O26.90 PREGNANCY RELATED CONDITION, ANTEPARTUM: ICD-10-CM

## 2025-04-10 DIAGNOSIS — O99.212 OBESITY DURING PREGNANCY IN SECOND TRIMESTER: Primary | ICD-10-CM

## 2025-04-10 PROCEDURE — 76811 OB US DETAILED SNGL FETUS: CPT

## 2025-04-10 PROCEDURE — 99203 OFFICE O/P NEW LOW 30 MIN: CPT | Mod: 25 | Performed by: OBSTETRICS & GYNECOLOGY

## 2025-04-10 PROCEDURE — 76811 OB US DETAILED SNGL FETUS: CPT | Mod: 26 | Performed by: OBSTETRICS & GYNECOLOGY

## 2025-04-10 NOTE — NURSING NOTE
Patient reports positive fetal movement, denies pain, leaking of fluid, or bleeding.  Education provided to patient on today's ultrasound.  SBAR given to KEREN THOMAS, see their note in Epic.

## 2025-04-10 NOTE — PROGRESS NOTES
"Please see \"Imaging\" tab under \"Chart Review\" for details of today's visit.    Stephanie Burns    "

## 2025-04-17 ENCOUNTER — TELEPHONE (OUTPATIENT)
Dept: FAMILY MEDICINE | Facility: CLINIC | Age: 25
End: 2025-04-17

## 2025-04-17 ENCOUNTER — PRENATAL OFFICE VISIT (OUTPATIENT)
Dept: FAMILY MEDICINE | Facility: CLINIC | Age: 25
End: 2025-04-17
Payer: COMMERCIAL

## 2025-04-17 ENCOUNTER — THERAPY VISIT (OUTPATIENT)
Dept: PHYSICAL THERAPY | Facility: CLINIC | Age: 25
End: 2025-04-17
Attending: FAMILY MEDICINE
Payer: COMMERCIAL

## 2025-04-17 VITALS
HEIGHT: 68 IN | HEART RATE: 92 BPM | OXYGEN SATURATION: 98 % | RESPIRATION RATE: 18 BRPM | WEIGHT: 293 LBS | BODY MASS INDEX: 44.41 KG/M2 | DIASTOLIC BLOOD PRESSURE: 76 MMHG | SYSTOLIC BLOOD PRESSURE: 114 MMHG | TEMPERATURE: 98.8 F

## 2025-04-17 DIAGNOSIS — R51.9 HEADACHE IN PREGNANCY, ANTEPARTUM: ICD-10-CM

## 2025-04-17 DIAGNOSIS — M54.9 BACK PAIN AFFECTING PREGNANCY, ANTEPARTUM: ICD-10-CM

## 2025-04-17 DIAGNOSIS — O26.899 HEADACHE IN PREGNANCY, ANTEPARTUM: ICD-10-CM

## 2025-04-17 DIAGNOSIS — O99.891 BACK PAIN AFFECTING PREGNANCY, ANTEPARTUM: ICD-10-CM

## 2025-04-17 DIAGNOSIS — M25.561 ACUTE BILATERAL KNEE PAIN: Primary | ICD-10-CM

## 2025-04-17 DIAGNOSIS — M25.551 HIP PAIN, RIGHT: ICD-10-CM

## 2025-04-17 DIAGNOSIS — M25.562 ACUTE BILATERAL KNEE PAIN: Primary | ICD-10-CM

## 2025-04-17 DIAGNOSIS — O09.90 SUPERVISION OF HIGH RISK PREGNANCY, ANTEPARTUM: Primary | ICD-10-CM

## 2025-04-17 RX ORDER — PROMETHAZINE HYDROCHLORIDE 12.5 MG/1
25 TABLET ORAL EVERY 6 HOURS PRN
Qty: 60 TABLET | Refills: 1 | Status: SHIPPED | OUTPATIENT
Start: 2025-04-17

## 2025-04-17 RX ORDER — NYSTATIN 100000 U/G
CREAM TOPICAL 2 TIMES DAILY
Qty: 30 G | Refills: 1 | Status: SHIPPED | OUTPATIENT
Start: 2025-04-17

## 2025-04-17 ASSESSMENT — PAIN SCALES - GENERAL: PAINLEVEL_OUTOF10: NO PAIN (0)

## 2025-04-17 ASSESSMENT — PATIENT HEALTH QUESTIONNAIRE - PHQ9: SUM OF ALL RESPONSES TO PHQ QUESTIONS 1-9: 6

## 2025-04-17 NOTE — TELEPHONE ENCOUNTER
Appointment for Growth Ultrasound and BPPs scheduled per provider request.     Sent mychart to patient.     ARASH Cantrell

## 2025-04-17 NOTE — PROGRESS NOTES
Pregnany related issues     Morbid obesity: ref to MFM  Pre-gestational BMI 48. Will continue to follow recommend limited weight gain in pregnancy.   ASA at 12 weeks contraindicated due to gastric bypass     Dx in chart of pre gestational type 2 DM  A1c in first trimester 5.2  GTT first trimester: 1 hr done and wnl.      Hx of SAB in 2024 with retained POC: required D&C  Hx of gastric bypass  Asthma: mild intermittent prior   NIPT: negative/normal 46 XX carrier screen SMA/CF neg.   PPBCM: unsure  BF+   Tdap at 28 weeks  GBS at 36 weeks  Likely IOL at 39 weeks         24-year-old G2, P0 currently at 20 weeks and 3 days gestational age here for routine OB history of morbid obesity and pregestational diabetes with history of gastric bypass    Concerns today: Patient reports almost daily headache.  Using Tylenol arthritis for this.  We discussed the addition of Phenergan and using cyclobenzaprine as well.  She describes the headache as a frontal headache and a feeling of tension or tightness around the temples consistent with prior migraines.  We reviewed that generally we do not recommend any triptans in pregnancy but she was amenable to a trial of promethazine with a plan to accelerate to opioids if ineffective but she would strongly prefer to avoid opioid treatment if possible.  [facility] recommendations were reviewed with the patient.  They are recommending significant surveillance towards the end of pregnancy and regular growth scans which they will be doing the next 1.  Doing well.  No concerns today.  No nausea/vomiting. No heartburn.  No vaginal bleeding, no contractions/severe cramping, no leakage of fluid.  No vaginal discharge. No dysuria  No headache, vision changes, lower extremity swelling, upper abdominal pain, chest pain, shortness of breath.   Discussed kick counts and fetal movement.  Reportable signs and symptoms discussed.      Mey Smith MD

## 2025-04-22 ENCOUNTER — MYC MEDICAL ADVICE (OUTPATIENT)
Dept: FAMILY MEDICINE | Facility: CLINIC | Age: 25
End: 2025-04-22
Payer: COMMERCIAL

## 2025-04-22 DIAGNOSIS — J30.1 SEASONAL ALLERGIC RHINITIS DUE TO POLLEN: ICD-10-CM

## 2025-04-22 DIAGNOSIS — O09.90 SUPERVISION OF HIGH RISK PREGNANCY, ANTEPARTUM: Primary | ICD-10-CM

## 2025-04-23 RX ORDER — IPRATROPIUM BROMIDE 42 UG/1
2 SPRAY, METERED NASAL 4 TIMES DAILY
Qty: 15 ML | Refills: 1 | Status: SHIPPED | OUTPATIENT
Start: 2025-04-23

## 2025-04-30 ENCOUNTER — OFFICE VISIT (OUTPATIENT)
Dept: MATERNAL FETAL MEDICINE | Facility: HOSPITAL | Age: 25
End: 2025-04-30
Attending: STUDENT IN AN ORGANIZED HEALTH CARE EDUCATION/TRAINING PROGRAM
Payer: COMMERCIAL

## 2025-04-30 ENCOUNTER — ANCILLARY PROCEDURE (OUTPATIENT)
Dept: ULTRASOUND IMAGING | Facility: HOSPITAL | Age: 25
End: 2025-04-30
Attending: STUDENT IN AN ORGANIZED HEALTH CARE EDUCATION/TRAINING PROGRAM
Payer: COMMERCIAL

## 2025-04-30 DIAGNOSIS — O99.212 OBESITY DURING PREGNANCY IN SECOND TRIMESTER: ICD-10-CM

## 2025-04-30 DIAGNOSIS — O99.210 OBESITY AFFECTING PREGNANCY, ANTEPARTUM, UNSPECIFIED OBESITY TYPE: Primary | ICD-10-CM

## 2025-04-30 PROCEDURE — 99207 PR NO CHARGE LOS: CPT | Performed by: STUDENT IN AN ORGANIZED HEALTH CARE EDUCATION/TRAINING PROGRAM

## 2025-04-30 PROCEDURE — 76816 OB US FOLLOW-UP PER FETUS: CPT

## 2025-04-30 PROCEDURE — 76816 OB US FOLLOW-UP PER FETUS: CPT | Mod: 26 | Performed by: STUDENT IN AN ORGANIZED HEALTH CARE EDUCATION/TRAINING PROGRAM

## 2025-04-30 NOTE — PROGRESS NOTES
The patient was seen for an [facility] today.  For a detailed report of the ultrasound examination, please see the ultrasound report which can be found under the imaging tab.    If you have questions regarding today's evaluation or if we can be of further service, please contact the [facility].    Keren Mcdowell M.D.  Maternal Fetal-Medicine Specialist

## 2025-04-30 NOTE — NURSING NOTE
Sasha Guy is a  at 22w2d who presents to [facility] for a follow-up ultrasound. Pt reports positive fetal movement. Pt denies bldg/lof/change in discharge, contractions, headache, vision changes, chest pain/SOB or edema. SBAR given to Dr. Mcdowell, see note in Epic.      Magdalene Nuno RN

## 2025-05-08 ENCOUNTER — THERAPY VISIT (OUTPATIENT)
Dept: PHYSICAL THERAPY | Facility: CLINIC | Age: 25
End: 2025-05-08
Attending: FAMILY MEDICINE
Payer: COMMERCIAL

## 2025-05-08 DIAGNOSIS — M25.562 ACUTE BILATERAL KNEE PAIN: Primary | ICD-10-CM

## 2025-05-08 DIAGNOSIS — O99.891 BACK PAIN AFFECTING PREGNANCY, ANTEPARTUM: ICD-10-CM

## 2025-05-08 DIAGNOSIS — M54.9 BACK PAIN AFFECTING PREGNANCY, ANTEPARTUM: ICD-10-CM

## 2025-05-08 DIAGNOSIS — M25.551 HIP PAIN, RIGHT: ICD-10-CM

## 2025-05-08 DIAGNOSIS — M25.561 ACUTE BILATERAL KNEE PAIN: Primary | ICD-10-CM

## 2025-05-10 ENCOUNTER — NURSE TRIAGE (OUTPATIENT)
Dept: NURSING | Facility: CLINIC | Age: 25
End: 2025-05-10
Payer: COMMERCIAL

## 2025-05-10 NOTE — TELEPHONE ENCOUNTER
"OB Triage Call  Is patient's OB/Midwife with the [facility] or [facility]? [facility]- Proceed with triage     Reason for call:   Very light vaginal spotting when urinating and then wiping twice in last half hour  Pt reports having sexual intercourse within last several hours  Pt states she can feel baby moving all during this call    Assessment:   Denies;  Abdominal pain/lower abdominal/low back cramping  Large amount of vaginal bleeding  Leakage of vaginal fluids  Known diagnosis of placenta previa or low-laying placenta    Plan:   Home Care  Care advice given/when to call back  Pt verbalized understanding and agrees with this plan    Patient plans to deliver at [facility]  Patient's primary OB Provider is Dr Mey Smith.    Per protocol recommendations Patient to follow home care recommendations.   Is patient's delivering hospital on divert? Does not apply due to Patient given home care instructions      23w5d    Estimated Date of Delivery: Sep 1, 2025        OB History    Para Term  AB Living   2 0 0 0 1 0   SAB IAB Ectopic Multiple Live Births   1 0 0 0 0      # Outcome Date GA Lbr Spencer/2nd Weight Sex Type Anes PTL Lv   2 Current            1 SAB 2024 8w0d    SAB         Obstetric Comments   EDC 2025 based on LMP.   to [facility].       No results found for: \"GBS\"       Sarita Gutiérrez RN   Reason for Disposition   Slight SPOTTING after sexual intercourse (single or brief episode)    Additional Information   Negative: Passed out (e.g., fainted, lost consciousness, blacked out and was not responding)   Negative: Shock suspected (e.g., cold/pale/clammy skin, too weak to stand, low BP, rapid pulse)   Negative: Difficult to awaken or acting confused (e.g., disoriented, slurred speech)   Negative: SEVERE vaginal bleeding (e.g., continuous red blood from vagina, or large blood clots)   Negative: SEVERE constant abdominal pain (e.g., excruciating) and present > 1 hour   " Negative: Sounds like a life-threatening emergency to the triager   Negative: MILD-MODERATE vaginal bleeding (i.e., small to medium clots; like mild menstrual period)  (Exception: Single episode after sexual intercourse or pelvic exam.)   Negative: Abdominal pain or having contractions   Negative: Leakage of fluid from vagina (or caller thinks they have ruptured their bag of drew)   Negative: Baby moving less today (e.g., kick count < 5 in 1 hour or < 10 in 2 hours) and 23 or more weeks pregnant   Negative: Pregnant 24 to 36 weeks () and pinkish or brownish mucous discharge   Negative: Patient sounds very sick or weak to the triager   Negative: Known diagnosis of placenta previa or low-lying placenta   Negative: Patient wants to be seen   Negative: Vaginal bleeding and pregnant < 20 weeks    Protocols used: Pregnancy - Vaginal Bleeding Greater Than 20 Weeks EGA-A-[place]

## 2025-05-13 ENCOUNTER — NURSE TRIAGE (OUTPATIENT)
Dept: FAMILY MEDICINE | Facility: CLINIC | Age: 25
End: 2025-05-13
Payer: COMMERCIAL

## 2025-05-13 NOTE — TELEPHONE ENCOUNTER
Nurse Triage SBAR    Is this a 2nd Level Triage? YES, LICENSED PRACTITIONER REVIEW IS REQUIRED    Situation: Patient sent Mariluhart regarding back and hip pain, asking if she is safe to take her muscle relaxers during pregnancy. See Av.     Background: Patient is 24w 1d pregnant. States she has BRCA2 gene.     Assessment: Patient states she has low back pain, states it is in her tail bone area and it radiates to her hips. States it is on both sides but more to the L. She states it started around 10 am this morning, she was already up and moving for the day and this pain started out of no where. She states she has had pains like this in the past when she has tweaked her back, but she does not recall tweaking her back today. She states she has tried Tylenol with little to no relief. States she has been icing the area and trying all different positions, with minimal relief. She rates the pain 7/10. She states baby's movements fluctuate daily, but she feels baby is still active today and the kicks are strong. She states she has not done an official kick count, initially stated she has felt baby kick at least once per hour, then said it's probably more like 2-3 times per hour. She states this is maybe a tiny bit less than normal, but again voiced that movement fluctuates day to day. She denies any change in vaginal discharge. She denies fevers, weakness or numbness of leg or foot, contractions/abdominal pain, vaginal leakage or bleeding, urinary symptoms, rash.    Protocol Recommended Disposition:   Go To LD Now    Recommendation: Per protocol, patient was advised she should go to LD now. She would like PCP to review. Advised patient to call back or go to LD immediately for any new or worsening symptoms. She verbalized understanding and had no other questions or concerns.       Hedy Lee, SAVANNAHN, RN        Reason for Disposition   Pregnant 23 or more weeks and baby is moving less today (e.g., kick count < 5 in 1 hour  or < 10 in 2 hours)    Additional Information   Negative: Shock suspected (e.g., cold/pale/clammy skin, too weak to stand, low BP, rapid pulse)   Negative: Severe abdominal pain   Negative: Sounds like a life-threatening emergency to the triager   Negative: Major injury to the back (e.g., MVA, fall > 10 feet or 3 meters, penetrating injury, etc.)   Negative: Followed a fall   Negative: Having contractions or other symptoms of labor (such as vaginal pressure) and pregnant 37 or more weeks (i.e., term pregnancy)   Negative: Having contractions or other symptoms of labor (such as vaginal pressure) and < 37 weeks pregnant (i.e., )   Negative: Abdominal pain and pregnant 20 or more weeks   Negative: Abdominal pain and pregnant < 20 weeks   Negative: Pain in the upper back and overlies the rib cage   Negative: Pain in the upper back and worsened by coughing (or clearly increases with breathing)   Negative: Unable to urinate (or only a few drops) > 4 hours and bladder feels very full (e.g., palpable bladder or strong urge to urinate)   Negative: Numbness in groin or rectal area (i.e., loss of sensation)   Negative: Leakage of fluid from vagina    Protocols used: Pregnancy - Back Pain-A-[place]

## 2025-05-13 NOTE — TELEPHONE ENCOUNTER
Called and spoke with patient.  She will try cyclobenzaprine at home which she already has for her prior sciatic pain.  Will also consider a TENS unit.  TENS units are considered safe in pregnancy and or even used for labor and delivery pain on occasion.  At this gestational age I would recommend that she not use on the front of her abdomen but it is safe to use for back pain.  If the pain is not getting better or is getting significantly worse patient is aware that we can fit her in tomorrow.  But she will try home methods at this juncture.    Labor and delivery precautions were discussed at length with the patient patient was amenable to watchful waiting and if any continued signs or symptoms of  labor patient is to present to labor and delivery

## 2025-05-14 ENCOUNTER — OFFICE VISIT (OUTPATIENT)
Dept: FAMILY MEDICINE | Facility: CLINIC | Age: 25
End: 2025-05-14
Payer: COMMERCIAL

## 2025-05-14 VITALS
HEART RATE: 82 BPM | TEMPERATURE: 97.9 F | RESPIRATION RATE: 20 BRPM | WEIGHT: 293 LBS | HEIGHT: 68 IN | OXYGEN SATURATION: 98 % | SYSTOLIC BLOOD PRESSURE: 122 MMHG | DIASTOLIC BLOOD PRESSURE: 82 MMHG | BODY MASS INDEX: 44.41 KG/M2

## 2025-05-14 DIAGNOSIS — E66.01 MORBID OBESITY (H): ICD-10-CM

## 2025-05-14 DIAGNOSIS — M54.9 BACK PAIN AFFECTING PREGNANCY, ANTEPARTUM: Primary | ICD-10-CM

## 2025-05-14 DIAGNOSIS — O09.90 SUPERVISION OF HIGH RISK PREGNANCY, ANTEPARTUM: ICD-10-CM

## 2025-05-14 DIAGNOSIS — O99.891 BACK PAIN AFFECTING PREGNANCY, ANTEPARTUM: Primary | ICD-10-CM

## 2025-05-14 PROCEDURE — 1125F AMNT PAIN NOTED PAIN PRSNT: CPT | Performed by: FAMILY MEDICINE

## 2025-05-14 PROCEDURE — 3079F DIAST BP 80-89 MM HG: CPT | Performed by: FAMILY MEDICINE

## 2025-05-14 PROCEDURE — 3074F SYST BP LT 130 MM HG: CPT | Performed by: FAMILY MEDICINE

## 2025-05-14 PROCEDURE — 99214 OFFICE O/P EST MOD 30 MIN: CPT | Performed by: FAMILY MEDICINE

## 2025-05-14 RX ORDER — OXYCODONE HYDROCHLORIDE 5 MG/1
5 TABLET ORAL EVERY 6 HOURS PRN
Qty: 6 TABLET | Refills: 0 | Status: SHIPPED | OUTPATIENT
Start: 2025-05-14 | End: 2025-05-17

## 2025-05-14 ASSESSMENT — PAIN SCALES - GENERAL: PAINLEVEL_OUTOF10: MODERATE PAIN (5)

## 2025-05-14 ASSESSMENT — ENCOUNTER SYMPTOMS: BACK PAIN: 1

## 2025-05-14 NOTE — LETTER
May 14, 2025      Sasha Guy  [address] [place] [facility] [address]        To Whom It May Concern:    Sasha Guy  was seen on 05/14/25.  She has been having some complications with her pregnancy. We are hopeful that this complication will resolve with a brief period of time of rest/low activity.     We are requesting that she be allowed to work from home from today until 5/19 if able. If unable to work from home we are requesting that she be given time off to rest during this time. If you require further documentation please do not hesitate to contact our office.         Sincerely,        Mey Smith MD    Electronically signed

## 2025-05-14 NOTE — PROGRESS NOTES
"    Assessment & Plan     (O99.891,  M54.9) Back pain affecting pregnancy, antepartum  (primary encounter diagnosis)  Comment: Pt with acute on chronic worsening of back pain. Known hx of sciatica and chronic low back pain now worsened significantly in second trimester of pregnancy. Has PT appt tomorrow and has benefited from cupping. Myofascial release attempted today with moderate affect while supine. Changes of position still very difficult. Pt using cyclobenzaprine but worried re: driving with same. Discussed if breaks through tylenol and flexeril we have limited options. Amenable to having a small amount of oxycodone IF needed but will continue with PT tomorrow.   Discussed as well back TENs unit for home. Work restrictions given. Follow up as planned prior.   Plan: oxyCODONE (ROXICODONE) 5 MG tablet    (O09.90) Supervision of high risk pregnancy, antepartum  Comment: FHT wnl today. Has follow up growth scan pending. No other issues with pregnancy today   Plan: oxyCODONE (ROXICODONE) 5 MG tablet    (E66.01) Morbid obesity (H)  Plan: oxyCODONE (ROXICODONE) 5 MG tablet            BMI  Estimated body mass index is 49.27 kg/m  as calculated from the following:    Height as of this encounter: 1.73 m (5' 8.1\").    Weight as of this encounter: 147.4 kg (325 lb).       Subjective   Sasha is a 25 year old, presenting for the following health issues:  Back Pain        5/14/2025    11:12 AM   Additional Questions   Roomed by Amber HUANG     Back Pain     History of Present Illness       Back Pain:  She presents for follow up of back pain. Patient's back pain is a new problem.    Original cause of back pain: not sure  First noticed back pain: yesterday  Patient feels back pain: constantlyLocation of back pain:  Left lower back, left buttock and left hip  Description of back pain: burning and stabbing  Back pain spreads: nowhere    Since patient first noticed back pain, pain is: always present, but gets better and worse  Does " back pain interfere with her job:  Yes  On a scale of 1-10 (10 being the worst), patient describes pain as:  7  What makes back pain worse: bending, certain positions, sitting and standing   Acupuncture: not tried  Acetaminophen: not helpful  Activity or exercise: not helpful  Chiropractor:  Not tried  Cold: helpful  Heat: helpful  Massage: helpful  Muscle relaxants: helpful  NSAIDS: not tried  Opioids: not tried  Physical Therapy: helpful  Rest: helpful  Steroid Injection: not tried  Stretching: not helpful  Surgery: not tried  TENS unit: not tried  Topical pain relievers: not helpful  Other healthcare providers patient is seeing for back pain: None   She is taking medications regularly.      25-year-old G2, P0 currently at 24 weeks and 2 days gestational age with acute on chronic worsening of back pain with left-sided buttocks pain and likely sciatica with possible piriformis syndrome.  Seeing physical therapy tomorrow and has improved with cupping in the past.  Also seeing chiropractic.  Amenable to gentle myofascial release today with plan to continue meds.  Laying supine with Flexeril has been helpful but patient does not feel like she can drive while on Flexeril.  Using Tylenol round-the-clock        Objective    Resp 20   Wt (!) 147.4 kg (325 lb)   LMP 11/14/2024   BMI 49.14 kg/m    Body mass index is 49.14 kg/m .  Physical Exam  Constitutional:       General: She is not in acute distress.     Appearance: She is not ill-appearing, toxic-appearing or diaphoretic.   Cardiovascular:      Rate and Rhythm: Normal rate.      Pulses: Normal pulses.   Pulmonary:      Effort: Pulmonary effort is normal. No respiratory distress.   Musculoskeletal:      Comments: Point tenderness over deep palpation to the piriformis on the left buttocks.  Paraspinal muscle tension/spasm on that side   Skin:     General: Skin is warm and dry.   Neurological:      Mental Status: She is alert.            Signed Electronically by:  Mey Smith MD

## 2025-05-15 ENCOUNTER — TELEPHONE (OUTPATIENT)
Dept: FAMILY MEDICINE | Facility: CLINIC | Age: 25
End: 2025-05-15

## 2025-05-15 ENCOUNTER — LAB (OUTPATIENT)
Dept: LAB | Facility: OTHER | Age: 25
End: 2025-05-15
Payer: COMMERCIAL

## 2025-05-15 DIAGNOSIS — O09.90 SUPERVISION OF HIGH RISK PREGNANCY, ANTEPARTUM: ICD-10-CM

## 2025-05-15 LAB
ERYTHROCYTE [DISTWIDTH] IN BLOOD BY AUTOMATED COUNT: 13 % (ref 10–15)
GLUCOSE 1H P 50 G GLC PO SERPL-MCNC: 120 MG/DL (ref 70–129)
HCT VFR BLD AUTO: 36.6 % (ref 35–47)
HGB BLD-MCNC: 12.5 G/DL (ref 11.7–15.7)
MCH RBC QN AUTO: 29.3 PG (ref 26.5–33)
MCHC RBC AUTO-ENTMCNC: 34.2 G/DL (ref 31.5–36.5)
MCV RBC AUTO: 86 FL (ref 78–100)
PLATELET # BLD AUTO: 258 10E3/UL (ref 150–450)
RBC # BLD AUTO: 4.26 10E6/UL (ref 3.8–5.2)
WBC # BLD AUTO: 9.4 10E3/UL (ref 4–11)

## 2025-05-15 NOTE — TELEPHONE ENCOUNTER
Called and notified patient of provider response, per below note. She stated understanding and states she is on her way to the appointment now. She had no other questions or concerns at this time.    Hedy Lee, SAVANNAHN, RN

## 2025-05-15 NOTE — TELEPHONE ENCOUNTER
Patient calling into confirm she is ok to complete 1 hour glucose test for pregnancy.     Patient concerned that the PRN oxycodone she is taking may elevate her BS, wondering if she is ok to proceed with lab testing.     She last took 1, 5mg tablet oxycodone at 11 AM today. Please advise if patient should reschedule labs.     Gaudencio Bailey RN on 5/15/2025 at 1:01 PM

## 2025-05-15 NOTE — TELEPHONE ENCOUNTER
Mey Smith MD to [facility]  (Selected Message)  RR      5/15/25  1:11 PM  Okay to complete gtt with oxycodone on board.

## 2025-05-28 ENCOUNTER — THERAPY VISIT (OUTPATIENT)
Dept: PHYSICAL THERAPY | Facility: CLINIC | Age: 25
End: 2025-05-28
Payer: COMMERCIAL

## 2025-05-28 DIAGNOSIS — M25.561 ACUTE BILATERAL KNEE PAIN: Primary | ICD-10-CM

## 2025-05-28 DIAGNOSIS — M25.562 ACUTE BILATERAL KNEE PAIN: Primary | ICD-10-CM

## 2025-05-28 DIAGNOSIS — O99.891 BACK PAIN AFFECTING PREGNANCY, ANTEPARTUM: ICD-10-CM

## 2025-05-28 DIAGNOSIS — M54.9 BACK PAIN AFFECTING PREGNANCY, ANTEPARTUM: ICD-10-CM

## 2025-05-28 DIAGNOSIS — M25.551 HIP PAIN, RIGHT: ICD-10-CM

## 2025-05-28 PROCEDURE — 97140 MANUAL THERAPY 1/> REGIONS: CPT | Mod: GP | Performed by: PHYSICAL THERAPIST

## 2025-05-28 PROCEDURE — 97110 THERAPEUTIC EXERCISES: CPT | Mod: GP | Performed by: PHYSICAL THERAPIST

## 2025-05-28 PROCEDURE — 97139 UNLISTED THERAPEUTIC PX: CPT | Mod: GP | Performed by: PHYSICAL THERAPIST

## 2025-05-28 NOTE — PROGRESS NOTES
PLAN  Continue therapy per current plan of care.  Frequency decreased to 1-2x/month x 1-2 months    Beginning/End Dates of Progress Note Reporting Period:  04/03/25 to 05/28/2025    Referring Provider:  Mey Smith       05/28/25 [id number]   Appointment Info   Signing clinician's name / credentials Amanda Hilligoss, DPYAKELIN, [facility]   Total/Authorized Visits 8-10 (E&T)   Visits Used 6   Medical Diagnosis Back pain affecting pregnancy, antepartum   PT Tx Diagnosis Back pain affecting pregnancy, antepartum, hip pain, knee pain   Progress Note/Certification   Onset of illness/injury or Date of Surgery 02/27/25  (date of referral)   Therapy Frequency 1x/week   Predicted Duration x 4 weeks, tapering to 2x/month x 2 months   Progress Note Due Date 06/01/25   Progress Note Completed Date 04/03/25   GOALS   PT Goals 2   PT Goal 1   Goal Identifier Transfers   Goal Description Pt will be able to transfer out of bed and into/out of sitting w/o pain   Rationale to maximize safety and independence with performance of ADLs and functional tasks;to maximize safety and independence within the home;to maximize safety and independence within the community;to maximize safety and independence with self cares   Goal Progress Transferring out of bed or chair w/ 1-2/10 pain   Target Date 06/26/25   PT Goal 2   Goal Identifier Transferring to/from floor   Goal Description Pt will be able to transfer to/from floor independently w/ pain no worse than 2/10   Rationale to maximize safety and independence with performance of ADLs and functional tasks;to maximize safety and independence within the home;to maximize safety and independence with self cares   Goal Progress able to get to/from floor w/o pain.   Target Date 06/26/25   Date Met 05/28/25   Subjective Report   Subjective Report Pt has been for 6 PT visits. Feels that overall sx are much less intense than prior to PT. Has also started regular prenatal massage and accupuncture in addition to  formal PT. Also increased frequency of chiropractic. Feels HEP is going well and able to get exercises in multiple times per week. Feels her strength is improving and has been having an easier time doing transfers. Continues to feel hip pain daily, espcially with sitting. Feels better if lying down. Feels she is ready to decrease frequency of PT.   Objective Measures   Objective Measures Objective Measure 1;Objective Measure 2   Objective Measure 1   Objective Measure Lumbar AROM:   Details Flex to ankles, Ext min limitation, SB to knees B   Objective Measure 2   Objective Measure SI tests   Details (-) ASLR, (-) CARLYN B, medial malleoli level   PT Modalities   PT Modalities Cupping   Cupping   Cupping Minutes (46717) 12   Skilled Intervention adjustment of technique, intensity, and location based on patient tolerance and tissue response   Treatment Detail Static Cupping: Large cup central glude region, medium cups lateral glute region x 5 min w/ intermittent fascial lift during + initial set up/ finding points of tension; Large cup lateral hip over ischemic area x 5 min w/ manual fascial glides around cupping area.   Patient Response/Progress well tolerated, erythemic response as expected, can tolerate more tension than previously   Treatment Interventions (PT)   Interventions Therapeutic Procedure/Exercise;Neuromuscular Re-education;Manual Therapy   Therapeutic Procedure/Exercise   Therapeutic Procedures Ther Proc 2;Ther Proc 3;Ther Proc 4;Ther Proc 5;Ther Proc 6;Ther Proc 7   Ther Proc 1 Gluteal Myofascial Arc Series   Ther Proc 1 - Details has returned for HEP and progressed to 8 of ea.   Ther Proc 2 Piriformis stretch   Ther Proc 3 Counter stretch   Ther Proc 3 - Details reviewed for HEP   Ther Proc 4 Roll ins/roll outs   Ther Proc 4 - Details reviewed for HEP   Ther Proc 5 Thoracic ext in sitting   Ther Proc 5 - Details HEP   Ther Proc 6 Quad stretch in standing or SL   Ther Proc 6 - Details for HEP   Ther  "Proc 7 Angry cat   Ther Proc 7 - Details x5\" x 10   Skilled Intervention review of HEP and planning to work towards independent management of sx   Patient Response/Progress good stretch felt w/ angry cat, feels return to other exercises has gone well   Neuromuscular Re-education   Neuromuscular Re-education Neuro Re-ed 2;Neuro Re-ed 3;Neuro Re-ed 4   Neuro Re-ed 3 Elevators w/ butt skis   Neuro Re-ed 3 - Details HEP   Manual Therapy   Manual Therapy: Mobilization, MFR, MLD, friction massage minutes (17800) 15   Manual Therapy Manual Therapy 2   Manual Therapy 2 STM   Manual Therapy 2 - Details Fascial glides w/ tissue stacking over L glute med, piriformis, TFL, QL, and along iliac crest x 15 min total   Skilled Intervention adjustment of technique, intensity, and location based on patient tolerance and tissue response   Patient Response/Progress after MET: SB to knee B w/ less pain, still having pain w/ lumbar ext; after STM/MFR ext WNL w/ less pain, able to transfer out of lying w/ less pain   Education   Learner/Method Patient;Listening;Reading;Demonstration;Pictures/Video;No Barriers to Learning   Plan   Home program PTRx updated   Plan for next session sideteps, and butt taps to wall for glute activation if sx improved; continue MT and cupping as indicated     "

## 2025-06-07 ENCOUNTER — MYC MEDICAL ADVICE (OUTPATIENT)
Dept: FAMILY MEDICINE | Facility: CLINIC | Age: 25
End: 2025-06-07
Payer: COMMERCIAL

## 2025-06-07 DIAGNOSIS — O98.513 HERPES VIRUS INFECTION IN MOTHER DURING THIRD TRIMESTER OF PREGNANCY: Primary | ICD-10-CM

## 2025-06-07 DIAGNOSIS — B00.9 HERPES VIRUS INFECTION IN MOTHER DURING THIRD TRIMESTER OF PREGNANCY: Primary | ICD-10-CM

## 2025-06-10 RX ORDER — VALACYCLOVIR HYDROCHLORIDE 500 MG/1
TABLET, FILM COATED ORAL
Qty: 220 TABLET | Refills: 0 | Status: SHIPPED | OUTPATIENT
Start: 2025-06-10 | End: 2025-09-18

## 2025-06-12 ENCOUNTER — HOSPITAL ENCOUNTER (OUTPATIENT)
Dept: ULTRASOUND IMAGING | Facility: CLINIC | Age: 25
Discharge: HOME | End: 2025-06-12
Attending: FAMILY MEDICINE
Payer: COMMERCIAL

## 2025-06-12 ENCOUNTER — PRENATAL OFFICE VISIT (OUTPATIENT)
Dept: FAMILY MEDICINE | Facility: CLINIC | Age: 25
End: 2025-06-12
Payer: COMMERCIAL

## 2025-06-12 VITALS
BODY MASS INDEX: 44.41 KG/M2 | SYSTOLIC BLOOD PRESSURE: 120 MMHG | HEART RATE: 102 BPM | DIASTOLIC BLOOD PRESSURE: 60 MMHG | HEIGHT: 68 IN | OXYGEN SATURATION: 98 % | WEIGHT: 293 LBS | RESPIRATION RATE: 16 BRPM | TEMPERATURE: 98.1 F

## 2025-06-12 DIAGNOSIS — M54.9 BACK PAIN AFFECTING PREGNANCY, ANTEPARTUM: ICD-10-CM

## 2025-06-12 DIAGNOSIS — B00.9 HERPES VIRUS INFECTION IN MOTHER DURING THIRD TRIMESTER OF PREGNANCY: ICD-10-CM

## 2025-06-12 DIAGNOSIS — O09.90 SUPERVISION OF HIGH RISK PREGNANCY, ANTEPARTUM: ICD-10-CM

## 2025-06-12 DIAGNOSIS — O09.90 SUPERVISION OF HIGH RISK PREGNANCY, ANTEPARTUM: Primary | ICD-10-CM

## 2025-06-12 DIAGNOSIS — O98.513 HERPES VIRUS INFECTION IN MOTHER DURING THIRD TRIMESTER OF PREGNANCY: ICD-10-CM

## 2025-06-12 DIAGNOSIS — O99.891 BACK PAIN AFFECTING PREGNANCY, ANTEPARTUM: ICD-10-CM

## 2025-06-12 PROCEDURE — 76816 OB US FOLLOW-UP PER FETUS: CPT

## 2025-06-12 ASSESSMENT — PATIENT HEALTH QUESTIONNAIRE - PHQ9: SUM OF ALL RESPONSES TO PHQ QUESTIONS 1-9: 9

## 2025-06-12 ASSESSMENT — PAIN SCALES - GENERAL: PAINLEVEL_OUTOF10: NO PAIN (0)

## 2025-06-12 NOTE — PROGRESS NOTES
Pregnany related issues     Morbid obesity: ref to MFM  Pre-gestational BMI 48. Will continue to follow recommend limited weight gain in pregnancy.   ASA at 12 weeks contraindicated due to gastric bypass     Dx in chart of pre gestational type 2 DM  A1c in first trimester 5.2  GTT first trimester: 1 hr done and wnl.      Hx of SAB in 2024 with retained POC: required D&C  Hx of gastric bypass  Asthma: mild intermittent prior   NIPT: negative/normal 46 XX carrier screen SMA/CF neg.   PPBCM: unsure  BF+   Tdap done 6/12/25  GBS at 36 weeks  Likely IOL at 39 weeks          24-year-old G2, P0 currently at 28 weeks and 3 days gestational age here for routine OB history of morbid obesity and pregestational diabetes with history of gastric bypass; possible herpetic lesion.  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  Records review indicate retesting was negative for both HSV-1 and HSV-2.  She would like retesting.  We did discuss with the positive test I would recommend that we go ahead and treat presumptively and patient is amenable to that.  She is aware that if we have any vaginal lesions during delivery that would be a contraindication potentially for vaginal delivery and she highly desires vaginal delivery.  So at this juncture it is likely that she will just use proof of presumptive treatment regardless of the HSV result.  Exam today does show a small cystic lesion on the left labia but it is not classic appearing for herpetic lesion.  Given her positive history we did discuss presumptive treatment is likely warranted anyway and patient was amenable to that    Concerns: As above  Doing well.  No concerns today.  No vaginal bleeding, loss of fluid, or contractions  Discussed kick counts and fetal movement.  Reportable signs and symptoms discussed.  Tdap given today  Discussed kick counts and fetal movement.  Discussed PTL, PROM, and when to call or come in.  RTC in 2 weeks.      Mey Smith  MD

## 2025-06-12 NOTE — NURSING NOTE
Prior to immunization administration, verified patients identity using patient s name and date of birth. Please see Immunization Activity for additional information.     Screening Questionnaire for Adult Immunization    Are you sick today?   No   Do you have allergies to medications, food, a vaccine component or latex?   Yes   Have you ever had a serious reaction after receiving a vaccination?   No   Do you have a long-term health problem with heart, lung, kidney, or metabolic disease (e.g., diabetes), asthma, a blood disorder, no spleen, complement component deficiency, a cochlear implant, or a spinal fluid leak?  Are you on long-term aspirin therapy?   Yes   Do you have cancer, leukemia, HIV/AIDS, or any other immune system problem?   No   Do you have a parent, brother, or sister with an immune system problem?   No   In the past 3 months, have you taken medications that affect  your immune system, such as prednisone, other steroids, or anticancer drugs; drugs for the treatment of rheumatoid arthritis, Crohn s disease, or psoriasis; or have you had radiation treatments?   No   Have you had a seizure, or a brain or other nervous system problem?   No   During the past year, have you received a transfusion of blood or blood    products, or been given immune (gamma) globulin or antiviral drug?   No   For women: Are you pregnant or is there a chance you could become       pregnant during the next month?   Yes   Have you received any vaccinations in the past 4 weeks?   No     Immunization questionnaire was positive for at least one answer.  Notified Dr. Smith.      Patient instructed to remain in clinic for 15 minutes afterwards, and to report any adverse reactions.     Screening performed by Sugey Marie MA on 6/12/2025 at 3:38 PM.

## 2025-06-18 ENCOUNTER — MYC MEDICAL ADVICE (OUTPATIENT)
Dept: FAMILY MEDICINE | Facility: CLINIC | Age: 25
End: 2025-06-18
Payer: COMMERCIAL

## 2025-06-18 DIAGNOSIS — O09.90 SUPERVISION OF HIGH RISK PREGNANCY, ANTEPARTUM: Primary | ICD-10-CM

## 2025-06-19 ENCOUNTER — LAB (OUTPATIENT)
Dept: LAB | Facility: OTHER | Age: 25
End: 2025-06-19
Payer: COMMERCIAL

## 2025-06-19 DIAGNOSIS — O98.513 HERPES VIRUS INFECTION IN MOTHER DURING THIRD TRIMESTER OF PREGNANCY: ICD-10-CM

## 2025-06-19 DIAGNOSIS — B00.9 HERPES VIRUS INFECTION IN MOTHER DURING THIRD TRIMESTER OF PREGNANCY: ICD-10-CM

## 2025-06-19 DIAGNOSIS — M54.9 BACK PAIN AFFECTING PREGNANCY, ANTEPARTUM: ICD-10-CM

## 2025-06-19 DIAGNOSIS — O09.90 SUPERVISION OF HIGH RISK PREGNANCY, ANTEPARTUM: ICD-10-CM

## 2025-06-19 DIAGNOSIS — O99.891 BACK PAIN AFFECTING PREGNANCY, ANTEPARTUM: ICD-10-CM

## 2025-06-20 ENCOUNTER — RESULTS FOLLOW-UP (OUTPATIENT)
Dept: OBGYN | Facility: CLINIC | Age: 25
End: 2025-06-20

## 2025-07-10 ENCOUNTER — PRENATAL OFFICE VISIT (OUTPATIENT)
Dept: FAMILY MEDICINE | Facility: CLINIC | Age: 25
End: 2025-07-10
Payer: COMMERCIAL

## 2025-07-10 VITALS
OXYGEN SATURATION: 96 % | WEIGHT: 293 LBS | TEMPERATURE: 98 F | HEART RATE: 94 BPM | SYSTOLIC BLOOD PRESSURE: 138 MMHG | BODY MASS INDEX: 44.41 KG/M2 | RESPIRATION RATE: 18 BRPM | HEIGHT: 68 IN | DIASTOLIC BLOOD PRESSURE: 78 MMHG

## 2025-07-10 DIAGNOSIS — O09.90 SUPERVISION OF HIGH RISK PREGNANCY, ANTEPARTUM: Primary | ICD-10-CM

## 2025-07-10 LAB
ALBUMIN SERPL BCG-MCNC: 3.4 G/DL (ref 3.5–5.2)
ALP SERPL-CCNC: 98 U/L (ref 40–150)
ALT SERPL W P-5'-P-CCNC: 8 U/L (ref 0–50)
ANION GAP SERPL CALCULATED.3IONS-SCNC: 17 MMOL/L (ref 7–15)
AST SERPL W P-5'-P-CCNC: 13 U/L (ref 0–45)
BILIRUB SERPL-MCNC: 0.2 MG/DL
BUN SERPL-MCNC: 6.7 MG/DL (ref 6–20)
CALCIUM SERPL-MCNC: 8.9 MG/DL (ref 8.8–10.4)
CHLORIDE SERPL-SCNC: 103 MMOL/L (ref 98–107)
CREAT SERPL-MCNC: 0.42 MG/DL (ref 0.51–0.95)
EGFRCR SERPLBLD CKD-EPI 2021: >90 ML/MIN/1.73M2
GLUCOSE SERPL-MCNC: 101 MG/DL (ref 70–99)
HCO3 SERPL-SCNC: 17 MMOL/L (ref 22–29)
POTASSIUM SERPL-SCNC: 4.2 MMOL/L (ref 3.4–5.3)
PROT SERPL-MCNC: 6.4 G/DL (ref 6.4–8.3)
SODIUM SERPL-SCNC: 137 MMOL/L (ref 135–145)

## 2025-07-10 ASSESSMENT — PAIN SCALES - GENERAL: PAINLEVEL_OUTOF10: NO PAIN (0)

## 2025-07-10 NOTE — PROGRESS NOTES
Pregnany related issues     Morbid obesity: ref to MFM  Pre-gestational BMI 48. Will continue to follow recommend limited weight gain in pregnancy.   ASA at 12 weeks contraindicated due to gastric bypass     Dx in chart of pre gestational type 2 DM  A1c in first trimester 5.2  GTT first trimester: 1 hr done and wnl.      Hx of SAB in 2024 with retained POC: required D&C  Hx of gastric bypass  Asthma: mild intermittent prior   NIPT: negative/normal 46 XX carrier screen SMA/CF neg.   PPBCM: unsure  BF+   Tdap done 6/12/25  GBS at 36 weeks  Likely IOL at 39 weeks          24-year-old G2, P0 currently at 32 weeks and 3 days gestational age here for routine OB history of morbid obesity and pregestational diabetes with history of gastric bypass; possible herpetic lesion.       Concerns: some back pain. Having issues with feet itching on the soles of her feet. No hand itching. Discussed labs for ICP  Doing well.  No concerns today.  No vaginal bleeding, LOF.  No contractions.  Discussed kick counts and fetal movement.  Reportable signs and symptoms discussed.  Discussed kick counts and fetal movement.  Discussed PTL, PROM, and when to call or come in.  RTC 2 weeks.    Mey Smith MD

## 2025-07-13 LAB — BILE AC SERPL-SCNC: 2 UMOL/L

## 2025-07-23 ENCOUNTER — HOSPITAL ENCOUNTER (OUTPATIENT)
Dept: ULTRASOUND IMAGING | Facility: CLINIC | Age: 25
Discharge: HOME | End: 2025-07-23
Attending: FAMILY MEDICINE
Payer: COMMERCIAL

## 2025-07-23 ENCOUNTER — PRENATAL OFFICE VISIT (OUTPATIENT)
Dept: FAMILY MEDICINE | Facility: CLINIC | Age: 25
End: 2025-07-23
Payer: COMMERCIAL

## 2025-07-23 VITALS
SYSTOLIC BLOOD PRESSURE: 110 MMHG | BODY MASS INDEX: 51.65 KG/M2 | TEMPERATURE: 97.8 F | WEIGHT: 293 LBS | RESPIRATION RATE: 12 BRPM | DIASTOLIC BLOOD PRESSURE: 60 MMHG | OXYGEN SATURATION: 98 % | HEART RATE: 105 BPM

## 2025-07-23 DIAGNOSIS — O09.90 SUPERVISION OF HIGH RISK PREGNANCY, ANTEPARTUM: ICD-10-CM

## 2025-07-23 DIAGNOSIS — O09.90 SUPERVISION OF HIGH RISK PREGNANCY, ANTEPARTUM: Primary | ICD-10-CM

## 2025-07-23 PROCEDURE — 76819 FETAL BIOPHYS PROFIL W/O NST: CPT

## 2025-07-23 PROCEDURE — 3078F DIAST BP <80 MM HG: CPT | Performed by: FAMILY MEDICINE

## 2025-07-23 PROCEDURE — 0502F SUBSEQUENT PRENATAL CARE: CPT | Performed by: FAMILY MEDICINE

## 2025-07-23 PROCEDURE — 3074F SYST BP LT 130 MM HG: CPT | Performed by: FAMILY MEDICINE

## 2025-07-23 PROCEDURE — 99207 PR PRENATAL VISIT: CPT | Performed by: FAMILY MEDICINE

## 2025-07-23 RX ORDER — OMEPRAZOLE 20 MG/1
20 CAPSULE, DELAYED RELEASE ORAL DAILY
Qty: 30 CAPSULE | Refills: 2 | Status: SHIPPED | OUTPATIENT
Start: 2025-07-23

## 2025-07-23 ASSESSMENT — PATIENT HEALTH QUESTIONNAIRE - PHQ9
10. IF YOU CHECKED OFF ANY PROBLEMS, HOW DIFFICULT HAVE THESE PROBLEMS MADE IT FOR YOU TO DO YOUR WORK, TAKE CARE OF THINGS AT HOME, OR GET ALONG WITH OTHER PEOPLE: VERY DIFFICULT
SUM OF ALL RESPONSES TO PHQ QUESTIONS 1-9: 10
SUM OF ALL RESPONSES TO PHQ QUESTIONS 1-9: 10

## 2025-07-23 NOTE — PROGRESS NOTES
Pregnany related issues     Morbid obesity: ref to MFM  Pre-gestational BMI 48. Will continue to follow recommend limited weight gain in pregnancy.   ASA at 12 weeks contraindicated due to gastric bypass     Dx in chart of pre gestational type 2 DM  A1c in first trimester 5.2  GTT first trimester: 1 hr done and wnl.      Hx of SAB in 2024 with retained POC: required D&C  Hx of gastric bypass  Asthma: mild intermittent prior   NIPT: negative/normal 46 XX carrier screen SMA/CF neg.   PPBCM: unsure  BF+   Tdap done 6/12/25  GBS at 36 weeks  Likely IOL at 39 weeks          24-year-old G2, P0 currently at 34 weeks and 2 days gestational age here for routine OB history of morbid obesity and pregestational diabetes with history of gastric bypass; possible herpetic lesion HSV 1/2 negative.    Concerns: Continues to have low back/hip pain. Mild swelling in legs. Had a bout of contractions mainly feeling of tightness/pain in the upper abdomen that were regular q3-4 min x1 hr and intermittently over a 36 hr period not resolved.   Doing well.  No concerns today.  No vaginal bleeding, LOF.  No contractions.  Discussed kick counts and fetal movement.  Reportable signs and symptoms discussed.  Discussed kick counts and fetal movement.  Discussed PTL, PROM, and when to call or come in.  RTC 2 weeks.    Mey Smith MD      Answers submitted by the patient for this visit:  Patient Health Questionnaire (Submitted on 7/23/2025)  If you checked off any problems, how difficult have these problems made it for you to do your work, take care of things at home, or get along with other people?: Very difficult  PHQ9 TOTAL SCORE: 10

## 2025-07-26 ENCOUNTER — HEALTH MAINTENANCE LETTER (OUTPATIENT)
Age: 25
End: 2025-07-26

## 2025-07-30 ENCOUNTER — HOSPITAL ENCOUNTER (OUTPATIENT)
Dept: ULTRASOUND IMAGING | Facility: CLINIC | Age: 25
Discharge: HOME | End: 2025-07-30
Attending: FAMILY MEDICINE
Payer: COMMERCIAL

## 2025-07-30 DIAGNOSIS — O09.90 SUPERVISION OF HIGH RISK PREGNANCY, ANTEPARTUM: ICD-10-CM

## 2025-07-30 PROBLEM — M54.9 BACK PAIN AFFECTING PREGNANCY, ANTEPARTUM: Status: RESOLVED | Noted: 2025-04-03 | Resolved: 2025-07-30

## 2025-07-30 PROBLEM — O99.891 BACK PAIN AFFECTING PREGNANCY, ANTEPARTUM: Status: RESOLVED | Noted: 2025-04-03 | Resolved: 2025-07-30

## 2025-07-30 PROBLEM — M25.561 ACUTE BILATERAL KNEE PAIN: Status: RESOLVED | Noted: 2017-05-17 | Resolved: 2025-07-30

## 2025-07-30 PROBLEM — M25.562 ACUTE BILATERAL KNEE PAIN: Status: RESOLVED | Noted: 2017-05-17 | Resolved: 2025-07-30

## 2025-07-30 PROBLEM — M25.551 HIP PAIN, RIGHT: Status: RESOLVED | Noted: 2025-04-03 | Resolved: 2025-07-30

## 2025-07-30 PROCEDURE — 76819 FETAL BIOPHYS PROFIL W/O NST: CPT

## 2025-08-03 ENCOUNTER — MEDICAL CORRESPONDENCE (OUTPATIENT)
Dept: HEALTH INFORMATION MANAGEMENT | Facility: CLINIC | Age: 25
End: 2025-08-03

## 2025-08-03 ENCOUNTER — HOSPITAL ENCOUNTER (INPATIENT)
Facility: CLINIC | Age: 25
LOS: 4 days | Discharge: HOME | End: 2025-08-07
Attending: FAMILY MEDICINE | Admitting: FAMILY MEDICINE
Payer: COMMERCIAL

## 2025-08-03 ENCOUNTER — NURSE TRIAGE (OUTPATIENT)
Dept: NURSING | Facility: CLINIC | Age: 25
End: 2025-08-03
Payer: COMMERCIAL

## 2025-08-03 ENCOUNTER — APPOINTMENT (OUTPATIENT)
Dept: ULTRASOUND IMAGING | Facility: CLINIC | Age: 25
End: 2025-08-03
Attending: FAMILY MEDICINE
Payer: COMMERCIAL

## 2025-08-03 PROBLEM — O14.13 PREECLAMPSIA, SEVERE, THIRD TRIMESTER: Status: ACTIVE | Noted: 2025-08-03

## 2025-08-03 PROBLEM — Z36.89 ENCOUNTER FOR TRIAGE IN PREGNANT PATIENT: Status: ACTIVE | Noted: 2025-08-03

## 2025-08-03 LAB
ABO + RH BLD: NORMAL
ALBUMIN MFR UR ELPH: 6.8 MG/DL
ALBUMIN SERPL BCG-MCNC: 3.3 G/DL (ref 3.5–5.2)
ALBUMIN UR-MCNC: NEGATIVE MG/DL
ALP SERPL-CCNC: 115 U/L (ref 40–150)
ALT SERPL W P-5'-P-CCNC: 9 U/L (ref 0–50)
ANION GAP SERPL CALCULATED.3IONS-SCNC: 13 MMOL/L (ref 7–15)
APPEARANCE UR: CLEAR
AST SERPL W P-5'-P-CCNC: 15 U/L (ref 0–45)
BACTERIA #/AREA URNS HPF: ABNORMAL /HPF
BILIRUB SERPL-MCNC: 0.3 MG/DL
BILIRUB UR QL STRIP: NEGATIVE
BLD GP AB SCN SERPL QL: NEGATIVE
BUN SERPL-MCNC: 3.4 MG/DL (ref 6–20)
CALCIUM SERPL-MCNC: 8.9 MG/DL (ref 8.8–10.4)
CHLORIDE SERPL-SCNC: 103 MMOL/L (ref 98–107)
CLUE CELLS: ABNORMAL
COLOR UR AUTO: ABNORMAL
CREAT SERPL-MCNC: 0.49 MG/DL (ref 0.51–0.95)
CREAT UR-MCNC: 57.1 MG/DL
EGFRCR SERPLBLD CKD-EPI 2021: >90 ML/MIN/1.73M2
ERYTHROCYTE [DISTWIDTH] IN BLOOD BY AUTOMATED COUNT: 13.8 % (ref 10–15)
GLUCOSE SERPL-MCNC: 73 MG/DL (ref 70–99)
GLUCOSE UR STRIP-MCNC: NEGATIVE MG/DL
HCO3 SERPL-SCNC: 21 MMOL/L (ref 22–29)
HCT VFR BLD AUTO: 36.1 % (ref 35–47)
HGB BLD-MCNC: 12.2 G/DL (ref 11.7–15.7)
HGB UR QL STRIP: NEGATIVE
KETONES UR STRIP-MCNC: NEGATIVE MG/DL
LEUKOCYTE ESTERASE UR QL STRIP: NEGATIVE
MCH RBC QN AUTO: 28.6 PG (ref 26.5–33)
MCHC RBC AUTO-ENTMCNC: 33.8 G/DL (ref 31.5–36.5)
MCV RBC AUTO: 85 FL (ref 78–100)
MUCOUS THREADS #/AREA URNS LPF: PRESENT /LPF
NITRATE UR QL: NEGATIVE
PH UR STRIP: 7 [PH] (ref 5–7)
PLATELET # BLD AUTO: 251 10E3/UL (ref 150–450)
POTASSIUM SERPL-SCNC: 4.2 MMOL/L (ref 3.4–5.3)
PROT SERPL-MCNC: 6.3 G/DL (ref 6.4–8.3)
PROT/CREAT 24H UR: 0.12 MG/MG CR (ref 0–0.2)
RBC # BLD AUTO: 4.27 10E6/UL (ref 3.8–5.2)
RBC URINE: <1 /HPF
SODIUM SERPL-SCNC: 137 MMOL/L (ref 135–145)
SP GR UR STRIP: 1.01 (ref 1–1.03)
SPECIMEN EXP DATE BLD: NORMAL
SQUAMOUS EPITHELIAL: <1 /HPF
TRICHOMONAS, WET PREP: ABNORMAL
UROBILINOGEN UR STRIP-MCNC: NORMAL MG/DL
WBC # BLD AUTO: 8.9 10E3/UL (ref 4–11)
WBC URINE: 1 /HPF
WBC'S/HIGH POWER FIELD, WET PREP: ABNORMAL
YEAST, WET PREP: ABNORMAL

## 2025-08-03 PROCEDURE — 258N000003 HC RX IP 258 OP 636: Performed by: FAMILY MEDICINE

## 2025-08-03 PROCEDURE — 86780 TREPONEMA PALLIDUM: CPT | Performed by: FAMILY MEDICINE

## 2025-08-03 PROCEDURE — 0U7C7DJ DILATION OF CERVIX WITH INTRALUMINAL DEVICE, TEMPORARY, VIA NATURAL OR ARTIFICIAL OPENING: ICD-10-PCS | Performed by: FAMILY MEDICINE

## 2025-08-03 PROCEDURE — 84156 ASSAY OF PROTEIN URINE: CPT | Performed by: FAMILY MEDICINE

## 2025-08-03 PROCEDURE — 250N000013 HC RX MED GY IP 250 OP 250 PS 637: Performed by: FAMILY MEDICINE

## 2025-08-03 PROCEDURE — 59025 FETAL NON-STRESS TEST: CPT

## 2025-08-03 PROCEDURE — 99222 1ST HOSP IP/OBS MODERATE 55: CPT | Mod: 25 | Performed by: FAMILY MEDICINE

## 2025-08-03 PROCEDURE — 87653 STREP B DNA AMP PROBE: CPT | Performed by: FAMILY MEDICINE

## 2025-08-03 PROCEDURE — G0463 HOSPITAL OUTPT CLINIC VISIT: HCPCS

## 2025-08-03 PROCEDURE — 120N000001 HC R&B MED SURG/OB

## 2025-08-03 PROCEDURE — 250N000011 HC RX IP 250 OP 636: Performed by: FAMILY MEDICINE

## 2025-08-03 PROCEDURE — 85014 HEMATOCRIT: CPT | Performed by: FAMILY MEDICINE

## 2025-08-03 PROCEDURE — 80053 COMPREHEN METABOLIC PANEL: CPT | Performed by: FAMILY MEDICINE

## 2025-08-03 PROCEDURE — 86900 BLOOD TYPING SEROLOGIC ABO: CPT | Performed by: FAMILY MEDICINE

## 2025-08-03 PROCEDURE — 81003 URINALYSIS AUTO W/O SCOPE: CPT | Performed by: FAMILY MEDICINE

## 2025-08-03 PROCEDURE — 36415 COLL VENOUS BLD VENIPUNCTURE: CPT | Performed by: FAMILY MEDICINE

## 2025-08-03 PROCEDURE — 59025 FETAL NON-STRESS TEST: CPT | Mod: 26 | Performed by: FAMILY MEDICINE

## 2025-08-03 PROCEDURE — 87210 SMEAR WET MOUNT SALINE/INK: CPT | Performed by: FAMILY MEDICINE

## 2025-08-03 PROCEDURE — 76819 FETAL BIOPHYS PROFIL W/O NST: CPT

## 2025-08-03 RX ORDER — LOPERAMIDE HYDROCHLORIDE 2 MG/1
4 CAPSULE ORAL
Status: DISCONTINUED | OUTPATIENT
Start: 2025-08-03 | End: 2025-08-06 | Stop reason: HOSPADM

## 2025-08-03 RX ORDER — OXYTOCIN/0.9 % SODIUM CHLORIDE 30/500 ML
340 PLASTIC BAG, INJECTION (ML) INTRAVENOUS CONTINUOUS PRN
Status: DISCONTINUED | OUTPATIENT
Start: 2025-08-03 | End: 2025-08-06 | Stop reason: HOSPADM

## 2025-08-03 RX ORDER — ACETAMINOPHEN 325 MG/1
975 TABLET ORAL EVERY 6 HOURS PRN
Status: DISCONTINUED | OUTPATIENT
Start: 2025-08-03 | End: 2025-08-06

## 2025-08-03 RX ORDER — PENICILLIN G 3000000 [IU]/50ML
3 INJECTION, SOLUTION INTRAVENOUS EVERY 4 HOURS
Status: DISCONTINUED | OUTPATIENT
Start: 2025-08-03 | End: 2025-08-04

## 2025-08-03 RX ORDER — LABETALOL 100 MG/1
100 TABLET, FILM COATED ORAL 2 TIMES DAILY
Status: DISCONTINUED | OUTPATIENT
Start: 2025-08-03 | End: 2025-08-05

## 2025-08-03 RX ORDER — IBUPROFEN 800 MG/1
800 TABLET, FILM COATED ORAL
Status: DISCONTINUED | OUTPATIENT
Start: 2025-08-03 | End: 2025-08-06

## 2025-08-03 RX ORDER — OXYTOCIN 10 [USP'U]/ML
10 INJECTION, SOLUTION INTRAMUSCULAR; INTRAVENOUS
Status: DISCONTINUED | OUTPATIENT
Start: 2025-08-03 | End: 2025-08-07

## 2025-08-03 RX ORDER — BETAMETHASONE SODIUM PHOSPHATE AND BETAMETHASONE ACETATE 3; 3 MG/ML; MG/ML
INJECTION, SUSPENSION INTRA-ARTICULAR; INTRALESIONAL; INTRAMUSCULAR; SOFT TISSUE
Status: CANCELLED | OUTPATIENT
Start: 2025-08-03

## 2025-08-03 RX ORDER — CYCLOBENZAPRINE HCL 10 MG
10 TABLET ORAL EVERY 8 HOURS PRN
Status: DISCONTINUED | OUTPATIENT
Start: 2025-08-03 | End: 2025-08-07 | Stop reason: HOSPADM

## 2025-08-03 RX ORDER — LIDOCAINE 40 MG/G
CREAM TOPICAL
Status: DISCONTINUED | OUTPATIENT
Start: 2025-08-03 | End: 2025-08-03 | Stop reason: HOSPADM

## 2025-08-03 RX ORDER — OXYTOCIN 10 [USP'U]/ML
10 INJECTION, SOLUTION INTRAMUSCULAR; INTRAVENOUS
Status: DISCONTINUED | OUTPATIENT
Start: 2025-08-03 | End: 2025-08-06 | Stop reason: HOSPADM

## 2025-08-03 RX ORDER — LABETALOL HYDROCHLORIDE 5 MG/ML
20-80 INJECTION, SOLUTION INTRAVENOUS EVERY 10 MIN PRN
Status: DISCONTINUED | OUTPATIENT
Start: 2025-08-03 | End: 2025-08-07 | Stop reason: HOSPADM

## 2025-08-03 RX ORDER — MAGNESIUM SULFATE IN WATER 40 MG/ML
2 INJECTION, SOLUTION INTRAVENOUS CONTINUOUS
Status: DISCONTINUED | OUTPATIENT
Start: 2025-08-03 | End: 2025-08-07 | Stop reason: HOSPADM

## 2025-08-03 RX ORDER — CARBOPROST TROMETHAMINE 250 UG/ML
250 INJECTION, SOLUTION INTRAMUSCULAR
Status: DISCONTINUED | OUTPATIENT
Start: 2025-08-03 | End: 2025-08-06 | Stop reason: HOSPADM

## 2025-08-03 RX ORDER — OXYTOCIN/0.9 % SODIUM CHLORIDE 30/500 ML
100-340 PLASTIC BAG, INJECTION (ML) INTRAVENOUS CONTINUOUS PRN
Status: DISCONTINUED | OUTPATIENT
Start: 2025-08-03 | End: 2025-08-07

## 2025-08-03 RX ORDER — KETOROLAC TROMETHAMINE 15 MG/ML
15 INJECTION, SOLUTION INTRAMUSCULAR; INTRAVENOUS
Status: DISCONTINUED | OUTPATIENT
Start: 2025-08-03 | End: 2025-08-06

## 2025-08-03 RX ORDER — MISOPROSTOL 100 UG/1
25 TABLET ORAL
Status: DISCONTINUED | OUTPATIENT
Start: 2025-08-03 | End: 2025-08-04

## 2025-08-03 RX ORDER — HYDRALAZINE HYDROCHLORIDE 20 MG/ML
10 INJECTION INTRAMUSCULAR; INTRAVENOUS
Status: DISCONTINUED | OUTPATIENT
Start: 2025-08-03 | End: 2025-08-05

## 2025-08-03 RX ORDER — METOCLOPRAMIDE HYDROCHLORIDE 5 MG/ML
10 INJECTION INTRAMUSCULAR; INTRAVENOUS EVERY 6 HOURS PRN
Status: DISCONTINUED | OUTPATIENT
Start: 2025-08-03 | End: 2025-08-06 | Stop reason: HOSPADM

## 2025-08-03 RX ORDER — TRANEXAMIC ACID 10 MG/ML
1 INJECTION, SOLUTION INTRAVENOUS EVERY 30 MIN PRN
Status: DISCONTINUED | OUTPATIENT
Start: 2025-08-03 | End: 2025-08-06 | Stop reason: HOSPADM

## 2025-08-03 RX ORDER — CALCIUM GLUCONATE 98 MG/ML
1 INJECTION, SOLUTION INTRAVENOUS
Status: DISCONTINUED | OUTPATIENT
Start: 2025-08-03 | End: 2025-08-07 | Stop reason: HOSPADM

## 2025-08-03 RX ORDER — TERBUTALINE SULFATE 1 MG/ML
0.25 INJECTION SUBCUTANEOUS
Status: DISCONTINUED | OUTPATIENT
Start: 2025-08-03 | End: 2025-08-06 | Stop reason: HOSPADM

## 2025-08-03 RX ORDER — CITRIC ACID/SODIUM CITRATE 334-500MG
30 SOLUTION, ORAL ORAL
Status: DISCONTINUED | OUTPATIENT
Start: 2025-08-03 | End: 2025-08-06 | Stop reason: HOSPADM

## 2025-08-03 RX ORDER — LIDOCAINE 40 MG/G
CREAM TOPICAL
Status: DISCONTINUED | OUTPATIENT
Start: 2025-08-03 | End: 2025-08-04

## 2025-08-03 RX ORDER — METOCLOPRAMIDE 5 MG/1
10 TABLET ORAL EVERY 6 HOURS PRN
Status: DISCONTINUED | OUTPATIENT
Start: 2025-08-03 | End: 2025-08-06 | Stop reason: HOSPADM

## 2025-08-03 RX ORDER — LOPERAMIDE HYDROCHLORIDE 2 MG/1
2 CAPSULE ORAL
Status: DISCONTINUED | OUTPATIENT
Start: 2025-08-03 | End: 2025-08-06 | Stop reason: HOSPADM

## 2025-08-03 RX ORDER — ONDANSETRON 4 MG/1
4 TABLET, ORALLY DISINTEGRATING ORAL EVERY 6 HOURS PRN
Status: DISCONTINUED | OUTPATIENT
Start: 2025-08-03 | End: 2025-08-06 | Stop reason: HOSPADM

## 2025-08-03 RX ORDER — LOPERAMIDE HYDROCHLORIDE 2 MG/1
4 CAPSULE ORAL ONCE
Status: COMPLETED | OUTPATIENT
Start: 2025-08-03 | End: 2025-08-03

## 2025-08-03 RX ORDER — PENICILLIN G POTASSIUM 5000000 [IU]/1
5 INJECTION, POWDER, FOR SOLUTION INTRAMUSCULAR; INTRAVENOUS ONCE
Status: COMPLETED | OUTPATIENT
Start: 2025-08-03 | End: 2025-08-03

## 2025-08-03 RX ORDER — ONDANSETRON 2 MG/ML
4 INJECTION INTRAMUSCULAR; INTRAVENOUS EVERY 6 HOURS PRN
Status: DISCONTINUED | OUTPATIENT
Start: 2025-08-03 | End: 2025-08-06 | Stop reason: HOSPADM

## 2025-08-03 RX ORDER — SODIUM CHLORIDE, SODIUM LACTATE, POTASSIUM CHLORIDE, CALCIUM CHLORIDE 600; 310; 30; 20 MG/100ML; MG/100ML; MG/100ML; MG/100ML
10-125 INJECTION, SOLUTION INTRAVENOUS CONTINUOUS
Status: DISCONTINUED | OUTPATIENT
Start: 2025-08-03 | End: 2025-08-07 | Stop reason: HOSPADM

## 2025-08-03 RX ORDER — PROCHLORPERAZINE MALEATE 5 MG/1
10 TABLET ORAL EVERY 6 HOURS PRN
Status: DISCONTINUED | OUTPATIENT
Start: 2025-08-03 | End: 2025-08-06 | Stop reason: HOSPADM

## 2025-08-03 RX ORDER — ONDANSETRON 4 MG/1
4 TABLET, ORALLY DISINTEGRATING ORAL EVERY 6 HOURS PRN
Status: DISCONTINUED | OUTPATIENT
Start: 2025-08-03 | End: 2025-08-03

## 2025-08-03 RX ORDER — METHYLERGONOVINE MALEATE 0.2 MG/ML
200 INJECTION INTRAVENOUS
Status: DISCONTINUED | OUTPATIENT
Start: 2025-08-03 | End: 2025-08-06 | Stop reason: HOSPADM

## 2025-08-03 RX ORDER — HYDROXYZINE HYDROCHLORIDE 50 MG/1
50 TABLET, FILM COATED ORAL
Status: DISCONTINUED | OUTPATIENT
Start: 2025-08-03 | End: 2025-08-06 | Stop reason: HOSPADM

## 2025-08-03 RX ORDER — MISOPROSTOL 200 UG/1
400 TABLET ORAL
Status: DISCONTINUED | OUTPATIENT
Start: 2025-08-03 | End: 2025-08-06 | Stop reason: HOSPADM

## 2025-08-03 RX ORDER — BETAMETHASONE SODIUM PHOSPHATE AND BETAMETHASONE ACETATE 3; 3 MG/ML; MG/ML
12 INJECTION, SUSPENSION INTRA-ARTICULAR; INTRALESIONAL; INTRAMUSCULAR; SOFT TISSUE DAILY
Status: COMPLETED | OUTPATIENT
Start: 2025-08-03 | End: 2025-08-04

## 2025-08-03 RX ADMIN — LOPERAMIDE HYDROCHLORIDE 4 MG: 2 CAPSULE ORAL at 21:07

## 2025-08-03 RX ADMIN — ACETAMINOPHEN 975 MG: 325 TABLET ORAL at 19:20

## 2025-08-03 RX ADMIN — SODIUM CHLORIDE, SODIUM LACTATE, POTASSIUM CHLORIDE, AND CALCIUM CHLORIDE 25 ML/HR: .6; .31; .03; .02 INJECTION, SOLUTION INTRAVENOUS at 14:29

## 2025-08-03 RX ADMIN — MISOPROSTOL 25 MCG: 100 TABLET ORAL at 20:31

## 2025-08-03 RX ADMIN — MAGNESIUM SULFATE HEPTAHYDRATE 2 G/HR: 40 INJECTION, SOLUTION INTRAVENOUS at 23:24

## 2025-08-03 RX ADMIN — LABETALOL HYDROCHLORIDE 20 MG: 5 INJECTION INTRAVENOUS at 14:24

## 2025-08-03 RX ADMIN — CYCLOBENZAPRINE 10 MG: 10 TABLET, FILM COATED ORAL at 14:02

## 2025-08-03 RX ADMIN — LABETALOL HYDROCHLORIDE 100 MG: 100 TABLET, FILM COATED ORAL at 17:08

## 2025-08-03 RX ADMIN — BETAMETHASONE SODIUM PHOSPHATE AND BETAMETHASONE ACETATE 12 MG: 3; 3 INJECTION, SUSPENSION INTRA-ARTICULAR; INTRALESIONAL; INTRAMUSCULAR at 15:50

## 2025-08-03 RX ADMIN — LABETALOL HYDROCHLORIDE 40 MG: 5 INJECTION INTRAVENOUS at 14:49

## 2025-08-03 RX ADMIN — MISOPROSTOL 25 MCG: 100 TABLET ORAL at 16:09

## 2025-08-03 RX ADMIN — PENICILLIN G POTASSIUM 5 MILLION UNITS: 5000000 POWDER, FOR SOLUTION INTRAMUSCULAR; INTRAPLEURAL; INTRATHECAL; INTRAVENOUS at 16:11

## 2025-08-03 RX ADMIN — MISOPROSTOL 25 MCG: 100 TABLET ORAL at 18:29

## 2025-08-03 RX ADMIN — ONDANSETRON 4 MG: 4 TABLET, ORALLY DISINTEGRATING ORAL at 12:11

## 2025-08-03 RX ADMIN — MISOPROSTOL 25 MCG: 100 TABLET ORAL at 22:27

## 2025-08-03 ASSESSMENT — ACTIVITIES OF DAILY LIVING (ADL)
ADLS_ACUITY_SCORE: 19
ADLS_ACUITY_SCORE: 19
ADLS_ACUITY_SCORE: 42
ADLS_ACUITY_SCORE: 19
ADLS_ACUITY_SCORE: 25
ADLS_ACUITY_SCORE: 19
ADLS_ACUITY_SCORE: 25

## 2025-08-04 ENCOUNTER — ANESTHESIA EVENT (OUTPATIENT)
Dept: OBGYN | Facility: CLINIC | Age: 25
End: 2025-08-04
Payer: COMMERCIAL

## 2025-08-04 ENCOUNTER — ANESTHESIA (OUTPATIENT)
Dept: OBGYN | Facility: CLINIC | Age: 25
End: 2025-08-04
Payer: COMMERCIAL

## 2025-08-04 LAB
GP B STREP DNA SPEC QL NAA+PROBE: NEGATIVE
MAGNESIUM SERPL-MCNC: 3.7 MG/DL (ref 1.7–2.3)
T PALLIDUM AB SER QL: NONREACTIVE

## 2025-08-04 PROCEDURE — 250N000013 HC RX MED GY IP 250 OP 250 PS 637: Performed by: FAMILY MEDICINE

## 2025-08-04 PROCEDURE — 258N000003 HC RX IP 258 OP 636: Performed by: FAMILY MEDICINE

## 2025-08-04 PROCEDURE — 36415 COLL VENOUS BLD VENIPUNCTURE: CPT | Performed by: FAMILY MEDICINE

## 2025-08-04 PROCEDURE — 370N000003 HC ANESTHESIA WARD SERVICE: Performed by: NURSE ANESTHETIST, CERTIFIED REGISTERED

## 2025-08-04 PROCEDURE — 3E0R3BZ INTRODUCTION OF ANESTHETIC AGENT INTO SPINAL CANAL, PERCUTANEOUS APPROACH: ICD-10-PCS | Performed by: NURSE ANESTHETIST, CERTIFIED REGISTERED

## 2025-08-04 PROCEDURE — 250N000009 HC RX 250: Performed by: FAMILY MEDICINE

## 2025-08-04 PROCEDURE — 250N000011 HC RX IP 250 OP 636: Performed by: NURSE ANESTHETIST, CERTIFIED REGISTERED

## 2025-08-04 PROCEDURE — 00HU33Z INSERTION OF INFUSION DEVICE INTO SPINAL CANAL, PERCUTANEOUS APPROACH: ICD-10-PCS | Performed by: NURSE ANESTHETIST, CERTIFIED REGISTERED

## 2025-08-04 PROCEDURE — 83735 ASSAY OF MAGNESIUM: CPT | Performed by: FAMILY MEDICINE

## 2025-08-04 PROCEDURE — 250N000011 HC RX IP 250 OP 636: Performed by: FAMILY MEDICINE

## 2025-08-04 PROCEDURE — 120N000001 HC R&B MED SURG/OB

## 2025-08-04 PROCEDURE — 10907ZC DRAINAGE OF AMNIOTIC FLUID, THERAPEUTIC FROM PRODUCTS OF CONCEPTION, VIA NATURAL OR ARTIFICIAL OPENING: ICD-10-PCS | Performed by: OBSTETRICS & GYNECOLOGY

## 2025-08-04 RX ORDER — FAMOTIDINE 20 MG/1
40 TABLET, FILM COATED ORAL DAILY
Status: COMPLETED | OUTPATIENT
Start: 2025-08-04 | End: 2025-08-06

## 2025-08-04 RX ORDER — NALOXONE HYDROCHLORIDE 0.4 MG/ML
0.2 INJECTION, SOLUTION INTRAMUSCULAR; INTRAVENOUS; SUBCUTANEOUS
Status: DISCONTINUED | OUTPATIENT
Start: 2025-08-04 | End: 2025-08-07 | Stop reason: HOSPADM

## 2025-08-04 RX ORDER — FENTANYL CITRATE 50 UG/ML
50 INJECTION, SOLUTION INTRAMUSCULAR; INTRAVENOUS
Refills: 0 | Status: DISCONTINUED | OUTPATIENT
Start: 2025-08-04 | End: 2025-08-06 | Stop reason: HOSPADM

## 2025-08-04 RX ORDER — BUPIVACAINE HYDROCHLORIDE 2.5 MG/ML
0.25 INJECTION, SOLUTION EPIDURAL; INFILTRATION; INTRACAUDAL; PERINEURAL ONCE
Status: DISCONTINUED | OUTPATIENT
Start: 2025-08-04 | End: 2025-08-06 | Stop reason: HOSPADM

## 2025-08-04 RX ORDER — NALBUPHINE HYDROCHLORIDE 10 MG/ML
2.5-5 INJECTION INTRAMUSCULAR; INTRAVENOUS; SUBCUTANEOUS EVERY 6 HOURS PRN
Status: DISCONTINUED | OUTPATIENT
Start: 2025-08-04 | End: 2025-08-07 | Stop reason: HOSPADM

## 2025-08-04 RX ORDER — CALCIUM CARBONATE 500 MG/1
500 TABLET, CHEWABLE ORAL DAILY PRN
Status: DISCONTINUED | OUTPATIENT
Start: 2025-08-04 | End: 2025-08-07 | Stop reason: HOSPADM

## 2025-08-04 RX ORDER — SODIUM CHLORIDE, SODIUM LACTATE, POTASSIUM CHLORIDE, CALCIUM CHLORIDE 600; 310; 30; 20 MG/100ML; MG/100ML; MG/100ML; MG/100ML
INJECTION, SOLUTION INTRAVENOUS CONTINUOUS PRN
Status: DISCONTINUED | OUTPATIENT
Start: 2025-08-04 | End: 2025-08-06 | Stop reason: HOSPADM

## 2025-08-04 RX ORDER — BUPIVACAINE HYDROCHLORIDE 2.5 MG/ML
INJECTION, SOLUTION EPIDURAL; INFILTRATION; INTRACAUDAL; PERINEURAL
Status: COMPLETED | OUTPATIENT
Start: 2025-08-04 | End: 2025-08-05

## 2025-08-04 RX ORDER — PANTOPRAZOLE SODIUM 40 MG/1
40 TABLET, DELAYED RELEASE ORAL
Status: DISCONTINUED | OUTPATIENT
Start: 2025-08-04 | End: 2025-08-05

## 2025-08-04 RX ORDER — FENTANYL CITRATE-0.9 % NACL/PF 10 MCG/ML
100 PLASTIC BAG, INJECTION (ML) INTRAVENOUS EVERY 5 MIN PRN
Status: DISCONTINUED | OUTPATIENT
Start: 2025-08-04 | End: 2025-08-06 | Stop reason: HOSPADM

## 2025-08-04 RX ORDER — OXYTOCIN/0.9 % SODIUM CHLORIDE 30/500 ML
1-24 PLASTIC BAG, INJECTION (ML) INTRAVENOUS CONTINUOUS
Status: DISCONTINUED | OUTPATIENT
Start: 2025-08-04 | End: 2025-08-06 | Stop reason: HOSPADM

## 2025-08-04 RX ORDER — LIDOCAINE 40 MG/G
CREAM TOPICAL
Status: DISCONTINUED | OUTPATIENT
Start: 2025-08-04 | End: 2025-08-06 | Stop reason: HOSPADM

## 2025-08-04 RX ORDER — NALOXONE HYDROCHLORIDE 0.4 MG/ML
0.4 INJECTION, SOLUTION INTRAMUSCULAR; INTRAVENOUS; SUBCUTANEOUS
Status: DISCONTINUED | OUTPATIENT
Start: 2025-08-04 | End: 2025-08-07 | Stop reason: HOSPADM

## 2025-08-04 RX ORDER — FENTANYL CITRATE 50 UG/ML
50 INJECTION, SOLUTION INTRAMUSCULAR; INTRAVENOUS EVERY 30 MIN PRN
Status: DISCONTINUED | OUTPATIENT
Start: 2025-08-04 | End: 2025-08-07

## 2025-08-04 RX ADMIN — Medication: at 23:11

## 2025-08-04 RX ADMIN — ACETAMINOPHEN 975 MG: 325 TABLET ORAL at 06:20

## 2025-08-04 RX ADMIN — BETAMETHASONE SODIUM PHOSPHATE AND BETAMETHASONE ACETATE 12 MG: 3; 3 INJECTION, SUSPENSION INTRA-ARTICULAR; INTRALESIONAL; INTRAMUSCULAR at 15:50

## 2025-08-04 RX ADMIN — SODIUM CHLORIDE, SODIUM LACTATE, POTASSIUM CHLORIDE, AND CALCIUM CHLORIDE: .6; .31; .03; .02 INJECTION, SOLUTION INTRAVENOUS at 18:34

## 2025-08-04 RX ADMIN — CALCIUM CARBONATE (ANTACID) CHEW TAB 500 MG 500 MG: 500 CHEW TAB at 07:09

## 2025-08-04 RX ADMIN — BUPIVACAINE HYDROCHLORIDE 7 ML: 2.5 INJECTION, SOLUTION EPIDURAL; INFILTRATION; INTRACAUDAL at 23:08

## 2025-08-04 RX ADMIN — Medication 1 MILLI-UNITS/MIN: at 03:41

## 2025-08-04 RX ADMIN — MISOPROSTOL 25 MCG: 100 TABLET ORAL at 00:47

## 2025-08-04 RX ADMIN — LABETALOL HYDROCHLORIDE 100 MG: 100 TABLET, FILM COATED ORAL at 21:30

## 2025-08-04 RX ADMIN — LABETALOL HYDROCHLORIDE 100 MG: 100 TABLET, FILM COATED ORAL at 09:01

## 2025-08-04 RX ADMIN — MAGNESIUM SULFATE HEPTAHYDRATE 2 G/HR: 40 INJECTION, SOLUTION INTRAVENOUS at 08:59

## 2025-08-04 RX ADMIN — FAMOTIDINE 40 MG: 20 TABLET, FILM COATED ORAL at 11:09

## 2025-08-04 RX ADMIN — HYDROXYZINE HYDROCHLORIDE 50 MG: 50 TABLET ORAL at 00:02

## 2025-08-04 RX ADMIN — MAGNESIUM SULFATE HEPTAHYDRATE 2 G/HR: 40 INJECTION, SOLUTION INTRAVENOUS at 20:36

## 2025-08-04 RX ADMIN — FENTANYL CITRATE 50 MCG: 50 INJECTION, SOLUTION INTRAMUSCULAR; INTRAVENOUS at 22:36

## 2025-08-04 ASSESSMENT — ACTIVITIES OF DAILY LIVING (ADL)
ADLS_ACUITY_SCORE: 25

## 2025-08-05 PROCEDURE — 250N000013 HC RX MED GY IP 250 OP 250 PS 637: Performed by: FAMILY MEDICINE

## 2025-08-05 PROCEDURE — 250N000011 HC RX IP 250 OP 636: Performed by: NURSE ANESTHETIST, CERTIFIED REGISTERED

## 2025-08-05 PROCEDURE — 722N000001 HC LABOR CARE VAGINAL DELIVERY SINGLE

## 2025-08-05 PROCEDURE — 120N000001 HC R&B MED SURG/OB

## 2025-08-05 PROCEDURE — 999N000016 HC STATISTIC ATTENDANCE AT DELIVERY

## 2025-08-05 PROCEDURE — 250N000009 HC RX 250: Performed by: FAMILY MEDICINE

## 2025-08-05 PROCEDURE — 258N000003 HC RX IP 258 OP 636: Performed by: FAMILY MEDICINE

## 2025-08-05 PROCEDURE — 250N000011 HC RX IP 250 OP 636: Performed by: FAMILY MEDICINE

## 2025-08-05 RX ORDER — PANTOPRAZOLE SODIUM 40 MG/1
40 TABLET, DELAYED RELEASE ORAL
Status: DISCONTINUED | OUTPATIENT
Start: 2025-08-05 | End: 2025-08-07 | Stop reason: HOSPADM

## 2025-08-05 RX ORDER — LABETALOL 100 MG/1
200 TABLET, FILM COATED ORAL EVERY 12 HOURS SCHEDULED
Status: DISCONTINUED | OUTPATIENT
Start: 2025-08-05 | End: 2025-08-05

## 2025-08-05 RX ORDER — SODIUM CHLORIDE 9 MG/ML
INJECTION, SOLUTION INTRAVENOUS CONTINUOUS
Status: DISCONTINUED | OUTPATIENT
Start: 2025-08-05 | End: 2025-08-06 | Stop reason: HOSPADM

## 2025-08-05 RX ORDER — HYDRALAZINE HYDROCHLORIDE 20 MG/ML
10 INJECTION INTRAMUSCULAR; INTRAVENOUS
Status: DISCONTINUED | OUTPATIENT
Start: 2025-08-05 | End: 2025-08-07 | Stop reason: HOSPADM

## 2025-08-05 RX ORDER — LABETALOL HYDROCHLORIDE 5 MG/ML
20-40 INJECTION, SOLUTION INTRAVENOUS EVERY 10 MIN PRN
Status: DISCONTINUED | OUTPATIENT
Start: 2025-08-05 | End: 2025-08-05

## 2025-08-05 RX ORDER — LABETALOL 100 MG/1
200 TABLET, FILM COATED ORAL EVERY 8 HOURS SCHEDULED
Status: DISCONTINUED | OUTPATIENT
Start: 2025-08-05 | End: 2025-08-06

## 2025-08-05 RX ADMIN — MISOPROSTOL 800 MCG: 100 TABLET ORAL at 23:36

## 2025-08-05 RX ADMIN — MAGNESIUM SULFATE HEPTAHYDRATE 2 G/HR: 40 INJECTION, SOLUTION INTRAVENOUS at 16:00

## 2025-08-05 RX ADMIN — MAGNESIUM SULFATE HEPTAHYDRATE 2 G/HR: 40 INJECTION, SOLUTION INTRAVENOUS at 19:16

## 2025-08-05 RX ADMIN — FAMOTIDINE 40 MG: 20 TABLET, FILM COATED ORAL at 09:06

## 2025-08-05 RX ADMIN — SODIUM CHLORIDE, SODIUM LACTATE, POTASSIUM CHLORIDE, AND CALCIUM CHLORIDE: .6; .31; .03; .02 INJECTION, SOLUTION INTRAVENOUS at 12:34

## 2025-08-05 RX ADMIN — LABETALOL HYDROCHLORIDE 200 MG: 100 TABLET, FILM COATED ORAL at 12:15

## 2025-08-05 RX ADMIN — SODIUM CHLORIDE: 0.9 INJECTION, SOLUTION INTRAVENOUS at 13:53

## 2025-08-05 RX ADMIN — Medication 18 MILLI-UNITS/MIN: at 14:14

## 2025-08-05 RX ADMIN — MAGNESIUM SULFATE HEPTAHYDRATE 2 G/HR: 40 INJECTION, SOLUTION INTRAVENOUS at 06:41

## 2025-08-05 RX ADMIN — Medication: at 13:46

## 2025-08-05 RX ADMIN — MAGNESIUM SULFATE HEPTAHYDRATE 2 G/HR: 40 INJECTION, SOLUTION INTRAVENOUS at 07:29

## 2025-08-05 RX ADMIN — BUPIVACAINE HYDROCHLORIDE 5 ML: 2.5 INJECTION, SOLUTION EPIDURAL; INFILTRATION; INTRACAUDAL at 21:20

## 2025-08-05 RX ADMIN — LABETALOL HYDROCHLORIDE 200 MG: 100 TABLET, FILM COATED ORAL at 21:28

## 2025-08-05 RX ADMIN — LABETALOL HYDROCHLORIDE 40 MG: 5 INJECTION INTRAVENOUS at 12:01

## 2025-08-05 RX ADMIN — Medication: at 21:29

## 2025-08-05 RX ADMIN — Medication 20 MILLI-UNITS/MIN: at 19:17

## 2025-08-05 RX ADMIN — CALCIUM CARBONATE (ANTACID) CHEW TAB 500 MG 500 MG: 500 CHEW TAB at 00:43

## 2025-08-05 RX ADMIN — LABETALOL HYDROCHLORIDE 100 MG: 100 TABLET, FILM COATED ORAL at 09:06

## 2025-08-05 RX ADMIN — BUPIVACAINE HYDROCHLORIDE 4 ML: 2.5 INJECTION, SOLUTION EPIDURAL; INFILTRATION; INTRACAUDAL at 18:39

## 2025-08-05 RX ADMIN — PANTOPRAZOLE SODIUM 40 MG: 40 TABLET, DELAYED RELEASE ORAL at 19:50

## 2025-08-05 RX ADMIN — LABETALOL HYDROCHLORIDE 20 MG: 5 INJECTION INTRAVENOUS at 11:49

## 2025-08-05 RX ADMIN — Medication 10 ML/HR: at 07:23

## 2025-08-05 ASSESSMENT — ACTIVITIES OF DAILY LIVING (ADL)
ADLS_ACUITY_SCORE: 37
ADLS_ACUITY_SCORE: 25
ADLS_ACUITY_SCORE: 37
ADLS_ACUITY_SCORE: 25
ADLS_ACUITY_SCORE: 37
ADLS_ACUITY_SCORE: 37
ADLS_ACUITY_SCORE: 36
ADLS_ACUITY_SCORE: 31
ADLS_ACUITY_SCORE: 37
ADLS_ACUITY_SCORE: 37
ADLS_ACUITY_SCORE: 25
ADLS_ACUITY_SCORE: 36
ADLS_ACUITY_SCORE: 25
ADLS_ACUITY_SCORE: 25
ADLS_ACUITY_SCORE: 37
ADLS_ACUITY_SCORE: 37
ADLS_ACUITY_SCORE: 36
ADLS_ACUITY_SCORE: 36
ADLS_ACUITY_SCORE: 37
ADLS_ACUITY_SCORE: 36
ADLS_ACUITY_SCORE: 25
ADLS_ACUITY_SCORE: 37
ADLS_ACUITY_SCORE: 36

## 2025-08-06 LAB — GLUCOSE BLDC GLUCOMTR-MCNC: 124 MG/DL (ref 70–99)

## 2025-08-06 PROCEDURE — 258N000003 HC RX IP 258 OP 636: Performed by: FAMILY MEDICINE

## 2025-08-06 PROCEDURE — 250N000013 HC RX MED GY IP 250 OP 250 PS 637: Performed by: FAMILY MEDICINE

## 2025-08-06 PROCEDURE — 250N000011 HC RX IP 250 OP 636: Performed by: FAMILY MEDICINE

## 2025-08-06 PROCEDURE — 120N000001 HC R&B MED SURG/OB

## 2025-08-06 PROCEDURE — 59400 OBSTETRICAL CARE: CPT | Performed by: FAMILY MEDICINE

## 2025-08-06 PROCEDURE — 722N000001 HC LABOR CARE VAGINAL DELIVERY SINGLE

## 2025-08-06 RX ORDER — MISOPROSTOL 200 UG/1
400 TABLET ORAL
Status: DISCONTINUED | OUTPATIENT
Start: 2025-08-06 | End: 2025-08-07 | Stop reason: HOSPADM

## 2025-08-06 RX ORDER — AMOXICILLIN 250 MG
2 CAPSULE ORAL
Status: DISCONTINUED | OUTPATIENT
Start: 2025-08-06 | End: 2025-08-07 | Stop reason: HOSPADM

## 2025-08-06 RX ORDER — BISACODYL 10 MG
10 SUPPOSITORY, RECTAL RECTAL DAILY PRN
Status: DISCONTINUED | OUTPATIENT
Start: 2025-08-06 | End: 2025-08-07 | Stop reason: HOSPADM

## 2025-08-06 RX ORDER — METHYLERGONOVINE MALEATE 0.2 MG/ML
200 INJECTION INTRAVENOUS
Status: DISCONTINUED | OUTPATIENT
Start: 2025-08-06 | End: 2025-08-07 | Stop reason: HOSPADM

## 2025-08-06 RX ORDER — ENOXAPARIN SODIUM 100 MG/ML
40 INJECTION SUBCUTANEOUS EVERY 12 HOURS
Status: DISCONTINUED | OUTPATIENT
Start: 2025-08-06 | End: 2025-08-07 | Stop reason: HOSPADM

## 2025-08-06 RX ORDER — LABETALOL 100 MG/1
400 TABLET, FILM COATED ORAL 2 TIMES DAILY
Status: DISCONTINUED | OUTPATIENT
Start: 2025-08-06 | End: 2025-08-07 | Stop reason: HOSPADM

## 2025-08-06 RX ORDER — TRANEXAMIC ACID 10 MG/ML
1 INJECTION, SOLUTION INTRAVENOUS EVERY 30 MIN PRN
Status: DISCONTINUED | OUTPATIENT
Start: 2025-08-06 | End: 2025-08-07 | Stop reason: HOSPADM

## 2025-08-06 RX ORDER — LIDOCAINE 40 MG/G
CREAM TOPICAL
Status: DISCONTINUED | OUTPATIENT
Start: 2025-08-06 | End: 2025-08-07 | Stop reason: HOSPADM

## 2025-08-06 RX ORDER — CARBOPROST TROMETHAMINE 250 UG/ML
250 INJECTION, SOLUTION INTRAMUSCULAR
Status: DISCONTINUED | OUTPATIENT
Start: 2025-08-06 | End: 2025-08-07 | Stop reason: HOSPADM

## 2025-08-06 RX ORDER — IBUPROFEN 800 MG/1
800 TABLET, FILM COATED ORAL EVERY 6 HOURS PRN
Status: DISCONTINUED | OUTPATIENT
Start: 2025-08-06 | End: 2025-08-07 | Stop reason: HOSPADM

## 2025-08-06 RX ORDER — POLYETHYLENE GLYCOL 3350 17 G/17G
17 POWDER, FOR SOLUTION ORAL DAILY PRN
Status: DISCONTINUED | OUTPATIENT
Start: 2025-08-06 | End: 2025-08-07 | Stop reason: HOSPADM

## 2025-08-06 RX ORDER — OXYTOCIN 10 [USP'U]/ML
10 INJECTION, SOLUTION INTRAMUSCULAR; INTRAVENOUS
Status: DISCONTINUED | OUTPATIENT
Start: 2025-08-06 | End: 2025-08-07 | Stop reason: HOSPADM

## 2025-08-06 RX ORDER — ACETAMINOPHEN 325 MG/1
650 TABLET ORAL EVERY 4 HOURS PRN
Status: DISCONTINUED | OUTPATIENT
Start: 2025-08-06 | End: 2025-08-07 | Stop reason: HOSPADM

## 2025-08-06 RX ORDER — SODIUM CHLORIDE, SODIUM LACTATE, POTASSIUM CHLORIDE, CALCIUM CHLORIDE 600; 310; 30; 20 MG/100ML; MG/100ML; MG/100ML; MG/100ML
10-125 INJECTION, SOLUTION INTRAVENOUS CONTINUOUS
Status: DISCONTINUED | OUTPATIENT
Start: 2025-08-06 | End: 2025-08-06

## 2025-08-06 RX ORDER — HYDROCORTISONE 25 MG/G
CREAM TOPICAL 3 TIMES DAILY PRN
Status: DISCONTINUED | OUTPATIENT
Start: 2025-08-06 | End: 2025-08-07 | Stop reason: HOSPADM

## 2025-08-06 RX ORDER — LABETALOL 100 MG/1
200 TABLET, FILM COATED ORAL EVERY 12 HOURS SCHEDULED
Status: DISCONTINUED | OUTPATIENT
Start: 2025-08-06 | End: 2025-08-06

## 2025-08-06 RX ORDER — OXYTOCIN/0.9 % SODIUM CHLORIDE 30/500 ML
340 PLASTIC BAG, INJECTION (ML) INTRAVENOUS CONTINUOUS PRN
Status: DISCONTINUED | OUTPATIENT
Start: 2025-08-06 | End: 2025-08-07 | Stop reason: HOSPADM

## 2025-08-06 RX ORDER — LOPERAMIDE HYDROCHLORIDE 2 MG/1
2 CAPSULE ORAL
Status: DISCONTINUED | OUTPATIENT
Start: 2025-08-06 | End: 2025-08-07 | Stop reason: HOSPADM

## 2025-08-06 RX ORDER — LOPERAMIDE HYDROCHLORIDE 2 MG/1
4 CAPSULE ORAL
Status: DISCONTINUED | OUTPATIENT
Start: 2025-08-06 | End: 2025-08-07 | Stop reason: HOSPADM

## 2025-08-06 RX ADMIN — ENOXAPARIN SODIUM 40 MG: 40 INJECTION SUBCUTANEOUS at 12:49

## 2025-08-06 RX ADMIN — MAGNESIUM SULFATE HEPTAHYDRATE 2 G/HR: 40 INJECTION, SOLUTION INTRAVENOUS at 01:32

## 2025-08-06 RX ADMIN — PANTOPRAZOLE SODIUM 40 MG: 40 TABLET, DELAYED RELEASE ORAL at 08:17

## 2025-08-06 RX ADMIN — ACETAMINOPHEN 650 MG: 325 TABLET ORAL at 06:38

## 2025-08-06 RX ADMIN — LABETALOL HYDROCHLORIDE 20 MG: 5 INJECTION INTRAVENOUS at 00:12

## 2025-08-06 RX ADMIN — MAGNESIUM SULFATE HEPTAHYDRATE 2 G/HR: 40 INJECTION, SOLUTION INTRAVENOUS at 21:11

## 2025-08-06 RX ADMIN — LABETALOL HYDROCHLORIDE 400 MG: 100 TABLET, FILM COATED ORAL at 21:12

## 2025-08-06 RX ADMIN — FAMOTIDINE 40 MG: 20 TABLET, FILM COATED ORAL at 08:17

## 2025-08-06 RX ADMIN — HYDRALAZINE HYDROCHLORIDE 10 MG: 20 INJECTION INTRAMUSCULAR; INTRAVENOUS at 00:58

## 2025-08-06 RX ADMIN — ACETAMINOPHEN 650 MG: 325 TABLET ORAL at 18:13

## 2025-08-06 RX ADMIN — LABETALOL HYDROCHLORIDE 200 MG: 100 TABLET, FILM COATED ORAL at 06:38

## 2025-08-06 RX ADMIN — ACETAMINOPHEN 650 MG: 325 TABLET ORAL at 22:55

## 2025-08-06 RX ADMIN — PANTOPRAZOLE SODIUM 40 MG: 40 TABLET, DELAYED RELEASE ORAL at 22:47

## 2025-08-06 RX ADMIN — METHYLCELLULOSE 1000 MG: 500 TABLET ORAL at 08:17

## 2025-08-06 RX ADMIN — ACETAMINOPHEN 650 MG: 325 TABLET ORAL at 12:48

## 2025-08-06 RX ADMIN — ACETAMINOPHEN 650 MG: 325 TABLET ORAL at 01:29

## 2025-08-06 RX ADMIN — MAGNESIUM SULFATE HEPTAHYDRATE 2 G/HR: 40 INJECTION, SOLUTION INTRAVENOUS at 11:21

## 2025-08-06 RX ADMIN — SODIUM CHLORIDE, SODIUM LACTATE, POTASSIUM CHLORIDE, AND CALCIUM CHLORIDE 25 ML/HR: .6; .31; .03; .02 INJECTION, SOLUTION INTRAVENOUS at 11:20

## 2025-08-06 ASSESSMENT — ACTIVITIES OF DAILY LIVING (ADL)
ADLS_ACUITY_SCORE: 36
ADLS_ACUITY_SCORE: 36
ADLS_ACUITY_SCORE: 38
ADLS_ACUITY_SCORE: 35
ADLS_ACUITY_SCORE: 36
ADLS_ACUITY_SCORE: 38
ADLS_ACUITY_SCORE: 36
ADLS_ACUITY_SCORE: 35
ADLS_ACUITY_SCORE: 35
ADLS_ACUITY_SCORE: 38
ADLS_ACUITY_SCORE: 38
ADLS_ACUITY_SCORE: 35
ADLS_ACUITY_SCORE: 38
ADLS_ACUITY_SCORE: 36
ADLS_ACUITY_SCORE: 38
ADLS_ACUITY_SCORE: 35
ADLS_ACUITY_SCORE: 36
ADLS_ACUITY_SCORE: 35
ADLS_ACUITY_SCORE: 36

## 2025-08-07 VITALS
BODY MASS INDEX: 50.72 KG/M2 | WEIGHT: 293 LBS | SYSTOLIC BLOOD PRESSURE: 143 MMHG | OXYGEN SATURATION: 98 % | RESPIRATION RATE: 18 BRPM | TEMPERATURE: 97.7 F | DIASTOLIC BLOOD PRESSURE: 80 MMHG | HEART RATE: 75 BPM

## 2025-08-07 PROCEDURE — 250N000013 HC RX MED GY IP 250 OP 250 PS 637: Performed by: FAMILY MEDICINE

## 2025-08-07 PROCEDURE — 250N000011 HC RX IP 250 OP 636: Performed by: FAMILY MEDICINE

## 2025-08-07 RX ORDER — LABETALOL 200 MG/1
400 TABLET, FILM COATED ORAL 2 TIMES DAILY
Qty: 60 TABLET | Refills: 1 | Status: SHIPPED | OUTPATIENT
Start: 2025-08-07

## 2025-08-07 RX ADMIN — LABETALOL HYDROCHLORIDE 400 MG: 100 TABLET, FILM COATED ORAL at 08:43

## 2025-08-07 RX ADMIN — ACETAMINOPHEN 650 MG: 325 TABLET ORAL at 03:03

## 2025-08-07 RX ADMIN — ENOXAPARIN SODIUM 40 MG: 40 INJECTION SUBCUTANEOUS at 00:17

## 2025-08-07 RX ADMIN — PANTOPRAZOLE SODIUM 40 MG: 40 TABLET, DELAYED RELEASE ORAL at 06:20

## 2025-08-07 RX ADMIN — ACETAMINOPHEN 650 MG: 325 TABLET ORAL at 08:43

## 2025-08-07 ASSESSMENT — ACTIVITIES OF DAILY LIVING (ADL)
ADLS_ACUITY_SCORE: 37

## 2025-08-11 ENCOUNTER — ALLIED HEALTH/NURSE VISIT (OUTPATIENT)
Dept: FAMILY MEDICINE | Facility: CLINIC | Age: 25
End: 2025-08-11
Payer: COMMERCIAL

## 2025-08-11 VITALS — SYSTOLIC BLOOD PRESSURE: 120 MMHG | DIASTOLIC BLOOD PRESSURE: 66 MMHG

## 2025-08-11 DIAGNOSIS — I10 ESSENTIAL HYPERTENSION: Primary | ICD-10-CM

## 2025-08-11 PROCEDURE — 99207 PR NO CHARGE NURSE ONLY: CPT

## 2025-08-11 PROCEDURE — 3078F DIAST BP <80 MM HG: CPT

## 2025-08-11 PROCEDURE — 3074F SYST BP LT 130 MM HG: CPT

## 2025-08-19 ENCOUNTER — E-VISIT (OUTPATIENT)
Dept: URGENT CARE | Facility: CLINIC | Age: 25
End: 2025-08-19
Payer: COMMERCIAL

## 2025-08-19 DIAGNOSIS — R21 RASH AND NONSPECIFIC SKIN ERUPTION: Primary | ICD-10-CM

## 2025-08-19 PROCEDURE — 99207 PR NON-BILLABLE SERV PER CHARTING: CPT | Performed by: FAMILY MEDICINE

## (undated) DEVICE — DRAPE MAYO STAND 23X54 8337

## (undated) DEVICE — STPL DAVINCI SUREFORM 60MM RELOAD WHITE 48360W

## (undated) DEVICE — GLOVE PROTEXIS POWDER FREE SMT 7.5  2D72PT75X

## (undated) DEVICE — Device

## (undated) DEVICE — STPL DAVINCI SUREFORM 60MM STR 480460

## (undated) DEVICE — DAVINCI XI DRAPE ARM 470015

## (undated) DEVICE — ENDO POUCH UNIVERSAL RETRIEVAL SYSTEM INZII 12/15MM CD004

## (undated) DEVICE — GLOVE PROTEXIS W/NEU-THERA 8.5  2D73TE85

## (undated) DEVICE — SU VICRYL 0 CT-2 27" UND J270H

## (undated) DEVICE — SYR 30ML LL W/O NDL 302832

## (undated) DEVICE — LINEN TOWEL PACK X30 5481

## (undated) DEVICE — SOL WATER IRRIG 1000ML BOTTLE 2F7114

## (undated) DEVICE — DRAPE SHEET MED 44X70" 9355

## (undated) DEVICE — TUBING SMOKE EVAC PNEUMOCLEAR HEATED 0620050350

## (undated) DEVICE — SOL NACL 0.9% IRRIG 3000ML BAG 07972-08

## (undated) DEVICE — DAVINCI XI GRASPER FENESTRATED TIP UP 8MM 470347

## (undated) DEVICE — STPL DAVINCI SUREFORM 60MM RELOAD BLUE 48360B

## (undated) DEVICE — ENDO SYSTEM WATER BOTTLE & TUBING W/CO2 FILTER 00711549

## (undated) DEVICE — DRAPE SHEET REV FOLD 3/4 9349

## (undated) DEVICE — DAVINCI XI SEAL UNIVERSAL 5-8MM 470361

## (undated) DEVICE — ESU PENCIL W/HOLSTER

## (undated) DEVICE — SYSTEM LAPAROVUE VISIBILITY LAPVUE10

## (undated) DEVICE — LINEN TOWEL PACK X6 WHITE 5487

## (undated) DEVICE — DAVINCI XI HANDPIECE ESU VESSEL SEALER 8MM EXT 480422

## (undated) DEVICE — ADH LIQUID MASTISOL TOPICAL VIAL 2-3ML 0523-48

## (undated) DEVICE — CAST PADDING 4" WEBRIL UNSTERILE

## (undated) DEVICE — PREP CHLORAPREP 26ML TINTED ORANGE  260815

## (undated) DEVICE — TUBING SUCTION 12"X1/4" N612

## (undated) DEVICE — SYR 10ML LL W/O NDL 302995

## (undated) DEVICE — BLADE SHAVER ARTHRO 4MM TOMCAT

## (undated) DEVICE — ENDO TROCAR FIRST ENTRY KII FIOS Z-THRD 05X100MM CTF03

## (undated) DEVICE — SYR 30ML SLIP TIP W/O NDL 302833

## (undated) DEVICE — CAST PADDING 4" WEBRIL STERILE

## (undated) DEVICE — BNDG ESMARK 6" STERILE

## (undated) DEVICE — DAVINCI XI REDUCER 8-12MM 470381

## (undated) DEVICE — ENDO FORCEP ENDOJAW BIOPSY 2.8MMX160CM FB-220K

## (undated) DEVICE — DRSG PRIMAPORE 02X3" 7133

## (undated) DEVICE — LIGHT HANDLE X1 31140133

## (undated) DEVICE — DRSG GAUZE 4X4" TRAY

## (undated) DEVICE — TUBING ARTHRO PUMP STRYKER

## (undated) DEVICE — SYR 10ML SLIP TIP W/O NDL 303134

## (undated) DEVICE — DRSG XEROFORM 1X8"

## (undated) DEVICE — PACK ARTHROSCOPY KNEE LATEX FREE SOP32CAFCN

## (undated) DEVICE — TUBING SUCTION 10'X3/16" N510

## (undated) DEVICE — DAVINCI SEAL CANNULA AND STPL 12MM 470380

## (undated) DEVICE — SU ETHILON 3-0 PS-2 18" 1669H

## (undated) DEVICE — NDL INSUFFLATION 13GA 150MM C2202

## (undated) DEVICE — SUCTION MANIFOLD NEPTUNE 2 SYS 4 PORT 0702-020-000

## (undated) DEVICE — BLADE KNIFE SURG 15 371115

## (undated) DEVICE — SU VICRYL 2-0 CT-2 27" UND J269H

## (undated) DEVICE — ENDO BITE BLOCK ADULT OMNI-BLOC

## (undated) DEVICE — SOL ISOPROPYL ALCOHOL USP 70% 16OZ  NDC10565-002-16 D0022

## (undated) DEVICE — DRAPE C-ARM W/STRAPS 42X72" 07-CA104

## (undated) DEVICE — GLOVE PROTEXIS W/NEU-THERA 8.0  2D73TE80

## (undated) DEVICE — JELLY LUBRICATING SURGILUBE 2OZ TUBE

## (undated) DEVICE — DAVINCI XI FCP BIPOLAR FENESTRATED 470205

## (undated) DEVICE — DAVINCI XI DRAPE COLUMN 470341

## (undated) DEVICE — BNDG ELASTIC 6" DBL LENGTH UNSTERILE 6611-16

## (undated) DEVICE — KIT ENDO FIRST STEP DISINFECTANT 200ML W/POUCH EP-4

## (undated) DEVICE — BASIN SET SINGLE STERILE 13752-624

## (undated) DEVICE — PREP CHLORAPREP 26ML TINTED HI-LITE ORANGE 930815

## (undated) DEVICE — DRSG ABDOMINAL 07 1/2X8" 7197D

## (undated) RX ORDER — HYDROMORPHONE HCL IN WATER/PF 6 MG/30 ML
PATIENT CONTROLLED ANALGESIA SYRINGE INTRAVENOUS
Status: DISPENSED
Start: 2022-07-26

## (undated) RX ORDER — DIMENHYDRINATE 50 MG/ML
INJECTION, SOLUTION INTRAMUSCULAR; INTRAVENOUS
Status: DISPENSED
Start: 2017-05-10

## (undated) RX ORDER — KETOROLAC TROMETHAMINE 30 MG/ML
INJECTION, SOLUTION INTRAMUSCULAR; INTRAVENOUS
Status: DISPENSED
Start: 2017-05-10

## (undated) RX ORDER — DEXAMETHASONE SODIUM PHOSPHATE 10 MG/ML
INJECTION INTRAMUSCULAR; INTRAVENOUS
Status: DISPENSED
Start: 2017-05-10

## (undated) RX ORDER — ONDANSETRON 2 MG/ML
INJECTION INTRAMUSCULAR; INTRAVENOUS
Status: DISPENSED
Start: 2022-07-26

## (undated) RX ORDER — PROPOFOL 10 MG/ML
INJECTION, EMULSION INTRAVENOUS
Status: DISPENSED
Start: 2017-05-10

## (undated) RX ORDER — CEFAZOLIN SODIUM IN 0.9 % NACL 3 G/100 ML
INTRAVENOUS SOLUTION, PIGGYBACK (ML) INTRAVENOUS
Status: DISPENSED
Start: 2022-03-28

## (undated) RX ORDER — ONDANSETRON 2 MG/ML
INJECTION INTRAMUSCULAR; INTRAVENOUS
Status: DISPENSED
Start: 2022-03-28

## (undated) RX ORDER — FENTANYL CITRATE 50 UG/ML
INJECTION, SOLUTION INTRAMUSCULAR; INTRAVENOUS
Status: DISPENSED
Start: 2017-05-10

## (undated) RX ORDER — ACETAMINOPHEN 325 MG/1
TABLET ORAL
Status: DISPENSED
Start: 2022-07-26

## (undated) RX ORDER — BACITRACIN 50000 [IU]/1
INJECTION, POWDER, FOR SOLUTION INTRAMUSCULAR
Status: DISPENSED
Start: 2017-05-10

## (undated) RX ORDER — ESMOLOL HYDROCHLORIDE 10 MG/ML
INJECTION INTRAVENOUS
Status: DISPENSED
Start: 2022-07-26

## (undated) RX ORDER — BUPIVACAINE HYDROCHLORIDE AND EPINEPHRINE 5; 5 MG/ML; UG/ML
INJECTION, SOLUTION EPIDURAL; INTRACAUDAL; PERINEURAL
Status: DISPENSED
Start: 2017-05-10

## (undated) RX ORDER — ENOXAPARIN SODIUM 100 MG/ML
INJECTION SUBCUTANEOUS
Status: DISPENSED
Start: 2022-07-26

## (undated) RX ORDER — FENTANYL CITRATE 50 UG/ML
INJECTION, SOLUTION INTRAMUSCULAR; INTRAVENOUS
Status: DISPENSED
Start: 2022-07-26

## (undated) RX ORDER — LABETALOL HYDROCHLORIDE 5 MG/ML
INJECTION, SOLUTION INTRAVENOUS
Status: DISPENSED
Start: 2022-07-26

## (undated) RX ORDER — ONDANSETRON 2 MG/ML
INJECTION INTRAMUSCULAR; INTRAVENOUS
Status: DISPENSED
Start: 2017-05-10

## (undated) RX ORDER — LIDOCAINE HYDROCHLORIDE 20 MG/ML
INJECTION, SOLUTION EPIDURAL; INFILTRATION; INTRACAUDAL; PERINEURAL
Status: DISPENSED
Start: 2022-03-28

## (undated) RX ORDER — HALOPERIDOL 5 MG/ML
INJECTION INTRAMUSCULAR
Status: DISPENSED
Start: 2022-07-26

## (undated) RX ORDER — HYDROMORPHONE HYDROCHLORIDE 1 MG/ML
INJECTION, SOLUTION INTRAMUSCULAR; INTRAVENOUS; SUBCUTANEOUS
Status: DISPENSED
Start: 2022-07-26

## (undated) RX ORDER — PROPOFOL 10 MG/ML
INJECTION, EMULSION INTRAVENOUS
Status: DISPENSED
Start: 2022-03-28

## (undated) RX ORDER — CEFAZOLIN SODIUM/WATER 3 G/30 ML
SYRINGE (ML) INTRAVENOUS
Status: DISPENSED
Start: 2022-07-26

## (undated) RX ORDER — LIDOCAINE HYDROCHLORIDE 20 MG/ML
INJECTION, SOLUTION EPIDURAL; INFILTRATION; INTRACAUDAL; PERINEURAL
Status: DISPENSED
Start: 2017-05-10